# Patient Record
Sex: FEMALE | Race: WHITE | NOT HISPANIC OR LATINO | Employment: OTHER | URBAN - METROPOLITAN AREA
[De-identification: names, ages, dates, MRNs, and addresses within clinical notes are randomized per-mention and may not be internally consistent; named-entity substitution may affect disease eponyms.]

---

## 2017-01-06 ENCOUNTER — OFFICE VISIT (OUTPATIENT)
Dept: PHYSICAL MEDICINE AND REHAB | Facility: CLINIC | Age: 77
End: 2017-01-06
Payer: MEDICARE

## 2017-01-06 ENCOUNTER — OFFICE VISIT (OUTPATIENT)
Dept: PODIATRY | Facility: CLINIC | Age: 77
End: 2017-01-06
Payer: MEDICARE

## 2017-01-06 VITALS
SYSTOLIC BLOOD PRESSURE: 189 MMHG | BODY MASS INDEX: 30.86 KG/M2 | HEART RATE: 74 BPM | DIASTOLIC BLOOD PRESSURE: 91 MMHG | HEIGHT: 60 IN

## 2017-01-06 VITALS
HEART RATE: 78 BPM | DIASTOLIC BLOOD PRESSURE: 92 MMHG | WEIGHT: 158 LBS | BODY MASS INDEX: 31.02 KG/M2 | SYSTOLIC BLOOD PRESSURE: 173 MMHG | HEIGHT: 60 IN

## 2017-01-06 DIAGNOSIS — G89.29 CHRONIC BILATERAL LOW BACK PAIN WITH SCIATICA, SCIATICA LATERALITY UNSPECIFIED: ICD-10-CM

## 2017-01-06 DIAGNOSIS — Z86.12 HISTORY OF POLIOMYELITIS: ICD-10-CM

## 2017-01-06 DIAGNOSIS — M54.40 CHRONIC BILATERAL LOW BACK PAIN WITH SCIATICA, SCIATICA LATERALITY UNSPECIFIED: ICD-10-CM

## 2017-01-06 DIAGNOSIS — E11.40 TYPE 2 DIABETES MELLITUS WITH DIABETIC NEUROPATHY, WITHOUT LONG-TERM CURRENT USE OF INSULIN: ICD-10-CM

## 2017-01-06 DIAGNOSIS — E08.44 DIABETIC AMYOTROPHY ASSOCIATED WITH DIABETES MELLITUS DUE TO UNDERLYING CONDITION: ICD-10-CM

## 2017-01-06 DIAGNOSIS — M47.26 OSTEOARTHRITIS OF SPINE WITH RADICULOPATHY, LUMBAR REGION: ICD-10-CM

## 2017-01-06 DIAGNOSIS — G14 POST-POLIOMYELITIS MUSCULAR ATROPHY: ICD-10-CM

## 2017-01-06 DIAGNOSIS — M17.11 PRIMARY OSTEOARTHRITIS OF RIGHT KNEE: ICD-10-CM

## 2017-01-06 DIAGNOSIS — G82.20 PARAPARESIS OF BOTH LOWER LIMBS: ICD-10-CM

## 2017-01-06 DIAGNOSIS — M21.372 FOOT DROP, LEFT: ICD-10-CM
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
## 2017-01-06 DIAGNOSIS — M47.16 LUMBAR SPONDYLOSIS WITH MYELOPATHY: ICD-10-CM

## 2017-01-06 DIAGNOSIS — L84 CORN OR CALLUS: ICD-10-CM

## 2017-01-06 DIAGNOSIS — R26.9 GAIT DISORDER: Primary | ICD-10-CM

## 2017-01-06 DIAGNOSIS — M48.061 LUMBAR SPINAL STENOSIS: ICD-10-CM

## 2017-01-06 DIAGNOSIS — E11.42 DIABETIC POLYNEUROPATHY ASSOCIATED WITH TYPE 2 DIABETES MELLITUS: ICD-10-CM

## 2017-01-06 DIAGNOSIS — M54.16 LEFT LUMBAR RADICULOPATHY: ICD-10-CM

## 2017-01-06 DIAGNOSIS — B35.1 ONYCHOMYCOSIS DUE TO DERMATOPHYTE: ICD-10-CM

## 2017-01-06 DIAGNOSIS — G14 POST POLIOMYELITIS SYNDROME: ICD-10-CM

## 2017-01-06 DIAGNOSIS — G60.9 IDIOPATHIC PERIPHERAL NEUROPATHY: Primary | ICD-10-CM

## 2017-01-06 PROCEDURE — 3077F SYST BP >= 140 MM HG: CPT | Mod: S$GLB,,, | Performed by: PHYSICAL MEDICINE & REHABILITATION

## 2017-01-06 PROCEDURE — 99999 PR PBB SHADOW E&M-EST. PATIENT-LVL II: CPT | Mod: PBBFAC,,, | Performed by: PODIATRIST

## 2017-01-06 PROCEDURE — 99499 UNLISTED E&M SERVICE: CPT | Mod: S$GLB,,, | Performed by: PODIATRIST

## 2017-01-06 PROCEDURE — 99214 OFFICE O/P EST MOD 30 MIN: CPT | Mod: S$GLB,,, | Performed by: PHYSICAL MEDICINE & REHABILITATION

## 2017-01-06 PROCEDURE — 1160F RVW MEDS BY RX/DR IN RCRD: CPT | Mod: S$GLB,,, | Performed by: PHYSICAL MEDICINE & REHABILITATION

## 2017-01-06 PROCEDURE — 11056 PARNG/CUTG B9 HYPRKR LES 2-4: CPT | Mod: Q9,S$GLB,, | Performed by: PODIATRIST

## 2017-01-06 PROCEDURE — 1159F MED LIST DOCD IN RCRD: CPT | Mod: S$GLB,,, | Performed by: PHYSICAL MEDICINE & REHABILITATION

## 2017-01-06 PROCEDURE — 1157F ADVNC CARE PLAN IN RCRD: CPT | Mod: S$GLB,,, | Performed by: PHYSICAL MEDICINE & REHABILITATION

## 2017-01-06 PROCEDURE — 99999 PR PBB SHADOW E&M-EST. PATIENT-LVL III: CPT | Mod: PBBFAC,,, | Performed by: PHYSICAL MEDICINE & REHABILITATION

## 2017-01-06 PROCEDURE — 3080F DIAST BP >= 90 MM HG: CPT | Mod: S$GLB,,, | Performed by: PHYSICAL MEDICINE & REHABILITATION

## 2017-01-06 PROCEDURE — 99499 UNLISTED E&M SERVICE: CPT | Mod: S$GLB,,, | Performed by: PHYSICAL MEDICINE & REHABILITATION

## 2017-01-06 PROCEDURE — 11721 DEBRIDE NAIL 6 OR MORE: CPT | Mod: Q9,59,S$GLB, | Performed by: PODIATRIST

## 2017-01-06 RX ORDER — HYDROCODONE BITARTRATE AND ACETAMINOPHEN 10; 325 MG/1; MG/1
1 TABLET ORAL EVERY 8 HOURS PRN
Qty: 90 TABLET | Refills: 0 | Status: SHIPPED | OUTPATIENT
Start: 2017-02-06 | End: 2017-03-08

## 2017-01-06 RX ORDER — HYDROCODONE BITARTRATE AND ACETAMINOPHEN 10; 325 MG/1; MG/1
1 TABLET ORAL EVERY 8 HOURS PRN
Qty: 90 TABLET | Refills: 0 | Status: SHIPPED | OUTPATIENT
Start: 2017-03-06 | End: 2017-04-06 | Stop reason: SDUPTHER

## 2017-01-06 RX ORDER — HYDROCODONE BITARTRATE AND ACETAMINOPHEN 10; 325 MG/1; MG/1
1 TABLET ORAL EVERY 8 HOURS PRN
Qty: 90 TABLET | Refills: 0 | Status: SHIPPED | OUTPATIENT
Start: 2017-01-06 | End: 2017-02-05

## 2017-01-06 RX ORDER — GABAPENTIN 600 MG/1
600 TABLET ORAL 3 TIMES DAILY
Qty: 270 TABLET | Refills: 2 | Status: SHIPPED | OUTPATIENT
Start: 2017-01-06 | End: 2017-04-06

## 2017-01-06 NOTE — MR AVS SNAPSHOT
[facility]  [address]  [facility] [address]  Phone: [phone]                  Emilia Alan   2017 12:00 PM   Office Visit    Description:  Female : 1940   Provider:  Morro Ford DPM   Department:  [facility]           Reason for Visit     Peripheral Neuropathy     Foot Problem     Diabetes Mellitus     Diabetic Foot Exam     Routine Foot Care                To Do List           Future Appointments        Provider Department Dept Phone    2017 10:30 AM Krista Burger MD [facility] [phone]    2017 11:15 AM Carole Niño DPM [facility] [phone]      Goals (5 Years of Data)     None      [facility]     [facility] -  Assistance  Registered nurses in the [facility] provide clinical advisement, health education, appointment booking, and other advisory services.  Call for this free service at [phone].             Medications           Message regarding Medications     Verify the changes and/or additions to your medication regime listed below are the same as discussed with your clinician today.  If any of these changes or additions are incorrect, please notify your healthcare provider.             Verify that the below list of medications is an accurate representation of the medications you are currently taking.  If none reported, the list may be blank. If incorrect, please contact your healthcare provider. Carry this list with you in case of emergency.           Current Medications     blood sugar diagnostic Strp 1 strip by Misc.(Non-Drug; Combo Route) route 2 (two) times daily. TRUE RESULT SYSTEM    blood-glucose meter (TRUERESULT BLOOD GLUCOSE SYSTM) kit Use as instructed    calcium-vitamin D3-vitamin K (VIACTIV) 500-100-40 mg-unit-mcg Chew Take 2 each by mouth.    diclofenac (VOLTAREN) 75 MG EC tablet TAKE 1 TABLET BY MOUTH TWICE DAILY WITH MEALS. STOP IF  ANY STOMACH IRRITATION    diclofenac sodium (VOLTAREN) 1 % Gel Apply 4 gm to both knees 3x/day.    docusate sodium (COLACE) 50 MG capsule Take 50 mg by mouth 2 (two) times daily as needed for Constipation.    econazole nitrate 1 % cream Apply topically once daily.    enalapril (VASOTEC) 20 MG tablet Take 1 tablet (20 mg total) by mouth 2 (two) times daily.    glipiZIDE (GLUCOTROL) 5 MG tablet Take 1 tablet (5 mg total) by mouth once daily.    hydrochlorothiazide (HYDRODIURIL) 25 MG tablet Take 1 tablet (25 mg total) by mouth once daily.    lancets Misc TEST 2 X DAILY PROSPER RESULT SYSTEM    metformin (GLUCOPHAGE-XR) 500 MG 24 hr tablet TK 1 T PO QD    misoprostol (CYTOTEC) 200 MCG Tab Take 1 tablet (200 mcg total) by mouth 2 (two) times daily.    multivitamin (THERAGRAN) per tablet     oxybutynin (DITROPAN) 5 MG Tab Take 1 tablet (5 mg total) by mouth 2 (two) times daily.    pravastatin (PRAVACHOL) 40 MG tablet Take 1.5 tablets (60 mg total) by mouth nightly.    verapamil (CALAN-SR) 240 MG CR tablet Take 1 tablet (240 mg total) by mouth once daily.    gabapentin (NEURONTIN) 600 MG tablet Take 1 tablet (600 mg total) by mouth 3 (three) times daily.    hydrocodone-acetaminophen 10-325mg (NORCO)  mg Tab Take 1 tablet by mouth every 6 (six) hours as needed (pain).    hydrocodone-acetaminophen 10-325mg (NORCO)  mg Tab Take 1 tablet by mouth every 8 (eight) hours as needed (pain).    hydrocodone-acetaminophen 10-325mg (NORCO)  mg Tab Starting on Feb 06, 2017. Take 1 tablet by mouth every 8 (eight) hours as needed (pain).    hydrocodone-acetaminophen 10-325mg (NORCO)  mg Tab Starting on Mar 06, 2017. Take 1 tablet by mouth every 8 (eight) hours as needed (pain).           Clinical Reference Information           Vital Signs - Last Recorded  Most recent update: 1/6/2017 11:48 AM by Kymberly Guerra MA    BP Pulse Ht BMI       (!) 189/91 74 5' (1.524 m) 30.86 kg/m2       Blood Pressure          Most  Recent Value    BP  (!)  189/91      Allergies as of 1/6/2017     No Known Allergies      Immunizations Administered on Date of Encounter - 1/6/2017     None      [facility] Sign-Up     Activating your [facility] account is as easy as 1-2-3!     1) Visit [email or web address], select Sign Up Now, enter this activation code and your date of birth, then select Next.  [email or web address]  Expires: 2/20/2017 11:25 AM      2) Create a username and password to use when you visit [facility] in the future and select a security question in case you lose your password and select Next.    3) Enter your e-mail address and click Sign Up!    Additional Information  If you have questions, please e-mail [email or web address] or call [phone] to talk to our [facility] staff. Remember, [facility] is NOT to be used for urgent needs. For medical emergencies, dial 911.

## 2017-01-06 NOTE — PROGRESS NOTES
Subjective:      Patient ID: Emilia Alan is a 76 y.o. female.    Chief Complaint: Peripheral Neuropathy (PCP Dr. Deras 11/14/16); Foot Problem; Diabetes Mellitus; Diabetic Foot Exam; and Routine Foot Care    Emilia is a 76 y.o. female who presents to the clinic for evaluation and treatment of high risk feet. 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 The patient's chief complaint is long, thick toenails.       PCP: Lauren Deras MD    Date Last Seen by PCP:   Chief Complaint   Patient presents with    Peripheral Neuropathy     PCP Dr. Deras 11/14/16    Foot Problem    Diabetes Mellitus    Diabetic Foot Exam    Routine Foot Care         Current shoe gear: DM shoes w/ AFO brace( L)    Hemoglobin A1C   Date Value Ref Range Status   11/10/2016 6.7 (H) 4.5 - 6.2 % Final     Comment:     According to ADA guidelines, hemoglobin A1C <7.0% represents  optimal control in non-pregnant diabetic patients.  Different  metrics may apply to specific populations.   Standards of Medical Care in Diabetes - 2016.  For the purpose of screening for the presence of diabetes:  <5.7%     Consistent with the absence of diabetes  5.7-6.4%  Consistent with increasing risk for diabetes   (prediabetes)  >or=6.5%  Consistent with diabetes  Currently no consensus exists for use of hemoglobin A1C  for diagnosis of diabetes for children.     05/17/2016 6.4 (H) 4.5 - 6.2 % Final   11/11/2015 6.0 4.5 - 6.2 % Final         Review  of Systems   Constitution: Negative for chills, decreased appetite and fever.   Cardiovascular: Negative for chest pain, claudication and leg swelling.   Respiratory: Negative for cough.    Skin: Positive for dry skin and nail changes.   Musculoskeletal: Positive for muscle weakness (foot drop). Negative for arthritis, back pain, gout, joint pain and myalgias.   Gastrointestinal: Negative for nausea and vomiting.   Neurological: Positive for numbness and paresthesias. Negative for loss of balance.           Objective:      Physical Exam   Constitutional: She is oriented to person, place, and time. She appears well-developed and well-nourished. No distress.   Cardiovascular:   Dorsalis pedis and posterior tibial pulses are diminished Bilaterally. Toes are cool to touch. Feet are warm proximally.There is decreased digital hair. Skin is atrophic, slightly hyperpigmented, and mildly edematous       Musculoskeletal: She exhibits no edema or tenderness.        Right ankle: Normal.        Left ankle: Normal.        Right foot: There is no swelling, no crepitus and no deformity.        Left foot: There is no swelling, no crepitus and no deformity.   Dropfoot left.      Lymphadenopathy:   No palpable lymph nodes   Neurological: She is alert and oriented to person, place, and time. She has normal strength.   [place]-Sarah 5.07 monofilamant testing is diminished Charbel feet. Sharp/dull sensation diminished Bilaterally. Light touch absent Bilaterally.       Skin: Skin is warm, dry and intact. No abrasion, no bruising, no burn, no laceration, no lesion, no petechiae and no rash noted. She is not diaphoretic. No pallor. Nails show no clubbing.   Nails 1-5 b/l  are elongated by  3-6 mm's, thickened by 3-5 mm's, dystrophic, and are darkened in  coloration . Xerosis Bilaterally. No open lesions noted.    Hyperkeratotic tissue noted to plantar L 5th MPJ and plantar L 5th toe   Psychiatric: She has a normal mood and affect. Judgment  and thought content normal.   Nursing note and vitals reviewed.          Assessment:       Encounter Diagnoses   Name Primary?    Idiopathic peripheral neuropathy Yes    History of poliomyelitis     Onychomycosis due to dermatophyte     Corn or callus     Post-poliomyelitis muscular atrophy     Foot drop, left          Plan:       Emilia was seen today for peripheral neuropathy, foot problem, diabetes mellitus, diabetic foot exam and routine foot care.    Diagnoses and all orders for this visit:    Idiopathic peripheral neuropathy    History of poliomyelitis    Onychomycosis due to dermatophyte    Corn or callus    Post-poliomyelitis muscular atrophy    Foot drop, left    I counseled the patient on her conditions, their implications and medical management.    - Shoe inspection.Patient instructed on proper foot hygeine. We discussed wearing proper shoe gear, daily foot inspections, never walking without protective shoe gear, never putting sharp instruments to feet, routine podiatric nail visits every 2-3 months.      - With patient's permission, nails were aggressively reduced and debrided x 10 to their soft tissue attachment mechanically and with electric , removing all offending nail and debris. Patient relates relief following the procedure. She will continue to monitor the areas daily, inspect her feet, wear protective shoe gear when ambulatory, moisturizer to maintain skin integrity and follow in this office in approximately 2-3 months, sooner p.r.n.    - After cleansing the  area w/ alcohol prep pad the above mentioned hyperkeratosis was trimmed utilizing No 15 scapel, to a smooth base with out incident. Patient tolerated this  well and reported comfort to the area of plantar L 5th MPJ and lateral l 5th toe

## 2017-01-06 NOTE — PROGRESS NOTES
Subjective:       Patient ID: Emilia Alan is a 76 y.o. female.    Chief Complaint: Gait Problem    Back Pain   Associated symptoms include leg pain. Pertinent negatives include no abdominal pain, chest pain, fever, numbness or weakness. Headaches:     Leg Pain    Pertinent negatives include no numbness.   Extremity Weakness    Pertinent negatives include no fever or numbness.   Knee Pain    Pertinent negatives include no numbness.   Ms. Alan is a 76-year-old white female returns to clinic for face to face evaluation for scooter.  She has known with past medical history of polio, diagnosed at the age of 18 months and postpolio syndrome diagnosed a couple of years ago.    She has paraparesis, progressive b/l leg weakness, functional decline,a right leg is weaker than Left leg- that is weak in ankle only.   She cannot actively  Right LE, still walks short distances, but majority of time she spent sitting in manual wheel chair.  Today, she complains that she cannot walk any more, she stumbles a lot, and falls on daily basis if she tries to walk ( using RW).   She has at least 7 episodes of near fall during one week.no major injury reported.   Rt knee gives away on her, Rt leg is affected by polio, cannot lift or move at all Rt leg any longer.   She is still able to lift left leg.   She also have severe Lumbar Spinal stenosis at L3-4,with near complete obliteration of the left neural foramen, and severe Lumbar SS, at L4-5 with   Bilateral severe neural foramina narrowing.    She is here for follow up visit for back pain, and chronic pain management.    Today, she complains about back pain.   Current back pain is 0, worst pain is 9 while upright and walking.    Pain is localized across lower back and in upper spine.    Back pain is off/on, present mainly during day time, sharp, severe ,stabbing pain.  Pain is worse with lying or sitting and getting up from chair.   With that pain she is afraid she will  fall down, since she gets more weakness in Right leg.   Complains also about right knee pain, that is weak, and goes backwards during walking.   The patient has had gradual worsening of her gait over the last few years.   She was prescribed a left AFO ( that she wears) and a right KAFO that patient did not like ( does not wear).   She cannot tolerate swedish knee cage as well.   Now asking for neoprene sleee brace , that will try to stabilized knee, and to take pressure off bone.   She continues to complain of progressive bilateral lower extremity weakness.   She reports frequent falls, especially in the last three months.   The patient lives in a single-silvia home with a ramp access.   She is independent with her dressing, feeding and grooming.   She is also independent with toileting and bathing using a shower chair.   She is no longer able to ambulate with single cane, nor with RW  She can go about 20 feet, but has to stop frequently.   She has manual wheel chair that is bulky, and she cannot propel, nor she can francisco longer use RW with seat, for any longer distances.   She is restricted by lower extremity weakness, especially on the right side. She has frequent falls, reportedly every day.   She did not sustain any significant injuries.   The patient does own a manual wheelchair, but not able to push, propel on her own, secondary to arm, Rt shoulder pain and weakness as well.     She is here for face to face evaluation for scooter, and for follow up visit , and medication adjustment.    Review of Systems   Constitutional: Negative for appetite change, chills, fatigue, fever and unexpected weight change.   HENT: Negative for drooling, trouble swallowing and voice change.    Eyes: Negative for pain and visual disturbance.   Respiratory: Negative for shortness of breath and wheezing.    Cardiovascular: Negative for chest pain and palpitations.   Gastrointestinal: Negative for abdominal distention, abdominal pain,  constipation and diarrhea.   Genitourinary: Negative for difficulty urinating.   Musculoskeletal: Positive for back pain and extremity weakness. Negative for arthralgias, gait problem, joint swelling, myalgias and neck stiffness.               Skin: Negative for color change and rash.   Neurological: Negative for dizziness, facial asymmetry, speech difficulty, weakness, light-headedness and numbness. Headaches:     Hematological: Negative for adenopathy.   Psychiatric/Behavioral: Negative for behavioral problems, confusion and sleep disturbance. The patient is not nervous/anxious.            Objective:      Physical Exam     Constitutional: She is oriented to person, place, and time. She appears well-developed and well-nourished.   HENT:   Head: Normocephalic.   Eyes: EOM are normal.   Neck: Normal range of motion.   Cardiovascular: Normal rate, regular rhythm and normal heart sounds.   Pulmonary/Chest: Breath sounds normal.   Musculoskeletal: Normal range of motion.   BUE:  ROM:full.  Strength: 5/5 at shoulders, elbows & hands.  Sensation to pinprick: intact  BLE: ROM:full.  Strength:   RLE: HF 0/5, KE 0/5,   Ankle DF 2-, Ankle PF 3  LLE:   HF 3-, KE 3-.  Ankle DF 1,  Ankle PF 3  Leg length discrepancy ( left is shorter than right), wears Left AFO.   Sensation to pinprick: intact .   DTR: decreased.       Xray of Right knee ( 2014)  Showed:  Standing AP knees and lateral of the right knee and merchant view of both knees are submitted.    Advanced degenerative change seen in the tricompartmental areas of the right knee.    Left knee shows mild degenerative change.  Both patellas show significant lateral deviation    MRI of Lumbar spine  ( 2015) ;  L2-L3:There is a circumferential disk bulge with moderate bilateral facet osseous hypertrophy and ligamentum flavum buckling. This results and mild narrowing of the spinal canal. The bilateral neural foramen remain patent.  L3-L4: There is a circumferential disk bulge with  left paracentral disk protrusion. This is associated with severe bilateral facet osseous hypertrophy, bilateral facet edema, and ligamentum flavum buckling.   These findings result in severe narrowing of the spinal canal and near complete obliteration of the left neural foramen.  The right neural foramen is moderately narrowed as well.  L4-L5: There is a circumferential disk bulge with superimposed central disk protrusion. This is associated with severe bilateral facet osseous hypertrophy and ligamentum flavum buckling.   These findings result in severe narrowing of the spinal canal and bilateral neural foramina, left greater than right.     Assessment:       1. Gait disorder    2. Paraparesis of both lower limbs    3. Post poliomyelitis syndrome    4. Type 2 diabetes mellitus with diabetic neuropathy, without long-term current use of insulin    5. Primary osteoarthritis of right knee    6. Chronic bilateral low back pain with sciatica, sciatica laterality unspecified    7. Fall, sequela    8. Lumbar spinal stenosis    9. Left lumbar radiculopathy    10. Osteoarthritis of spine with radiculopathy, lumbar region    11. History of poliomyelitis    12. Lumbar spondylosis with myelopathy    13. 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  Dispense: 90 tablet; Refill: 0  -     hydrocodone-acetaminophen 10-325mg (NORCO)  mg Tab; Take 1 tablet by mouth every 8 (eight) hours as needed (pain).  Dispense: 90 tablet; Refill: 0  -     hydrocodone-acetaminophen 10-325mg (NORCO)  mg Tab; Take 1 tablet by mouth every 8 (eight) hours as needed (pain).  Dispense: 90 tablet; Refill: 0  -     gabapentin (NEURONTIN) 600 MG tablet; Take 1 tablet (600 mg  total) by mouth 3 (three) times daily.  Dispense: 270 tablet; Refill: 2  -     HME - OTHER    Paraparesis of both lower limbs  -     hydrocodone-acetaminophen 10-325mg (NORCO)  mg Tab; Take 1 tablet by mouth every 8 (eight) hours as needed (pain).  Dispense: 90 tablet; Refill: 0  -     hydrocodone-acetaminophen 10-325mg (NORCO)  mg Tab; Take 1 tablet by mouth every 8 (eight) hours as needed (pain).  Dispense: 90 tablet; Refill: 0  -     hydrocodone-acetaminophen 10-325mg (NORCO)  mg Tab; Take 1 tablet by mouth every 8 (eight) hours as needed (pain).  Dispense: 90 tablet; Refill: 0  -     gabapentin (NEURONTIN) 600 MG tablet; Take 1 tablet (600 mg total) by mouth 3 (three) times daily.  Dispense: 270 tablet; Refill: 2  -     HME - OTHER    Post poliomyelitis syndrome  -     hydrocodone-acetaminophen 10-325mg (NORCO)  mg Tab; Take 1 tablet by mouth every 8 (eight) hours as needed (pain).  Dispense: 90 tablet; Refill: 0  -     hydrocodone-acetaminophen 10-325mg (NORCO)  mg Tab; Take 1 tablet by mouth every 8 (eight) hours as needed (pain).  Dispense: 90 tablet; Refill: 0  -     hydrocodone-acetaminophen 10-325mg (NORCO)  mg Tab; Take 1 tablet by mouth every 8 (eight) hours as needed (pain).  Dispense: 90 tablet; Refill: 0  -     gabapentin (NEURONTIN) 600 MG tablet; Take 1 tablet (600 mg total) by mouth 3 (three) times daily.  Dispense: 270 tablet; Refill: 2  -     HME - OTHER    Type 2 diabetes mellitus with diabetic neuropathy, without long-term current use of insulin  -     hydrocodone-acetaminophen 10-325mg (NORCO)  mg Tab; Take 1 tablet by mouth every 8 (eight) hours as needed (pain).  Dispense: 90 tablet; Refill: 0  -     hydrocodone-acetaminophen 10-325mg (NORCO)  mg Tab; Take 1 tablet by mouth every 8 (eight) hours as needed (pain).  Dispense: 90 tablet; Refill: 0  -     hydrocodone-acetaminophen 10-325mg (NORCO)  mg Tab; Take 1 tablet by mouth every 8  (eight) hours as needed (pain).  Dispense: 90 tablet; Refill: 0  -     gabapentin (NEURONTIN) 600 MG tablet; Take 1 tablet (600 mg total) by mouth 3 (three) times daily.  Dispense: 270 tablet; Refill: 2  -     HME - OTHER    Primary osteoarthritis of right knee  -     hydrocodone-acetaminophen 10-325mg (NORCO)  mg Tab; Take 1 tablet by mouth every 8 (eight) hours as needed (pain).  Dispense: 90 tablet; Refill: 0  -     hydrocodone-acetaminophen 10-325mg (NORCO)  mg Tab; Take 1 tablet by mouth every 8 (eight) hours as needed (pain).  Dispense: 90 tablet; Refill: 0  -     hydrocodone-acetaminophen 10-325mg (NORCO)  mg Tab; Take 1 tablet by mouth every 8 (eight) hours as needed (pain).  Dispense: 90 tablet; Refill: 0  -     gabapentin (NEURONTIN) 600 MG tablet; Take 1 tablet (600 mg total) by mouth 3 (three) times daily.  Dispense: 270 tablet; Refill: 2  -     HME - OTHER    Chronic bilateral low back pain with sciatica, sciatica laterality unspecified  -     hydrocodone-acetaminophen 10-325mg (NORCO)  mg Tab; Take 1 tablet by mouth every 8 (eight) hours as needed (pain).  Dispense: 90 tablet; Refill: 0  -     hydrocodone-acetaminophen 10-325mg (NORCO)  mg Tab; Take 1 tablet by mouth every 8 (eight) hours as needed (pain).  Dispense: 90 tablet; Refill: 0  -     hydrocodone-acetaminophen 10-325mg (NORCO)  mg Tab; Take 1 tablet by mouth every 8 (eight) hours as needed (pain).  Dispense: 90 tablet; Refill: 0  -     gabapentin (NEURONTIN) 600 MG tablet; Take 1 tablet (600 mg total) by mouth 3 (three) times daily.  Dispense: 270 tablet; Refill: 2  -     HME - OTHER    Fall, sequela  -     hydrocodone-acetaminophen 10-325mg (NORCO)  mg Tab; Take 1 tablet by mouth every 8 (eight) hours as needed (pain).  Dispense: 90 tablet; Refill: 0  -     hydrocodone-acetaminophen 10-325mg (NORCO)  mg Tab; Take 1 tablet by mouth every 8 (eight) hours as needed (pain).  Dispense: 90 tablet;  Refill: 0  -     hydrocodone-acetaminophen 10-325mg (NORCO)  mg Tab; Take 1 tablet by mouth every 8 (eight) hours as needed (pain).  Dispense: 90 tablet; Refill: 0  -     gabapentin (NEURONTIN) 600 MG tablet; Take 1 tablet (600 mg total) by mouth 3 (three) times daily.  Dispense: 270 tablet; Refill: 2  -     HME - OTHER    Lumbar spinal stenosis  -     hydrocodone-acetaminophen 10-325mg (NORCO)  mg Tab; Take 1 tablet by mouth every 8 (eight) hours as needed (pain).  Dispense: 90 tablet; Refill: 0  -     hydrocodone-acetaminophen 10-325mg (NORCO)  mg Tab; Take 1 tablet by mouth every 8 (eight) hours as needed (pain).  Dispense: 90 tablet; Refill: 0  -     hydrocodone-acetaminophen 10-325mg (NORCO)  mg Tab; Take 1 tablet by mouth every 8 (eight) hours as needed (pain).  Dispense: 90 tablet; Refill: 0  -     gabapentin (NEURONTIN) 600 MG tablet; Take 1 tablet (600 mg total) by mouth 3 (three) times daily.  Dispense: 270 tablet; Refill: 2  -     HME - OTHER    Left lumbar radiculopathy  -     hydrocodone-acetaminophen 10-325mg (NORCO)  mg Tab; Take 1 tablet by mouth every 8 (eight) hours as needed (pain).  Dispense: 90 tablet; Refill: 0  -     hydrocodone-acetaminophen 10-325mg (NORCO)  mg Tab; Take 1 tablet by mouth every 8 (eight) hours as needed (pain).  Dispense: 90 tablet; Refill: 0  -     hydrocodone-acetaminophen 10-325mg (NORCO)  mg Tab; Take 1 tablet by mouth every 8 (eight) hours as needed (pain).  Dispense: 90 tablet; Refill: 0  -     gabapentin (NEURONTIN) 600 MG tablet; Take 1 tablet (600 mg total) by mouth 3 (three) times daily.  Dispense: 270 tablet; Refill: 2  -     HME - OTHER    Osteoarthritis of spine with radiculopathy, lumbar region  -     hydrocodone-acetaminophen 10-325mg (NORCO)  mg Tab; Take 1 tablet by mouth every 8 (eight) hours as needed (pain).  Dispense: 90 tablet; Refill: 0  -     hydrocodone-acetaminophen 10-325mg (NORCO)  mg Tab; Take  1 tablet by mouth every 8 (eight) hours as needed (pain).  Dispense: 90 tablet; Refill: 0  -     hydrocodone-acetaminophen 10-325mg (NORCO)  mg Tab; Take 1 tablet by mouth every 8 (eight) hours as needed (pain).  Dispense: 90 tablet; Refill: 0  -     gabapentin (NEURONTIN) 600 MG tablet; Take 1 tablet (600 mg total) by mouth 3 (three) times daily.  Dispense: 270 tablet; Refill: 2  -     HME - OTHER    History of poliomyelitis  -     hydrocodone-acetaminophen 10-325mg (NORCO)  mg Tab; Take 1 tablet by mouth every 8 (eight) hours as needed (pain).  Dispense: 90 tablet; Refill: 0  -     hydrocodone-acetaminophen 10-325mg (NORCO)  mg Tab; Take 1 tablet by mouth every 8 (eight) hours as needed (pain).  Dispense: 90 tablet; Refill: 0  -     hydrocodone-acetaminophen 10-325mg (NORCO)  mg Tab; Take 1 tablet by mouth every 8 (eight) hours as needed (pain).  Dispense: 90 tablet; Refill: 0  -     gabapentin (NEURONTIN) 600 MG tablet; Take 1 tablet (600 mg total) by mouth 3 (three) times daily.  Dispense: 270 tablet; Refill: 2  -     HME - OTHER    Lumbar spondylosis with myelopathy  -     hydrocodone-acetaminophen 10-325mg (NORCO)  mg Tab; Take 1 tablet by mouth every 8 (eight) hours as needed (pain).  Dispense: 90 tablet; Refill: 0  -     hydrocodone-acetaminophen 10-325mg (NORCO)  mg Tab; Take 1 tablet by mouth every 8 (eight) hours as needed (pain).  Dispense: 90 tablet; Refill: 0  -     hydrocodone-acetaminophen 10-325mg (NORCO)  mg Tab; Take 1 tablet by mouth every 8 (eight) hours as needed (pain).  Dispense: 90 tablet; Refill: 0  -     gabapentin (NEURONTIN) 600 MG tablet; Take 1 tablet (600 mg total) by mouth 3 (three) times daily.  Dispense: 270 tablet; Refill: 2  -     HME - OTHER    Diabetic polyneuropathy associated with type 2 diabetes mellitus  -     hydrocodone-acetaminophen 10-325mg (NORCO)  mg Tab; Take 1 tablet by mouth every 8 (eight) hours as needed (pain).   Dispense: 90 tablet; Refill: 0  -     hydrocodone-acetaminophen 10-325mg (NORCO)  mg Tab; Take 1 tablet by mouth every 8 (eight) hours as needed (pain).  Dispense: 90 tablet; Refill: 0  -     hydrocodone-acetaminophen 10-325mg (NORCO)  mg Tab; Take 1 tablet by mouth every 8 (eight) hours as needed (pain).  Dispense: 90 tablet; Refill: 0  -     gabapentin (NEURONTIN) 600 MG tablet; Take 1 tablet (600 mg total) by mouth 3 (three) times daily.  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  Dispense: 90 tablet; Refill: 0  -     hydrocodone-acetaminophen 10-325mg (NORCO)  mg Tab; Take 1 tablet by mouth every 8 (eight) hours as needed (pain).  Dispense: 90 tablet; Refill: 0  -     hydrocodone-acetaminophen 10-325mg (NORCO)  mg Tab; Take 1 tablet by mouth every 8 (eight) hours as needed (pain).  Dispense: 90 tablet; Refill: 0  -     gabapentin (NEURONTIN) 600 MG tablet; Take 1 tablet (600 mg total) by mouth 3 (three) times daily.  Dispense: 270 tablet; Refill: 2  -     HME - OTHER      Patient with Post polio sy with  Paraparesis., severe Lumbar spinal stenosis at L3-4, and L4-5, with  Leg length discrepancy ( left is shorter than right), wears Left AFO, and has more proximal strength in LLE, than in RLE .   Also with severe DJD , genu valgus in R knee ( cannot toleate custom made  knee brace,  Nor Swedish knee cage).      .Rx: scooter, power mobility device  Lifetime need.  Face-to-face: 01/06/17.  - The patient was seen today for mobility evaluation for a powered mobility device-scooter due to significant impairment of gait and ADL's at home.   - The patient has multifactorial gait impairment, due to Post polio sy,  with Paraparesis, ( both leg weakness, Rt > Lt)   Complicated by  severe Lumbar spinal stenosis at L3-4, and L4-5, with  Leg length discrepancy ( left is shorter than right), severe DJD , genu valgus in R knee ,   That gives away, multiple falls.  - The patient is not able to ambulate safely to the kitchen or living room, she is able to wallk 15- 20 ft with RW, cannot make from one room to another safely,  Due to profound leg weakness, Paraparesis, ( both leg weakness, Rt > Lt)   - The patient is unable to use cane or walker for functional distances due to Paraparesis, (both leg weakness, Rt > Lt)  weakness, associated with  Rt shoulder/ arm pain/ DJD of Rt shoulder.  - The patient is unable to use a manual wheelchair for functional distances due to generalized and focal weakness in Rt shoulder/ arm associated with    profound leg weakness, Paraparesis, ( both leg weakness, Rt > Lt)   - The patient prefers scooter secondary to her small rooms, house, and to be able to transfer easier. She lives alove in single story house, and has a ramp to get to house. She states that she would need the smallest scooter, to be able to use in house.  - The patient's cognition is intact & she should be able to use a powered mobility device well at home.   - The patient was given a prescription for scooter.   - This will allow the patient to go safely from bedroom, to bathroom, to the kitchen, dining room or living room for feeding & ADL's, and socialization.   Would improve quality of her life and decrease a risk of falls.       1. Back pain   Will resume Hydrocodone 10/325 mg po TID, and Neurontin 300 mg, po QID.   Patient refuses neurosurgical evaluation, and ASHLEY to back.     2. Right knee pain, secondary to advanced tricompartmental DJD, refusing Ortho consult, does not want knee replacement.   Wears brace with genu recurvatum and valgus in R knee ( order Voltaren gel).     RTC in 3  months..    Total time spent face to face with patient was 25 minutes.   More than 50% of that time was spent in  counseling on diagnosis , prognosis and treatment options, evaluation for scooter.  I also caunsel patient  on common and most usual side effect of prescribed medications.   Risk and benefits of opiates, possible risk of developing opiate dependence and tolerance, need of strict compliance with prescribed medications.  I reviewed Primary care , and other specialty's notes to better coordinate patient's  care.   All questions were answered, and patient voiced understanding.

## 2017-01-06 NOTE — MR AVS SNAPSHOT
[facility]  [address]  [facility] [address]  Phone: [phone]                  Emilia Alan   2017 10:30 AM   Office Visit    Description:  Female : 1940   Provider:  Krista Burger MD   Department:  [facility]           Reason for Visit     Gait Problem           Diagnoses this Visit        Comments    Gait disorder    -  Primary     Paraparesis of both lower limbs         Post poliomyelitis syndrome         Type 2 diabetes mellitus with diabetic neuropathy, without long-term current use of insulin         Primary osteoarthritis of right knee         Chronic bilateral low back pain with sciatica, sciatica laterality unspecified         Fall, sequela         Lumbar spinal stenosis         Left lumbar radiculopathy         Osteoarthritis of spine with radiculopathy, lumbar region         History of poliomyelitis         Lumbar spondylosis with myelopathy         Diabetic polyneuropathy associated with type 2 diabetes mellitus         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       These Medications        Disp Refills Start End    hydrocodone-acetaminophen 10-325mg (NORCO)  mg Tab 90 tablet 0 2017    Take 1 tablet by mouth every 8 (eight) hours as needed (pain). - Oral    Pharmacy: [facility] [id number] [address] [facility] [address]  AT [facility] [address] Ph #: [phone]       hydrocodone-acetaminophen 10-325mg (NORCO)  mg Tab 90 tablet 0 2017 3/8/2017    Take 1 tablet by mouth every 8 (eight) hours as needed (pain). - Oral    Pharmacy: [facility] [id number] -  [place], [place] - [address] AT [facility] Ph #: [phone]       hydrocodone-acetaminophen 10-325mg (NORCO)  mg Tab 90 tablet 0 3/6/2017 4/5/2017    Take 1 tablet by mouth every 8 (eight) hours as needed (pain). - Oral    Pharmacy: [facility] [address] - [address] AT [facility] Ph #: [phone]       gabapentin (NEURONTIN) 600 MG tablet 270 tablet 2 1/6/2017 4/6/2017    Take 1 tablet (600 mg total) by mouth 3 (three) times daily. - Oral    Pharmacy: [facility] [address] - [address]  AT [facility] Ph #: [phone]         [facility]     [facility] Nurse Care Line - 24/7 Assistance  Registered nurses in the [facility] provide clinical advisement, health education, appointment booking, and other advisory services.  Call for this free service at [phone].             Medications           Message regarding Medications     Verify the changes and/or additions to your medication regime listed below are the same as discussed with your clinician today.  If any of these changes or additions are incorrect, please notify your healthcare provider.        START taking these NEW medications        Refills    hydrocodone-acetaminophen 10-325mg (NORCO)  mg Tab 0    Starting on: 2/6/2017    Sig: Take 1 tablet by mouth every 8 (eight) hours as needed (pain).    Class: Print    Route: Oral    hydrocodone-acetaminophen 10-325mg (NORCO)  mg Tab 0    Starting on: 3/6/2017    Sig: Take 1 tablet by mouth every 8 (eight) hours as needed (pain).    Class: Print    Route: Oral    gabapentin (NEURONTIN) 600 MG tablet 2    Sig: Take 1 tablet (600 mg total) by mouth 3 (three) times daily.    Class: Normal    Route: Oral      CHANGE how you are taking these medications     Start Taking Instead of    hydrocodone-acetaminophen 10-325mg (NORCO)  mg Tab hydrocodone-acetaminophen 10-325mg  (NORCO)  mg Tab    Dosage:  Take 1 tablet by mouth every 8 (eight) hours as needed (pain). Dosage:  Take 1 tablet by mouth every 6 (six) hours as needed (pain).    Reason for Change:  Reorder            Verify that the below list of medications is an accurate representation of the medications you are currently taking.  If none reported, the list may be blank. If incorrect, please contact your healthcare provider. Carry this list with you in case of emergency.           Current Medications     blood sugar diagnostic Strp 1 strip by Misc.(Non-Drug; Combo Route) route 2 (two) times daily. TRUE RESULT SYSTEM    blood-glucose meter (TRUERESULT BLOOD GLUCOSE SYSTM) kit Use as instructed    calcium-vitamin D3-vitamin K (VIACTIV) 500-100-40 mg-unit-mcg Chew Take 2 each by mouth.    diclofenac (VOLTAREN) 75 MG EC tablet TAKE 1 TABLET BY MOUTH TWICE DAILY WITH MEALS. STOP IF ANY STOMACH IRRITATION    diclofenac sodium (VOLTAREN) 1 % Gel Apply 4 gm to both knees 3x/day.    docusate sodium (COLACE) 50 MG capsule Take 50 mg by mouth 2 (two) times daily as needed for Constipation.    econazole nitrate 1 % cream Apply topically once daily.    enalapril (VASOTEC) 20 MG tablet Take 1 tablet (20 mg total) by mouth 2 (two) times daily.    glipiZIDE (GLUCOTROL) 5 MG tablet Take 1 tablet (5 mg total) by mouth once daily.    hydrochlorothiazide (HYDRODIURIL) 25 MG tablet Take 1 tablet (25 mg total) by mouth once daily.    lancets Misc TEST 2 X DAILY PROSPER RESULT SYSTEM    metformin (GLUCOPHAGE-XR) 500 MG 24 hr tablet TK 1 T PO QD    misoprostol (CYTOTEC) 200 MCG Tab Take 1 tablet (200 mcg total) by mouth 2 (two) times daily.    multivitamin (THERAGRAN) per tablet     oxybutynin (DITROPAN) 5 MG Tab Take 1 tablet (5 mg total) by mouth 2 (two) times daily.    pravastatin (PRAVACHOL) 40 MG tablet Take 1.5 tablets (60 mg total) by mouth nightly.    verapamil (CALAN-SR) 240 MG CR tablet Take 1 tablet (240 mg total) by mouth once daily.     gabapentin (NEURONTIN) 600 MG tablet Take 1 tablet (600 mg total) by mouth 3 (three) times daily.    hydrocodone-acetaminophen 10-325mg (NORCO)  mg Tab Take 1 tablet by mouth every 6 (six) hours as needed (pain).    hydrocodone-acetaminophen 10-325mg (NORCO)  mg Tab Take 1 tablet by mouth every 8 (eight) hours as needed (pain).    hydrocodone-acetaminophen 10-325mg (NORCO)  mg Tab Starting on Feb 06, 2017. Take 1 tablet by mouth every 8 (eight) hours as needed (pain).    hydrocodone-acetaminophen 10-325mg (NORCO)  mg Tab Starting on Mar 06, 2017. Take 1 tablet by mouth every 8 (eight) hours as needed (pain).           Clinical Reference Information           Vital Signs - Last Recorded  Most recent update: 1/6/2017 10:27 AM by Esmer Machuca MA    BP Pulse Ht Wt BMI    (!) 173/92 78 5' (1.524 m) 71.7 kg (158 lb) 30.86 kg/m2      Blood Pressure          Most Recent Value    BP  (!)  173/92      Allergies as of 1/6/2017     No Known Allergies      Immunizations Administered on Date of Encounter - 1/6/2017     None      Orders Placed During Today's Visit      Normal Orders This Visit    E - OTHER       [facility] Sign-Up     Activating your [facility] account is as easy as 1-2-3!     1) Visit [email or web address], select Sign Up Now, enter this activation code and your date of birth, then select Next.  [email or web address]  Expires: 2/20/2017 11:25 AM      2) Create a username and password to use when you visit [facility] in the future and select a security question in case you lose your password and select Next.    3) Enter your e-mail address and click Sign Up!    Additional Information  If you have questions, please e-mail [email or web address] or call [phone] to talk to our [facility] staff. Remember, [facility] is NOT to be used for urgent needs. For medical emergencies, dial 911.

## 2017-01-31 RX ORDER — DICLOFENAC SODIUM 75 MG/1
TABLET, DELAYED RELEASE ORAL
Qty: 180 TABLET | Refills: 0 | Status: SHIPPED | OUTPATIENT
Start: 2017-01-31 | End: 2017-04-25 | Stop reason: SDUPTHER

## 2017-04-06 ENCOUNTER — OFFICE VISIT (OUTPATIENT)
Dept: PODIATRY | Facility: CLINIC | Age: 77
End: 2017-04-06
Payer: MEDICARE

## 2017-04-06 ENCOUNTER — OFFICE VISIT (OUTPATIENT)
Dept: PHYSICAL MEDICINE AND REHAB | Facility: CLINIC | Age: 77
End: 2017-04-06
Payer: MEDICARE

## 2017-04-06 VITALS
HEIGHT: 60 IN | RESPIRATION RATE: 18 BRPM | WEIGHT: 162 LBS | BODY MASS INDEX: 31.8 KG/M2 | HEART RATE: 69 BPM | DIASTOLIC BLOOD PRESSURE: 79 MMHG | SYSTOLIC BLOOD PRESSURE: 180 MMHG

## 2017-04-06 VITALS
WEIGHT: 162.38 LBS | HEART RATE: 77 BPM | SYSTOLIC BLOOD PRESSURE: 158 MMHG | DIASTOLIC BLOOD PRESSURE: 80 MMHG | BODY MASS INDEX: 31.88 KG/M2 | HEIGHT: 60 IN

## 2017-04-06 DIAGNOSIS — M54.16 LEFT LUMBAR RADICULOPATHY: ICD-10-CM
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
## 2017-04-06 DIAGNOSIS — Z86.12 HISTORY OF POLIOMYELITIS: ICD-10-CM

## 2017-04-06 DIAGNOSIS — E11.42 DIABETIC POLYNEUROPATHY ASSOCIATED WITH TYPE 2 DIABETES MELLITUS: ICD-10-CM

## 2017-04-06 DIAGNOSIS — L84 CORN OR CALLUS: ICD-10-CM

## 2017-04-06 DIAGNOSIS — M48.061 LUMBAR SPINAL STENOSIS: Primary | ICD-10-CM

## 2017-04-06 DIAGNOSIS — G89.29 CHRONIC PAIN OF RIGHT KNEE: ICD-10-CM

## 2017-04-06 DIAGNOSIS — M17.11 PRIMARY OSTEOARTHRITIS OF RIGHT KNEE: ICD-10-CM

## 2017-04-06 DIAGNOSIS — G82.20 PARAPARESIS OF BOTH LOWER LIMBS: ICD-10-CM

## 2017-04-06 DIAGNOSIS — M25.561 CHRONIC PAIN OF RIGHT KNEE: ICD-10-CM

## 2017-04-06 DIAGNOSIS — M54.16 LUMBAR RADICULOPATHY: ICD-10-CM

## 2017-04-06 DIAGNOSIS — E08.44 DIABETIC AMYOTROPHY ASSOCIATED WITH DIABETES MELLITUS DUE TO UNDERLYING CONDITION: ICD-10-CM

## 2017-04-06 DIAGNOSIS — G14 POST POLIOMYELITIS SYNDROME: ICD-10-CM

## 2017-04-06 DIAGNOSIS — G89.29 CHRONIC BILATERAL LOW BACK PAIN WITH SCIATICA, SCIATICA LATERALITY UNSPECIFIED: ICD-10-CM

## 2017-04-06 DIAGNOSIS — M54.40 CHRONIC BILATERAL LOW BACK PAIN WITH SCIATICA, SCIATICA LATERALITY UNSPECIFIED: ICD-10-CM

## 2017-04-06 DIAGNOSIS — E11.40 TYPE 2 DIABETES MELLITUS WITH DIABETIC NEUROPATHY, WITHOUT LONG-TERM CURRENT USE OF INSULIN: ICD-10-CM

## 2017-04-06 DIAGNOSIS — B35.1 ONYCHOMYCOSIS DUE TO DERMATOPHYTE: ICD-10-CM

## 2017-04-06 DIAGNOSIS — M47.16 LUMBAR SPONDYLOSIS WITH MYELOPATHY: ICD-10-CM

## 2017-04-06 DIAGNOSIS — R26.9 GAIT DISORDER: ICD-10-CM

## 2017-04-06 DIAGNOSIS — M47.26 OSTEOARTHRITIS OF SPINE WITH RADICULOPATHY, LUMBAR REGION: ICD-10-CM

## 2017-04-06 DIAGNOSIS — G60.9 IDIOPATHIC PERIPHERAL NEUROPATHY: Primary | ICD-10-CM

## 2017-04-06 PROCEDURE — 99999 PR PBB SHADOW E&M-EST. PATIENT-LVL III: CPT | Mod: PBBFAC,,, | Performed by: PHYSICAL MEDICINE & REHABILITATION

## 2017-04-06 PROCEDURE — 3077F SYST BP >= 140 MM HG: CPT | Mod: S$GLB,,, | Performed by: PHYSICAL MEDICINE & REHABILITATION

## 2017-04-06 PROCEDURE — 99499 UNLISTED E&M SERVICE: CPT | Mod: S$GLB,,, | Performed by: PHYSICAL MEDICINE & REHABILITATION

## 2017-04-06 PROCEDURE — 3079F DIAST BP 80-89 MM HG: CPT | Mod: S$GLB,,, | Performed by: PHYSICAL MEDICINE & REHABILITATION

## 2017-04-06 PROCEDURE — 99499 UNLISTED E&M SERVICE: CPT | Mod: S$GLB,,, | Performed by: PODIATRIST

## 2017-04-06 PROCEDURE — 1125F AMNT PAIN NOTED PAIN PRSNT: CPT | Mod: S$GLB,,, | Performed by: PHYSICAL MEDICINE & REHABILITATION

## 2017-04-06 PROCEDURE — 1160F RVW MEDS BY RX/DR IN RCRD: CPT | Mod: S$GLB,,, | Performed by: PHYSICAL MEDICINE & REHABILITATION

## 2017-04-06 PROCEDURE — 11056 PARNG/CUTG B9 HYPRKR LES 2-4: CPT | Mod: Q9,S$GLB,, | Performed by: PODIATRIST

## 2017-04-06 PROCEDURE — 99999 PR PBB SHADOW E&M-EST. PATIENT-LVL III: CPT | Mod: PBBFAC,,, | Performed by: PODIATRIST

## 2017-04-06 PROCEDURE — 1159F MED LIST DOCD IN RCRD: CPT | Mod: S$GLB,,, | Performed by: PHYSICAL MEDICINE & REHABILITATION

## 2017-04-06 PROCEDURE — 1157F ADVNC CARE PLAN IN RCRD: CPT | Mod: S$GLB,,, | Performed by: PHYSICAL MEDICINE & REHABILITATION

## 2017-04-06 PROCEDURE — 99214 OFFICE O/P EST MOD 30 MIN: CPT | Mod: S$GLB,,, | Performed by: PHYSICAL MEDICINE & REHABILITATION

## 2017-04-06 PROCEDURE — 11721 DEBRIDE NAIL 6 OR MORE: CPT | Mod: 59,Q9,S$GLB, | Performed by: PODIATRIST

## 2017-04-06 RX ORDER — GABAPENTIN 300 MG/1
CAPSULE ORAL
Refills: 3 | COMMUNITY
Start: 2017-03-23 | End: 2017-04-25 | Stop reason: SDUPTHER

## 2017-04-06 RX ORDER — HYDROCODONE BITARTRATE AND ACETAMINOPHEN 10; 325 MG/1; MG/1
1 TABLET ORAL EVERY 6 HOURS PRN
Qty: 120 TABLET | Refills: 0 | Status: SHIPPED | OUTPATIENT
Start: 2017-04-06 | End: 2017-04-25 | Stop reason: SDUPTHER

## 2017-04-06 RX ORDER — HYDROCODONE BITARTRATE AND ACETAMINOPHEN 10; 325 MG/1; MG/1
1 TABLET ORAL EVERY 6 HOURS PRN
Qty: 120 TABLET | Refills: 0 | Status: SHIPPED | OUTPATIENT
Start: 2017-06-06 | End: 2017-07-06 | Stop reason: SDUPTHER

## 2017-04-06 RX ORDER — HYDROCODONE BITARTRATE AND ACETAMINOPHEN 10; 325 MG/1; MG/1
1 TABLET ORAL EVERY 6 HOURS PRN
Qty: 120 TABLET | Refills: 0 | Status: SHIPPED | OUTPATIENT
Start: 2017-05-06 | End: 2017-06-05

## 2017-04-06 RX ORDER — LANCETS 33 GAUGE
EACH MISCELLANEOUS
Refills: 3 | COMMUNITY
Start: 2017-02-10 | End: 2023-11-15

## 2017-04-06 NOTE — PROGRESS NOTES
Subjective:      Patient ID: Emilia Alan is a 76 y.o. female.    Chief Complaint: Follow-up (nail care ) and Callouses    Emilia is a 76 y.o. female who presents to the clinic for evaluation and treatment of high risk feet. 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 The patient's chief complaint is long, thick toenails.       PCP: Lauren Deras MD    Date Last Seen by PCP:   Chief Complaint   Patient presents with    Follow-up     nail care     Callouses         Current shoe gear: DM shoes w/ AFO brace( L)    Hemoglobin A1C   Date Value Ref Range Status   11/10/2016 6.7 (H) 4.5 - 6.2 % Final     Comment:     According to ADA guidelines, hemoglobin A1C <7.0% represents  optimal control in non-pregnant diabetic patients.  Different  metrics may apply to specific populations.   Standards of Medical Care in Diabetes - 2016.  For the purpose of screening for the presence of diabetes:  <5.7%     Consistent with the absence of diabetes  5.7-6.4%  Consistent with increasing risk for diabetes   (prediabetes)  >or=6.5%  Consistent with diabetes  Currently no consensus exists for use of hemoglobin A1C  for diagnosis of diabetes for children.     05/17/2016 6.4 (H) 4.5 - 6.2 % Final   11/11/2015 6.0 4.5 - 6.2 % Final         Review of Systems   Constitution: Negative for chills, decreased appetite and fever.   Cardiovascular: Negative for chest pain, claudication and leg swelling.   Respiratory: Negative for  cough.    Skin: Positive for dry skin and nail changes. Negative for flushing and itching.   Musculoskeletal: Positive for muscle weakness (foot drop). Negative for arthritis, back pain, gout, joint pain and myalgias.   Gastrointestinal: Negative for nausea and vomiting.   Neurological: Positive for numbness and paresthesias. Negative for loss of balance.           Objective:      Physical Exam   Constitutional: She is oriented to person, place, and time. She appears well-developed and well-nourished. No distress.   Cardiovascular:   Dorsalis pedis and posterior tibial pulses are diminished Bilaterally. Toes are cool to touch. Feet are warm proximally.There is decreased digital hair. Skin is atrophic, slightly hyperpigmented, and mildly edematous       Musculoskeletal: She exhibits no edema or tenderness.        Right ankle: Normal.        Left ankle: Normal.        Right foot: There is no swelling, no crepitus and no deformity.        Left foot: There is no swelling, no crepitus and no deformity.   Dropfoot left.      Lymphadenopathy:   No palpable lymph nodes   Neurological: She is alert and oriented to person, place, and time. She has normal strength.   [place]-Sarah 5.07 monofilamant testing is diminished Charbel feet. Sharp/dull sensation diminished Bilaterally. Light touch absent Bilaterally.       Skin: Skin is warm, dry and intact. No abrasion, no bruising, no burn, no laceration, no lesion, no petechiae and no rash noted. She is not diaphoretic. No pallor. Nails show no clubbing.   Nails 1-5 b/l  are elongated by  3-7 mm's, thickened by 3-5 mm's, dystrophic, and are darkened in  coloration . Xerosis Bilaterally. No open lesions noted.    Hyperkeratotic tissue noted to plantar L 5th MPJ and plantar L 5th toe   Psychiatric: She has a normal mood and affect. Judgment and thought content normal.   Nursing note and vitals reviewed.          Assessment:       Encounter Diagnoses   Name Primary?    Idiopathic  peripheral neuropathy Yes    History of poliomyelitis     Onychomycosis due to dermatophyte     Corn or callus          Plan:       Emilia was seen today for follow-up and callouses.    Diagnoses and all orders for this visit:    Idiopathic peripheral neuropathy    History of poliomyelitis    Onychomycosis due to dermatophyte    Corn or callus      I counseled the patient on her conditions, their implications and medical management.    - Shoe inspection.Patient instructed on proper foot hygeine. We discussed wearing proper shoe gear, daily foot inspections, never walking without protective shoe gear, never putting sharp instruments to feet, routine podiatric nail visits every 2-3 months.      - With patient's permission, nails were aggressively reduced and debrided x 10 to their soft tissue attachment mechanically and with electric , removing all offending nail and debris. Patient relates relief following the procedure. She will continue to monitor the areas daily, inspect her feet, wear protective shoe gear when ambulatory, moisturizer to maintain skin integrity and follow in this office in approximately 2-3 months, sooner p.r.n.    - After cleansing the  area w/ alcohol prep pad the above mentioned hyperkeratosis was trimmed utilizing No 15 scapel, to a smooth base with out incident. Patient tolerated this  well and reported comfort to the area of plantar L 5th MPJ and lateral l 5th toe

## 2017-04-06 NOTE — MR AVS SNAPSHOT
[facility]  [address]  [facility] [address]  Phone: [phone]                  Emilia SHAIKH Dayanna   2017 10:20 AM   Office Visit    Description:  Female : 1940   Provider:  Krista Burger MD   Department:  [facility]           Reason for Visit     Back Pain     Neck Pain           Diagnoses this Visit        Comments    Lumbar spinal stenosis    -  Primary     Lumbar radiculopathy         Paraparesis of both lower limbs         Osteoarthritis of spine with radiculopathy, lumbar region         Post poliomyelitis syndrome         Diabetic polyneuropathy associated with type 2 diabetes mellitus         History of poliomyelitis         Chronic pain of right knee         Lumbar spondylosis with myelopathy         Primary osteoarthritis of right knee         Gait disorder         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       These Medications        Disp Refills Start End    hydrocodone-acetaminophen 10-325mg (NORCO)  mg Tab 120 tablet 0 2017    Take 1 tablet by mouth every 6 (six) hours as needed for Pain (pain). - Oral    Pharmacy: [facility] [id number] - [address] [facility] [address]  AT [facility] [address] Ph #: [phone]       hydrocodone-acetaminophen 10-325mg (NORCO)  mg Tab 120 tablet 0 2017    Take 1 tablet by  mouth every 6 (six) hours as needed for Pain (pain). - Oral    [facility]: [facility] [id number]  [place] - [address]  AT [facility] Ph #: [phone]       hydrocodone-acetaminophen 10-325mg (NORCO)  mg Tab 120 tablet 0 6/6/2017 7/6/2017    Take 1 tablet by mouth every 6 (six) hours as needed for Pain (pain). - Oral    Pharmacy: [facility] [id number] - [place] - [address]  AT [facility] Ph #: [phone]         [facility]     [facility] On Call Nurse Care Line - 24/7 Assistance  Unless otherwise directed by your provider, please contact [facility], our nurse care line that is available for 24/7 assistance.     Registered nurses in the [facility] provide: appointment scheduling, clinical advisement, health education, and other advisory services.  Call: [phone] (toll free)               Medications           Message regarding Medications     Verify the changes and/or additions to your medication regime listed below are the same as discussed with your clinician today.  If any of these changes or additions are incorrect, please notify your healthcare provider.        START taking these NEW medications        Refills    hydrocodone-acetaminophen 10-325mg (NORCO)  mg Tab 0    Starting on: 5/6/2017    Sig: Take 1 tablet by mouth every 6 (six) hours as needed for Pain (pain).    Class: Print    Route: Oral    hydrocodone-acetaminophen 10-325mg (NORCO)  mg Tab 0    Starting on: 6/6/2017    Sig: Take 1 tablet by mouth every 6 (six) hours as needed for Pain (pain).    Class: Print    Route: Oral      CHANGE how you are taking these medications     Start Taking Instead of    hydrocodone-acetaminophen 10-325mg (NORCO)  mg Tab hydrocodone-acetaminophen 10-325mg (NORCO)  mg Tab    Dosage:  Take 1 tablet by mouth every 6 (six) hours as needed for Pain (pain). Dosage:  Take 1 tablet by mouth every 8 (eight) hours  as needed (pain).    Reason for Change:  Reorder            Verify that the below list of medications is an accurate representation of the medications you are currently taking.  If none reported, the list may be blank. If incorrect, please contact your healthcare provider. Carry this list with you in case of emergency.           Current Medications     blood sugar diagnostic Strp 1 strip by Misc.(Non-Drug; Combo Route) route 2 (two) times daily. TRUE RESULT SYSTEM    blood-glucose meter (TRUERESULT BLOOD GLUCOSE SYSTM) kit Use as instructed    calcium-vitamin D3-vitamin K (VIACTIV) 500-100-40 mg-unit-mcg Chew Take 2 each by mouth.    diclofenac (VOLTAREN) 75 MG EC tablet TAKE 1 TABLET BY MOUTH TWICE DAILY WITH MEALS. STOP IF ANY STOMACH IRRITATION    diclofenac sodium (VOLTAREN) 1 % Gel Apply 4 gm to both knees 3x/day.    docusate sodium (COLACE) 50 MG capsule Take 50 mg by mouth 2 (two) times daily as needed for Constipation.    econazole nitrate 1 % cream Apply topically once daily.    enalapril (VASOTEC) 20 MG tablet Take 1 tablet (20 mg total) by mouth 2 (two) times daily.    gabapentin (NEURONTIN) 600 MG tablet Take 1 tablet (600 mg total) by mouth 3 (three) times daily.    glipiZIDE (GLUCOTROL) 5 MG tablet Take 1 tablet (5 mg total) by mouth once daily.    hydrochlorothiazide (HYDRODIURIL) 25 MG tablet Take 1 tablet (25 mg total) by mouth once daily.    lancets Misc TEST 2 X DAILY PROSPER RESULT SYSTEM    metformin (GLUCOPHAGE-XR) 500 MG 24 hr tablet TK 1 T PO QD    misoprostol (CYTOTEC) 200 MCG Tab Take 1 tablet (200 mcg total) by mouth 2 (two) times daily.    multivitamin (THERAGRAN) per tablet     oxybutynin (DITROPAN) 5 MG Tab Take 1 tablet (5 mg total) by mouth 2 (two) times daily.    pravastatin (PRAVACHOL) 40 MG tablet Take 1.5 tablets (60 mg total) by mouth nightly.    verapamil (CALAN-SR) 240 MG CR tablet Take 1 tablet (240 mg total) by mouth once daily.    hydrocodone-acetaminophen 10-325mg (NORCO)   mg Tab Take 1 tablet by mouth every 6 (six) hours as needed for Pain (pain).    hydrocodone-acetaminophen 10-325mg (NORCO)  mg Tab Starting on May 06, 2017. Take 1 tablet by mouth every 6 (six) hours as needed for Pain (pain).    hydrocodone-acetaminophen 10-325mg (NORCO)  mg Tab Starting on Jun 06, 2017. Take 1 tablet by mouth every 6 (six) hours as needed for Pain (pain).           Clinical Reference Information           Your Vitals Were     BP Pulse Height Weight BMI    158/80 77 5' (1.524 m) 73.6 kg (162 lb 5.9 oz) 31.71 kg/m2      Blood Pressure          Most Recent Value    BP  (!)  158/80      Allergies as of 4/6/2017     No Known Allergies      Immunizations Administered on Date of Encounter - 4/6/2017     None      [facility] Sign-Up     Activating your [facility] account is as easy as 1-2-3!     1) Visit [email or web address], select Sign Up Now, enter this activation code and your date of birth, then select Next.  [email or web address]  Expires: 5/21/2017 11:07 AM      2) Create a username and password to use when you visit [facility] in the future and select a security question in case you lose your password and select Next.    3) Enter your e-mail address and click Sign Up!    Additional Information  If you have questions, please e-mail [email or web address] or call [phone] to talk to our [facility] staff. Remember, [facility] is NOT to be used for urgent needs. For medical emergencies, dial 911.         Language Assistance Services     ATTENTION: Language assistance services are available, free of charge. Please call [phone].      ATENCIÓN: Si habla katherine, tiene a medina disposición servicios gratuitos de asistencia lingüística. Llame al [phone].     CHÚ Ý: N?u b?n nói Ti?ng Vi?t, có các d?ch v? h? tr? ngôn ng? mi?n phí dành cho b?n. G?i s? [phone].         [facility] complies with applicable Federal civil rights laws and does not discriminate on the basis  of race, color, national origin, age, disability, or sex.

## 2017-04-20 ENCOUNTER — TELEPHONE (OUTPATIENT)
Dept: INTERNAL MEDICINE | Facility: CLINIC | Age: 77
End: 2017-04-20

## 2017-04-20 DIAGNOSIS — E11.42 DIABETIC POLYNEUROPATHY ASSOCIATED WITH TYPE 2 DIABETES MELLITUS: Primary | ICD-10-CM

## 2017-04-20 DIAGNOSIS — I10 ESSENTIAL HYPERTENSION: ICD-10-CM

## 2017-04-20 RX ORDER — VERAPAMIL HYDROCHLORIDE 240 MG/1
TABLET, FILM COATED, EXTENDED RELEASE ORAL
Qty: 90 TABLET | Refills: 0 | Status: SHIPPED | OUTPATIENT
Start: 2017-04-20 | End: 2017-04-25 | Stop reason: SDUPTHER

## 2017-04-20 RX ORDER — VERAPAMIL HYDROCHLORIDE 240 MG/1
TABLET, FILM COATED, EXTENDED RELEASE ORAL
Qty: 90 TABLET | Refills: 0 | Status: SHIPPED | OUTPATIENT
Start: 2017-04-20 | End: 2017-05-17 | Stop reason: SDUPTHER

## 2017-04-20 NOTE — TELEPHONE ENCOUNTER
Please call her    Blood pressure has been very high    She will be due for labs and needs to see me in May or at the latest June    Orders are in, please schedule appointment.  I did refill her medication.  Thank you

## 2017-04-25 DIAGNOSIS — G89.29 CHRONIC PAIN OF RIGHT KNEE: ICD-10-CM

## 2017-04-25 DIAGNOSIS — M17.11 PRIMARY OSTEOARTHRITIS OF RIGHT KNEE: ICD-10-CM
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
## 2017-04-25 DIAGNOSIS — M54.16 LEFT LUMBAR RADICULOPATHY: ICD-10-CM

## 2017-04-25 DIAGNOSIS — M54.16 LUMBAR RADICULOPATHY: ICD-10-CM

## 2017-04-25 DIAGNOSIS — M48.061 LUMBAR SPINAL STENOSIS: ICD-10-CM

## 2017-04-25 DIAGNOSIS — N39.46 MIXED INCONTINENCE: ICD-10-CM

## 2017-04-25 DIAGNOSIS — M25.561 CHRONIC PAIN OF RIGHT KNEE: ICD-10-CM

## 2017-04-25 DIAGNOSIS — M47.26 OSTEOARTHRITIS OF SPINE WITH RADICULOPATHY, LUMBAR REGION: ICD-10-CM

## 2017-04-25 DIAGNOSIS — M47.16 LUMBAR SPONDYLOSIS WITH MYELOPATHY: ICD-10-CM

## 2017-04-25 DIAGNOSIS — E11.42 DIABETIC POLYNEUROPATHY ASSOCIATED WITH TYPE 2 DIABETES MELLITUS: ICD-10-CM

## 2017-04-25 DIAGNOSIS — E78.5 HYPERLIPIDEMIA, UNSPECIFIED HYPERLIPIDEMIA TYPE: ICD-10-CM

## 2017-04-25 DIAGNOSIS — M54.40 CHRONIC BILATERAL LOW BACK PAIN WITH SCIATICA, SCIATICA LATERALITY UNSPECIFIED: ICD-10-CM

## 2017-04-25 DIAGNOSIS — G82.20 PARAPARESIS OF BOTH LOWER LIMBS: ICD-10-CM

## 2017-04-25 DIAGNOSIS — G89.29 CHRONIC BILATERAL LOW BACK PAIN WITH SCIATICA, SCIATICA LATERALITY UNSPECIFIED: ICD-10-CM

## 2017-04-25 DIAGNOSIS — E08.44 DIABETIC AMYOTROPHY ASSOCIATED WITH DIABETES MELLITUS DUE TO UNDERLYING CONDITION: ICD-10-CM

## 2017-04-25 DIAGNOSIS — R26.9 GAIT DISORDER: ICD-10-CM

## 2017-04-25 DIAGNOSIS — E11.40 TYPE 2 DIABETES MELLITUS WITH DIABETIC NEUROPATHY, WITHOUT LONG-TERM CURRENT USE OF INSULIN: ICD-10-CM

## 2017-04-25 DIAGNOSIS — Z86.12 HISTORY OF POLIOMYELITIS: ICD-10-CM

## 2017-04-25 DIAGNOSIS — G14 POST POLIOMYELITIS SYNDROME: ICD-10-CM

## 2017-04-25 RX ORDER — PRAVASTATIN SODIUM 40 MG/1
60 TABLET ORAL NIGHTLY
Qty: 135 TABLET | Refills: 3 | Status: SHIPPED | OUTPATIENT
Start: 2017-04-25 | End: 2017-05-17 | Stop reason: SDUPTHER

## 2017-04-25 RX ORDER — VERAPAMIL HYDROCHLORIDE 240 MG/1
TABLET, FILM COATED, EXTENDED RELEASE ORAL
Qty: 90 TABLET | Refills: 0 | Status: SHIPPED | OUTPATIENT
Start: 2017-04-25 | End: 2017-05-17

## 2017-04-25 RX ORDER — HYDROCHLOROTHIAZIDE 25 MG/1
25 TABLET ORAL DAILY
Qty: 90 TABLET | Refills: 3 | Status: SHIPPED | OUTPATIENT
Start: 2017-04-25 | End: 2017-05-17

## 2017-04-25 RX ORDER — GLIPIZIDE 5 MG/1
5 TABLET ORAL DAILY
Qty: 90 TABLET | Refills: 3 | Status: SHIPPED | OUTPATIENT
Start: 2017-04-25 | End: 2017-05-17 | Stop reason: SDUPTHER

## 2017-04-25 RX ORDER — HYDROCODONE BITARTRATE AND ACETAMINOPHEN 10; 325 MG/1; MG/1
1 TABLET ORAL EVERY 6 HOURS PRN
Qty: 120 TABLET | Refills: 0 | Status: SHIPPED | OUTPATIENT
Start: 2017-04-25 | End: 2017-04-26 | Stop reason: CLARIF

## 2017-04-25 RX ORDER — ENALAPRIL MALEATE 20 MG/1
20 TABLET ORAL 2 TIMES DAILY
Qty: 180 TABLET | Refills: 3 | Status: SHIPPED | OUTPATIENT
Start: 2017-04-25 | End: 2017-05-17 | Stop reason: SDUPTHER

## 2017-04-25 RX ORDER — OXYBUTYNIN CHLORIDE 5 MG/1
5 TABLET ORAL 2 TIMES DAILY
Qty: 180 TABLET | Refills: 3 | Status: SHIPPED | OUTPATIENT
Start: 2017-04-25 | End: 2017-05-17 | Stop reason: SDUPTHER

## 2017-04-25 RX ORDER — DICLOFENAC SODIUM 75 MG/1
TABLET, DELAYED RELEASE ORAL
Qty: 180 TABLET | Refills: 0 | Status: SHIPPED | OUTPATIENT
Start: 2017-04-25 | End: 2017-05-17

## 2017-04-25 RX ORDER — GABAPENTIN 300 MG/1
300 CAPSULE ORAL 2 TIMES DAILY
Qty: 180 CAPSULE | Refills: 3 | Status: SHIPPED | OUTPATIENT
Start: 2017-04-25 | End: 2017-05-17 | Stop reason: SDUPTHER

## 2017-04-25 RX ORDER — METFORMIN HYDROCHLORIDE 500 MG/1
TABLET, EXTENDED RELEASE ORAL
Qty: 60 TABLET | Refills: 5 | Status: SHIPPED | OUTPATIENT
Start: 2017-04-25 | End: 2017-05-17 | Stop reason: SDUPTHER

## 2017-04-25 NOTE — TELEPHONE ENCOUNTER
----- Message from Eva Licea sent at 4/25/2017  2:13 PM CDT -----  Contact: patient [phone]  Pt said that she needs all of her rx's filled/ about 10  and that you know what they are . She said that you also know the name of her pharmacy. Please call pt

## 2017-05-03 RX ORDER — MISOPROSTOL 200 UG/1
TABLET ORAL
Qty: 180 TABLET | Refills: 0 | Status: SHIPPED | OUTPATIENT
Start: 2017-05-03 | End: 2017-06-28

## 2017-05-10 ENCOUNTER — LAB VISIT (OUTPATIENT)
Dept: LAB | Facility: HOSPITAL | Age: 77
End: 2017-05-10
Attending: INTERNAL MEDICINE
Payer: MEDICARE

## 2017-05-10 DIAGNOSIS — E11.42 DIABETIC POLYNEUROPATHY ASSOCIATED WITH TYPE 2 DIABETES MELLITUS: ICD-10-CM

## 2017-05-10 DIAGNOSIS — I10 ESSENTIAL HYPERTENSION: ICD-10-CM

## 2017-05-10 LAB
ALBUMIN SERPL BCP-MCNC: 3 G/DL
ALP SERPL-CCNC: 79 U/L
ALT SERPL W/O P-5'-P-CCNC: 22 U/L
ANION GAP SERPL CALC-SCNC: 10 MMOL/L
AST SERPL-CCNC: 24 U/L
BASOPHILS # BLD AUTO: 0.12 K/UL
BASOPHILS NFR BLD: 1.5 %
BILIRUB SERPL-MCNC: 0.3 MG/DL
BUN SERPL-MCNC: 31 MG/DL
CALCIUM SERPL-MCNC: 9.5 MG/DL
CHLORIDE SERPL-SCNC: 103 MMOL/L
CHOLEST/HDLC SERPL: 3.6 {RATIO}
CO2 SERPL-SCNC: 27 MMOL/L
CREAT SERPL-MCNC: 1.1 MG/DL
DIFFERENTIAL METHOD: ABNORMAL
EOSINOPHIL # BLD AUTO: 0.6 K/UL
EOSINOPHIL NFR BLD: 7.1 %
ERYTHROCYTE [DISTWIDTH] IN BLOOD BY AUTOMATED COUNT: 12.6 %
EST. GFR  (AFRICAN AMERICAN): 56.4 ML/MIN/1.73 M^2
EST. GFR  (NON AFRICAN AMERICAN): 48.9 ML/MIN/1.73 M^2
ESTIMATED AVG GLUCOSE: 154 MG/DL
GLUCOSE SERPL-MCNC: 121 MG/DL
HBA1C MFR BLD HPLC: 7 %
HCT VFR BLD AUTO: 34.9 %
HDL/CHOLESTEROL RATIO: 27.5 %
HDLC SERPL-MCNC: 142 MG/DL
HDLC SERPL-MCNC: 39 MG/DL
HGB BLD-MCNC: 11.5 G/DL
LDLC SERPL CALC-MCNC: 48.8 MG/DL
LYMPHOCYTES # BLD AUTO: 2.9 K/UL
LYMPHOCYTES NFR BLD: 37.2 %
MCH RBC QN AUTO: 31.6 PG
MCHC RBC AUTO-ENTMCNC: 33 %
MCV RBC AUTO: 96 FL
MONOCYTES # BLD AUTO: 0.6 K/UL
MONOCYTES NFR BLD: 7.3 %
NEUTROPHILS # BLD AUTO: 3.6 K/UL
NEUTROPHILS NFR BLD: 46.6 %
NONHDLC SERPL-MCNC: 103 MG/DL
PLATELET # BLD AUTO: 210 K/UL
PMV BLD AUTO: 11.5 FL
POTASSIUM SERPL-SCNC: 4 MMOL/L
PROT SERPL-MCNC: 6.1 G/DL
RBC # BLD AUTO: 3.64 M/UL
SODIUM SERPL-SCNC: 140 MMOL/L
TRIGL SERPL-MCNC: 271 MG/DL
WBC # BLD AUTO: 7.79 K/UL

## 2017-05-10 PROCEDURE — 80053 COMPREHEN METABOLIC PANEL: CPT

## 2017-05-10 PROCEDURE — 36415 COLL VENOUS BLD VENIPUNCTURE: CPT

## 2017-05-10 PROCEDURE — 85025 COMPLETE CBC W/AUTO DIFF WBC: CPT

## 2017-05-10 PROCEDURE — 80061 LIPID PANEL: CPT

## 2017-05-10 PROCEDURE — 83036 HEMOGLOBIN GLYCOSYLATED A1C: CPT

## 2017-05-17 ENCOUNTER — OFFICE VISIT (OUTPATIENT)
Dept: INTERNAL MEDICINE | Facility: CLINIC | Age: 77
End: 2017-05-17
Payer: MEDICARE

## 2017-05-17 VITALS — DIASTOLIC BLOOD PRESSURE: 80 MMHG | SYSTOLIC BLOOD PRESSURE: 140 MMHG

## 2017-05-17 DIAGNOSIS — N39.46 MIXED INCONTINENCE: ICD-10-CM

## 2017-05-17 DIAGNOSIS — E11.42 DIABETIC POLYNEUROPATHY ASSOCIATED WITH TYPE 2 DIABETES MELLITUS: ICD-10-CM

## 2017-05-17 DIAGNOSIS — M48.061 LUMBAR SPINAL STENOSIS: ICD-10-CM

## 2017-05-17 DIAGNOSIS — G89.29 CHRONIC PAIN OF RIGHT KNEE: ICD-10-CM

## 2017-05-17 DIAGNOSIS — R26.9 GAIT DISORDER: ICD-10-CM

## 2017-05-17 DIAGNOSIS — E11.43 TYPE 2 DIABETES MELLITUS WITH AUTONOMIC NEUROPATHY, UNSPECIFIED LONG TERM INSULIN USE STATUS: ICD-10-CM

## 2017-05-17 DIAGNOSIS — M17.11 PRIMARY OSTEOARTHRITIS OF RIGHT KNEE: ICD-10-CM

## 2017-05-17 DIAGNOSIS — M47.26 OSTEOARTHRITIS OF SPINE WITH RADICULOPATHY, LUMBAR REGION: ICD-10-CM

## 2017-05-17 DIAGNOSIS — I10 ESSENTIAL HYPERTENSION: ICD-10-CM

## 2017-05-17 DIAGNOSIS — E11.40 TYPE 2 DIABETES MELLITUS WITH DIABETIC NEUROPATHY, WITHOUT LONG-TERM CURRENT USE OF INSULIN: ICD-10-CM

## 2017-05-17 DIAGNOSIS — G14 POST POLIOMYELITIS SYNDROME: ICD-10-CM

## 2017-05-17 DIAGNOSIS — D64.9 ANEMIA, UNSPECIFIED TYPE: ICD-10-CM

## 2017-05-17 DIAGNOSIS — M25.561 CHRONIC PAIN OF RIGHT KNEE: ICD-10-CM

## 2017-05-17 DIAGNOSIS — E53.8 B12 DEFICIENCY: ICD-10-CM

## 2017-05-17 DIAGNOSIS — E78.5 HYPERLIPIDEMIA, UNSPECIFIED HYPERLIPIDEMIA TYPE: ICD-10-CM

## 2017-05-17 DIAGNOSIS — E08.44 DIABETIC AMYOTROPHY ASSOCIATED WITH DIABETES MELLITUS DUE TO UNDERLYING CONDITION: ICD-10-CM

## 2017-05-17 DIAGNOSIS — M81.0 AGE-RELATED OSTEOPOROSIS WITHOUT CURRENT PATHOLOGICAL FRACTURE: ICD-10-CM

## 2017-05-17 DIAGNOSIS — N17.9 ACUTE RENAL FAILURE, UNSPECIFIED ACUTE RENAL FAILURE TYPE: ICD-10-CM

## 2017-05-17 DIAGNOSIS — Z00.00 ANNUAL PHYSICAL EXAM: Primary | ICD-10-CM

## 2017-05-17 PROBLEM — M54.16 LUMBAR RADICULOPATHY: Status: RESOLVED | Noted: 2017-01-06 | Resolved: 2017-05-17

## 2017-05-17 PROCEDURE — 99499 UNLISTED E&M SERVICE: CPT | Mod: S$GLB,,, | Performed by: INTERNAL MEDICINE

## 2017-05-17 PROCEDURE — 3079F DIAST BP 80-89 MM HG: CPT | Mod: S$GLB,,, | Performed by: INTERNAL MEDICINE

## 2017-05-17 PROCEDURE — 99397 PER PM REEVAL EST PAT 65+ YR: CPT | Mod: S$GLB,,, | Performed by: INTERNAL MEDICINE

## 2017-05-17 PROCEDURE — 3077F SYST BP >= 140 MM HG: CPT | Mod: S$GLB,,, | Performed by: INTERNAL MEDICINE

## 2017-05-17 PROCEDURE — 99999 PR PBB SHADOW E&M-EST. PATIENT-LVL III: CPT | Mod: PBBFAC,,, | Performed by: INTERNAL MEDICINE

## 2017-05-17 RX ORDER — ENALAPRIL MALEATE 20 MG/1
20 TABLET ORAL 2 TIMES DAILY
Qty: 180 TABLET | Refills: 3 | Status: SHIPPED | OUTPATIENT
Start: 2017-05-17 | End: 2018-01-05 | Stop reason: ALTCHOICE

## 2017-05-17 RX ORDER — HYDROCODONE BITARTRATE AND ACETAMINOPHEN 10; 325 MG/1; MG/1
1 TABLET ORAL EVERY 6 HOURS PRN
Qty: 120 TABLET | Refills: 0 | Status: CANCELLED | OUTPATIENT
Start: 2017-05-17 | End: 2017-06-16

## 2017-05-17 RX ORDER — ASPIRIN 81 MG/1
81 TABLET ORAL DAILY
Qty: 30 TABLET | Refills: 12 | Status: SHIPPED | OUTPATIENT
Start: 2017-05-17 | End: 2019-04-12 | Stop reason: SDUPTHER

## 2017-05-17 RX ORDER — OXYBUTYNIN CHLORIDE 5 MG/1
5 TABLET ORAL 2 TIMES DAILY
Qty: 180 TABLET | Refills: 3 | Status: SHIPPED | OUTPATIENT
Start: 2017-05-17 | End: 2018-02-05 | Stop reason: SDUPTHER

## 2017-05-17 RX ORDER — ATENOLOL 25 MG/1
25 TABLET ORAL DAILY
Qty: 90 TABLET | Refills: 3 | Status: ON HOLD | OUTPATIENT
Start: 2017-05-17 | End: 2017-06-10 | Stop reason: HOSPADM

## 2017-05-17 RX ORDER — DICLOFENAC SODIUM 75 MG/1
TABLET, DELAYED RELEASE ORAL
Qty: 180 TABLET | Refills: 0 | Status: CANCELLED | OUTPATIENT
Start: 2017-05-17

## 2017-05-17 RX ORDER — HYDROCHLOROTHIAZIDE 25 MG/1
25 TABLET ORAL DAILY
Qty: 90 TABLET | Refills: 3 | Status: CANCELLED | OUTPATIENT
Start: 2017-05-17

## 2017-05-17 RX ORDER — GLIPIZIDE 5 MG/1
5 TABLET ORAL DAILY
Qty: 90 TABLET | Refills: 3 | Status: SHIPPED | OUTPATIENT
Start: 2017-05-17 | End: 2018-02-05 | Stop reason: SDUPTHER

## 2017-05-17 RX ORDER — METFORMIN HYDROCHLORIDE 500 MG/1
TABLET, EXTENDED RELEASE ORAL
Qty: 60 TABLET | Refills: 5 | Status: SHIPPED | OUTPATIENT
Start: 2017-05-17 | End: 2017-06-28

## 2017-05-17 RX ORDER — GABAPENTIN 300 MG/1
300 CAPSULE ORAL 2 TIMES DAILY
Qty: 180 CAPSULE | Refills: 3 | Status: SHIPPED | OUTPATIENT
Start: 2017-05-17 | End: 2017-06-28

## 2017-05-17 RX ORDER — VERAPAMIL HYDROCHLORIDE 240 MG/1
TABLET, FILM COATED, EXTENDED RELEASE ORAL
Qty: 90 TABLET | Refills: 3 | Status: ON HOLD | OUTPATIENT
Start: 2017-05-17 | End: 2017-06-10 | Stop reason: HOSPADM

## 2017-05-17 RX ORDER — HYDROCODONE BITARTRATE AND ACETAMINOPHEN 10; 325 MG/1; MG/1
1 TABLET ORAL EVERY 6 HOURS PRN
Qty: 120 TABLET | Refills: 0 | Status: CANCELLED | OUTPATIENT
Start: 2017-06-06 | End: 2017-07-06

## 2017-05-17 RX ORDER — PRAVASTATIN SODIUM 40 MG/1
60 TABLET ORAL NIGHTLY
Qty: 135 TABLET | Refills: 3 | Status: SHIPPED | OUTPATIENT
Start: 2017-05-17 | End: 2018-02-05 | Stop reason: SDUPTHER

## 2017-05-17 NOTE — MR AVS SNAPSHOT
[facility] [place] [facility]  [address]  [facility] [address]  Phone: [phone]  Fax: [phone]                  Emilia Alan   2017 11:30 AM   Office Visit    Description:  Female : 1940   Provider:  Lauren Deras MD   Department:  [facility] [place] [facility]           Reason for Visit     Annual Exam           Diagnoses this Visit        Comments    Annual physical exam    -  Primary     Type 2 diabetes mellitus with autonomic neuropathy, unspecified long term insulin use status         Osteoarthritis of spine with radiculopathy, lumbar region         Post poliomyelitis syndrome         Diabetic polyneuropathy associated with type 2 diabetes mellitus         Primary osteoarthritis of right knee         Gait disorder         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         Type 2 diabetes mellitus with diabetic neuropathy, without long-term current use of insulin         Chronic pain of right knee         Hyperlipidemia, unspecified hyperlipidemia type         Mixed incontinence         Lumbar spinal stenosis         Essential hypertension         Acute renal failure, unspecified acute renal failure type         Anemia, unspecified type         B12 deficiency         Age-related osteoporosis without current pathological fracture                To Do List           Future Appointments        Provider Department Dept Phone    2017 8:15 AM Carole Niño DPM [facility] - Podiatry [phone]    2017 10:20 AM Krista Burger MD [facility]-Physical Med & Rehab [phone]      To Schedule:     Please call the Endoscopy Department at [phone] to schedule your appointment.          Goals (5 Years of Data)     None       These Medications        Disp Refills Start End    blood sugar diagnostic Strp 180 strip 4 2017     1 strip by Misc.(Non-Drug; Combo Route) route 2 (two) times daily. TRUE RESULT  SYSTEM - Misc.(Non-Drug; Combo Route)    [address] [place] [address] [facility] Ph #: [phone]       glipiZIDE (GLUCOTROL) 5 MG tablet 90 tablet 3 5/17/2017     Take 1 tablet (5 mg total) by mouth once daily. - Oral    Pharmacy: [address] AT [facility] Ph #: [phone]       gabapentin (NEURONTIN) 300 MG capsule 180 capsule 3 5/17/2017     Take 1 capsule (300 mg total) by mouth 2 (two) times daily. - Oral    Pharmacy: [address], [place] [facility] Ph #: [phone]       metformin (GLUCOPHAGE-XR) 500 MG 24 hr tablet 60 tablet 5 5/17/2017     TK 1 T PO QD    Pharmacy: [address] AT [facility] Ph #: [phone]       pravastatin (PRAVACHOL) 40 MG tablet 135 tablet 3 5/17/2017     Take 1.5 tablets (60 mg total) by mouth nightly. - Oral    Pharmacy: [address] AT [facility] Ph #: [phone]       oxybutynin (DITROPAN) 5 MG Tab 180 tablet 3 5/17/2017     Take 1 tablet (5 mg total) by mouth 2 (two) times daily. - Oral    Pharmacy: [address] [place] [address] [address] AT [facility] Ph #: [phone]       verapamil (CALAN-SR) 240 MG CR tablet 90 tablet 3 5/17/2017     TAKE 1 TABLET(240 MG) BY MOUTH EVERY DAY    Pharmacy: [address]  AT [facility] Ph #: [phone]       enalapril (VASOTEC) 20 MG tablet 180 tablet 3 5/17/2017     Take 1 tablet (20 mg total) by mouth 2 (two) times daily. - Oral    Pharmacy: [facility] [id number] [address] AT [facility] [address] Ph #: [phone]       atenolol (TENORMIN) 25 MG tablet 90 tablet 3 5/17/2017 5/17/2018    Take 1 tablet  (25 mg total) by mouth once daily. - Oral    Pharmacy: [facility] [id number]  [address] [facility] [address]  AT [facility] Ph #: [phone]       aspirin (ECOTRIN) 81 MG EC tablet 30 tablet 12 5/17/2017 5/17/2018    Take 1 tablet (81 mg total) by mouth once daily. - Oral    Pharmacy: [facility] [address] [facility] [place] [facility] [address]  AT [facility] Ph #: [phone]         [facility]     [facility] - 24/7 Assistance  Unless otherwise directed by your provider, please contact [facility], our nurse care line that is available for 24/7 assistance.     Registered nurses in the [facility] provide: appointment scheduling, clinical advisement, health education, and other advisory services.  Call: [phone] (toll free)               Medications           Message regarding Medications     Verify the changes and/or additions to your medication regime listed below are the same as discussed with your clinician today.  If any of these changes or additions are incorrect, please notify your healthcare provider.        START taking these NEW medications        Refills    atenolol (TENORMIN) 25 MG tablet 3    Sig: Take 1 tablet (25 mg total) by mouth once daily.    Class: Normal    Route: Oral    aspirin (ECOTRIN) 81 MG EC tablet 12    Sig: Take 1 tablet (81 mg total) by mouth once daily.    Class: Print    Route: Oral      STOP taking these medications     diclofenac (VOLTAREN) 75 MG EC tablet TAKE 1 TABLET BY MOUTH TWICE DAILY WITH MEALS. STOP IF ANY STOMACH IRRITATION    hydrochlorothiazide (HYDRODIURIL) 25 MG tablet Take 1 tablet (25 mg total) by mouth once daily.           Verify that the below list of medications is an accurate representation of the medications you are currently taking.  If none reported, the list may be blank. If incorrect, please contact your healthcare provider. Carry this list with you in case of emergency.            Current Medications     blood-glucose meter (TRUERESULT BLOOD GLUCOSE SYSTM) kit Use as instructed    calcium-vitamin D3-vitamin K (VIACTIV) 500-100-40 mg-unit-mcg Chew Take 2 each by mouth.    diclofenac sodium (VOLTAREN) 1 % Gel Apply 4 gm to both knees 3x/day.    docusate sodium (COLACE) 50 MG capsule Take 1 capsule (50 mg total) by mouth 2 (two) times daily as needed for Constipation.    econazole nitrate 1 % cream Apply topically once daily.    hydrocodone-acetaminophen 10-325mg (NORCO)  mg Tab Take 1 tablet by mouth every 6 (six) hours as needed for Pain (pain).    hydrocodone-acetaminophen 10-325mg (NORCO)  mg Tab Starting on Jun 06, 2017. Take 1 tablet by mouth every 6 (six) hours as needed for Pain (pain).    lancets Misc TEST 2 X DAILY PROSPER RESULT SYSTEM    misoprostol (CYTOTEC) 200 MCG Tab TAKE 1 TABLET(200 MCG) BY MOUTH TWICE DAILY    multivitamin (THERAGRAN) per tablet     TRUEPLUS LANCETS 33 gauge Misc USE BID    aspirin (ECOTRIN) 81 MG EC tablet Take 1 tablet (81 mg total) by mouth once daily.    atenolol (TENORMIN) 25 MG tablet Take 1 tablet (25 mg total) by mouth once daily.    blood sugar diagnostic Strp 1 strip by Misc.(Non-Drug; Combo Route) route 2 (two) times daily. TRUE RESULT SYSTEM    enalapril (VASOTEC) 20 MG tablet Take 1 tablet (20 mg total) by mouth 2 (two) times daily.    gabapentin (NEURONTIN) 300 MG capsule Take 1 capsule (300 mg total) by mouth 2 (two) times daily.    glipiZIDE (GLUCOTROL) 5 MG tablet Take 1 tablet (5 mg total) by mouth once daily.    metformin (GLUCOPHAGE-XR) 500 MG 24 hr tablet TK 1 T PO QD    oxybutynin (DITROPAN) 5 MG Tab Take 1 tablet (5 mg total) by mouth 2 (two) times daily.    pravastatin (PRAVACHOL) 40 MG tablet Take 1.5 tablets (60 mg total) by mouth nightly.    verapamil (CALAN-SR) 240 MG CR tablet TAKE 1 TABLET(240 MG) BY MOUTH EVERY DAY           Clinical Reference Information           Your Vitals Were     BP                   140/80            Blood Pressure          Most Recent Value    BP  (!)  140/80      Allergies as of 5/17/2017     No Known Allergies      Immunizations Administered on Date of Encounter - 5/17/2017     None      Orders Placed During Today's Visit      Normal Orders This Visit    Ambulatory consult to Endocrinology     Case request GI: COLONOSCOPY, ESOPHAGOGASTRODUODENOSCOPY (EGD)     Future Labs/Procedures Expected by Expires    Basic metabolic panel  5/17/2017 5/17/2018    CBC auto differential  5/17/2017 5/17/2018    Ferritin  5/17/2017 8/15/2017    Iron and TIBC  5/17/2017 8/15/2017    Protein electrophoresis, serum  5/17/2017 5/17/2018    Vitamin B12  5/17/2017 5/17/2018      [facility] Sign-Up     Activating your [facility] account is as easy as 1-2-3!     1) Visit [email or web address], select Sign Up Now, enter this activation code and your date of birth, then select Next.  [email or web address]  Expires: 5/21/2017 11:07 AM      2) Create a username and password to use when you visit [facility] in the future and select a security question in case you lose your password and select Next.    3) Enter your e-mail address and click Sign Up!    Additional Information  If you have questions, please e-mail [email or web address] or call [phone] to talk to our [facility] staff. Remember, [facility] is NOT to be used for urgent needs. For medical emergencies, dial 911.         Instructions      Low-Salt Diet  This diet removes foods that are high in salt. It also limits the amount of salt you use when cooking. It is most often used for people with high blood pressure, edema (fluid retention), and kidney, liver, or heart disease.  Table salt contains the mineral sodium. Your body needs sodium to work normally. But too much sodium can make your health problems worse. Your healthcare provider is recommending a low-salt (also called low-sodium) diet for you. Your total daily allowance of salt is 1,500 to 2,300 milligrams (mg). It is less than 1  teaspoon of table salt. This means you can have only about 500 to 700 mg of sodium at each meal. People with certain health problems should limit salt intake to the lower end of the recommended range.    When you cook, dont add much salt. If you can cook without using salt, even better. Dont add salt to your food at the table.  When shopping, read food labels. Salt is often called sodium on the label. Choose foods that are salt-free, low salt, or very low salt. Note that foods with reduced salt may not lower your salt intake enough.    Beans, potatoes, and pasta  Ok: Dry beans, split peas, lentils, potatoes, rice, macaroni, pasta, spaghetti without added salt  Avoid: Potato chips, tortilla chips, and similar products  Breads and cereals  Ok: Low-sodium breads, rolls, cereals, and cakes; low-salt crackers, matzo crackers  Avoid: Salted crackers, pretzels, popcorn, Mauritian toast, pancakes, muffins  Dairy  Ok: Milk, chocolate milk, hot chocolate mix, low-salt cheeses, and yogurt  Avoid: Processed cheese and cheese spreads; Roquefort, Camembert, and cottage cheese; buttermilk, instant breakfast drink  Desserts  Ok: Ice cream, frozen yogurt, juice bars, gelatin, cookies and pies, sugar, honey, jelly, hard candy  Avoid: Most pies, cakes and cookies prepared or processed with salt; instant pudding  Drinks  Ok: Tea, coffee, fizzy (carbonated) drinks, juices  Avoid: Flavored coffees, electrolyte replacement drinks, sports drinks  Meats  Ok: All fresh meat, fish, poultry, low-salt tuna, eggs, egg substitute  Avoid: Smoked, pickled, brine-cured, or salted meats and fish. This includes holden, chipped beef, corned beef, hot dogs, deli meats, ham, kosher meats, salt pork, sausage, canned tuna, salted codfish, smoked salmon, herring, sardines, or anchovies.  Seasonings and spices  Ok: Most seasonings are okay. Good substitutes for salt include: fresh herb blends, hot sauce, lemon, garlic, johansen, vinegar, dry mustard, parsley,  cilantro, horseradish, tomato paste, regular margarine, mayonnaise, unsalted butter, cream cheese, vegetable oil, cream, low-salt salad dressing and gravy.  Avoid: Regular ketchup, relishes, pickles, soy sauce, teriyaki sauce, Worcestershire sauce, BBQ sauce, tartar sauce, meat tenderizer, chili sauce, regular gravy, regular salad dressing, salted butter  Soups  Ok: Low-salt soups and broths made with allowed foods  Avoid: Bouillon cubes, soups with smoked or salted meats, regular soup and broth  Vegetables  Ok: Most vegetables are okay; also low-salt tomato and vegetable juices  Avoid: Sauerkraut and other brine-soaked vegetables; pickles and other pickled vegetables; tomato juice, olives  Date Last Reviewed: 8/1/2016 © 2000-2016 [employer]. [address] [place] [address]. All rights reserved. This information is not intended as a substitute for professional medical care. Always follow your healthcare professional's instructions.        Discharge Instructions for High Blood Pressure (Hypertension)  You have been diagnosed with high blood pressure (also called hypertension). This means the force of blood against your artery walls is too strong. It also means your heart is working hard to move blood. High blood pressure usually has no symptoms, but over time, it can damage your heart, blood vessels, eyes, kidneys, and other organs. With help from your doctor, you can manage your blood pressure and protect your health.  Taking medicine  · Learn to take your own blood pressure. Keep a record of your results. Ask your doctor which readings mean that you need medical attention.  · Take your blood pressure medicine exactly as directed. Dont skip doses. Missing doses can cause your blood pressure to get out of control.  · If you do miss a dose (or doses) check with your healthcare provider about what to do.  · Avoid medicine that contain heart stimulants, including over-the-counter drugs. Check  "for warnings about high blood pressure on the label. Ask the pharmacist before purchasing something you haven't used before  · Check with your doctor or pharmacist before taking a decongestant. Some decongestants can worsen high blood pressure.  Lifestyle changes  · Maintain a healthy weight. Get help to lose any extra pounds.  · Cut back on salt.  ¨ Limit canned, dried, packaged, and fast foods.  ¨ Dont add salt to your food at the table.  ¨ Season foods with herbs instead of salt when you cook.  ¨ Request no added salt when you go to a restaurant.  ¨ The American Heart Associations (AHA) "ideal" sodium intake recommendation is 1,500 milligrams per day.  However, since American's eat so much salt, the AHA says a positive change can occur by cutting back to even 2,400 milligrams of sodium a day.   · Follow the DASH (Dietary Approaches to Stop Hypertension) eating plan. This plan recommends vegetables, fruits, whole gains, and other heart healthy foods.  · Begin an exercise program. Ask your doctor how to get started. The American Heart Association recommends aerobic exercise 3 to 4 times a week for an average of 40 minutes at a time, with your doctor's approval. Simple activities like walking or gardening can help.  · Break the smoking habit. Enroll in a stop-smoking program to improve your chances of success. Ask your healthcare provider about programs and medicines to help you stop smoking.  · Limit drinks that contain caffeine (coffee, black or green tea, cola) to 2 per day.  · Never take stimulants such as amphetamines or cocaine; these drugs can be deadly for someone with high blood pressure.  · Control your stress. Learn stress-management techniques.  · Limit alcohol to no more than 1 drink a day for women and 2 drinks a day for men.  Follow-up care  Make a follow-up appointment as directed by our staff.     When to seek medical care  Call your doctor immediately or seek emergency care if you have any of the " following:  · Chest pain or shortness of breath (call 911)  · Moderate to severe headache  · Weakness in the muscles of your face, arms, or legs  · Trouble speaking  · Extreme drowsiness  · Confusion  · Fainting or dizziness  · Pulsating or rushing sound in your ears  · Unexplained nosebleed  · Weakness, tingling, or numbness of your face, arms, or legs  · Change in vision  · Blood pressure measured at home that is greater than 180/110   Date Last Reviewed: 4/27/2016  © 2340-6222 [employer]. [address] [place] [address]. All rights reserved. This information is not intended as a substitute for professional medical care. Always follow your healthcare professional's instructions.        Controlling High Blood Pressure  High blood pressure (hypertension) is often called the silent killer. This is because many people who have it dont know it. High blood pressure is defined as 140/90 mm Hg or higher. Know your blood pressure and remember to check it regularly. Doing so can save your life. Here are some things you can do to help control your blood pressure.    Choose heart-healthy foods  · Select low-salt, low-fat foods. Limit sodium intake to 2,400 mg per day or the amount suggested by your healthcare provider.  · Limit canned, dried, cured, packaged, and fast foods. These can contain a lot of salt.  · Eat 8 to 10 servings of fruits and vegetables every day.  · Choose lean meats, fish, or chicken.  · Eat whole-grain pasta, brown rice, and beans.  · Eat 2 to 3 servings of low-fat or fat-free dairy products.  · Ask your doctor about the DASH eating plan. This plan helps reduce blood pressure.  · When you go to a restaurant, ask that your meal be prepared with no added salt.  Maintain a healthy weight  · Ask your healthcare provider how many calories to eat a day. Then stick to that number.  · Ask your healthcare provider what weight range is healthiest for you. If you are overweight, a weight  loss of only 3% to 5% of your body weight can help lower blood pressure. Generally, a good weight loss goal is to lose 10% of your body weight in a year.  · Limit snacks and sweets.  · Get regular exercise.  Get up and get active  · Choose activities you enjoy. Find ones you can do with friends or family. This includes bicycling, dancing, walking, and jogging.  · Park farther away from building entrances.  · Use stairs instead of the elevator.  · When you can, walk or bike instead of driving.  · [place] leaves, garden, or do household repairs.  · Be active at a moderate to vigorous level of physical activity for at least 40 minutes for a minimum of 3 to 4 days a week.   Manage stress  · Make time to relax and enjoy life. Find time to laugh.  · Communicate your concerns with your loved ones and your healthcare provider.  · Visit with family and friends, and keep up with hobbies.  Limit alcohol and quit smoking  · Men should have no more than 2 drinks per day.  · Women should have no more than 1 drink per day.  · Talk with your healthcare provider about quitting smoking. Smoking significantly increases your risk for heart disease and stroke. Ask your healthcare provider about community smoking cessation programs and other options.  Medicines  If lifestyle changes arent enough, your healthcare provider may prescribe high blood pressure medicine. Take all medicines as prescribed. If you have any questions about your medicines, ask your healthcare provider before stopping or changing them.   Date Last Reviewed: 4/27/2016  © 4156-9041 [employer]. [address] [place] [address], [place] [address]. All rights reserved. This information is not intended as a substitute for professional medical care. Always follow your healthcare professional's instructions.      STOP HCTZ  STOP DICLOFENAC  INCREASE FLUIDS  START ATENOLOL             Language Assistance Services     ATTENTION: Language assistance services are available,  free of charge. Please call [phone].      ATENCIÓN: Si habla español, tiene a medina disposición servicios gratuitos de asistencia lingüística. Llame al [phone].     CHÚ Ý: N?u b?n nói Ti?ng Vi?t, có các d?ch v? h? tr? ngôn ng? mi?n phí dành cho b?n. G?i s? [phone].         [facility] complies with applicable Federal civil rights laws and does not discriminate on the basis of race, color, national origin, age, disability, or sex.

## 2017-05-17 NOTE — PATIENT INSTRUCTIONS
Low-Salt Diet  This diet removes foods that are high in salt. It also limits the amount of salt you use when cooking. It is most often used for people with high blood pressure, edema (fluid retention), and kidney, liver, or heart disease.  Table salt contains the mineral sodium. Your body needs sodium to work normally. But too much sodium can make your health problems worse. Your healthcare provider is recommending a low-salt (also called low-sodium) diet for you. Your total daily allowance of salt is 1,500 to 2,300 milligrams (mg). It is less than 1 teaspoon of table salt. This means you can have only about 500 to 700 mg of sodium at each meal. People with certain health problems should limit salt intake to the lower end of the recommended range.    When you cook, dont add much salt. If you can cook without using salt, even better. Dont add salt to your food at the table.  When shopping, read food labels. Salt is often called sodium on the label. Choose foods that are salt-free, low salt, or very low salt. Note that foods with reduced salt may not lower your salt intake enough.    Beans, potatoes, and pasta  Ok: Dry beans, split peas, lentils, potatoes, rice, macaroni, pasta, spaghetti without added salt  Avoid: Potato chips, tortilla chips, and similar products  Breads and cereals  Ok: Low-sodium breads, rolls, cereals, and cakes; low-salt crackers, matzo crackers  Avoid: Salted crackers, pretzels, popcorn, Vietnamese toast, pancakes, muffins  Dairy  Ok: Milk, chocolate milk, hot chocolate mix, low-salt cheeses, and yogurt  Avoid: Processed cheese and cheese spreads; Roquefort, Camembert, and cottage cheese; buttermilk, instant breakfast drink  Desserts  Ok: Ice cream, frozen yogurt, juice bars, gelatin, cookies and pies, sugar, honey, jelly, hard candy  Avoid: Most pies, cakes and cookies prepared or processed with salt; instant pudding  Drinks  Ok: Tea, coffee, fizzy (carbonated) drinks, juices  Avoid: Flavored  coffees, electrolyte replacement drinks, sports drinks  Meats  Ok: All fresh meat, fish, poultry, low-salt tuna, eggs, egg substitute  Avoid: Smoked, pickled, brine-cured, or salted meats and fish. This includes holden, chipped beef, corned beef, hot dogs, deli meats, ham, kosher meats, salt pork, sausage, canned tuna, salted codfish, smoked salmon, herring, sardines, or anchovies.  Seasonings and spices  Ok: Most seasonings are okay. Good substitutes for salt include: fresh herb blends, hot sauce, lemon, garlic, johansen, vinegar, dry mustard, parsley, cilantro, horseradish, tomato paste, regular margarine, mayonnaise, unsalted butter, cream cheese, vegetable oil, cream, low-salt salad dressing and gravy.  Avoid: Regular ketchup, relishes, pickles, soy sauce, teriyaki sauce, Worcestershire sauce, BBQ sauce, tartar sauce, meat tenderizer, chili sauce, regular gravy, regular salad dressing, salted butter  Soups  Ok: Low-salt soups and broths made with allowed foods  Avoid: Bouillon cubes, soups with smoked or salted meats, regular soup and broth  Vegetables  Ok: Most vegetables are okay; also low-salt tomato and vegetable juices  Avoid: Sauerkraut and other brine-soaked vegetables; pickles and other pickled vegetables; tomato juice, olives  Date Last Reviewed: 8/1/2016 © 2000-2016 [employer]. [address] [place] [address]. All rights reserved. This information is not intended as a substitute for professional medical care. Always follow your healthcare professional's instructions.        Discharge Instructions for High Blood Pressure (Hypertension)  You have been diagnosed with high blood pressure (also called hypertension). This means the force of blood against your artery walls is too strong. It also means your heart is working hard to move blood. High blood pressure usually has no symptoms, but over time, it can damage your heart, blood vessels, eyes, kidneys, and other organs. With help  "from your doctor, you can manage your blood pressure and protect your health.  Taking medicine  · Learn to take your own blood pressure. Keep a record of your results. Ask your doctor which readings mean that you need medical attention.  · Take your blood pressure medicine exactly as directed. Dont skip doses. Missing doses can cause your blood pressure to get out of control.  · If you do miss a dose (or doses) check with your healthcare provider about what to do.  · Avoid medicine that contain heart stimulants, including over-the-counter drugs. Check for warnings about high blood pressure on the label. Ask the pharmacist before purchasing something you haven't used before  · Check with your doctor or pharmacist before taking a decongestant. Some decongestants can worsen high blood pressure.  Lifestyle changes  · Maintain a healthy weight. Get help to lose any extra pounds.  · Cut back on salt.  ¨ Limit canned, dried, packaged, and fast foods.  ¨ Dont add salt to your food at the table.  ¨ Season foods with herbs instead of salt when you cook.  ¨ Request no added salt when you go to a restaurant.  ¨ The American Heart Associations (AHA) "ideal" sodium intake recommendation is 1,500 milligrams per day.  However, since American's eat so much salt, the AHA says a positive change can occur by cutting back to even 2,400 milligrams of sodium a day.   · Follow the DASH (Dietary Approaches to Stop Hypertension) eating plan. This plan recommends vegetables, fruits, whole gains, and other heart healthy foods.  · Begin an exercise program. Ask your doctor how to get started. The American Heart Association recommends aerobic exercise 3 to 4 times a week for an average of 40 minutes at a time, with your doctor's approval. Simple activities like walking or gardening can help.  · Break the smoking habit. Enroll in a stop-smoking program to improve your chances of success. Ask your healthcare provider about programs and " medicines to help you stop smoking.  · Limit drinks that contain caffeine (coffee, black or green tea, cola) to 2 per day.  · Never take stimulants such as amphetamines or cocaine; these drugs can be deadly for someone with high blood pressure.  · Control your stress. Learn stress-management techniques.  · Limit alcohol to no more than 1 drink a day for women and 2 drinks a day for men.  Follow-up care  Make a follow-up appointment as directed by our staff.     When to seek medical care  Call your doctor immediately or seek emergency care if you have any of the following:  · Chest pain or shortness of breath (call 911)  · Moderate to severe headache  · Weakness in the muscles of your face, arms, or legs  · Trouble speaking  · Extreme drowsiness  · Confusion  · Fainting or dizziness  · Pulsating or rushing sound in your ears  · Unexplained nosebleed  · Weakness, tingling, or numbness of your face, arms, or legs  · Change in vision  · Blood pressure measured at home that is greater than 180/110   Date Last Reviewed: 4/27/2016  © 4021-8134 [employer]. [address] [place] [address]. All rights reserved. This information is not intended as a substitute for professional medical care. Always follow your healthcare professional's instructions.        Controlling High Blood Pressure  High blood pressure (hypertension) is often called the silent killer. This is because many people who have it dont know it. High blood pressure is defined as 140/90 mm Hg or higher. Know your blood pressure and remember to check it regularly. Doing so can save your life. Here are some things you can do to help control your blood pressure.    Choose heart-healthy foods  · Select low-salt, low-fat foods. Limit sodium intake to 2,400 mg per day or the amount suggested by your healthcare provider.  · Limit canned, dried, cured, packaged, and fast foods. These can contain a lot of salt.  · Eat 8 to 10 servings of fruits  and vegetables every day.  · Choose lean meats, fish, or chicken.  · Eat whole-grain pasta, brown rice, and beans.  · Eat 2 to 3 servings of low-fat or fat-free dairy products.  · Ask your doctor about the DASH eating plan. This plan helps reduce blood pressure.  · When you go to a restaurant, ask that your meal be prepared with no added salt.  Maintain a healthy weight  · Ask your healthcare provider how many calories to eat a day. Then stick to that number.  · Ask your healthcare provider what weight range is healthiest for you. If you are overweight, a weight loss of only 3% to 5% of your body weight can help lower blood pressure. Generally, a good weight loss goal is to lose 10% of your body weight in a year.  · Limit snacks and sweets.  · Get regular exercise.  Get up and get active  · Choose activities you enjoy. Find ones you can do with friends or family. This includes bicycling, dancing, walking, and jogging.  · Park farther away from building entrances.  · Use stairs instead of the elevator.  · When you can, walk or bike instead of driving.  · [place] leaves, garden, or do household repairs.  · Be active at a moderate to vigorous level of physical activity for at least 40 minutes for a minimum of 3 to 4 days a week.   Manage stress  · Make time to relax and enjoy life. Find time to laugh.  · Communicate your concerns with your loved ones and your healthcare provider.  · Visit with family and friends, and keep up with hobbies.  Limit alcohol and quit smoking  · Men should have no more than 2 drinks per day.  · Women should have no more than 1 drink per day.  · Talk with your healthcare provider about quitting smoking. Smoking significantly increases your risk for heart disease and stroke. Ask your healthcare provider about community smoking cessation programs and other options.  Medicines  If lifestyle changes arent enough, your healthcare provider may prescribe high blood pressure medicine. Take all  medicines as prescribed. If you have any questions about your medicines, ask your healthcare provider before stopping or changing them.   Date Last Reviewed: 4/27/2016 © 2000-2016 [employer]. [address] [place] [address], [place] [address]. All rights reserved. This information is not intended as a substitute for professional medical care. Always follow your healthcare professional's instructions.      STOP HCTZ  STOP DICLOFENAC  INCREASE FLUIDS  START ATENOLOL

## 2017-05-18 DIAGNOSIS — D64.9 ANEMIA, UNSPECIFIED TYPE: Primary | ICD-10-CM

## 2017-05-18 RX ORDER — POLYETHYLENE GLYCOL 3350, SODIUM SULFATE ANHYDROUS, SODIUM BICARBONATE, SODIUM CHLORIDE, POTASSIUM CHLORIDE 236; 22.74; 6.74; 5.86; 2.97 G/4L; G/4L; G/4L; G/4L; G/4L
4 POWDER, FOR SOLUTION ORAL ONCE
Qty: 4000 ML | Refills: 0 | Status: SHIPPED | OUTPATIENT
Start: 2017-05-18 | End: 2017-05-18

## 2017-05-18 NOTE — PROGRESS NOTES
Subjective:       Patient ID: Emilia Alan is a 76 y.o. female.    Chief Complaint: Annual Exam    HPI Comments: Annual exam  Multiple issues    CKD with GFR 48.9- this is new.  Drinks some water.  Is on NSAIDs- advised to d/c,  Will also d/c her diuretic.    Anemia, stable. Unclear etiology.  No GI bleeding.  Previous iron levels acceptable.  EGD/colonoscopy to be ordered; she is due.    DM stable.  A1c 7.0.    BP doing OK but slightly high today.  Not able to exercise much due to post polio syndrome and leg pain.    Opto 10/16  Podiatry 4/17    Patient Active Problem List:     Gait disorder     History of poliomyelitis     Post poliomyelitis syndrome     Diabetic polyneuropathy associated with type 2 diabetes mellitus     Mixed hyperlipidemia     Osteoporosis without current pathological fracture: worse on fosamax 5/16- Reclast 8/16     Paraparesis of both lower limbs     Essential hypertension     Type 2 diabetes mellitus with diabetic neuropathy, without long-term current use of insulin     Osteoarthritis of right knee     Lumbar spinal stenosis          Review of Systems   Constitutional: Negative for fatigue and fever.   HENT: Negative for congestion.    Eyes: Negative for visual disturbance.   Respiratory: Negative for cough, shortness of breath and stridor.    Cardiovascular: Negative for leg swelling.   Gastrointestinal: Negative for abdominal pain.   Genitourinary: Negative for difficulty urinating and hematuria.   Musculoskeletal: Positive for back pain.   Skin: Negative for rash.   Neurological: Negative for weakness and numbness.   Psychiatric/Behavioral: Negative for decreased concentration.       Objective:      Physical Exam   Constitutional: She is oriented to person, place, and time. She appears well-developed and well-nourished.   HENT:   Head: Normocephalic and atraumatic.   Right Ear: External ear normal.   Left Ear: External ear normal.   Nose: Nose normal.   Mouth/Throat: Oropharynx is  clear and moist. No oropharyngeal exudate.   Eyes: Conjunctivae and EOM are normal. No scleral icterus.   Neck: Normal range of motion. Neck supple. No JVD present. No thyromegaly present.   Cardiovascular: Normal rate, regular rhythm, normal heart sounds and intact distal pulses.  Exam reveals no gallop.    No murmur heard.  Pulmonary/Chest: Effort normal and breath sounds normal. No respiratory distress. She has no wheezes. She exhibits no tenderness.   Abdominal: Soft. Bowel sounds are normal. She exhibits no distension and no mass. There is no tenderness. There is no rebound and no guarding.   Musculoskeletal: Normal range of motion. She exhibits no edema.   In a wheelchair   Lymphadenopathy:     She has no cervical adenopathy.   Neurological: She is alert and oriented to person, place, and time. No cranial nerve deficit. Coordination normal.   Skin: Skin is warm. No rash noted. No erythema.   Psychiatric: She has a normal mood and affect. Her behavior is normal. Judgment and thought content normal.   Nursing note and vitals reviewed.      Assessment:       1. Annual physical exam    2. Type 2 diabetes mellitus with autonomic neuropathy, unspecified long term insulin use status    3. Osteoarthritis of spine with radiculopathy, lumbar region    4. Post poliomyelitis syndrome    5. Diabetic polyneuropathy associated with type 2 diabetes mellitus    6. Primary osteoarthritis of right knee    7. Gait disorder    8. 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 Type 2 diabetes mellitus with diabetic neuropathy, without long-term current use of insulin    10. Chronic pain of right knee    11. Hyperlipidemia, unspecified hyperlipidemia type    12. Mixed incontinence    13. Lumbar spinal stenosis    14. Essential hypertension    15. Acute renal failure, unspecified acute renal failure type    16. Anemia, unspecified type    17. B12 deficiency    18. Age-related osteoporosis  without current pathological fracture        Plan:         Annual physical exam    Type 2 diabetes mellitus with autonomic neuropathy, unspecified long term insulin use status: diet and medication reviewed  -     blood sugar diagnostic Strp; 1 strip by Misc.(Non-Drug; Combo Route) route 2 (two) times daily. 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  Dispense: 135 tablet; Refill: 3    Mixed incontinence  -     oxybutynin (DITROPAN) 5 MG Tab; Take 1 tablet (5 mg total) by mouth 2 (two) times daily.  Dispense: 180 tablet; Refill: 3    Lumbar spinal stenosis: PM&R follow up    Essential hypertension: add atenolol; d/c HCTZ.  Low salt diet, exercise as tolerated.  Call if BP > 135/85 on a regular basis.  RTC 4 weeks for BP check    Acute renal failure, unspecified acute renal failure type: d/c diuretic; increase fluids.  D/c NSAIDs.  Will repeat labs in 1 month; may need additional assessment  -     Basic metabolic panel; Future; Expected date: 5/17/17    Anemia, unspecified type  -     Case request GI: COLONOSCOPY, ESOPHAGOGASTRODUODENOSCOPY (EGD)  -     Ferritin; Future; Expected date: 5/17/17  -     Protein electrophoresis, serum; Future; Expected date: 5/17/17  -     Iron and TIBC; Future; Expected date: 5/17/17  -     CBC auto differential; Future; Expected date:  5/17/17    B12 deficiency  -     Vitamin B12; Future; Expected date: 5/17/17    Age-related osteoporosis without current pathological fracture  -     Ambulatory consult to Endocrinology    Other orders  -     Cancel: diclofenac (VOLTAREN) 75 MG EC tablet; TAKE 1 TABLET BY MOUTH TWICE DAILY WITH MEALS. STOP IF ANY STOMACH IRRITATION  Dispense: 180 tablet; Refill: 0  -     glipiZIDE (GLUCOTROL) 5 MG tablet; Take 1 tablet (5 mg total) by mouth once daily.  Dispense: 90 tablet; Refill: 3  -     gabapentin (NEURONTIN) 300 MG capsule; Take 1 capsule (300 mg total) by mouth 2 (two) times daily.  Dispense: 180 capsule; Refill: 3  -     metformin (GLUCOPHAGE-XR) 500 MG 24 hr tablet; TK 1 T PO QD  Dispense: 60 tablet; Refill: 5  -     Cancel: hydrochlorothiazide (HYDRODIURIL) 25 MG tablet; Take 1 tablet (25 mg total) by mouth once daily.  Dispense: 90 tablet; Refill: 3  -     verapamil (CALAN-SR) 240 MG CR tablet; TAKE 1 TABLET(240 MG) BY MOUTH EVERY DAY  Dispense: 90 tablet; Refill: 3  -     enalapril (VASOTEC) 20 MG tablet; Take 1 tablet (20 mg total) by mouth 2 (two) times daily.  Dispense: 180 tablet; Refill: 3  -     atenolol (TENORMIN) 25 MG tablet; Take 1 tablet (25 mg total) by mouth once daily.  Dispense: 90 tablet; Refill: 3  -     aspirin (ECOTRIN) 81 MG EC tablet; Take 1 tablet (81 mg total) by mouth once daily.  Dispense: 30 tablet; Refill: 12    I will review all studies and determine further tx depending on findings    EP BP check 4 weeks

## 2017-05-31 ENCOUNTER — LAB VISIT (OUTPATIENT)
Dept: LAB | Facility: HOSPITAL | Age: 77
End: 2017-05-31
Attending: INTERNAL MEDICINE
Payer: MEDICARE

## 2017-05-31 DIAGNOSIS — N17.9 ACUTE RENAL FAILURE, UNSPECIFIED ACUTE RENAL FAILURE TYPE: ICD-10-CM

## 2017-05-31 DIAGNOSIS — E53.8 B12 DEFICIENCY: ICD-10-CM

## 2017-05-31 DIAGNOSIS — D64.9 ANEMIA, UNSPECIFIED TYPE: ICD-10-CM

## 2017-05-31 LAB
ANION GAP SERPL CALC-SCNC: 11 MMOL/L
BASOPHILS # BLD AUTO: 0.11 K/UL
BASOPHILS NFR BLD: 1.6 %
BUN SERPL-MCNC: 17 MG/DL
CALCIUM SERPL-MCNC: 9.7 MG/DL
CHLORIDE SERPL-SCNC: 107 MMOL/L
CO2 SERPL-SCNC: 24 MMOL/L
CREAT SERPL-MCNC: 0.8 MG/DL
DIFFERENTIAL METHOD: ABNORMAL
EOSINOPHIL # BLD AUTO: 0.2 K/UL
EOSINOPHIL NFR BLD: 3.1 %
ERYTHROCYTE [DISTWIDTH] IN BLOOD BY AUTOMATED COUNT: 12.9 %
EST. GFR  (AFRICAN AMERICAN): >60 ML/MIN/1.73 M^2
EST. GFR  (NON AFRICAN AMERICAN): >60 ML/MIN/1.73 M^2
FERRITIN SERPL-MCNC: 80 NG/ML
GLUCOSE SERPL-MCNC: 165 MG/DL
HCT VFR BLD AUTO: 35.8 %
HGB BLD-MCNC: 11.9 G/DL
IRON SERPL-MCNC: 72 UG/DL
LYMPHOCYTES # BLD AUTO: 1.5 K/UL
LYMPHOCYTES NFR BLD: 21.3 %
MCH RBC QN AUTO: 31.2 PG
MCHC RBC AUTO-ENTMCNC: 33.2 %
MCV RBC AUTO: 94 FL
MONOCYTES # BLD AUTO: 0.4 K/UL
MONOCYTES NFR BLD: 5.3 %
NEUTROPHILS # BLD AUTO: 4.8 K/UL
NEUTROPHILS NFR BLD: 68.4 %
PLATELET # BLD AUTO: 239 K/UL
PMV BLD AUTO: 10.9 FL
POTASSIUM SERPL-SCNC: 4.1 MMOL/L
RBC # BLD AUTO: 3.81 M/UL
SATURATED IRON: 21 %
SODIUM SERPL-SCNC: 142 MMOL/L
TOTAL IRON BINDING CAPACITY: 345 UG/DL
TRANSFERRIN SERPL-MCNC: 233 MG/DL
VIT B12 SERPL-MCNC: 1118 PG/ML
WBC # BLD AUTO: 7.03 K/UL

## 2017-05-31 PROCEDURE — 84466 ASSAY OF TRANSFERRIN: CPT

## 2017-05-31 PROCEDURE — 36415 COLL VENOUS BLD VENIPUNCTURE: CPT

## 2017-05-31 PROCEDURE — 86334 IMMUNOFIX E-PHORESIS SERUM: CPT | Mod: 26,,, | Performed by: PATHOLOGY

## 2017-05-31 PROCEDURE — 82607 VITAMIN B-12: CPT

## 2017-05-31 PROCEDURE — 86334 IMMUNOFIX E-PHORESIS SERUM: CPT

## 2017-05-31 PROCEDURE — 80048 BASIC METABOLIC PNL TOTAL CA: CPT

## 2017-05-31 PROCEDURE — 85025 COMPLETE CBC W/AUTO DIFF WBC: CPT

## 2017-05-31 PROCEDURE — 84165 PROTEIN E-PHORESIS SERUM: CPT

## 2017-05-31 PROCEDURE — 83540 ASSAY OF IRON: CPT

## 2017-05-31 PROCEDURE — 82728 ASSAY OF FERRITIN: CPT

## 2017-06-01 LAB
ALBUMIN SERPL ELPH-MCNC: 3.62 G/DL
ALPHA1 GLOB SERPL ELPH-MCNC: 0.32 G/DL
ALPHA2 GLOB SERPL ELPH-MCNC: 0.79 G/DL
B-GLOBULIN SERPL ELPH-MCNC: 0.78 G/DL
GAMMA GLOB SERPL ELPH-MCNC: 0.88 G/DL
PROT SERPL-MCNC: 6.4 G/DL

## 2017-06-02 NOTE — LETTER
June 2, 2017    Emilia Alan  [address] [facility] [address]             [facility] - Internal Medicine  [address]  [place] [address]  Phone: [phone]  Fax: [phone] Dear Mrs. Alan:    Below are the results from your recent visit:    Resulted Orders   Ferritin   Result Value Ref Range    Ferritin 80 20.0 - 300.0 ng/mL   Protein electrophoresis, serum   Result Value Ref Range    Protein, Serum 6.4 6.0 - 8.4 g/dL    Albumin grams/dl 3.62 3.35 - 5.55 g/dL    Alpha-1 grams/dl 0.32 0.17 - 0.41 g/dL    Alpha-2 grams/dl 0.79 0.43 - 0.99 g/dL    Beta grams/dl 0.78 0.50 - 1.10 g/dL    Gamma grams/dl 0.88 0.67 - 1.58 g/dL   Vitamin B12   Result Value Ref Range    Vitamin B-12 1118 (H) 210 - 950 pg/mL   Iron and TIBC   Result Value Ref Range    Iron 72 30 - 160 ug/dL    Transferrin 233 200 - 375 mg/dL    TIBC 345 250 - 450 ug/dL    Saturated Iron 21 20 - 50 %   CBC auto differential   Result Value Ref Range    WBC 7.03 3.90 - 12.70 K/uL    RBC 3.81 (L) 4.00 - 5.40 M/uL    Hemoglobin 11.9 (L) 12.0 - 16.0 g/dL    Hematocrit 35.8 (L) 37.0 - 48.5 %    MCV 94 82 - 98 fL    MCH 31.2 (H) 27.0 - 31.0 pg    MCHC 33.2 32.0 - 36.0 %    RDW 12.9 11.5 - 14.5 %    Platelets 239 150 - 350 K/uL    MPV 10.9 9.2 - 12.9 fL    Gran # 4.8 1.8 - 7.7 K/uL    Lymph # 1.5 1.0 - 4.8 K/uL    Mono # 0.4 0.3 - 1.0 K/uL    Eos # 0.2 0.0 - 0.5 K/uL    Baso # 0.11 0.00 - 0.20 K/uL    Gran% 68.4 38.0 - 73.0 %    Lymph% 21.3 18.0 - 48.0 %    Mono% 5.3 4.0 - 15.0 %    Eosinophil% 3.1 0.0 - 8.0 %    Basophil% 1.6 0.0 - 1.9 %    Differential Method Automated    Basic metabolic panel   Result Value Ref Range    Sodium 142 136 - 145 mmol/L    Potassium 4.1 3.5 - 5.1 mmol/L    Chloride 107 95 - 110 mmol/L    CO2 24 23 - 29 mmol/L    Glucose 165 (H) 70 - 110 mg/dL    BUN, Bld 17 8 - 23 mg/dL    Creatinine 0.8 0.5 - 1.4 mg/dL    Calcium 9.7 8.7 - 10.5 mg/dL    Anion Gap 11 8 - 16 mmol/L    eGFR if African American >60.0 >60  mL/min/1.73 m^2    eGFR if non African American >60.0 >60 mL/min/1.73 m^2      Comment:      Calculation used to obtain the estimated glomerular filtration  rate (eGFR) is the CKD-EPI equation. Since race is unknown   in our information system, the eGFR values for   -American and Non--American patients are given   for each creatinine result.       Your results are within an acceptable range.    I will see you in July for follow up.  Please don't hesitate to call our office if you have any questions or concerns.    Sincerely,        Lauren Deras MD

## 2017-06-02 NOTE — LETTER
June 8, 2017    Emilia Alan  [address] [facility] [address]             [facility] - Internal Medicine  [address]  [place] [address]  Phone: [phone]  Fax: [phone] Dear Mrs. Alan:    Below are the results from your recent visit:    Resulted Orders   Ferritin   Result Value Ref Range    Ferritin 80 20.0 - 300.0 ng/mL   Protein electrophoresis, serum   Result Value Ref Range    Protein, Serum 6.4 6.0 - 8.4 g/dL    Albumin grams/dl 3.62 3.35 - 5.55 g/dL    Alpha-1 grams/dl 0.32 0.17 - 0.41 g/dL    Alpha-2 grams/dl 0.79 0.43 - 0.99 g/dL    Beta grams/dl 0.78 0.50 - 1.10 g/dL    Gamma grams/dl 0.88 0.67 - 1.58 g/dL   Vitamin B12   Result Value Ref Range    Vitamin B-12 1118 (H) 210 - 950 pg/mL   Iron and TIBC   Result Value Ref Range    Iron 72 30 - 160 ug/dL    Transferrin 233 200 - 375 mg/dL    TIBC 345 250 - 450 ug/dL    Saturated Iron 21 20 - 50 %   CBC auto differential   Result Value Ref Range    WBC 7.03 3.90 - 12.70 K/uL    RBC 3.81 (L) 4.00 - 5.40 M/uL    Hemoglobin 11.9 (L) 12.0 - 16.0 g/dL    Hematocrit 35.8 (L) 37.0 - 48.5 %    MCV 94 82 - 98 fL    MCH 31.2 (H) 27.0 - 31.0 pg    MCHC 33.2 32.0 - 36.0 %    RDW 12.9 11.5 - 14.5 %    Platelets 239 150 - 350 K/uL    MPV 10.9 9.2 - 12.9 fL    Gran # 4.8 1.8 - 7.7 K/uL    Lymph # 1.5 1.0 - 4.8 K/uL    Mono # 0.4 0.3 - 1.0 K/uL    Eos # 0.2 0.0 - 0.5 K/uL    Baso # 0.11 0.00 - 0.20 K/uL    Gran% 68.4 38.0 - 73.0 %    Lymph% 21.3 18.0 - 48.0 %    Mono% 5.3 4.0 - 15.0 %    Eosinophil% 3.1 0.0 - 8.0 %    Basophil% 1.6 0.0 - 1.9 %    Differential Method Automated    Basic metabolic panel   Result Value Ref Range    Sodium 142 136 - 145 mmol/L    Potassium 4.1 3.5 - 5.1 mmol/L    Chloride 107 95 - 110 mmol/L    CO2 24 23 - 29 mmol/L    Glucose 165 (H) 70 - 110 mg/dL    BUN, Bld 17 8 - 23 mg/dL    Creatinine 0.8 0.5 - 1.4 mg/dL    Calcium 9.7 8.7 - 10.5 mg/dL    Anion Gap 11 8 - 16 mmol/L    eGFR if African American >60.0 >60  mL/min/1.73 m^2    eGFR if non African American >60.0 >60 mL/min/1.73 m^2      Comment:      Calculation used to obtain the estimated glomerular filtration  rate (eGFR) is the CKD-EPI equation. Since race is unknown   in our information system, the eGFR values for   -American and Non--American patients are given   for each creatinine result.     Immunofixation electrophoresis   Result Value Ref Range    Immunofix Interp. SEE COMMENT       Comment:      See pathologist's interpretation.   Pathologist Interpretation JOSTIN   Result Value Ref Range    Pathologist Interpretation JOSTIN REVIEWED       Comment:      Electronically reviewed and signed by:  Shavon Martin M.D.  Signed on 06/07/17 at 16:15  No monoclonal peaks identified.       Your results are within an acceptable range.  Please don't hesitate to call our office if you have any questions or concerns.  I will see you in July.    Sincerely,        Lauren Deras MD

## 2017-06-02 NOTE — LETTER
June 6, 2017    Emilia Alan  [address] [facility] [address]             [facility] - Internal Medicine  [address]  [place] [address]  Phone: [phone]  Fax: [phone] Dear Mrs. Alan:    Below are the results from your recent visit:    Resulted Orders   Ferritin   Result Value Ref Range    Ferritin 80 20.0 - 300.0 ng/mL   Protein electrophoresis, serum   Result Value Ref Range    Protein, Serum 6.4 6.0 - 8.4 g/dL    Albumin grams/dl 3.62 3.35 - 5.55 g/dL    Alpha-1 grams/dl 0.32 0.17 - 0.41 g/dL    Alpha-2 grams/dl 0.79 0.43 - 0.99 g/dL    Beta grams/dl 0.78 0.50 - 1.10 g/dL    Gamma grams/dl 0.88 0.67 - 1.58 g/dL   Vitamin B12   Result Value Ref Range    Vitamin B-12 1118 (H) 210 - 950 pg/mL   Iron and TIBC   Result Value Ref Range    Iron 72 30 - 160 ug/dL    Transferrin 233 200 - 375 mg/dL    TIBC 345 250 - 450 ug/dL    Saturated Iron 21 20 - 50 %   CBC auto differential   Result Value Ref Range    WBC 7.03 3.90 - 12.70 K/uL    RBC 3.81 (L) 4.00 - 5.40 M/uL    Hemoglobin 11.9 (L) 12.0 - 16.0 g/dL    Hematocrit 35.8 (L) 37.0 - 48.5 %    MCV 94 82 - 98 fL    MCH 31.2 (H) 27.0 - 31.0 pg    MCHC 33.2 32.0 - 36.0 %    RDW 12.9 11.5 - 14.5 %    Platelets 239 150 - 350 K/uL    MPV 10.9 9.2 - 12.9 fL    Gran # 4.8 1.8 - 7.7 K/uL    Lymph # 1.5 1.0 - 4.8 K/uL    Mono # 0.4 0.3 - 1.0 K/uL    Eos # 0.2 0.0 - 0.5 K/uL    Baso # 0.11 0.00 - 0.20 K/uL    Gran% 68.4 38.0 - 73.0 %    Lymph% 21.3 18.0 - 48.0 %    Mono% 5.3 4.0 - 15.0 %    Eosinophil% 3.1 0.0 - 8.0 %    Basophil% 1.6 0.0 - 1.9 %    Differential Method Automated    Basic metabolic panel   Result Value Ref Range    Sodium 142 136 - 145 mmol/L    Potassium 4.1 3.5 - 5.1 mmol/L    Chloride 107 95 - 110 mmol/L    CO2 24 23 - 29 mmol/L    Glucose 165 (H) 70 - 110 mg/dL    BUN, Bld 17 8 - 23 mg/dL    Creatinine 0.8 0.5 - 1.4 mg/dL    Calcium 9.7 8.7 - 10.5 mg/dL    Anion Gap 11 8 - 16 mmol/L    eGFR if African American >60.0 >60  mL/min/1.73 m^2    eGFR if non African American >60.0 >60 mL/min/1.73 m^2      Comment:      Calculation used to obtain the estimated glomerular filtration  rate (eGFR) is the CKD-EPI equation. Since race is unknown   in our information system, the eGFR values for   -American and Non--American patients are given   for each creatinine result.     Immunofixation electrophoresis   Result Value Ref Range    Immunofix Interp. SEE COMMENT       Comment:      See pathologist's interpretation.     Your results are within an acceptable range.  Please don't hesitate to call our office if you have any questions or concerns.  Please keep your follow-up appointment to see me in July.    Sincerely,        Lauren Deras MD

## 2017-06-06 LAB — INTERPRETATION SERPL IFE-IMP: NORMAL

## 2017-06-06 NOTE — ADDENDUM NOTE
Encounter addended by: Lauren Deras MD on: 6/6/2017  2:47 PM<BR>    Actions taken: Letter status changed

## 2017-06-07 LAB — PATHOLOGIST INTERPRETATION IFE: NORMAL

## 2017-06-08 NOTE — ADDENDUM NOTE
Encounter addended by: Lauren Deras MD on: 6/8/2017  2:52 PM<BR>    Actions taken: Letter status changed

## 2017-06-09 ENCOUNTER — HOSPITAL ENCOUNTER (INPATIENT)
Facility: HOSPITAL | Age: 77
LOS: 1 days | Discharge: HOME | DRG: 310 | End: 2017-06-10
Attending: EMERGENCY MEDICINE | Admitting: INTERNAL MEDICINE
Payer: MEDICARE

## 2017-06-09 DIAGNOSIS — R55 PRE-SYNCOPE: ICD-10-CM

## 2017-06-09 DIAGNOSIS — I95.9 HYPOTENSION, UNSPECIFIED HYPOTENSION TYPE: ICD-10-CM

## 2017-06-09 DIAGNOSIS — E11.42 DIABETIC POLYNEUROPATHY ASSOCIATED WITH TYPE 2 DIABETES MELLITUS: ICD-10-CM

## 2017-06-09 DIAGNOSIS — R00.1 SYMPTOMATIC SINUS BRADYCARDIA: Primary | ICD-10-CM

## 2017-06-09 DIAGNOSIS — E78.2 MIXED HYPERLIPIDEMIA: ICD-10-CM

## 2017-06-09 DIAGNOSIS — R55 SYNCOPE, UNSPECIFIED SYNCOPE TYPE: ICD-10-CM

## 2017-06-09 DIAGNOSIS — I10 ESSENTIAL HYPERTENSION: ICD-10-CM

## 2017-06-09 DIAGNOSIS — Z86.12 HISTORY OF POLIOMYELITIS: ICD-10-CM

## 2017-06-09 DIAGNOSIS — G14 POST POLIOMYELITIS SYNDROME: ICD-10-CM

## 2017-06-09 DIAGNOSIS — R55 SYNCOPE: ICD-10-CM

## 2017-06-09 DIAGNOSIS — E11.40 TYPE 2 DIABETES MELLITUS WITH DIABETIC NEUROPATHY, WITHOUT LONG-TERM CURRENT USE OF INSULIN: ICD-10-CM

## 2017-06-09 DIAGNOSIS — R00.1 SINUS BRADYCARDIA: ICD-10-CM

## 2017-06-09 DIAGNOSIS — I95.9 HYPOTENSION: ICD-10-CM

## 2017-06-09 LAB
ALBUMIN SERPL BCP-MCNC: 3.2 G/DL
ALP SERPL-CCNC: 106 U/L
ALT SERPL W/O P-5'-P-CCNC: 70 U/L
ANION GAP SERPL CALC-SCNC: 14 MMOL/L
APTT BLDCRRT: <21 SEC
AST SERPL-CCNC: 94 U/L
BASOPHILS # BLD AUTO: 0.11 K/UL
BASOPHILS NFR BLD: 1.2 %
BILIRUB SERPL-MCNC: 0.5 MG/DL
BNP SERPL-MCNC: 206 PG/ML
BUN SERPL-MCNC: 23 MG/DL
BUN SERPL-MCNC: 31 MG/DL (ref 6–30)
CALCIUM SERPL-MCNC: 9.1 MG/DL
CHLORIDE SERPL-SCNC: 104 MMOL/L (ref 95–110)
CHLORIDE SERPL-SCNC: 106 MMOL/L
CO2 SERPL-SCNC: 18 MMOL/L
CREAT SERPL-MCNC: 1 MG/DL (ref 0.5–1.4)
CREAT SERPL-MCNC: 1.1 MG/DL
DIFFERENTIAL METHOD: ABNORMAL
EOSINOPHIL # BLD AUTO: 0.2 K/UL
EOSINOPHIL NFR BLD: 2.3 %
ERYTHROCYTE [DISTWIDTH] IN BLOOD BY AUTOMATED COUNT: 12.9 %
EST. GFR  (AFRICAN AMERICAN): 56.4 ML/MIN/1.73 M^2
EST. GFR  (NON AFRICAN AMERICAN): 48.9 ML/MIN/1.73 M^2
GLUCOSE SERPL-MCNC: 245 MG/DL
GLUCOSE SERPL-MCNC: 248 MG/DL (ref 70–110)
HCT VFR BLD AUTO: 37.6 %
HCT VFR BLD CALC: 38 %PCV (ref 36–54)
HGB BLD-MCNC: 12.6 G/DL
INR PPP: 0.9
LYMPHOCYTES # BLD AUTO: 1.6 K/UL
LYMPHOCYTES NFR BLD: 17.8 %
MAGNESIUM SERPL-MCNC: 2.4 MG/DL
MCH RBC QN AUTO: 31.8 PG
MCHC RBC AUTO-ENTMCNC: 33.5 %
MCV RBC AUTO: 95 FL
MONOCYTES # BLD AUTO: 0.5 K/UL
MONOCYTES NFR BLD: 5 %
NEUTROPHILS # BLD AUTO: 6.8 K/UL
NEUTROPHILS NFR BLD: 73.4 %
PHOSPHATE SERPL-MCNC: 4.5 MG/DL
PLATELET # BLD AUTO: 259 K/UL
PMV BLD AUTO: 11 FL
POC IONIZED CALCIUM: 1.1 MMOL/L (ref 1.06–1.42)
POC TCO2 (MEASURED): 24 MMOL/L (ref 23–29)
POCT GLUCOSE: 197 MG/DL (ref 70–110)
POTASSIUM BLD-SCNC: 4.9 MMOL/L (ref 3.5–5.1)
POTASSIUM SERPL-SCNC: 5 MMOL/L
PROT SERPL-MCNC: 7.4 G/DL
PROTHROMBIN TIME: 10 SEC
RBC # BLD AUTO: 3.96 M/UL
SAMPLE: ABNORMAL
SODIUM BLD-SCNC: 138 MMOL/L (ref 136–145)
SODIUM SERPL-SCNC: 138 MMOL/L
TROPONIN I SERPL DL<=0.01 NG/ML-MCNC: 0.01 NG/ML
TSH SERPL DL<=0.005 MIU/L-ACNC: 1.66 UIU/ML
WBC # BLD AUTO: 9.23 K/UL

## 2017-06-09 PROCEDURE — 83880 ASSAY OF NATRIURETIC PEPTIDE: CPT

## 2017-06-09 PROCEDURE — 99291 CRITICAL CARE FIRST HOUR: CPT

## 2017-06-09 PROCEDURE — 82962 GLUCOSE BLOOD TEST: CPT

## 2017-06-09 PROCEDURE — 84484 ASSAY OF TROPONIN QUANT: CPT

## 2017-06-09 PROCEDURE — 83735 ASSAY OF MAGNESIUM: CPT

## 2017-06-09 PROCEDURE — 84443 ASSAY THYROID STIM HORMONE: CPT

## 2017-06-09 PROCEDURE — 25000003 PHARM REV CODE 250: Performed by: EMERGENCY MEDICINE

## 2017-06-09 PROCEDURE — 84100 ASSAY OF PHOSPHORUS: CPT

## 2017-06-09 PROCEDURE — 25000003 PHARM REV CODE 250: Performed by: INTERNAL MEDICINE

## 2017-06-09 PROCEDURE — 63600175 PHARM REV CODE 636 W HCPCS: Performed by: INTERNAL MEDICINE

## 2017-06-09 PROCEDURE — 96366 THER/PROPH/DIAG IV INF ADDON: CPT

## 2017-06-09 PROCEDURE — 96368 THER/DIAG CONCURRENT INF: CPT

## 2017-06-09 PROCEDURE — 85730 THROMBOPLASTIN TIME PARTIAL: CPT

## 2017-06-09 PROCEDURE — 94761 N-INVAS EAR/PLS OXIMETRY MLT: CPT

## 2017-06-09 PROCEDURE — 63600175 PHARM REV CODE 636 W HCPCS: Performed by: EMERGENCY MEDICINE

## 2017-06-09 PROCEDURE — 11000001 HC ACUTE MED/SURG PRIVATE ROOM

## 2017-06-09 PROCEDURE — 93010 ELECTROCARDIOGRAM REPORT: CPT | Mod: S$GLB,,, | Performed by: INTERNAL MEDICINE

## 2017-06-09 PROCEDURE — 85610 PROTHROMBIN TIME: CPT

## 2017-06-09 PROCEDURE — 96365 THER/PROPH/DIAG IV INF INIT: CPT

## 2017-06-09 PROCEDURE — 93005 ELECTROCARDIOGRAM TRACING: CPT

## 2017-06-09 PROCEDURE — 80053 COMPREHEN METABOLIC PANEL: CPT

## 2017-06-09 PROCEDURE — 99285 EMERGENCY DEPT VISIT HI MDM: CPT | Mod: 25

## 2017-06-09 PROCEDURE — 85025 COMPLETE CBC W/AUTO DIFF WBC: CPT

## 2017-06-09 RX ORDER — GLUCAGON 1 MG
1 KIT INJECTION
Status: DISCONTINUED | OUTPATIENT
Start: 2017-06-09 | End: 2017-06-10 | Stop reason: HOSPADM

## 2017-06-09 RX ORDER — DOPAMINE HYDROCHLORIDE 160 MG/100ML
INJECTION, SOLUTION INTRAVENOUS
Status: DISPENSED
Start: 2017-06-09 | End: 2017-06-10

## 2017-06-09 RX ORDER — ENOXAPARIN SODIUM 100 MG/ML
40 INJECTION SUBCUTANEOUS EVERY 24 HOURS
Status: DISCONTINUED | OUTPATIENT
Start: 2017-06-09 | End: 2017-06-09

## 2017-06-09 RX ORDER — SODIUM CHLORIDE 0.9 % (FLUSH) 0.9 %
3 SYRINGE (ML) INJECTION EVERY 8 HOURS
Status: DISCONTINUED | OUTPATIENT
Start: 2017-06-09 | End: 2017-06-10 | Stop reason: HOSPADM

## 2017-06-09 RX ORDER — INSULIN ASPART 100 [IU]/ML
0-5 INJECTION, SOLUTION INTRAVENOUS; SUBCUTANEOUS
Status: DISCONTINUED | OUTPATIENT
Start: 2017-06-09 | End: 2017-06-10 | Stop reason: HOSPADM

## 2017-06-09 RX ORDER — DOPAMINE HYDROCHLORIDE 160 MG/100ML
5 INJECTION, SOLUTION INTRAVENOUS CONTINUOUS
Status: DISCONTINUED | OUTPATIENT
Start: 2017-06-09 | End: 2017-06-10 | Stop reason: HOSPADM

## 2017-06-09 RX ORDER — ATROPINE SULFATE 0.1 MG/ML
0.5 INJECTION INTRAVENOUS
Status: DISCONTINUED | OUTPATIENT
Start: 2017-06-09 | End: 2017-06-10 | Stop reason: HOSPADM

## 2017-06-09 RX ORDER — ONDANSETRON 2 MG/ML
8 INJECTION INTRAMUSCULAR; INTRAVENOUS EVERY 6 HOURS PRN
Status: DISCONTINUED | OUTPATIENT
Start: 2017-06-09 | End: 2017-06-10 | Stop reason: HOSPADM

## 2017-06-09 RX ORDER — SODIUM CHLORIDE 9 MG/ML
1000 INJECTION, SOLUTION INTRAVENOUS
Status: COMPLETED | OUTPATIENT
Start: 2017-06-09 | End: 2017-06-09

## 2017-06-09 RX ORDER — IBUPROFEN 200 MG
16 TABLET ORAL
Status: DISCONTINUED | OUTPATIENT
Start: 2017-06-09 | End: 2017-06-10 | Stop reason: HOSPADM

## 2017-06-09 RX ORDER — IBUPROFEN 200 MG
24 TABLET ORAL
Status: DISCONTINUED | OUTPATIENT
Start: 2017-06-09 | End: 2017-06-10 | Stop reason: HOSPADM

## 2017-06-09 RX ORDER — HEPARIN SODIUM 5000 [USP'U]/ML
5000 INJECTION, SOLUTION INTRAVENOUS; SUBCUTANEOUS EVERY 8 HOURS
Status: DISCONTINUED | OUTPATIENT
Start: 2017-06-09 | End: 2017-06-10 | Stop reason: HOSPADM

## 2017-06-09 RX ORDER — ASPIRIN 81 MG/1
81 TABLET ORAL DAILY
Status: DISCONTINUED | OUTPATIENT
Start: 2017-06-09 | End: 2017-06-10 | Stop reason: HOSPADM

## 2017-06-09 RX ADMIN — PRAVASTATIN SODIUM 60 MG: 40 TABLET ORAL at 08:06

## 2017-06-09 RX ADMIN — INSULIN DETEMIR 7 UNITS: 100 INJECTION, SOLUTION SUBCUTANEOUS at 09:06

## 2017-06-09 RX ADMIN — ASPIRIN 81 MG: 81 TABLET, COATED ORAL at 03:06

## 2017-06-09 RX ADMIN — CALCIUM GLUCONATE 1 G: 94 INJECTION, SOLUTION INTRAVENOUS at 02:06

## 2017-06-09 RX ADMIN — ONDANSETRON 8 MG: 2 INJECTION INTRAMUSCULAR; INTRAVENOUS at 02:06

## 2017-06-09 RX ADMIN — SODIUM CHLORIDE, PRESERVATIVE FREE 3 ML: 5 INJECTION INTRAVENOUS at 09:06

## 2017-06-09 RX ADMIN — SODIUM CHLORIDE 1000 ML: 0.9 INJECTION, SOLUTION INTRAVENOUS at 12:06

## 2017-06-09 RX ADMIN — HEPARIN SODIUM 5000 UNITS: 5000 INJECTION, SOLUTION INTRAVENOUS; SUBCUTANEOUS at 08:06

## 2017-06-09 RX ADMIN — SODIUM CHLORIDE 1000 ML: 0.9 INJECTION, SOLUTION INTRAVENOUS at 02:06

## 2017-06-09 RX ADMIN — DOPAMINE HYDROCHLORIDE IN DEXTROSE 5 MCG/KG/MIN: 1.6 INJECTION, SOLUTION INTRAVENOUS at 01:06

## 2017-06-09 NOTE — CONSULTS
[facility]  Cardiology Service ER Consult Note    Attending Physician: Praful Wilcox MD  Reason for Consult: Sinus bradycardia    HPI:     Emilia Alan is a 76 year old female patient who was brought into the ED for symptomatic bradycardia and cardiology service is consulted for the same reason.    The patient has a PMH, DM, HL, HTN, anemia, CKD, diabetic neuropathy. The patient takes atenolol 25 mg daily and verapamil 240 mg daily at home. The patient experienced sudden onset dizziness, diaphoresis and near syncope while walking at home around noon. She felt very weak, sat down and called EMS. When EMS arrived her blood pressure was undetectable and her heart rate was in the mid-30s. ECG showed junctional escape rhythm. She was given atropine 0.5 mg X x and started on dopamine infusion which temporarily increased the heart rate to >100 bpm. And than heart rate persisted around 50 bpm. She is currently on dopamine 10 mcg/kg/min. After arrival to the ED, her ECG showed sinus bradycardia. Her dopamine was stopped temporarily but BP dropped to 80 mm Hg and heart to 40. So it was restarted again. The patient feels better now. She denies any prior similar symptoms. She denies chest pain, SOB, syncope. One of her BP meds was changed 2 weeks ago (patient is not sure which one). She denies diarrhea, poor oral intake.    ROS: Negative except mentioned above.      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      Current Outpatient Prescriptions on File Prior to Encounter   Medication Sig Dispense Refill    aspirin (ECOTRIN) 81 MG EC tablet Take 1 tablet (81 mg total) by mouth once daily. 30 tablet 12    atenolol (TENORMIN) 25 MG tablet Take 1 tablet (25 mg total) by mouth once daily. 90 tablet 3    blood sugar diagnostic Strp 1 strip by Misc.(Non-Drug; Combo Route) route 2 (two) times daily. TRUE RESULT SYSTEM 180 strip 4    blood-glucose meter (TRUERESULT BLOOD GLUCOSE SYSTM) kit Use as instructed 1 each 0    calcium-vitamin D3-vitamin K (VIACTIV) 500-100-40 mg-unit-mcg Chew Take 2 each by mouth.      diclofenac sodium (VOLTAREN) 1 % Gel Apply 4 gm to both knees 3x/day. 500 g 4    docusate sodium (COLACE) 50 MG capsule Take 1 capsule (50 mg total) by mouth 2 (two) times daily as needed for Constipation.      econazole nitrate 1 % cream Apply topically once daily. 85 g 0    enalapril (VASOTEC) 20 MG tablet Take 1 tablet (20 mg total) by mouth 2 (two) times daily. 180 tablet 3    gabapentin (NEURONTIN) 300 MG capsule Take 1 capsule (300 mg total) by mouth 2 (two) times daily. 180 capsule 3    glipiZIDE (GLUCOTROL) 5 MG tablet Take 1 tablet (5 mg total) by mouth once daily. 90 tablet 3    hydrocodone-acetaminophen 10-325mg (NORCO)  mg Tab Take 1 tablet by mouth every 6 (six) hours as needed for Pain (pain). 120 tablet 0    lancets Misc TEST 2 X DAILY PROSPER RESULT SYSTEM 180 each 3    metformin (GLUCOPHAGE-XR) 500 MG 24 hr tablet TK 1 T PO QD 60 tablet 5    misoprostol (CYTOTEC) 200 MCG Tab TAKE 1 TABLET(200 MCG) BY MOUTH TWICE DAILY 180 tablet 0    multivitamin (THERAGRAN)  per tablet       oxybutynin (DITROPAN) 5 MG Tab Take 1 tablet (5 mg total) by mouth 2 (two) times daily. 180 tablet 3    pravastatin (PRAVACHOL) 40 MG tablet Take 1.5 tablets (60 mg total) by mouth nightly. 135 tablet 3    TRUEPLUS LANCETS 33 gauge Misc USE BID  3    verapamil (CALAN-SR) 240 MG CR tablet TAKE 1 TABLET(240 MG) BY MOUTH EVERY DAY 90 tablet 3        Physical Exam:     Vitals:  Temp:  [97.7 °F (36.5 °C)]   Pulse:  [48-55]   Resp:  [16-24]   BP: (80-91)/(47-58)   SpO2:  [95 %]        Physical Exam   Constitutional: She is oriented to person, place, and time. No distress.   Eyes: No scleral icterus.   Neck: No JVD present.   Cardiovascular:   No murmur heard.  Bradycardic, regular   Pulmonary/Chest: She has no wheezes. She has no rales.   Abdominal: There is no tenderness. There is no rebound.   Musculoskeletal: She exhibits no edema.   Neurological: She is alert and oriented to person, place, and time.   Skin: Skin is warm and dry. She is not diaphoretic.   Psychiatric: She has a normal mood and affect. Her behavior is normal.         Labs:     Recent Results (from the past 336 hour(s))   CBC auto differential    Collection Time: 06/09/17 12:46 PM   Result Value Ref Range    WBC 9.23 3.90 - 12.70 K/uL    Hemoglobin 12.6 12.0 - 16.0 g/dL    Hematocrit 37.6 37.0 - 48.5 %    Platelets 259 150 - 350 K/uL   CBC auto differential    Collection Time: 05/31/17 10:01 AM   Result Value Ref Range    WBC 7.03 3.90 - 12.70 K/uL    Hemoglobin 11.9 (L) 12.0 - 16.0 g/dL    Hematocrit 35.8 (L) 37.0 - 48.5 %    Platelets 239 150 - 350 K/uL       Recent Results (from the past 336 hour(s))   Basic metabolic panel    Collection Time: 05/31/17 10:01 AM   Result Value Ref Range    Sodium 142 136 - 145 mmol/L    Potassium 4.1 3.5 - 5.1 mmol/L    Chloride 107 95 - 110 mmol/L    CO2 24 23 - 29 mmol/L    BUN, Bld 17 8 - 23 mg/dL    Creatinine 0.8 0.5 - 1.4 mg/dL    Calcium 9.7 8.7 - 10.5 mg/dL    Anion Gap 11 8 - 16 mmol/L        No results for input(s): TROPONINI, CPKMB, CPK, MB in the last 168 hours.    Imaging:  No acute cardiopulmonary process.    EKG:   Sinus bradycardia, fusion complex    Telemetry:   Telemetry currently shows: Sinus bradycardia, HR of 49 to 55 bpm    Assessment & Recommendations:     76 year old female patient with a PMH, DM, HL, HTN, anemia, CKD, diabetic neuropathy. The patient was seen with the following diagnoses:    1) Symptomatic Sinus Bradycardia  - Initial HR in the mid 30s with junctional escape. Currently sinus bradycardia. SBP around  mm Hg on dopamine.  - Bradycardia is likely secondary to atenolol and verapamil.  - No evidence of AV lynn disease, his purkinje disease on the ECG.    Recommendations:  - Keep transcutaneous pacer pads on and atropine at bed-side.  - NS IV bolus.  - Calcium gluconate 1 gr now. Can repeat the dose.  - Continue with dopamine at the current rate.  - Will admit to CCU for close monitoring and further management.  - Hold verapamil and atenolol.   - TSH pending. No need for TVP currently.  - If heart rate does not improve, can consider glucagon IV and EP consult.    I discussed with the ED team and CCU resident. Will discuss with Dr. Armstrong.    Thank you for this consult. Further recommendations are pending the attending addendum. Please do not hesitate to page with questions.     Signed:  Akash Jauregui MD  Cardiology Fellow, PGY-6  Pager: [phone]  6/9/2017 1:05 PM    Attending Addendum:

## 2017-06-09 NOTE — H&P
[facility]  Cardiology CCU  History and Physical     Patient Name: Emilia Alan  MRN: [id number]  Admission Date: 6/9/2017  Code Status: Full Code   Attending Provider: Praful Wilcox MD   Primary Care Physician: Lauren Deras MD  Principal Problem:Symptomatic sinus bradycardia    Patient information was obtained from patient, relative(s) and ER records.     Subjective:     Chief Complaint: dizziness, presyncope      HPI:   This is a 76 year old lady with hx of poliomyelitis, DM type II, HTN, HLD, CKD stage III who is BIB after having a presyncopal episode at home. Symptoms started today 11am-12pm when the patient was watching TV and suddenly became diaphoretic and lightheaded. She got up to go to the bathroom but felt weak and dizzy and had to lay on a dining chair. Her daughter happened to come in and find her mother on the chair. She attempted to get her to her wheelchair to take her to the hospital but could not maneuver her legs given her hx of poliomyelitis. Instead she called EMS. Upon their arrival, her blood pressure was undetectable and her heart rate was in the mid-30's. EKG showed junctional escape rhythm. She was given atropine 0.5 mg x2 with no improvement then dopamine infusion was initiated which temporarily increased the heart rate to >100 bpm. After arrival to the ED, her ECG showed sinus bradycardia. Her dopamine was stopped temporarily but BP dropped to 80 mm Hg and heart to 40. The patient feels better now. She denies any prior similar symptoms. She denies chest pain, SOB, syncope. One of her BP meds was changed 2 weeks ago (patient is not sure which one). She denies diarrhea, poor oral intake.       She notes being taken off verapamil 240mg daily and HCTZ 9 days ago and switched over to atenolol 25mg daily. She denies any similar episodes in the past. Denies any CAD or CVA.         Past Medical History:   Diagnosis Date    Allergy     Anemia 10/20/2014     Arthritis     Diabetes mellitus     Diabetic neuropathy 2012    Gait disorder 2012    High cholesterol     History of poliomyelitis 2012    Hyperlipidemia 2013    Hypertension     Osteopenia     Osteoporosis, unspecified 2014    Other specified anemias 2015    Post poliomyelitis syndrome 2012    Sleep apnea     Type II or unspecified type diabetes mellitus without mention of complication, not stated as uncontrolled 2015    Unspecified essential hypertension 2015       Past Surgical History:   Procedure Laterality Date    CATARACT EXTRACTION       SECTION      CHOLECYSTECTOMY      EYE SURGERY      FRACTURE SURGERY         Review of patient's allergies indicates:  No Known Allergies    No current facility-administered medications on file prior to encounter.      Current Outpatient Prescriptions on File Prior to Encounter   Medication Sig    aspirin (ECOTRIN) 81 MG EC tablet Take 1 tablet (81 mg total) by mouth once daily.    atenolol (TENORMIN) 25 MG tablet Take 1 tablet (25 mg total) by mouth once daily.    blood sugar diagnostic Strp 1 strip by Misc.(Non-Drug; Combo Route) route 2 (two) times daily. TRUE RESULT SYSTEM    blood-glucose meter (TRUE[employer] BLOOD GLUCOSE SYSTM) kit Use as instructed    calcium-vitamin D3-vitamin K (VIACTIV) 500-100-40 mg-unit-mcg Chew Take 2 each by mouth.    diclofenac sodium (VOLTAREN) 1 % Gel Apply 4 gm to both knees 3x/day.    docusate sodium (COLACE) 50 MG capsule Take 1 capsule (50 mg total) by mouth 2 (two) times daily as needed for Constipation.    econazole nitrate 1 % cream Apply topically once daily.    enalapril (VASOTEC) 20 MG tablet Take 1 tablet (20 mg total) by mouth 2 (two) times daily.    gabapentin (NEURONTIN) 300 MG capsule Take 1 capsule (300 mg total) by mouth 2 (two) times daily.    glipiZIDE (GLUCOTROL) 5 MG tablet Take 1 tablet (5 mg total) by mouth once daily.     hydrocodone-acetaminophen 10-325mg (NORCO)  mg Tab Take 1 tablet by mouth every 6 (six) hours as needed for Pain (pain).    lancets Misc TEST 2 X DAILY PROSPER RESULT SYSTEM    metformin (GLUCOPHAGE-XR) 500 MG 24 hr tablet TK 1 T PO QD    misoprostol (CYTOTEC) 200 MCG Tab TAKE 1 TABLET(200 MCG) BY MOUTH TWICE DAILY    multivitamin (THERAGRAN) per tablet     oxybutynin (DITROPAN) 5 MG Tab Take 1 tablet (5 mg total) by mouth 2 (two) times daily.    pravastatin (PRAVACHOL) 40 MG tablet Take 1.5 tablets (60 mg total) by mouth nightly.    TRUEPLUS LANCETS 33 gauge Misc USE BID    verapamil (CALAN-SR) 240 MG CR tablet TAKE 1 TABLET(240 MG) BY MOUTH EVERY DAY     Family History     Problem Relation (Age of Onset)    Diabetes Father, Son, Daughter    Heart disease Father, Brother, Brother    No Known Problems Daughter        Social History Main Topics    Smoking status: Never Smoker    Smokeless tobacco: Never Used    Alcohol use No    Drug use: No    Sexual activity: No     ROS   Constitutional: no fever or chills  ENT: no nasal congestion or sore throat  Respiratory: no cough or shortness of breath  Cardiovascular: no chest pain or palpitations  Gastrointestinal: + nausea and vomiting, no abdominal pain or abdominal distention   Genitourinary: no hematuria or dysuria  Integument/Breast: no rash or pruritis  Hematologic/Lymphatic: no easy bruising or lymphadenopathy  Musculoskeletal: no arthralgias or myalgias  Neurological: no seizures or tremors  Endocrine: no heat or cold intolerance    Objective:     Vital Signs (Most Recent):  Temp: 97.7 °F (36.5 °C) (06/09/17 1245)  Pulse: (!) 51 (06/09/17 1407)  Resp: 19 (06/09/17 1407)  BP: (!) 98/55 (06/09/17 1407)  SpO2: (!) 94 % (06/09/17 1407) Vital Signs (24h Range):  Temp:  [97.7 °F (36.5 °C)] 97.7 °F (36.5 °C)  Pulse:  [44-55] 51  Resp:  [13-24] 19  SpO2:  [94 %-99 %] 94 %  BP: (72-98)/(42-58) 98/55     Weight: 73.9 kg (163 lb)  Body mass index is 32.92  kg/m².    SpO2: (!) 94 %  O2 Device (Oxygen Therapy): room air    No intake or output data in the 24 hours ending 06/09/17 1458    Lines/Drains/Airways     Peripheral Intravenous Line                 Peripheral IV - Single Lumen 06/09/17 1242 Right Wrist less than 1 day         Peripheral IV - Single Lumen 06/09/17 1257 Left Wrist less than 1 day                Physical Exam  General: well developed, well nourished, alert and oriented x3, NAD  Eyes: conjunctivae/corneas clear. PERRL.  Neck: supple, symmetrical, trachea midline, no JVD  Cardiovascular: Heart: regular rhythm, bradycardic, S1, S2 normal, no murmur, no click, rub or gallop.  Lungs: clear to auscultation bilaterally and normal respiratory effort  Chest Wall: no tenderness, has pacer pads on left ant chest and left back.  Abdomen/Rectal: Abdomen: Non distended. + BS. No masses. No TTP. No rebound or guarding. Negative Barlow's sign. Rectal: not examined  Extremities: + RLE +1 pitting edema, shortened left leg, bilateral foot drop, left foot with lateral and calcaneal scars. No redness or tenderness in the calves or thighs. Pulses: 2+ and symmetric  Skin: Skin color, texture, turgor normal. No rashes or lesions  Musculoskeletal: full range of motion of joints  Lymph Nodes: No cervical or supraclavicular adenopathy  Psych/Behavioral: Normal. Alert and oriented, appropriate affect.    Significant Labs:   CMP     Recent Labs  Lab 06/09/17  1246      K 5.0      CO2 18*   *   BUN 23   CREATININE 1.1   CALCIUM 9.1   PROT 7.4   ALBUMIN 3.2*   BILITOT 0.5   ALKPHOS 106   AST 94*   ALT 70*   ANIONGAP 14   ESTGFRAFRICA 56.4*   EGFRNONAA 48.9*   , CBC     Recent Labs  Lab 06/09/17  1246 06/09/17  1251   WBC 9.23  --    HGB 12.6  --    HCT 37.6 38     --    , INR     Recent Labs  Lab 06/09/17  1246   INR 0.9   , Lipid Panel No results for input(s): CHOL, HDL, LDLCALC, TRIG, CHOLHDL in the last 48 hours. and Troponin     Recent Labs  Lab  06/09/17  1246   TROPONINI 0.007       Significant Imaging: Echocardiogram: 2D echo with color flow doppler: No results found for this or any previous visit.  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] 07/25/2012 Not Applicable      Problems Resolved During this Admission:    Diagnosis Date Noted Date Resolved POA       Neuro:  -- alert and oriented  -- no acute issues    #Diabetic neuropathy:  -- hold gabapentin in light of hypotension    #Poliomyelitis:  -- PT/OT    Resp:   -- sating high 90's on RA  -- no acute issues  -- CXR wnl    Cards:  #Symptomatic bradycardia:  -- likely drug-induced given hx of recently changing medications to atneolol  -- K 5.0  -- pacer pads on and atropine is at bedside  -- hold atenolol   -- case discussed with EP, plan to observe over weekend and consider PPM by Monday if bradycardia did not improve  -- continue ASA    #HTN  Hypotension:  -- hypotension likely due to symptomatic bradycardia. Treat underlying cause  -- received one liter NS bolus in ED  -- continue dopamine gtt and wean as tolerated  -- hold home enalapril given hypotension    #HLD:  -- continue pravachol    Renal:  #CKD stage III:  -- likely diabetic neuropathy  -- at baseline Cr of 1.1  -- renally dose medications  -- avoid nephrotoxins, NSAIDs, IV contrast  -- monitor BP and treat hypotension  -- strict I's/O's  -- daily weights  -- DVT ppx with heparin 5,000 IU tid   -- renal diet    Endocrine:  #DM type II:  -- on  metformin and glipizide at home  -- start weight-base basal insulin at 7u detemir in addition to LDSSI  -- BSL on admit was 245    #ID:  -- no acute issues    #FENGI:  -- no acute issues    Code: full  Diet: cardiac, renal, diabetic  DVT ppx: [facility]      Case and plan d/w Dr. Brewer, cardiology fellow.      VTE Risk Mitigation         Ordered     heparin (porcine) injection 5,000 Units  Every 8 hours     Route:  Subcutaneous        06/09/17 1449     Medium Risk of VTE  Once      06/09/17 1447          FUNMILAYO Mathew  Cardiology   [facility]

## 2017-06-09 NOTE — ED PROVIDER NOTES
"Encounter Date: 6/9/2017    SCRIBE #1 NOTE: I, Mai Prater, am scribing for, and in the presence of,  Dr. De Souza. I have scribed the following portions of the note - the EKG reading.       History     Chief Complaint   Patient presents with    Other     Patient states that she was really clammy and had blurry vision. Patient denies passing out      Review of patient's allergies indicates:  No Known Allergies  77 y/o F with h/o DM2, anemia, HTN, HLD, b12 def, arthritis BIB EMS for sx bradycardia and near-syncopal episode. Pt reports being in usual state of health, feeling well and watching TV at home just PTA, when she became woozy, lightheaded, generally weak, diaphoretic with blurry vision; she proceeded to "lay out" on her seat, almost passing out; then EMS was called. EMS reports that upon their arrival her initial HR was in 30s and she had an undetectable BP and heart tones. She was promptly given 0.5 atropine x2 and started on dopa gtt which improved her mentation, BP and HR significantly; rhythm upon arrival here sinus tach. Initial accucheck 200s. Pt and daughter report her as having been Rx'd new meds recently during past few wks- though they cannot recall which ones they are, however. Per MAR, pt may be on atenolol and/or verapamil. Pt reports having had recent dx kidney dz as well which is being w/u as outpt. Denies CP, SOB, lightheaded / dizziness, hx thyroid issue / wt loss or recent illness, intox / opiate use, or recent issues with her sugars. No focal neuro changes or sz-like activity. Denies trauma or pain anywhere. Currently pt is asx, feeling well.          Past Medical History:   Diagnosis Date    Allergy     Anemia 10/20/2014    Arthritis     Diabetes mellitus     Diabetic neuropathy 7/25/2012    Gait disorder 7/25/2012    High cholesterol     History of poliomyelitis 7/25/2012    Hyperlipidemia 8/5/2013    Hypertension     Osteopenia     Osteoporosis, unspecified 6/5/2014    Other " specified anemias 2015    Post poliomyelitis syndrome 2012    Sleep apnea     Type II or unspecified type diabetes mellitus without mention of complication, not stated as uncontrolled 2015    Unspecified essential hypertension 2015     Past Surgical History:   Procedure Laterality Date    CATARACT EXTRACTION       SECTION      CHOLECYSTECTOMY      EYE SURGERY      FRACTURE SURGERY       Family History   Problem Relation Age of Onset    Diabetes Father     Heart disease Father     Heart disease Brother     No Known Problems Daughter     Diabetes Son     Heart disease Brother     Diabetes Daughter     Cataracts Neg Hx     Glaucoma Neg Hx     Hypertension Neg Hx     Cancer Neg Hx     Blindness Neg Hx     Amblyopia Neg Hx     Strabismus Neg Hx     Retinal detachment Neg Hx     Macular degeneration Neg Hx     Melanoma Neg Hx      Social History   Substance Use Topics    Smoking status: Never Smoker    Smokeless tobacco: Never Used    Alcohol use No     Review of Systems   Constitutional: Negative for chills, diaphoresis, fatigue and fever.   HENT: Negative for congestion, rhinorrhea, sinus pressure and sore throat.    Eyes: Negative for visual disturbance.   Respiratory: Negative for cough and shortness of breath.    Cardiovascular: Negative for chest pain and palpitations.   Gastrointestinal: Negative for abdominal pain, blood in stool, constipation, diarrhea, nausea and vomiting.   Genitourinary: Negative for difficulty urinating, flank pain, frequency, hematuria, pelvic pain, urgency, vaginal bleeding and vaginal discharge.   Musculoskeletal: Negative for back pain and neck pain.   Skin: Negative for rash.   Neurological: Positive for dizziness, syncope (pre-syncope), weakness and light-headedness. Negative for seizures, speech difficulty, numbness and headaches.   Psychiatric/Behavioral: Negative for behavioral problems and confusion.       Physical Exam      Initial Vitals [06/09/17 1245]   BP Pulse Resp Temp SpO2   (!) 91/58 (!) 55 16 97.7 °F (36.5 °C) 95 %     Physical Exam    Nursing note and vitals reviewed.  Constitutional: She appears well-developed and well-nourished. She is not diaphoretic. No distress.   Non-toxic elderly WF smiling in NAD with daughter at bedside   HENT:   Head: Normocephalic and atraumatic.   Mouth/Throat: Oropharynx is clear and moist. No oropharyngeal exudate.   Eyes: EOM are normal. Pupils are equal, round, and reactive to light. No scleral icterus.   Neck: Normal range of motion. Neck supple. No tracheal deviation present. No JVD present.   Cardiovascular: Normal rate, regular rhythm and intact distal pulses. Exam reveals no gallop and no friction rub.    No murmur heard.  HR 70s regular   Pulmonary/Chest: Breath sounds normal. No stridor. She has no wheezes. She has no rhonchi. She has no rales.   Abdominal: Soft. There is no tenderness.   Musculoskeletal: She exhibits no edema or tenderness.   Neurological: She is alert and oriented to person, place, and time.   Skin: Skin is warm and dry. Capillary refill takes less than 2 seconds. No rash noted.   Psychiatric: She has a normal mood and affect. Thought content normal.         ED Course   Procedures  Labs Reviewed   CBC W/ AUTO DIFFERENTIAL - Abnormal; Notable for the following:        Result Value    RBC 3.96 (*)     MCH 31.8 (*)     Gran% 73.4 (*)     Lymph% 17.8 (*)     All other components within normal limits   COMPREHENSIVE METABOLIC PANEL - Abnormal; Notable for the following:     CO2 18 (*)     Glucose 245 (*)     Albumin 3.2 (*)     AST 94 (*)     ALT 70 (*)     eGFR if  56.4 (*)     eGFR if non  48.9 (*)     All other components within normal limits   B-TYPE NATRIURETIC PEPTIDE - Abnormal; Notable for the following:      (*)     All other components within normal limits   ISTAT PROCEDURE - Abnormal; Notable for the following:     POC  Glucose 248 (*)     POC BUN 31 (*)     All other components within normal limits   MAGNESIUM   PHOSPHORUS   PROTIME-INR   APTT   TSH   TROPONIN I   URINALYSIS, REFLEX TO URINE CULTURE   ISTAT CHEM8   POCT GLUCOSE MONITORING CONTINUOUS     EKG Readings: (Independently Interpreted)   Initial Reading: No STEMI. Rhythm: Sinus Bradycardia. Heart Rate: 50. Axis: Normal.   Occasional PVCs, ST segment flattening noted to septal and inferior leads          Medical Decision Making:   History:   Old Medical Records: I decided to obtain old medical records.  Independently Interpreted Test(s):   I have ordered and independently interpreted EKG Reading(s) - see prior notes  Clinical Tests:   Lab Tests: Reviewed and Ordered  Radiological Study: Reviewed and Ordered  Medical Tests: Reviewed and Ordered  Other:   I have discussed this case with another health care provider.       APC / Resident Notes:   HO-3 MDM: Pt presented as above- with significant sx bradycardia, likely sinus; no e/o AVB per EMS and our EKGs though possible STDs noted on their EKG. We placed pacer pads on her, stopped her dopa gtt here upon arrival and ordered NS bolus; however her HR dipped to 40s and SBP dropped to 80s- so it was restarted and cards team was called. Initial ddx incl but not limited to cardiac such as ACS, sick sinus; med induced such as b-blocker, CCB; electrolyte abnlty; less likely hypoglycemia, hypothyroid, vasovagal, sepsis, tox. Initiated full sx aditya w/u here; contacted cards team on-call to emergently evaluate, they will see pt shortly. Transvenous pacing equipmt avail at bedside if needed. Will cont to monitor closely; anticipate admission to cardiac ICU. Case and plan d/w staff Soledad De Souza and Brenden.  Seymour Allred MD, PGY-3 EM  6/9/2017 1:25 PM    -3 update: Labs thus far largely unremarkable besides bicarb 18 (AG 14), mild transaminitis, mild elev BNP; CXR clear and pt has remained stable on dopa gtt. Cards team evaluated; have  decided to give trial dose of Ca gluconate for possible CCB effect, and another bolus of IVF; they will admit her to cardiac ICU for further eval and tx.  Seymour Allred MD, PGY-3 EM  6/9/2017 2:00 PM           Scribe Attestation:   Scribe #1: I performed the above scribed service and the documentation accurately describes the services I performed. I attest to the accuracy of the note.    Attending Attestation:           Physician Attestation for Scribe:  Physician Attestation Statement for Scribe #1: I, Dr. De Souza, reviewed documentation, as scribed by Mai Prater in my presence, and it is both accurate and complete.                 ED Course     Clinical Impression:   The primary encounter diagnosis was Symptomatic sinus bradycardia. Diagnoses of Syncope, Pre-syncope, and Hypotension, unspecified hypotension type were also pertinent to this visit.          Seymour Allred MD  Resident  06/09/17 8727

## 2017-06-09 NOTE — ED NOTES
Patient identifiers verified and correct for Emilia Alan.    LOC: The patient is awake, alert and aware of environment with an appropriate affect, the patient is oriented x 3 and speaking appropriately.  APPEARANCE: Patient resting comfortably and in no acute distress, patient is clean and well groomed, patient's clothing is properly fastened.  SKIN: The skin is warm and dry, color consistent with ethnicity, patient has normal skin turgor and moist mucus membranes, skin intact, no breakdown or bruising noted.  MUSCULOSKELETAL: Patient moving all extremities spontaneously, no obvious swelling or deformities noted.  RESPIRATORY: Airway is open and patent, respirations are spontaneous, patient has a normal effort and rate, no accessory muscle use noted, clear bilateral breath sounds noted through out the chest.  CARDIAC: Bradycardia noted, pacer pads noted, no periphreal edema noted, capillary refill < 3 seconds.  ABDOMEN: Soft and non tender to palpation, no distention noted, normoactive bowel sounds present in all four quadrants.

## 2017-06-10 VITALS
WEIGHT: 162.25 LBS | HEIGHT: 60 IN | OXYGEN SATURATION: 98 % | BODY MASS INDEX: 31.86 KG/M2 | DIASTOLIC BLOOD PRESSURE: 67 MMHG | HEART RATE: 76 BPM | SYSTOLIC BLOOD PRESSURE: 140 MMHG | TEMPERATURE: 98 F | RESPIRATION RATE: 14 BRPM

## 2017-06-10 PROBLEM — I95.9 HYPOTENSION: Status: ACTIVE | Noted: 2017-06-10

## 2017-06-10 PROBLEM — R55 PRE-SYNCOPE: Status: ACTIVE | Noted: 2017-06-10

## 2017-06-10 PROBLEM — R55 SYNCOPE: Status: ACTIVE | Noted: 2017-06-10

## 2017-06-10 LAB
ALBUMIN SERPL BCP-MCNC: 3.1 G/DL
ALP SERPL-CCNC: 97 U/L
ALT SERPL W/O P-5'-P-CCNC: 54 U/L
ANION GAP SERPL CALC-SCNC: 11 MMOL/L
AST SERPL-CCNC: 52 U/L
BASOPHILS # BLD AUTO: 0.05 K/UL
BASOPHILS NFR BLD: 0.6 %
BILIRUB SERPL-MCNC: 0.4 MG/DL
BUN SERPL-MCNC: 14 MG/DL
CALCIUM SERPL-MCNC: 9.2 MG/DL
CHLORIDE SERPL-SCNC: 111 MMOL/L
CO2 SERPL-SCNC: 16 MMOL/L
CREAT SERPL-MCNC: 0.8 MG/DL
DIFFERENTIAL METHOD: ABNORMAL
EOSINOPHIL # BLD AUTO: 0.1 K/UL
EOSINOPHIL NFR BLD: 0.7 %
ERYTHROCYTE [DISTWIDTH] IN BLOOD BY AUTOMATED COUNT: 12.7 %
EST. GFR  (AFRICAN AMERICAN): >60 ML/MIN/1.73 M^2
EST. GFR  (NON AFRICAN AMERICAN): >60 ML/MIN/1.73 M^2
GLUCOSE SERPL-MCNC: 150 MG/DL
HCT VFR BLD AUTO: 32.4 %
HGB BLD-MCNC: 11.2 G/DL
LYMPHOCYTES # BLD AUTO: 1.2 K/UL
LYMPHOCYTES NFR BLD: 14.9 %
MAGNESIUM SERPL-MCNC: 2.3 MG/DL
MCH RBC QN AUTO: 31.9 PG
MCHC RBC AUTO-ENTMCNC: 34.6 %
MCV RBC AUTO: 92 FL
MONOCYTES # BLD AUTO: 0.5 K/UL
MONOCYTES NFR BLD: 6.5 %
NEUTROPHILS # BLD AUTO: 6.4 K/UL
NEUTROPHILS NFR BLD: 77.3 %
PHOSPHATE SERPL-MCNC: 3.2 MG/DL
PLATELET # BLD AUTO: 206 K/UL
PLATELET BLD QL SMEAR: ABNORMAL
PMV BLD AUTO: 10.4 FL
POCT GLUCOSE: 167 MG/DL (ref 70–110)
POCT GLUCOSE: 167 MG/DL (ref 70–110)
POCT GLUCOSE: 169 MG/DL (ref 70–110)
POCT GLUCOSE: 251 MG/DL (ref 70–110)
POTASSIUM SERPL-SCNC: 4.7 MMOL/L
PROT SERPL-MCNC: 7 G/DL
RBC # BLD AUTO: 3.51 M/UL
SODIUM SERPL-SCNC: 138 MMOL/L
WBC # BLD AUTO: 8.27 K/UL

## 2017-06-10 PROCEDURE — 25000003 PHARM REV CODE 250: Performed by: INTERNAL MEDICINE

## 2017-06-10 PROCEDURE — 99223 1ST HOSP IP/OBS HIGH 75: CPT | Mod: ,,, | Performed by: INTERNAL MEDICINE

## 2017-06-10 PROCEDURE — 84100 ASSAY OF PHOSPHORUS: CPT

## 2017-06-10 PROCEDURE — 85025 COMPLETE CBC W/AUTO DIFF WBC: CPT

## 2017-06-10 PROCEDURE — 80053 COMPREHEN METABOLIC PANEL: CPT

## 2017-06-10 PROCEDURE — 83735 ASSAY OF MAGNESIUM: CPT

## 2017-06-10 RX ORDER — ENALAPRIL MALEATE 10 MG/1
20 TABLET ORAL 2 TIMES DAILY
Status: DISCONTINUED | OUTPATIENT
Start: 2017-06-10 | End: 2017-06-10 | Stop reason: HOSPADM

## 2017-06-10 RX ADMIN — HEPARIN SODIUM 5000 UNITS: 5000 INJECTION, SOLUTION INTRAVENOUS; SUBCUTANEOUS at 05:06

## 2017-06-10 RX ADMIN — ENALAPRIL MALEATE 20 MG: 10 TABLET ORAL at 09:06

## 2017-06-10 RX ADMIN — SODIUM CHLORIDE, PRESERVATIVE FREE 3 ML: 5 INJECTION INTRAVENOUS at 05:06

## 2017-06-10 RX ADMIN — ASPIRIN 81 MG: 81 TABLET, COATED ORAL at 09:06

## 2017-06-10 NOTE — PROGRESS NOTES
Patient discharged home with instructions. Medications reviewed with patient, including which medications to stop and start taking, as well as medications that were already given this morning. Patient and family verbalized understanding of instructions. Patient's belongings (phone  and eyeglasses) were sent with patient. Patient's daughter and grandson at bedside at time of discharge, patient able to ambulate at baseline (history of poliomyelitis) to wheelchair and taken to vehicle. Questions answered, VSS at discharge.

## 2017-06-10 NOTE — CONSULTS
Electrophysiology Consult Note  Attending Physician: Marvin Armstrong MD  Reason for Consult: Symptomatic Bradycardia     HPI:   76 y.o. woman with PMH of poliomyelitis, DM2, HTN, HLD, CKD who presented 06/09 after developing presyncopal symptoms. She was watching TV when she suddenly developed dizziness and diaphoresis with inability to walk. EMS was called and they found her  undetectable with HR's in the mid 30's/ They performed an EKG that identified a junctional escape rhythm and they gave atropine 0.5x2 without any response. They then started her on a dopamine drip and transferred her to the ED. There, they attempted to discontinue the dopamine drip, but the bradycardia and hypotension persisted. EP was consulted for possible PPM placement. To note, the patient was recently switched from Verapamil 240 QD + HCTZ 25 QD to atenolol 25mg QD on 05/17.    ROS:    Constitution: Negative for fever, chills, weight loss or gain.   HENT: Negative for sore throat, rhinorrhea, or headache.  Eyes: Negative for blurred or double vision.   Cardiovascular: See above  Pulmonary: Negative for SOB   Gastrointestinal: Negative for abdominal pain, nausea, vomiting, or diarrhea.   : Negative for dysuria.   Neurological: Negative for focal weakness or sensory changes.  PMH:     PTA Medications   Medication Sig    aspirin (ECOTRIN) 81 MG EC tablet Take 1 tablet (81 mg total) by mouth once daily.    atenolol (TENORMIN) 25 MG tablet Take 1 tablet (25 mg total) by mouth once daily.    blood sugar diagnostic Strp 1 strip by Misc.(Non-Drug; Combo Route) route 2 (two) times daily. TRUE RESULT SYSTEM    blood-glucose meter (TRUERESULT BLOOD GLUCOSE SYSTM) kit Use as instructed    calcium-vitamin D3-vitamin K (VIACTIV) 500-100-40 mg-unit-mcg Chew Take 2 each by mouth.    diclofenac sodium (VOLTAREN) 1 % Gel Apply 4 gm to both knees 3x/day.    docusate sodium (COLACE) 50 MG capsule Take 1 capsule (50 mg total) by mouth 2  (two) times daily as needed for Constipation.    econazole nitrate 1 % cream Apply topically once daily.    enalapril (VASOTEC) 20 MG tablet Take 1 tablet (20 mg total) by mouth 2 (two) times daily.    gabapentin (NEURONTIN) 300 MG capsule Take 1 capsule (300 mg total) by mouth 2 (two) times daily.    glipiZIDE (GLUCOTROL) 5 MG tablet Take 1 tablet (5 mg total) by mouth once daily.    hydrocodone-acetaminophen 10-325mg (NORCO)  mg Tab Take 1 tablet by mouth every 6 (six) hours as needed for Pain (pain).    lancets Misc TEST 2 X DAILY PROSPER RESULT SYSTEM    metformin (GLUCOPHAGE-XR) 500 MG 24 hr tablet TK 1 T PO QD    misoprostol (CYTOTEC) 200 MCG Tab TAKE 1 TABLET(200 MCG) BY MOUTH TWICE DAILY    multivitamin (THERAGRAN) per tablet     oxybutynin (DITROPAN) 5 MG Tab Take 1 tablet (5 mg total) by mouth 2 (two) times daily.    pravastatin (PRAVACHOL) 40 MG tablet Take 1.5 tablets (60 mg total) by mouth nightly.    TRUEPLUS LANCETS 33 gauge Misc USE BID    verapamil (CALAN-SR) 240 MG CR tablet TAKE 1 TABLET(240 MG) BY MOUTH EVERY DAY      Review of patient's allergies indicates:  No Known Allergies    Past Medical History:   Diagnosis Date    Allergy     Anemia 10/20/2014    Arthritis     Diabetes mellitus     Diabetic neuropathy 2012    Gait disorder 2012    High cholesterol     History of poliomyelitis 2012    Hyperlipidemia 2013    Hypertension     Osteopenia     Osteoporosis, unspecified 2014    Other specified anemias 2015    Post poliomyelitis syndrome 2012    Sleep apnea     Type II or unspecified type diabetes mellitus without mention of complication, not stated as uncontrolled 2015    Unspecified essential hypertension 2015       Past Surgical History:   Procedure Laterality Date    CATARACT EXTRACTION       SECTION      CHOLECYSTECTOMY      EYE SURGERY      FRACTURE SURGERY        Family History   Problem Relation Age of  Onset    Diabetes Father     Heart disease Father     Heart disease Brother     No Known Problems Daughter     Diabetes Son     Heart disease Brother     Diabetes Daughter     Cataracts Neg Hx     Glaucoma Neg Hx     Hypertension Neg Hx     Cancer Neg Hx     Blindness Neg Hx     Amblyopia Neg Hx     Strabismus Neg Hx     Retinal detachment Neg Hx     Macular degeneration Neg Hx     Melanoma Neg Hx        Social History   Substance Use Topics    Smoking status: Never Smoker    Smokeless tobacco: Never Used    Alcohol use No        Allergies:   Review of patient's allergies indicates:  No Known Allergies  Medications:     No current facility-administered medications on file prior to encounter.      Current Outpatient Prescriptions on File Prior to Encounter   Medication Sig Dispense Refill    aspirin (ECOTRIN) 81 MG EC tablet Take 1 tablet (81 mg total) by mouth once daily. 30 tablet 12    atenolol (TENORMIN) 25 MG tablet Take 1 tablet (25 mg total) by mouth once daily. 90 tablet 3    blood sugar diagnostic Strp 1 strip by Misc.(Non-Drug; Combo Route) route 2 (two) times daily. TRUE RESULT SYSTEM 180 strip 4    blood-glucose meter (TRUERESULT BLOOD GLUCOSE SYSTM) kit Use as instructed 1 each 0    calcium-vitamin D3-vitamin K (VIACTIV) 500-100-40 mg-unit-mcg Chew Take 2 each by mouth.      diclofenac sodium (VOLTAREN) 1 % Gel Apply 4 gm to both knees 3x/day. 500 g 4    docusate sodium (COLACE) 50 MG capsule Take 1 capsule (50 mg total) by mouth 2 (two) times daily as needed for Constipation.      econazole nitrate 1 % cream Apply topically once daily. 85 g 0    enalapril (VASOTEC) 20 MG tablet Take 1 tablet (20 mg total) by mouth 2 (two) times daily. 180 tablet 3    gabapentin (NEURONTIN) 300 MG capsule Take 1 capsule (300 mg total) by mouth 2 (two) times daily. 180 capsule 3    glipiZIDE (GLUCOTROL) 5 MG tablet Take 1 tablet (5 mg total) by mouth once daily. 90 tablet 3     hydrocodone-acetaminophen 10-325mg (NORCO)  mg Tab Take 1 tablet by mouth every 6 (six) hours as needed for Pain (pain). 120 tablet 0    lancets Misc TEST 2 X DAILY PROSPER RESULT SYSTEM 180 each 3    metformin (GLUCOPHAGE-XR) 500 MG 24 hr tablet TK 1 T PO QD 60 tablet 5    misoprostol (CYTOTEC) 200 MCG Tab TAKE 1 TABLET(200 MCG) BY MOUTH TWICE DAILY 180 tablet 0    multivitamin (THERAGRAN) per tablet       oxybutynin (DITROPAN) 5 MG Tab Take 1 tablet (5 mg total) by mouth 2 (two) times daily. 180 tablet 3    pravastatin (PRAVACHOL) 40 MG tablet Take 1.5 tablets (60 mg total) by mouth nightly. 135 tablet 3    TRUEPLUS LANCETS 33 gauge Misc USE BID  3    verapamil (CALAN-SR) 240 MG CR tablet TAKE 1 TABLET(240 MG) BY MOUTH EVERY DAY 90 tablet 3       Inpatient Medications   Continuous Infusions:   DOPamine 2.5 mcg/kg/min (06/10/17 0300)     Scheduled Meds:   aspirin  81 mg Oral Daily    heparin (porcine)  5,000 Units Subcutaneous Q8H    insulin detemir  7 Units Subcutaneous QHS    pravastatin  60 mg Oral Nightly    sodium chloride 0.9%  3 mL Intravenous Q8H     PRN Meds:atropine, dextrose 50%, dextrose 50%, glucagon (human recombinant), glucose, glucose, insulin aspart, ondansetron     Social History:     Social History   Substance Use Topics    Smoking status: Never Smoker    Smokeless tobacco: Never Used    Alcohol use No     Family History:     Family History   Problem Relation Age of Onset    Diabetes Father     Heart disease Father     Heart disease Brother     No Known Problems Daughter     Diabetes Son     Heart disease Brother     Diabetes Daughter     Cataracts Neg Hx     Glaucoma Neg Hx     Hypertension Neg Hx     Cancer Neg Hx     Blindness Neg Hx     Amblyopia Neg Hx     Strabismus Neg Hx     Retinal detachment Neg Hx     Macular degeneration Neg Hx     Melanoma Neg Hx      Physical Exam:     Vitals:  Temp:  [97.6 °F (36.4 °C)-98.4 °F (36.9 °C)]   Pulse:  [44-74]   Resp:   [13-24]   BP: ()/(42-80)   SpO2:  [94 %-100 %]  on RA I/O's:    Intake/Output Summary (Last 24 hours) at 06/10/17 0516  Last data filed at 06/10/17 0300   Gross per 24 hour   Intake           272.02 ml   Output              850 ml   Net          -577.98 ml        Constitutional: NAD, conversant  HEENT: Sclera anicteric, PERRLA, EOMI  Neck: No JVD, no carotid bruits  CV: RRR, no murmur, normal S1/S2  Pulm: CTAB, no wheezes, rales, or ronchi  GI: Abdomen soft, NTND, +BS  Extremities: No LE edema, warm and well perfused  Skin: No ecchymosis, erythema, or ulcers  Psych: AOx3, appropriate affect  Neuro: CNII-XII intact, no focal deficits    Labs:     CBC: CMP:      Recent Labs  Lab 06/09/17  1246 06/09/17  1251 06/10/17  0347   WBC 9.23  --  8.27   HGB 12.6  --  11.2*   HCT 37.6 38 32.4*     --  206   MCV 95  --  92   RDW 12.9  --  12.7      Recent Labs  Lab 06/09/17  1246      K 5.0      CO2 18*   BUN 23   CREATININE 1.1   CALCIUM 9.1   PROT 7.4   BILITOT 0.5   ALKPHOS 106   ALT 70*   AST 94*   MG 2.4   PHOS 4.5        Cholesterol: Coagulation   Lab Results   Component Value Date    CHOL 142 05/10/2017    HDL 39 (L) 05/10/2017    LDLCALC 48.8 (L) 05/10/2017    TRIG 271 (H) 05/10/2017      Recent Labs  Lab 06/09/17  1246   INR 0.9        Misc:     Recent Labs  Lab 06/09/17  1246   TROPONINI 0.007      Lab Results   Component Value Date    HGBA1C 7.0 (H) 05/10/2017        Micro:   Microbiology Results (last 7 days)     ** No results found for the last 168 hours. **           Imaging:     CXR (06/09/2017):   The examination is compared to study of 4/11/2008.  Cardiomediastinal silhouette within normal limits.  There is no tracheal abnormalities the lungs are clear peritoneal vasculature within normal limits.  There are cardiac monitoring leads of the chest.  There is a defibrillator pad over the left chest wall.     EKG (06/09/2017):   Sinus bradycardia with 1 PVC     Telemetry:   No abnormalities      2D Echo:   none     Assessment:   76 y.o. woman with PMH of poliomyelitis, DM2, HTN, HLD, CKD who presented 06/09 after developing symptomatic bradycardia    Plan:     #Symptomatic Bradycardia  --weaned off of dopa this morning. HR stable. Watch on tele.  --allow atenolol to wash out of her system  --no need for TVP at this time  --avoid AV lynn blockers  --discharge with 30 day cardiac monitor  --follow up with Dr. Vasquez in clinic in 6 weeks    Thank you for the consult. We will sign off. Staff addendum to follow    Signed:  Monique Edwards MD  Cardiology Fellow, PGY4  6/10/2017 5:16 AM

## 2017-06-10 NOTE — PLAN OF CARE
Problem: Patient Care Overview  Goal: Plan of Care Review  Outcome: Ongoing (interventions implemented as appropriate)  PT arrived from ED at 1800. Pt AAO, VSS. HR 55-65. No acute distress. Dopamine infusing to maintain MAP > 70. Family at bedside. Cardiology team notified of arrival. POC discussed with pt.

## 2017-06-10 NOTE — DISCHARGE SUMMARY
DISCHARGE SUMMARY  Hospital Medicine    Team: Networked reference to record PCT     Patient Name: Emilia Alan  YOB: 1940    Admit Date: 6/9/2017    Discharge Date: 06/10/2017    Discharge Attending Physician: Marvin Armstrong MD     Diagnoses:  Active Hospital Problems    Diagnosis  POA    *Sinus bradycardia [R00.1]  Yes    Hypotension [I95.9]  Yes    Pre-syncope [R55]  Yes    Syncope [R55]  Yes    Type 2 diabetes mellitus with diabetic neuropathy, without long-term current use of insulin [E11.40]  Yes    Essential hypertension [I10]  Yes    Mixed hyperlipidemia [E78.2]  Yes    History of poliomyelitis [Z86.12]  Yes    Diabetic polyneuropathy associated with type 2 diabetes mellitus [E11.42]  Yes    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       Discharged Condition: admit problems have stabilized      HOSPITAL COURSE:    Initial Presentation:   This is a 76 year old lady with hx of poliomyelitis, DM type II, HTN, HLD, CKD stage III who is BIB after having a presyncopal episode at home. Symptoms started today 11am-12pm when the patient was watching TV and suddenly became diaphoretic and lightheaded. She got up to go to the bathroom but felt weak and dizzy and had to lay on a dining chair. Her daughter happened to come in and find her mother on the chair. She attempted to get her to her wheelchair to take her to the hospital but could not maneuver her legs given her hx of poliomyelitis. Instead she called EMS. Upon their arrival, her blood pressure was undetectable and her heart rate was in the mid-30's. EKG showed junctional escape rhythm. She was given atropine 0.5 mg x2 with no improvement then dopamine infusion was initiated which temporarily increased the heart rate to >100 bpm. After arrival to the ED, her ECG showed sinus bradycardia. Her dopamine was stopped  temporarily but BP dropped to 80 mm Hg and heart to 40. The patient feels better now. She denies any prior similar symptoms. She denies chest pain, SOB, syncope. One of her BP meds was changed 2 weeks ago (patient is not sure which one). She denies diarrhea, poor oral intake.        She notes being taken off verapamil 240mg daily and HCTZ 9 days ago and switched over to atenolol 25mg daily. She denies any similar episodes in the past. Denies any CAD or CVA.     Course of Principle Problem for Admission:  Patient presented with symptomatic bradycardia requiring dopamine infusion. She had previously been started on atenolol 25 mg by her PCP, which is what likely contributed to her bradycardia. This medication has been removed and her problem has resolved. Weaned off of dopamine drip now. EP has seen the patient and feels that she is ok to go home. Event monitor has been ordered for the patient on outpatient basis.    CONSULTS: Electophysiology, Cardiology    Disposition:  Home       Future Scheduled Appointments:  Future Appointments  Date Time Provider Department Center   7/6/2017 8:15 AM Carole Niño DPM [facility] POD [facility]   7/6/2017 10:20 AM Krista Burger MD [facility] PHYSMED [facility]   7/14/2017 2:30 PM Lauren Deras MD [facility] IM [facility]   9/22/2017 11:30 AM Kelin Jerez NP [facility] ENDOCRN [facility]       Follow-up Plans from This Hospitalization:    Last CBC/BMP/HgbA1c (if applicable):  Recent Results (from the past 336 hour(s))   CBC auto differential    Collection Time: 06/10/17  3:47 AM   Result Value Ref Range    WBC 8.27 3.90 - 12.70 K/uL    Hemoglobin 11.2 (L) 12.0 - 16.0 g/dL    Hematocrit 32.4 (L) 37.0 - 48.5 %    Platelets 206 150 - 350 K/uL   CBC auto differential    Collection Time: 06/09/17 12:46 PM   Result Value Ref Range    WBC 9.23 3.90 - 12.70 K/uL    Hemoglobin 12.6 12.0 - 16.0 g/dL    Hematocrit 37.6 37.0 - 48.5 %    Platelets 259 150 - 350 K/uL   CBC auto differential     Collection Time: 05/31/17 10:01 AM   Result Value Ref Range    WBC 7.03 3.90 - 12.70 K/uL    Hemoglobin 11.9 (L) 12.0 - 16.0 g/dL    Hematocrit 35.8 (L) 37.0 - 48.5 %    Platelets 239 150 - 350 K/uL     Recent Results (from the past 336 hour(s))   Basic metabolic panel    Collection Time: 05/31/17 10:01 AM   Result Value Ref Range    Sodium 142 136 - 145 mmol/L    Potassium 4.1 3.5 - 5.1 mmol/L    Chloride 107 95 - 110 mmol/L    CO2 24 23 - 29 mmol/L    BUN, Bld 17 8 - 23 mg/dL    Creatinine 0.8 0.5 - 1.4 mg/dL    Calcium 9.7 8.7 - 10.5 mg/dL    Anion Gap 11 8 - 16 mmol/L     Lab Results   Component Value Date    HGBA1C 7.0 (H) 05/10/2017       Discharge Medication List:     Medication List      CONTINUE taking these medications    aspirin 81 MG EC tablet  Commonly known as:  ECOTRIN  Take 1 tablet (81 mg total) by mouth once daily.     blood sugar diagnostic Strp  1 strip by Misc.(Non-Drug; Combo Route) route 2 (two) times daily. TRUE RESULT SYSTEM     blood-glucose meter kit  Commonly known as:  TRUERESULT BLOOD GLUCOSE SYSTM  Use as instructed     diclofenac sodium 1 % Gel  Commonly known as:  VOLTAREN  Apply 4 gm to both knees 3x/day.     docusate sodium 50 MG capsule  Commonly known as:  COLACE  Take 1 capsule (50 mg total) by mouth 2 (two) times daily as needed for Constipation.     econazole nitrate 1 % cream  Apply topically once daily.     enalapril 20 MG tablet  Commonly known as:  VASOTEC  Take 1 tablet (20 mg total) by mouth 2 (two) times daily.     gabapentin 300 MG capsule  Commonly known as:  NEURONTIN  Take 1 capsule (300 mg total) by mouth 2 (two) times daily.     glipiZIDE 5 MG tablet  Commonly known as:  GLUCOTROL  Take 1 tablet (5 mg total) by mouth once daily.     hydrocodone-acetaminophen 10-325mg  mg Tab  Commonly known as:  NORCO  Take 1 tablet by mouth every 6 (six) hours as needed for Pain (pain).     * lancets Misc  TEST 2 X DAILY PROSPER RESULT SYSTEM     * TRUEPLUS LANCETS 33 gauge  Misc  Generic drug:  lancets     metformin 500 MG 24 hr tablet  Commonly known as:  GLUCOPHAGE-XR  TK 1 T PO QD     misoprostol 200 MCG Tab  Commonly known as:  CYTOTEC  TAKE 1 TABLET(200 MCG) BY MOUTH TWICE DAILY     multivitamin per tablet  Commonly known as:  THERAGRAN     oxybutynin 5 MG Tab  Commonly known as:  DITROPAN  Take 1 tablet (5 mg total) by mouth 2 (two) times daily.     pravastatin 40 MG tablet  Commonly known as:  PRAVACHOL  Take 1.5 tablets (60 mg total) by mouth nightly.     VIACTIV 500-100-40 mg-unit-mcg Chew  Generic drug:  calcium-vitamin D3-vitamin K        * This list has 2 medication(s) that are the same as other medications prescribed for you. Read the directions carefully, and ask your doctor or other care provider to review them with you.            STOP taking these medications    atenolol 25 MG tablet  Commonly known as:  TENORMIN     verapamil 240 MG CR tablet  Commonly known as:  CALAN-SR            Patient Instructions:    Discharge Procedure Orders  Diet general     Activity as tolerated     Cardiac event monitor   Standing Status: Future  Standing Exp. Date: 06/10/18   Order Specific Question Answer Comments   Cardiac Event Monitor Auto Trigger          Signing Physician:  Ike Paredes MD

## 2017-06-10 NOTE — H&P
[facility]  Cardiology CCU  Progress Note    Patient Name: Emilia Alan  MRN: [id number]  Admission Date: 6/9/2017  Code Status: Full Code   Attending Provider: Marvin Armstrong MD   Primary Care Physician: Lauren Deras MD  Principal Problem:Sinus bradycardia    Patient information was obtained from patient, relative(s) and ER records.     Subjective:     Chief Complaint: dizziness, presyncope      HPI:   This is a 76 year old lady with hx of poliomyelitis, DM type II, HTN, HLD, CKD stage III who is BIB after having a presyncopal episode at home. Symptoms started today 11am-12pm when the patient was watching TV and suddenly became diaphoretic and lightheaded. She got up to go to the bathroom but felt weak and dizzy and had to lay on a dining chair. Her daughter happened to come in and find her mother on the chair. She attempted to get her to her wheelchair to take her to the hospital but could not maneuver her legs given her hx of poliomyelitis. Instead she called EMS. Upon their arrival, her blood pressure was undetectable and her heart rate was in the mid-30's. EKG showed junctional escape rhythm. She was given atropine 0.5 mg x2 with no improvement then dopamine infusion was initiated which temporarily increased the heart rate to >100 bpm. After arrival to the ED, her ECG showed sinus bradycardia. Her dopamine was stopped temporarily but BP dropped to 80 mm Hg and heart to 40. The patient feels better now. She denies any prior similar symptoms. She denies chest pain, SOB, syncope. One of her BP meds was changed 2 weeks ago (patient is not sure which one). She denies diarrhea, poor oral intake.       She notes being taken off verapamil 240mg daily and HCTZ 9 days ago and switched over to atenolol 25mg daily. She denies any similar episodes in the past. Denies any CAD or CVA.     Interval History  Patient reports that she had high UOP overnight while on dopamine drip. Reports that her  symptoms have resolved now. Denies any further lightheadedness, palpitations, or dizziness.      Past Medical History:   Diagnosis Date    Allergy     Anemia 10/20/2014    Arthritis     Diabetes mellitus     Diabetic neuropathy 2012    Gait disorder 2012    High cholesterol     History of poliomyelitis 2012    Hyperlipidemia 2013    Hypertension     Osteopenia     Osteoporosis, unspecified 2014    Other specified anemias 2015    Post poliomyelitis syndrome 2012    Sleep apnea     Type II or unspecified type diabetes mellitus without mention of complication, not stated as uncontrolled 2015    Unspecified essential hypertension 2015       Past Surgical History:   Procedure Laterality Date    CATARACT EXTRACTION       SECTION      CHOLECYSTECTOMY      EYE SURGERY      FRACTURE SURGERY         Review of patient's allergies indicates:  No Known Allergies    No current facility-administered medications on file prior to encounter.      Current Outpatient Prescriptions on File Prior to Encounter   Medication Sig    aspirin (ECOTRIN) 81 MG EC tablet Take 1 tablet (81 mg total) by mouth once daily.    atenolol (TENORMIN) 25 MG tablet Take 1 tablet (25 mg total) by mouth once daily.    blood sugar diagnostic Strp 1 strip by Misc.(Non-Drug; Combo Route) route 2 (two) times daily. TRUE RESULT SYSTEM    blood-glucose meter (TRUERESULT BLOOD GLUCOSE SYSTM) kit Use as instructed    calcium-vitamin D3-vitamin K (VIACTIV) 500-100-40 mg-unit-mcg Chew Take 2 each by mouth.    diclofenac sodium (VOLTAREN) 1 % Gel Apply 4 gm to both knees 3x/day.    docusate sodium (COLACE) 50 MG capsule Take 1 capsule (50 mg total) by mouth 2 (two) times daily as needed for Constipation.    econazole nitrate 1 % cream Apply topically once daily.    enalapril (VASOTEC) 20 MG tablet Take 1 tablet (20 mg total) by mouth 2 (two) times daily.    gabapentin (NEURONTIN) 300 MG  capsule Take 1 capsule (300 mg total) by mouth 2 (two) times daily.    glipiZIDE (GLUCOTROL) 5 MG tablet Take 1 tablet (5 mg total) by mouth once daily.    hydrocodone-acetaminophen 10-325mg (NORCO)  mg Tab Take 1 tablet by mouth every 6 (six) hours as needed for Pain (pain).    lancets Misc TEST 2 X DAILY PROSPER RESULT SYSTEM    metformin (GLUCOPHAGE-XR) 500 MG 24 hr tablet TK 1 T PO QD    misoprostol (CYTOTEC) 200 MCG Tab TAKE 1 TABLET(200 MCG) BY MOUTH TWICE DAILY    multivitamin (THERAGRAN) per tablet     oxybutynin (DITROPAN) 5 MG Tab Take 1 tablet (5 mg total) by mouth 2 (two) times daily.    pravastatin (PRAVACHOL) 40 MG tablet Take 1.5 tablets (60 mg total) by mouth nightly.    TRUEPLUS LANCETS 33 gauge Misc USE BID    verapamil (CALAN-SR) 240 MG CR tablet TAKE 1 TABLET(240 MG) BY MOUTH EVERY DAY     Family History     Problem Relation (Age of Onset)    Diabetes Father, Son, Daughter    Heart disease Father, Brother, Brother    No Known Problems Daughter        Social History Main Topics    Smoking status: Never Smoker    Smokeless tobacco: Never Used    Alcohol use No    Drug use: No    Sexual activity: No     ROS   Constitutional: no fever or chills  ENT: no nasal congestion or sore throat  Respiratory: no cough or shortness of breath  Cardiovascular: no chest pain or palpitations  Gastrointestinal: + nausea and vomiting, no abdominal pain or abdominal distention   Genitourinary: no hematuria or dysuria  Integument/Breast: no rash or pruritis  Hematologic/Lymphatic: no easy bruising or lymphadenopathy  Musculoskeletal: no arthralgias or myalgias  Neurological: no seizures or tremors  Endocrine: no heat or cold intolerance    Objective:     Vital Signs (Most Recent):  Temp: 98.5 °F (36.9 °C) (06/10/17 0700)  Pulse: 74 (06/10/17 0900)  Resp: 13 (06/10/17 0900)  BP: (!) 157/70 (06/10/17 0900)  SpO2: 98 % (06/10/17 0900) Vital Signs (24h Range):  Temp:  [97.6 °F (36.4 °C)-98.5 °F (36.9 °C)]  98.5 °F (36.9 °C)  Pulse:  [44-74] 74  Resp:  [10-24] 13  SpO2:  [94 %-100 %] 98 %  BP: ()/(42-80) 157/70     Weight: 73.6 kg (162 lb 4.1 oz)  Body mass index is 31.69 kg/m².    SpO2: 98 %  O2 Device (Oxygen Therapy): room air      Intake/Output Summary (Last 24 hours) at 06/10/17 0925  Last data filed at 06/10/17 0701   Gross per 24 hour   Intake           295.72 ml   Output             1350 ml   Net         -1054.28 ml       Lines/Drains/Airways     Peripheral Intravenous Line                 Peripheral IV - Single Lumen 06/09/17 1242 Right Wrist less than 1 day         Peripheral IV - Single Lumen 06/09/17 1257 Left Wrist less than 1 day                Physical Exam  General: well developed, well nourished, alert and oriented x3, NAD  Eyes: conjunctivae/corneas clear. PERRL.  Neck: supple, symmetrical, trachea midline, no JVD  Cardiovascular: Heart: regular rhythm, bradycardic, S1, S2 normal, no murmur, no click, rub or gallop.  Lungs: clear to auscultation bilaterally and normal respiratory effort  Chest Wall: no tenderness, has pacer pads on left ant chest and left back.  Abdomen/Rectal: Abdomen: Non distended. + BS. No masses. No TTP. No rebound or guarding. Negative Barlow's sign. Rectal: not examined  Extremities: + RLE +1 pitting edema, shortened left leg, bilateral foot drop, left foot with lateral and calcaneal scars. No redness or tenderness in the calves or thighs. Pulses: 2+ and symmetric  Skin: Skin color, texture, turgor normal. No rashes or lesions  Musculoskeletal: full range of motion of joints  Lymph Nodes: No cervical or supraclavicular adenopathy  Psych/Behavioral: Normal. Alert and oriented, appropriate affect.    Significant Labs:   CMP     Recent Labs  Lab 06/09/17  1246 06/10/17  0649    138   K 5.0 4.7    111*   CO2 18* 16*   * 150*   BUN 23 14   CREATININE 1.1 0.8   CALCIUM 9.1 9.2   PROT 7.4 7.0   ALBUMIN 3.2* 3.1*   BILITOT 0.5 0.4   ALKPHOS 106 97   AST 94* 52*    ALT 70* 54*   ANIONGAP 14 11   ESTGFRAFRICA 56.4* >60.0   EGFRNONAA 48.9* >60.0   , CBC     Recent Labs  Lab 06/09/17  1246  06/10/17  0347   WBC 9.23  --  8.27   HGB 12.6  --  11.2*   HCT 37.6  < > 32.4*     --  206   < > = values in this interval not displayed., INR     Recent Labs  Lab 06/09/17  1246   INR 0.9   , Lipid Panel No results for input(s): CHOL, HDL, LDLCALC, TRIG, CHOLHDL in the last 48 hours. and Troponin     Recent Labs  Lab 06/09/17  1246   TROPONINI 0.007       Significant Imaging: Echocardiogram: 2D echo with color flow doppler: No results found for this or any previous visit.  Assessment and Plan:     Active Diagnoses:    Diagnosis Date Noted POA    PRINCIPAL PROBLEM:  Sinus bradycardia [R00.1] 06/09/2017 Yes    Hypotension [I95.9] 06/10/2017 Yes    Pre-syncope [R55] 06/10/2017 Yes    Syncope [R55] 06/10/2017 Yes    Type 2 diabetes mellitus with diabetic neuropathy, without long-term current use of insulin [E11.40] 07/06/2015 Yes    Essential hypertension [I10] 07/06/2015 Yes    Mixed hyperlipidemia [E78.2] 09/24/2012 Yes    History of poliomyelitis [Z86.12] 07/25/2012 Yes    Diabetic polyneuropathy associated with type 2 diabetes mellitus [E11.42] 07/25/2012 Yes    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] 07/25/2012 Not Applicable      Problems Resolved During this Admission:    Diagnosis Date Noted Date Resolved POA       Neuro:  -- alert and oriented  -- no acute issues    #Diabetic neuropathy:  -- may resume gabapentin on discharge    #Poliomyelitis:  -- PT/OT    Resp:   -- sating high 90's on RA  -- no acute issues  -- CXR wnl    Cards:  #Symptomatic bradycardia:  -- likely drug-induced given hx of recently changing medications to atneolol  -- K 4.7  -- pacer pads on and atropine is at bedside  -- hold atenolol   -- case discussed with EP, recommending no further intervention  -- continue ASA    #HTN  Hypotension:  -- hypotension likely due to symptomatic bradycardia. Treat  underlying cause  -- received one liter NS bolus in ED  -- weaned off dopamine drip  -- restart enalapril given hypertension  -- resolved    #HLD:  -- continue pravachol    Renal:  #CKD stage III:  -- likely diabetic neuropathy  -- at baseline Cr of 1.1  -- renally dose medications  -- avoid nephrotoxins, NSAIDs, IV contrast  -- strict I's/O's  -- daily weights  -- DVT ppx with heparin 5,000 IU tid   -- renal diet    Endocrine:  #DM type II:  -- on metformin and glipizide at home  -- start weight-base basal insulin at 7u detemir in addition to LDSSI  -- BSL on admit was 245    #ID:  -- no acute issues    #FENGI:  -- no acute issues    Code: full  Diet: cardiac, renal, diabetic  DVT ppx: SQH      Case and plan d/w Dr. Armstrong.      VTE Risk Mitigation         Ordered     heparin (porcine) injection 5,000 Units  Every 8 hours     Route:  Subcutaneous        06/09/17 1449     Medium Risk of VTE  Once      06/09/17 1447          Ike Paredes MD  Cardiology   [facility]

## 2017-06-10 NOTE — PLAN OF CARE
Problem: Patient Care Overview  Goal: Plan of Care Review  Outcome: Ongoing (interventions implemented as appropriate)  POC reviewed with patient and daughter.  Pt AAOx4,GCS 15. On SR, no episodes of bradycardia noted. Still on Dopa drip tapered down to 2.5mcg/kg/min.-177mmmhg throughout the shift. Urinates in the bedpan with adequate amount of clear yellow urine. Please see flow sheet for complete assmt. All concerns and questions addressed. Daughter stayed overnight. Will continue to monitor.

## 2017-06-11 ENCOUNTER — TELEPHONE (OUTPATIENT)
Dept: INTERNAL MEDICINE | Facility: CLINIC | Age: 77
End: 2017-06-11

## 2017-06-11 RX ORDER — VERAPAMIL HYDROCHLORIDE 240 MG/1
TABLET, FILM COATED, EXTENDED RELEASE ORAL
Qty: 90 TABLET | Refills: 0 | Status: SHIPPED | OUTPATIENT
Start: 2017-06-11 | End: 2017-07-25

## 2017-06-11 NOTE — TELEPHONE ENCOUNTER
Please call her    I spoke to her today- reviewed her chart- she is at home now and doing well.   She does not feel she needs home health; has 2 daughters at home and they are taking care of her    Please see about her coming on to see me for a hospital follow up next week (week of 6/19);  she does not want to come in next week; she has her scopes scheduled    Please let me know thanks

## 2017-06-14 ENCOUNTER — HOSPITAL ENCOUNTER (OUTPATIENT)
Facility: HOSPITAL | Age: 77
Discharge: HOME | End: 2017-06-14
Attending: INTERNAL MEDICINE | Admitting: INTERNAL MEDICINE
Payer: MEDICARE

## 2017-06-14 ENCOUNTER — SURGERY (OUTPATIENT)
Age: 77
End: 2017-06-14

## 2017-06-14 ENCOUNTER — CLINICAL SUPPORT (OUTPATIENT)
Dept: ELECTROPHYSIOLOGY | Facility: CLINIC | Age: 77
End: 2017-06-14
Payer: MEDICARE

## 2017-06-14 ENCOUNTER — ANESTHESIA EVENT (OUTPATIENT)
Dept: ENDOSCOPY | Facility: HOSPITAL | Age: 77
End: 2017-06-14
Payer: MEDICARE

## 2017-06-14 ENCOUNTER — ANESTHESIA (OUTPATIENT)
Dept: ENDOSCOPY | Facility: HOSPITAL | Age: 77
End: 2017-06-14
Payer: MEDICARE

## 2017-06-14 VITALS
BODY MASS INDEX: 32 KG/M2 | HEART RATE: 72 BPM | SYSTOLIC BLOOD PRESSURE: 182 MMHG | TEMPERATURE: 98 F | WEIGHT: 163 LBS | RESPIRATION RATE: 17 BRPM | RESPIRATION RATE: 20 BRPM | DIASTOLIC BLOOD PRESSURE: 77 MMHG | OXYGEN SATURATION: 97 % | HEIGHT: 60 IN

## 2017-06-14 DIAGNOSIS — D50.9 IRON DEFICIENCY ANEMIA: ICD-10-CM

## 2017-06-14 DIAGNOSIS — R55 PRE-SYNCOPE: ICD-10-CM

## 2017-06-14 DIAGNOSIS — R00.1 SINUS BRADYCARDIA: ICD-10-CM

## 2017-06-14 DIAGNOSIS — D50.9 IRON DEFICIENCY ANEMIA, UNSPECIFIED IRON DEFICIENCY ANEMIA TYPE: Primary | ICD-10-CM

## 2017-06-14 LAB — POCT GLUCOSE: 171 MG/DL (ref 70–110)

## 2017-06-14 PROCEDURE — 88305 TISSUE EXAM BY PATHOLOGIST: CPT | Performed by: PATHOLOGY

## 2017-06-14 PROCEDURE — 93268 ECG RECORD/REVIEW: CPT | Mod: S$GLB,,, | Performed by: INTERNAL MEDICINE

## 2017-06-14 PROCEDURE — D9220A PRA ANESTHESIA: Mod: CRNA,,, | Performed by: NURSE ANESTHETIST, CERTIFIED REGISTERED

## 2017-06-14 PROCEDURE — 43239 EGD BIOPSY SINGLE/MULTIPLE: CPT | Mod: 51,,, | Performed by: INTERNAL MEDICINE

## 2017-06-14 PROCEDURE — D9220A PRA ANESTHESIA: Mod: ANES,,, | Performed by: ANESTHESIOLOGY

## 2017-06-14 PROCEDURE — 45378 DIAGNOSTIC COLONOSCOPY: CPT | Mod: ,,, | Performed by: INTERNAL MEDICINE

## 2017-06-14 PROCEDURE — 88305 TISSUE EXAM BY PATHOLOGIST: CPT | Mod: 26,,, | Performed by: PATHOLOGY

## 2017-06-14 RX ORDER — PROPOFOL 10 MG/ML
VIAL (ML) INTRAVENOUS CONTINUOUS PRN
Status: DISCONTINUED | OUTPATIENT
Start: 2017-06-14 | End: 2017-06-14

## 2017-06-14 RX ORDER — PROPOFOL 10 MG/ML
VIAL (ML) INTRAVENOUS
Status: DISCONTINUED | OUTPATIENT
Start: 2017-06-14 | End: 2017-06-14

## 2017-06-14 RX ORDER — LIDOCAINE HCL/PF 100 MG/5ML
SYRINGE (ML) INTRAVENOUS
Status: DISCONTINUED | OUTPATIENT
Start: 2017-06-14 | End: 2017-06-14

## 2017-06-14 RX ORDER — SODIUM CHLORIDE 9 MG/ML
INJECTION, SOLUTION INTRAVENOUS CONTINUOUS
Status: DISCONTINUED | OUTPATIENT
Start: 2017-06-14 | End: 2017-06-14 | Stop reason: HOSPADM

## 2017-06-14 RX ORDER — GLYCOPYRROLATE 0.2 MG/ML
INJECTION INTRAMUSCULAR; INTRAVENOUS
Status: DISCONTINUED | OUTPATIENT
Start: 2017-06-14 | End: 2017-06-14

## 2017-06-14 RX ADMIN — LIDOCAINE HYDROCHLORIDE 50 MG: 20 INJECTION, SOLUTION INTRAVENOUS at 10:06

## 2017-06-14 RX ADMIN — PROPOFOL 100 MG: 10 INJECTION, EMULSION INTRAVENOUS at 10:06

## 2017-06-14 RX ADMIN — PROPOFOL 175 MCG/KG/MIN: 10 INJECTION, EMULSION INTRAVENOUS at 10:06

## 2017-06-14 RX ADMIN — SODIUM CHLORIDE: 0.9 INJECTION, SOLUTION INTRAVENOUS at 11:06

## 2017-06-14 RX ADMIN — GLYCOPYRROLATE 0.2 MG: 0.2 INJECTION, SOLUTION INTRAMUSCULAR; INTRAVENOUS at 10:06

## 2017-06-14 RX ADMIN — SODIUM CHLORIDE: 0.9 INJECTION, SOLUTION INTRAVENOUS at 09:06

## 2017-06-14 NOTE — TRANSFER OF CARE
Anesthesia Transfer of Care Note    Patient: Emilia Alan    Procedure(s) Performed: Procedure(s) (LRB):  ESOPHAGOGASTRODUODENOSCOPY (EGD) (N/A)  COLONOSCOPY (N/A)    Patient location: PACU    Anesthesia Type: general    Transport from OR: Transported from OR on room air with adequate spontaneous ventilation    Post pain: adequate analgesia    Post assessment: no apparent anesthetic complications    Post vital signs: stable    Level of consciousness: sedated    Nausea/Vomiting: no nausea/vomiting    Complications: none    Transfer of care protocol was followed      Last vitals:   Visit Vitals  BP (!) 199/93 (BP Location: Left arm, Patient Position: Lying, BP Method: Automatic)   Pulse 72   Temp 37.2 °C (99 °F) (Oral)   Resp 18   Ht 5' (1.524 m)   Wt 73.9 kg (163 lb)   SpO2 98%   Breastfeeding? No   BMI 31.83 kg/m²

## 2017-06-14 NOTE — PATIENT INSTRUCTIONS
Discharge Summary/Instructions after an Endoscopic Procedure  Patient Name: Emilia Alan  Patient MRN: [id number]  Patient YOB: 1940 Wednesday, June 14, 2017  Karen Lebron MD  RESTRICTIONS ON ACTIVITY:  - DO NOT drive a car, operate machinery, make legal/financial decisions, or   drink alcohol until the day after the procedure.    - The following day: return to full activity including work, except no heavy   lifting, straining or running for 3 days if polyps were removed.  - Diet: Eat and drink normally unless instructed otherwise.  TREATMENT FOR COMMON SIDE EFFECTS:  - Mild abdominal pain, bloating or excessive gas: rest, eat lightly and use   a heating pad.  - Sore Throat - treat with throat lozenges. Gargle with warm salt water.  SYMPTOMS TO WATCH FOR AND REPORT TO YOUR PHYSICIAN:  1. Severe abdominal pain or bloating.  2. Pain in chest.  3. Chills or fever occurring within 24 hours after a procedure.  4. A large amount of rectal bleeding, which would show as bright red,   maroon, or black stools. (A small amount of blood from the rectum is not   serious, especially if hemorrhoids are present.)  5. Because air was used during the procedure, expelling large amounts of air   from your rectum or belching is normal.  6. If a bowel prep was taken, you may not have a bowel movement for 1-3   days.  This is normal.  7. Go directly to the emergency room if you notice any of the following:   Chills and/or fever over 101 F   Persistent vomiting   Severe abdominal pain, other than gas cramps   Severe chest pain   Black, tarry stools   Any bleeding - exceeding one tablespoon  Your doctor recommends these additional instructions:  If any biopsies were performed, my office will call you in 5 to 6 business   days with any results.  You are being discharged to home.   You have a contact number available for emergencies.  The signs and symptoms   of potential delayed complications were discussed with you.  You may  return   to normal activities tomorrow.  Written discharge instructions were   provided to you.   Resume your previous diet.   Continue your present medications.   We are waiting for your pathology results.  For questions, problems or results please call your physician - Karen Lebron MD at Work:  [phone].  [facility], EMERGENCY ROOM PHONE NUMBER: [phone]  IF A COMPLICATION OR EMERGENCY SITUATION ARISES AND YOU ARE UNABLE TO REACH   YOUR PHYSICIAN - GO TO THE EMERGENCY ROOM.  Karen Lebron MD  6/14/2017 10:59:09 AM  This report has been verified and signed electronically.

## 2017-06-14 NOTE — ANESTHESIA POSTPROCEDURE EVALUATION
Anesthesia Post Evaluation    Patient: Emilia Alan    Procedure(s) Performed: Procedure(s) (LRB):  ESOPHAGOGASTRODUODENOSCOPY (EGD) (N/A)  COLONOSCOPY (N/A)    Final Anesthesia Type: general  Patient location during evaluation: GI PACU  Patient participation: Yes- Able to Participate  Level of consciousness: awake and alert, oriented and awake  Post-procedure vital signs: reviewed and stable  Pain management: adequate  Airway patency: patent  PONV status at discharge: No PONV  Anesthetic complications: no      Cardiovascular status: blood pressure returned to baseline and hemodynamically stable  Respiratory status: unassisted, spontaneous ventilation and room air  Hydration status: euvolemic  Follow-up not needed.        Visit Vitals  BP (!) 182/77 (BP Location: Left arm, Patient Position: Lying, BP Method: Automatic)   Pulse 72   Temp 36.6 °C (97.8 °F) (Axillary)   Resp 17   Ht 5' (1.524 m)   Wt 73.9 kg (163 lb)   SpO2 97%   Breastfeeding? No   BMI 31.83 kg/m²       Pain/Yelena Score: Pain Assessment Performed: Yes (6/14/2017 11:49 AM)  Presence of Pain: denies (6/14/2017 11:49 AM)  Pain Rating Prior to Med Admin: 0 (6/14/2017 11:49 AM)  Yelena Score: 10 (6/14/2017 11:49 AM)

## 2017-06-14 NOTE — ANESTHESIA PREPROCEDURE EVALUATION
06/14/2017  Emilia Alan is a 76 y.o., female.    Anesthesia Evaluation    I have reviewed the Patient Summary Reports.    I have reviewed the Nursing Notes.      Review of Systems  Anesthesia Hx:  No problems with previous Anesthesia    Cardiovascular:   Hypertension    Pulmonary:   Sleep Apnea    Musculoskeletal:   Arthritis     Neurological:   Neuromuscular Disease,    Endocrine:   Diabetes        Physical Exam  General:  Well nourished    Airway/Jaw/Neck:  Airway Findings: Mouth Opening: Normal Tongue: Normal  Mallampati: II  TM Distance: Normal, at least 6 cm  Jaw/Neck Findings:  Neck ROM: Normal ROM     Eyes/Ears/Nose:  Eyes/Ears/Nose Findings:    Dental:  Dental Findings: In tact   Chest/Lungs:  Chest/Lungs Findings: Normal Respiratory Rate     Heart/Vascular:  Heart Findings: Rate: Normal  Rhythm: Regular Rhythm        Mental Status:  Mental Status Findings:  Cooperative, Alert and Oriented         Anesthesia Plan  Type of Anesthesia, risks & benefits discussed:  Anesthesia Type:  general  Patient's Preference: general  Intra-op Monitoring Plan: standard ASA monitors  Intra-op Monitoring Plan Comments:   Post Op Pain Control Plan:   Post Op Pain Control Plan Comments:   Induction:   IV  Beta Blocker:  Patient is not currently on a Beta-Blocker (No further documentation required).       Informed Consent: Patient understands risks and agrees with Anesthesia plan.  Questions answered. Anesthesia consent signed with patient.  ASA Score: 2     Day of Surgery Review of History & Physical:    H&P update referred to the surgeon.         Ready For Surgery From Anesthesia Perspective.

## 2017-06-14 NOTE — H&P
Short Stay Endoscopy History and Physical    PCP - Lauren Deras MD  Referring Physician - Lauren Deras MD  [address] [place] [address]    Procedure - EGD and colonoscopy  ASA - per anesthesia  Mallampati - per anesthesia  History of Anesthesia problems - no  Family history Anesthesia problems -  no   Plan of anesthesia - General    HPI  76 y.o. female  Reason for procedure: anemia      ROS:  Constitutional: No fevers, chills, No weight loss  CV: No chest pain  Pulm: No cough, No shortness of breath  GI: see HPI    Medical History:  has a past medical history of Allergy; Anemia (10/20/2014); Arthritis; Diabetes mellitus; Diabetic neuropathy (2012); Gait disorder (2012); High cholesterol; History of poliomyelitis (2012); Hyperlipidemia (2013); Hypertension; Osteopenia; Osteoporosis, unspecified (2014); Other specified anemias (2015); Post poliomyelitis syndrome (2012); Sleep apnea; Type II or unspecified type diabetes mellitus without mention of complication, not stated as uncontrolled (2015); and Unspecified essential hypertension (2015).    Surgical History:  has a past surgical history that includes Cataract extraction;  section; Cholecystectomy; Fracture surgery; and Eye surgery.    Family History: family history includes Diabetes in her daughter, father, and son; Heart disease in her brother, brother, and father; No Known Problems in her daughter.. Otherwise no colon cancer, inflammatory bowel disease, or GI malignancies.    Social History:  reports that she has never smoked. She has never used smokeless tobacco. She reports that she does not drink alcohol or use drugs.    Review of patient's allergies indicates:  No Known Allergies    Medications:   Prescriptions Prior to Admission   Medication Sig Dispense Refill Last Dose    docusate sodium (COLACE) 50 MG capsule Take 1 capsule (50 mg total) by mouth 2 (two) times daily as needed for  Constipation.   6/13/2017 at Unknown time    enalapril (VASOTEC) 20 MG tablet Take 1 tablet (20 mg total) by mouth 2 (two) times daily. 180 tablet 3 6/14/2017 at Unknown time    aspirin (ECOTRIN) 81 MG EC tablet Take 1 tablet (81 mg total) by mouth once daily. 30 tablet 12 6/12/2017    blood sugar diagnostic Strp 1 strip by Misc.(Non-Drug; Combo Route) route 2 (two) times daily. TRUE RESULT SYSTEM 180 strip 4     blood-glucose meter (TRUERESULT BLOOD GLUCOSE SYSTM) kit Use as instructed 1 each 0 Taking    calcium-vitamin D3-vitamin K (VIACTIV) 500-100-40 mg-unit-mcg Chew Take 2 each by mouth.   6/13/2017    diclofenac sodium (VOLTAREN) 1 % Gel Apply 4 gm to both knees 3x/day. 500 g 4 Taking    econazole nitrate 1 % cream Apply topically once daily. 85 g 0 Taking    gabapentin (NEURONTIN) 300 MG capsule Take 1 capsule (300 mg total) by mouth 2 (two) times daily. 180 capsule 3 6/12/2017    glipiZIDE (GLUCOTROL) 5 MG tablet Take 1 tablet (5 mg total) by mouth once daily. 90 tablet 3 6/12/2017    hydrocodone-acetaminophen 10-325mg (NORCO)  mg Tab Take 1 tablet by mouth every 6 (six) hours as needed for Pain (pain). 120 tablet 0 6/12/2017    lancets Misc TEST 2 X DAILY PROSPER RESULT SYSTEM 180 each 3 Taking    metformin (GLUCOPHAGE-XR) 500 MG 24 hr tablet TK 1 T PO QD 60 tablet 5 6/13/2017    misoprostol (CYTOTEC) 200 MCG Tab TAKE 1 TABLET(200 MCG) BY MOUTH TWICE DAILY 180 tablet 0 6/12/2017    multivitamin (THERAGRAN) per tablet    6/12/2017    oxybutynin (DITROPAN) 5 MG Tab Take 1 tablet (5 mg total) by mouth 2 (two) times daily. 180 tablet 3 6/12/2017    pravastatin (PRAVACHOL) 40 MG tablet Take 1.5 tablets (60 mg total) by mouth nightly. 135 tablet 3 6/12/2017    TRUEPLUS LANCETS 33 gauge Misc USE BID  3 Taking    verapamil (CALAN-SR) 240 MG CR tablet TAKE 1 TABLET(240 MG) BY MOUTH EVERY DAY 90 tablet 0        Physical Exam:    Vital Signs:   Vitals:    06/14/17 0957   BP: (!) 199/93   Pulse: 72    Resp: 18   Temp: 99 °F (37.2 °C)       General Appearance: Well appearing in no acute distress  Lungs: CTA anteriorly  Heart:  Regular rate, S1, S2 normal, no murmurs heard.  Abdomen: Soft, non tender, non distended with normal bowel sounds, no masses  Extremities: No edema    Labs:  Lab Results   Component Value Date    WBC 8.27 06/10/2017    HGB 11.2 (L) 06/10/2017    HCT 32.4 (L) 06/10/2017     06/10/2017    CHOL 142 05/10/2017    TRIG 271 (H) 05/10/2017    HDL 39 (L) 05/10/2017    ALT 54 (H) 06/10/2017    AST 52 (H) 06/10/2017     06/10/2017    K 4.7 06/10/2017     (H) 06/10/2017    CREATININE 0.8 06/10/2017    BUN 14 06/10/2017    CO2 16 (L) 06/10/2017    TSH 1.657 06/09/2017    INR 0.9 06/09/2017    HGBA1C 7.0 (H) 05/10/2017       I have explained the risks and benefits of this endoscopic procedure to the patient including but not limited to bleeding, inflammation, infection, perforation, and death.      Karen Lebron MD

## 2017-06-14 NOTE — PATIENT INSTRUCTIONS
Discharge Summary/Instructions after an Endoscopic Procedure  Patient Name: Emilia Alan  Patient MRN: [id number]  Patient YOB: 1940 Wednesday, June 14, 2017  Karen Lebron MD  RESTRICTIONS ON ACTIVITY:  - DO NOT drive a car, operate machinery, make legal/financial decisions, or   drink alcohol until the day after the procedure.    - The following day: return to full activity including work, except no heavy   lifting, straining or running for 3 days if polyps were removed.  - Diet: Eat and drink normally unless instructed otherwise.  TREATMENT FOR COMMON SIDE EFFECTS:  - Mild abdominal pain, bloating or excessive gas: rest, eat lightly and use   a heating pad.  - Sore Throat - treat with throat lozenges. Gargle with warm salt water.  SYMPTOMS TO WATCH FOR AND REPORT TO YOUR PHYSICIAN:  1. Severe abdominal pain or bloating.  2. Pain in chest.  3. Chills or fever occurring within 24 hours after a procedure.  4. A large amount of rectal bleeding, which would show as bright red,   maroon, or black stools. (A small amount of blood from the rectum is not   serious, especially if hemorrhoids are present.)  5. Because air was used during the procedure, expelling large amounts of air   from your rectum or belching is normal.  6. If a bowel prep was taken, you may not have a bowel movement for 1-3   days.  This is normal.  7. Go directly to the emergency room if you notice any of the following:   Chills and/or fever over 101 F   Persistent vomiting   Severe abdominal pain, other than gas cramps   Severe chest pain   Black, tarry stools   Any bleeding - exceeding one tablespoon  Your doctor recommends these additional instructions:  If any biopsies were performed, my office will call you in 5 to 6 business   days with any results.  You are being discharged to home.   You have a contact number available for emergencies.  The signs and symptoms   of potential delayed complications were discussed with you.  You may  return   to normal activities tomorrow.  Written discharge instructions were   provided to you.   Resume your previous diet.   Continue your present medications.   Your physician has recommended a repeat colonoscopy in 10 years for   screening purposes.   Return to your referring physician as previously scheduled.  For questions, problems or results please call your physician - Karen Lebron MD at Work:  [phone].  [facility], EMERGENCY ROOM PHONE NUMBER: [phone]  IF A COMPLICATION OR EMERGENCY SITUATION ARISES AND YOU ARE UNABLE TO REACH   YOUR PHYSICIAN - GO TO THE EMERGENCY ROOM.  Karen Lebron MD  6/14/2017 11:19:12 AM  This report has been verified and signed electronically.

## 2017-06-20 DIAGNOSIS — M47.16 LUMBAR SPONDYLOSIS WITH MYELOPATHY: ICD-10-CM

## 2017-06-20 DIAGNOSIS — E11.42 DIABETIC POLYNEUROPATHY ASSOCIATED WITH TYPE 2 DIABETES MELLITUS: ICD-10-CM

## 2017-06-20 DIAGNOSIS — M47.26 OSTEOARTHRITIS OF SPINE WITH RADICULOPATHY, LUMBAR REGION: ICD-10-CM

## 2017-06-20 DIAGNOSIS — Z86.12 HISTORY OF POLIOMYELITIS: ICD-10-CM

## 2017-06-20 DIAGNOSIS — G14 POST POLIOMYELITIS SYNDROME: ICD-10-CM

## 2017-06-20 DIAGNOSIS — R26.9 GAIT DISORDER: ICD-10-CM

## 2017-06-20 DIAGNOSIS — G82.20 PARAPARESIS OF BOTH LOWER LIMBS: ICD-10-CM

## 2017-06-20 RX ORDER — GABAPENTIN 300 MG/1
CAPSULE ORAL
Qty: 270 CAPSULE | Refills: 0 | Status: SHIPPED | OUTPATIENT
Start: 2017-06-20 | End: 2017-06-28

## 2017-06-21 ENCOUNTER — TELEPHONE (OUTPATIENT)
Dept: ENDOSCOPY | Facility: HOSPITAL | Age: 77
End: 2017-06-21

## 2017-06-21 NOTE — LETTER
June 21, 2017        Emilia SHAIKH Alinaconnor  [address] [address]         6/21/2017      Emilia Alan  [address] [address]    MRN:  [id number]      Dear Emilia Silvia SHAIKH Dayanna,    It was a pleasure to meet you recently and to perform your endoscopy. Your primary care referred you for an upper endoscopy and colonoscopy due to anemia (low blood count).  The colonoscopy was normal. The upper endoscopy was normal and biopsies of your small intestine were taken which were also normal.     It is my pleasure to participate in your medical care. Please do not hesitate to contact me if you have any questions. Please continue your follow up with your primary care physician, Dr. Deras.     Sincerely,        Karen Lebron MD   Department of Gastroenterology  Medical Director, Inflammatory Bowel Disease

## 2017-06-21 NOTE — ADDENDUM NOTE
Encounter addended by: Dominique Wilcox MA on: 6/21/2017 10:28 AM<BR>    Actions taken: Letter status changed

## 2017-06-28 ENCOUNTER — OFFICE VISIT (OUTPATIENT)
Dept: INTERNAL MEDICINE | Facility: CLINIC | Age: 77
End: 2017-06-28
Payer: MEDICARE

## 2017-06-28 ENCOUNTER — TELEPHONE (OUTPATIENT)
Dept: INTERNAL MEDICINE | Facility: CLINIC | Age: 77
End: 2017-06-28

## 2017-06-28 VITALS
HEIGHT: 60 IN | HEART RATE: 78 BPM | DIASTOLIC BLOOD PRESSURE: 81 MMHG | SYSTOLIC BLOOD PRESSURE: 178 MMHG | WEIGHT: 162.5 LBS | BODY MASS INDEX: 31.9 KG/M2

## 2017-06-28 DIAGNOSIS — R00.1 SINUS BRADYCARDIA: ICD-10-CM

## 2017-06-28 DIAGNOSIS — E11.42 DIABETIC POLYNEUROPATHY ASSOCIATED WITH TYPE 2 DIABETES MELLITUS: ICD-10-CM

## 2017-06-28 DIAGNOSIS — R55 PRE-SYNCOPE: Primary | ICD-10-CM

## 2017-06-28 DIAGNOSIS — I10 ESSENTIAL HYPERTENSION: ICD-10-CM

## 2017-06-28 DIAGNOSIS — E11.40 TYPE 2 DIABETES MELLITUS WITH DIABETIC NEUROPATHY, WITHOUT LONG-TERM CURRENT USE OF INSULIN: ICD-10-CM

## 2017-06-28 DIAGNOSIS — R35.0 URINARY FREQUENCY: ICD-10-CM

## 2017-06-28 DIAGNOSIS — G47.33 OBSTRUCTIVE SLEEP APNEA SYNDROME: ICD-10-CM

## 2017-06-28 PROBLEM — I95.9 HYPOTENSION: Status: RESOLVED | Noted: 2017-06-10 | Resolved: 2017-06-28

## 2017-06-28 LAB
BACTERIA #/AREA URNS AUTO: ABNORMAL /HPF
BILIRUB UR QL STRIP: NEGATIVE
CLARITY UR REFRACT.AUTO: CLEAR
COLOR UR AUTO: YELLOW
GLUCOSE UR QL STRIP: ABNORMAL
HGB UR QL STRIP: NEGATIVE
KETONES UR QL STRIP: NEGATIVE
LEUKOCYTE ESTERASE UR QL STRIP: ABNORMAL
MICROSCOPIC COMMENT: ABNORMAL
NITRITE UR QL STRIP: NEGATIVE
PH UR STRIP: 7 [PH] (ref 5–8)
PROT UR QL STRIP: NEGATIVE
RBC #/AREA URNS AUTO: 1 /HPF (ref 0–4)
SP GR UR STRIP: 1.01 (ref 1–1.03)
SQUAMOUS #/AREA URNS AUTO: 1 /HPF
URN SPEC COLLECT METH UR: ABNORMAL
UROBILINOGEN UR STRIP-ACNC: NEGATIVE EU/DL
WBC #/AREA URNS AUTO: 17 /HPF (ref 0–5)

## 2017-06-28 PROCEDURE — 99499 UNLISTED E&M SERVICE: CPT | Mod: S$GLB,,, | Performed by: INTERNAL MEDICINE

## 2017-06-28 PROCEDURE — 99215 OFFICE O/P EST HI 40 MIN: CPT | Mod: S$GLB,,, | Performed by: INTERNAL MEDICINE

## 2017-06-28 PROCEDURE — 1159F MED LIST DOCD IN RCRD: CPT | Mod: S$GLB,,, | Performed by: INTERNAL MEDICINE

## 2017-06-28 PROCEDURE — 99999 PR PBB SHADOW E&M-EST. PATIENT-LVL IV: CPT | Mod: PBBFAC,,, | Performed by: INTERNAL MEDICINE

## 2017-06-28 PROCEDURE — 1126F AMNT PAIN NOTED NONE PRSNT: CPT | Mod: S$GLB,,, | Performed by: INTERNAL MEDICINE

## 2017-06-28 PROCEDURE — 81001 URINALYSIS AUTO W/SCOPE: CPT

## 2017-06-28 RX ORDER — GABAPENTIN 600 MG/1
TABLET ORAL
COMMUNITY
Start: 2017-04-03 | End: 2017-07-06 | Stop reason: SDUPTHER

## 2017-06-28 RX ORDER — HYDROCHLOROTHIAZIDE 25 MG/1
TABLET ORAL
COMMUNITY
Start: 2017-06-16 | End: 2017-06-28

## 2017-06-28 RX ORDER — NITROFURANTOIN 25; 75 MG/1; MG/1
100 CAPSULE ORAL 2 TIMES DAILY
Qty: 10 CAPSULE | Refills: 0 | Status: SHIPPED | OUTPATIENT
Start: 2017-06-28 | End: 2017-08-08

## 2017-06-28 RX ORDER — HYDRALAZINE HYDROCHLORIDE 10 MG/1
10 TABLET, FILM COATED ORAL 3 TIMES DAILY
Qty: 90 TABLET | Refills: 11 | Status: SHIPPED | OUTPATIENT
Start: 2017-06-28 | End: 2017-08-08 | Stop reason: SDUPTHER

## 2017-06-28 NOTE — PATIENT INSTRUCTIONS
Hydralazine tablets  What is this medicine?  HYDRALAZINE (emily kern) is a type of vasodilator. It relaxes blood vessels, increasing the blood and oxygen supply to your heart. This medicine is used to treat high blood pressure.  How should I use this medicine?  Take this medicine by mouth with a glass of water. Follow the directions on the prescription label. Take your doses at regular intervals. Do not take your medicine more often than directed. Do not stop taking except on the advice of your doctor or health care professional.  Talk to your pediatrician regarding the use of this medicine in children. Special care may be needed. While this drug may be prescribed for children for selected conditions, precautions do apply.  What side effects may I notice from receiving this medicine?  Side effects that you should report to your doctor or health care professional as soon as possible:  · chest pain, or fast or irregular heartbeat  · fever, chills, or sore throat  · numbness or tingling in the hands or feet  · shortness of breath  · skin rash, redness, blisters or itching  · stiff or swollen joints  · sudden weight gain  · swelling of the feet or legs  · swollen lymph glands  · unusual weakness  Side effects that usually do not require medical attention (report to your doctor or health care professional if they continue or are bothersome):  · diarrhea, or constipation  · headache  · loss of appetite  · nausea, vomiting  What may interact with this medicine?  · medicines for high blood pressure  · medicines for mental depression  What if I miss a dose?  If you miss a dose, take it as soon as you can. If it is almost time for your next dose, take only that dose. Do not take double or extra doses.  Where should I keep my medicine?  Keep out of the reach of children.  Store at room temperature between 15 and 30 degrees C (59 and 86 degrees F). Throw away any unused medicine after the expiration date.  What should I  tell my health care provider before I take this medicine?  They need to know if you have any of these conditions:  · blood vessel disease  · heart disease including angina or history of heart attack  · kidney or liver disease  · systemic lupus erythematosus (SLE)  · an unusual or allergic reaction to hydralazine, tartrazine dye, other medicines, foods, dyes, or preservatives  · pregnant or trying to get pregnant  · breast-feeding  What should I watch for while using this medicine?  Visit your doctor or health care professional for regular checks on your progress. Check your blood pressure and pulse rate regularly. Ask your doctor or health care professional what your blood pressure and pulse rate should be and when you should contact him or her.  You may get drowsy or dizzy. Do not drive, use machinery, or do anything that needs mental alertness until you know how this medicine affects you. Do not stand or sit up quickly, especially if you are an older patient. This reduces the risk of dizzy or fainting spells. Alcohol may interfere with the effect of this medicine. Avoid alcoholic drinks.  Do not treat yourself for coughs, colds, or pain while you are taking this medicine without asking your doctor or health care professional for advice. Some ingredients may increase your blood pressure.  Date Last Reviewed:   NOTE:This sheet is a summary. It may not cover all possible information. If you have questions about this medicine, talk to your doctor, pharmacist, or health care provider. Copyright© 2016 [employer]

## 2017-06-28 NOTE — PROGRESS NOTES
Transitional Care Note  Subjective:       Patient ID: Emilia Alan is a 76 y.o. female.  Chief Complaint: Follow-up (blood pressure check)    Family and/or Caretaker present at visit?  No.  Diagnostic tests reviewed/disposition: I have reviewed all completed as well as pending diagnostic tests at the time of discharge.  Disease/illness education: yes  Home health/community services discussion/referrals: Patient does not have home health established from hospital visit.  They do not need home health.  If needed, we will set up home health for the patient.   Establishment or re-establishment of referral orders for community resources: No other necessary community resources.   Discussion with other health care providers: No discussion with other health care providers necessary.     Ate breakfast, took pills and then felt bad.  She thought she was hypoglycemic, but then felt over-tired.  She did not fully lose consciousness.  911 was called and she was admitted- bradycardic.  BP up since meds adjusted; beta blocker d/c.    Recall that we had had to discontinue her diuretic due to renal insufficiency.  She is already on maximum doses of Ace.  She is back on verapamil.    ALso had short-endoscopy.  No findings.    At home BP running 150 range.    Has monitor in place, no sx of syncope or LOC.      Significant polyuria all day and all night.  No dysuria or fever.  No hematuria.  Never smoked.    Osteoporosis evaluated last year, she saw endocrinology in September and was given 1 dose of Reclast.  She'll be due for follow-up around September of this year as well.  No falls or fractures.    She requires a new mask and some supplies for her CPAP.  She has not had this evaluated since 2008.  She declines depression or excessive daytime somnolence.    Patient Active Problem List   Diagnosis    Gait disorder    History of poliomyelitis    Post poliomyelitis syndrome    Diabetic polyneuropathy associated with type 2  diabetes mellitus    Mixed hyperlipidemia    Osteoporosis without current pathological fracture: worse on fosamax 5/16- Reclast 8/16    Paraparesis of both lower limbs    Essential hypertension    Type 2 diabetes mellitus with diabetic neuropathy, without long-term current use of insulin    Osteoarthritis of right knee    Lumbar spinal stenosis    Sinus bradycardia    Pre-syncope    Obstructive sleep apnea syndrome: dx 2008 needs CPAP 11         HPI  Review of Systems   Constitutional: Negative for chills, fatigue and fever.   HENT: Negative for congestion and postnasal drip.    Eyes: Negative.    Respiratory: Positive for apnea. Negative for cough, chest tightness, shortness of breath and wheezing.    Cardiovascular: Negative.         No further syncope.  No chest pain, pressure, tightness or shortness of breath.  No edema.    Discussed her test results.  BNP minimally elevated.   Gastrointestinal: Negative.         No GERD sx; no GI bleeding   Genitourinary: Positive for frequency. Negative for hematuria.   Musculoskeletal: Positive for arthralgias, back pain and gait problem.        No changes   Neurological: Negative for tremors, syncope, weakness and light-headedness.   Psychiatric/Behavioral: Negative for behavioral problems, confusion and dysphoric mood. The patient is not nervous/anxious.        Objective:      Physical Exam   Constitutional: She is oriented to person, place, and time. She appears well-developed and well-nourished.   HENT:   Head: Normocephalic and atraumatic.   Nose: Nose normal.   Mouth/Throat: Oropharynx is clear and moist. No oropharyngeal exudate.   Eyes: Conjunctivae and EOM are normal. No scleral icterus.   Neck: Normal range of motion. Neck supple. No JVD present. No thyromegaly present.   Cardiovascular: Normal rate, regular rhythm, normal heart sounds and intact distal pulses.  Exam reveals no gallop.    No murmur heard.  Pulmonary/Chest: Effort normal and breath sounds  normal. No respiratory distress. She has no wheezes. She exhibits no tenderness.   Abdominal: Soft. Bowel sounds are normal. She exhibits no distension. There is no tenderness. There is no guarding.   Musculoskeletal: Normal range of motion. She exhibits no edema or tenderness.   Lymphadenopathy:     She has no cervical adenopathy.   Neurological: She is alert and oriented to person, place, and time.   In a wheelchair   Skin: Skin is warm. No rash noted. No erythema.   Psychiatric: She has a normal mood and affect. Her behavior is normal. Judgment and thought content normal.   Nursing note and vitals reviewed.      Assessment:       1. Pre-syncope    2. Sinus bradycardia    3. Essential hypertension    4. Type 2 diabetes mellitus with diabetic neuropathy, without long-term current use of insulin    5. Diabetic polyneuropathy associated with type 2 diabetes mellitus    6. Obstructive sleep apnea syndrome: dx 2008 needs CPAP 11    7. Urinary frequency        Plan:         Pre-syncope: Diet, hydration and postural measures reviewed.  No further symptoms.  Results of monitor pending    Sinus bradycardia: Keep arrhythmia follow-up  -     2D Echo w/ Color Flow Doppler; Future    Essential hypertension: difficult to control.  On maximum dose of ACE inhibitor as well as calcium channel blocker.  Unable to tolerate diuretic.  Did not tolerate beta blocker.  Will try low-dose hydralazine, cautions and side effects reviewed.  Continue with low-salt diet and exercise and weight loss as tolerated  -     Ambulatory consult to Cardiology  -     Cardiology Lab US Renal Artery Scan Bilateral; Future  -     2D Echo w/ Color Flow Doppler; Future    Type 2 diabetes mellitus with diabetic neuropathy, without long-term current use of insulin: Continue regimen     Diabetic polyneuropathy associated with type 2 diabetes mellitus: Continue regimen    Obstructive sleep apnea syndrome: dx 2008 needs CPAP 11: Issues reviewed, continue regimen.   Needs to be revisited; this could have implications for her hypertension and arrhythmia issues   -     Ambulatory consult to Sleep Disorders  -     CPAP/BIPAP SUPPLIES    Urinary frequency  -     Urinalysis  -     Ambulatory referral to Urology    Other orders  -     hydrALAZINE (APRESOLINE) 10 MG tablet; Take 1 tablet (10 mg total) by mouth 3 (three) times daily.  Dispense: 90 tablet; Refill: 11    EP 6 weeks    I will review all studies and determine further tx depending on findings    Patient evaluated for over 45 minutes with this appoinment, including diagnostic testing and treatment.  All questions answered,  chart reviewed and care coordinated.

## 2017-06-29 NOTE — TELEPHONE ENCOUNTER
Please let her know it looks like she has a UTI    I have sent in antibiotics to her pharmact    Keep all follow up appts    thanks

## 2017-07-06 ENCOUNTER — OFFICE VISIT (OUTPATIENT)
Dept: PHYSICAL MEDICINE AND REHAB | Facility: CLINIC | Age: 77
End: 2017-07-06
Payer: MEDICARE

## 2017-07-06 ENCOUNTER — OFFICE VISIT (OUTPATIENT)
Dept: PODIATRY | Facility: CLINIC | Age: 77
End: 2017-07-06
Payer: MEDICARE

## 2017-07-06 VITALS
SYSTOLIC BLOOD PRESSURE: 163 MMHG | HEIGHT: 60 IN | BODY MASS INDEX: 30.73 KG/M2 | HEART RATE: 65 BPM | DIASTOLIC BLOOD PRESSURE: 87 MMHG | WEIGHT: 156.5 LBS

## 2017-07-06 VITALS
WEIGHT: 162 LBS | HEIGHT: 60 IN | HEART RATE: 77 BPM | BODY MASS INDEX: 31.8 KG/M2 | DIASTOLIC BLOOD PRESSURE: 87 MMHG | RESPIRATION RATE: 18 BRPM | SYSTOLIC BLOOD PRESSURE: 188 MMHG

## 2017-07-06 DIAGNOSIS — Z86.12 HISTORY OF POLIOMYELITIS: ICD-10-CM

## 2017-07-06 DIAGNOSIS — B35.1 ONYCHOMYCOSIS DUE TO DERMATOPHYTE: ICD-10-CM

## 2017-07-06 DIAGNOSIS — M54.16 LUMBAR RADICULOPATHY: ICD-10-CM

## 2017-07-06 DIAGNOSIS — M81.0 AGE-RELATED OSTEOPOROSIS WITHOUT CURRENT PATHOLOGICAL FRACTURE: ICD-10-CM

## 2017-07-06 DIAGNOSIS — E11.40 TYPE 2 DIABETES MELLITUS WITH DIABETIC NEUROPATHY, WITHOUT LONG-TERM CURRENT USE OF INSULIN: ICD-10-CM

## 2017-07-06 DIAGNOSIS — G89.29 CHRONIC PAIN OF RIGHT KNEE: ICD-10-CM

## 2017-07-06 DIAGNOSIS — M47.16 LUMBAR SPONDYLOSIS WITH MYELOPATHY: ICD-10-CM

## 2017-07-06 DIAGNOSIS — G60.9 IDIOPATHIC PERIPHERAL NEUROPATHY: Primary | ICD-10-CM

## 2017-07-06 DIAGNOSIS — M17.11 PRIMARY OSTEOARTHRITIS OF RIGHT KNEE: ICD-10-CM

## 2017-07-06 DIAGNOSIS — G14 POST POLIOMYELITIS SYNDROME: ICD-10-CM

## 2017-07-06 DIAGNOSIS — M54.16 LEFT LUMBAR RADICULOPATHY: ICD-10-CM

## 2017-07-06 DIAGNOSIS — G89.29 CHRONIC BILATERAL LOW BACK PAIN WITH SCIATICA, SCIATICA LATERALITY UNSPECIFIED: ICD-10-CM
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
## 2017-07-06 DIAGNOSIS — E08.44 DIABETIC AMYOTROPHY ASSOCIATED WITH DIABETES MELLITUS DUE TO UNDERLYING CONDITION: ICD-10-CM

## 2017-07-06 DIAGNOSIS — M54.40 CHRONIC BILATERAL LOW BACK PAIN WITH SCIATICA, SCIATICA LATERALITY UNSPECIFIED: ICD-10-CM

## 2017-07-06 DIAGNOSIS — M47.26 OSTEOARTHRITIS OF SPINE WITH RADICULOPATHY, LUMBAR REGION: Primary | ICD-10-CM

## 2017-07-06 DIAGNOSIS — E11.42 DIABETIC POLYNEUROPATHY ASSOCIATED WITH TYPE 2 DIABETES MELLITUS: ICD-10-CM

## 2017-07-06 DIAGNOSIS — R26.9 GAIT DISORDER: ICD-10-CM

## 2017-07-06 DIAGNOSIS — M25.561 CHRONIC PAIN OF RIGHT KNEE: ICD-10-CM

## 2017-07-06 DIAGNOSIS — L84 CORN OR CALLUS: ICD-10-CM

## 2017-07-06 DIAGNOSIS — G82.20 PARAPARESIS OF BOTH LOWER LIMBS: ICD-10-CM

## 2017-07-06 DIAGNOSIS — M48.061 LUMBAR SPINAL STENOSIS: ICD-10-CM

## 2017-07-06 PROCEDURE — 11056 PARNG/CUTG B9 HYPRKR LES 2-4: CPT | Mod: Q9,S$GLB,, | Performed by: PODIATRIST

## 2017-07-06 PROCEDURE — 99999 PR PBB SHADOW E&M-EST. PATIENT-LVL III: CPT | Mod: PBBFAC,,, | Performed by: PODIATRIST

## 2017-07-06 PROCEDURE — 99499 UNLISTED E&M SERVICE: CPT | Mod: S$GLB,,, | Performed by: PHYSICAL MEDICINE & REHABILITATION

## 2017-07-06 PROCEDURE — 99499 UNLISTED E&M SERVICE: CPT | Mod: S$GLB,,, | Performed by: PODIATRIST

## 2017-07-06 PROCEDURE — 1125F AMNT PAIN NOTED PAIN PRSNT: CPT | Mod: S$GLB,,, | Performed by: PHYSICAL MEDICINE & REHABILITATION

## 2017-07-06 PROCEDURE — 99999 PR PBB SHADOW E&M-EST. PATIENT-LVL III: CPT | Mod: PBBFAC,,, | Performed by: PHYSICAL MEDICINE & REHABILITATION

## 2017-07-06 PROCEDURE — 11721 DEBRIDE NAIL 6 OR MORE: CPT | Mod: 59,Q9,S$GLB, | Performed by: PODIATRIST

## 2017-07-06 PROCEDURE — 1159F MED LIST DOCD IN RCRD: CPT | Mod: S$GLB,,, | Performed by: PHYSICAL MEDICINE & REHABILITATION

## 2017-07-06 PROCEDURE — 99214 OFFICE O/P EST MOD 30 MIN: CPT | Mod: S$GLB,,, | Performed by: PHYSICAL MEDICINE & REHABILITATION

## 2017-07-06 RX ORDER — GABAPENTIN 600 MG/1
600 TABLET ORAL
Qty: 360 TABLET | Refills: 1 | Status: SHIPPED | OUTPATIENT
Start: 2017-07-06 | End: 2017-10-06 | Stop reason: SDUPTHER

## 2017-07-06 RX ORDER — HYDROCODONE BITARTRATE AND ACETAMINOPHEN 10; 325 MG/1; MG/1
1 TABLET ORAL EVERY 6 HOURS PRN
Qty: 120 TABLET | Refills: 0 | Status: SHIPPED | OUTPATIENT
Start: 2017-08-05 | End: 2017-09-04

## 2017-07-06 RX ORDER — GABAPENTIN 300 MG/1
CAPSULE ORAL
COMMUNITY
Start: 2017-06-20 | End: 2017-07-25 | Stop reason: DRUGHIGH

## 2017-07-06 RX ORDER — HYDROCODONE BITARTRATE AND ACETAMINOPHEN 10; 325 MG/1; MG/1
1 TABLET ORAL EVERY 6 HOURS PRN
Qty: 120 TABLET | Refills: 0 | Status: SHIPPED | OUTPATIENT
Start: 2017-07-06 | End: 2017-10-06 | Stop reason: SDUPTHER

## 2017-07-06 RX ORDER — HYDROCODONE BITARTRATE AND ACETAMINOPHEN 10; 325 MG/1; MG/1
1 TABLET ORAL EVERY 6 HOURS PRN
Qty: 120 TABLET | Refills: 0 | Status: SHIPPED | OUTPATIENT
Start: 2017-09-06 | End: 2017-10-06

## 2017-07-06 NOTE — PROGRESS NOTES
Subjective:      Patient ID: Emilia Alan is a 76 y.o. female.    Chief Complaint: PCP (Lauren Deras MD 6/28/17); Peripheral Neuropathy; Nail Care; and Callouses    Emilia is a 76 y.o. female who presents to the clinic for evaluation and treatment of high risk feet. 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 The patient's chief complaint is long, thick toenails.       PCP: Lauren Deras MD    Date Last Seen by PCP:   Chief Complaint   Patient presents with    PCP     Lauren Deras MD 6/28/17    Peripheral Neuropathy    Nail Care    Callouses         Current shoe gear: DM shoes w/ AFO brace( L)    Hemoglobin A1C   Date Value Ref Range Status   05/10/2017 7.0 (H) 4.5 - 6.2 % Final     Comment:     According to ADA guidelines, hemoglobin A1C <7.0% represents  optimal control in non-pregnant diabetic patients.  Different  metrics may apply to specific populations.   Standards of Medical Care in Diabetes - 2016.  For the purpose of screening for the presence of diabetes:  <5.7%     Consistent with the absence of diabetes  5.7-6.4%  Consistent with increasing risk for diabetes   (prediabetes)  >or=6.5%  Consistent with diabetes  Currently no consensus exists for use of hemoglobin A1C  for diagnosis of diabetes for children.     11/10/2016 6.7 (H) 4.5 - 6.2 % Final     Comment:     According to ADA guidelines, hemoglobin A1C  <7.0% represents  optimal control in non-pregnant diabetic patients.  Different  metrics may apply to specific populations.   Standards of Medical Care in Diabetes - 2016.  For the purpose of screening for the presence of diabetes:  <5.7%     Consistent with the absence of diabetes  5.7-6.4%  Consistent with increasing risk for diabetes   (prediabetes)  >or=6.5%  Consistent with diabetes  Currently no consensus exists for use of hemoglobin A1C  for diagnosis of diabetes for children.     05/17/2016 6.4 (H) 4.5 - 6.2 % Final         Review of Systems   Constitution: Negative for chills, decreased appetite and fever.   Cardiovascular: Negative for chest pain, claudication and leg swelling.   Respiratory: Negative for cough.    Skin: Positive for dry skin and nail changes. Negative for flushing and itching.   Musculoskeletal: Positive for muscle weakness (foot drop). Negative for arthritis, back pain, gout, joint pain and myalgias.   Gastrointestinal: Negative for nausea and vomiting.   Neurological: Positive for numbness and paresthesias. Negative for loss of balance.           Objective:      Physical Exam   Constitutional: She is oriented to person, place, and time. She appears well-developed and well-nourished. No distress.   Cardiovascular:   Dorsalis pedis and posterior tibial pulses are diminished Bilaterally. Toes are cool to touch. Feet are warm proximally.There is decreased digital hair. Skin is atrophic, slightly hyperpigmented, and mildly edematous       Musculoskeletal: She exhibits no edema or tenderness.        Right ankle: Normal.        Left ankle: Normal.        Right foot: There is no swelling, no crepitus and no deformity.        Left foot: There is no swelling, no crepitus and no deformity.   Dropfoot left.      Lymphadenopathy:   No palpable lymph nodes   Neurological: She is alert and oriented to person, place, and time. She has normal strength.   [place] 5.07 monofilamant testing is  diminished Charbel feet. Sharp/dull sensation diminished Bilaterally. Light touch absent Bilaterally.       Skin: Skin is warm, dry and intact. No abrasion, no bruising, no burn, no laceration, no lesion, no petechiae and no rash noted. She is not diaphoretic. No pallor. Nails show no clubbing.   Nails 1-5 b/l  are elongated by  3-5 mm's, thickened by 3-5 mm's, dystrophic, and are darkened in  coloration . Xerosis Bilaterally. No open lesions noted.    Hyperkeratotic tissue noted to plantar L 5th MPJ and plantar L 5th toe   Psychiatric: She has a normal mood and affect. Judgment and thought content normal.   Nursing note and vitals reviewed.          Assessment:       Encounter Diagnoses   Name Primary?    Idiopathic peripheral neuropathy Yes    History of poliomyelitis     Onychomycosis due to dermatophyte     Corn or callus          Plan:       Emilia was seen today for pcp, peripheral neuropathy, nail care and callouses.    Diagnoses and all orders for this visit:    Idiopathic peripheral neuropathy    History of poliomyelitis    Onychomycosis due to dermatophyte    Corn or callus      I counseled the patient on her conditions, their implications and medical management.    - Shoe inspection.Patient instructed on proper foot hygeine. We discussed wearing proper shoe gear, daily foot inspections, never walking without protective shoe gear, never putting sharp instruments to feet, routine podiatric nail visits every 2-3 months.      - With patient's permission, nails were aggressively reduced and debrided x 10 to their soft tissue attachment mechanically and with electric , removing all offending nail and debris. Patient relates relief following the procedure. She will continue to monitor the areas daily, inspect her feet, wear protective shoe gear when ambulatory, moisturizer to maintain skin integrity and follow in this office in approximately 2-3 months, sooner p.r.n.    - After cleansing the  area w/  alcohol prep pad the above mentioned hyperkeratosis was trimmed utilizing No 15 scapel, to a smooth base with out incident. Patient tolerated this  well and reported comfort to the area of plantar L 5th MPJ and lateral l 5th toe

## 2017-07-06 NOTE — PROGRESS NOTES
Subjective:       Patient ID: Emilia Alan is a 76 y.o. female.    Chief Complaint: Spine Injury and Back Pain    Back Pain   Associated symptoms include leg pain. Pertinent negatives include no abdominal pain, chest pain, fever, numbness or weakness. Headaches:     Neck Pain    Associated symptoms include leg pain. Pertinent negatives include no chest pain, fever, numbness, trouble swallowing or weakness. Headaches:     Leg Pain    Pertinent negatives include no numbness.   Extremity Weakness    Pertinent negatives include no fever or numbness.   Knee Pain    Pertinent negatives include no numbness.   Spine Injury   Associated symptoms include neck pain. Pertinent negatives include no abdominal pain, arthralgias, chest pain, chills, fatigue, fever, joint swelling, myalgias, numbness, rash or weakness. Headaches:     Ms. Alan is a 76-year-old white female with past medical history of polio,   diagnosed at the age of 18 months and postpolio syndrome.  [facility] 04/06/17.  She is here for follow up visit for back pain, leg weakness,  functional decline, and chronic pain management.   She has paraparesis, A right leg is weaker than Left leg- that is weak in ankle.   She cannot actively  Right LE, still walks short distances, but majority of time she spent sitting.    Today, she states that she is slowing down, she walks slower, and can do less than in past.  She complains about back pain.   Current back pain is 6, worst pain is 9-10/10 while upright and walking.    Pain is localized across lower back and in upper spine.    Back pain is off/on, present mainly during day time, sharp, severe ,stabbing pain.  Pain is worse with lying or sitting and getting up from chair.   With that pain she is afraid she will fall down, since she gets more weakness in Right leg.   Complains also about right knee pain, that is weak, and goes backwards during walking.   Patient takes  Hydrocodone 10/325 mg, takes 3-4 x/day, and  Neurontin 600 mg, po QID, tolerates it well, and she knows that both medications are helping.  Patient refuses neurosurgical evaluation, and  ASHLEY to back.       The patient has had gradual worsening of her gait over the last few years.   She was prescribed a left AFO ( that she wears) and a right KAFO that patient did not like ( does not wear).   She cannot tolerate swedish knee cage as well.   She also has a neoprene sleee brace , to stabilized knee, and to take pressure off bone.   She continues to complain of progressive bilateral lower extremity weakness.   She reports frequent falls, especially in the last 3-6 months.   The patient lives in a single-silvia home with a ramp access.   She is independent with her dressing, feeding and grooming.   She is also independent with toileting and bathing using a shower chair.   She is able to ambulate with single cane, RW.  She can go about 20 feet, but has to stop frequently. She does better with a Rollator walker.   She has manual wheel chair that is bulky, and she cannot propel, RW with seat, cane and RW.   The patient does own a manual wheelchair.  She is restricted by lower extremity weakness, especially on the right side as noted above, she has frequent falls.  She did not sustain any significant injuries.   She recieved a new SCOOTER that is now fitting very nice, and is helping her a lot moving inside her house.    She is here to follow up, and chronic pain management with opiates.     Past Medical History:   Diagnosis Date    Allergy     Anemia 10/20/2014    Arthritis     Diabetes mellitus     Diabetic neuropathy 7/25/2012    Gait disorder 7/25/2012    High cholesterol     History of poliomyelitis 7/25/2012    Hyperlipidemia 8/5/2013    Hypertension     Obstructive sleep apnea syndrome: dx 2008 needs CPAP 11 6/28/2017    Osteopenia     Osteoporosis, unspecified 6/5/2014    Other specified anemias 7/6/2015    Post poliomyelitis syndrome 7/25/2012     Sleep apnea     Type II or unspecified type diabetes mellitus without mention of complication, not stated as uncontrolled 2015    Unspecified essential hypertension 2015       Past Surgical History:   Procedure Laterality Date    CATARACT EXTRACTION       SECTION      CHOLECYSTECTOMY      COLONOSCOPY N/A 2017    Procedure: COLONOSCOPY;  Surgeon: Karen Lebron MD;  Location: [address];  Service: Endoscopy;  Laterality: N/A;    EYE SURGERY      FRACTURE SURGERY         Family History   Problem Relation Age of Onset    Diabetes Father     Heart disease Father     Heart disease Brother     No Known Problems Daughter     Diabetes Son     Heart disease Brother     Diabetes Daughter     Cataracts Neg Hx     Glaucoma Neg Hx     Hypertension Neg Hx     Cancer Neg Hx     Blindness Neg Hx     Amblyopia Neg Hx     Strabismus Neg Hx     Retinal detachment Neg Hx     Macular degeneration Neg Hx     Melanoma Neg Hx        Social History     Social History    Marital status:      Spouse name: N/A    Number of children: 4    Years of education: N/A     Social History Main Topics    Smoking status: Never Smoker    Smokeless tobacco: Never Used    Alcohol use No    Drug use: No    Sexual activity: No     Other Topics Concern    Are You Pregnant Or Think You May Be? No     Social History Narrative    None       Current Outpatient Prescriptions   Medication Sig Dispense Refill    aspirin (ECOTRIN) 81 MG EC tablet Take 1 tablet (81 mg total) by mouth once daily. 30 tablet 12    blood sugar diagnostic Strp 1 strip by Misc.(Non-Drug; Combo Route) route 2 (two) times daily. TRUE RESULT SYSTEM 180 strip 4    blood-glucose meter (TRUERESULT BLOOD GLUCOSE SYSTM) kit Use as instructed 1 each 0    calcium-vitamin D3-vitamin K (VIACTIV) 500-100-40 mg-unit-mcg Chew Take 2 each by mouth.      diclofenac sodium (VOLTAREN) 1 % Gel Apply 4 gm to both knees 3x/day. 500  g 4    docusate sodium (COLACE) 50 MG capsule Take 1 capsule (50 mg total) by mouth 2 (two) times daily as needed for Constipation.      econazole nitrate 1 % cream Apply topically once daily. 85 g 0    enalapril (VASOTEC) 20 MG tablet Take 1 tablet (20 mg total) by mouth 2 (two) times daily. 180 tablet 3    gabapentin (NEURONTIN) 300 MG capsule       gabapentin (NEURONTIN) 600 MG tablet Take 1 tablet (600 mg total) by mouth 4 (four) times daily with meals and nightly. 360 tablet 1    glipiZIDE (GLUCOTROL) 5 MG tablet Take 1 tablet (5 mg total) by mouth once daily. 90 tablet 3    hydrALAZINE (APRESOLINE) 10 MG tablet Take 1 tablet (10 mg total) by mouth 3 (three) times daily. 90 tablet 11    hydrocodone-acetaminophen 10-325mg (NORCO)  mg Tab Take 1 tablet by mouth every 6 (six) hours as needed for Pain (pain). 120 tablet 0    [START ON 8/5/2017] hydrocodone-acetaminophen 10-325mg (NORCO)  mg Tab Take 1 tablet by mouth every 6 (six) hours as needed for Pain (pain). 120 tablet 0    [START ON 9/6/2017] hydrocodone-acetaminophen 10-325mg (NORCO)  mg Tab Take 1 tablet by mouth every 6 (six) hours as needed for Pain (pain). 120 tablet 0    lancets Misc TEST 2 X DAILY PROSPER RESULT SYSTEM 180 each 3    multivitamin (THERAGRAN) per tablet       nitrofurantoin, macrocrystal-monohydrate, (MACROBID) 100 MG capsule Take 1 capsule (100 mg total) by mouth 2 (two) times daily. 10 capsule 0    oxybutynin (DITROPAN) 5 MG Tab Take 1 tablet (5 mg total) by mouth 2 (two) times daily. 180 tablet 3    pravastatin (PRAVACHOL) 40 MG tablet Take 1.5 tablets (60 mg total) by mouth nightly. 135 tablet 3    TRUEPLUS LANCETS 33 gauge Misc USE BID  3    verapamil (CALAN-SR) 240 MG CR tablet TAKE 1 TABLET(240 MG) BY MOUTH EVERY DAY 90 tablet 0     No current facility-administered medications for this visit.        Review of patient's allergies indicates:  No Known Allergies  Review of Systems   Constitutional:  Negative for appetite change, chills, fatigue, fever and unexpected weight change.   HENT: Negative for drooling, trouble swallowing and voice change.    Eyes: Negative for pain and visual disturbance.   Respiratory: Negative for shortness of breath and wheezing.    Cardiovascular: Negative for chest pain and palpitations.   Gastrointestinal: Negative for abdominal distention, abdominal pain, constipation and diarrhea.   Genitourinary: Negative for difficulty urinating.   Musculoskeletal: Positive for back pain, extremity weakness and neck pain. Negative for arthralgias, gait problem, joint swelling, myalgias and neck stiffness.               Skin: Negative for color change and rash.   Neurological: Negative for dizziness, facial asymmetry, speech difficulty, weakness, light-headedness and numbness. Headaches:     Hematological: Negative for adenopathy.   Psychiatric/Behavioral: Negative for behavioral problems, confusion and sleep disturbance. The patient is not nervous/anxious.            Objective:      Physical Exam     Constitutional: She is oriented to person, place, and time. She appears well-developed and well-nourished.   HENT:   Head: Normocephalic.   Eyes: EOM are normal.   Neck: Normal range of motion.   Cardiovascular: Normal rate, regular rhythm and normal heart sounds.   Pulmonary/Chest: Breath sounds normal.   Musculoskeletal: Normal range of motion.   BUE:  ROM:full.  Strength: 5/5 at shoulders, elbows & hands.  Sensation to pinprick: intact  BLE: ROM:full.  Strength:   RLE: HF 0/5, KE 0/5,   Ankle DF 2-, Ankle PF 3  LLE:   HF 3-, KE 3-.  Ankle DF 1,  Ankle PF 3  Leg length discrepancy ( left is shorter than right), wears Left AFO.   Sensation to pinprick: intact .   DTR: decreased.       Xray of Right knee ( 2014)  Showed:  Standing AP knees and lateral of the right knee and merchant view of both knees are submitted.    Advanced degenerative change seen in the tricompartmental areas of the right  knee.    Left knee shows mild degenerative change.  Both patellas show significant lateral deviation    MRI of Lumbar spine  ( 2015) ;  L2-L3:There is a circumferential disk bulge with moderate bilateral facet osseous hypertrophy and ligamentum flavum buckling. This results and mild narrowing of the spinal canal. The bilateral neural foramen remain patent.  L3-L4: There is a circumferential disk bulge with left paracentral disk protrusion. This is associated with severe bilateral facet osseous hypertrophy, bilateral facet edema, and ligamentum flavum buckling.   These findings result in severe narrowing of the spinal canal and near complete obliteration of the left neural foramen.  The right neural foramen is moderately narrowed as well.  L4-L5: There is a circumferential disk bulge with superimposed central disk protrusion. This is associated with severe bilateral facet osseous hypertrophy and ligamentum flavum buckling.   These findings result in severe narrowing of the spinal canal and bilateral neural foramina, left greater than right.     Assessment:       1. Osteoarthritis of spine with radiculopathy, lumbar region    2. Post poliomyelitis syndrome    3. Paraparesis of both lower limbs    4. Lumbar spinal stenosis    5. Diabetic polyneuropathy associated with type 2 diabetes mellitus    6. Osteoporosis without current pathological fracture: worse on fosamax 5/16- Reclast 8/16    7. Primary osteoarthritis of right knee    8. History of poliomyelitis    9. Lumbar spondylosis with myelopathy    10. Gait disorder    11. 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 Type 2 diabetes mellitus with diabetic neuropathy, without long-term current use of insulin    13. Chronic bilateral low back pain with sciatica, sciatica laterality unspecified    14. Fall, sequela    15. Left lumbar radiculopathy    16. Lumbar radiculopathy    17. Chronic pain of right knee       Plan:         Osteoarthritis of spine with radiculopathy, lumbar region  -     hydrocodone-acetaminophen 10-325mg (NORCO)  mg Tab; Take 1 tablet by mouth every 6 (six) hours as needed for Pain (pain).  Dispense: 120 tablet; Refill: 0  -     hydrocodone-acetaminophen 10-325mg (NORCO)  mg Tab; Take 1 tablet by mouth every 6 (six) hours as needed for Pain (pain).  Dispense: 120 tablet; Refill: 0  -     hydrocodone-acetaminophen 10-325mg (NORCO)  mg Tab; Take 1 tablet by mouth every 6 (six) hours as needed for Pain (pain).  Dispense: 120 tablet; Refill: 0    Post poliomyelitis syndrome  -     hydrocodone-acetaminophen 10-325mg (NORCO)  mg Tab; Take 1 tablet by mouth every 6 (six) hours as needed for Pain (pain).  Dispense: 120 tablet; Refill: 0  -     hydrocodone-acetaminophen 10-325mg (NORCO)  mg Tab; Take 1 tablet by mouth every 6 (six) hours as needed for Pain (pain).  Dispense: 120 tablet; Refill: 0  -     hydrocodone-acetaminophen 10-325mg (NORCO)  mg Tab; Take 1 tablet by mouth every 6 (six) hours as needed for Pain (pain).  Dispense: 120 tablet; Refill: 0    Paraparesis of both lower limbs  -     hydrocodone-acetaminophen 10-325mg (NORCO)  mg Tab; Take 1 tablet by mouth every 6 (six) hours as needed for Pain (pain).  Dispense: 120 tablet; Refill: 0  -     hydrocodone-acetaminophen 10-325mg (NORCO)  mg Tab; Take 1 tablet by mouth every 6 (six) hours as needed for Pain (pain).  Dispense: 120 tablet; Refill: 0  -     hydrocodone-acetaminophen 10-325mg (NORCO)  mg Tab; Take 1 tablet by mouth every 6 (six) hours as needed for Pain (pain).  Dispense: 120 tablet; Refill: 0    Lumbar spinal stenosis  -     hydrocodone-acetaminophen 10-325mg (NORCO)  mg Tab; Take 1 tablet by mouth every 6 (six) hours as needed for Pain (pain).  Dispense: 120 tablet; Refill: 0  -     hydrocodone-acetaminophen 10-325mg (NORCO)  mg Tab; Take 1 tablet by mouth every 6 (six) hours as needed  for Pain (pain).  Dispense: 120 tablet; Refill: 0  -     hydrocodone-acetaminophen 10-325mg (NORCO)  mg Tab; Take 1 tablet by mouth every 6 (six) hours as needed for Pain (pain).  Dispense: 120 tablet; Refill: 0    Diabetic polyneuropathy associated with type 2 diabetes mellitus  -     hydrocodone-acetaminophen 10-325mg (NORCO)  mg Tab; Take 1 tablet by mouth every 6 (six) hours as needed for Pain (pain).  Dispense: 120 tablet; Refill: 0  -     hydrocodone-acetaminophen 10-325mg (NORCO)  mg Tab; Take 1 tablet by mouth every 6 (six) hours as needed for Pain (pain).  Dispense: 120 tablet; Refill: 0  -     hydrocodone-acetaminophen 10-325mg (NORCO)  mg Tab; Take 1 tablet by mouth every 6 (six) hours as needed for Pain (pain).  Dispense: 120 tablet; Refill: 0    Osteoporosis without current pathological fracture: worse on fosamax 5/16- Reclast 8/16  -     hydrocodone-acetaminophen 10-325mg (NORCO)  mg Tab; Take 1 tablet by mouth every 6 (six) hours as needed for Pain (pain).  Dispense: 120 tablet; Refill: 0  -     hydrocodone-acetaminophen 10-325mg (NORCO)  mg Tab; Take 1 tablet by mouth every 6 (six) hours as needed for Pain (pain).  Dispense: 120 tablet; Refill: 0  -     hydrocodone-acetaminophen 10-325mg (NORCO)  mg Tab; Take 1 tablet by mouth every 6 (six) hours as needed for Pain (pain).  Dispense: 120 tablet; Refill: 0    Primary osteoarthritis of right knee  -     hydrocodone-acetaminophen 10-325mg (NORCO)  mg Tab; Take 1 tablet by mouth every 6 (six) hours as needed for Pain (pain).  Dispense: 120 tablet; Refill: 0  -     hydrocodone-acetaminophen 10-325mg (NORCO)  mg Tab; Take 1 tablet by mouth every 6 (six) hours as needed for Pain (pain).  Dispense: 120 tablet; Refill: 0  -     hydrocodone-acetaminophen 10-325mg (NORCO)  mg Tab; Take 1 tablet by mouth every 6 (six) hours as needed for Pain (pain).  Dispense: 120 tablet; Refill: 0    History of  poliomyelitis  -     hydrocodone-acetaminophen 10-325mg (NORCO)  mg Tab; Take 1 tablet by mouth every 6 (six) hours as needed for Pain (pain).  Dispense: 120 tablet; Refill: 0  -     hydrocodone-acetaminophen 10-325mg (NORCO)  mg Tab; Take 1 tablet by mouth every 6 (six) hours as needed for Pain (pain).  Dispense: 120 tablet; Refill: 0  -     hydrocodone-acetaminophen 10-325mg (NORCO)  mg Tab; Take 1 tablet by mouth every 6 (six) hours as needed for Pain (pain).  Dispense: 120 tablet; Refill: 0    Lumbar spondylosis with myelopathy  -     hydrocodone-acetaminophen 10-325mg (NORCO)  mg Tab; Take 1 tablet by mouth every 6 (six) hours as needed for Pain (pain).  Dispense: 120 tablet; Refill: 0  -     hydrocodone-acetaminophen 10-325mg (NORCO)  mg Tab; Take 1 tablet by mouth every 6 (six) hours as needed for Pain (pain).  Dispense: 120 tablet; Refill: 0  -     hydrocodone-acetaminophen 10-325mg (NORCO)  mg Tab; Take 1 tablet by mouth every 6 (six) hours as needed for Pain (pain).  Dispense: 120 tablet; Refill: 0    Gait disorder  -     hydrocodone-acetaminophen 10-325mg (NORCO)  mg Tab; Take 1 tablet by mouth every 6 (six) hours as needed for Pain (pain).  Dispense: 120 tablet; Refill: 0  -     hydrocodone-acetaminophen 10-325mg (NORCO)  mg Tab; Take 1 tablet by mouth every 6 (six) hours as needed for Pain (pain).  Dispense: 120 tablet; Refill: 0  -     hydrocodone-acetaminophen 10-325mg (NORCO)  mg Tab; Take 1 tablet by mouth every 6 (six) hours as needed for Pain (pain).  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  Dispense: 120 tablet; Refill: 0  -     hydrocodone-acetaminophen 10-325mg (NORCO)  mg Tab; Take 1 tablet by mouth every 6 (six) hours as  needed for Pain (pain).  Dispense: 120 tablet; Refill: 0  -     hydrocodone-acetaminophen 10-325mg (NORCO)  mg Tab; Take 1 tablet by mouth every 6 (six) hours as needed for Pain (pain).  Dispense: 120 tablet; Refill: 0    Type 2 diabetes mellitus with diabetic neuropathy, without long-term current use of insulin  -     hydrocodone-acetaminophen 10-325mg (NORCO)  mg Tab; Take 1 tablet by mouth every 6 (six) hours as needed for Pain (pain).  Dispense: 120 tablet; Refill: 0  -     hydrocodone-acetaminophen 10-325mg (NORCO)  mg Tab; Take 1 tablet by mouth every 6 (six) hours as needed for Pain (pain).  Dispense: 120 tablet; Refill: 0  -     hydrocodone-acetaminophen 10-325mg (NORCO)  mg Tab; Take 1 tablet by mouth every 6 (six) hours as needed for Pain (pain).  Dispense: 120 tablet; Refill: 0    Chronic bilateral low back pain with sciatica, sciatica laterality unspecified  -     hydrocodone-acetaminophen 10-325mg (NORCO)  mg Tab; Take 1 tablet by mouth every 6 (six) hours as needed for Pain (pain).  Dispense: 120 tablet; Refill: 0  -     hydrocodone-acetaminophen 10-325mg (NORCO)  mg Tab; Take 1 tablet by mouth every 6 (six) hours as needed for Pain (pain).  Dispense: 120 tablet; Refill: 0  -     hydrocodone-acetaminophen 10-325mg (NORCO)  mg Tab; Take 1 tablet by mouth every 6 (six) hours as needed for Pain (pain).  Dispense: 120 tablet; Refill: 0    Fall, sequela  -     hydrocodone-acetaminophen 10-325mg (NORCO)  mg Tab; Take 1 tablet by mouth every 6 (six) hours as needed for Pain (pain).  Dispense: 120 tablet; Refill: 0  -     hydrocodone-acetaminophen 10-325mg (NORCO)  mg Tab; Take 1 tablet by mouth every 6 (six) hours as needed for Pain (pain).  Dispense: 120 tablet; Refill: 0  -     hydrocodone-acetaminophen 10-325mg (NORCO)  mg Tab; Take 1 tablet by mouth every 6 (six) hours as needed for Pain (pain).  Dispense: 120 tablet; Refill: 0    Left lumbar  radiculopathy  -     hydrocodone-acetaminophen 10-325mg (NORCO)  mg Tab; Take 1 tablet by mouth every 6 (six) hours as needed for Pain (pain).  Dispense: 120 tablet; Refill: 0  -     hydrocodone-acetaminophen 10-325mg (NORCO)  mg Tab; Take 1 tablet by mouth every 6 (six) hours as needed for Pain (pain).  Dispense: 120 tablet; Refill: 0  -     hydrocodone-acetaminophen 10-325mg (NORCO)  mg Tab; Take 1 tablet by mouth every 6 (six) hours as needed for Pain (pain).  Dispense: 120 tablet; Refill: 0    Lumbar radiculopathy  -     hydrocodone-acetaminophen 10-325mg (NORCO)  mg Tab; Take 1 tablet by mouth every 6 (six) hours as needed for Pain (pain).  Dispense: 120 tablet; Refill: 0  -     hydrocodone-acetaminophen 10-325mg (NORCO)  mg Tab; Take 1 tablet by mouth every 6 (six) hours as needed for Pain (pain).  Dispense: 120 tablet; Refill: 0  -     hydrocodone-acetaminophen 10-325mg (NORCO)  mg Tab; Take 1 tablet by mouth every 6 (six) hours as needed for Pain (pain).  Dispense: 120 tablet; Refill: 0    Chronic pain of right knee  -     hydrocodone-acetaminophen 10-325mg (NORCO)  mg Tab; Take 1 tablet by mouth every 6 (six) hours as needed for Pain (pain).  Dispense: 120 tablet; Refill: 0  -     hydrocodone-acetaminophen 10-325mg (NORCO)  mg Tab; Take 1 tablet by mouth every 6 (six) hours as needed for Pain (pain).  Dispense: 120 tablet; Refill: 0  -     hydrocodone-acetaminophen 10-325mg (NORCO)  mg Tab; Take 1 tablet by mouth every 6 (six) hours as needed for Pain (pain).  Dispense: 120 tablet; Refill: 0    Other orders  -     gabapentin (NEURONTIN) 600 MG tablet; Take 1 tablet (600 mg total) by mouth 4 (four) times daily with meals and nightly.  Dispense: 360 tablet; Refill: 1      Patient with C.palsy, with  Paraparesis., severe Lumbar spinal stenosis at L3-4, and L4-5, with  Leg length discrepancy ( left is shorter than right),  wears Left AFO, and has more  proximal strength in LLE, than in RLE .   Also with severe DJD , genu valgus in R knee ( cannot toleate custom made  knee brace, nor Swedish knee cage).   She also wears  neoprene sleee brace , that stabilizes knee, and improves proprioception, helps with gait.    1. Back pain   Will resume Hydrocodone 10/325 mg, takes 3-4 x/day, and Neurontin 600 mg, po QID.   Patient refuses neurosurgical evaluation, and  ASHLEY to back.     2.  Recieved SCOOTER  the very next day, that is fitting great, excellent .       RTC in 3  months..    Total time spent face to face with patient was 25 minutes.   More than 50% of that time was spent in counseling on diagnosis , prognosis and treatment options.   I also caunsel patient  on common and most usual side effect of prescribed medications.   Risk and benefits of opiates, possible risk of developing opiate dependence and tolerance, need of strict compliance with prescribed medications.  I reviewed Primary care , and other specialty's notes to better coordinate patient's  care.   All questions were answered, and patient voiced understanding.

## 2017-07-12 ENCOUNTER — CLINICAL SUPPORT (OUTPATIENT)
Dept: CARDIOLOGY | Facility: CLINIC | Age: 77
End: 2017-07-12
Payer: MEDICARE

## 2017-07-12 DIAGNOSIS — I10 ESSENTIAL HYPERTENSION: ICD-10-CM

## 2017-07-12 PROCEDURE — 93975 VASCULAR STUDY: CPT | Mod: S$GLB,,, | Performed by: INTERNAL MEDICINE

## 2017-07-13 ENCOUNTER — HOSPITAL ENCOUNTER (OUTPATIENT)
Dept: CARDIOLOGY | Facility: CLINIC | Age: 77
Discharge: HOME | End: 2017-07-13
Payer: MEDICARE

## 2017-07-13 DIAGNOSIS — I10 ESSENTIAL HYPERTENSION: ICD-10-CM

## 2017-07-13 DIAGNOSIS — R00.1 SINUS BRADYCARDIA: ICD-10-CM

## 2017-07-13 LAB
DIASTOLIC DYSFUNCTION: NO
ESTIMATED PA SYSTOLIC PRESSURE: 30.25
GLOBAL PERICARDIAL EFFUSION: NORMAL
RETIRED EF AND QEF - SEE NOTES: 63 (ref 55–65)
TRICUSPID VALVE REGURGITATION: NORMAL

## 2017-07-13 PROCEDURE — 93306 TTE W/DOPPLER COMPLETE: CPT | Mod: S$GLB,,, | Performed by: INTERNAL MEDICINE

## 2017-07-19 NOTE — PROGRESS NOTES
Please notify the patient that her cardiac event monitor showed no significant slow heart rates.  I would be happy to discuss with her further over the phone or in clinic if she would like to talk in person.

## 2017-07-20 ENCOUNTER — TELEPHONE (OUTPATIENT)
Dept: ELECTROPHYSIOLOGY | Facility: CLINIC | Age: 77
End: 2017-07-20

## 2017-07-25 ENCOUNTER — HOSPITAL ENCOUNTER (EMERGENCY)
Facility: HOSPITAL | Age: 77
Discharge: HOME | End: 2017-07-25
Attending: EMERGENCY MEDICINE
Payer: MEDICARE

## 2017-07-25 ENCOUNTER — OFFICE VISIT (OUTPATIENT)
Dept: CARDIOLOGY | Facility: CLINIC | Age: 77
End: 2017-07-25
Payer: MEDICARE

## 2017-07-25 VITALS
HEIGHT: 60 IN | BODY MASS INDEX: 31.29 KG/M2 | DIASTOLIC BLOOD PRESSURE: 100 MMHG | WEIGHT: 159.38 LBS | SYSTOLIC BLOOD PRESSURE: 225 MMHG | HEART RATE: 73 BPM

## 2017-07-25 VITALS
DIASTOLIC BLOOD PRESSURE: 91 MMHG | TEMPERATURE: 98 F | HEIGHT: 60 IN | WEIGHT: 150 LBS | OXYGEN SATURATION: 98 % | HEART RATE: 82 BPM | SYSTOLIC BLOOD PRESSURE: 217 MMHG | BODY MASS INDEX: 29.45 KG/M2 | RESPIRATION RATE: 18 BRPM

## 2017-07-25 DIAGNOSIS — I10 SEVERE HYPERTENSION: ICD-10-CM

## 2017-07-25 DIAGNOSIS — I10 ESSENTIAL HYPERTENSION: Primary | ICD-10-CM

## 2017-07-25 PROBLEM — R00.1 SINUS BRADYCARDIA: Status: RESOLVED | Noted: 2017-06-09 | Resolved: 2017-07-25

## 2017-07-25 PROBLEM — R55 PRE-SYNCOPE: Status: RESOLVED | Noted: 2017-06-10 | Resolved: 2017-07-25

## 2017-07-25 LAB
BUN SERPL-MCNC: 18 MG/DL (ref 6–30)
CHLORIDE SERPL-SCNC: 106 MMOL/L (ref 95–110)
CREAT SERPL-MCNC: 0.7 MG/DL (ref 0.5–1.4)
GLUCOSE SERPL-MCNC: 134 MG/DL (ref 70–110)
HCT VFR BLD CALC: 37 %PCV (ref 36–54)
POC IONIZED CALCIUM: 1.24 MMOL/L (ref 1.06–1.42)
POC TCO2 (MEASURED): 27 MMOL/L (ref 23–29)
POTASSIUM BLD-SCNC: 3.7 MMOL/L (ref 3.5–5.1)
SAMPLE: ABNORMAL
SODIUM BLD-SCNC: 143 MMOL/L (ref 136–145)

## 2017-07-25 PROCEDURE — 99283 EMERGENCY DEPT VISIT LOW MDM: CPT | Mod: ,,, | Performed by: EMERGENCY MEDICINE

## 2017-07-25 PROCEDURE — 93005 ELECTROCARDIOGRAM TRACING: CPT

## 2017-07-25 PROCEDURE — 99499 UNLISTED E&M SERVICE: CPT | Mod: S$GLB,,, | Performed by: INTERNAL MEDICINE

## 2017-07-25 PROCEDURE — 1125F AMNT PAIN NOTED PAIN PRSNT: CPT | Mod: S$GLB,,, | Performed by: INTERNAL MEDICINE

## 2017-07-25 PROCEDURE — 99284 EMERGENCY DEPT VISIT MOD MDM: CPT | Mod: 25

## 2017-07-25 PROCEDURE — 1159F MED LIST DOCD IN RCRD: CPT | Mod: S$GLB,,, | Performed by: INTERNAL MEDICINE

## 2017-07-25 PROCEDURE — 25000003 PHARM REV CODE 250: Performed by: STUDENT IN AN ORGANIZED HEALTH CARE EDUCATION/TRAINING PROGRAM

## 2017-07-25 PROCEDURE — 99999 PR PBB SHADOW E&M-EST. PATIENT-LVL III: CPT | Mod: PBBFAC,,, | Performed by: INTERNAL MEDICINE

## 2017-07-25 PROCEDURE — 93010 ELECTROCARDIOGRAM REPORT: CPT | Mod: ,,, | Performed by: INTERNAL MEDICINE

## 2017-07-25 PROCEDURE — 99213 OFFICE O/P EST LOW 20 MIN: CPT | Mod: S$GLB,,, | Performed by: INTERNAL MEDICINE

## 2017-07-25 RX ORDER — DICLOFENAC SODIUM 75 MG/1
75 TABLET, DELAYED RELEASE ORAL 2 TIMES DAILY
COMMUNITY
End: 2017-08-08

## 2017-07-25 RX ORDER — DESLORATADINE 5 MG/1
5 TABLET ORAL DAILY
COMMUNITY

## 2017-07-25 RX ORDER — METFORMIN HYDROCHLORIDE 500 MG/1
500 TABLET ORAL
COMMUNITY
End: 2017-09-22 | Stop reason: SDUPTHER

## 2017-07-25 RX ORDER — HYDRALAZINE HYDROCHLORIDE 10 MG/1
10 TABLET, FILM COATED ORAL
Status: COMPLETED | OUTPATIENT
Start: 2017-07-25 | End: 2017-07-25

## 2017-07-25 RX ADMIN — HYDRALAZINE HYDROCHLORIDE 10 MG: 10 TABLET, FILM COATED ORAL at 04:07

## 2017-07-25 NOTE — DISCHARGE INSTRUCTIONS
Take 20mg hydralazine 2 Tablets by mouth in the morning and 10mg hydralazine 1 Tablet in the afternoon. If blood pressure is not controlled, can take 20mg Hydralazine 2 tablets in the morning and 20mg Hydralazine 2 tablets in the afternoon. If blood pressure runs too low, return to regular blood pressure medication schedule.

## 2017-07-25 NOTE — LETTER
July 25, 2017      Lauren Deras MD  [address]  [place] [address]           [facility] - Cardiology  [address]  [facility] [address]  Phone: [phone]          Patient: Emilia Alan   MR Number: [id number]   YOB: 1940   Date of Visit: 7/25/2017       Dear Dr. Lauren Deras:    Thank you for referring Emilia Alan to me for evaluation. Attached you will find relevant portions of my assessment and plan of care.    If you have questions, please do not hesitate to call me. I look forward to following Emilia Alan along with you.    Sincerely,    Yonathan Nguyen MD    Enclosure  CC:  No Recipients    If you would like to receive this communication electronically, please contact [email or web address] or [phone] to request more information on [facility] access.    For providers and/or their staff who would like to refer a patient to [facility], please contact us through our one-stop-shop provider referral line, [facility], at [phone].    If you feel you have received this communication in error or would no longer like to receive these types of communications, please e-mail [email or web address]

## 2017-07-25 NOTE — ED TRIAGE NOTES
Pt states she is here for High Blood pressure, Pt states she has no s/s of high blood pressure, Pt denies headache, dizziness, blurry vision, or chest pain. Pt is currently having no pain, denies any problems at this time.

## 2017-07-25 NOTE — PROVIDER PROGRESS NOTES - EMERGENCY DEPT.
Encounter Date: 7/25/2017    ED Physician Progress Notes       SCRIBE NOTE: I, Brook Magaña, am scribing for, and in the presence of,  Dr. Nava.  Physician Statement: I, Dr. Nava, personally performed the services described in this documentation as scribed by Brook Magaña in my presence, and it is both accurate and complete.      EKG - STEMI Decision  Initial Reading: No STEMI present.

## 2017-07-25 NOTE — ED NOTES
LOC: The patient is awake, alert and aware of environment with an appropriate affect, the patient is oriented x 3 and speaking appropriately.  APPEARANCE: Patient resting comfortably and in no acute distress, patient is clean and well groomed  SKIN: The skin is warm and dry, color consistent with ethnicity, patient has normal skin turgor and moist mucus membranes, skin intact, no breakdown or brusing noted.  MUSCULOSKELETAL: Patient moving all extremities well, no obvious swelling or deformities noted.   RESPIRATORY: Airway is open and patent, breath sounds clear throughout all lung fields; respirations are spontaneous, patient has a normal effort and rate, no accessory muscle use noted.   CARDIAC: Patient has no peripheral edema noted, capillary refill < 3 seconds. No complaints of chest pain   ABDOMEN: Soft and non tender to palpation, no distention noted. Bowel sounds present x 4  NEUROLOGIC: PERRL, 4mm bilaterally, eyes open spontaneously, behavior appropriate to situation, follows commands, facial expression symmetrical, bilateral hand grasp equal and even, purposeful motor response noted, normal sensation in all extremities when touched with a finger.

## 2017-07-25 NOTE — ASSESSMENT & PLAN NOTE
Severe hypertension requiring immediate treatment. Pt will be transferred to the ED and will return to see me in Cardiology Clinic in two weeks.

## 2017-07-25 NOTE — ED PROVIDER NOTES
Encounter Date: 7/25/2017    SCRIBE #1 NOTE: I, Shabnam Acuña, am scribing for, and in the presence of,  Dr. Gibson. I have scribed the following portions of the note - the Resident attestation and the EKG reading.       History     Chief Complaint   Patient presents with    Hypertension     coming from cards clinic for HTN- took all meds today, denies CP/blurred vision/h/a.      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 Patiently was recently discharged from hospital on 6/10 for sinus bradycardia after receiving atenolol, and patient now follows up with cariology clinic as she has a history of difficult to control blood pressure. Patient felt at baseline today and was noted to have a BP of 225/100 at clinic and she was asked to come to the ED. Patient takes enalapril BID and hydralazine BID, and verapamil, having taken one dose each before her clinic appointment. Patient was taken off verapamil for unkown reasons, and arrhythmia clinic could not be reached for details. Patient is asymptomatc. Denies headache, blurry vision, new onset weakness, chest pain, SOB, palpitations, decreased urine, abd pain, syncope, nausea.           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    Sleep apnea     Type II or unspecified type diabetes mellitus without mention of complication, not stated as uncontrolled 7/6/2015    Unspecified essential  hypertension 2015     Past Surgical History:   Procedure Laterality Date    CATARACT EXTRACTION       SECTION      CHOLECYSTECTOMY      COLONOSCOPY N/A 2017    Procedure: COLONOSCOPY;  Surgeon: Karen Lebron MD;  Location: [address];  Service: Endoscopy;  Laterality: N/A;    EYE SURGERY      FRACTURE SURGERY       Family History   Problem Relation Age of Onset    Diabetes Father     Heart disease Father     Heart attack Father     Heart disease Brother     No Known Problems Daughter     Diabetes Son     Heart disease Brother     Diabetes Daughter     Cataracts Neg Hx     Glaucoma Neg Hx     Hypertension Neg Hx     Cancer Neg Hx     Blindness Neg Hx     Amblyopia Neg Hx     Strabismus Neg Hx     Retinal detachment Neg Hx     Macular degeneration Neg Hx     Melanoma Neg Hx      Social History   Substance Use Topics    Smoking status: Never Smoker    Smokeless tobacco: Never Used    Alcohol use No     Review of Systems   Constitutional: Negative for activity change, appetite change, chills, diaphoresis, fatigue and fever.   HENT: Negative for trouble swallowing.    Eyes: Negative for visual disturbance.   Respiratory: Negative for chest tightness, shortness of breath and wheezing.    Cardiovascular: Negative for chest pain, palpitations and leg swelling.   Gastrointestinal: Negative for abdominal pain, constipation, diarrhea, nausea and vomiting.   Genitourinary: Negative for difficulty urinating and dysuria.   Musculoskeletal: Negative for back pain.   Skin: Negative for color change.   Neurological: Negative for dizziness, syncope, weakness, light-headedness, numbness and headaches.   Psychiatric/Behavioral: Negative for confusion.       Physical Exam     Initial Vitals [17 1352]   BP Pulse Resp Temp SpO2   (!) 216/96 63 16 98.4 °F (36.9 °C) 98 %      MAP       136         Physical Exam    Vitals reviewed.  Constitutional: She appears well-developed and  well-nourished. She is not diaphoretic. No distress.   HENT:   Head: Normocephalic and atraumatic.   Left Ear: External ear normal.   Eyes: Conjunctivae and EOM are normal. Pupils are equal, round, and reactive to light.   Cardiovascular: Normal rate, regular rhythm, normal heart sounds and intact distal pulses. Exam reveals no friction rub.    No murmur heard.  Pulmonary/Chest: Breath sounds normal. No stridor. No respiratory distress. She has no wheezes. She has no rhonchi. She has no rales.   Abdominal: Soft. Bowel sounds are normal. She exhibits no distension. There is no tenderness. There is no rebound.   Musculoskeletal: Normal range of motion.   No lower extremity edema noted   Neurological: She is alert and oriented to person, place, and time. No cranial nerve deficit. GCS eye subscore is 4. GCS verbal subscore is 5. GCS motor subscore is 6.   Patient has known neuropathy 2/2 to DM in fingertips bilaterally and BLE in feet up to ankle. Unchanged from baseline.    Patient has known BLE weakness 2/2 to poliomyelitis, R worse than L. Unchanged from baseline.   Skin: Skin is warm and dry. Capillary refill takes less than 2 seconds. No erythema.         ED Course   Procedures  Labs Reviewed   ISTAT PROCEDURE - Abnormal; Notable for the following:        Result Value    POC Glucose 134 (*)     All other components within normal limits     EKG Readings: (Independently Interpreted)   NSR at 63, normal intervals, normal axis, no acute ischemic changes.          Medical Decision Making:   History:   Old Medical Records: I decided to obtain old medical records.  Initial Assessment:   76 year old patient with history of difficult to treat HTN and recent discharge for sinus aditya after atenolol administration. Patient sent from clinic for systolic 225, currently asymptomatic, no neurological deficits or abnormal findings on exam. Patient given home dose of hydralazine while in ED, ISTAT performed to test kidney function.  No apparent signs of end organ damage. Will be discharged and asked to follow up with PCP for BP control, to return to ED if symptoms of hypertensive emergency occur, and to change dosing schedule of hydralazine if tolerated.   Differential Diagnosis:   1) hypertension  2) Hypertensive urgency  3) hypertensive emergency  Independently Interpreted Test(s):   I have ordered and independently interpreted EKG Reading(s) - see prior notes  Clinical Tests:   Lab Tests: Ordered and Reviewed  Medical Tests: Ordered and Reviewed            Scribe Attestation:   Scribe #1: I performed the above scribed service and the documentation accurately describes the services I performed. I attest to the accuracy of the note.    Attending Attestation:   Physician Attestation Statement for Resident:  As the supervising MD   Physician Attestation Statement: I have personally seen and examined this patient.   I agree with the above history. -:   As the supervising MD I agree with the above PE.    As the supervising MD I agree with the above treatment, course, plan, and disposition.   -: 77 yo female sent from cardiology clinic for evaluation of elevated blood pressure. Pt is asymptomatic at this time. ISTAT shows no acute abnormalities. It is unclear as to why cardiology wanted to discontinue verapamil today. Tried calling clinic to discuss concerns with no response. Will increase hydralazine to 20 mg in the morning and instruct pt to monitor pressure over the next several days. Pt advised to return if she has symptoms including unilateral weakness, slurred speech, CP, or SOB. She takes daily logs of her pressure and it was 150/80 this morning. Pt also noted she recently had an upper and lower scope and during this time pressure was in the 220's, this could be secondary to white coat syndrome as pressure seems to be more controlled at home.   I have reviewed the following: old records at this facility and EKG reports.          Physician  Attestation for Scribe:  Physician Attestation Statement for Scribe #1: I, Dr. Gibson, reviewed documentation, as scribed by Shabnam Acuña in my presence, and it is both accurate and complete.                 ED Course     Clinical Impression:   The encounter diagnosis was Essential hypertension.    Disposition:   Disposition: Discharged  Condition: Stable                        Valeria Gibson MD  07/28/17 [id number]

## 2017-07-27 ENCOUNTER — HOSPITAL ENCOUNTER (OUTPATIENT)
Dept: CARDIOLOGY | Facility: CLINIC | Age: 77
Discharge: HOME | End: 2017-07-27
Payer: MEDICARE

## 2017-07-27 ENCOUNTER — OFFICE VISIT (OUTPATIENT)
Dept: ELECTROPHYSIOLOGY | Facility: CLINIC | Age: 77
End: 2017-07-27
Payer: MEDICARE

## 2017-07-27 ENCOUNTER — TELEPHONE (OUTPATIENT)
Dept: INTERNAL MEDICINE | Facility: CLINIC | Age: 77
End: 2017-07-27

## 2017-07-27 VITALS
HEART RATE: 74 BPM | DIASTOLIC BLOOD PRESSURE: 96 MMHG | SYSTOLIC BLOOD PRESSURE: 184 MMHG | BODY MASS INDEX: 31.22 KG/M2 | HEIGHT: 60 IN | WEIGHT: 159 LBS

## 2017-07-27 DIAGNOSIS — G47.33 OBSTRUCTIVE SLEEP APNEA SYNDROME: ICD-10-CM

## 2017-07-27 DIAGNOSIS — R00.1 BRADYCARDIA, DRUG INDUCED: ICD-10-CM

## 2017-07-27 DIAGNOSIS — R00.1 BRADYCARDIA: ICD-10-CM

## 2017-07-27 DIAGNOSIS — R00.1 BRADYCARDIA: Primary | ICD-10-CM

## 2017-07-27 DIAGNOSIS — I10 SEVERE HYPERTENSION: Primary | ICD-10-CM

## 2017-07-27 DIAGNOSIS — E11.42 DIABETIC POLYNEUROPATHY ASSOCIATED WITH TYPE 2 DIABETES MELLITUS: ICD-10-CM

## 2017-07-27 DIAGNOSIS — T50.905A BRADYCARDIA, DRUG INDUCED: ICD-10-CM

## 2017-07-27 PROCEDURE — 99499 UNLISTED E&M SERVICE: CPT | Mod: S$GLB,,, | Performed by: INTERNAL MEDICINE

## 2017-07-27 PROCEDURE — 99999 PR PBB SHADOW E&M-EST. PATIENT-LVL III: CPT | Mod: PBBFAC,,, | Performed by: INTERNAL MEDICINE

## 2017-07-27 PROCEDURE — 99213 OFFICE O/P EST LOW 20 MIN: CPT | Mod: S$GLB,,, | Performed by: INTERNAL MEDICINE

## 2017-07-27 PROCEDURE — 1126F AMNT PAIN NOTED NONE PRSNT: CPT | Mod: S$GLB,,, | Performed by: INTERNAL MEDICINE

## 2017-07-27 PROCEDURE — 93000 ELECTROCARDIOGRAM COMPLETE: CPT | Mod: S$GLB,,, | Performed by: INTERNAL MEDICINE

## 2017-07-27 PROCEDURE — 1159F MED LIST DOCD IN RCRD: CPT | Mod: S$GLB,,, | Performed by: INTERNAL MEDICINE

## 2017-07-27 NOTE — PROGRESS NOTES
Subjective:    Patient ID:  Emilia Alan is a 76 y.o. female who presents for follow-up of No chief complaint on file.    Primary Cardiologist: Yonathan Nguyen MD  Primary Care Physician: Lauren Deras MD    HPI   I had the pleasure of seeing Ms. Alan in our electrophysiology clinic today in follow-up for her bradycardia.  As you are aware she is a pleasant 76 year-old woman with difficult to treat hypertension, poliomyelitis, and diabetes mellitus type 2. She was previously on a treatment regimen of verapamil, enalapril, and hydrochlorothiazide. The HCTZ had to be stopped due to increase in creatinine. Atenolol was added.  Several weeks later on 6/9/2017 she was at home and began to feel weak and dizzy. EMS was called and they found her pulse. They performed an EKG that identified a junctional escape rhythm and they gave atropine 0.5x2 without any response. They then started her on a dopamine drip and transferred her to the ED. There, they attempted to discontinue the dopamine drip, but the bradycardia and hypotension persisted. With holding verapamil and atenolol her heart rate recovered and dopamine was stopped. She was discharged on only enalapril.  At follow-up visit with Dr. Deras on 6/28/17 she was started on hydralazine 10mg tid. In her note she mentioned the patient was back on verapamil however it is not on her medication list. She then saw Dr. Nguyen for hypertension in general cardiology clinic on 7/25/2017. At that visit she was sent to the ER for BP of 225/100. Her hydralazine was increased to 20mg in the morning, 10mg at lunch, and 20mg with dinner.  She presents for follow-up from her hospital visit and after the 30 day monitor. She has been feeling well. At home her BP has been 152-193/82-98. Her pulse has been 69-91. She reports she is not on verapamil.    She wore a 30 day monitor which showed no significant bradycardia. Recent ECHO showed preserved LV function. A renal artery  doppler noted no significant stenosis. She had a recent EGD/colonoscopy for anemia which were normal.    ECHO 7/2017 CONCLUSIONS     1 - Normal left ventricular systolic function (EF 60-65%).     2 - No wall motion abnormalities.     3 - Normal left ventricular diastolic function.     4 - Normal right ventricular systolic function .     5 - Trivial to mild tricuspid regurgitation.     6 - Trivial pulmonic regurgitation.     7 - Trivial pericardial effusion.     8 - The estimated PA systolic pressure is 30 mmHg.     My interpretation of today's in clinic electrocardiogram is normal sinus rhythm at a rate of 74 bpm.    Review of Systems   Constitution: Negative for weakness and malaise/fatigue.   HENT: Negative.    Eyes: Negative for blurred vision and visual disturbance.   Cardiovascular: Negative for chest pain, irregular heartbeat, leg swelling, near-syncope, orthopnea, palpitations and syncope.   Respiratory: Positive for sleep disturbances due to breathing (uses CPAP). Negative for cough and wheezing.    Hematologic/Lymphatic: Negative for bleeding problem. Does not bruise/bleed easily.   Skin: Negative.    Gastrointestinal: Negative for bloating and abdominal pain.   Neurological: Negative for excessive daytime sleepiness, dizziness and light-headedness.        Objective:    Physical Exam   Constitutional: She is oriented to person, place, and time. She appears well-developed and well-nourished. No distress.   HENT:   Head: Normocephalic and atraumatic.   Eyes: Conjunctivae are normal. Right eye exhibits no discharge. Left eye exhibits no discharge.   Neck: Neck supple. No JVD present.   Cardiovascular: Normal rate, regular rhythm and normal heart sounds.  Exam reveals no gallop and no friction rub.    No murmur heard.  Pulmonary/Chest: Effort normal and breath sounds normal. No respiratory distress. She has no wheezes. She has no rales.   Abdominal: Soft. Bowel sounds are normal. She exhibits no distension.  There is no tenderness.   Musculoskeletal: She exhibits no edema.   Neurological: She is alert and oriented to person, place, and time.   Skin: Skin is warm and dry. She is not diaphoretic.   Psychiatric: She has a normal mood and affect. Her behavior is normal. Judgment and thought content normal.         Assessment:       1. Severe hypertension    2. Bradycardia, drug induced    3. Obstructive sleep apnea syndrome: dx 2008 needs CPAP 11    4. Diabetic polyneuropathy associated with type 2 diabetes mellitus         Plan:       In summary, Ms. Alan is a pleasant 76 year-old woman with difficult to treat hypertension, poliomyelitis, and diabetes mellitus type 2 with recent admission for drug-induced symptomatic bradycardia due to verapamil and atenolol. Her heart rate is normal off these agents.  I would recommend her BP be treated without lynn blocking agents. Amlodipine is an option for a calcium channel blocker that does not effect the heart rate. RTC as needed. Follow-up with Dr. Nguyen and Dr. Deras.    Thank you for allowing me to participate in the care of this patient. Please do not hesitate to call me with any questions or concerns.    Fernie Vasquez MD, PhD  Cardiac Electrophysiology

## 2017-07-27 NOTE — TELEPHONE ENCOUNTER
----- Message from Fernie Vasquez MD sent at 7/27/2017 10:15 AM CDT -----  George Aguilar    I saw Ms. Alan in EP follow-up clinic from her recent hospital stay. Her 30 day monitor showed normal heart rate off verapamil and atenolol. She is currently taking enalapril and hydralazine for her BP with home readings still 150s-190s.  I would recommend not treating her with agents with lynn blocking activity in the future. Amlodipine is an option however for CCB activity that does not effect heart rate.  She can see me as needed in the future.    Regards    Fernie Vasquez MD, PhD  Cardiac Electrophysiology

## 2017-08-04 ENCOUNTER — OFFICE VISIT (OUTPATIENT)
Dept: SLEEP MEDICINE | Facility: CLINIC | Age: 77
End: 2017-08-04
Payer: MEDICARE

## 2017-08-04 VITALS
WEIGHT: 159 LBS | HEIGHT: 60 IN | HEART RATE: 74 BPM | BODY MASS INDEX: 31.22 KG/M2 | SYSTOLIC BLOOD PRESSURE: 165 MMHG | DIASTOLIC BLOOD PRESSURE: 83 MMHG

## 2017-08-04 DIAGNOSIS — G47.33 OBSTRUCTIVE SLEEP APNEA: Primary | ICD-10-CM

## 2017-08-04 PROCEDURE — 99499 UNLISTED E&M SERVICE: CPT | Mod: S$GLB,,, | Performed by: NURSE PRACTITIONER

## 2017-08-04 PROCEDURE — 99999 PR PBB SHADOW E&M-EST. PATIENT-LVL V: CPT | Mod: PBBFAC,,, | Performed by: NURSE PRACTITIONER

## 2017-08-04 PROCEDURE — 1126F AMNT PAIN NOTED NONE PRSNT: CPT | Mod: S$GLB,,, | Performed by: NURSE PRACTITIONER

## 2017-08-04 PROCEDURE — 99203 OFFICE O/P NEW LOW 30 MIN: CPT | Mod: S$GLB,,, | Performed by: NURSE PRACTITIONER

## 2017-08-04 PROCEDURE — 1159F MED LIST DOCD IN RCRD: CPT | Mod: S$GLB,,, | Performed by: NURSE PRACTITIONER

## 2017-08-04 NOTE — PROGRESS NOTES
Emilia Alan was seen as a new patient, referred by Dr. Lauren Deras , for the management of obstructive sleep apnea.     CHIEF COMPLAINT: CPAP monitoring    HISTORY OF PRESENT ILLNESS: Emilia Alan a 76 y.o. female presents for the management of obstructive sleep apnea. She was diagnosed with sleep apnea by study done 2008 revealing mild sleep apnea. She has been adherent with cpap 11cm since this time. Contniued resolution of snoring. No apneic pauses. Sleep undisturbed. Sleep remains refreshing. Sleep onset delayed due to early time to bed and tv watching, okay with her schedule. Denies excessive daytime sleepiness. Norco/gapentin qd for  Back/knee pain, chronic pain, hx polio. Eligible for new machine.Has not had new supplies in a very very long time. Uses chin strap which. Denies significant oral drying. Denies nasal drying. Wants mask w/o forehead piece.     Interrogation- [employer] machine poor condition, manometer 11.3cm. 958hr. Nasal comfort mask worn    Denies symptoms of restless legs or kicking during sleep.     On todays [place] Sleepiness Scale the patient scores a 6/24.     BT:9p  SL: hours, watches tv, asleep ~ 12-1a  WT: 7a  Sleep quality: refreshing     FAMILY HISTORY: No known sleep disorders.     SOCIAL HISTORY: no ETOH, no tobacco    REVIEW OF SYSTEMS:  Sleep related symptoms as per HPI; Positive overweight; denies sinus congestion; occasion oral drying. occasional dyspnea; + palpitations; +gait impairment, back /knee pain. Denies acid reflux; Denies polyuria; Denies headaches;Denies mood disturbance.  Otherwise, a balance of 10 systems reviewed is negative        PHYSICAL EXAM:   BP (!) 165/83   Pulse 74   Ht 5' (1.524 m)   Wt 72.1 kg (159 lb)   BMI 31.05 kg/m²   GENERAL: Obese body habitus, well groomed   HEENT: Conjunctivae are non-erythematous; Pupils equal, round, and reactive to light; Nose is symmetrical  SKIN: On face and neck: No abrasions, no rashes, no  lesions  RESPIRATORY:  Normal chest expansion and non-labored breathing at rest.   CARDIOVASCULAR: Normal S1, S2  EXTREMITIES: No edema. No clubbing. No cyanosis. Sitting in WC  NEURO/PSYCH: Oriented to time, place and person. Normal attention span and concentration. Affect is full. Mood is normal.     PSG 5/21/08 AHI 11.8/low sat 88%  Titration 6/3/08, effective CPAP 11cm    ASSESSMENT:     VIVEK, mild, could be an underestimate. Has been adherent with cpap 11cm since 2008. Symptoms continued to be improved. Eligible for new machine.  She has medical comorbidities of obesity, hypertension, DM2, long term opioid use. Warrants continued treatment for sleep apnea.     PLAN:   1. Get new cpap machine today, CPAP 11cm. [facility] DME. RTC 5-6 wks adherence   2. Discussed etiology of VIVEK and potential ramifications of untreated VIVEK, including stroke, heart disease, HTN.      Thank you for allowing me the opportunity to participate in the care of your patient

## 2017-08-04 NOTE — PATIENT INSTRUCTIONS

## 2017-08-08 ENCOUNTER — OFFICE VISIT (OUTPATIENT)
Dept: INTERNAL MEDICINE | Facility: CLINIC | Age: 77
End: 2017-08-08
Payer: MEDICARE

## 2017-08-08 VITALS — SYSTOLIC BLOOD PRESSURE: 168 MMHG | DIASTOLIC BLOOD PRESSURE: 87 MMHG

## 2017-08-08 DIAGNOSIS — G47.33 OBSTRUCTIVE SLEEP APNEA SYNDROME: ICD-10-CM

## 2017-08-08 DIAGNOSIS — Z86.12 HISTORY OF POLIOMYELITIS: ICD-10-CM

## 2017-08-08 DIAGNOSIS — I70.201 ATHEROSCLEROSIS OF ARTERY OF RIGHT LOWER EXTREMITY: ICD-10-CM

## 2017-08-08 DIAGNOSIS — E11.40 TYPE 2 DIABETES MELLITUS WITH DIABETIC NEUROPATHY, WITHOUT LONG-TERM CURRENT USE OF INSULIN: ICD-10-CM

## 2017-08-08 DIAGNOSIS — R00.1 BRADYCARDIA, DRUG INDUCED: ICD-10-CM

## 2017-08-08 DIAGNOSIS — T50.905A BRADYCARDIA, DRUG INDUCED: ICD-10-CM

## 2017-08-08 DIAGNOSIS — I10 SEVERE HYPERTENSION: Primary | ICD-10-CM

## 2017-08-08 PROCEDURE — 99499 UNLISTED E&M SERVICE: CPT | Mod: S$GLB,,, | Performed by: INTERNAL MEDICINE

## 2017-08-08 PROCEDURE — 3008F BODY MASS INDEX DOCD: CPT | Mod: S$GLB,,, | Performed by: INTERNAL MEDICINE

## 2017-08-08 PROCEDURE — 99999 PR PBB SHADOW E&M-EST. PATIENT-LVL III: CPT | Mod: PBBFAC,,, | Performed by: INTERNAL MEDICINE

## 2017-08-08 PROCEDURE — 1126F AMNT PAIN NOTED NONE PRSNT: CPT | Mod: S$GLB,,, | Performed by: INTERNAL MEDICINE

## 2017-08-08 PROCEDURE — 99214 OFFICE O/P EST MOD 30 MIN: CPT | Mod: S$GLB,,, | Performed by: INTERNAL MEDICINE

## 2017-08-08 PROCEDURE — 3077F SYST BP >= 140 MM HG: CPT | Mod: S$GLB,,, | Performed by: INTERNAL MEDICINE

## 2017-08-08 PROCEDURE — 3079F DIAST BP 80-89 MM HG: CPT | Mod: S$GLB,,, | Performed by: INTERNAL MEDICINE

## 2017-08-08 PROCEDURE — 1159F MED LIST DOCD IN RCRD: CPT | Mod: S$GLB,,, | Performed by: INTERNAL MEDICINE

## 2017-08-08 RX ORDER — HYDRALAZINE HYDROCHLORIDE 25 MG/1
25 TABLET, FILM COATED ORAL 3 TIMES DAILY
Qty: 270 TABLET | Refills: 3 | Status: SHIPPED | OUTPATIENT
Start: 2017-08-08 | End: 2017-08-10 | Stop reason: ALTCHOICE

## 2017-08-08 RX ORDER — HYDRALAZINE HYDROCHLORIDE 25 MG/1
25 TABLET, FILM COATED ORAL 3 TIMES DAILY
Qty: 90 TABLET | Refills: 11 | Status: SHIPPED | OUTPATIENT
Start: 2017-08-08 | End: 2017-08-08 | Stop reason: SDUPTHER

## 2017-08-08 NOTE — PROGRESS NOTES
Subjective:       Patient ID: Emilia Alan is a 76 y.o. female.    Chief Complaint: Follow-up    BP follow up    Still with high readings.      She saw Dr. Nguyen for hypertension in general cardiology clinic on 7/25/2017. At that visit she was sent to the ER for BP of 225/100. Her hydralazine was increased to 20mg in the morning, 10mg at lunch, and 20mg with dinner.      She then saw Dr Vasquez for follow-up from her hospital visit and after the 30 day monitor. She had been feeling well. At home her BP has been 152-193/82-98. Her pulse has been 69-91. She is not on verapamil.   She wore a 30 day monitor which showed no significant bradycardia.  Recent ECHO showed preserved LV function.  A renal artery doppler noted no significant stenosis.     She had a recent EGD/colonoscopy for anemia which were normal.    Seen in sleep clinic last week; CPAP recently adjusted, new machine.  Much more comfortable and she is sleeping better.  Discussed this may help her BP.    Some ASCVD R leg but no claudication; this was identified years ago.    CXR 6/17 acceptable.    Patient Active Problem List:     Gait disorder     History of poliomyelitis     Post poliomyelitis syndrome     Diabetic polyneuropathy associated with type 2 diabetes mellitus     Mixed hyperlipidemia     Osteoporosis without current pathological fracture: worse on fosamax 5/16- Reclast 8/16     Paraparesis of both lower limbs     Severe hypertension     Type 2 diabetes mellitus with diabetic neuropathy, without long-term current use of insulin     Osteoarthritis of right knee     Lumbar spinal stenosis     Obstructive sleep apnea syndrome: dx 2008 needs CPAP 11     Bradycardia, drug induced     Atherosclerosis of artery of right lower extremity: see xray 4/4/07                   Review of Systems   Constitutional: Negative for chills, fatigue and fever.   HENT: Negative for congestion, ear pain and postnasal drip.    Eyes: Negative.    Respiratory:  Positive for apnea. Negative for cough, chest tightness, shortness of breath and wheezing.         Better with new CPAP machine   Cardiovascular: Negative.         Feels well BP up and down  No palpitations of flushing   Gastrointestinal: Negative.    Genitourinary: Negative for dysuria and frequency.   Musculoskeletal: Positive for arthralgias and gait problem.        Chronic post polio sx   Neurological: Negative for headaches.       Objective:      Physical Exam   Constitutional: She is oriented to person, place, and time. She appears well-developed and well-nourished.   HENT:   Head: Normocephalic and atraumatic.   Right Ear: External ear normal.   Left Ear: External ear normal.   Mouth/Throat: Oropharynx is clear and moist.   Eyes: Conjunctivae are normal.   Neck: Normal range of motion. Neck supple. No thyromegaly present.   Cardiovascular: Normal rate, regular rhythm and normal heart sounds.    Pulmonary/Chest: No respiratory distress. She has no wheezes. She has no rales.   Abdominal: Soft. Bowel sounds are normal.   Musculoskeletal: She exhibits no edema.   Unchanged, in a wheelchair   Lymphadenopathy:     She has no cervical adenopathy.   Neurological: She is alert and oriented to person, place, and time.   Skin: Skin is warm and dry. No rash noted. No erythema.       Assessment:       1. Severe hypertension    2. Atherosclerosis of artery of right lower extremity: see xray 4/4/07    3. Bradycardia, drug induced    4. History of poliomyelitis    5. Type 2 diabetes mellitus with diabetic neuropathy, without long-term current use of insulin        Plan:         Severe hypertension: increase hydralazine to 25 tid; keep Cardiology follow up.  Low salt diet, exercise as tolerated, 5-10-# weight loss.  Call if BP > 135/85 on a regular basis.  -     CBC auto differential; Future; Expected date: 08/08/2017  -     Comprehensive metabolic panel; Future; Expected date: 08/08/2017    Atherosclerosis of artery of right  lower extremity: see xray 4/4/07  -     Lipid panel; Future; Expected date: 08/08/2017    Bradycardia, drug induced: resolved    History of poliomyelitis: stable    Type 2 diabetes mellitus with diabetic neuropathy, without long-term current use of insulin  -     Hemoglobin A1c; Future; Expected date: 08/08/2017    Other order  -     hydrALAZINE (APRESOLINE) 25 MG tablet; Take 1 tablet (25 mg total) by mouth 3 (three) times daily.  Dispense: 270 tablet; Refill: 3    VIVEK doing better on new tx; this should help with BP- reviewed compliance  Keep Cardiology follow up    Labs and EP 3 months  Tdap and flu vaccines recommended

## 2017-08-09 NOTE — PROGRESS NOTES
Patient ID:  Emilia Alan is a 76 y.o. female who presents for follow-up of severe hypertension. Hypertension  . This problem is stable    Pt has severe hypertension. She is compliant with her medicines and measures BP at home (today she forgot to bring the diary); systolic values frequently range 170-160 mmHg systolic.       Lab Results   Component Value Date     06/10/2017    K 4.7 06/10/2017     (H) 06/10/2017    CO2 16 (L) 06/10/2017    BUN 14 06/10/2017    CREATININE 0.8 06/10/2017     (H) 06/10/2017    HGBA1C 7.0 (H) 05/10/2017    MG 2.3 06/10/2017    AST 52 (H) 06/10/2017    ALT 54 (H) 06/10/2017    ALBUMIN 3.1 (L) 06/10/2017    PROT 7.0 06/10/2017    BILITOT 0.4 06/10/2017    WBC 8.27 06/10/2017    HGB 11.2 (L) 06/10/2017    HCT 37 07/25/2017    MCV 92 06/10/2017     06/10/2017    INR 0.9 06/09/2017    TSH 1.657 06/09/2017         Lab Results   Component Value Date    CHOL 142 05/10/2017    HDL 39 (L) 05/10/2017    TRIG 271 (H) 05/10/2017       Lab Results   Component Value Date    LDLCALC 48.8 (L) 05/10/2017       Past Medical History:   Diagnosis Date    Allergy     Anemia 10/20/2014    Arthritis     Atherosclerosis of artery of right lower extremity: see xray 4/4/07 8/8/2017    Diabetes mellitus     Diabetic neuropathy 7/25/2012    Gait disorder 7/25/2012    High cholesterol     History of poliomyelitis 7/25/2012    Hyperlipidemia 8/5/2013    Hypertension     Obstructive sleep apnea syndrome: dx 2008 needs CPAP 11 6/28/2017    Osteopenia     Osteoporosis, unspecified 6/5/2014    Other specified anemias 7/6/2015    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    hydrALAZINE (APRESOLINE) 25 MG tablet Take 1 tablet (25 mg total) by mouth 3 (three) times daily.            Review of Systems   Constitution: Negative for decreased appetite, diaphoresis, fever, weakness, malaise/fatigue, weight gain and weight loss.   HENT: Negative for congestion, ear discharge, ear pain, headaches and nosebleeds.    Eyes: Negative for blurred vision, double vision and visual disturbance.   Cardiovascular: Positive for leg swelling (right pretibial and ankle adema for many years). Negative for chest pain, claudication, cyanosis, dyspnea on exertion, irregular heartbeat, near-syncope, orthopnea, palpitations, paroxysmal nocturnal dyspnea and syncope.   Respiratory: Negative for cough, hemoptysis, shortness of breath, sleep disturbances due to breathing, snoring, sputum production and wheezing.    Endocrine: Negative for polydipsia, polyphagia and polyuria.   Hematologic/Lymphatic: Negative for adenopathy and bleeding problem. Does not bruise/bleed easily.   Skin: Negative for color change, nail changes, poor wound healing and rash.   Musculoskeletal: Positive for muscle weakness (polio has affected her right leg when she was a chils). Negative for muscle cramps.   Gastrointestinal: Negative for abdominal pain, anorexia, change in bowel habit, hematochezia, nausea and vomiting.   Genitourinary: Negative for dysuria, frequency and hematuria.   Neurological: Negative for brief paralysis, difficulty with concentration, excessive daytime sleepiness, dizziness, focal weakness, light-headedness, seizures and vertigo.   Psychiatric/Behavioral: Negative for altered mental status and depression.   Allergic/Immunologic: Negative for persistent infections.                Objective:         BP (!) 160/84 (BP Location: Left arm, Patient Position: Sitting, BP Method: Large (Manual))   Ht 5' (1.524 m)   Wt 73 kg (160 lb 15 oz)   BMI 31.43 kg/m²    Physical Exam   Constitutional: She is oriented to  person, place, and time. She appears well-developed and well-nourished.   HENT:   Head: Normocephalic.   Right Ear: External ear normal.   Left Ear: External ear normal.   Nose: Nose normal.   Inspection of lips, teeth and gums normal   Eyes: Conjunctivae and EOM are normal. Pupils are equal, round, and reactive to light. No scleral icterus.   Neck: Normal range of motion. No JVD present. No tracheal deviation present. No thyromegaly present.   Cardiovascular: Normal rate, regular rhythm, normal heart sounds and intact distal pulses.  Exam reveals no gallop and no friction rub.    No murmur heard.  Pulmonary/Chest: Effort normal and breath sounds normal. No respiratory distress. She has no wheezes. She has no rales. She exhibits no tenderness.   Abdominal: Soft. Bowel sounds are normal. She exhibits no distension. There is no hepatosplenomegaly. There is no tenderness. There is no guarding.   Musculoskeletal: She exhibits no edema or tenderness.   Weakness of the right leg secondary to polio.   Lymphadenopathy:   Palpation of lymph nodes of neck and groin normal   Neurological: She is oriented to person, place, and time. No cranial nerve deficit. She exhibits normal muscle tone. Coordination normal.   Skin: Skin is warm and dry. No rash noted. No erythema. No pallor.   Palpation of skin normal   Psychiatric: She has a normal mood and affect. Her behavior is normal. Judgment and thought content normal.           I have reviewed the following:     Details / Date    [x]   Labs     []   Imaging     [x]   Cardiology Procedures     []   Other      Assessment and Plan:       1. Severe hypertension         Severe hypertension  Her BP is still poorly controlled with systolic values frequently ranging 170-160 mmHg. I have tried enrolling pt in the digital hypertension program but she does not have a smartphone.    Instructed to take BP 3 times per day for 5 days prior to the next visit.  Increase hydralazine from 25 to 50 mg  tid.  RTC in 4 weeks

## 2017-08-10 ENCOUNTER — OFFICE VISIT (OUTPATIENT)
Dept: CARDIOLOGY | Facility: CLINIC | Age: 77
End: 2017-08-10
Payer: MEDICARE

## 2017-08-10 VITALS
DIASTOLIC BLOOD PRESSURE: 84 MMHG | BODY MASS INDEX: 31.6 KG/M2 | HEIGHT: 60 IN | WEIGHT: 160.94 LBS | SYSTOLIC BLOOD PRESSURE: 160 MMHG

## 2017-08-10 DIAGNOSIS — I10 SEVERE HYPERTENSION: Primary | ICD-10-CM

## 2017-08-10 PROCEDURE — 99214 OFFICE O/P EST MOD 30 MIN: CPT | Mod: S$GLB,,, | Performed by: INTERNAL MEDICINE

## 2017-08-10 PROCEDURE — 1125F AMNT PAIN NOTED PAIN PRSNT: CPT | Mod: S$GLB,,, | Performed by: INTERNAL MEDICINE

## 2017-08-10 PROCEDURE — 99499 UNLISTED E&M SERVICE: CPT | Mod: S$GLB,,, | Performed by: INTERNAL MEDICINE

## 2017-08-10 PROCEDURE — 3008F BODY MASS INDEX DOCD: CPT | Mod: S$GLB,,, | Performed by: INTERNAL MEDICINE

## 2017-08-10 PROCEDURE — 3079F DIAST BP 80-89 MM HG: CPT | Mod: S$GLB,,, | Performed by: INTERNAL MEDICINE

## 2017-08-10 PROCEDURE — 1159F MED LIST DOCD IN RCRD: CPT | Mod: S$GLB,,, | Performed by: INTERNAL MEDICINE

## 2017-08-10 PROCEDURE — 99999 PR PBB SHADOW E&M-EST. PATIENT-LVL III: CPT | Mod: PBBFAC,,, | Performed by: INTERNAL MEDICINE

## 2017-08-10 PROCEDURE — 3077F SYST BP >= 140 MM HG: CPT | Mod: S$GLB,,, | Performed by: INTERNAL MEDICINE

## 2017-08-10 RX ORDER — HYDRALAZINE HYDROCHLORIDE 50 MG/1
50 TABLET, FILM COATED ORAL 3 TIMES DAILY
Qty: 90 TABLET | Refills: 11 | Status: SHIPPED | OUTPATIENT
Start: 2017-08-10 | End: 2017-08-17 | Stop reason: DRUGHIGH

## 2017-08-10 NOTE — ASSESSMENT & PLAN NOTE
Her BP is still poorly controlled with systolic values frequently ranging 170-160 mmHg. I have tried enrolling pt in the digital hypertension program but she does not have a smartphone.    Instructed to take BP 3 times per day for 5 days prior to the next visit.  Increase hydralazine from 25 to 50 mg tid.  RTC in 4 weeks

## 2017-08-10 NOTE — PATIENT INSTRUCTIONS
I have instructed Emilia Alan, to obtain BP measurements according to the following rule: wait 5 minutes before each BP measurement; obtain the BP measurement and after 2 minutes take the BP again. If the BP systolic values are not within 10 mmHg wait 2 additional minutes and repeat the BP measurement. Average the two consecutive BP values in which the measurements are within 10 mmHg. I have asked the patient to obtain 3 measurements per day for 5 days, keep a diary and then return to see me in clinic.

## 2017-08-16 ENCOUNTER — TELEPHONE (OUTPATIENT)
Dept: CARDIOLOGY | Facility: CLINIC | Age: 77
End: 2017-08-16

## 2017-08-17 RX ORDER — HYDRALAZINE HYDROCHLORIDE 50 MG/1
50 TABLET, FILM COATED ORAL 3 TIMES DAILY
COMMUNITY
End: 2017-10-09 | Stop reason: SDUPTHER

## 2017-08-17 NOTE — TELEPHONE ENCOUNTER
Patient dropped off list of blood pressure readings. Dr. Nguyen reviewed the list and wants patient to increase Hydralazine from 50 mg TID to 75 mg TID. Patient was called and notified about this and she was agreeable. She will wait two weeks after starting the higher dose of Hydralazine before starting to keep a new b/p list.  New Rx for Hydralazine 75 mg ( one and a half of the 50 mg tablet three times daily ) was called in to patient's pharmacy.

## 2017-08-23 RX ORDER — DICLOFENAC SODIUM 75 MG/1
75 TABLET, DELAYED RELEASE ORAL 2 TIMES DAILY PRN
Qty: 90 TABLET | Refills: 0 | Status: SHIPPED | OUTPATIENT
Start: 2017-08-23 | End: 2017-08-23 | Stop reason: SDUPTHER

## 2017-08-23 RX ORDER — DICLOFENAC SODIUM 75 MG/1
TABLET, DELAYED RELEASE ORAL
Qty: 180 TABLET | Refills: 0 | Status: SHIPPED | OUTPATIENT
Start: 2017-08-23 | End: 2017-11-30 | Stop reason: SDUPTHER

## 2017-09-07 ENCOUNTER — OFFICE VISIT (OUTPATIENT)
Dept: CARDIOLOGY | Facility: CLINIC | Age: 77
End: 2017-09-07
Payer: MEDICARE

## 2017-09-07 VITALS
WEIGHT: 163.56 LBS | HEART RATE: 82 BPM | SYSTOLIC BLOOD PRESSURE: 159 MMHG | BODY MASS INDEX: 32.11 KG/M2 | HEIGHT: 60 IN | DIASTOLIC BLOOD PRESSURE: 70 MMHG

## 2017-09-07 DIAGNOSIS — I10 SEVERE HYPERTENSION: Primary | ICD-10-CM

## 2017-09-07 PROCEDURE — 3008F BODY MASS INDEX DOCD: CPT | Mod: S$GLB,,, | Performed by: INTERNAL MEDICINE

## 2017-09-07 PROCEDURE — 99999 PR PBB SHADOW E&M-EST. PATIENT-LVL IV: CPT | Mod: PBBFAC,,, | Performed by: INTERNAL MEDICINE

## 2017-09-07 PROCEDURE — 1125F AMNT PAIN NOTED PAIN PRSNT: CPT | Mod: S$GLB,,, | Performed by: INTERNAL MEDICINE

## 2017-09-07 PROCEDURE — 99499 UNLISTED E&M SERVICE: CPT | Mod: S$GLB,,, | Performed by: INTERNAL MEDICINE

## 2017-09-07 PROCEDURE — 3078F DIAST BP <80 MM HG: CPT | Mod: S$GLB,,, | Performed by: INTERNAL MEDICINE

## 2017-09-07 PROCEDURE — 1159F MED LIST DOCD IN RCRD: CPT | Mod: S$GLB,,, | Performed by: INTERNAL MEDICINE

## 2017-09-07 PROCEDURE — 3077F SYST BP >= 140 MM HG: CPT | Mod: S$GLB,,, | Performed by: INTERNAL MEDICINE

## 2017-09-07 PROCEDURE — 99214 OFFICE O/P EST MOD 30 MIN: CPT | Mod: S$GLB,,, | Performed by: INTERNAL MEDICINE

## 2017-09-07 RX ORDER — HYDROCHLOROTHIAZIDE 25 MG/1
25 TABLET ORAL DAILY
Qty: 30 TABLET | Refills: 11 | Status: SHIPPED | OUTPATIENT
Start: 2017-09-07 | End: 2018-01-05 | Stop reason: ALTCHOICE

## 2017-09-07 NOTE — PROGRESS NOTES
Patient ID:  Emilia Alan is a 76 y.o. female who presents for follow-up of severe hypertension. This problem is improving.    Pt is a 67 year old woman with severe hypertension. She refers that over the past few weeks she has developed increased swelling of her legs and has also experienced some shortness of breath on exertion. She has no symptoms suggestive of myocardial ischemia.  Her home BP record shows good control with BP consistently < 150/90 mmHg. Her present antihypertensive therapy is: enalapril 20 mg bid; hydralazine 50 mg tid.      Lab Results   Component Value Date     06/10/2017    K 4.7 06/10/2017     (H) 06/10/2017    CO2 16 (L) 06/10/2017    BUN 14 06/10/2017    CREATININE 0.8 06/10/2017     (H) 06/10/2017    HGBA1C 7.0 (H) 05/10/2017    MG 2.3 06/10/2017    AST 52 (H) 06/10/2017    ALT 54 (H) 06/10/2017    ALBUMIN 3.1 (L) 06/10/2017    PROT 7.0 06/10/2017    BILITOT 0.4 06/10/2017    WBC 8.27 06/10/2017    HGB 11.2 (L) 06/10/2017    HCT 37 07/25/2017    MCV 92 06/10/2017     06/10/2017    INR 0.9 06/09/2017    TSH 1.657 06/09/2017         Lab Results   Component Value Date    CHOL 142 05/10/2017    HDL 39 (L) 05/10/2017    TRIG 271 (H) 05/10/2017       Lab Results   Component Value Date    LDLCALC 48.8 (L) 05/10/2017       Past Medical History:   Diagnosis Date    Allergy     Anemia 10/20/2014    Arthritis     Atherosclerosis of artery of right lower extremity: see xray 4/4/07 8/8/2017    Diabetes mellitus     Diabetic neuropathy 7/25/2012    Gait disorder 7/25/2012    High cholesterol     History of poliomyelitis 7/25/2012    Hyperlipidemia 8/5/2013    Hypertension     Obstructive sleep apnea syndrome: dx 2008 needs CPAP 11 6/28/2017    Osteopenia     Osteoporosis, unspecified 6/5/2014    Other specified anemias 7/6/2015    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    hydrALAZINE (APRESOLINE) 50 MG tablet Take 50 mg by mouth 3 (three) times daily. One and a half tablets ( total of 75 mg ) three times daily            Review of Systems   Constitution: Negative for decreased appetite, diaphoresis, fever, weakness, malaise/fatigue, weight gain and weight loss.   HENT: Negative for congestion, ear discharge, ear pain and nosebleeds.    Eyes: Negative for blurred vision, double vision and visual disturbance.   Cardiovascular: Positive for leg swelling (right pretibial and ankle adema for many years). Negative for chest pain, claudication, cyanosis, dyspnea on exertion, irregular heartbeat, near-syncope, orthopnea, palpitations, paroxysmal nocturnal dyspnea and syncope.   Respiratory: Positive for shortness of breath (upon exertion, e.g. getting dressed). Negative for cough, hemoptysis, sleep disturbances due to breathing, snoring, sputum production and wheezing.    Endocrine: Negative for polydipsia, polyphagia and polyuria.   Hematologic/Lymphatic: Negative for adenopathy and bleeding problem. Does not bruise/bleed easily.   Skin: Negative for color change, nail changes, poor wound healing and rash.   Musculoskeletal: Positive for muscle weakness (polio has affected her left leg when she was a child). Negative for muscle cramps.   Gastrointestinal: Negative for abdominal pain, anorexia, change in bowel habit, hematochezia, nausea and vomiting.   Genitourinary: Negative for dysuria, frequency and hematuria.   Neurological: Negative for brief paralysis, difficulty with concentration, excessive daytime sleepiness, dizziness, focal weakness, headaches, light-headedness, seizures and vertigo.   Psychiatric/Behavioral: Negative for altered mental status and  depression.   Allergic/Immunologic: Negative for persistent infections.        Objective:         BP (!) 159/70 (BP Location: Left arm, Patient Position: Sitting, BP Method: X-Large (Automatic))   Pulse 82   Ht 5' (1.524 m)   Wt 74.2 kg (163 lb 9.3 oz)   BMI 31.95 kg/m²    Physical Exam   Constitutional: She is oriented to person, place, and time. She appears well-developed and well-nourished.   HENT:   Head: Normocephalic.   Right Ear: External ear normal.   Left Ear: External ear normal.   Nose: Nose normal.   Inspection of lips, teeth and gums normal   Eyes: Conjunctivae and EOM are normal. Pupils are equal, round, and reactive to light. No scleral icterus.   Neck: Normal range of motion. No JVD present. No tracheal deviation present. No thyromegaly present.   Cardiovascular: Normal rate, regular rhythm, normal heart sounds and intact distal pulses.  Exam reveals no gallop and no friction rub.    No murmur heard.  Pulmonary/Chest: Effort normal. No respiratory distress. She has no wheezes. She has rales in the left lower field. She exhibits no tenderness.   Abdominal: Soft. Bowel sounds are normal. She exhibits no distension. There is no hepatosplenomegaly. There is no tenderness. There is no guarding.   Musculoskeletal: She exhibits no edema or tenderness.   Weakness of the right leg secondary to polio.   Lymphadenopathy:   Palpation of lymph nodes of neck and groin normal   Neurological: She is oriented to person, place, and time. No cranial nerve deficit. She exhibits normal muscle tone. Coordination normal.   Skin: Skin is warm and dry. No rash noted. No erythema. No pallor.   Palpation of skin normal. She has 1-2+ bilateral ankle and pretibial edema (up to just below the knee). This is slightly worse than what she has had for many years.   Psychiatric: She has a normal mood and affect. Her behavior is normal. Judgment and thought content normal.      I have reviewed the following:     Details / Date     []   Labs     []   Imaging     []   Cardiology Procedures     []   Other      Assessment and Plan:       1. Severe hypertension         Severe hypertension  BP is well controlled. She has clinical evidence of fluid retention with shortness of breath, small rales at the left lung base and increased dependent edema.    Add HCTZ 25 mg qd to her present therapy.  Basic metabolic panel prior to next visit  Home BP record for 5 days prior to visit  RTC in 4 weeks

## 2017-09-07 NOTE — ASSESSMENT & PLAN NOTE
BP is well controlled. She has clinical evidence of fluid retention with exertional shortness of breath, small rales at the left lung base and increased dependent edema.    Add HCTZ 25 mg qd to her present therapy.  Basic metabolic panel prior to next visit  Home BP record for 5 days prior to visit  RTC in 4 weeks

## 2017-09-11 RX ORDER — METFORMIN HYDROCHLORIDE 500 MG/1
TABLET, EXTENDED RELEASE ORAL
Qty: 90 TABLET | Refills: 0 | Status: SHIPPED | OUTPATIENT
Start: 2017-09-11 | End: 2017-12-09 | Stop reason: SDUPTHER

## 2017-09-20 ENCOUNTER — OFFICE VISIT (OUTPATIENT)
Dept: SLEEP MEDICINE | Facility: CLINIC | Age: 77
End: 2017-09-20
Payer: MEDICARE

## 2017-09-20 VITALS
DIASTOLIC BLOOD PRESSURE: 68 MMHG | HEART RATE: 73 BPM | SYSTOLIC BLOOD PRESSURE: 118 MMHG | BODY MASS INDEX: 31.95 KG/M2 | WEIGHT: 163.56 LBS

## 2017-09-20 DIAGNOSIS — G47.33 OBSTRUCTIVE SLEEP APNEA: Primary | ICD-10-CM

## 2017-09-20 PROCEDURE — 3008F BODY MASS INDEX DOCD: CPT | Mod: S$GLB,,, | Performed by: NURSE PRACTITIONER

## 2017-09-20 PROCEDURE — 99499 UNLISTED E&M SERVICE: CPT | Mod: S$GLB,,, | Performed by: NURSE PRACTITIONER

## 2017-09-20 PROCEDURE — 99213 OFFICE O/P EST LOW 20 MIN: CPT | Mod: S$GLB,,, | Performed by: NURSE PRACTITIONER

## 2017-09-20 PROCEDURE — 99999 PR PBB SHADOW E&M-EST. PATIENT-LVL III: CPT | Mod: PBBFAC,,, | Performed by: NURSE PRACTITIONER

## 2017-09-20 PROCEDURE — 1159F MED LIST DOCD IN RCRD: CPT | Mod: S$GLB,,, | Performed by: NURSE PRACTITIONER

## 2017-09-20 PROCEDURE — 3074F SYST BP LT 130 MM HG: CPT | Mod: S$GLB,,, | Performed by: NURSE PRACTITIONER

## 2017-09-20 PROCEDURE — 1125F AMNT PAIN NOTED PAIN PRSNT: CPT | Mod: S$GLB,,, | Performed by: NURSE PRACTITIONER

## 2017-09-20 PROCEDURE — 3078F DIAST BP <80 MM HG: CPT | Mod: S$GLB,,, | Performed by: NURSE PRACTITIONER

## 2017-09-20 NOTE — PROGRESS NOTES
Emilia Alan returns today for mgt of obstructive sleep apnea.    Since last seen she got new dreamstation cpap machine and continues to use it nightly. Longtime user .Contniued resolution of snoring and air gasping. Sleeps undisturbed. Got new nasal mask. Using therapy helps her sleep and feel less tired during daytime. ESS=2. +oral drying.Not using chin strap consistently.     Interrogation- 30davg 8h/n. Wisp mask. AHI 2.8-5.3, 0% periodic. 99 %mask fit. 26/30d>4h.       HISTORY:  8/4/17  CHIEF COMPLAINT: CPAP monitoring    HISTORY OF PRESENT ILLNESS: Emilia Alan a 77 y.o. female presents for the management of obstructive sleep apnea. She was diagnosed with sleep apnea by study done 2008 revealing mild sleep apnea. She has been adherent with cpap 11cm since this time. Contniued resolution of snoring. No apneic pauses. Sleep undisturbed. Sleep remains refreshing. Sleep onset delayed due to early time to bed and tv watching, okay with her schedule. Denies excessive daytime sleepiness. Norco/gapentin qd for back/knee pain, chronic pain, hx polio. Eligible for new machine. Has not had new supplies in a very very long time. Uses chin strap which. Denies significant oral drying. Denies nasal drying. Wants mask w/o forehead piece. ESS=6    Interrogation- tango machine poor condition, manometer 11.3cm. 958hr. Nasal comfort mask worn    Denies symptoms of restless legs or kicking during sleep.     BT:9p  SL: hours, watches tv, asleep ~ 12-1a  WT: 7a  Sleep quality: refreshing         REVIEW OF SYSTEMS:  Sleep related symptoms as per HPI; 4# gain; denies sinus congestion; +gait impairment, back /knee pain. Otherwise, a balance of 10 systems reviewed is negative        PHYSICAL EXAM:   /68 (BP Location: Left arm, Patient Position: Sitting)   Pulse 73   Wt 74.2 kg (163 lb 9.3 oz)   BMI 31.95 kg/m²   GENERAL: Obese body habitus, well groomed     PSG 5/21/08 AHI 11.8/low sat 88%  Titration 6/3/08,  effective CPAP 11cm    ASSESSMENT:   VIVEK, mild, could be an underestimate. Has been adherent with cpap 11cm since 2008. Symptoms continued to be improved. Eligible for new machine. 9/20/17: Continued excellent adherence, symptoms improved. Meeting Medicare guidelines.   She has medical comorbidities of obesity, hypertension, DM2, long term opioid use. Warrants continued treatment for sleep apnea.     PLAN:   1. Continue CPAP 11cm. [facility] D[facility]. Wear chin strap to get ahi<5 consistently and avoid oral drying. RTC 12-mos adherence   2. Discussed effectiveness of her therapy and potential ramifications of untreated VIVEK, including stroke, heart disease, HTN.

## 2017-09-22 ENCOUNTER — OFFICE VISIT (OUTPATIENT)
Dept: ENDOCRINOLOGY | Facility: CLINIC | Age: 77
End: 2017-09-22
Payer: MEDICARE

## 2017-09-22 VITALS
DIASTOLIC BLOOD PRESSURE: 68 MMHG | HEIGHT: 60 IN | HEART RATE: 82 BPM | BODY MASS INDEX: 32.11 KG/M2 | RESPIRATION RATE: 16 BRPM | SYSTOLIC BLOOD PRESSURE: 138 MMHG | WEIGHT: 163.56 LBS

## 2017-09-22 DIAGNOSIS — E11.40 TYPE 2 DIABETES MELLITUS WITH DIABETIC NEUROPATHY, WITHOUT LONG-TERM CURRENT USE OF INSULIN: ICD-10-CM

## 2017-09-22 DIAGNOSIS — E78.2 MIXED HYPERLIPIDEMIA: ICD-10-CM

## 2017-09-22 DIAGNOSIS — I10 SEVERE HYPERTENSION: ICD-10-CM

## 2017-09-22 DIAGNOSIS — M81.0 AGE-RELATED OSTEOPOROSIS WITHOUT CURRENT PATHOLOGICAL FRACTURE: Primary | ICD-10-CM

## 2017-09-22 PROCEDURE — 99214 OFFICE O/P EST MOD 30 MIN: CPT | Mod: S$GLB,,, | Performed by: INTERNAL MEDICINE

## 2017-09-22 PROCEDURE — 3075F SYST BP GE 130 - 139MM HG: CPT | Mod: S$GLB,,, | Performed by: INTERNAL MEDICINE

## 2017-09-22 PROCEDURE — 3008F BODY MASS INDEX DOCD: CPT | Mod: S$GLB,,, | Performed by: INTERNAL MEDICINE

## 2017-09-22 PROCEDURE — 1126F AMNT PAIN NOTED NONE PRSNT: CPT | Mod: S$GLB,,, | Performed by: INTERNAL MEDICINE

## 2017-09-22 PROCEDURE — 3078F DIAST BP <80 MM HG: CPT | Mod: S$GLB,,, | Performed by: INTERNAL MEDICINE

## 2017-09-22 PROCEDURE — 99999 PR PBB SHADOW E&M-EST. PATIENT-LVL IV: CPT | Mod: PBBFAC,,, | Performed by: INTERNAL MEDICINE

## 2017-09-22 PROCEDURE — 1159F MED LIST DOCD IN RCRD: CPT | Mod: S$GLB,,, | Performed by: INTERNAL MEDICINE

## 2017-09-22 RX ORDER — SODIUM CHLORIDE 0.9 % (FLUSH) 0.9 %
10 SYRINGE (ML) INJECTION
Status: CANCELLED | OUTPATIENT
Start: 2017-09-22

## 2017-09-22 RX ORDER — ACETAMINOPHEN 500 MG
500 TABLET ORAL
Status: CANCELLED | OUTPATIENT
Start: 2017-09-22

## 2017-09-22 RX ORDER — HEPARIN 100 UNIT/ML
500 SYRINGE INTRAVENOUS
Status: CANCELLED | OUTPATIENT
Start: 2017-09-22

## 2017-09-22 RX ORDER — ZOLEDRONIC ACID 5 MG/100ML
5 INJECTION, SOLUTION INTRAVENOUS
Status: CANCELLED | OUTPATIENT
Start: 2017-09-22

## 2017-09-22 NOTE — LETTER
September 22, 2017      Lauren Deras MD  [address]  [facility] [address]           [facility]  [address]  [facility] [address]  Phone: [phone]  Fax: [phone]          Patient: Emilia Alan   MR Number: [id number]   YOB: 1940   Date of Visit: 9/22/2017       Dear Dr. Lauren Deras:    Thank you for referring Emilia Alan to me for evaluation. Attached you will find relevant portions of my assessment and plan of care.    If you have questions, please do not hesitate to call me. I look forward to following Emilia Alan along with you.    Sincerely,    Kelin Jerez, NP    Enclosure  CC:  No Recipients    If you would like to receive this communication electronically, please contact [email or web address] or [phone] to request more information on [employer] access.    For providers and/or their staff who would like to refer a patient to [facility], please contact us through our one-stop-shop provider referral line, [facility], at [phone].    If you feel you have received this communication in error or would no longer like to receive these types of communications, please e-mail [email or web address]

## 2017-09-22 NOTE — PROGRESS NOTES
Subjective:      Patient ID: Emilia Alan is a 77 y.o. female.    Chief Complaint:  Osteoporosis      History of Present Illness  Ms. Alan presents today for follow up of osteoporosis.     Was last seen in 08/2016 by Dr. Alcaraz for osteoporosis.    She was started on therapy for  osteoporosis in ~2008.    Per medical record she was on nasal calcitonin for four years, then she was switched to Fosamax ~2012.  Has history of multiple fractures (notably to the left wrist and left tibia).  Denies chronic glucocorticoid use, history of RA, history of smoking, or current EtOH consumption.  Denies family history of hip fractures.  Had decline of BMD of 6% at the hip on most recent DXA scan done 5/2016. Z-scores similar to age matched controls at the hip.    Dr. Alcaraz discontinued Fosamax in 08/2016 and started the patient on Reclast     She received an infusion on 08/19/2016   Patient reports she tolerated Reclast well. She denied side effects.   Denied fever or flu like symptoms     She is taking an over the counter calcium and vitamin D supplement   She also endorses consuming a calcium rich diet including yogurt, spinach, almonds and milk     Previous evaluation for secondary causes negative.  Has advanced degenerative changes of the L-spine.    Also with history of type 2 DM for about 15 years.  Has never been on insulin.  Currently on glipizide 5 mg in the AM and metformin XR 500mg daily.  Last A1c 7.0 % without anemia on labs from 05/2017   Checks glucoses once in early AM.   Has symptoms of hypoglycemia 2-3 times monthly that resolve with carb intake   She attributes hypoglycemia to skipping meals     Has diabetic neuropathy without history of foot ulcers.  Denies history of diabetic retinopathy but she is due for annual eye exam.    Has hypertension and blood pressure has been elevated recently  Currently controlled with current regimen   She is now followed by Cardiology - Dr. Nguyen   Compliant with  antihypertensives.     Review of Systems   Constitutional: Negative for chills and fever.   HENT: Negative for trouble swallowing.    Eyes: Negative for visual disturbance.   Respiratory: Positive for shortness of breath.    Cardiovascular: Positive for leg swelling.   Gastrointestinal: Negative for nausea.   Endocrine: Negative for cold intolerance and heat intolerance.   Musculoskeletal: Positive for gait problem and myalgias.   Neurological: Negative for tremors.   Hematological: Does not bruise/bleed easily.   Psychiatric/Behavioral: The patient is not nervous/anxious.      Objective:   Physical Exam   Constitutional: She is oriented to person, place, and time. She appears well-developed. No distress.   Neck: No thyromegaly present.   Cardiovascular: Normal rate and regular rhythm.    Pulmonary/Chest: Effort normal.   Abdominal: Soft.   Musculoskeletal: She exhibits edema.   Neurological: She is alert and oriented to person, place, and time.   Skin: Skin is warm and dry.   Nursing note and vitals reviewed.    Lab Review:   Results for MARGARITA HANNON (MRN [id number]) as of 9/22/2017 13:00   Ref. Range 6/10/2017 06:49   Sodium Latest Ref Range: 136 - 145 mmol/L 138   Potassium Latest Ref Range: 3.5 - 5.1 mmol/L 4.7   Chloride Latest Ref Range: 95 - 110 mmol/L 111 (H)   CO2 Latest Ref Range: 23 - 29 mmol/L 16 (L)   Anion Gap Latest Ref Range: 8 - 16 mmol/L 11   BUN, Bld Latest Ref Range: 8 - 23 mg/dL 14   Creatinine Latest Ref Range: 0.5 - 1.4 mg/dL 0.8   eGFR if non African American Latest Ref Range: >60 mL/min/1.73 m^2 >60.0   eGFR if African American Latest Ref Range: >60 mL/min/1.73 m^2 >60.0   Glucose Latest Ref Range: 70 - 110 mg/dL 150 (H)   Calcium Latest Ref Range: 8.7 - 10.5 mg/dL 9.2   Phosphorus Latest Ref Range: 2.7 - 4.5 mg/dL 3.2   Magnesium Latest Ref Range: 1.6 - 2.6 mg/dL 2.3   Alkaline Phosphatase Latest Ref Range: 55 - 135 U/L 97   Total Protein Latest Ref Range: 6.0 - 8.4 g/dL 7.0   Albumin  Latest Ref Range: 3.5 - 5.2 g/dL 3.1 (L)   Total Bilirubin Latest Ref Range: 0.1 - 1.0 mg/dL 0.4   AST Latest Ref Range: 10 - 40 U/L 52 (H)   ALT Latest Ref Range: 10 - 44 U/L 54 (H)     Results for MARGARITA HANNON (MRN [id number]) as of 9/22/2017 13:00   Ref. Range 8/9/2016 12:20   Vit D, 25-Hydroxy Latest Ref Range: 30 - 96 ng/mL 34     Results for MARGARITA HANNON (MRN [id number]) as of 9/22/2017 13:00   Ref. Range 5/10/2017 07:00   Hemoglobin A1C Latest Ref Range: 4.5 - 6.2 % 7.0 (H)   Estimated Avg Glucose Latest Ref Range: 68 - 131 mg/dL 154 (H)     Bone Density Scan 5/2016:  Low bone mass at the hip with a significant decrease of 6.1% in the hip BMD compared with the prior study. Elevated BMD in the lumbar spine, unchanged.     Assessment:     1. Osteoporosis without current pathological fracture: worse on fosamax 5/16- Reclast 8/16    2. Type 2 diabetes mellitus with diabetic neuropathy, without long-term current use of insulin    3. Severe hypertension    4. Mixed hyperlipidemia        Plan:     1.) Osteoporosis on therapy since ~2008  -- recommend to continue Reclast   -- order placed today   -- RDA of calcium and vitamin D, calcium from food sources are preferred   -- emphasized fall safety and fall precautions   -- weight bearing exercises as tolerated   -- repeat DEXA due in 05/2018   -- Reviewed complications of bisphosphonates with patient including ONJ and atypical femoral fractures  --Patient does not have any dental work that is pending at this time  -- check vitamin D with next set of labs     2.) T2DM   -- Seems to be well controlled with occasional hypoglycemia that is able to be recognized by the patient  -- Continue current regimen  -- emphasized dietary and lifestyle modifications   -- followed by PCP     3.) Severe Hypertension   -- stable with current regimen   -- followed by Cardiology     4. ) Mixed HLD   -- on statin, tolerating   -- encouraged to follow a low fat, low chol diet      RTC in 1 year

## 2017-09-24 DIAGNOSIS — M47.16 LUMBAR SPONDYLOSIS WITH MYELOPATHY: ICD-10-CM

## 2017-09-24 DIAGNOSIS — Z86.12 HISTORY OF POLIOMYELITIS: ICD-10-CM

## 2017-09-24 DIAGNOSIS — M47.26 OSTEOARTHRITIS OF SPINE WITH RADICULOPATHY, LUMBAR REGION: ICD-10-CM

## 2017-09-24 DIAGNOSIS — R26.9 GAIT DISORDER: ICD-10-CM

## 2017-09-24 DIAGNOSIS — G14 POST POLIOMYELITIS SYNDROME: ICD-10-CM

## 2017-09-24 DIAGNOSIS — G82.20 PARAPARESIS OF BOTH LOWER LIMBS: ICD-10-CM

## 2017-09-24 DIAGNOSIS — E11.42 DIABETIC POLYNEUROPATHY ASSOCIATED WITH TYPE 2 DIABETES MELLITUS: ICD-10-CM

## 2017-09-24 RX ORDER — GABAPENTIN 300 MG/1
CAPSULE ORAL
Qty: 270 CAPSULE | Refills: 0 | Status: SHIPPED | OUTPATIENT
Start: 2017-09-24 | End: 2017-12-14 | Stop reason: SDUPTHER

## 2017-10-06 ENCOUNTER — OFFICE VISIT (OUTPATIENT)
Dept: PODIATRY | Facility: CLINIC | Age: 77
End: 2017-10-06
Payer: MEDICARE

## 2017-10-06 ENCOUNTER — OFFICE VISIT (OUTPATIENT)
Dept: PHYSICAL MEDICINE AND REHAB | Facility: CLINIC | Age: 77
End: 2017-10-06
Payer: MEDICARE

## 2017-10-06 VITALS
DIASTOLIC BLOOD PRESSURE: 71 MMHG | RESPIRATION RATE: 18 BRPM | WEIGHT: 163 LBS | HEART RATE: 71 BPM | BODY MASS INDEX: 32 KG/M2 | HEIGHT: 60 IN | SYSTOLIC BLOOD PRESSURE: 160 MMHG

## 2017-10-06 VITALS
SYSTOLIC BLOOD PRESSURE: 150 MMHG | BODY MASS INDEX: 32.05 KG/M2 | HEIGHT: 60 IN | DIASTOLIC BLOOD PRESSURE: 70 MMHG | WEIGHT: 163.25 LBS | HEART RATE: 78 BPM

## 2017-10-06 DIAGNOSIS — E11.42 DIABETIC POLYNEUROPATHY ASSOCIATED WITH TYPE 2 DIABETES MELLITUS: ICD-10-CM

## 2017-10-06 DIAGNOSIS — G14 POST POLIOMYELITIS SYNDROME: ICD-10-CM
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
## 2017-10-06 DIAGNOSIS — E08.44 DIABETIC AMYOTROPHY ASSOCIATED WITH DIABETES MELLITUS DUE TO UNDERLYING CONDITION: ICD-10-CM

## 2017-10-06 DIAGNOSIS — G60.9 IDIOPATHIC PERIPHERAL NEUROPATHY: Primary | ICD-10-CM

## 2017-10-06 DIAGNOSIS — M81.0 AGE-RELATED OSTEOPOROSIS WITHOUT CURRENT PATHOLOGICAL FRACTURE: ICD-10-CM

## 2017-10-06 DIAGNOSIS — M25.561 CHRONIC PAIN OF RIGHT KNEE: ICD-10-CM

## 2017-10-06 DIAGNOSIS — G89.29 CHRONIC BILATERAL LOW BACK PAIN WITH SCIATICA, SCIATICA LATERALITY UNSPECIFIED: ICD-10-CM

## 2017-10-06 DIAGNOSIS — B35.1 ONYCHOMYCOSIS DUE TO DERMATOPHYTE: ICD-10-CM

## 2017-10-06 DIAGNOSIS — M47.16 LUMBAR SPONDYLOSIS WITH MYELOPATHY: ICD-10-CM

## 2017-10-06 DIAGNOSIS — M54.16 LUMBAR RADICULOPATHY: ICD-10-CM

## 2017-10-06 DIAGNOSIS — R26.9 GAIT DISORDER: ICD-10-CM

## 2017-10-06 DIAGNOSIS — E11.40 TYPE 2 DIABETES MELLITUS WITH DIABETIC NEUROPATHY, WITHOUT LONG-TERM CURRENT USE OF INSULIN: ICD-10-CM

## 2017-10-06 DIAGNOSIS — G82.20 PARAPARESIS OF BOTH LOWER LIMBS: ICD-10-CM

## 2017-10-06 DIAGNOSIS — G89.29 CHRONIC PAIN OF RIGHT KNEE: ICD-10-CM

## 2017-10-06 DIAGNOSIS — M54.16 LEFT LUMBAR RADICULOPATHY: ICD-10-CM

## 2017-10-06 DIAGNOSIS — Z86.12 HISTORY OF POLIOMYELITIS: ICD-10-CM

## 2017-10-06 DIAGNOSIS — M17.11 PRIMARY OSTEOARTHRITIS OF RIGHT KNEE: ICD-10-CM

## 2017-10-06 DIAGNOSIS — L84 CORN OR CALLUS: ICD-10-CM

## 2017-10-06 DIAGNOSIS — M21.372 FOOT DROP, LEFT: ICD-10-CM

## 2017-10-06 DIAGNOSIS — M47.26 OSTEOARTHRITIS OF SPINE WITH RADICULOPATHY, LUMBAR REGION: Primary | ICD-10-CM

## 2017-10-06 DIAGNOSIS — M48.062 SPINAL STENOSIS OF LUMBAR REGION WITH NEUROGENIC CLAUDICATION: ICD-10-CM

## 2017-10-06 DIAGNOSIS — M54.40 CHRONIC BILATERAL LOW BACK PAIN WITH SCIATICA, SCIATICA LATERALITY UNSPECIFIED: ICD-10-CM

## 2017-10-06 PROCEDURE — 99999 PR PBB SHADOW E&M-EST. PATIENT-LVL III: CPT | Mod: PBBFAC,,, | Performed by: PODIATRIST

## 2017-10-06 PROCEDURE — 99999 PR PBB SHADOW E&M-EST. PATIENT-LVL III: CPT | Mod: PBBFAC,,, | Performed by: PHYSICAL MEDICINE & REHABILITATION

## 2017-10-06 PROCEDURE — 11721 DEBRIDE NAIL 6 OR MORE: CPT | Mod: 59,Q9,S$GLB, | Performed by: PODIATRIST

## 2017-10-06 PROCEDURE — 99499 UNLISTED E&M SERVICE: CPT | Mod: S$GLB,,, | Performed by: PHYSICAL MEDICINE & REHABILITATION

## 2017-10-06 PROCEDURE — 99214 OFFICE O/P EST MOD 30 MIN: CPT | Mod: S$GLB,,, | Performed by: PHYSICAL MEDICINE & REHABILITATION

## 2017-10-06 PROCEDURE — 11056 PARNG/CUTG B9 HYPRKR LES 2-4: CPT | Mod: Q9,S$GLB,, | Performed by: PODIATRIST

## 2017-10-06 PROCEDURE — 99499 UNLISTED E&M SERVICE: CPT | Mod: S$GLB,,, | Performed by: PODIATRIST

## 2017-10-06 RX ORDER — HYDROCODONE BITARTRATE AND ACETAMINOPHEN 10; 325 MG/1; MG/1
1 TABLET ORAL EVERY 6 HOURS PRN
Qty: 120 TABLET | Refills: 0 | Status: SHIPPED | OUTPATIENT
Start: 2017-11-06 | End: 2017-10-06 | Stop reason: SDUPTHER

## 2017-10-06 RX ORDER — HYDROCODONE BITARTRATE AND ACETAMINOPHEN 10; 325 MG/1; MG/1
1 TABLET ORAL EVERY 6 HOURS PRN
Qty: 120 TABLET | Refills: 0 | Status: SHIPPED | OUTPATIENT
Start: 2017-12-06 | End: 2018-01-11 | Stop reason: SDUPTHER

## 2017-10-06 RX ORDER — HYDRALAZINE HYDROCHLORIDE 10 MG/1
TABLET, FILM COATED ORAL
COMMUNITY
Start: 2017-09-24 | End: 2017-10-09 | Stop reason: DRUGHIGH

## 2017-10-06 RX ORDER — HYDROCODONE BITARTRATE AND ACETAMINOPHEN 10; 325 MG/1; MG/1
1 TABLET ORAL EVERY 6 HOURS PRN
Qty: 120 TABLET | Refills: 0 | Status: SHIPPED | OUTPATIENT
Start: 2017-10-06 | End: 2017-11-05

## 2017-10-06 RX ORDER — GABAPENTIN 600 MG/1
600 TABLET ORAL
Qty: 360 TABLET | Refills: 1 | Status: SHIPPED | OUTPATIENT
Start: 2017-10-06 | End: 2018-01-11 | Stop reason: SDUPTHER

## 2017-10-06 NOTE — PROGRESS NOTES
Subjective:      Patient ID: Emilia Alan is a 77 y.o. female.    Chief Complaint: PCP (Lauren Deras MD 8/8/17); Diabetic Foot Exam; Nail Care; and Foot Problem    Emilia is a 77 y.o. female who presents to the clinic for evaluation and treatment of high risk feet. 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 The patient's chief complaint is long, thick toenails.       PCP: Lauren Deras MD    Date Last Seen by PCP:   Chief Complaint   Patient presents with    PCP     Lauren Deras MD 8/8/17    Diabetic Foot Exam    Nail Care    Foot Problem         Current shoe gear: DM shoes w/ AFO brace( L)    Hemoglobin A1C   Date Value Ref Range Status   05/10/2017 7.0 (H) 4.5 - 6.2 % Final     Comment:     According to ADA guidelines, hemoglobin A1C <7.0% represents  optimal control in non-pregnant diabetic patients.  Different  metrics may apply to specific populations.   Standards of Medical Care in Diabetes - 2016.  For the purpose of screening for the presence of diabetes:  <5.7%     Consistent with the absence of diabetes  5.7-6.4%  Consistent with increasing risk for diabetes   (prediabetes)  >or=6.5%  Consistent with diabetes  Currently no consensus exists for use of hemoglobin A1C  for diagnosis of diabetes for children.     11/10/2016 6.7 (H) 4.5  - 6.2 % Final     Comment:     According to ADA guidelines, hemoglobin A1C <7.0% represents  optimal control in non-pregnant diabetic patients.  Different  metrics may apply to specific populations.   Standards of Medical Care in Diabetes - 2016.  For the purpose of screening for the presence of diabetes:  <5.7%     Consistent with the absence of diabetes  5.7-6.4%  Consistent with increasing risk for diabetes   (prediabetes)  >or=6.5%  Consistent with diabetes  Currently no consensus exists for use of hemoglobin A1C  for diagnosis of diabetes for children.     05/17/2016 6.4 (H) 4.5 - 6.2 % Final         Review of Systems   Constitution: Negative for chills, decreased appetite and fever.   Cardiovascular: Negative for chest pain, claudication and leg swelling.   Respiratory: Negative for cough.    Skin: Positive for dry skin and nail changes. Negative for flushing and itching.   Musculoskeletal: Positive for muscle weakness (foot drop). Negative for arthritis, back pain, gout, joint pain and myalgias.   Gastrointestinal: Negative for nausea and vomiting.   Neurological: Positive for numbness and paresthesias. Negative for loss of balance.           Objective:      Physical Exam   Constitutional: She is oriented to person, place, and time. She appears well-developed and well-nourished. No distress.   Cardiovascular:   Dorsalis pedis and posterior tibial pulses are diminished Bilaterally. Toes are cool to touch. Feet are warm proximally.There is decreased digital hair. Skin is atrophic, slightly hyperpigmented, and mildly edematous       Musculoskeletal: She exhibits no edema or tenderness.        Right ankle: Normal.        Left ankle: Normal.        Right foot: There is no swelling, no crepitus and no deformity.        Left foot: There is no swelling, no crepitus and no deformity.   Dropfoot left.      Lymphadenopathy:   No palpable lymph nodes   Neurological: She is alert and oriented to person, place, and time.  She has normal strength.   [place] 5.07 monofilamant testing is diminished Charbel feet. Sharp/dull sensation diminished Bilaterally. Light touch absent Bilaterally.       Skin: Skin is warm, dry and intact. No abrasion, no bruising, no burn, no laceration, no lesion, no petechiae and no rash noted. She is not diaphoretic. No pallor. Nails show no clubbing.   Nails 1-5 b/l  are elongated by  3-8 mm's, thickened by 3-5 mm's, dystrophic, and are darkened in  coloration . Xerosis Bilaterally. No open lesions noted.    Hyperkeratotic tissue noted to plantar L 5th MPJ and plantar L 5th toe   Psychiatric: She has a normal mood and affect. Judgment and thought content normal.   Nursing note and vitals reviewed.          Assessment:       Encounter Diagnoses   Name Primary?    Idiopathic peripheral neuropathy Yes    History of poliomyelitis     Onychomycosis due to dermatophyte     Foot drop, left     Corn or callus          Plan:       Emilia was seen today for pcp, diabetic foot exam, nail care and foot problem.    Diagnoses and all orders for this visit:    Idiopathic peripheral neuropathy    History of poliomyelitis    Onychomycosis due to dermatophyte    Foot drop, left    Corn or callus      I counseled the patient on her conditions, their implications and medical management.    - Shoe inspection.Patient instructed on proper foot hygeine. We discussed wearing proper shoe gear, daily foot inspections, never walking without protective shoe gear, never putting sharp instruments to feet, routine podiatric nail visits every 2-3 months.      - With patient's permission, nails were aggressively reduced and debrided x 10 to their soft tissue attachment mechanically and with electric , removing all offending nail and debris. Patient relates relief following the procedure. She will continue to monitor the areas daily, inspect her feet, wear protective shoe gear when ambulatory, moisturizer to maintain skin  integrity and follow in this office in approximately 2-3 months, sooner p.r.n.    - After cleansing the  area w/ alcohol prep pad the above mentioned hyperkeratosis was trimmed utilizing No 15 scapel, to a smooth base with out incident. Patient tolerated this  well and reported comfort to the area of plantar L 5th MPJ and lateral l 5th toe

## 2017-10-09 ENCOUNTER — LAB VISIT (OUTPATIENT)
Dept: LAB | Facility: HOSPITAL | Age: 77
End: 2017-10-09
Attending: INTERNAL MEDICINE
Payer: MEDICARE

## 2017-10-09 ENCOUNTER — OFFICE VISIT (OUTPATIENT)
Dept: CARDIOLOGY | Facility: CLINIC | Age: 77
End: 2017-10-09
Payer: MEDICARE

## 2017-10-09 VITALS
HEART RATE: 81 BPM | HEIGHT: 60 IN | WEIGHT: 165.38 LBS | DIASTOLIC BLOOD PRESSURE: 64 MMHG | BODY MASS INDEX: 32.47 KG/M2 | SYSTOLIC BLOOD PRESSURE: 152 MMHG

## 2017-10-09 DIAGNOSIS — E11.40 TYPE 2 DIABETES MELLITUS WITH DIABETIC NEUROPATHY, WITHOUT LONG-TERM CURRENT USE OF INSULIN: ICD-10-CM

## 2017-10-09 DIAGNOSIS — I70.201 ATHEROSCLEROSIS OF ARTERY OF RIGHT LOWER EXTREMITY: ICD-10-CM

## 2017-10-09 DIAGNOSIS — I10 SEVERE HYPERTENSION: ICD-10-CM

## 2017-10-09 LAB
ANION GAP SERPL CALC-SCNC: 9 MMOL/L
BASOPHILS # BLD AUTO: 0.08 K/UL
BASOPHILS NFR BLD: 1.1 %
BUN SERPL-MCNC: 43 MG/DL
CALCIUM SERPL-MCNC: 9.6 MG/DL
CHLORIDE SERPL-SCNC: 106 MMOL/L
CHOLEST SERPL-MCNC: 143 MG/DL
CHOLEST/HDLC SERPL: 3.3 {RATIO}
CO2 SERPL-SCNC: 27 MMOL/L
CREAT SERPL-MCNC: 1.2 MG/DL
DIFFERENTIAL METHOD: ABNORMAL
EOSINOPHIL # BLD AUTO: 0.5 K/UL
EOSINOPHIL NFR BLD: 7.1 %
ERYTHROCYTE [DISTWIDTH] IN BLOOD BY AUTOMATED COUNT: 13 %
EST. GFR  (AFRICAN AMERICAN): 50.4 ML/MIN/1.73 M^2
EST. GFR  (NON AFRICAN AMERICAN): 43.7 ML/MIN/1.73 M^2
ESTIMATED AVG GLUCOSE: 148 MG/DL
GLUCOSE SERPL-MCNC: 143 MG/DL
HBA1C MFR BLD HPLC: 6.8 %
HCT VFR BLD AUTO: 35.9 %
HDLC SERPL-MCNC: 44 MG/DL
HDLC SERPL: 30.8 %
HGB BLD-MCNC: 11.5 G/DL
LDLC SERPL CALC-MCNC: 55 MG/DL
LYMPHOCYTES # BLD AUTO: 2 K/UL
LYMPHOCYTES NFR BLD: 27.2 %
MCH RBC QN AUTO: 30.3 PG
MCHC RBC AUTO-ENTMCNC: 32 G/DL
MCV RBC AUTO: 95 FL
MONOCYTES # BLD AUTO: 0.4 K/UL
MONOCYTES NFR BLD: 4.9 %
NEUTROPHILS # BLD AUTO: 4.4 K/UL
NEUTROPHILS NFR BLD: 59.6 %
NONHDLC SERPL-MCNC: 99 MG/DL
PLATELET # BLD AUTO: 233 K/UL
PMV BLD AUTO: 11.2 FL
POTASSIUM SERPL-SCNC: 3.8 MMOL/L
RBC # BLD AUTO: 3.8 M/UL
SODIUM SERPL-SCNC: 142 MMOL/L
TRIGL SERPL-MCNC: 220 MG/DL
WBC # BLD AUTO: 7.36 K/UL

## 2017-10-09 PROCEDURE — 80061 LIPID PANEL: CPT

## 2017-10-09 PROCEDURE — 36415 COLL VENOUS BLD VENIPUNCTURE: CPT

## 2017-10-09 PROCEDURE — 83036 HEMOGLOBIN GLYCOSYLATED A1C: CPT

## 2017-10-09 PROCEDURE — 85025 COMPLETE CBC W/AUTO DIFF WBC: CPT

## 2017-10-09 PROCEDURE — 99999 PR PBB SHADOW E&M-EST. PATIENT-LVL IV: CPT | Mod: PBBFAC,,, | Performed by: INTERNAL MEDICINE

## 2017-10-09 PROCEDURE — 99213 OFFICE O/P EST LOW 20 MIN: CPT | Mod: S$GLB,,, | Performed by: INTERNAL MEDICINE

## 2017-10-09 PROCEDURE — 80048 BASIC METABOLIC PNL TOTAL CA: CPT

## 2017-10-09 RX ORDER — HYDRALAZINE HYDROCHLORIDE 50 MG/1
TABLET, FILM COATED ORAL
Qty: 315 TABLET | Refills: 3 | Status: SHIPPED | OUTPATIENT
Start: 2017-10-09 | End: 2018-01-26 | Stop reason: SDUPTHER

## 2017-10-09 RX ORDER — HYDRALAZINE HYDROCHLORIDE 50 MG/1
50 TABLET, FILM COATED ORAL 3 TIMES DAILY
Qty: 90 TABLET | Refills: 3 | Status: SHIPPED | OUTPATIENT
Start: 2017-10-09 | End: 2017-10-09 | Stop reason: SDUPTHER

## 2017-10-09 NOTE — ASSESSMENT & PLAN NOTE
From the home record BP control appears adequate. I have discussed with the pt the possibility of a more stringent BP monitoring through the Digital Hypertension Program; she accepts to be referred.     Refer to Digital Hypertension  RTC to see me in 6 months

## 2017-10-09 NOTE — TELEPHONE ENCOUNTER
Nayla said that the Rx had two sets of directions and that the pt wanted a 90 day supply.Clarified with the pt that Rx should be for Hydralazine 50 mg take 1.5 tablets (= total of 75 mg) oral three times a day.Ok'd qty for 315 tabs for 90 day supply with 3 refills.Nayla reports understanding of these instructions.

## 2017-10-09 NOTE — PROGRESS NOTES
Patient ID:  Emilia Alan is a 77 y.o. female who presents for follow-up of  hypertension. This problem is stable.    Pt has no new complaints. Shortness of breath on exertion is stable. Home BP record shows good control with most values below 140/90 mmHg.  She takes the following antihypertensive medicines: HCTZ 25 mg qAM; enalapril 20 mg bid;  Hydralazine 50 mg tid.              Lab Results   Component Value Date     06/10/2017    K 4.7 06/10/2017     (H) 06/10/2017    CO2 16 (L) 06/10/2017    BUN 14 06/10/2017    CREATININE 0.8 06/10/2017     (H) 06/10/2017    HGBA1C 7.0 (H) 05/10/2017    MG 2.3 06/10/2017    AST 52 (H) 06/10/2017    ALT 54 (H) 06/10/2017    ALBUMIN 3.1 (L) 06/10/2017    PROT 7.0 06/10/2017    BILITOT 0.4 06/10/2017    WBC 8.27 06/10/2017    HGB 11.2 (L) 06/10/2017    HCT 37 07/25/2017    MCV 92 06/10/2017     06/10/2017    INR 0.9 06/09/2017    TSH 1.657 06/09/2017         Lab Results   Component Value Date    CHOL 142 05/10/2017    HDL 39 (L) 05/10/2017    TRIG 271 (H) 05/10/2017       Lab Results   Component Value Date    LDLCALC 48.8 (L) 05/10/2017       Past Medical History:   Diagnosis Date    Allergy     Anemia 10/20/2014    Arthritis     Atherosclerosis of artery of right lower extremity: see xray 4/4/07 8/8/2017    Diabetes mellitus     Diabetic neuropathy 7/25/2012    Gait disorder 7/25/2012    High cholesterol     History of poliomyelitis 7/25/2012    Hyperlipidemia 8/5/2013    Hypertension     Obstructive sleep apnea syndrome: dx 2008 needs CPAP 11 6/28/2017    Osteopenia     Osteoporosis, unspecified 6/5/2014    Other specified anemias 7/6/2015    Post poliomyelitis syndrome 7/25/2012    Sleep apnea     Type II or unspecified type diabetes mellitus without mention of complication, not stated as uncontrolled 7/6/2015    Unspecified essential hypertension 7/6/2015     Hypertension Medications             enalapril (VASOTEC) 20 MG  tablet Take 1 tablet (20 mg total) by mouth 2 (two) times daily.    hydrALAZINE (APRESOLINE) 10 MG tablet     hydrALAZINE (APRESOLINE) 50 MG tablet Take 50 mg by mouth 3 (three) times daily. One and a half tablets ( total of 75 mg ) three times daily    hydrochlorothiazide (HYDRODIURIL) 25 MG tablet Take 1 tablet (25 mg total) by mouth once daily.            Review of Systems   Constitution: Negative for decreased appetite, diaphoresis, fever, weakness, malaise/fatigue, weight gain and weight loss.   HENT: Negative for congestion, ear discharge, ear pain and nosebleeds.    Eyes: Negative for blurred vision, double vision and visual disturbance.   Cardiovascular: Positive for leg swelling (right pretibial and ankle adema for many years). Negative for chest pain, claudication, cyanosis, dyspnea on exertion, irregular heartbeat, near-syncope, orthopnea, palpitations, paroxysmal nocturnal dyspnea and syncope.   Respiratory: Positive for shortness of breath (upon exertion, e.g. getting dressed). Negative for cough, hemoptysis, sleep disturbances due to breathing, snoring, sputum production and wheezing.    Endocrine: Negative for polydipsia, polyphagia and polyuria.   Hematologic/Lymphatic: Negative for adenopathy and bleeding problem. Does not bruise/bleed easily.   Skin: Negative for color change, nail changes, poor wound healing and rash.   Musculoskeletal: Positive for muscle weakness (polio has affected her left leg when she was a child). Negative for muscle cramps.   Gastrointestinal: Negative for abdominal pain, anorexia, change in bowel habit, hematochezia, nausea and vomiting.   Genitourinary: Negative for dysuria, frequency and hematuria.   Neurological: Negative for brief paralysis, difficulty with concentration, excessive daytime sleepiness, dizziness, focal weakness, headaches, light-headedness, seizures and vertigo.   Psychiatric/Behavioral: Negative for altered mental status and depression.    Allergic/Immunologic: Negative for persistent infections.                Objective:         There were no vitals taken for this visit.   Physical Exam   Constitutional: She is oriented to person, place, and time. She appears well-developed and well-nourished.   HENT:   Head: Normocephalic.   Right Ear: External ear normal.   Left Ear: External ear normal.   Nose: Nose normal.   Inspection of lips, teeth and gums normal   Eyes: Conjunctivae and EOM are normal. Pupils are equal, round, and reactive to light. No scleral icterus.   Neck: Normal range of motion. No JVD present. No tracheal deviation present. No thyromegaly present.   Cardiovascular: Normal rate, regular rhythm, normal heart sounds and intact distal pulses.  Exam reveals no gallop and no friction rub.    No murmur heard.  Pulmonary/Chest: Effort normal. No respiratory distress. She has no wheezes. She has no rales. She exhibits no tenderness.   Abdominal: Soft. Bowel sounds are normal. She exhibits no distension. There is no hepatosplenomegaly. There is no tenderness. There is no guarding.   Musculoskeletal: She exhibits no edema or tenderness.   Weakness of the right leg secondary to polio.   Lymphadenopathy:   Palpation of lymph nodes of neck and groin normal   Neurological: She is oriented to person, place, and time. No cranial nerve deficit. She exhibits normal muscle tone. Coordination normal.   Skin: Skin is warm and dry. No rash noted. No erythema. No pallor.   She has 1+ bilateral ankle and pretibial edema (polio has affected both of her legs and this is a chronic problem)   Psychiatric: She has a normal mood and affect. Her behavior is normal. Judgment and thought content normal.           I have reviewed the following:     Details / Date    []   Labs     []   Imaging     []   Cardiology Procedures     []   Other      Assessment and Plan:       No diagnosis found.     No problem-specific Assessment & Plan notes found for this  encounter.    There are no diagnoses linked to this encounter.

## 2017-10-09 NOTE — TELEPHONE ENCOUNTER
----- Message from Angely Hou sent at 10/9/2017  9:53 AM CDT -----  Contact: Nayla Davies call Nayla at [facility] [phone].  She needs to clarify dir on hydrALAZINE (APRESOLINE) 50 MG tablet.  There are 2 sets of directions.    Thank you

## 2017-10-10 ENCOUNTER — TELEPHONE (OUTPATIENT)
Dept: INTERNAL MEDICINE | Facility: CLINIC | Age: 77
End: 2017-10-10

## 2017-10-10 NOTE — TELEPHONE ENCOUNTER
Please let her know that her labs look better in general.  I would like to see her back in about 3 months time for an EP, please schedule.  Let me know if any other problems or concerns.  Thank you

## 2017-10-11 ENCOUNTER — TELEPHONE (OUTPATIENT)
Dept: INTERNAL MEDICINE | Facility: CLINIC | Age: 77
End: 2017-10-11

## 2017-10-11 DIAGNOSIS — E11.42 DIABETIC POLYNEUROPATHY ASSOCIATED WITH TYPE 2 DIABETES MELLITUS: Primary | ICD-10-CM

## 2017-10-11 NOTE — TELEPHONE ENCOUNTER
----- Message from Lauren Deras MD sent at 8/8/2017  7:03 PM CDT -----  See me 11/17 with labs  Eye exam October

## 2017-10-11 NOTE — TELEPHONE ENCOUNTER
She is due for an eye exam this month, please contact her to schedule.  Referral is in.  Thank you

## 2017-10-17 ENCOUNTER — INFUSION (OUTPATIENT)
Dept: INFECTIOUS DISEASES | Facility: HOSPITAL | Age: 77
End: 2017-10-17
Attending: INTERNAL MEDICINE
Payer: MEDICARE

## 2017-10-17 VITALS
WEIGHT: 165 LBS | BODY MASS INDEX: 32.39 KG/M2 | TEMPERATURE: 98 F | HEIGHT: 60 IN | DIASTOLIC BLOOD PRESSURE: 74 MMHG | HEART RATE: 86 BPM | SYSTOLIC BLOOD PRESSURE: 187 MMHG

## 2017-10-17 DIAGNOSIS — M81.0 AGE-RELATED OSTEOPOROSIS WITHOUT CURRENT PATHOLOGICAL FRACTURE: Primary | ICD-10-CM

## 2017-10-17 PROCEDURE — 63600175 PHARM REV CODE 636 W HCPCS: Performed by: INTERNAL MEDICINE

## 2017-10-17 PROCEDURE — 25000003 PHARM REV CODE 250: Performed by: INTERNAL MEDICINE

## 2017-10-17 PROCEDURE — 96365 THER/PROPH/DIAG IV INF INIT: CPT

## 2017-10-17 RX ORDER — ACETAMINOPHEN 500 MG
500 TABLET ORAL
Status: CANCELLED | OUTPATIENT
Start: 2017-10-17

## 2017-10-17 RX ORDER — ACETAMINOPHEN 500 MG
500 TABLET ORAL
Status: DISCONTINUED | OUTPATIENT
Start: 2017-10-17 | End: 2017-10-17 | Stop reason: HOSPADM

## 2017-10-17 RX ORDER — ZOLEDRONIC ACID 5 MG/100ML
5 INJECTION, SOLUTION INTRAVENOUS
Status: COMPLETED | OUTPATIENT
Start: 2017-10-17 | End: 2017-10-17

## 2017-10-17 RX ORDER — SODIUM CHLORIDE 0.9 % (FLUSH) 0.9 %
10 SYRINGE (ML) INJECTION
Status: CANCELLED | OUTPATIENT
Start: 2017-10-17

## 2017-10-17 RX ORDER — HEPARIN 100 UNIT/ML
500 SYRINGE INTRAVENOUS
Status: CANCELLED | OUTPATIENT
Start: 2017-10-17

## 2017-10-17 RX ORDER — ZOLEDRONIC ACID 5 MG/100ML
5 INJECTION, SOLUTION INTRAVENOUS
Status: CANCELLED | OUTPATIENT
Start: 2017-10-17

## 2017-10-17 RX ADMIN — ZOLEDRONIC ACID 5 MG: 5 INJECTION, SOLUTION INTRAVENOUS at 10:10

## 2017-10-17 RX ADMIN — ACETAMINOPHEN 500 MG: 500 TABLET ORAL at 10:10

## 2017-10-20 ENCOUNTER — PATIENT MESSAGE (OUTPATIENT)
Dept: ADMINISTRATIVE | Facility: OTHER | Age: 77
End: 2017-10-20

## 2017-10-23 ENCOUNTER — OFFICE VISIT (OUTPATIENT)
Dept: OPTOMETRY | Facility: CLINIC | Age: 77
End: 2017-10-23
Payer: MEDICARE

## 2017-10-23 DIAGNOSIS — E11.9 DIABETES MELLITUS TYPE 2 WITHOUT RETINOPATHY: Primary | ICD-10-CM

## 2017-10-23 DIAGNOSIS — Z13.5 GLAUCOMA SCREENING: ICD-10-CM

## 2017-10-23 DIAGNOSIS — H26.493 PCO (POSTERIOR CAPSULAR OPACIFICATION), BILATERAL: ICD-10-CM

## 2017-10-23 PROCEDURE — 99999 PR PBB SHADOW E&M-EST. PATIENT-LVL II: CPT | Mod: PBBFAC,,, | Performed by: OPTOMETRIST

## 2017-10-23 PROCEDURE — 92014 COMPRE OPH EXAM EST PT 1/>: CPT | Mod: S$GLB,,, | Performed by: OPTOMETRIST

## 2017-10-23 PROCEDURE — 99499 UNLISTED E&M SERVICE: CPT | Mod: S$GLB,,, | Performed by: OPTOMETRIST

## 2017-10-23 NOTE — LETTER
October 23, 2017      Lauren Deras MD  [address]  [facility] [address]           [facility]  [address]  [facility] [address]  Phone: [phone]  Fax: [phone]          Patient: Emilia Alan   MR Number: [id number]   YOB: 1940   Date of Visit: 10/23/2017       Dear Dr. Lauren Deras:    Thank you for referring Emilia Alan to me for evaluation. Attached you will find relevant portions of my assessment and plan of care.    If you have questions, please do not hesitate to call me. I look forward to following Emilia Alan along with you.    Sincerely,    Philip Amaya, OD    Enclosure  CC:  No Recipients    If you would like to receive this communication electronically, please contact [email or web address] or [phone] to request more information on [employer] access.    For providers and/or their staff who would like to refer a patient to [facility], please contact us through our one-stop-shop provider referral line, [facility] , at [phone].    If you feel you have received this communication in error or would no longer like to receive these types of communications, please e-mail [email or web address]

## 2017-10-24 ENCOUNTER — PATIENT OUTREACH (OUTPATIENT)
Dept: OTHER | Facility: OTHER | Age: 77
End: 2017-10-24

## 2017-10-24 NOTE — LETTER
Altagracia Cabezas PharmD  [address] [place] [address]     Dear Emilia Alan,    Welcome to the [facility]!         My name is Altagracia Cabezas PharmD and I am your dedicated Digital Medicine clinician.  As an expert in medication management, I will help ensure that the medications you are taking continue to provide you with the intended benefits.      I am Nhi Camacho and I will be your health  for the duration of the program.  My  job is to help you identify lifestyle changes to improve your blood pressure control.  We will talk about nutrition, exercise, and other ways that you may be able to adjust your current habits to better your health. Together, we will work to improve your overall health and encourage you to meet your goals for a healthier lifestyle.    What we expect from YOU:    You will need to take blood pressure readings multiple times a week and no less than one reading per week.   It is important that you take your measurements at different times during the day, when possible.     What you should expect from your Digital Medicine Care Team:   We will provide you with education about high blood pressure, including lifestyle changes that could help you to control your blood pressure.   We will review your weekly readings and provide you with monthly blood pressure progress reports after you have been in the program for more than 30 days.   We will send monthly progress reports on your blood pressure control to your physician so they can follow along with your progress as well.    You will be able to reach me by phone at [phone] or through your [facility] account by clicking my name under Care Team on the right side of the home screen.    I look forward to working with you to achieve your blood pressure goals!    Sincerely,    Altagracia Cabezas PharmD  Your personal clinician    Please visit  [email or web address] to learn more about high blood pressure and what you can do lower your blood pressure.                                                                                           Emilia Alan  [address] [facility] [address]

## 2017-10-24 NOTE — PROGRESS NOTES
Last 5 Patient Entered Redings Current 30 Day Average: 155/78     Recent Readings 10/23/2017 10/22/2017 10/21/2017 10/20/2017 10/20/2017    Systolic BP (mmHg) 148 152 163 157 147    Diastolic BP (mmHg) 74 77 75 84 91    Pulse 74 73 72 68 77        Hypertension Digital Medicine Program (HDMP): Health  Introduction    Pt reports her Bp cuff may be small and she might switch it out.    Introduced Mrs. Emilia Alan to [facility]. Discussed health  role and recommended lifestyle modifications.    Diet (i.e. Low sodium, food labels): Pt reports eating no canned food and no fried foods. Reports all meat is oven cooked and eats only fresh veggies. States when seasoning food she uses Mrs.Dash and other seasonings that are salt free.  Exercise: Pt reports not be able to work out or move around well because her polio is affecting her muscles. States she got polio as a child and it affects her walking because she has a brace on her legs and she uses a walker to get around the house. Discussed chair exercises she get do in the future to get some physically activity in.  Alcohol/Tobacco: Will discuss next call.  Medication Adherence: has been compliant with the medicaiton regimen. Pt reports taking medicine at 7 in the morning and 3 in the evening. Reports she never forgets to take her medicine because she has arthritis and it will flare up if she doesn't take her medicine properly.  Other goals: Pt reports wanting to figure out why her BP readings are high.    Reviewed AHA recommendations:  Limit sodium intake to <2000mg/day  Perform 150 minutes of physical activity per week    Patient is aware of the importance of taking daily BP readings at various times of the day  Patient aware that the health  can be used as a resource for lifestyle modifications to help reduce or maintain a healthy BP  Patient is aware of the importance of medication adherence.  Patient is aware that [facility] team is not available for  emergencies. Patient should call 911 or [facility] on Call if one arises.

## 2017-11-07 ENCOUNTER — PATIENT OUTREACH (OUTPATIENT)
Dept: OTHER | Facility: OTHER | Age: 77
End: 2017-11-07

## 2017-11-07 NOTE — PROGRESS NOTES
Ms. Emilia Alan is a 77 y.o. female who is newly enrolled in the Digital Medicine Hypertension Clinic.     The following information was reviewed/updated:  Preferred pharmacy   [id number] - [place] - [address]  AT [facility] [address]  [address] [place] [address]  Phone: [phone] Fax: [phone]    Patient prefers a 90 days supply  Review of patient's allergies indicates:  No Known Allergies  Current Outpatient Prescriptions on File Prior to Visit   Medication Sig Dispense Refill    aspirin (ECOTRIN) 81 MG EC tablet Take 1 tablet (81 mg total) by mouth once daily. 30 tablet 12    blood sugar diagnostic Strp 1 strip by Misc.(Non-Drug; Combo Route) route 2 (two) times daily. TRUE RESULT SYSTEM 180 strip 4    blood-glucose meter (TRUERESULT BLOOD GLUCOSE SYSTM) kit Use as instructed 1 each 0    calcium-vitamin D3-vitamin K (VIACTIV) 500-100-40 mg-unit-mcg Chew Take 2 each by mouth.      desloratadine (CLARINEX) 5 mg tablet Take 5 mg by mouth once daily.      diclofenac (VOLTAREN) 75 MG EC tablet TAKE 1 TABLET(75 MG) BY MOUTH TWICE DAILY AS NEEDED. STOP IF ANY STOMACH IRRITATION 180 tablet 0    diclofenac sodium (VOLTAREN) 1 % Gel Apply 4 gm to both knees 3x/day. 500 g 4    docusate sodium (COLACE) 50 MG capsule Take 1 capsule (50 mg total) by mouth 2 (two) times daily as needed for Constipation.      enalapril (VASOTEC) 20 MG tablet Take 1 tablet (20 mg total) by mouth 2 (two) times daily. 180 tablet 3    gabapentin (NEURONTIN) 300 MG capsule TAKE 1 CAPSULE(300 MG) BY MOUTH THREE TIMES DAILY 270 capsule 0    gabapentin (NEURONTIN) 600 MG tablet Take 1 tablet (600 mg total) by mouth 4 (four) times daily with meals and nightly. 360 tablet 1    glipiZIDE (GLUCOTROL) 5 MG tablet Take 1 tablet (5 mg total) by mouth once daily. 90 tablet 3    hydrALAZINE (APRESOLINE) 50 MG tablet Take One and a half tablets ( total of 75 mg ) oral three times daily. 315  tablet 3    hydrochlorothiazide (HYDRODIURIL) 25 MG tablet Take 1 tablet (25 mg total) by mouth once daily. 30 tablet 11    [START ON 12/6/2017] hydrocodone-acetaminophen 10-325mg (NORCO)  mg Tab Take 1 tablet by mouth every 6 (six) hours as needed for Pain (pain). 120 tablet 0    lancets Misc TEST 2 X DAILY PROSPER RESULT SYSTEM 180 each 3    metformin (GLUCOPHAGE-XR) 500 MG 24 hr tablet TAKE 1 TABLET BY MOUTH EVERY DAY 90 tablet 0    multivitamin (THERAGRAN) per tablet Take 1 tablet by mouth once daily.       oxybutynin (DITROPAN) 5 MG Tab Take 1 tablet (5 mg total) by mouth 2 (two) times daily. 180 tablet 3    pravastatin (PRAVACHOL) 40 MG tablet Take 1.5 tablets (60 mg total) by mouth nightly. 135 tablet 3    TRUEPLUS LANCETS 33 gauge Misc USE BID  3     No current facility-administered medications on file prior to visit.      IF DEPRESSED: Not indicated    VIVEK: Diagnosed    VIVEK screening results for this patient suggest a high likelihood of sleep apnea, which can contribute to hypertension. Patient has been previously diagnosed with sleep apnea and is interested in referral at this time. Mrs. reports consistent CPAP use at least 8 per night. VIVEK is managed effectively with CPAP use.     Reviewed non-pharmacologic therapies and impact on BP:    1. Low-sodium diet- 11 mmHg reduction 2-4 weeks. I have reviewed D.A.S.H diet sodium restrictions (<2000 mg/day)  2. Exercise- 7 mmHg reduction 4-12 weeks. I have recommended patient engage in exercise as tolerated at least 30 minutes 5x per week to improve cardiovascular health.    3. Alcohol intake- 3 mmHg reduction 4-12 weeks. I have discussed with patient the maximum recommended number of 1 drink(s) per day for men/women.     Explained that we expect patient to obtain several blood pressures per week at random times of day.   Our goal is to get  BP to consistently below 140/90 mmHg and make the process convenient so patient can avoid extra trips to the  office. Getting your blood pressure below 140/90 mmHg (definition of control) will reduce your risk for heart attack, kidney failure, stroke and death (as well as kidney failure, eye disease, & dementia).     Patient is not meeting the goal already.   When asked what the patient thinks is causing BP to be elevated, she states: that she does not cook with salt however, due to physical limitations and pain associated with polio patient is not physically active. She is adherent to medication regimen and has no complaints at this time. Per patient earlier readings are higher than normal as a result of an ill fitting cuff. Issue was resolved last week and readings are lower and are similar to the readings she would get using a previous cuff. She denies s/s of hyper/hypotension. Discussed obtaining more readings prior to making medication changes. Per patient she has only ever had a problem with atenolol. Newly started on hydrochlorothiazide after having noticeable edema. Edema may be a result of hydralazine use. Will continue to assess readings and consider the use of a long acting calcium channel blocker/long acting ACEI in place of previous regimen.     Last 5 Patient Entered Readings Current 30 Day Average: 152/78     Recent Readings 11/5/2017 11/3/2017 11/2/2017 11/1/2017 10/31/2017    Systolic BP (mmHg) 139 165 155 132 120    Diastolic BP (mmHg) 61 82 65 65 63    Pulse 68 66 71 76 80          Instructed patient not to allow anyone else to use phone and BP cuff.   I'm not available for emergencies. Patient will call [facility] on-call ([phone] or [phone]) or 911 if needed.     Discussed appropriate BP measuring technique:  Before taking your blood pressure  Find a quiet place. You will need to listen for your heartbeat.  Roll up the sleeve on your left arm or remove any tight-sleeved clothing, if needed. (It's best to take your blood pressure from your left arm if you are right-handed.You can use the other  arm if you have been told by your health care provider to do so.)  Rest in a chair next to a table for 5 to 10 minutes. (Your left arm should rest comfortably at heart level.)  Sit up straight with your back against the chair, legs uncrossed and on the ground.  Rest your forearm on the table with the palm of your hand facing up.  You should not talk, read the newspaper, or watch television during this process.  Blood pressure timing will vary significantly as the jolie is on daughter's phone.       Patient and I agreed that she will continue to monitor blood pressure and sodium intake, and continue to remain adherent to medications.     I will plan to follow-up with the patient in 2 to 4 weeks.   Emailed patient link to [facility] HTN webpage and my contact information in case she has any questions.

## 2017-11-21 ENCOUNTER — PATIENT OUTREACH (OUTPATIENT)
Dept: OTHER | Facility: OTHER | Age: 77
End: 2017-11-21

## 2017-11-21 NOTE — PROGRESS NOTES
Last 5 Patient Entered Readings Current 30 Day Average: 146/74     Recent Readings 11/20/2017 11/18/2017 11/17/2017 11/15/2017 11/14/2017    Systolic BP (mmHg) 117 122 167 115 145    Diastolic BP (mmHg) 41 58 79 61 73    Pulse 81 86 69 73 90          Hypertension Digital Medicine Program (HDMP): Health  Follow Up    Lifestyle Modifications:    1.Low sodium diet: yes, Pt reports still eating fresh vegetables and no canned foods and plans on to continue to eat this way throughout the holiday.Reports she doesn't change her diet for the holiday because they will waste food.     2.Physical activity: no, Pt reports still no being able to workout.    3.Hypotension/Hypertension symptoms: no   Frequency/Alleviating factors/Precipitating factors, etc.     4.Patient has been compliant with the medication regimen.     Follow up with Mrs. Emilia Alan completed. No further questions or concerns. I will follow up in a few weeks to assess progress.

## 2017-11-30 RX ORDER — DICLOFENAC SODIUM 75 MG/1
TABLET, DELAYED RELEASE ORAL
Qty: 180 TABLET | Refills: 0 | Status: SHIPPED | OUTPATIENT
Start: 2017-11-30 | End: 2018-03-03 | Stop reason: SDUPTHER

## 2017-12-05 ENCOUNTER — PATIENT OUTREACH (OUTPATIENT)
Dept: OTHER | Facility: OTHER | Age: 77
End: 2017-12-05

## 2017-12-05 NOTE — PROGRESS NOTES
HPI:  Ms. Alan is feeling well. Notes improvement in readings since adding HCTZ. Two to three readings elevated however, they were taken prior to medication. Patient must take readings when daughter is home. She denies s/s of hypertension at this time. Patient has upcoming appointment with nurse practitioner to review medications.     Last 5 Patient Entered Readings Current 30 Day Average: 138/69     Recent Readings 12/5/2017 12/4/2017 12/3/2017 12/1/2017 11/30/2017    Systolic BP (mmHg) 127 164 134 136 130    Diastolic BP (mmHg) 58 74 83 70 71    Pulse 73 69 76 85 78        Assessment:  Per 30-day average blood pressure is well controlled and improving.     Plan:  · Continue current regimen. Patient just refilled enalapril. Will consider to switching to lisinopril to avoid twice daily dosing.   · Will continue to monitor and follow-up in a few weeks, sooner if needed to assess BP readings and medication regimen.     Current medication regimen:  Hypertension Medications             enalapril (VASOTEC) 20 MG tablet Take 1 tablet (20 mg total) by mouth 2 (two) times daily.    hydrALAZINE (APRESOLINE) 50 MG tablet Take One and a half tablets ( total of 75 mg ) oral three times daily.    hydrochlorothiazide (HYDRODIURIL) 25 MG tablet Take 1 tablet (25 mg total) by mouth once daily.

## 2017-12-10 RX ORDER — METFORMIN HYDROCHLORIDE 500 MG/1
TABLET, EXTENDED RELEASE ORAL
Qty: 90 TABLET | Refills: 0 | Status: SHIPPED | OUTPATIENT
Start: 2017-12-10 | End: 2018-02-05 | Stop reason: SDUPTHER

## 2017-12-14 ENCOUNTER — OFFICE VISIT (OUTPATIENT)
Dept: INTERNAL MEDICINE | Facility: CLINIC | Age: 77
End: 2017-12-14
Payer: MEDICARE

## 2017-12-14 VITALS
HEIGHT: 60 IN | OXYGEN SATURATION: 97 % | BODY MASS INDEX: 32.47 KG/M2 | WEIGHT: 165.38 LBS | SYSTOLIC BLOOD PRESSURE: 142 MMHG | HEART RATE: 89 BPM | DIASTOLIC BLOOD PRESSURE: 66 MMHG

## 2017-12-14 DIAGNOSIS — I70.201 ATHEROSCLEROSIS OF ARTERY OF RIGHT LOWER EXTREMITY: ICD-10-CM

## 2017-12-14 DIAGNOSIS — M81.0 AGE-RELATED OSTEOPOROSIS WITHOUT CURRENT PATHOLOGICAL FRACTURE: ICD-10-CM

## 2017-12-14 DIAGNOSIS — G14 POST POLIOMYELITIS SYNDROME: ICD-10-CM

## 2017-12-14 DIAGNOSIS — Z23 NEED FOR TETANUS BOOSTER: ICD-10-CM

## 2017-12-14 DIAGNOSIS — Z00.00 ENCOUNTER FOR PREVENTIVE HEALTH EXAMINATION: Primary | ICD-10-CM

## 2017-12-14 DIAGNOSIS — I10 SEVERE HYPERTENSION: ICD-10-CM

## 2017-12-14 DIAGNOSIS — E11.40 TYPE 2 DIABETES MELLITUS WITH DIABETIC NEUROPATHY, WITHOUT LONG-TERM CURRENT USE OF INSULIN: ICD-10-CM

## 2017-12-14 DIAGNOSIS — E78.2 MIXED HYPERLIPIDEMIA: ICD-10-CM

## 2017-12-14 DIAGNOSIS — G82.20 PARAPARESIS OF BOTH LOWER LIMBS: ICD-10-CM

## 2017-12-14 DIAGNOSIS — G47.33 OBSTRUCTIVE SLEEP APNEA SYNDROME: ICD-10-CM

## 2017-12-14 PROCEDURE — G0439 PPPS, SUBSEQ VISIT: HCPCS | Mod: S$GLB,,, | Performed by: NURSE PRACTITIONER

## 2017-12-14 PROCEDURE — 99999 PR PBB SHADOW E&M-EST. PATIENT-LVL V: CPT | Mod: PBBFAC,,, | Performed by: NURSE PRACTITIONER

## 2017-12-14 PROCEDURE — 99499 UNLISTED E&M SERVICE: CPT | Mod: S$GLB,,, | Performed by: NURSE PRACTITIONER

## 2017-12-14 NOTE — PROGRESS NOTES
Emilia Alan presented for a  Medicare AWV and comprehensive Health Risk Assessment today. The following components were reviewed and updated:    · Medical history  · Family History  · Social history  · Allergies and Current Medications  · Health Risk Assessment  · Health Maintenance  · Care Team     ** See Completed Assessments for Annual Wellness Visit within the encounter summary.**       The following assessments were completed:  · Living Situation  · CAGE  · Depression Screening  · Timed Get Up and Go  · Whisper Test  · Cognitive Function Screening  · Nutrition Screening  · ADL Screening  · PAQ Screening    Vitals:    12/14/17 0939   BP: (!) 142/66   Pulse: 89   SpO2: 97%   Weight: 75 kg (165 lb 5.5 oz)   Height: 5' (1.524 m)     Body mass index is 32.29 kg/m².  Physical Exam   Constitutional: She is oriented to person, place, and time. No distress.   In motorized wheelchair. Impaired gait   HENT:   Head: Normocephalic and atraumatic.   Eyes: No scleral icterus.   Neck: Neck supple.   Cardiovascular: Normal rate, regular rhythm and normal heart sounds.    Pulmonary/Chest: Effort normal and breath sounds normal. No respiratory distress. She has no wheezes. She has no rales.   Musculoskeletal: She exhibits no edema.   Neurological: She is alert and oriented to person, place, and time.   Skin: Skin is warm and dry. She is not diaphoretic.   Psychiatric: She has a normal mood and affect. Her behavior is normal.         Diagnoses and health risks identified today and associated recommendations/orders:    1. Encounter for preventive health examination  - Screenings performed, as noted above. Personal preventative testing needs reviewed.     2. Need for tetanus booster  - tetanus and diphther. tox (PF)(TD); Inject 0.5 mLs into the muscle one time.    3. Severe hypertension  - Improving control with digital HTN program. Followed by digital medicine and PCP    4. Atherosclerosis of artery of right lower extremity: see  xray 4/4/07  - Stable, BP control improving, followed by PCP and cardiology    5. Mixed hyperlipidemia  - Stable, followed by PCP and cardiology    6. Type 2 diabetes mellitus with diabetic neuropathy, without long-term current use of insulin  - A1c at goal, followed by PCP    7. Osteoporosis without current pathological fracture: worse on fosamax 5/16- Reclast 8/16  - Worsened on alendronate, received reclast infusion. Followed by endocrinology    8. Obstructive sleep apnea syndrome: dx 2008 needs CPAP 11  - Stable, follwoed by PCP and sleep medicine.     9. 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 Recommendations were developed using the USPSTF age appropriate recommendations. Education, counseling, and referrals were provided as needed. After Visit Summary printed and given to patient which includes a list of additional screenings\tests needed.    Return in about 1 year (around 12/14/2018) for your next annual wellness visit.    Keren Ziegler NP

## 2017-12-14 NOTE — PATIENT INSTRUCTIONS
Counseling and Referral of Other Preventative  (Italic type indicates deductible and co-insurance are waived)    Patient Name: Emilia Alan  Today's Date: 12/14/2017      SERVICE LIMITATIONS RECOMMENDATION    Vaccines    · Pneumococcal (once after 65)    · Influenza (annually)    · Hepatitis B (if medium/high risk)    · Prevnar 13      Hepatitis B medium/high risk factors:       - End-stage renal disease       - Hemophiliacs who received Factor VII or         IX concentrates       - Clients of institutions for the mentally             retarded       - Persons who live in the same house as          a HepB carrier       - Homosexual men       - Illicit injectable drug abusers     Pneumococcal: Done, no repeat necessary     Influenza: Done, repeat in one year     Hepatitis B: N/A     Prevnar 13: Done, no repeat necessary    Mammogram (biennial age 50-74)  Annually (age 40 or over)  N/A    Pap (up to age 70 and after 70 if unknown history or abnormal study last 10 years)    N/A     The USPSTF recommends against screening for cervical cancer in women older than age 65 years who have had adequate prior screening and are not otherwise at high risk for cervical cancer.      Colorectal cancer screening (to age 75)    · Fecal occult blood test (annual)  · Flexible sigmoidoscopy (5y)  · Screening colonoscopy (10y)  · Barium enema   Last done 6/14/17, repeat every 10 years    Diabetes self-management training (no USPSTF recommendations)  Requires referral by treating physician for patient with diabetes or renal disease. 10 hours of initial DSMT sessions of no less than 30 minutes each in a continuous 12-month period. 2 hours of follow-up DSMT in subsequent years.  N/A - Well controlled at this time.     Bone mass measurements (age 65 & older, biennial)  Requires diagnosis related to osteoporosis or estrogen deficiency. Biennial benefit unless patient has history of long-term glucocorticoid  Ordered, please schedule     Glaucoma screening (no USPSTF recommendation)  Diabetes mellitus, family history   , age 50 or over    American, age 65 or over  Done this year, repeat every year    Medical nutrition therapy for diabetes or renal disease (no recommended schedule)  Requires referral by treating physician for patient with diabetes or renal disease or kidney transplant within the past 3 years.  Can be provided in same year as diabetes self-management training (DSMT), and CMS recommends medical nutrition therapy take place after DSMT. Up to 3 hours for initial year and 2 hours in subsequent years.  N/A - Well controlled at this time    Cardiovascular screening blood tests (every 5 years)  · Fasting lipid panel  Order as a panel if possible  Done this year, repeat every year    Diabetes screening tests (at least every 3 years, Medicare covers annually or at 6-month intervals for prediabetic patients)  · Fasting blood sugar (FBS) or glucose tolerance test (GTT)  Patient must be diagnosed with one of the following:       - Hypertension       - Dyslipidemia       - Obesity (BMI 30kg/m2)       - Previous elevated impaired FBS or GTT       ... or any two of the following:       - Overweight (BMI 25 but <30)       - Family history of diabetes       - Age 65 or older       - History of gestational diabetes or birth of baby weighing more than 9 pounds  N/A    HIV screening (annually for increased risk patients)  · HIV-1 and HIV-2 by EIA, or RICCARDO, rapid antibody test or oral mucosa transudate  Patients must be at increased risk for HIV infection per USPSTF guidelines or pregnant. Tests covered annually for patient at increased risk or as requested by the patient. Pregnant patients may receive up to 3 tests during pregnancy.  Risks discussed, screening is not recommended    Smoking cessation counseling (up to 8 sessions per year)  Patients must be asymptomatic of tobacco-related conditions to receive as a preventative  service.  Non-smoker    Subsequent annual wellness visit  At least 12 months since last AWV  Return in one year     The following information is provided to all patients.  This information is to help you find resources for any of the problems found today that may be affecting your health:                Living healthy guide: www.[facility].[place].[facility]      Understanding Diabetes: www.diabetes.org      Eating healthy: www.cdc.gov/healthyweight      CDC home safety checklist: www.cdc.gov/steadi/patient.html      Agency on Aging: www.goea.[place].[facility]      Alcoholics anonymous (AA): www.aa.org      Physical Activity: www.court.nih.gov/pa4cybb      Tobacco use: www.quitwithusla.org

## 2017-12-20 ENCOUNTER — PATIENT OUTREACH (OUTPATIENT)
Dept: OTHER | Facility: OTHER | Age: 77
End: 2017-12-20

## 2017-12-20 NOTE — PROGRESS NOTES
Last 5 Patient Entered Readings Current 30 Day Average: 141/69       Units 12/20/2017 12/19/2017 12/19/2017 12/18/2017 12/18/2017    Time - 12:00 AM  1:28 PM 12:00 AM  5:57 PM 12:00 AM    Systolic Blood Pressure - - 142 - 174 -    Diastolic Blood Pressure - - 72 - 82 -    Pulse bpm - 65 - 72 -    Steps Walked - 1211 - 6768 - 4643          Hypertension Digital Medicine (HDMP) Health  Follow Up    LVM to follow up with Mrs. Emilia Alan.    Per 30 day average, blood pressure is not well controlled 141/69 mmHg. Encouraged adherence to low sodium diet and physical activity guidelines. Advised patient to call or message with questions or concerns. WCB in 3 weeks.

## 2018-01-05 ENCOUNTER — PATIENT OUTREACH (OUTPATIENT)
Dept: OTHER | Facility: OTHER | Age: 78
End: 2018-01-05

## 2018-01-05 DIAGNOSIS — I10 SEVERE HYPERTENSION: Primary | ICD-10-CM

## 2018-01-05 RX ORDER — VALSARTAN AND HYDROCHLOROTHIAZIDE 160; 25 MG/1; MG/1
1 TABLET ORAL DAILY
Qty: 30 TABLET | Refills: 3 | Status: SHIPPED | OUTPATIENT
Start: 2018-01-05 | End: 2018-02-05 | Stop reason: SDUPTHER

## 2018-01-05 NOTE — PROGRESS NOTES
HPI:   Called patient for follow-up. She is doing well and states that nothing has changed diet wise yet her blood pressure is elevated. Seen by PCP and reports a blood pressure in the 140's. She reports compliance with her medication regimen with no complaints. She denies s/s of hypertension.     Last 5 Patient Entered Readings Current 30 Day Average: 155/74     Recent Readings 1/4/2018 1/3/2018 1/3/2018 1/2/2018 1/1/2018    SBP (mmHg) 147 170 181 164 150    DBP (mmHg) 66 79 94 74 71    Pulse 73 72 71 73 69        Assessment:  Per 30-day average blood pressure is well controlled. Will optimize medication regimen.    Plan:  · Stop enalapril and HCTZ and start valsartan/hctz. Patient verbalized understanding.  · Will continue to monitor and follow-up in a few weeks, sooner if needed to assess BP readings and medication regimen.     Current medication regimen:  Hypertension Medications             hydrALAZINE (APRESOLINE) 50 MG tablet Take One and a half tablets ( total of 75 mg ) oral three times daily.    valsartan-hydrochlorothiazide (DIOVAN-HCT) 160-25 mg per tablet Take 1 tablet by mouth once daily.

## 2018-01-11 ENCOUNTER — PATIENT OUTREACH (OUTPATIENT)
Dept: OTHER | Facility: OTHER | Age: 78
End: 2018-01-11

## 2018-01-11 ENCOUNTER — TELEPHONE (OUTPATIENT)
Dept: PODIATRY | Facility: CLINIC | Age: 78
End: 2018-01-11

## 2018-01-11 ENCOUNTER — OFFICE VISIT (OUTPATIENT)
Dept: PHYSICAL MEDICINE AND REHAB | Facility: CLINIC | Age: 78
End: 2018-01-11
Payer: MEDICARE

## 2018-01-11 ENCOUNTER — OFFICE VISIT (OUTPATIENT)
Dept: PODIATRY | Facility: CLINIC | Age: 78
End: 2018-01-11
Payer: MEDICARE

## 2018-01-11 VITALS
DIASTOLIC BLOOD PRESSURE: 57 MMHG | SYSTOLIC BLOOD PRESSURE: 124 MMHG | HEART RATE: 91 BPM | WEIGHT: 161.63 LBS | BODY MASS INDEX: 31.73 KG/M2 | HEIGHT: 60 IN

## 2018-01-11 VITALS
SYSTOLIC BLOOD PRESSURE: 121 MMHG | BODY MASS INDEX: 31.61 KG/M2 | WEIGHT: 161 LBS | DIASTOLIC BLOOD PRESSURE: 62 MMHG | HEART RATE: 80 BPM | HEIGHT: 60 IN

## 2018-01-11 DIAGNOSIS — G89.29 CHRONIC BILATERAL LOW BACK PAIN WITH SCIATICA, SCIATICA LATERALITY UNSPECIFIED: ICD-10-CM

## 2018-01-11 DIAGNOSIS — E08.44 DIABETIC AMYOTROPHY ASSOCIATED WITH DIABETES MELLITUS DUE TO UNDERLYING CONDITION: ICD-10-CM

## 2018-01-11 DIAGNOSIS — M25.561 CHRONIC PAIN OF RIGHT KNEE: ICD-10-CM

## 2018-01-11 DIAGNOSIS — E11.40 TYPE 2 DIABETES MELLITUS WITH DIABETIC NEUROPATHY, WITHOUT LONG-TERM CURRENT USE OF INSULIN: ICD-10-CM

## 2018-01-11 DIAGNOSIS — M54.16 LUMBAR RADICULOPATHY: ICD-10-CM

## 2018-01-11 DIAGNOSIS — M81.0 AGE-RELATED OSTEOPOROSIS WITHOUT CURRENT PATHOLOGICAL FRACTURE: ICD-10-CM

## 2018-01-11 DIAGNOSIS — R26.9 GAIT DISORDER: ICD-10-CM

## 2018-01-11 DIAGNOSIS — M48.062 SPINAL STENOSIS OF LUMBAR REGION WITH NEUROGENIC CLAUDICATION: ICD-10-CM
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
## 2018-01-11 DIAGNOSIS — M47.16 LUMBAR SPONDYLOSIS WITH MYELOPATHY: ICD-10-CM

## 2018-01-11 DIAGNOSIS — M17.11 PRIMARY OSTEOARTHRITIS OF RIGHT KNEE: Primary | ICD-10-CM

## 2018-01-11 DIAGNOSIS — L84 CORN OR CALLUS: ICD-10-CM

## 2018-01-11 DIAGNOSIS — Z86.12 HISTORY OF POLIOMYELITIS: ICD-10-CM

## 2018-01-11 DIAGNOSIS — G89.29 CHRONIC PAIN OF RIGHT KNEE: ICD-10-CM

## 2018-01-11 DIAGNOSIS — E11.42 DIABETIC POLYNEUROPATHY ASSOCIATED WITH TYPE 2 DIABETES MELLITUS: ICD-10-CM

## 2018-01-11 DIAGNOSIS — M54.40 CHRONIC BILATERAL LOW BACK PAIN WITH SCIATICA, SCIATICA LATERALITY UNSPECIFIED: ICD-10-CM

## 2018-01-11 DIAGNOSIS — G60.9 IDIOPATHIC PERIPHERAL NEUROPATHY: Primary | ICD-10-CM

## 2018-01-11 DIAGNOSIS — M47.26 OSTEOARTHRITIS OF SPINE WITH RADICULOPATHY, LUMBAR REGION: ICD-10-CM

## 2018-01-11 DIAGNOSIS — G82.20 PARAPARESIS OF BOTH LOWER LIMBS: ICD-10-CM

## 2018-01-11 DIAGNOSIS — G14 POST POLIOMYELITIS SYNDROME: ICD-10-CM

## 2018-01-11 DIAGNOSIS — B35.1 ONYCHOMYCOSIS DUE TO DERMATOPHYTE: ICD-10-CM

## 2018-01-11 DIAGNOSIS — M54.16 LEFT LUMBAR RADICULOPATHY: ICD-10-CM

## 2018-01-11 PROCEDURE — 11056 PARNG/CUTG B9 HYPRKR LES 2-4: CPT | Mod: Q9,S$GLB,, | Performed by: PODIATRIST

## 2018-01-11 PROCEDURE — 99499 UNLISTED E&M SERVICE: CPT | Mod: S$GLB,,, | Performed by: PODIATRIST

## 2018-01-11 PROCEDURE — 99999 PR PBB SHADOW E&M-EST. PATIENT-LVL III: CPT | Mod: PBBFAC,,, | Performed by: PODIATRIST

## 2018-01-11 PROCEDURE — 11721 DEBRIDE NAIL 6 OR MORE: CPT | Mod: 59,Q9,S$GLB, | Performed by: PODIATRIST

## 2018-01-11 PROCEDURE — 99499 UNLISTED E&M SERVICE: CPT | Mod: S$GLB,,, | Performed by: PHYSICAL MEDICINE & REHABILITATION

## 2018-01-11 PROCEDURE — 99999 PR PBB SHADOW E&M-EST. PATIENT-LVL III: CPT | Mod: PBBFAC,,, | Performed by: PHYSICAL MEDICINE & REHABILITATION

## 2018-01-11 PROCEDURE — 99214 OFFICE O/P EST MOD 30 MIN: CPT | Mod: S$GLB,,, | Performed by: PHYSICAL MEDICINE & REHABILITATION

## 2018-01-11 RX ORDER — HYDROCODONE BITARTRATE AND ACETAMINOPHEN 10; 325 MG/1; MG/1
1 TABLET ORAL EVERY 6 HOURS PRN
Qty: 120 TABLET | Refills: 0 | Status: SHIPPED | OUTPATIENT
Start: 2018-02-10 | End: 2018-03-12

## 2018-01-11 RX ORDER — GABAPENTIN 600 MG/1
600 TABLET ORAL
Qty: 360 TABLET | Refills: 1 | Status: SHIPPED | OUTPATIENT
Start: 2018-01-11 | End: 2018-07-19 | Stop reason: SDUPTHER

## 2018-01-11 RX ORDER — HYDROCODONE BITARTRATE AND ACETAMINOPHEN 10; 325 MG/1; MG/1
1 TABLET ORAL EVERY 6 HOURS PRN
Qty: 120 TABLET | Refills: 0 | Status: SHIPPED | OUTPATIENT
Start: 2018-03-10 | End: 2018-04-11 | Stop reason: SDUPTHER

## 2018-01-11 RX ORDER — HYDROCODONE BITARTRATE AND ACETAMINOPHEN 10; 325 MG/1; MG/1
1 TABLET ORAL EVERY 6 HOURS PRN
Qty: 120 TABLET | Refills: 0 | Status: SHIPPED | OUTPATIENT
Start: 2018-01-11 | End: 2018-02-10

## 2018-01-11 NOTE — LETTER
Altagracia Cabezas PharmD  [address] [place] [address]     Dear Emilia Alan,    Welcome to the [facility] Hypertension Digital Medicine Program!         My name is Altagracia Cabezas PharmD and I am your dedicated Digital Medicine clinician.  As an expert in medication management, I will help ensure that the medications you are taking continue to provide you with the intended benefits.      I am Nhi Camacho and I will be your health  for the duration of the program.  My  job is to help you identify lifestyle changes to improve your blood pressure control.  We will talk about nutrition, exercise, and other ways that you may be able to adjust your current habits to better your health. Together, we will work to improve your overall health and encourage you to meet your goals for a healthier lifestyle.    What we expect from YOU:    You will need to take blood pressure readings multiple times a week and no less than one reading per week.   It is important that you take your measurements at different times during the day, when possible.     What you should expect from your Digital Medicine Care Team:   We will provide you with education about high blood pressure, including lifestyle changes that could help you to control your blood pressure.   We will review your weekly readings and provide you with monthly blood pressure progress reports after you have been in the program for more than 30 days.   We will send monthly progress reports on your blood pressure control to your physician so they can follow along with your progress as well.    You will be able to reach me by phone at [phone] or through your [facility] account by clicking my name under Care Team on the right side of the home screen.    I look forward to working with you to achieve your blood pressure goals!    Sincerely,    Altagracia Cabezas PharmD  Your personal clinician    Please visit  [email or web address] to learn more about high blood pressure and what you can do lower your blood pressure.                                                                                           Emilia Alan  [address] [address]

## 2018-01-11 NOTE — PROGRESS NOTES
Last 5 Patient Entered Readings                                      Current 30 Day Average: 158/79     Recent Readings 2/22/2018 2/20/2018 2/19/2018 2/18/2018 2/17/2018    SBP (mmHg) 147 172 169 114 163    DBP (mmHg) 68 86 72 60 77    Pulse 69 83 88 83 62          Hypertension Digital Medicine Program (HDMP): Health  Follow Up    Lifestyle Modifications:    1.Low sodium diet: yes Pt reports she isn't adding salt.     2.Physical activity: yes Pt reports she is moving around better but not exercising in the gym because of her polio and arthritis.    3.Hypotension/Hypertension symptoms: no   Frequency/Alleviating factors/Precipitating factors, etc.     4.Patient has been compliant with the medication regimen.     Follow up with Mrs. Emilia Alan completed. No further questions or concerns. I will follow up in a few weeks to assess progress.

## 2018-01-11 NOTE — PROGRESS NOTES
Subjective:       Patient ID: Emilia Alan is a 77 y.o. female.    Chief Complaint: Back Pain and Knee Pain    Spine Injury   Associated symptoms include neck pain. Pertinent negatives include no abdominal pain, arthralgias, chest pain, chills, fatigue, fever, joint swelling, myalgias, numbness, rash or weakness. Headaches:     Extremity Weakness    Pertinent negatives include no fever or numbness.   Knee Pain    Pertinent negatives include no numbness.   Back Pain   Associated symptoms include leg pain. Pertinent negatives include no abdominal pain, chest pain, fever, numbness or weakness. Headaches:     Neck Pain    Associated symptoms include leg pain. Pertinent negatives include no chest pain, fever, numbness, trouble swallowing or weakness. Headaches:     Leg Pain    Pertinent negatives include no numbness.     Subjective:       Patient ID: Emilia Alan is a 77 y.o. female.    Chief Complaint: Back Pain and Knee Pain    Extremity Weakness    Pertinent negatives include no fever or numbness.   Back Pain   Associated symptoms include leg pain. Pertinent negatives include no abdominal pain, chest pain, fever, numbness or weakness. Headaches:     Leg Pain    Pertinent negatives include no numbness.   Knee Pain    Pertinent negatives include no numbness.     Ms. 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  She has B LE weakness, paraparesis, secondary to severe Lumbar spinal stenosis at L3-4, and L4-5, with  Leg length discrepancy ( left is shorter than right),with severe DJD of both knees , genu valgus in R knee.  LCV  10/07/17.   She is here for follow up visit for back pain, leg weakness, functional decline, and chronic pain management.   She has paraparesis, a right leg is weaker than Left leg- that is weak in ankle.   She cannot actively  Right LE, still walks short distances, but majority of time she  spent sitting.    Today, she states that she is slowing down, she walks slower, and can do less than in past. She also complains about back pain.   Current back pain is 7, worst pain is 9-10/10 while upright and walking.    Pain is localized across lower back and in upper spine.    Back pain is off/on, present mainly during day time, sharp, severe ,stabbing pain.  Pain is worse with lying or sitting and getting up from chair.   With that pain she is afraid she will fall down, since she gets more weakness in Right leg.   Complains also about right knee pain, that is weak, and goes backwards during walking.   Patient takes  Hydrocodone 10/325 mg, takes 3-4 x/day, and Neurontin 600 mg, po QID, tolerates it well, and she knows that both medications are helping.  Patient refuses neurosurgical evaluation, and  ASHLEY to back.   The patient has had gradual worsening of her gait over the last few years.   She was prescribed a left AFO ( that she wears).   She cannot tolerate swedish knee cage as well.   She also has a neoprene sleee brace , to stabilized knee, and to take pressure off bone.   Right hinge knee brace that is all warned up,since she wears it all the time.  She continues to complain of progressive bilateral lower extremity weakness.   She reports frequent falls, especially in the last 3-6 months.   The patient lives in a single-silvia home with a ramp access.   She is independent with her dressing, feeding and grooming.   She is also independent with toileting and bathing using a shower chair.   She is able to ambulate with single cane, RW.  She can walk about 20 feet, but has to stop frequently.   She does better with a Rollator walker.   She has manual wheel chair that is bulky, and she cannot propel, RW with seat, cane and RW.   She is restricted by lower extremity weakness, especially on the right side as noted above, she has frequent falls.  She did not sustain any significant injuries.   She recieved a new  KODAK.  She is here for follow up, and chronic pain management with opiates.    Past Medical History:   Diagnosis Date    Allergy     Anemia 10/20/2014    Arthritis     Atherosclerosis of artery of right lower extremity: see xray 07    Diabetes mellitus     Diabetic neuropathy 2012    Gait disorder 2012    High cholesterol     History of poliomyelitis 2012    Hyperlipidemia 2013    Hypertension     Obstructive sleep apnea syndrome: dx 2008 needs CPAP 11 2017    Osteopenia     Osteoporosis, unspecified 2014    Other specified anemias 2015    Post poliomyelitis syndrome 2012    Sleep apnea     Type II or unspecified type diabetes mellitus without mention of complication, not stated as uncontrolled 2015    Unspecified essential hypertension 2015       Past Surgical History:   Procedure Laterality Date    CATARACT EXTRACTION       SECTION      CHOLECYSTECTOMY      COLONOSCOPY N/A 2017    Procedure: COLONOSCOPY;  Surgeon: Karen Lebron MD;  Location: [facility] ([address] [place] [address]);  Service: Endoscopy;  Laterality: N/A;    EYE SURGERY      FRACTURE SURGERY         Family History   Problem Relation Age of Onset    Diabetes Father     Heart disease Father     Heart attack Father     Heart disease Brother     No Known Problems Daughter     Diabetes Son     Heart disease Brother     Diabetes Daughter     Diabetes Daughter     Cataracts Neg Hx     Glaucoma Neg Hx     Hypertension Neg Hx     Cancer Neg Hx     Blindness Neg Hx     Amblyopia Neg Hx     Strabismus Neg Hx     Retinal detachment Neg Hx     Macular degeneration Neg Hx     Melanoma Neg Hx        Social History     Social History    Marital status:      Spouse name: N/A    Number of children: 4    Years of education: N/A     Social History Main Topics    Smoking status: Never Smoker    Smokeless tobacco: Never Used    Alcohol use No    Drug use:  No    Sexual activity: No     Other Topics Concern    Are You Pregnant Or Think You May Be? No     Social History Narrative    No narrative on file       Current Outpatient Prescriptions   Medication Sig Dispense Refill    aspirin (ECOTRIN) 81 MG EC tablet Take 1 tablet (81 mg total) by mouth once daily. 30 tablet 12    blood sugar diagnostic Strp 1 strip by Misc.(Non-Drug; Combo Route) route 2 (two) times daily. TRUE RESULT SYSTEM 180 strip 4    calcium-vitamin D3-vitamin K (VIACTIV) 500-100-40 mg-unit-mcg Chew Take 2 each by mouth.      desloratadine (CLARINEX) 5 mg tablet Take 5 mg by mouth once daily.      diclofenac (VOLTAREN) 75 MG EC tablet TAKE 1 TABLET(75 MG) BY MOUTH TWICE DAILY AS NEEDED. STOP IF ANY STOMACH IRRITATION 180 tablet 0    diclofenac sodium (VOLTAREN) 1 % Gel Apply 4 gm to both knees 3x/day. 500 g 4    docusate sodium (COLACE) 50 MG capsule Take 1 capsule (50 mg total) by mouth 2 (two) times daily as needed for Constipation.      FLUZONE HIGH-DOSE 2017-18, PF, 180 mcg/0.5 mL vaccine       gabapentin (NEURONTIN) 600 MG tablet Take 1 tablet (600 mg total) by mouth 4 (four) times daily with meals and nightly. 360 tablet 1    glipiZIDE (GLUCOTROL) 5 MG tablet Take 1 tablet (5 mg total) by mouth once daily. 90 tablet 3    hydrALAZINE (APRESOLINE) 50 MG tablet Take One and a half tablets ( total of 75 mg ) oral three times daily. 315 tablet 3    hydrocodone-acetaminophen 10-325mg (NORCO)  mg Tab Take 1 tablet by mouth every 6 (six) hours as needed for Pain (pain). 120 tablet 0    [START ON 2/10/2018] hydrocodone-acetaminophen 10-325mg (NORCO)  mg Tab Take 1 tablet by mouth every 6 (six) hours as needed for Pain (pain). 120 tablet 0    [START ON 3/10/2018] hydrocodone-acetaminophen 10-325mg (NORCO)  mg Tab Take 1 tablet by mouth every 6 (six) hours as needed for Pain (pain). 120 tablet 0    lancets Misc TEST 2 X DAILY PROSPER RESULT SYSTEM 180 each 3    metFORMIN  (GLUCOPHAGE-XR) 500 MG 24 hr tablet TAKE 1 TABLET BY MOUTH EVERY DAY 90 tablet 0    multivitamin (THERAGRAN) per tablet Take 1 tablet by mouth once daily.       oxybutynin (DITROPAN) 5 MG Tab Take 1 tablet (5 mg total) by mouth 2 (two) times daily. 180 tablet 3    pravastatin (PRAVACHOL) 40 MG tablet Take 1.5 tablets (60 mg total) by mouth nightly. 135 tablet 3    TRUEPLUS LANCETS 33 gauge Misc USE BID  3    valsartan-hydrochlorothiazide (DIOVAN-HCT) 160-25 mg per tablet Take 1 tablet by mouth once daily. 30 tablet 3    blood-glucose meter (TRUERESULT BLOOD GLUCOSE SYSTM) kit Use as instructed 1 each 0     No current facility-administered medications for this visit.        Review of patient's allergies indicates:  No Known Allergies    Review of Systems   Constitutional: Negative for appetite change, chills, fatigue, fever and unexpected weight change.   HENT: Negative for drooling, trouble swallowing and voice change.    Eyes: Negative for pain and visual disturbance.   Respiratory: Negative for shortness of breath and wheezing.    Cardiovascular: Negative for chest pain and palpitations.   Gastrointestinal: Negative for abdominal distention, abdominal pain, constipation and diarrhea.   Genitourinary: Negative for difficulty urinating.   Musculoskeletal: Positive for back pain, extremity weakness and neck pain. Negative for arthralgias, gait problem, joint swelling, myalgias and neck stiffness.               Skin: Negative for color change and rash.   Neurological: Negative for dizziness, facial asymmetry, speech difficulty, weakness, light-headedness and numbness. Headaches:     Hematological: Negative for adenopathy.   Psychiatric/Behavioral: Negative for behavioral problems, confusion and sleep disturbance. The patient is not nervous/anxious.        Objective:      Physical Exam    Constitutional: She is oriented to person, place, and time.   She appears well-developed and well-nourished.   HENT:   Head:  Normocephalic.   Eyes: EOM are normal.   Neck: Normal range of motion.   Cardiovascular: Normal rate, regular rhythm and normal heart sounds.   Pulmonary/Chest: Breath sounds normal.   Musculoskeletal: Normal range of motion.   BUE:  ROM:full.  Strength: 5/5 at shoulders, elbows & hands.  Sensation to pinprick: intact  BLE: ROM:full.  Strength:   RLE: HF 0/5, KE 0/5,   Ankle DF 2-, Ankle PF 3  LLE:   HF 3-, KE 3-.  Ankle DF 1,  Ankle PF 3  Leg length discrepancy ( left is shorter than right), wears Left AFO.   Sensation to pinprick: intact .   DTR: decreased.       Xray of Right knee ( 2014)  Showed:  Standing AP knees and lateral of the right knee and merchant view of both knees are submitted.    Advanced degenerative change seen in the tricompartmental areas of the right knee.    Left knee shows mild degenerative change.  Both patellas show significant lateral deviation    MRI of Lumbar spine  ( 2015) ;  L2-L3:There is a circumferential disk bulge with moderate bilateral facet osseous hypertrophy and ligamentum flavum buckling. This results and mild narrowing of the spinal canal. The bilateral neural foramen remain patent.  L3-L4: There is a circumferential disk bulge with left paracentral disk protrusion. This is associated with severe bilateral facet osseous hypertrophy, bilateral facet edema, and ligamentum flavum buckling.   These findings result in severe narrowing of the spinal canal and near complete obliteration of the left neural foramen.  The right neural foramen is moderately narrowed as well.  L4-L5: There is a circumferential disk bulge with superimposed central disk protrusion. This is associated with severe bilateral facet osseous hypertrophy and ligamentum flavum buckling.   These findings result in severe narrowing of the spinal canal and bilateral neural foramina, left greater than right.     Assessment:       1. Primary osteoarthritis of right knee    2. Paraparesis of both lower limbs    3.  Spinal stenosis of lumbar region with neurogenic claudication    4. Post poliomyelitis syndrome    5. Gait disorder    6. Osteoarthritis of spine with radiculopathy, lumbar region    7. History of poliomyelitis    8. Lumbar spondylosis with myelopathy    9. Diabetic polyneuropathy associated with type 2 diabetes mellitus    10. 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 Type 2 diabetes mellitus with diabetic neuropathy, without long-term current use of insulin    12. Chronic bilateral low back pain with sciatica, sciatica laterality unspecified    13. Fall, sequela    14. Left lumbar radiculopathy    15. Lumbar radiculopathy    16. Chronic pain of right knee    17. Osteoporosis without current pathological fracture: worse on fosamax 5/16- Reclast 8/16       Plan:        Primary osteoarthritis of right knee  -     hydrocodone-acetaminophen 10-325mg (NORCO)  mg Tab; Take 1 tablet by mouth every 6 (six) hours as needed for Pain (pain).  Dispense: 120 tablet; Refill: 0  -     hydrocodone-acetaminophen 10-325mg (NORCO)  mg Tab; Take 1 tablet by mouth every 6 (six) hours as needed for Pain (pain).  Dispense: 120 tablet; Refill: 0  -     hydrocodone-acetaminophen 10-325mg (NORCO)  mg Tab; Take 1 tablet by mouth every 6 (six) hours as needed for Pain (pain).  Dispense: 120 tablet; Refill: 0  -     gabapentin (NEURONTIN) 600 MG tablet; Take 1 tablet (600 mg total) by mouth 4 (four) times daily with meals and nightly.  Dispense: 360 tablet; Refill: 1  -     HME - OTHER    Paraparesis of both lower limbs  -     hydrocodone-acetaminophen 10-325mg (NORCO)  mg Tab; Take 1 tablet by mouth every 6 (six) hours as needed for Pain (pain).  Dispense: 120 tablet; Refill: 0  -     hydrocodone-acetaminophen 10-325mg (NORCO)  mg Tab; Take 1 tablet by mouth every 6 (six) hours as needed for Pain (pain).  Dispense: 120 tablet; Refill: 0  -      hydrocodone-acetaminophen 10-325mg (NORCO)  mg Tab; Take 1 tablet by mouth every 6 (six) hours as needed for Pain (pain).  Dispense: 120 tablet; Refill: 0  -     gabapentin (NEURONTIN) 600 MG tablet; Take 1 tablet (600 mg total) by mouth 4 (four) times daily with meals and nightly.  Dispense: 360 tablet; Refill: 1    Spinal stenosis of lumbar region with neurogenic claudication  -     hydrocodone-acetaminophen 10-325mg (NORCO)  mg Tab; Take 1 tablet by mouth every 6 (six) hours as needed for Pain (pain).  Dispense: 120 tablet; Refill: 0  -     hydrocodone-acetaminophen 10-325mg (NORCO)  mg Tab; Take 1 tablet by mouth every 6 (six) hours as needed for Pain (pain).  Dispense: 120 tablet; Refill: 0  -     hydrocodone-acetaminophen 10-325mg (NORCO)  mg Tab; Take 1 tablet by mouth every 6 (six) hours as needed for Pain (pain).  Dispense: 120 tablet; Refill: 0  -     gabapentin (NEURONTIN) 600 MG tablet; Take 1 tablet (600 mg total) by mouth 4 (four) times daily with meals and nightly.  Dispense: 360 tablet; Refill: 1    Post poliomyelitis syndrome  -     hydrocodone-acetaminophen 10-325mg (NORCO)  mg Tab; Take 1 tablet by mouth every 6 (six) hours as needed for Pain (pain).  Dispense: 120 tablet; Refill: 0  -     hydrocodone-acetaminophen 10-325mg (NORCO)  mg Tab; Take 1 tablet by mouth every 6 (six) hours as needed for Pain (pain).  Dispense: 120 tablet; Refill: 0  -     hydrocodone-acetaminophen 10-325mg (NORCO)  mg Tab; Take 1 tablet by mouth every 6 (six) hours as needed for Pain (pain).  Dispense: 120 tablet; Refill: 0  -     gabapentin (NEURONTIN) 600 MG tablet; Take 1 tablet (600 mg total) by mouth 4 (four) times daily with meals and nightly.  Dispense: 360 tablet; Refill: 1    Gait disorder  -     hydrocodone-acetaminophen 10-325mg (NORCO)  mg Tab; Take 1 tablet by mouth every 6 (six) hours as needed for Pain (pain).  Dispense: 120 tablet; Refill: 0  -      hydrocodone-acetaminophen 10-325mg (NORCO)  mg Tab; Take 1 tablet by mouth every 6 (six) hours as needed for Pain (pain).  Dispense: 120 tablet; Refill: 0  -     hydrocodone-acetaminophen 10-325mg (NORCO)  mg Tab; Take 1 tablet by mouth every 6 (six) hours as needed for Pain (pain).  Dispense: 120 tablet; Refill: 0  -     gabapentin (NEURONTIN) 600 MG tablet; Take 1 tablet (600 mg total) by mouth 4 (four) times daily with meals and nightly.  Dispense: 360 tablet; Refill: 1    Osteoarthritis of spine with radiculopathy, lumbar region  -     hydrocodone-acetaminophen 10-325mg (NORCO)  mg Tab; Take 1 tablet by mouth every 6 (six) hours as needed for Pain (pain).  Dispense: 120 tablet; Refill: 0  -     hydrocodone-acetaminophen 10-325mg (NORCO)  mg Tab; Take 1 tablet by mouth every 6 (six) hours as needed for Pain (pain).  Dispense: 120 tablet; Refill: 0  -     hydrocodone-acetaminophen 10-325mg (NORCO)  mg Tab; Take 1 tablet by mouth every 6 (six) hours as needed for Pain (pain).  Dispense: 120 tablet; Refill: 0  -     gabapentin (NEURONTIN) 600 MG tablet; Take 1 tablet (600 mg total) by mouth 4 (four) times daily with meals and nightly.  Dispense: 360 tablet; Refill: 1    History of poliomyelitis  -     hydrocodone-acetaminophen 10-325mg (NORCO)  mg Tab; Take 1 tablet by mouth every 6 (six) hours as needed for Pain (pain).  Dispense: 120 tablet; Refill: 0  -     hydrocodone-acetaminophen 10-325mg (NORCO)  mg Tab; Take 1 tablet by mouth every 6 (six) hours as needed for Pain (pain).  Dispense: 120 tablet; Refill: 0  -     hydrocodone-acetaminophen 10-325mg (NORCO)  mg Tab; Take 1 tablet by mouth every 6 (six) hours as needed for Pain (pain).  Dispense: 120 tablet; Refill: 0  -     gabapentin (NEURONTIN) 600 MG tablet; Take 1 tablet (600 mg total) by mouth 4 (four) times daily with meals and nightly.  Dispense: 360 tablet; Refill: 1    Lumbar spondylosis with myelopathy  -      hydrocodone-acetaminophen 10-325mg (NORCO)  mg Tab; Take 1 tablet by mouth every 6 (six) hours as needed for Pain (pain).  Dispense: 120 tablet; Refill: 0  -     hydrocodone-acetaminophen 10-325mg (NORCO)  mg Tab; Take 1 tablet by mouth every 6 (six) hours as needed for Pain (pain).  Dispense: 120 tablet; Refill: 0  -     hydrocodone-acetaminophen 10-325mg (NORCO)  mg Tab; Take 1 tablet by mouth every 6 (six) hours as needed for Pain (pain).  Dispense: 120 tablet; Refill: 0  -     gabapentin (NEURONTIN) 600 MG tablet; Take 1 tablet (600 mg total) by mouth 4 (four) times daily with meals and nightly.  Dispense: 360 tablet; Refill: 1    Diabetic polyneuropathy associated with type 2 diabetes mellitus  -     hydrocodone-acetaminophen 10-325mg (NORCO)  mg Tab; Take 1 tablet by mouth every 6 (six) hours as needed for Pain (pain).  Dispense: 120 tablet; Refill: 0  -     hydrocodone-acetaminophen 10-325mg (NORCO)  mg Tab; Take 1 tablet by mouth every 6 (six) hours as needed for Pain (pain).  Dispense: 120 tablet; Refill: 0  -     hydrocodone-acetaminophen 10-325mg (NORCO)  mg Tab; Take 1 tablet by mouth every 6 (six) hours as needed for Pain (pain).  Dispense: 120 tablet; Refill: 0  -     gabapentin (NEURONTIN) 600 MG tablet; Take 1 tablet (600 mg total) by mouth 4 (four) times daily with meals and nightly.  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  Dispense: 120 tablet; Refill: 0  -     hydrocodone-acetaminophen 10-325mg (NORCO)  mg Tab; Take 1 tablet by mouth every 6 (six) hours as needed for Pain (pain).  Dispense: 120 tablet; Refill: 0  -     hydrocodone-acetaminophen 10-325mg (NORCO)  mg Tab; Take 1 tablet by mouth every 6 (six) hours as needed for Pain  (pain).  Dispense: 120 tablet; Refill: 0  -     gabapentin (NEURONTIN) 600 MG tablet; Take 1 tablet (600 mg total) by mouth 4 (four) times daily with meals and nightly.  Dispense: 360 tablet; Refill: 1    Type 2 diabetes mellitus with diabetic neuropathy, without long-term current use of insulin  -     hydrocodone-acetaminophen 10-325mg (NORCO)  mg Tab; Take 1 tablet by mouth every 6 (six) hours as needed for Pain (pain).  Dispense: 120 tablet; Refill: 0  -     hydrocodone-acetaminophen 10-325mg (NORCO)  mg Tab; Take 1 tablet by mouth every 6 (six) hours as needed for Pain (pain).  Dispense: 120 tablet; Refill: 0  -     hydrocodone-acetaminophen 10-325mg (NORCO)  mg Tab; Take 1 tablet by mouth every 6 (six) hours as needed for Pain (pain).  Dispense: 120 tablet; Refill: 0  -     gabapentin (NEURONTIN) 600 MG tablet; Take 1 tablet (600 mg total) by mouth 4 (four) times daily with meals and nightly.  Dispense: 360 tablet; Refill: 1    Chronic bilateral low back pain with sciatica, sciatica laterality unspecified  -     hydrocodone-acetaminophen 10-325mg (NORCO)  mg Tab; Take 1 tablet by mouth every 6 (six) hours as needed for Pain (pain).  Dispense: 120 tablet; Refill: 0  -     hydrocodone-acetaminophen 10-325mg (NORCO)  mg Tab; Take 1 tablet by mouth every 6 (six) hours as needed for Pain (pain).  Dispense: 120 tablet; Refill: 0  -     hydrocodone-acetaminophen 10-325mg (NORCO)  mg Tab; Take 1 tablet by mouth every 6 (six) hours as needed for Pain (pain).  Dispense: 120 tablet; Refill: 0  -     gabapentin (NEURONTIN) 600 MG tablet; Take 1 tablet (600 mg total) by mouth 4 (four) times daily with meals and nightly.  Dispense: 360 tablet; Refill: 1    Fall, sequela  -     hydrocodone-acetaminophen 10-325mg (NORCO)  mg Tab; Take 1 tablet by mouth every 6 (six) hours as needed for Pain (pain).  Dispense: 120 tablet; Refill: 0  -     hydrocodone-acetaminophen 10-325mg (NORCO)   mg Tab; Take 1 tablet by mouth every 6 (six) hours as needed for Pain (pain).  Dispense: 120 tablet; Refill: 0  -     hydrocodone-acetaminophen 10-325mg (NORCO)  mg Tab; Take 1 tablet by mouth every 6 (six) hours as needed for Pain (pain).  Dispense: 120 tablet; Refill: 0  -     gabapentin (NEURONTIN) 600 MG tablet; Take 1 tablet (600 mg total) by mouth 4 (four) times daily with meals and nightly.  Dispense: 360 tablet; Refill: 1    Left lumbar radiculopathy  -     hydrocodone-acetaminophen 10-325mg (NORCO)  mg Tab; Take 1 tablet by mouth every 6 (six) hours as needed for Pain (pain).  Dispense: 120 tablet; Refill: 0  -     hydrocodone-acetaminophen 10-325mg (NORCO)  mg Tab; Take 1 tablet by mouth every 6 (six) hours as needed for Pain (pain).  Dispense: 120 tablet; Refill: 0  -     hydrocodone-acetaminophen 10-325mg (NORCO)  mg Tab; Take 1 tablet by mouth every 6 (six) hours as needed for Pain (pain).  Dispense: 120 tablet; Refill: 0  -     gabapentin (NEURONTIN) 600 MG tablet; Take 1 tablet (600 mg total) by mouth 4 (four) times daily with meals and nightly.  Dispense: 360 tablet; Refill: 1    Lumbar radiculopathy  -     hydrocodone-acetaminophen 10-325mg (NORCO)  mg Tab; Take 1 tablet by mouth every 6 (six) hours as needed for Pain (pain).  Dispense: 120 tablet; Refill: 0  -     hydrocodone-acetaminophen 10-325mg (NORCO)  mg Tab; Take 1 tablet by mouth every 6 (six) hours as needed for Pain (pain).  Dispense: 120 tablet; Refill: 0  -     hydrocodone-acetaminophen 10-325mg (NORCO)  mg Tab; Take 1 tablet by mouth every 6 (six) hours as needed for Pain (pain).  Dispense: 120 tablet; Refill: 0  -     gabapentin (NEURONTIN) 600 MG tablet; Take 1 tablet (600 mg total) by mouth 4 (four) times daily with meals and nightly.  Dispense: 360 tablet; Refill: 1    Chronic pain of right knee  -     hydrocodone-acetaminophen 10-325mg (NORCO)  mg Tab; Take 1 tablet by mouth  every 6 (six) hours as needed for Pain (pain).  Dispense: 120 tablet; Refill: 0  -     hydrocodone-acetaminophen 10-325mg (NORCO)  mg Tab; Take 1 tablet by mouth every 6 (six) hours as needed for Pain (pain).  Dispense: 120 tablet; Refill: 0  -     hydrocodone-acetaminophen 10-325mg (NORCO)  mg Tab; Take 1 tablet by mouth every 6 (six) hours as needed for Pain (pain).  Dispense: 120 tablet; Refill: 0  -     gabapentin (NEURONTIN) 600 MG tablet; Take 1 tablet (600 mg total) by mouth 4 (four) times daily with meals and nightly.  Dispense: 360 tablet; Refill: 1  -     HME - OTHER    Osteoporosis without current pathological fracture: worse on fosamax 5/16- Reclast 8/16  -     hydrocodone-acetaminophen 10-325mg (NORCO)  mg Tab; Take 1 tablet by mouth every 6 (six) hours as needed for Pain (pain).  Dispense: 120 tablet; Refill: 0  -     hydrocodone-acetaminophen 10-325mg (NORCO)  mg Tab; Take 1 tablet by mouth every 6 (six) hours as needed for Pain (pain).  Dispense: 120 tablet; Refill: 0  -     hydrocodone-acetaminophen 10-325mg (NORCO)  mg Tab; Take 1 tablet by mouth every 6 (six) hours as needed for Pain (pain).  Dispense: 120 tablet; Refill: 0  -     gabapentin (NEURONTIN) 600 MG tablet; Take 1 tablet (600 mg total) by mouth 4 (four) times daily with meals and nightly.  Dispense: 360 tablet; Refill: 1      Patient with C.palsy, with  Paraparesis., severe Lumbar spinal stenosis at L3-4, and L4-5, with  Leg length discrepancy ( left is shorter than right),  wears Left AFO, and has more proximal strength in LLE, than in RLE .   Also with severe DJD , genu valgus in R knee ( cannot toleate custom made  knee brace, nor Swedish knee cage).   She also wears  neoprene sleee brace , that stabilizes knee, and improves proprioception, helps with gait.    1. Back pain   Will resume Hydrocodone 10/325 mg, takes 3-4 x/day, and Neurontin 600 mg, po QID.   Opioid Risk Score       Value Time User     Opioid Risk Score  5 1/11/2018 10:20 AM Krista Burger MD         reviewed and appropriate.    Patient refuses neurosurgical evaluation, and  ASHLEY to back.     2. Right knee pain, secondary to advanced tricompartmental DJD,   refusing Ortho consult, does not want knee replacement.   Wears brace with genu recurvatum and valgus in R knee,   will order a new  HME -other, hinge knee brace.     RTC in 3 - 4  months..    Total time spent face to face with patient was 25 minutes.   More than 50% of that time was spent in counseling on diagnosis , prognosis and treatment options.   I also caunsel patient  on common and most usual side effect of prescribed medications.   Risk and benefits of opiates, possible risk of developing opiate dependence and tolerance, need of strict compliance with prescribed medications.  I reviewed Primary care , and other specialty's notes to better coordinate patient's  care.   All questions were answered, and patient voiced understanding.

## 2018-01-11 NOTE — TELEPHONE ENCOUNTER
----- Message from Michael Sunshine sent at 1/11/2018 11:37 AM CST -----  Contact: Carol Ann Ross/daughter  Carol Ann would like an order for the pts foot brace to be faxed over to [facility] at [phone]. Carol Ann can be reached at [phone].

## 2018-01-12 NOTE — PROGRESS NOTES
Subjective:      Patient ID: Emilia Alan is a 77 y.o. female.    Chief Complaint: Diabetes Mellitus (PCP Dr. Deras 8/8/17); Diabetic Foot Exam; and Routine Foot Care    Emilia is a 77 y.o. female who presents to the clinic for evaluation and treatment of high risk feet. 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 The patient's chief complaint is long, thick toenails.       PCP: Lauren Deras MD    Date Last Seen by PCP:   Chief Complaint   Patient presents with    Diabetes Mellitus     PCP Dr. Deras 8/8/17    Diabetic Foot Exam    Routine Foot Care         Current shoe gear: DM shoes w/ AFO brace( L)    Hemoglobin A1C   Date Value Ref Range Status   10/09/2017 6.8 (H) 4.0 - 5.6 % Final     Comment:     According to ADA guidelines, hemoglobin A1c <7.0% represents  optimal control in non-pregnant diabetic patients. Different  metrics may apply to specific patient populations.   Standards of Medical Care in Diabetes-2016.  For the purpose of screening for the presence of diabetes:  <5.7%     Consistent with the absence of diabetes  5.7-6.4%  Consistent with increasing risk for diabetes   (prediabetes)  >or=6.5%  Consistent with diabetes  Currently, no consensus exists for use of hemoglobin A1c  for diagnosis of diabetes for children.  This Hemoglobin  A1c assay has significant interference with fetal   hemoglobin   (HbF). The results are invalid for patients with abnormal amounts of   HbF,   including those with known Hereditary Persistence   of Fetal Hemoglobin. Heterozygous hemoglobin variants (HbAS, HbAC,   HbAD, HbAE, HbA2) do not significantly interfere with this assay;   however, presence of multiple variants in a sample may impact the %   interference.     05/10/2017 7.0 (H) 4.5 - 6.2 % Final     Comment:     According to ADA guidelines, hemoglobin A1C <7.0% represents  optimal control in non-pregnant diabetic patients.  Different  metrics may apply to specific populations.   Standards of Medical Care in Diabetes - 2016.  For the purpose of screening for the presence of diabetes:  <5.7%     Consistent with the absence of diabetes  5.7-6.4%  Consistent with increasing risk for diabetes   (prediabetes)  >or=6.5%  Consistent with diabetes  Currently no consensus exists for use of hemoglobin A1C  for diagnosis of diabetes for children.     11/10/2016 6.7 (H) 4.5 - 6.2 % Final     Comment:     According to ADA guidelines, hemoglobin A1C <7.0% represents  optimal control in non-pregnant diabetic patients.  Different  metrics may apply to specific populations.   Standards of Medical Care in Diabetes - 2016.  For the purpose of screening for the presence of diabetes:  <5.7%     Consistent with the absence of diabetes  5.7-6.4%  Consistent with increasing risk for diabetes   (prediabetes)  >or=6.5%  Consistent with diabetes  Currently no consensus exists for use of hemoglobin A1C  for diagnosis of diabetes for children.           Review of Systems   Constitution: Negative for chills, decreased appetite and fever.   Cardiovascular: Negative for chest pain, claudication and leg swelling.   Respiratory: Negative for cough.    Skin: Positive for dry skin and nail changes. Negative for flushing and itching.   Musculoskeletal: Positive for muscle weakness (foot drop).  Negative for arthritis, back pain, gout, joint pain and myalgias.   Gastrointestinal: Negative for nausea and vomiting.   Neurological: Positive for numbness and paresthesias. Negative for loss of balance.           Objective:      Physical Exam   Constitutional: She is oriented to person, place, and time. She appears well-developed and well-nourished. No distress.   Cardiovascular:   Dorsalis pedis and posterior tibial pulses are diminished Bilaterally. Toes are cool to touch. Feet are warm proximally.There is decreased digital hair. Skin is atrophic, slightly hyperpigmented, and mildly edematous       Musculoskeletal: She exhibits no edema or tenderness.        Right ankle: Normal.        Left ankle: Normal.        Right foot: There is no swelling, no crepitus and no deformity.        Left foot: There is no swelling, no crepitus and no deformity.   Dropfoot left.      Lymphadenopathy:   No palpable lymph nodes   Neurological: She is alert and oriented to person, place, and time. She has normal strength.   Elim-Sarah 5.07 monofilamant testing is diminished Charbel feet. Sharp/dull sensation diminished Bilaterally. Light touch absent Bilaterally.       Skin: Skin is warm, dry and intact. No abrasion, no bruising, no burn, no laceration, no lesion, no petechiae and no rash noted. She is not diaphoretic. No pallor. Nails show no clubbing.   Nails 1-5 b/l  are elongated by  3-6 mm's, thickened by 3-5 mm's, dystrophic, and are darkened in  coloration . Xerosis Bilaterally. No open lesions noted.    Hyperkeratotic tissue noted to plantar L 5th MPJ and plantar L 5th toe   Psychiatric: She has a normal mood and affect. Judgment and thought content normal.   Nursing note and vitals reviewed.          Assessment:       Encounter Diagnoses   Name Primary?    Idiopathic peripheral neuropathy Yes    History of poliomyelitis     Onychomycosis due to dermatophyte     Corn or callus          Plan:       Emilia was seen today  for diabetes mellitus, diabetic foot exam and routine foot care.    Diagnoses and all orders for this visit:    Idiopathic peripheral neuropathy    History of poliomyelitis    Onychomycosis due to dermatophyte    Corn or callus      I counseled the patient on her conditions, their implications and medical management.    - Shoe inspection.Patient instructed on proper foot hygeine. We discussed wearing proper shoe gear, daily foot inspections, never walking without protective shoe gear, never putting sharp instruments to feet, routine podiatric nail visits every 2-3 months.      - With patient's permission, nails were aggressively reduced and debrided x 10 to their soft tissue attachment mechanically and with electric , removing all offending nail and debris. Patient relates relief following the procedure. She will continue to monitor the areas daily, inspect her feet, wear protective shoe gear when ambulatory, moisturizer to maintain skin integrity and follow in this office in approximately 2-3 months, sooner p.r.n.    - After cleansing the  area w/ alcohol prep pad the above mentioned hyperkeratosis was trimmed utilizing No 15 scapel, to a smooth base with out incident. Patient tolerated this  well and reported comfort to the area of plantar L 5th MPJ and lateral l 5th toe

## 2018-01-15 ENCOUNTER — TELEPHONE (OUTPATIENT)
Dept: PODIATRY | Facility: CLINIC | Age: 78
End: 2018-01-15

## 2018-01-15 DIAGNOSIS — G14 POST-POLIO SYNDROME: Primary | ICD-10-CM

## 2018-01-15 NOTE — TELEPHONE ENCOUNTER
----- Message from Michael Sunshine sent at 1/15/2018  8:46 AM CST -----  Contact: self/home   Pt would like her order for her brace to be faxed over to [facility]. The fax number is [phone]. Pt would like a call once order has been faxed over.       Dr. Moore I talk to the patient and the patient said that [facility] said that she need a script for the  brace will you write one for patient and I will faxed it the the company.

## 2018-01-18 ENCOUNTER — PATIENT OUTREACH (OUTPATIENT)
Dept: OTHER | Facility: OTHER | Age: 78
End: 2018-01-18

## 2018-01-19 NOTE — PROGRESS NOTES
HPI:  Called patient to discuss high reading. States that they had no water yesterday and she ate fast food. She denies symptoms and states that she feels fine now. She was unable to repeat the reading because her daughter was asleep. She will retake her blood pressure immediately. Per patient she is tolerating the new medication without any complaints. Upon further discussion patient is only taking hydralazine 50 mg TID. Per patient the medication is difficult to cut in half.     Last 5 Patient Entered Readings                                      Current 30 Day Average: 154/73     Recent Readings 1/17/2018 1/17/2018 1/17/2018 1/15/2018 1/13/2018    SBP (mmHg) 215 205 181 138 152    DBP (mmHg) 138 89 95 58 78    Pulse 77 93 76 79 84        Assessment:  Per 30-day average blood pressure is not at goal. Medication regimen optimized one week ago.     Plan:  · Continue current regimen. Will optimize hydralazine if needed.   · Will continue to monitor and follow-up in a few weeks sooner if needed to assess BP readings and medication regimen.     Current medication regimen:  Hypertension Medications             hydrALAZINE (APRESOLINE) 50 MG tablet Take One and a half tablets ( total of 75 mg ) oral three times daily.    valsartan-hydrochlorothiazide (DIOVAN-HCT) 160-25 mg per tablet Take 1 tablet by mouth once daily.

## 2018-01-23 ENCOUNTER — OFFICE VISIT (OUTPATIENT)
Dept: INTERNAL MEDICINE | Facility: CLINIC | Age: 78
End: 2018-01-23
Payer: MEDICARE

## 2018-01-23 ENCOUNTER — HOSPITAL ENCOUNTER (EMERGENCY)
Facility: OTHER | Age: 78
Discharge: HOME | End: 2018-01-23
Attending: EMERGENCY MEDICINE
Payer: MEDICARE

## 2018-01-23 VITALS
HEIGHT: 60 IN | HEART RATE: 94 BPM | SYSTOLIC BLOOD PRESSURE: 171 MMHG | WEIGHT: 161 LBS | BODY MASS INDEX: 31.61 KG/M2 | RESPIRATION RATE: 16 BRPM | DIASTOLIC BLOOD PRESSURE: 76 MMHG | OXYGEN SATURATION: 96 % | TEMPERATURE: 99 F

## 2018-01-23 VITALS
SYSTOLIC BLOOD PRESSURE: 158 MMHG | OXYGEN SATURATION: 96 % | DIASTOLIC BLOOD PRESSURE: 80 MMHG | TEMPERATURE: 99 F | HEART RATE: 90 BPM

## 2018-01-23 DIAGNOSIS — R42 DIZZINESS: ICD-10-CM

## 2018-01-23 DIAGNOSIS — R19.7 NAUSEA, VOMITING, AND DIARRHEA: Primary | ICD-10-CM

## 2018-01-23 DIAGNOSIS — K52.9 GASTROENTERITIS: ICD-10-CM

## 2018-01-23 DIAGNOSIS — R53.1 WEAKNESS: ICD-10-CM

## 2018-01-23 DIAGNOSIS — R11.2 INTRACTABLE VOMITING WITH NAUSEA, UNSPECIFIED VOMITING TYPE: Primary | ICD-10-CM

## 2018-01-23 DIAGNOSIS — R19.7 DIARRHEA, UNSPECIFIED TYPE: ICD-10-CM

## 2018-01-23 DIAGNOSIS — R11.2 NAUSEA, VOMITING, AND DIARRHEA: Primary | ICD-10-CM

## 2018-01-23 DIAGNOSIS — R68.89 FLU-LIKE SYMPTOMS: ICD-10-CM

## 2018-01-23 LAB
ALBUMIN SERPL BCP-MCNC: 4 G/DL
ALP SERPL-CCNC: 99 U/L
ALT SERPL W/O P-5'-P-CCNC: 25 U/L
ANION GAP SERPL CALC-SCNC: 14 MMOL/L
AST SERPL-CCNC: 28 U/L
B-OH-BUTYR BLD STRIP-SCNC: 0.6 MMOL/L
BASOPHILS # BLD AUTO: 0.04 K/UL
BASOPHILS NFR BLD: 0.4 %
BILIRUB SERPL-MCNC: 0.4 MG/DL
BILIRUB UR QL STRIP: NEGATIVE
BUN SERPL-MCNC: 20 MG/DL
CALCIUM SERPL-MCNC: 9.8 MG/DL
CHLORIDE SERPL-SCNC: 101 MMOL/L
CLARITY UR: CLEAR
CO2 SERPL-SCNC: 21 MMOL/L
COLOR UR: YELLOW
CREAT SERPL-MCNC: 0.8 MG/DL
DIFFERENTIAL METHOD: ABNORMAL
EOSINOPHIL # BLD AUTO: 0 K/UL
EOSINOPHIL NFR BLD: 0.1 %
ERYTHROCYTE [DISTWIDTH] IN BLOOD BY AUTOMATED COUNT: 12.5 %
EST. GFR  (AFRICAN AMERICAN): >60 ML/MIN/1.73 M^2
EST. GFR  (NON AFRICAN AMERICAN): >60 ML/MIN/1.73 M^2
FLUAV AG SPEC QL IA: NEGATIVE
FLUBV AG SPEC QL IA: NEGATIVE
GLUCOSE SERPL-MCNC: 193 MG/DL
GLUCOSE UR QL STRIP: ABNORMAL
HCT VFR BLD AUTO: 37 %
HGB BLD-MCNC: 12.9 G/DL
HGB UR QL STRIP: NEGATIVE
KETONES UR QL STRIP: ABNORMAL
LEUKOCYTE ESTERASE UR QL STRIP: NEGATIVE
LIPASE SERPL-CCNC: 11 U/L
LYMPHOCYTES # BLD AUTO: 0.9 K/UL
LYMPHOCYTES NFR BLD: 9.1 %
MCH RBC QN AUTO: 32.2 PG
MCHC RBC AUTO-ENTMCNC: 34.9 G/DL
MCV RBC AUTO: 92 FL
MONOCYTES # BLD AUTO: 0.3 K/UL
MONOCYTES NFR BLD: 2.9 %
NEUTROPHILS # BLD AUTO: 8.9 K/UL
NEUTROPHILS NFR BLD: 87.1 %
NITRITE UR QL STRIP: NEGATIVE
PH UR STRIP: 6 [PH] (ref 5–8)
PLATELET # BLD AUTO: 305 K/UL
PMV BLD AUTO: 11 FL
POCT GLUCOSE: 167 MG/DL (ref 70–110)
POTASSIUM SERPL-SCNC: 3.6 MMOL/L
PROT SERPL-MCNC: 8 G/DL
PROT UR QL STRIP: ABNORMAL
RBC # BLD AUTO: 4.01 M/UL
SODIUM SERPL-SCNC: 136 MMOL/L
SP GR UR STRIP: 1.02 (ref 1–1.03)
SPECIMEN SOURCE: NORMAL
URN SPEC COLLECT METH UR: ABNORMAL
UROBILINOGEN UR STRIP-ACNC: NEGATIVE EU/DL
WBC # BLD AUTO: 10.23 K/UL

## 2018-01-23 PROCEDURE — 99999 PR PBB SHADOW E&M-EST. PATIENT-LVL V: CPT | Mod: PBBFAC,,, | Performed by: NURSE PRACTITIONER

## 2018-01-23 PROCEDURE — 83690 ASSAY OF LIPASE: CPT

## 2018-01-23 PROCEDURE — 96374 THER/PROPH/DIAG INJ IV PUSH: CPT

## 2018-01-23 PROCEDURE — 87400 INFLUENZA A/B EACH AG IA: CPT

## 2018-01-23 PROCEDURE — 99285 EMERGENCY DEPT VISIT HI MDM: CPT | Mod: 25

## 2018-01-23 PROCEDURE — 85025 COMPLETE CBC W/AUTO DIFF WBC: CPT

## 2018-01-23 PROCEDURE — 25000003 PHARM REV CODE 250: Performed by: PHYSICIAN ASSISTANT

## 2018-01-23 PROCEDURE — 63600175 PHARM REV CODE 636 W HCPCS: Performed by: PHYSICIAN ASSISTANT

## 2018-01-23 PROCEDURE — 93010 ELECTROCARDIOGRAM REPORT: CPT | Mod: ,,, | Performed by: INTERNAL MEDICINE

## 2018-01-23 PROCEDURE — 96361 HYDRATE IV INFUSION ADD-ON: CPT

## 2018-01-23 PROCEDURE — 81003 URINALYSIS AUTO W/O SCOPE: CPT

## 2018-01-23 PROCEDURE — 82010 KETONE BODYS QUAN: CPT

## 2018-01-23 PROCEDURE — 96376 TX/PRO/DX INJ SAME DRUG ADON: CPT

## 2018-01-23 PROCEDURE — 99215 OFFICE O/P EST HI 40 MIN: CPT | Mod: S$GLB,,, | Performed by: NURSE PRACTITIONER

## 2018-01-23 PROCEDURE — 80053 COMPREHEN METABOLIC PANEL: CPT

## 2018-01-23 RX ORDER — ONDANSETRON 2 MG/ML
4 INJECTION INTRAMUSCULAR; INTRAVENOUS
Status: COMPLETED | OUTPATIENT
Start: 2018-01-23 | End: 2018-01-23

## 2018-01-23 RX ORDER — ONDANSETRON 4 MG/1
4 TABLET, ORALLY DISINTEGRATING ORAL EVERY 8 HOURS PRN
Qty: 12 TABLET | Refills: 0 | Status: SHIPPED | OUTPATIENT
Start: 2018-01-23 | End: 2018-05-16

## 2018-01-23 RX ADMIN — ONDANSETRON 4 MG: 2 INJECTION INTRAMUSCULAR; INTRAVENOUS at 02:01

## 2018-01-23 RX ADMIN — ONDANSETRON 4 MG: 2 INJECTION INTRAMUSCULAR; INTRAVENOUS at 04:01

## 2018-01-23 RX ADMIN — SODIUM CHLORIDE 1000 ML: 0.9 INJECTION, SOLUTION INTRAVENOUS at 02:01

## 2018-01-23 RX ADMIN — SODIUM CHLORIDE 1000 ML: 0.9 INJECTION, SOLUTION INTRAVENOUS at 05:01

## 2018-01-23 NOTE — PROGRESS NOTES
Subjective:       Patient ID: Emilia Alan is a 77 y.o. female.    Chief Complaint: Facial Swelling; Diarrhea; and Emesis    Pt here with daughter.  Pt c/o sudden onset of n/v/d last night around 6 pm.  Pt has vomited 3 times, had dry heaves and has had more than 10 episodes of diarrhea.  Pt states that she  Is dizzy with standing.  No abd pain, no headache.  No bodyaches.  No sore throat. Some swelling of face.  Pt actively nauseated, not tolerating PO at home          Past Medical History:   Diagnosis Date    Allergy     Anemia 10/20/2014    Arthritis     Atherosclerosis of artery of right lower extremity: see xray 07    Diabetes mellitus     Diabetic neuropathy 2012    Gait disorder 2012    High cholesterol     History of poliomyelitis 2012    Hyperlipidemia 2013    Hypertension     Obstructive sleep apnea syndrome: dx 2008 needs CPAP 11 2017    Osteopenia     Osteoporosis, unspecified 2014    Other specified anemias 2015    Post poliomyelitis syndrome 2012    Sleep apnea     Type II or unspecified type diabetes mellitus without mention of complication, not stated as uncontrolled 2015    Unspecified essential hypertension 2015     Past Surgical History:   Procedure Laterality Date    CATARACT EXTRACTION       SECTION      CHOLECYSTECTOMY      COLONOSCOPY N/A 2017    Procedure: COLONOSCOPY;  Surgeon: Karen Lebron MD;  Location: [address];  Service: Endoscopy;  Laterality: N/A;    EYE SURGERY      FRACTURE SURGERY       Social History     Social History Narrative    No narrative on file     Family History   Problem Relation Age of Onset    Diabetes Father     Heart disease Father     Heart attack Father     Heart disease Brother     No Known Problems Daughter     Diabetes Son     Heart disease Brother     Diabetes Daughter     Diabetes Daughter     Cataracts Neg Hx     Glaucoma Neg Hx      Hypertension Neg Hx     Cancer Neg Hx     Blindness Neg Hx     Amblyopia Neg Hx     Strabismus Neg Hx     Retinal detachment Neg Hx     Macular degeneration Neg Hx     Melanoma Neg Hx      Vitals:    01/23/18 1112   BP: (!) 158/80   Pulse: 90   Temp: 98.7 °F (37.1 °C)   SpO2: 96%   PainSc: 0-No pain     Outpatient Encounter Prescriptions as of 1/23/2018   Medication Sig Dispense Refill    aspirin (ECOTRIN) 81 MG EC tablet Take 1 tablet (81 mg total) by mouth once daily. 30 tablet 12    blood sugar diagnostic Strp 1 strip by Misc.(Non-Drug; Combo Route) route 2 (two) times daily. TRUE RESULT SYSTEM 180 strip 4    calcium-vitamin D3-vitamin K (VIACTIV) 500-100-40 mg-unit-mcg Chew Take 2 each by mouth.      desloratadine (CLARINEX) 5 mg tablet Take 5 mg by mouth once daily.      diclofenac (VOLTAREN) 75 MG EC tablet TAKE 1 TABLET(75 MG) BY MOUTH TWICE DAILY AS NEEDED. STOP IF ANY STOMACH IRRITATION 180 tablet 0    diclofenac sodium (VOLTAREN) 1 % Gel Apply 4 gm to both knees 3x/day. 500 g 4    docusate sodium (COLACE) 50 MG capsule Take 1 capsule (50 mg total) by mouth 2 (two) times daily as needed for Constipation.      FLUZONE HIGH-DOSE 2017-18, PF, 180 mcg/0.5 mL vaccine       gabapentin (NEURONTIN) 600 MG tablet Take 1 tablet (600 mg total) by mouth 4 (four) times daily with meals and nightly. 360 tablet 1    glipiZIDE (GLUCOTROL) 5 MG tablet Take 1 tablet (5 mg total) by mouth once daily. 90 tablet 3    hydrALAZINE (APRESOLINE) 50 MG tablet Take One and a half tablets ( total of 75 mg ) oral three times daily. 315 tablet 3    hydrocodone-acetaminophen 10-325mg (NORCO)  mg Tab Take 1 tablet by mouth every 6 (six) hours as needed for Pain (pain). 120 tablet 0    [START ON 2/10/2018] hydrocodone-acetaminophen 10-325mg (NORCO)  mg Tab Take 1 tablet by mouth every 6 (six) hours as needed for Pain (pain). 120 tablet 0    [START ON 3/10/2018] hydrocodone-acetaminophen 10-325mg (NORCO)  " mg Tab Take 1 tablet by mouth every 6 (six) hours as needed for Pain (pain). 120 tablet 0    lancets Misc TEST 2 X DAILY PROSPER RESULT SYSTEM 180 each 3    metFORMIN (GLUCOPHAGE-XR) 500 MG 24 hr tablet TAKE 1 TABLET BY MOUTH EVERY DAY 90 tablet 0    multivitamin (THERAGRAN) per tablet Take 1 tablet by mouth once daily.       oxybutynin (DITROPAN) 5 MG Tab Take 1 tablet (5 mg total) by mouth 2 (two) times daily. 180 tablet 3    pravastatin (PRAVACHOL) 40 MG tablet Take 1.5 tablets (60 mg total) by mouth nightly. 135 tablet 3    TRUEPLUS LANCETS 33 gauge Misc USE BID  3    valsartan-hydrochlorothiazide (DIOVAN-HCT) 160-25 mg per tablet Take 1 tablet by mouth once daily. 30 tablet 3    blood-glucose meter (TRUERESULT BLOOD GLUCOSE SYSTM) kit Use as instructed 1 each 0     No facility-administered encounter medications on file as of 1/23/2018.      Wt Readings from Last 3 Encounters:   01/11/18 73 kg (161 lb)   01/11/18 73.3 kg (161 lb 9.6 oz)   12/14/17 75 kg (165 lb 5.5 oz)     Last 3 sets of Vitals    Vitals - 1 value per visit 1/11/2018 1/11/2018 1/23/2018   SYSTOLIC 121 124 158   DIASTOLIC 62 57 80   PULSE 80 91 90   TEMPERATURE - - 98.7   RESPIRATIONS - - -   SPO2 - - 96   Weight (lb) 161 161.6 -   Weight (kg) 73.029 73.301 -   HEIGHT 5' 0" 5' 0" -   BODY MASS INDEX 31.44 31.56 -   VISIT REPORT - - -   Pain Score  7 0 0   Some recent data might be hidden     No results found for: CBC  Review of Systems   Constitutional: Positive for fatigue.   HENT: Positive for facial swelling. Negative for congestion, sore throat, tinnitus, trouble swallowing and voice change.    Respiratory: Negative for cough and shortness of breath.    Cardiovascular: Negative for chest pain and palpitations.   Gastrointestinal: Positive for diarrhea, nausea and vomiting. Negative for abdominal pain.   Musculoskeletal: Negative for arthralgias, myalgias, neck pain and neck stiffness.   Skin: Negative for rash.   Neurological: " Positive for dizziness and light-headedness. Negative for facial asymmetry, numbness and headaches.   Hematological: Negative for adenopathy. Does not bruise/bleed easily.   Psychiatric/Behavioral: Negative for sleep disturbance.       Objective:      Physical Exam   Constitutional: She is oriented to person, place, and time. She appears well-developed. She appears distressed.   Pt look ill, pale     HENT:   Head: Normocephalic and atraumatic.   Right Ear: External ear normal.   Left Ear: External ear normal.   Nose: Nose normal.   Mouth/Throat: Oropharynx is clear and moist. No oropharyngeal exudate.   Oral mucosa moist     Eyes: Conjunctivae are normal. Pupils are equal, round, and reactive to light. Right eye exhibits no discharge. Left eye exhibits no discharge.   Neck: Normal range of motion. Neck supple.   Cardiovascular: Regular rhythm.  Tachycardia present.    Pulmonary/Chest: Effort normal and breath sounds normal. No stridor. No respiratory distress.   Abdominal: Soft. Bowel sounds are decreased. There is no tenderness. There is no rigidity, no rebound, no guarding and no CVA tenderness.   Lymphadenopathy:     She has no cervical adenopathy.   Neurological: She is alert and oriented to person, place, and time.   Skin: Capillary refill takes 2 to 3 seconds. She is not diaphoretic. There is pallor.   Psychiatric: She has a normal mood and affect. Her behavior is normal. Judgment and thought content normal.   Nursing note and vitals reviewed.        Report called to Briana at [facility] at 11:46 am.    Pt transported by [facility] out of clinic and 12:33    Assessment:       1. Intractable vomiting with nausea, unspecified vomiting type    2. Diarrhea, unspecified type    3. Weakness    4. Gastroenteritis    5. Flu-like symptoms        Plan:       Emilia was seen today for facial swelling, diarrhea and emesis.    Diagnoses and all orders for this visit:    Intractable vomiting with nausea, unspecified  vomiting type  -     Refer to Emergency Dept.    Diarrhea, unspecified type  -     Refer to Emergency Dept.    Weakness  -     Refer to Emergency Dept.    Gastroenteritis  -     Refer to Emergency Dept.    Flu-like symptoms  -     Refer to Emergency Dept.      Patient Instructions   To ER via EMS for further eval

## 2018-01-23 NOTE — ED NOTES
"ROUNDING:  Reclining in chair; AAOx4. States nausea "better", lower abd pain 2/10  "it's getting better" and states dizziness "a little better."  Pt smiling and jovial. Resp:18 even and unlabored. Comfort and BR needs addressed. Plan of care updated. Pt's dtr at [facility]. Fall precautions maintained. Recliner wheels locked and call light within reach.Will continue to monitor.   "

## 2018-01-23 NOTE — ED NOTES
DIZZINESS:  Assisted pt to BSC. Pt c/o increase dizziness standing up from BSC to sitting in the recliner. Monitor demonstrates HR:113. Dizziness improved once pt sat down. Eva Pindea PA-C notified. Will continue to monitor.

## 2018-01-23 NOTE — PROGRESS NOTES
Orders given per JENNY Robin to transport to [facility] ER as per patient request  per Ambulance. [facility] notified. Daughter is present and has spoken with JENNY Robin regarding her status .Report was called to ER per JENNY Robin .[facility] arrived @ 12:30 , patient transported without event.

## 2018-01-23 NOTE — ED NOTES
PO CHALLENGE: Pt given 4 ounces of water to consume po. Pt encouraged to complete fluids as tolerated. PO challenge in progress. Will continue to monitor.

## 2018-01-23 NOTE — ED TRIAGE NOTES
Pt reports she has been suffering from N/V/D x2 days. Pt reports she feels dizzy wheel sitting in the wheelchair, pt reports onset of dizziness this morning while being seen by PCP.

## 2018-01-23 NOTE — ED PROVIDER NOTES
Encounter Date: 2018       History     Chief Complaint   Patient presents with    Diarrhea     N/V/D x3 days, VSS, sent from PCP     Patient is a 77 y.o. female with a past medical history of HTN, DM, presenting with complaints of nausea, vomiting, diarrhea.  The patient reports her symptoms started approximately 2 days ago.  She states that she's been unable to tolerate any by mouth intake.  She denies fever or chills.  She states that she is also started feeling dizzy.  She states that she went to see her primary care provider today who recommended that she go immediately to the emergency department for further evaluation and management.  She denies known sick contacts.  She states she's had trouble taking her medication due to her symptoms.      The history is provided by the patient.     Review of patient's allergies indicates:   Allergen Reactions    Atenolol     Verapamil      Past Medical History:   Diagnosis Date    Allergy     Anemia 10/20/2014    Arthritis     Atherosclerosis of artery of right lower extremity: see xray 07    Diabetes mellitus     Diabetic neuropathy 2012    Gait disorder 2012    High cholesterol     History of poliomyelitis 2012    Hyperlipidemia 2013    Hypertension     Obstructive sleep apnea syndrome: dx 2008 needs CPAP 11 2017    Osteopenia     Osteoporosis, unspecified 2014    Other specified anemias 2015    Post poliomyelitis syndrome 2012    Sleep apnea     Type II or unspecified type diabetes mellitus without mention of complication, not stated as uncontrolled 2015    Unspecified essential hypertension 2015     Past Surgical History:   Procedure Laterality Date    CATARACT EXTRACTION       SECTION      CHOLECYSTECTOMY      COLONOSCOPY N/A 2017    Procedure: COLONOSCOPY;  Surgeon: Karen Lebron MD;  Location: [address];  Service: Endoscopy;  Laterality: N/A;    EYE  SURGERY      FRACTURE SURGERY       Family History   Problem Relation Age of Onset    Diabetes Father     Heart disease Father     Heart attack Father     Heart disease Brother     No Known Problems Daughter     Diabetes Son     Heart disease Brother     Diabetes Daughter     Diabetes Daughter     Cataracts Neg Hx     Glaucoma Neg Hx     Hypertension Neg Hx     Cancer Neg Hx     Blindness Neg Hx     Amblyopia Neg Hx     Strabismus Neg Hx     Retinal detachment Neg Hx     Macular degeneration Neg Hx     Melanoma Neg Hx      Social History   Substance Use Topics    Smoking status: Never Smoker    Smokeless tobacco: Never Used    Alcohol use No     Review of Systems   Constitutional: Positive for fatigue. Negative for activity change, appetite change, chills and fever.   HENT: Negative for congestion, rhinorrhea and sore throat.    Eyes: Negative for photophobia and visual disturbance.   Respiratory: Negative for cough, shortness of breath and wheezing.    Cardiovascular: Negative for chest pain.   Gastrointestinal: Positive for diarrhea, nausea and vomiting. Negative for abdominal pain.   Genitourinary: Negative for dysuria, hematuria and urgency.   Musculoskeletal: Negative for back pain, myalgias and neck pain.   Skin: Negative for color change and wound.   Neurological: Positive for dizziness. Negative for weakness and headaches.   Psychiatric/Behavioral: Negative for agitation and confusion.       Physical Exam     Initial Vitals [01/23/18 1254]   BP Pulse Resp Temp SpO2   (!) 186/103 97 16 98.2 °F (36.8 °C) (!) 94 %      MAP       130.67         Physical Exam    Nursing note and vitals reviewed.  Constitutional: She appears well-developed and well-nourished. She is not diaphoretic.  Non-toxic appearance. She does not have a sickly appearance. She does not appear ill. No distress.   Elderly appearing,  female.  Speaking in clear and full sentences.  No acute distress.   HENT:    Head: Normocephalic and atraumatic.   Right Ear: External ear normal.   Left Ear: External ear normal.   Nose: Nose normal.   Mouth/Throat: Oropharynx is clear and moist.   Eyes: Conjunctivae and EOM are normal.   Neck: Normal range of motion. Neck supple.   Cardiovascular: Normal rate, regular rhythm and normal heart sounds.   Pulmonary/Chest: Breath sounds normal. No respiratory distress. She has no wheezes.   Musculoskeletal: Normal range of motion.   Neurological: She is alert and oriented to person, place, and time. GCS eye subscore is 4. GCS verbal subscore is 5. GCS motor subscore is 6.   Skin: Skin is warm.   Psychiatric: She has a normal mood and affect. Her behavior is normal. Judgment and thought content normal.         ED Course   Procedures  Labs Reviewed   CBC W/ AUTO DIFFERENTIAL - Abnormal; Notable for the following:        Result Value    MCH 32.2 (*)     Gran # 8.9 (*)     Lymph # 0.9 (*)     Gran% 87.1 (*)     Lymph% 9.1 (*)     Mono% 2.9 (*)     All other components within normal limits   COMPREHENSIVE METABOLIC PANEL - Abnormal; Notable for the following:     CO2 21 (*)     Glucose 193 (*)     All other components within normal limits   BETA - HYDROXYBUTYRATE, SERUM - Abnormal; Notable for the following:     Beta-Hydroxybutyrate 0.6 (*)     All other components within normal limits   URINALYSIS - Abnormal; Notable for the following:     Protein, UA Trace (*)     Glucose, UA 1+ (*)     Ketones, UA 1+ (*)     All other components within normal limits   POCT GLUCOSE - Abnormal; Notable for the following:     POCT Glucose 167 (*)     All other components within normal limits   LIPASE   INFLUENZA A AND B ANTIGEN   POCT GLUCOSE MONITORING CONTINUOUS     EKG Readings: (Independently Interpreted)   Initial Reading: No STEMI. Rhythm: Normal Sinus Rhythm. Heart Rate: 87.          Medical Decision Making:   Initial Assessment:   Urgent evaluation of a 77-year-old female with a past medical history of  hypertension, diabetes, presenting with complaints of abdominal pain, nausea, vomiting, diarrhea.  Patient is afebrile and nontoxic appearing, hemodynamically stable.  Physical exam reveals regular rate and rhythm, lungs are clear to auscultation bilaterally.  Will plan for labs, EKG, fluids and reassess.  Clinical Tests:   Lab Tests: Ordered and Reviewed  The following lab test(s) were unremarkable: CBC, CMP and Urinalysis       <> Summary of Lab: Beta hydroxybutyrate   Medical Tests: Ordered and Reviewed  ED Management:  EKG shows NSR with a rate of 87bpm, no STEMI.  UA shows 1+ ketones.  Rapid influenza is negative.  CBC shows no leukocytosis, normal H&H.  CMP is relatively unremarkable.  After 1 L of fluids, patient reports much improvement of her symptoms.  She does have mild persistent dizziness when she stands up to use the restroom.  Will plan to administer another liter of fluids.  At this time, no further testing or imaging is warranted.  Patient's signs and symptoms are likely secondary to a viral etiology.  Hydrated in the emergency department.  Will plan to discharge home with a prescription for Zofran, counseled on further symptomatic care and treatment. The patient was instructed to follow up with a primary care provider in 2 days or to return to the emergency department for worsening symptoms. The treatment plan was discussed with the patient who demonstrated understanding and comfort with plan. The patient's history, physical exam, and plan of care was discussed with and agreed upon with my supervising physician.    Other:   I have discussed this case with another health care provider.       <> Summary of the Discussion: Dr. Ojeda  This note was created using Dragon Medical Dictation. There may be typographical errors secondary to dictation.                    ED Course      Clinical Impression:     1. Nausea, vomiting, and diarrhea    2. Dizziness         Disposition:   Disposition:  Discharged  Condition: Stable                        Eva Pineda PA-C  01/23/18 2323

## 2018-01-23 NOTE — ED NOTES
URINE COLLECTION PENDING: Pt states she does not need to void at this time. Sterile specimen container and urine clean catch instructions given to patient. Pt instructed to notify nurse immediately once she has the urge to void.  Pt verbalized understanding. Will continue to monitor.

## 2018-01-24 ENCOUNTER — TELEPHONE (OUTPATIENT)
Dept: INTERNAL MEDICINE | Facility: CLINIC | Age: 78
End: 2018-01-24

## 2018-01-24 NOTE — ED NOTES
"ROUNDING:  Reclining in chair; AAOx4. States nausea, abd pain and dizziness "gone." Denies any other discomfort at this time. Comfort and BR needs addressed. Plan of care updated. Fall precautions maintained. Family member at BS. Recliner wheels locked and call light within reach. Will continue to monitor.   "

## 2018-01-24 NOTE — TELEPHONE ENCOUNTER
I am sorry about the problem yesterday, but I am concerned that she is getting dehydrated and with her diabetes this is very high risk.    I would recommend that she go back to the emergency room as she may need additional testing today and additional IV fluids since she is continuing to have vomiting

## 2018-01-24 NOTE — TELEPHONE ENCOUNTER
----- Message from Kay Fermin sent at 1/24/2018  9:30 AM CST -----  Contact: Carol Ann/Daughter/[phone]  Pt's daughter is requesting a call from the nurse in regards to her mother being sent to the ER on yesterday. Please advise.

## 2018-01-24 NOTE — ED NOTES
"AAOx4. Pt assisted to BSC. States dizziness "much better" standing up. Pt denies any other discomfort at this time. Family at BS. Discharge instructions/RX given and reviewed with patient. Pt verbalized understanding. Pt discharged to home.   "

## 2018-01-24 NOTE — TELEPHONE ENCOUNTER
Pt daughter stated they came to  yesterday pt was send to the ED they stayed there for a couple of hours pt was on the hallway receiving  fluids in a chair because they had no exam rooms available, pt was send home bu is still having severe nausea and vomiting even if she drinks clear broth also Diarrhea,   Pt Daughter wants to know what to do , because pt is not improving at all   please advise

## 2018-01-24 NOTE — TELEPHONE ENCOUNTER
Spoke to pt's daughter and advised. She stated that pt had just had a popsicle and so far has been able to keep it down. She will try to bring pt back. (she advised that pt may not want to go)

## 2018-01-26 ENCOUNTER — OFFICE VISIT (OUTPATIENT)
Dept: INTERNAL MEDICINE | Facility: CLINIC | Age: 78
End: 2018-01-26
Payer: MEDICARE

## 2018-01-26 ENCOUNTER — LAB VISIT (OUTPATIENT)
Dept: LAB | Facility: HOSPITAL | Age: 78
End: 2018-01-26
Attending: INTERNAL MEDICINE
Payer: MEDICARE

## 2018-01-26 VITALS — DIASTOLIC BLOOD PRESSURE: 65 MMHG | SYSTOLIC BLOOD PRESSURE: 125 MMHG

## 2018-01-26 DIAGNOSIS — R53.83 FATIGUE, UNSPECIFIED TYPE: Primary | ICD-10-CM

## 2018-01-26 DIAGNOSIS — R11.2 NAUSEA AND VOMITING, INTRACTABILITY OF VOMITING NOT SPECIFIED, UNSPECIFIED VOMITING TYPE: ICD-10-CM

## 2018-01-26 DIAGNOSIS — E11.40 TYPE 2 DIABETES MELLITUS WITH DIABETIC NEUROPATHY, WITHOUT LONG-TERM CURRENT USE OF INSULIN: ICD-10-CM

## 2018-01-26 DIAGNOSIS — I10 SEVERE HYPERTENSION: ICD-10-CM

## 2018-01-26 DIAGNOSIS — R53.83 FATIGUE, UNSPECIFIED TYPE: ICD-10-CM

## 2018-01-26 LAB
ALBUMIN SERPL BCP-MCNC: 3.2 G/DL
ALP SERPL-CCNC: 98 U/L
ALT SERPL W/O P-5'-P-CCNC: 30 U/L
AMYLASE SERPL-CCNC: 31 U/L
ANION GAP SERPL CALC-SCNC: 10 MMOL/L
AST SERPL-CCNC: 33 U/L
B-OH-BUTYR BLD STRIP-SCNC: 0.1 MMOL/L
BASOPHILS # BLD AUTO: 0.07 K/UL
BASOPHILS NFR BLD: 0.8 %
BILIRUB SERPL-MCNC: 0.5 MG/DL
BUN SERPL-MCNC: 19 MG/DL
CALCIUM SERPL-MCNC: 8.9 MG/DL
CHLORIDE SERPL-SCNC: 97 MMOL/L
CO2 SERPL-SCNC: 24 MMOL/L
CREAT SERPL-MCNC: 0.9 MG/DL
DIFFERENTIAL METHOD: ABNORMAL
EOSINOPHIL # BLD AUTO: 0.1 K/UL
EOSINOPHIL NFR BLD: 1 %
ERYTHROCYTE [DISTWIDTH] IN BLOOD BY AUTOMATED COUNT: 12.4 %
EST. GFR  (AFRICAN AMERICAN): >60 ML/MIN/1.73 M^2
EST. GFR  (NON AFRICAN AMERICAN): >60 ML/MIN/1.73 M^2
ESTIMATED AVG GLUCOSE: 128 MG/DL
GLUCOSE SERPL-MCNC: 236 MG/DL (ref 70–110)
GLUCOSE SERPL-MCNC: 275 MG/DL
HBA1C MFR BLD HPLC: 6.1 %
HCT VFR BLD AUTO: 35.2 %
HGB BLD-MCNC: 12.4 G/DL
LIPASE SERPL-CCNC: 20 U/L
LYMPHOCYTES # BLD AUTO: 1.5 K/UL
LYMPHOCYTES NFR BLD: 16.8 %
MCH RBC QN AUTO: 32.2 PG
MCHC RBC AUTO-ENTMCNC: 35.2 G/DL
MCV RBC AUTO: 91 FL
MONOCYTES # BLD AUTO: 0.7 K/UL
MONOCYTES NFR BLD: 7.8 %
NEUTROPHILS # BLD AUTO: 6.5 K/UL
NEUTROPHILS NFR BLD: 73.3 %
PLATELET # BLD AUTO: 316 K/UL
PMV BLD AUTO: 10.4 FL
POTASSIUM SERPL-SCNC: 3.4 MMOL/L
PROT SERPL-MCNC: 7 G/DL
RBC # BLD AUTO: 3.85 M/UL
SODIUM SERPL-SCNC: 131 MMOL/L
TSH SERPL DL<=0.005 MIU/L-ACNC: 1.39 UIU/ML
WBC # BLD AUTO: 8.92 K/UL

## 2018-01-26 PROCEDURE — 99499 UNLISTED E&M SERVICE: CPT | Mod: S$GLB,,, | Performed by: INTERNAL MEDICINE

## 2018-01-26 PROCEDURE — 85025 COMPLETE CBC W/AUTO DIFF WBC: CPT

## 2018-01-26 PROCEDURE — 83036 HEMOGLOBIN GLYCOSYLATED A1C: CPT

## 2018-01-26 PROCEDURE — 99215 OFFICE O/P EST HI 40 MIN: CPT | Mod: S$GLB,,, | Performed by: INTERNAL MEDICINE

## 2018-01-26 PROCEDURE — 87086 URINE CULTURE/COLONY COUNT: CPT

## 2018-01-26 PROCEDURE — 82010 KETONE BODYS QUAN: CPT

## 2018-01-26 PROCEDURE — 99999 PR PBB SHADOW E&M-EST. PATIENT-LVL III: CPT | Mod: PBBFAC,,, | Performed by: INTERNAL MEDICINE

## 2018-01-26 PROCEDURE — 80053 COMPREHEN METABOLIC PANEL: CPT

## 2018-01-26 PROCEDURE — 83690 ASSAY OF LIPASE: CPT

## 2018-01-26 PROCEDURE — 36415 COLL VENOUS BLD VENIPUNCTURE: CPT

## 2018-01-26 PROCEDURE — 84443 ASSAY THYROID STIM HORMONE: CPT

## 2018-01-26 PROCEDURE — 82150 ASSAY OF AMYLASE: CPT

## 2018-01-26 PROCEDURE — 82948 REAGENT STRIP/BLOOD GLUCOSE: CPT | Mod: S$GLB,,, | Performed by: INTERNAL MEDICINE

## 2018-01-26 RX ORDER — HYDRALAZINE HYDROCHLORIDE 50 MG/1
50 TABLET, FILM COATED ORAL EVERY 8 HOURS
Qty: 270 TABLET | Refills: 3
Start: 2018-01-26 | End: 2018-02-05 | Stop reason: SDUPTHER

## 2018-01-26 RX ORDER — SODIUM CHLORIDE 9 MG/ML
INJECTION, SOLUTION INTRAVENOUS
Status: COMPLETED | OUTPATIENT
Start: 2018-01-26 | End: 2018-01-26

## 2018-01-26 RX ADMIN — SODIUM CHLORIDE: 9 INJECTION, SOLUTION INTRAVENOUS at 08:01

## 2018-01-26 NOTE — PROGRESS NOTES
Subjective:       Patient ID: Emilia Alan is a 77 y.o. female.    Chief Complaint: Emesis; Diarrhea; and Fatigue    ER follow up    Here with daughter Carol Ann.    Sunday began with vomiting and diarrhea.  No ill contacts.   No obvious triggers, did not eat out.  In the ER recently (1/23) negative for flu.  In the ER was tested- labs ok; given IV fluids.    Sx slightly better.  Not eating much.  A little weak, but not lightheaded nor any syncope      Diabetes stable, adhering to sick day rules.      Nausea slightly better.    Digital HTN- changed from enalapril with HCTZ to Diovan HCT.  Also on hydralazine.  At this point only taking hydralazine 50 3 x daily.  BP better; low normal today.    GB removed.    Did not take diabetes meds today.    Patient Active Problem List:     Gait disorder     History of poliomyelitis     Post poliomyelitis syndrome     Diabetic polyneuropathy associated with type 2 diabetes mellitus     Mixed hyperlipidemia     Osteoporosis without current pathological fracture: worse on fosamax 5/16- Reclast 8/16     Paraparesis of both lower limbs     Severe hypertension     Type 2 diabetes mellitus with diabetic neuropathy, without long-term current use of insulin     Osteoarthritis of right knee     Lumbar spinal stenosis     Obstructive sleep apnea syndrome: dx 2008 needs CPAP 11     Bradycardia, drug induced     Atherosclerosis of artery of right lower extremity: see xray 4/4/07            Review of Systems   Constitutional: Positive for fatigue. Negative for activity change, appetite change, chills and fever.   HENT: Negative for congestion, hearing loss, sinus pressure and sore throat.    Eyes: Negative for visual disturbance.   Respiratory: Negative for apnea, cough, shortness of breath and wheezing.    Cardiovascular: Negative for chest pain, palpitations and leg swelling.   Gastrointestinal: Negative for abdominal distention, abdominal pain, constipation, diarrhea, nausea and  vomiting.   Genitourinary: Negative for dysuria, frequency, hematuria and vaginal bleeding.   Musculoskeletal: Negative for gait problem, joint swelling and myalgias.   Skin: Negative for rash.   Neurological: Negative for dizziness, weakness, light-headedness and headaches.   Hematological: Negative for adenopathy. Does not bruise/bleed easily.   Psychiatric/Behavioral: Positive for sleep disturbance. Negative for confusion, hallucinations and suicidal ideas.       Objective:      Physical Exam   Constitutional: She is oriented to person, place, and time. She appears well-developed and well-nourished.   HENT:   Head: Normocephalic and atraumatic.   Right Ear: External ear normal.   Left Ear: External ear normal.   Nose: Nose normal.   Mouth/Throat: Oropharynx is clear and moist. No oropharyngeal exudate.   Eyes: Conjunctivae and EOM are normal. No scleral icterus.   Neck: Normal range of motion. Neck supple. No JVD present. No thyromegaly present.   Cardiovascular: Normal rate, regular rhythm, normal heart sounds and intact distal pulses.  Exam reveals no gallop.    No murmur heard.  Pulmonary/Chest: Effort normal and breath sounds normal. No respiratory distress. She has no wheezes.   Abdominal: Soft. Bowel sounds are normal. She exhibits no distension and no mass. There is no tenderness. There is no rebound and no guarding.   Musculoskeletal: Normal range of motion. She exhibits no edema or tenderness.   Lymphadenopathy:     She has no cervical adenopathy.   Neurological: She is alert and oriented to person, place, and time. She displays normal reflexes. No cranial nerve deficit. Coordination normal.   Skin: Skin is warm. No rash noted. No erythema.   Psychiatric: She has a normal mood and affect. Her behavior is normal. Judgment and thought content normal.   Nursing note and vitals reviewed.      Assessment:       1. Fatigue, unspecified type    2. Nausea and vomiting, intractability of vomiting not specified,  unspecified vomiting type    3. Type 2 diabetes mellitus with diabetic neuropathy, without long-term current use of insulin    4. Severe hypertension        Plan:         The patient had a point-of-care testing glucose is 200 range.    POCT testing urinalysis was acceptable with no ketones.  She was sent for stat labs all of which were acceptable.  She received 1 L of IV fluids over the course of 3 hours.    After the IV fluids, she felt symptomatically significantly better and looks much improved.    We discussed bland diet, hydration at home and decreasing hydralazine to twice daily while monitoring blood pressure.  Alarm symptoms reviewed with patient and daughter, she is having none.  Follow-up in the next 3-4 days, sooner with problems in the interim    Patient evaluated for over 60 minutes with this appoinment, including diagnostic testing and treatment.  All questions answered,  chart reviewed and care coordinated.

## 2018-01-26 NOTE — PROGRESS NOTES
2 patient identifiers verified (Name and )    Fall Risk Band applied    Patient instructed not to go to restroom or stand alone.  Patient verbalized understanding.    Symptoms: diarrhea & vomiting    Number of attempts: 1    Size of cath used: 20G    Location: right AC    Fluid ordered: Normal Saline    Amount of fluid given:      Initial Vitals:   BP:   Pulse:   O2:   Temp:   Blood Sugar:

## 2018-01-27 LAB
BACTERIA UR CULT: NORMAL
BACTERIA UR CULT: NORMAL

## 2018-01-28 ENCOUNTER — PATIENT MESSAGE (OUTPATIENT)
Dept: INTERNAL MEDICINE | Facility: CLINIC | Age: 78
End: 2018-01-28

## 2018-02-05 ENCOUNTER — OFFICE VISIT (OUTPATIENT)
Dept: INTERNAL MEDICINE | Facility: CLINIC | Age: 78
End: 2018-02-05
Payer: MEDICARE

## 2018-02-05 ENCOUNTER — PATIENT OUTREACH (OUTPATIENT)
Dept: OTHER | Facility: OTHER | Age: 78
End: 2018-02-05

## 2018-02-05 VITALS — SYSTOLIC BLOOD PRESSURE: 120 MMHG | DIASTOLIC BLOOD PRESSURE: 70 MMHG

## 2018-02-05 DIAGNOSIS — I10 SEVERE HYPERTENSION: Primary | ICD-10-CM

## 2018-02-05 DIAGNOSIS — E11.40 TYPE 2 DIABETES MELLITUS WITH DIABETIC NEUROPATHY, WITHOUT LONG-TERM CURRENT USE OF INSULIN: ICD-10-CM

## 2018-02-05 DIAGNOSIS — N39.46 MIXED INCONTINENCE: ICD-10-CM

## 2018-02-05 DIAGNOSIS — E78.5 HYPERLIPIDEMIA, UNSPECIFIED HYPERLIPIDEMIA TYPE: ICD-10-CM

## 2018-02-05 DIAGNOSIS — I70.201 ATHEROSCLEROSIS OF ARTERY OF RIGHT LOWER EXTREMITY: ICD-10-CM

## 2018-02-05 PROCEDURE — 99999 PR PBB SHADOW E&M-EST. PATIENT-LVL III: CPT | Mod: PBBFAC,,, | Performed by: INTERNAL MEDICINE

## 2018-02-05 PROCEDURE — 99499 UNLISTED E&M SERVICE: CPT | Mod: S$GLB,,, | Performed by: INTERNAL MEDICINE

## 2018-02-05 PROCEDURE — 1159F MED LIST DOCD IN RCRD: CPT | Mod: S$GLB,,, | Performed by: INTERNAL MEDICINE

## 2018-02-05 PROCEDURE — 1125F AMNT PAIN NOTED PAIN PRSNT: CPT | Mod: S$GLB,,, | Performed by: INTERNAL MEDICINE

## 2018-02-05 PROCEDURE — 99214 OFFICE O/P EST MOD 30 MIN: CPT | Mod: S$GLB,,, | Performed by: INTERNAL MEDICINE

## 2018-02-05 PROCEDURE — 3008F BODY MASS INDEX DOCD: CPT | Mod: S$GLB,,, | Performed by: INTERNAL MEDICINE

## 2018-02-05 RX ORDER — VALSARTAN AND HYDROCHLOROTHIAZIDE 160; 25 MG/1; MG/1
1 TABLET ORAL DAILY
Qty: 90 TABLET | Refills: 3 | Status: SHIPPED | OUTPATIENT
Start: 2018-02-05 | End: 2018-02-27 | Stop reason: ALTCHOICE

## 2018-02-05 RX ORDER — HYDRALAZINE HYDROCHLORIDE 50 MG/1
50 TABLET, FILM COATED ORAL EVERY 12 HOURS
Qty: 180 TABLET | Refills: 3
Start: 2018-02-05 | End: 2018-05-17 | Stop reason: SDUPTHER

## 2018-02-05 RX ORDER — PRAVASTATIN SODIUM 40 MG/1
60 TABLET ORAL NIGHTLY
Qty: 135 TABLET | Refills: 3 | Status: SHIPPED | OUTPATIENT
Start: 2018-02-05 | End: 2019-04-12 | Stop reason: SDUPTHER

## 2018-02-05 RX ORDER — METFORMIN HYDROCHLORIDE 500 MG/1
500 TABLET, EXTENDED RELEASE ORAL DAILY
Qty: 90 TABLET | Refills: 3 | Status: SHIPPED | OUTPATIENT
Start: 2018-02-05 | End: 2019-02-24 | Stop reason: SDUPTHER

## 2018-02-05 RX ORDER — OXYBUTYNIN CHLORIDE 5 MG/1
5 TABLET ORAL 2 TIMES DAILY
Qty: 180 TABLET | Refills: 3 | Status: SHIPPED | OUTPATIENT
Start: 2018-02-05 | End: 2019-03-05 | Stop reason: SDUPTHER

## 2018-02-05 RX ORDER — GLIPIZIDE 5 MG/1
5 TABLET ORAL DAILY
Qty: 90 TABLET | Refills: 3 | Status: SHIPPED | OUTPATIENT
Start: 2018-02-05 | End: 2018-06-18

## 2018-02-05 NOTE — Clinical Note
George Frias:  I saw her today, BP doing well on hydralazine 50 2 x daily.  I reduced it  because on 3 x daily it was too low and she was tired and hypotensive.  I told her if BP creeps up again we will need to increase to 3 x daily again.  Lauren Deras

## 2018-02-05 NOTE — PATIENT INSTRUCTIONS
Taking Your Blood Pressure  Blood pressure is the force of blood against the artery wall as it moves from the heart through the blood vessels. You can take your own blood pressure reading using a digital monitor. Take your readings the same each time, using the same arm. Take readings as often as your healthcare provider instructs.  About blood pressure monitors  Blood pressure monitors are designed for certain ages and cases. You can find monitors for older adults, for pregnant women, and for children. Make sure the one you choose is the right one for your age and situation.  The American Heart Association recommends an automatic cuff monitor that fits on your upper arm (bicep). The cuff should fit your arm size. A cuff thats too large or too small will not give an accurate reading. Measure around your upper arm to find your size.  Monitors that attach to your finger or wrist are not as accurate as monitors for your upper arm.  Ask your healthcare provider for help in choosing a monitor. Bring your monitor to your next provider visit if you need help in using it the correct way.  The steps below are general instructions for using an automatic digital monitor.  Step 1. Relax    · Take your blood pressure at the same time every day, such as in the morning or evening, or at the time your healthcare provider recommends.  · Wait at least a half-hour after smoking, eating, or exercising. Don't drink coffee, tea, soda, or other caffeinated beverages before checking your blood pressure.  · Sit comfortably at a table with both feet on the floor. Do not cross your legs or feet. Place the monitor near you.  · Rest for a few minutes before you begin.  Step 2. Wrap the cuff    · Place your arm on the table, palm up. Your arm should be at the level of your heart. Wrap the cuff around your upper arm, just above your elbow. Its best done on bare skin, not over clothing. Most cuffs will indicate where the brachial artery (the  blood vessel in the middle of the arm at the inner side of the elbow) should line up with the cuff. Look in your monitor's instruction booklet for an illustration. You can also bring your cuff to your healthcare provider and have them show you how to correctly place the cuff.  Step 3. Inflate the cuff    · Push the button that starts the pump.  · The cuff will tighten, then loosen.  · The numbers will change. When they stop changing, your blood pressure reading will appear.  · Take 2 or 3 readings one minute apart.  Step 4. Write down the results of each reading    · Write down your blood pressure numbers for each reading. Note the date and time. Keep your results in one place, such as a notebook. Even if your monitor has a built-in memory, keep a hard copy of the readings.  · Remove the cuff from your arm. Turn off the machine.  · Bring your blood pressure records with your healthcare providers at each visit.  · If you start a new blood pressure medicine, note the day you started the new medicine. Also note the day if you change the dose of your medicine. This information goes on your blood pressure recording sheet. This will help your healthcare provider monitor how well the medicine changes are working.  · Ask your healthcare provider what numbers should prompt you to call him or her. Also ask what numbers should prompt you to get help right away.  Date Last Reviewed: 11/1/2016  © 3777-1881 [employer]. [address] [place] [address], [place] [address]. All rights reserved. This information is not intended as a substitute for professional medical care. Always follow your healthcare professional's instructions.

## 2018-02-05 NOTE — PROGRESS NOTES
Subjective:       Patient ID: Emilia Alan is a 77 y.o. female.    Chief Complaint: Hypertension    Follow up multiple issues    Significantly better with regard to viral syndrome and fatigue.   Back to eating and drinking well.  No longer napping.  No fevers or chills at this point.    BP much better again.  Hydralazine 50 2 x daily because on 3 x daily it was too low and she was tired and hypotensive.      Last fall CXR, echo and DIXIE assessment wnl.   No palpitations, no chest pain.  Denies edema, PND or orthopnea.    Recall being seen in Cardiology in October for her BP.  Told to come back in 1 year (chart says 6 months).    Patient Active Problem List:     Gait disorder     History of poliomyelitis     Post poliomyelitis syndrome     Diabetic polyneuropathy associated with type 2 diabetes mellitus     Mixed hyperlipidemia     Osteoporosis without current pathological fracture: worse on fosamax 5/16- Reclast 8/16     Paraparesis of both lower limbs     Severe hypertension     Type 2 diabetes mellitus with diabetic neuropathy, without long-term current use of insulin     Osteoarthritis of right knee     Lumbar spinal stenosis     Obstructive sleep apnea syndrome: dx 2008 needs CPAP 11     Bradycardia, drug induced     Atherosclerosis of artery of right lower extremity: see xray 4/4/07        Hypertension   Pertinent negatives include no chest pain, headaches, palpitations or shortness of breath.     Review of Systems   Constitutional: Negative for activity change, appetite change, chills, fatigue and fever.   HENT: Negative for congestion, hearing loss, sinus pressure and sore throat.    Eyes: Negative for visual disturbance.   Respiratory: Negative for apnea, cough and shortness of breath.    Cardiovascular: Negative for chest pain, palpitations and leg swelling.   Gastrointestinal: Negative for abdominal distention, abdominal pain, constipation, diarrhea, nausea and vomiting.   Genitourinary: Negative  for dysuria, frequency, hematuria and vaginal bleeding.   Musculoskeletal: Positive for arthralgias and back pain. Negative for gait problem, joint swelling and myalgias.        Stable sx  Sees Dr Burger, pain meds stable   Skin: Negative for rash.   Neurological: Negative for dizziness, weakness, light-headedness and headaches.   Hematological: Negative for adenopathy. Does not bruise/bleed easily.   Psychiatric/Behavioral: Negative for confusion, hallucinations, sleep disturbance and suicidal ideas.       Objective:      Physical Exam   Constitutional: She is oriented to person, place, and time. She appears well-developed and well-nourished.   HENT:   Head: Normocephalic and atraumatic.   Right Ear: External ear normal.   Left Ear: External ear normal.   Nose: Nose normal.   Mouth/Throat: Oropharynx is clear and moist. No oropharyngeal exudate.   Eyes: Conjunctivae and EOM are normal. No scleral icterus.   Neck: Normal range of motion. Neck supple. No JVD present. No thyromegaly present.   Cardiovascular: Normal rate, regular rhythm, normal heart sounds and intact distal pulses.  Exam reveals no gallop.    No murmur heard.  Pulmonary/Chest: Effort normal and breath sounds normal. No respiratory distress. She has no wheezes.   Abdominal: Soft. Bowel sounds are normal. She exhibits no distension and no mass. There is no tenderness. There is no rebound and no guarding.   Musculoskeletal: Normal range of motion. She exhibits no edema or tenderness.   Lymphadenopathy:     She has no cervical adenopathy.   Neurological: She is alert and oriented to person, place, and time. She displays normal reflexes. No cranial nerve deficit. Coordination normal.   Skin: Skin is warm. No rash noted. No erythema.   Psychiatric: She has a normal mood and affect. Her behavior is normal. Judgment and thought content normal.   Nursing note and vitals reviewed.      Assessment:       1. Severe hypertension    2. Type 2 diabetes mellitus  with diabetic neuropathy, without long-term current use of insulin    3. Atherosclerosis of artery of right lower extremity: see xray 4/4/07    4. Hyperlipidemia, unspecified hyperlipidemia type    5. Mixed incontinence        Plan:         Severe hypertension: improved.   Low salt diet, exercise, 5-10-# weight loss.  Call if BP > 135/85 on a regular basis.  -     Comprehensive metabolic panel; Future; Expected date: 02/05/2018  -     CBC auto differential; Future; Expected date: 02/05/2018  -     valsartan-hydrochlorothiazide (DIOVAN-HCT) 160-25 mg per tablet; Take 1 tablet by mouth once daily.  Dispense: 90 tablet; Refill: 3    Type 2 diabetes mellitus with diabetic neuropathy, without long-term current use of insulin: doing very well on her regimen  -     Hemoglobin A1c; Future; Expected date: 02/05/2018    Atherosclerosis of artery of right lower extremity: see xray 4/4/07: no alarm sx.  Stable on regimen  -     Lipid panel; Future; Expected date: 02/05/2018    Hyperlipidemia, unspecified hyperlipidemia type  -     pravastatin (PRAVACHOL) 40 MG tablet; Take 1.5 tablets (60 mg total) by mouth nightly.  Dispense: 135 tablet; Refill: 3    Mixed incontinence  -     oxybutynin (DITROPAN) 5 MG Tab; Take 1 tablet (5 mg total) by mouth 2 (two) times daily.  Dispense: 180 tablet; Refill: 3    Other orders  -     metFORMIN (GLUCOPHAGE-XR) 500 MG 24 hr tablet; Take 1 tablet (500 mg total) by mouth once daily.  Dispense: 90 tablet; Refill: 3  -     glipiZIDE (GLUCOTROL) 5 MG tablet; Take 1 tablet (5 mg total) by mouth once daily.  Dispense: 90 tablet; Refill: 3    EP 3 months with labs

## 2018-02-05 NOTE — PROGRESS NOTES
HPI:   Called patient for follow-up. States that she is doing much better today. She has been very ill for the last week and had to be seen in the ED. She was unable to keep anything down including her medications. Illness has resolved and patient reports adherence to regimen however, she states that her PCP changed hydralazine to twice daily. She is unsure of why the change occurred and notes increased BP. Appointment today at 3 PM for follow-up. Patient denies s/s of hypertension at this time.     Last 5 Patient Entered Readings                                      Current 30 Day Average: 154/74     Recent Readings 2/4/2018 2/3/2018 2/3/2018 2/1/2018 1/31/2018    SBP (mmHg) 151 197 162 136 154    DBP (mmHg) 81 91 94 75 77    Pulse 78 75 78 83 78        Assessment:  Per 30-day average blood pressure not well controlled. Trending down after medication adjustment however, readings began to trend upward secondary to illness.     Plan:  · Continue current regimen. Will follow-up with PCP regarding decrease dose of hydralazine. Valsartan available for titration.   · Will continue to monitor and follow-up in a few weeks sooner if needed to assess BP readings and medication regimen.     Current medication regimen:  Hypertension Medications             hydrALAZINE (APRESOLINE) 50 MG tablet Take 1 tablet (50 mg total) by mouth every 8 (eight) hours.    valsartan-hydrochlorothiazide (DIOVAN-HCT) 160-25 mg per tablet Take 1 tablet by mouth once daily.

## 2018-02-09 ENCOUNTER — PATIENT OUTREACH (OUTPATIENT)
Dept: OTHER | Facility: OTHER | Age: 78
End: 2018-02-09

## 2018-02-09 NOTE — PROGRESS NOTES
HPI:  Called patient for follow-up. She is doing well. States that she was feeling tired and fatigued and she is unsure of the cause. Medication adjusted by PCP to avoid hypotensive symptoms. Upon further discussion patient is using a home cuff and states that readings are much lower 115's to 120's. She denies s/s of hyper/hypotension at this time.     Last 5 Patient Entered Readings                                      Current 30 Day Average: 157/76     Recent Readings 2/8/2018 2/7/2018 2/6/2018 2/5/2018 2/4/2018    SBP (mmHg) 177 165 159 156 151    DBP (mmHg) 85 81 77 80 81    Pulse 60 80 78 74 78        Assessment:  Per 30-day average blood pressure is not well controlled however, patient questions cuff accuracy.     Plan:  · Continue current medication regimen.  · Patient will have cuff assessed in 1 to 2 weeks at [facility].   · Will continue to monitor and follow-up in a few weeks sooner if needed to assess BP readings and medication regimen.     Current medication regimen:  Hypertension Medications             hydrALAZINE (APRESOLINE) 50 MG tablet Take 1 tablet (50 mg total) by mouth every 12 (twelve) hours.    valsartan-hydrochlorothiazide (DIOVAN-HCT) 160-25 mg per tablet Take 1 tablet by mouth once daily.

## 2018-02-27 ENCOUNTER — PATIENT OUTREACH (OUTPATIENT)
Dept: OTHER | Facility: OTHER | Age: 78
End: 2018-02-27

## 2018-02-27 DIAGNOSIS — I10 SEVERE HYPERTENSION: Primary | ICD-10-CM

## 2018-02-27 RX ORDER — VALSARTAN AND HYDROCHLOROTHIAZIDE 320; 25 MG/1; MG/1
1 TABLET, FILM COATED ORAL DAILY
Qty: 30 TABLET | Refills: 3 | Status: SHIPPED | OUTPATIENT
Start: 2018-02-27 | End: 2018-06-27 | Stop reason: SDUPTHER

## 2018-02-27 NOTE — PROGRESS NOTES
"HPI:  Called patient for follow-up. Despite high readings patient is adamant that she feels fine and has no symptoms. States that she has been dealing with high blood pressure for a long time and that no matter what, her readings are always high. She endorses high intake of fresh fruits, vegetables and lean meats that are baked. Upon further discussion patient realized that her preferred seasoning is reduced sodium versus salt free. Per patient she has worked with dieticians in the past and did not feel as though she learned anything and just "looked at labels". She has seen her friends use online recipes however, she is not computer savvy and is requesting that we mail her information on what she can eat. She feels that this would be easier than focusing on what she can't have. She reports use of a food journal for the last two years and is willing to discuss in further detail so that she can improve her diet. Cuff was assessed and replaced shortly after our last discussion.     Last 5 Patient Entered Readings                                      Current 30 Day Average: 160/80     Recent Readings 2/26/2018 2/26/2018 2/23/2018 2/22/2018 2/20/2018    SBP (mmHg) 165 187 162 147 172    DBP (mmHg) 80 91 80 68 86    Pulse 72 76 67 69 83        Assessment:  Per 30-day average blood pressure is not well controlled. Will optimize medication regimen at this time.      Plan:  · Increase valsartan. Patient will begin new regimen tomorrow.   · Discussed seasonings that are salt free and methods for reducing sodium intake. Task placed for HC to discuss in further detail as well as to mail useful resources to patient as requested.   · Will continue to monitor and follow-up in a few weeks sooner if needed to assess BP readings and medication regimen.     Current medication regimen:  Hypertension Medications             hydrALAZINE (APRESOLINE) 50 MG tablet Take 1 tablet (50 mg total) by mouth every 12 (twelve) hours.    " valsartan-hydrochlorothiazide (DIOVAN-HCT) 320-25 mg per tablet Take 1 tablet by mouth once daily.

## 2018-03-04 RX ORDER — DICLOFENAC SODIUM 75 MG/1
TABLET, DELAYED RELEASE ORAL
Qty: 180 TABLET | Refills: 0 | Status: SHIPPED | OUTPATIENT
Start: 2018-03-04 | End: 2018-05-29 | Stop reason: SDUPTHER

## 2018-03-05 ENCOUNTER — PATIENT OUTREACH (OUTPATIENT)
Dept: OTHER | Facility: OTHER | Age: 78
End: 2018-03-05

## 2018-03-05 NOTE — PROGRESS NOTES
Last 5 Patient Entered Readings                                      Current 30 Day Average: 162/80     Recent Readings 3/4/2018 3/3/2018 3/2/2018 3/2/2018 3/1/2018    SBP (mmHg) 138 131 168 161 171    DBP (mmHg) 67 61 73 105 83    Pulse 78 77 69 70 74          Hypertension Digital Medicine Program (HDMP): Health  Follow Up    Lifestyle Modifications:    1.Low sodium diet:yes, Pt reports oven roasting her food and trying to eat more vegetables. Reports she doesn't think she is using the right seasonings to decrease her Bp. Discussed low sodium recipes and seasoning to help with her diet with pt. Send pt resource and will discuss if the recipes and seasoning where good on the next call.    2.Physical activity: yes , Pt reports she is walking around and move around in her house constantly.     3.Hypotension/Hypertension symptoms: no   Frequency/Alleviating factors/Precipitating factors, etc.     4.Patient has been compliant with the medication regimen.     Follow up with Mrs. Emilia Alan completed. No further questions or concerns. I will follow up in a few weeks to assess progress.

## 2018-03-13 ENCOUNTER — PATIENT OUTREACH (OUTPATIENT)
Dept: OTHER | Facility: OTHER | Age: 78
End: 2018-03-13

## 2018-03-13 NOTE — PROGRESS NOTES
HPI:  Called patient for follow-up. States that she is doing well. Per patient, BP was elevated yesterday because she had yet to take medication. She denies s/s of hypertension. She endorses adherence to medication regimen with no complaints (7 AM and ~6 or 7 PM). Patient has not received dietary resources in the mail.     Last 5 Patient Entered Readings                                      Current 30 Day Average: 162/79     Recent Readings 3/12/2018 3/11/2018 3/10/2018 3/10/2018 3/9/2018    SBP (mmHg) 185 151 167 188 154    DBP (mmHg) 87 72 78 80 76    Pulse 71 67 67 73 79        Assessment:  Per 30-day average blood pressure is not well controlled. Medication regimen optimized two weeks ago.     Plan:  · Continue current regimen. Patient will take valsartan/hctz in the morning and hydralazine in the afternoon and evening.   · Will consider initiating amlodipine if needed.   · Task placed for HC to follow-up with patient regarding resources.  · Will continue to monitor and follow-up in a few weeks, sooner if needed to assess BP readings and medication regimen.     Current medication regimen:  Hypertension Medications             hydrALAZINE (APRESOLINE) 50 MG tablet Take 1 tablet (50 mg total) by mouth every 12 (twelve) hours.    valsartan-hydrochlorothiazide (DIOVAN-HCT) 320-25 mg per tablet Take 1 tablet by mouth once daily.

## 2018-03-27 ENCOUNTER — PATIENT OUTREACH (OUTPATIENT)
Dept: OTHER | Facility: OTHER | Age: 78
End: 2018-03-27

## 2018-03-27 DIAGNOSIS — I10 SEVERE HYPERTENSION: Primary | ICD-10-CM

## 2018-03-27 NOTE — LETTER
Altagracia Cabezas PharmD  [address] [place] [address]     Dear Emilia Alan,    Welcome to the [facility]pert[facility]!         My name is Altagracia Cabezas PharmD and I will be your dedicated Digital Medicine clinician. As an expert in medication management, I will help manage your medications and identify lifestyle changes to improve blood pressure control. Together, we will work to improve your overall health.     What we expect from YOU:    You will need to take blood pressure readings multiple times a week and no less than one reading per week.   It is important that you take your measurements at different times during the day, when possible.     What you should expect from your Digital Medicine Care Team:   I will provide you with education about high blood pressure, including lifestyle changes that could help you to control your blood pressure.   I will review your weekly readings and provide you with monthly blood pressure progress reports after you have been in the program for more than 30 days.   I will send monthly progress reports on your blood pressure control to your physician so they can follow along with your progress as well.    You will be able to reach me by phone at  or through your [facility] account by clicking my name under Care Team on the right side of the home screen.    I look forward to working with you to achieve your blood pressure goals!    Sincerely,    Altagracia Cabezas PharmD  Your Personal Clinical Pharmacist    Please visit [email or web address] to learn more about high blood pressure and what you can do lower your blood pressure.                                                                                                    Emilia Alan  [address] [facility] [id number]

## 2018-03-27 NOTE — PROGRESS NOTES
HPI:  Called patient for follow-up. Overall doing well. Notes no change in blood pressure despite reported adherence to medication regimen. Endorses use of two salt free seasonings since last discussion however, the recipes were damaged in the mail. She denies s/s of hypertension at this time.     Last 5 Patient Entered Readings                                      Current 30 Day Average: 159/78     Recent Readings 4/8/2018 4/7/2018 4/6/2018 4/4/2018 4/3/2018    SBP (mmHg) 162 173 165 138 153    DBP (mmHg) 70 82 81 75 81    Pulse 72 71 72 64 75        Assessment:  Per 30-day average blood pressure is not well controlled. Will optimize medication regimen at this time.     Plan:  · Start amlodipine 5 mg. Patient prefers to take in the afternoon vs hydralazine.   · Will resend recipes and meal plan to patient (DM & HTN friendly).   · Will continue to monitor and follow-up in 2 weeks to assess BP readings and medication regimen.     Current medication regimen:     Hypertension Medications             amLODIPine (NORVASC) 5 MG tablet Take 1 tablet (5 mg total) by mouth once daily.    hydrALAZINE (APRESOLINE) 50 MG tablet Take 1 tablet (50 mg total) by mouth every 12 (twelve) hours.    valsartan-hydrochlorothiazide (DIOVAN-HCT) 320-25 mg per tablet Take 1 tablet by mouth once daily.

## 2018-04-06 ENCOUNTER — PATIENT OUTREACH (OUTPATIENT)
Dept: OTHER | Facility: OTHER | Age: 78
End: 2018-04-06

## 2018-04-06 NOTE — PROGRESS NOTES
"Last 5 Patient Entered Readings                                      Current 30 Day Average: 159/78     Recent Readings 4/6/2018 4/4/2018 4/3/2018 4/1/2018 3/31/2018    SBP (mmHg) 165 138 153 169 168    DBP (mmHg) 81 75 81 88 80    Pulse 72 64 75 72 74        4/6- Accidental encounter. Will wait to call per clinical pharmacist request. Will call again on 4/20.    Patient expressed her frustration in not knowing why her BP readings are all over the place. Went over all the foods she has eaten. Patient reports keeping track of all her meals. Most meals are bread and meat based. Encouraged patient to read food labels and increase fruit and vegetable intake, decrease meat and cheese consumption. She states she will keep track of the amount of sodium in her food journal.    4/25 - Sent "snail mail" of [employer] low sodium bread.     Hypertension Digital Medicine Program (HDMP): Health  Follow Up    Lifestyle Modifications:    1.Low sodium diet: no Patient states she is cooking everything in the oven. She states not consuming foods high in sodium. Patient reports not smoking or drinking alcoholic beverages. Patient states she does not feel anxious when taking readings.  Patient states consuming beef and pork often. Patient states not consuming foods high in salt. Patient reports consuming 1 cup of coffee in the morning. Patient states consuming an egg on a piece of toast and slice of cheese with an orange. Patient states eating out maybe 1x-2x/month. She states not adding any seasoning to her meals except [employer] PATEL. Patient states she may consume sushi her daughter brings. Patient states not adding soy sauce to her sushi. Patient states consuming around 2-4 slices of bread/day. She states not consuming items out of a can-everything is fresh or frozen. Patient confirmed she is taking her BP correctly (i.e. Feet on the floor, cuff is not too tight, not watching tv,) and is implementing correct BP timing (i.e. Waiting at " "least 45 minutes after normal ADL's). Patient states not adding table salt to any meals. Patient states making a sandwich out of the meat made previously and adding a piece of cheese to it. Patient states the bread she is consuming is 190 mg per slice. Patient states not knowing the bread is high is sodium. Informed patient cheese may be high in sodium.  Informed patient to heavily monitor and track the food labels and purchase items that are 140 mg or less of sodium.    2.Physical activity: no Patient states "my activity is at a zero point." She states having a brace on both legs. She states having polio as a child. She states the most activity she can do is walking around the house. Encouraged patient to walk around 5-10 minutes every hour to work towards a goal of walking 30 minutes a day.      3.Hypotension/Hypertension symptoms: no Patient reports no abnormal signs or symptoms with BP medication.  Frequency/Alleviating factors/Precipitating factors, etc.     4.Patient has been compliant with the medication regimen.     Follow up with Mrs. Croninll Silvia Alan completed. No further questions or concerns. I will follow up in a few weeks to assess progress.       "

## 2018-04-10 RX ORDER — AMLODIPINE BESYLATE 5 MG/1
5 TABLET ORAL DAILY
Qty: 30 TABLET | Refills: 3 | Status: SHIPPED | OUTPATIENT
Start: 2018-04-10 | End: 2018-06-18 | Stop reason: DRUGHIGH

## 2018-04-11 ENCOUNTER — OFFICE VISIT (OUTPATIENT)
Dept: PHYSICAL MEDICINE AND REHAB | Facility: CLINIC | Age: 78
End: 2018-04-11
Payer: MEDICARE

## 2018-04-11 ENCOUNTER — OFFICE VISIT (OUTPATIENT)
Dept: PODIATRY | Facility: CLINIC | Age: 78
End: 2018-04-11
Payer: MEDICARE

## 2018-04-11 VITALS
BODY MASS INDEX: 32.74 KG/M2 | SYSTOLIC BLOOD PRESSURE: 190 MMHG | DIASTOLIC BLOOD PRESSURE: 91 MMHG | HEART RATE: 87 BPM | WEIGHT: 166.75 LBS | HEIGHT: 60 IN

## 2018-04-11 VITALS
HEART RATE: 81 BPM | BODY MASS INDEX: 32.59 KG/M2 | HEIGHT: 60 IN | RESPIRATION RATE: 18 BRPM | DIASTOLIC BLOOD PRESSURE: 85 MMHG | SYSTOLIC BLOOD PRESSURE: 179 MMHG | WEIGHT: 166 LBS

## 2018-04-11 DIAGNOSIS — E08.44 DIABETIC AMYOTROPHY ASSOCIATED WITH DIABETES MELLITUS DUE TO UNDERLYING CONDITION: ICD-10-CM

## 2018-04-11 DIAGNOSIS — M47.26 OSTEOARTHRITIS OF SPINE WITH RADICULOPATHY, LUMBAR REGION: ICD-10-CM
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
## 2018-04-11 DIAGNOSIS — E11.42 DIABETIC POLYNEUROPATHY ASSOCIATED WITH TYPE 2 DIABETES MELLITUS: ICD-10-CM

## 2018-04-11 DIAGNOSIS — M48.062 SPINAL STENOSIS OF LUMBAR REGION WITH NEUROGENIC CLAUDICATION: ICD-10-CM

## 2018-04-11 DIAGNOSIS — M17.11 PRIMARY OSTEOARTHRITIS OF RIGHT KNEE: Primary | ICD-10-CM

## 2018-04-11 DIAGNOSIS — G14 POST-POLIO SYNDROME: Primary | ICD-10-CM

## 2018-04-11 DIAGNOSIS — M54.16 LUMBAR RADICULOPATHY: ICD-10-CM

## 2018-04-11 DIAGNOSIS — M54.16 LEFT LUMBAR RADICULOPATHY: ICD-10-CM

## 2018-04-11 DIAGNOSIS — E11.40 TYPE 2 DIABETES MELLITUS WITH DIABETIC NEUROPATHY, WITHOUT LONG-TERM CURRENT USE OF INSULIN: ICD-10-CM

## 2018-04-11 DIAGNOSIS — M25.561 CHRONIC PAIN OF RIGHT KNEE: ICD-10-CM

## 2018-04-11 DIAGNOSIS — M81.0 AGE-RELATED OSTEOPOROSIS WITHOUT CURRENT PATHOLOGICAL FRACTURE: ICD-10-CM

## 2018-04-11 DIAGNOSIS — L84 CORN OR CALLUS: ICD-10-CM

## 2018-04-11 DIAGNOSIS — G60.9 IDIOPATHIC PERIPHERAL NEUROPATHY: ICD-10-CM

## 2018-04-11 DIAGNOSIS — B35.1 ONYCHOMYCOSIS DUE TO DERMATOPHYTE: ICD-10-CM

## 2018-04-11 DIAGNOSIS — M47.16 LUMBAR SPONDYLOSIS WITH MYELOPATHY: ICD-10-CM

## 2018-04-11 DIAGNOSIS — R26.9 GAIT DISORDER: ICD-10-CM

## 2018-04-11 DIAGNOSIS — G89.29 CHRONIC BILATERAL LOW BACK PAIN WITH SCIATICA, SCIATICA LATERALITY UNSPECIFIED: ICD-10-CM

## 2018-04-11 DIAGNOSIS — G89.29 CHRONIC PAIN OF RIGHT KNEE: ICD-10-CM

## 2018-04-11 DIAGNOSIS — G14 POST POLIOMYELITIS SYNDROME: ICD-10-CM

## 2018-04-11 DIAGNOSIS — M54.40 CHRONIC BILATERAL LOW BACK PAIN WITH SCIATICA, SCIATICA LATERALITY UNSPECIFIED: ICD-10-CM

## 2018-04-11 DIAGNOSIS — G82.20 PARAPARESIS OF BOTH LOWER LIMBS: ICD-10-CM

## 2018-04-11 DIAGNOSIS — Z86.12 HISTORY OF POLIOMYELITIS: ICD-10-CM

## 2018-04-11 PROCEDURE — 99999 PR PBB SHADOW E&M-EST. PATIENT-LVL III: CPT | Mod: PBBFAC,,, | Performed by: PODIATRIST

## 2018-04-11 PROCEDURE — 99999 PR PBB SHADOW E&M-EST. PATIENT-LVL III: CPT | Mod: PBBFAC,,, | Performed by: PHYSICAL MEDICINE & REHABILITATION

## 2018-04-11 PROCEDURE — 11056 PARNG/CUTG B9 HYPRKR LES 2-4: CPT | Mod: Q9,S$GLB,, | Performed by: PODIATRIST

## 2018-04-11 PROCEDURE — 99499 UNLISTED E&M SERVICE: CPT | Mod: S$GLB,,, | Performed by: PHYSICAL MEDICINE & REHABILITATION

## 2018-04-11 PROCEDURE — 3078F DIAST BP <80 MM HG: CPT | Mod: CPTII,S$GLB,, | Performed by: PHYSICAL MEDICINE & REHABILITATION

## 2018-04-11 PROCEDURE — 11721 DEBRIDE NAIL 6 OR MORE: CPT | Mod: 59,Q9,S$GLB, | Performed by: PODIATRIST

## 2018-04-11 PROCEDURE — 3077F SYST BP >= 140 MM HG: CPT | Mod: CPTII,S$GLB,, | Performed by: PHYSICAL MEDICINE & REHABILITATION

## 2018-04-11 PROCEDURE — 99499 UNLISTED E&M SERVICE: CPT | Mod: S$GLB,,, | Performed by: PODIATRIST

## 2018-04-11 PROCEDURE — 99214 OFFICE O/P EST MOD 30 MIN: CPT | Mod: S$GLB,,, | Performed by: PHYSICAL MEDICINE & REHABILITATION

## 2018-04-11 RX ORDER — HYDROCODONE BITARTRATE AND ACETAMINOPHEN 10; 325 MG/1; MG/1
1 TABLET ORAL EVERY 6 HOURS PRN
Qty: 120 TABLET | Refills: 0 | Status: SHIPPED | OUTPATIENT
Start: 2018-04-11 | End: 2018-05-11

## 2018-04-11 RX ORDER — DICLOFENAC SODIUM 10 MG/G
GEL TOPICAL
Qty: 500 G | Refills: 4 | Status: SHIPPED | OUTPATIENT
Start: 2018-04-11 | End: 2019-06-13

## 2018-04-11 RX ORDER — ENALAPRIL MALEATE 20 MG/1
TABLET ORAL
COMMUNITY
Start: 2018-03-29 | End: 2018-05-14

## 2018-04-11 RX ORDER — HYDROCODONE BITARTRATE AND ACETAMINOPHEN 10; 325 MG/1; MG/1
1 TABLET ORAL EVERY 6 HOURS PRN
Qty: 120 TABLET | Refills: 0 | Status: SHIPPED | OUTPATIENT
Start: 2018-06-11 | End: 2018-07-19 | Stop reason: SDUPTHER

## 2018-04-11 RX ORDER — HYDROCODONE BITARTRATE AND ACETAMINOPHEN 10; 325 MG/1; MG/1
1 TABLET ORAL EVERY 6 HOURS PRN
Qty: 120 TABLET | Refills: 0 | Status: SHIPPED | OUTPATIENT
Start: 2018-05-11 | End: 2018-06-10

## 2018-04-11 NOTE — PROGRESS NOTES
Subjective:       Patient ID: Emilia Alan is a 77 y.o. female.    Chief Complaint: Back Pain    Back Pain   Associated symptoms include leg pain. Pertinent negatives include no abdominal pain, chest pain, fever, numbness or weakness. Headaches:     Knee Pain    Pertinent negatives include no numbness.   Spine Injury   Associated symptoms include neck pain. Pertinent negatives include no abdominal pain, arthralgias, chest pain, chills, fatigue, fever, joint swelling, myalgias, numbness, rash or weakness. Headaches:     Extremity Weakness    Pertinent negatives include no fever or numbness.   Neck Pain    Associated symptoms include leg pain. Pertinent negatives include no chest pain, fever, numbness, trouble swallowing or weakness. Headaches:     Leg Pain    Pertinent negatives include no numbness.     Subjective:       Patient ID: Emilia Alan is a 77 y.o. female.    Chief Complaint: Back Pain    Extremity Weakness    Pertinent negatives include no fever or numbness.   Back Pain   Associated symptoms include leg pain. Pertinent negatives include no abdominal pain, chest pain, fever, numbness or weakness. Headaches:     Leg Pain    Pertinent negatives include no numbness.   Knee Pain    Pertinent negatives include no numbness.     Ms. 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  She has B LE weakness, paraparesis, secondary to severe Lumbar spinal stenosis at L3-4, and L4-5, with  Leg length discrepancy ( left is shorter than right),with severe DJD of both knees , genu valgus in R knee.  LCV  01/11/18.  She is here for follow up visit for back pain, leg weakness, functional decline, and chronic pain management.   She has paraparesis, a right leg is weaker than Left leg- that is weak in ankle.   She cannot actively  Right LE, still walks short distances, but majority of time she spent sitting.    Today, she  states that she is slowing down, she walks slower, and can do less than in past. She also complains about back pain.   Current back pain is 7, worst pain is 9-10/10 while upright and walking.    Pain is localized across lower back and in upper spine.    Back pain is off/on, present mainly during day time, sharp, severe ,stabbing pain.  Pain is worse with lying or sitting and getting up from chair.   With that pain she is afraid she will fall down, since she gets more weakness in Right leg.   Complains also about right knee pain, that is weak, and goes backwards   during walking.   Patient takes  Hydrocodone 10/325 mg, takes 3-4 x/day, and Neurontin 600 mg, po QID, tolerates it well, and she knows that both medications are helping.  Patient refuses neurosurgical evaluation, and  ASHLEY to back.   The patient has had gradual worsening of her gait over the last few years.   She was prescribed a left AFO ( that she wears).   She cannot tolerate swedish knee cage as well.   She also has a neoprene sleee brace , to stabilized knee, and to take pressure off bone.   Right hinge knee brace that is all warned up,since she wears it all the time.  She continues to complain of progressive bilateral lower extremity weakness.   She reports frequent falls, especially in the last 3-6 months.   The patient lives in a single-silvia home with a ramp access.   She is independent with her dressing, feeding and grooming.   She is also independent with toileting and bathing using a shower chair.   She is able to ambulate with single cane, RW.  She can walk about 20 feet, but has to stop frequently.   She does better with a Rollator walker.   She has manual wheel chair that is bulky, and she cannot propel, RW with seat, cane and RW.   She is restricted by lower extremity weakness, especially on the right side as noted above, she has frequent falls.  She did not sustain any significant injuries.   She recieved a new SCOOTER, and it is helping  her greatly.  She uses it for linger distances, but tries to walk as much as possible leslye. Inside the house, with her braces, and  RW with seat.    She is here for follow up, and chronic pain management with opiates.    Past Medical History:   Diagnosis Date    Allergy     Anemia 10/20/2014    Arthritis     Atherosclerosis of artery of right lower extremity: see xray 07    Diabetes mellitus     Diabetic neuropathy 2012    Gait disorder 2012    High cholesterol     History of poliomyelitis 2012    Hyperlipidemia 2013    Hypertension     Obstructive sleep apnea syndrome: dx 2008 needs CPAP 11 2017    Osteopenia     Osteoporosis, unspecified 2014    Other specified anemias 2015    Post poliomyelitis syndrome 2012    Sleep apnea     Type II or unspecified type diabetes mellitus without mention of complication, not stated as uncontrolled 2015    Unspecified essential hypertension 2015       Past Surgical History:   Procedure Laterality Date    CATARACT EXTRACTION       SECTION      CHOLECYSTECTOMY      COLONOSCOPY N/A 2017    Procedure: COLONOSCOPY;  Surgeon: Karen Lebron MD;  Location: [facility] ([address] [place] [address]);  Service: Endoscopy;  Laterality: N/A;    EYE SURGERY      FRACTURE SURGERY         Family History   Problem Relation Age of Onset    Diabetes Father     Heart disease Father     Heart attack Father     Heart disease Brother     No Known Problems Daughter     Diabetes Son     Heart disease Brother     Diabetes Daughter     Diabetes Daughter     Cataracts Neg Hx     Glaucoma Neg Hx     Hypertension Neg Hx     Cancer Neg Hx     Blindness Neg Hx     Amblyopia Neg Hx     Strabismus Neg Hx     Retinal detachment Neg Hx     Macular degeneration Neg Hx     Melanoma Neg Hx        Social History     Social History    Marital status:      Spouse name: N/A    Number of children: 4    Years of  education: N/A     Social History Main Topics    Smoking status: Never Smoker    Smokeless tobacco: Never Used    Alcohol use No    Drug use: No    Sexual activity: No     Other Topics Concern    Are You Pregnant Or Think You May Be? No     Social History Narrative    No narrative on file       Current Outpatient Prescriptions   Medication Sig Dispense Refill    amLODIPine (NORVASC) 5 MG tablet Take 1 tablet (5 mg total) by mouth once daily. 30 tablet 3    aspirin (ECOTRIN) 81 MG EC tablet Take 1 tablet (81 mg total) by mouth once daily. 30 tablet 12    blood sugar diagnostic Strp 1 strip by Misc.(Non-Drug; Combo Route) route 2 (two) times daily. TRUE RESULT SYSTEM 180 strip 4    blood-glucose meter (TRUERESULT BLOOD GLUCOSE SYSTM) kit Use as instructed 1 each 0    calcium-vitamin D3-vitamin K (VIACTIV) 500-100-40 mg-unit-mcg Chew Take 2 each by mouth.      desloratadine (CLARINEX) 5 mg tablet Take 5 mg by mouth once daily.      diclofenac (VOLTAREN) 75 MG EC tablet TAKE 1 TABLET(75 MG) BY MOUTH TWICE DAILY AS NEEDED. STOP IF ANY STOMACH IRRITATION 180 tablet 0    diclofenac sodium (VOLTAREN) 1 % Gel Apply 4 gm to both knees 3x/day. 500 g 4    docusate sodium (COLACE) 50 MG capsule Take 1 capsule (50 mg total) by mouth 2 (two) times daily as needed for Constipation.      enalapril (VASOTEC) 20 MG tablet       gabapentin (NEURONTIN) 600 MG tablet Take 1 tablet (600 mg total) by mouth 4 (four) times daily with meals and nightly. 360 tablet 1    glipiZIDE (GLUCOTROL) 5 MG tablet Take 1 tablet (5 mg total) by mouth once daily. 90 tablet 3    hydrALAZINE (APRESOLINE) 50 MG tablet Take 1 tablet (50 mg total) by mouth every 12 (twelve) hours. 180 tablet 3    hydrocodone-acetaminophen 10-325mg (NORCO)  mg Tab Take 1 tablet by mouth every 6 (six) hours as needed for Pain (pain). 120 tablet 0    [START ON 5/11/2018] hydrocodone-acetaminophen 10-325mg (NORCO)  mg Tab Take 1 tablet by mouth  every 6 (six) hours as needed for Pain (pain). 120 tablet 0    [START ON 6/11/2018] hydrocodone-acetaminophen 10-325mg (NORCO)  mg Tab Take 1 tablet by mouth every 6 (six) hours as needed for Pain (pain). 120 tablet 0    lancets Misc TEST 2 X DAILY PROSPER RESULT SYSTEM 180 each 3    metFORMIN (GLUCOPHAGE-XR) 500 MG 24 hr tablet Take 1 tablet (500 mg total) by mouth once daily. 90 tablet 3    multivitamin (THERAGRAN) per tablet Take 1 tablet by mouth once daily.       ondansetron (ZOFRAN-ODT) 4 MG TbDL Take 1 tablet (4 mg total) by mouth every 8 (eight) hours as needed. 12 tablet 0    oxybutynin (DITROPAN) 5 MG Tab Take 1 tablet (5 mg total) by mouth 2 (two) times daily. 180 tablet 3    pravastatin (PRAVACHOL) 40 MG tablet Take 1.5 tablets (60 mg total) by mouth nightly. 135 tablet 3    TRUEPLUS LANCETS 33 gauge Misc USE BID  3    valsartan-hydrochlorothiazide (DIOVAN-HCT) 320-25 mg per tablet Take 1 tablet by mouth once daily. 30 tablet 3     No current facility-administered medications for this visit.        Review of patient's allergies indicates:  No Known Allergies    Review of Systems   Constitutional: Negative for appetite change, chills, fatigue, fever and unexpected weight change.   HENT: Negative for drooling, trouble swallowing and voice change.    Eyes: Negative for pain and visual disturbance.   Respiratory: Negative for shortness of breath and wheezing.    Cardiovascular: Negative for chest pain and palpitations.   Gastrointestinal: Negative for abdominal distention, abdominal pain, constipation and diarrhea.   Genitourinary: Negative for difficulty urinating.   Musculoskeletal: Positive for back pain, extremity weakness and neck pain. Negative for arthralgias, gait problem, joint swelling, myalgias and neck stiffness.               Skin: Negative for color change and rash.   Neurological: Negative for dizziness, facial asymmetry, speech difficulty, weakness, light-headedness and numbness.  Headaches:     Hematological: Negative for adenopathy.   Psychiatric/Behavioral: Negative for behavioral problems, confusion and sleep disturbance. The patient is not nervous/anxious.        Objective:      Physical Exam    Constitutional: She is oriented to person, place, and time.   She appears well-developed and well-nourished.   HENT:   Head: Normocephalic.   Eyes: EOM are normal.   Neck: Normal range of motion.   Cardiovascular: Normal rate, regular rhythm and normal heart sounds.   Pulmonary/Chest: Breath sounds normal.   Musculoskeletal: Normal range of motion.   BUE:  ROM:full.  Strength: 5/5 at shoulders, elbows & hands.  Sensation to pinprick: intact  BLE: ROM:full.  Strength:   RLE: HF 0/5, KE 0/5,   Ankle DF 2-, Ankle PF 3  LLE:   HF 3-, KE 3-.  Ankle DF 1,  Ankle PF 3  Leg length discrepancy ( left is shorter than right), wears Left AFO.   Sensation to pinprick: intact .   DTR: decreased.       Xray of Right knee ( 2014)  Showed:  Standing AP knees and lateral of the right knee and merchant view of both knees are submitted.    Advanced degenerative change seen in the tricompartmental areas of the right knee.    Left knee shows mild degenerative change.  Both patellas show significant lateral deviation    MRI of Lumbar spine  ( 2015) ;  L2-L3:There is a circumferential disk bulge with moderate bilateral facet osseous hypertrophy and ligamentum flavum buckling. This results and mild narrowing of the spinal canal. The bilateral neural foramen remain patent.  L3-L4: There is a circumferential disk bulge with left paracentral disk protrusion. This is associated with severe bilateral facet osseous hypertrophy, bilateral facet edema, and ligamentum flavum buckling.   These findings result in severe narrowing of the spinal canal and near complete obliteration of the left neural foramen.  The right neural foramen is moderately narrowed as well.  L4-L5: There is a circumferential disk bulge with superimposed  central disk protrusion. This is associated with severe bilateral facet osseous hypertrophy and ligamentum flavum buckling.   These findings result in severe narrowing of the spinal canal and bilateral neural foramina, left greater than right.     Assessment:       1. Primary osteoarthritis of right knee    2. Spinal stenosis of lumbar region with neurogenic claudication    3. Diabetic polyneuropathy associated with type 2 diabetes mellitus    4. Lumbar radiculopathy    5. Paraparesis of both lower limbs    6. Post poliomyelitis syndrome    7. Osteoarthritis of spine with radiculopathy, lumbar region    8. History of poliomyelitis    9. Lumbar spondylosis with myelopathy    10. Gait disorder    11. 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 Type 2 diabetes mellitus with diabetic neuropathy, without long-term current use of insulin    13. Chronic bilateral low back pain with sciatica, sciatica laterality unspecified    14. Fall, sequela    15. Left lumbar radiculopathy    16. Chronic pain of right knee    17. Osteoporosis without current pathological fracture: worse on fosamax 5/16- Reclast 8/16       Plan:        Primary osteoarthritis of right knee  -     hydrocodone-acetaminophen 10-325mg (NORCO)  mg Tab; Take 1 tablet by mouth every 6 (six) hours as needed for Pain (pain).  Dispense: 120 tablet; Refill: 0  -     hydrocodone-acetaminophen 10-325mg (NORCO)  mg Tab; Take 1 tablet by mouth every 6 (six) hours as needed for Pain (pain).  Dispense: 120 tablet; Refill: 0  -     hydrocodone-acetaminophen 10-325mg (NORCO)  mg Tab; Take 1 tablet by mouth every 6 (six) hours as needed for Pain (pain).  Dispense: 120 tablet; Refill: 0  -     diclofenac sodium (VOLTAREN) 1 % Gel; Apply 4 gm to both knees 3x/day.  Dispense: 500 g; Refill: 4    Spinal stenosis of lumbar region with neurogenic claudication  -     hydrocodone-acetaminophen 10-325mg (NORCO)  mg  Tab; Take 1 tablet by mouth every 6 (six) hours as needed for Pain (pain).  Dispense: 120 tablet; Refill: 0  -     hydrocodone-acetaminophen 10-325mg (NORCO)  mg Tab; Take 1 tablet by mouth every 6 (six) hours as needed for Pain (pain).  Dispense: 120 tablet; Refill: 0  -     hydrocodone-acetaminophen 10-325mg (NORCO)  mg Tab; Take 1 tablet by mouth every 6 (six) hours as needed for Pain (pain).  Dispense: 120 tablet; Refill: 0  -     diclofenac sodium (VOLTAREN) 1 % Gel; Apply 4 gm to both knees 3x/day.  Dispense: 500 g; Refill: 4    Diabetic polyneuropathy associated with type 2 diabetes mellitus  -     hydrocodone-acetaminophen 10-325mg (NORCO)  mg Tab; Take 1 tablet by mouth every 6 (six) hours as needed for Pain (pain).  Dispense: 120 tablet; Refill: 0  -     hydrocodone-acetaminophen 10-325mg (NORCO)  mg Tab; Take 1 tablet by mouth every 6 (six) hours as needed for Pain (pain).  Dispense: 120 tablet; Refill: 0  -     hydrocodone-acetaminophen 10-325mg (NORCO)  mg Tab; Take 1 tablet by mouth every 6 (six) hours as needed for Pain (pain).  Dispense: 120 tablet; Refill: 0  -     diclofenac sodium (VOLTAREN) 1 % Gel; Apply 4 gm to both knees 3x/day.  Dispense: 500 g; Refill: 4    Lumbar radiculopathy  -     hydrocodone-acetaminophen 10-325mg (NORCO)  mg Tab; Take 1 tablet by mouth every 6 (six) hours as needed for Pain (pain).  Dispense: 120 tablet; Refill: 0  -     hydrocodone-acetaminophen 10-325mg (NORCO)  mg Tab; Take 1 tablet by mouth every 6 (six) hours as needed for Pain (pain).  Dispense: 120 tablet; Refill: 0  -     hydrocodone-acetaminophen 10-325mg (NORCO)  mg Tab; Take 1 tablet by mouth every 6 (six) hours as needed for Pain (pain).  Dispense: 120 tablet; Refill: 0  -     diclofenac sodium (VOLTAREN) 1 % Gel; Apply 4 gm to both knees 3x/day.  Dispense: 500 g; Refill: 4    Paraparesis of both lower limbs  -     hydrocodone-acetaminophen 10-325mg (NORCO)   mg Tab; Take 1 tablet by mouth every 6 (six) hours as needed for Pain (pain).  Dispense: 120 tablet; Refill: 0  -     hydrocodone-acetaminophen 10-325mg (NORCO)  mg Tab; Take 1 tablet by mouth every 6 (six) hours as needed for Pain (pain).  Dispense: 120 tablet; Refill: 0  -     hydrocodone-acetaminophen 10-325mg (NORCO)  mg Tab; Take 1 tablet by mouth every 6 (six) hours as needed for Pain (pain).  Dispense: 120 tablet; Refill: 0  -     diclofenac sodium (VOLTAREN) 1 % Gel; Apply 4 gm to both knees 3x/day.  Dispense: 500 g; Refill: 4    Post poliomyelitis syndrome  -     hydrocodone-acetaminophen 10-325mg (NORCO)  mg Tab; Take 1 tablet by mouth every 6 (six) hours as needed for Pain (pain).  Dispense: 120 tablet; Refill: 0  -     hydrocodone-acetaminophen 10-325mg (NORCO)  mg Tab; Take 1 tablet by mouth every 6 (six) hours as needed for Pain (pain).  Dispense: 120 tablet; Refill: 0  -     hydrocodone-acetaminophen 10-325mg (NORCO)  mg Tab; Take 1 tablet by mouth every 6 (six) hours as needed for Pain (pain).  Dispense: 120 tablet; Refill: 0  -     diclofenac sodium (VOLTAREN) 1 % Gel; Apply 4 gm to both knees 3x/day.  Dispense: 500 g; Refill: 4    Osteoarthritis of spine with radiculopathy, lumbar region  -     hydrocodone-acetaminophen 10-325mg (NORCO)  mg Tab; Take 1 tablet by mouth every 6 (six) hours as needed for Pain (pain).  Dispense: 120 tablet; Refill: 0  -     hydrocodone-acetaminophen 10-325mg (NORCO)  mg Tab; Take 1 tablet by mouth every 6 (six) hours as needed for Pain (pain).  Dispense: 120 tablet; Refill: 0  -     hydrocodone-acetaminophen 10-325mg (NORCO)  mg Tab; Take 1 tablet by mouth every 6 (six) hours as needed for Pain (pain).  Dispense: 120 tablet; Refill: 0  -     diclofenac sodium (VOLTAREN) 1 % Gel; Apply 4 gm to both knees 3x/day.  Dispense: 500 g; Refill: 4    History of poliomyelitis  -     hydrocodone-acetaminophen 10-325mg (NORCO)   mg Tab; Take 1 tablet by mouth every 6 (six) hours as needed for Pain (pain).  Dispense: 120 tablet; Refill: 0  -     hydrocodone-acetaminophen 10-325mg (NORCO)  mg Tab; Take 1 tablet by mouth every 6 (six) hours as needed for Pain (pain).  Dispense: 120 tablet; Refill: 0  -     hydrocodone-acetaminophen 10-325mg (NORCO)  mg Tab; Take 1 tablet by mouth every 6 (six) hours as needed for Pain (pain).  Dispense: 120 tablet; Refill: 0  -     diclofenac sodium (VOLTAREN) 1 % Gel; Apply 4 gm to both knees 3x/day.  Dispense: 500 g; Refill: 4    Lumbar spondylosis with myelopathy  -     hydrocodone-acetaminophen 10-325mg (NORCO)  mg Tab; Take 1 tablet by mouth every 6 (six) hours as needed for Pain (pain).  Dispense: 120 tablet; Refill: 0  -     hydrocodone-acetaminophen 10-325mg (NORCO)  mg Tab; Take 1 tablet by mouth every 6 (six) hours as needed for Pain (pain).  Dispense: 120 tablet; Refill: 0  -     hydrocodone-acetaminophen 10-325mg (NORCO)  mg Tab; Take 1 tablet by mouth every 6 (six) hours as needed for Pain (pain).  Dispense: 120 tablet; Refill: 0  -     diclofenac sodium (VOLTAREN) 1 % Gel; Apply 4 gm to both knees 3x/day.  Dispense: 500 g; Refill: 4    Gait disorder  -     hydrocodone-acetaminophen 10-325mg (NORCO)  mg Tab; Take 1 tablet by mouth every 6 (six) hours as needed for Pain (pain).  Dispense: 120 tablet; Refill: 0  -     hydrocodone-acetaminophen 10-325mg (NORCO)  mg Tab; Take 1 tablet by mouth every 6 (six) hours as needed for Pain (pain).  Dispense: 120 tablet; Refill: 0  -     hydrocodone-acetaminophen 10-325mg (NORCO)  mg Tab; Take 1 tablet by mouth every 6 (six) hours as needed for Pain (pain).  Dispense: 120 tablet; Refill: 0  -     diclofenac sodium (VOLTAREN) 1 % Gel; Apply 4 gm to both knees 3x/day.  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  Dispense: 120 tablet; Refill: 0  -     hydrocodone-acetaminophen 10-325mg (NORCO)  mg Tab; Take 1 tablet by mouth every 6 (six) hours as needed for Pain (pain).  Dispense: 120 tablet; Refill: 0  -     hydrocodone-acetaminophen 10-325mg (NORCO)  mg Tab; Take 1 tablet by mouth every 6 (six) hours as needed for Pain (pain).  Dispense: 120 tablet; Refill: 0  -     diclofenac sodium (VOLTAREN) 1 % Gel; Apply 4 gm to both knees 3x/day.  Dispense: 500 g; Refill: 4    Type 2 diabetes mellitus with diabetic neuropathy, without long-term current use of insulin  -     hydrocodone-acetaminophen 10-325mg (NORCO)  mg Tab; Take 1 tablet by mouth every 6 (six) hours as needed for Pain (pain).  Dispense: 120 tablet; Refill: 0  -     hydrocodone-acetaminophen 10-325mg (NORCO)  mg Tab; Take 1 tablet by mouth every 6 (six) hours as needed for Pain (pain).  Dispense: 120 tablet; Refill: 0  -     hydrocodone-acetaminophen 10-325mg (NORCO)  mg Tab; Take 1 tablet by mouth every 6 (six) hours as needed for Pain (pain).  Dispense: 120 tablet; Refill: 0  -     diclofenac sodium (VOLTAREN) 1 % Gel; Apply 4 gm to both knees 3x/day.  Dispense: 500 g; Refill: 4    Chronic bilateral low back pain with sciatica, sciatica laterality unspecified  -     hydrocodone-acetaminophen 10-325mg (NORCO)  mg Tab; Take 1 tablet by mouth every 6 (six) hours as needed for Pain (pain).  Dispense: 120 tablet; Refill: 0  -     hydrocodone-acetaminophen 10-325mg (NORCO)  mg Tab; Take 1 tablet by mouth every 6 (six) hours as needed for Pain (pain).  Dispense: 120 tablet; Refill: 0  -     hydrocodone-acetaminophen 10-325mg (NORCO)  mg Tab; Take 1 tablet by mouth every 6 (six) hours as needed for Pain (pain).  Dispense: 120 tablet; Refill: 0  -     diclofenac sodium (VOLTAREN) 1 % Gel; Apply 4 gm  to both knees 3x/day.  Dispense: 500 g; Refill: 4    Fall, sequela  -     hydrocodone-acetaminophen 10-325mg (NORCO)  mg Tab; Take 1 tablet by mouth every 6 (six) hours as needed for Pain (pain).  Dispense: 120 tablet; Refill: 0  -     hydrocodone-acetaminophen 10-325mg (NORCO)  mg Tab; Take 1 tablet by mouth every 6 (six) hours as needed for Pain (pain).  Dispense: 120 tablet; Refill: 0  -     hydrocodone-acetaminophen 10-325mg (NORCO)  mg Tab; Take 1 tablet by mouth every 6 (six) hours as needed for Pain (pain).  Dispense: 120 tablet; Refill: 0  -     diclofenac sodium (VOLTAREN) 1 % Gel; Apply 4 gm to both knees 3x/day.  Dispense: 500 g; Refill: 4    Left lumbar radiculopathy  -     hydrocodone-acetaminophen 10-325mg (NORCO)  mg Tab; Take 1 tablet by mouth every 6 (six) hours as needed for Pain (pain).  Dispense: 120 tablet; Refill: 0  -     hydrocodone-acetaminophen 10-325mg (NORCO)  mg Tab; Take 1 tablet by mouth every 6 (six) hours as needed for Pain (pain).  Dispense: 120 tablet; Refill: 0  -     hydrocodone-acetaminophen 10-325mg (NORCO)  mg Tab; Take 1 tablet by mouth every 6 (six) hours as needed for Pain (pain).  Dispense: 120 tablet; Refill: 0  -     diclofenac sodium (VOLTAREN) 1 % Gel; Apply 4 gm to both knees 3x/day.  Dispense: 500 g; Refill: 4    Chronic pain of right knee  -     hydrocodone-acetaminophen 10-325mg (NORCO)  mg Tab; Take 1 tablet by mouth every 6 (six) hours as needed for Pain (pain).  Dispense: 120 tablet; Refill: 0  -     hydrocodone-acetaminophen 10-325mg (NORCO)  mg Tab; Take 1 tablet by mouth every 6 (six) hours as needed for Pain (pain).  Dispense: 120 tablet; Refill: 0  -     hydrocodone-acetaminophen 10-325mg (NORCO)  mg Tab; Take 1 tablet by mouth every 6 (six) hours as needed for Pain (pain).  Dispense: 120 tablet; Refill: 0  -     diclofenac sodium (VOLTAREN) 1 % Gel; Apply 4 gm to both knees 3x/day.  Dispense: 500 g;  Refill: 4    Osteoporosis without current pathological fracture: worse on fosamax 5/16- Reclast 8/16  -     hydrocodone-acetaminophen 10-325mg (NORCO)  mg Tab; Take 1 tablet by mouth every 6 (six) hours as needed for Pain (pain).  Dispense: 120 tablet; Refill: 0  -     hydrocodone-acetaminophen 10-325mg (NORCO)  mg Tab; Take 1 tablet by mouth every 6 (six) hours as needed for Pain (pain).  Dispense: 120 tablet; Refill: 0  -     hydrocodone-acetaminophen 10-325mg (NORCO)  mg Tab; Take 1 tablet by mouth every 6 (six) hours as needed for Pain (pain).  Dispense: 120 tablet; Refill: 0  -     diclofenac sodium (VOLTAREN) 1 % Gel; Apply 4 gm to both knees 3x/day.  Dispense: 500 g; Refill: 4      Patient with C.palsy, with  Paraparesis., severe Lumbar spinal stenosis at L3-4, and L4-5, with  Leg length discrepancy ( left is shorter than right),  wears Left AFO, and has more proximal strength in LLE, than in RLE .   Also with severe DJD , genu valgus in R knee ( cannot toleate custom made  knee brace, nor Swedish knee cage).   She also wears  neoprene sleee brace , that stabilizes knee, and improves proprioception, helps with gait.    1. Back pain   Will resume Hydrocodone 10/325 mg, takes 3-4 x/day, and Neurontin 600 mg, po QID.   Opioid Risk Score       Value Time User    Opioid Risk Score  0 4/11/2018 10:39 AM Krista Burger MD         reviewed and appropriate.    Patient refuses neurosurgical evaluation, and  ASHLEY to back.     2. Right knee pain, secondary to advanced tricompartmental DJD,   refusing Ortho consult, does not want knee replacement.   Wears brace with genu recurvatum and valgus in R knee,   will order a new  HME -other, hinge knee brace.   Voltaren gel topical.     RTC in 3 months..    Total time spent face to face with patient was 25 minutes.   More than 50% of that time was spent in counseling on diagnosis , prognosis and treatment options.   I also caunsel patient  on common and most  usual side effect of prescribed medications.   Risk and benefits of opiates, possible risk of developing opiate dependence and tolerance, need of strict compliance with prescribed medications.  I reviewed Primary care , and other specialty's notes to better coordinate patient's  care.   All questions were answered, and patient voiced understanding.

## 2018-04-12 NOTE — PROGRESS NOTES
Subjective:      Patient ID: Emilia Alan is a 77 y.o. female.    Chief Complaint: PCP (Lauren Deras MD  2/5/18); Peripheral Neuropathy; Nail Care; and Nail Problem    Emilia is a 77 y.o. female who presents to the clinic for evaluation and treatment of high risk feet. 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 The patient's chief complaint is long, thick toenails.       PCP: Lauren Deras MD    Date Last Seen by PCP:   Chief Complaint   Patient presents with    PCP     Lauren Deras MD  2/5/18    Peripheral Neuropathy    Nail Care    Nail Problem         Current shoe gear: DM shoes w/ AFO brace( L)    Hemoglobin A1C   Date Value Ref Range Status   01/26/2018 6.1 (H) 4.0 - 5.6 % Final     Comment:     According to ADA guidelines, hemoglobin A1c <7.0% represents  optimal control in non-pregnant diabetic patients. Different  metrics may apply to specific patient populations.   Standards of Medical Care in Diabetes-2016.  For the purpose of screening for the presence of diabetes:  <5.7%     Consistent with the absence of diabetes  5.7-6.4%  Consistent with increasing risk for diabetes   (prediabetes)  >or=6.5%  Consistent with diabetes  Currently, no consensus exists for use of hemoglobin A1c  for diagnosis of diabetes for children.  This  Hemoglobin A1c assay has significant interference with fetal   hemoglobin   (HbF). The results are invalid for patients with abnormal amounts of   HbF,   including those with known Hereditary Persistence   of Fetal Hemoglobin. Heterozygous hemoglobin variants (HbAS, HbAC,   HbAD, HbAE, HbA2) do not significantly interfere with this assay;   however, presence of multiple variants in a sample may impact the %   interference.     10/09/2017 6.8 (H) 4.0 - 5.6 % Final     Comment:     According to ADA guidelines, hemoglobin A1c <7.0% represents  optimal control in non-pregnant diabetic patients. Different  metrics may apply to specific patient populations.   Standards of Medical Care in Diabetes-2016.  For the purpose of screening for the presence of diabetes:  <5.7%     Consistent with the absence of diabetes  5.7-6.4%  Consistent with increasing risk for diabetes   (prediabetes)  >or=6.5%  Consistent with diabetes  Currently, no consensus exists for use of hemoglobin A1c  for diagnosis of diabetes for children.  This Hemoglobin A1c assay has significant interference with fetal   hemoglobin   (HbF). The results are invalid for patients with abnormal amounts of   HbF,   including those with known Hereditary Persistence   of Fetal Hemoglobin. Heterozygous hemoglobin variants (HbAS, HbAC,   HbAD, HbAE, HbA2) do not significantly interfere with this assay;   however, presence of multiple variants in a sample may impact the %   interference.     05/10/2017 7.0 (H) 4.5 - 6.2 % Final     Comment:     According to ADA guidelines, hemoglobin A1C <7.0% represents  optimal control in non-pregnant diabetic patients.  Different  metrics may apply to specific populations.   Standards of Medical Care in Diabetes - 2016.  For the purpose of screening for the presence of diabetes:  <5.7%     Consistent with the absence of diabetes  5.7-6.4%  Consistent with increasing risk for diabetes   (prediabetes)  >or=6.5%  Consistent with  diabetes  Currently no consensus exists for use of hemoglobin A1C  for diagnosis of diabetes for children.           Review of Systems   Constitution: Negative for chills, decreased appetite and fever.   Cardiovascular: Negative for chest pain, claudication and leg swelling.   Respiratory: Negative for cough.    Skin: Positive for dry skin and nail changes. Negative for color change, flushing and itching.   Musculoskeletal: Positive for muscle weakness (foot drop). Negative for arthritis, back pain, gout, joint pain and myalgias.   Gastrointestinal: Negative for nausea and vomiting.   Neurological: Positive for numbness and paresthesias. Negative for loss of balance.           Objective:      Physical Exam   Constitutional: She is oriented to person, place, and time. She appears well-developed and well-nourished. No distress.   Cardiovascular:   Dorsalis pedis and posterior tibial pulses are diminished Bilaterally. Toes are cool to touch. Feet are warm proximally.There is decreased digital hair. Skin is atrophic, slightly hyperpigmented, and mildly edematous       Musculoskeletal: She exhibits no edema or tenderness.        Right ankle: Normal.        Left ankle: Normal.        Right foot: There is no swelling, no crepitus and no deformity.        Left foot: There is no swelling, no crepitus and no deformity.   Dropfoot left.      Lymphadenopathy:   No palpable lymph nodes   Neurological: She is alert and oriented to person, place, and time. She has normal strength.   [place] 5.07 monofilamant testing is diminished [place] feet. Sharp/dull sensation diminished Bilaterally. Light touch absent Bilaterally.       Skin: Skin is warm, dry and intact. No abrasion, no bruising, no burn, no laceration, no lesion, no petechiae and no rash noted. She is not diaphoretic. No pallor. Nails show no clubbing.   Nails 1-5 b/l  are elongated by  3-7 mm's, thickened by 3-5 mm's, dystrophic, and are darkened in  coloration .  Xerosis Bilaterally. No open lesions noted.    Hyperkeratotic tissue noted to plantar L 5th MPJ and plantar L 5th toe   Psychiatric: She has a normal mood and affect. Judgment and thought content normal.   Nursing note and vitals reviewed.          Assessment:       Encounter Diagnoses   Name Primary?    Post-polio syndrome Yes    Idiopathic peripheral neuropathy     Onychomycosis due to dermatophyte     Corn or callus          Plan:       Emilia was seen today for pcp, peripheral neuropathy, nail care and nail problem.    Diagnoses and all orders for this visit:    Post-polio syndrome    Idiopathic peripheral neuropathy    Onychomycosis due to dermatophyte    Corn or callus      I counseled the patient on her conditions, their implications and medical management.    - Shoe inspection.Patient instructed on proper foot hygeine. We discussed wearing proper shoe gear, daily foot inspections, never walking without protective shoe gear, never putting sharp instruments to feet, routine podiatric nail visits every 2-3 months.      - With patient's permission, nails were aggressively reduced and debrided x 10 to their soft tissue attachment mechanically and with electric , removing all offending nail and debris. Patient relates relief following the procedure. She will continue to monitor the areas daily, inspect her feet, wear protective shoe gear when ambulatory, moisturizer to maintain skin integrity and follow in this office in approximately 2-3 months, sooner p.r.n.    - After cleansing the  area w/ alcohol prep pad the above mentioned hyperkeratosis was trimmed utilizing No 15 scapel, to a smooth base with out incident. Patient tolerated this  well and reported comfort to the area of plantar L 5th MPJ and lateral l 5th toe

## 2018-04-16 ENCOUNTER — PATIENT MESSAGE (OUTPATIENT)
Dept: ADMINISTRATIVE | Facility: OTHER | Age: 78
End: 2018-04-16

## 2018-04-25 ENCOUNTER — PATIENT OUTREACH (OUTPATIENT)
Dept: OTHER | Facility: OTHER | Age: 78
End: 2018-04-25

## 2018-04-25 NOTE — PROGRESS NOTES
HPI:  Called patient for follow-up. States that she is doing well with new medication and has no complaints. Patient requesting low sodium bread options and snacks. Her daughter does most of the shopping and she finds it difficult to read the labels. Patient is requesting specific list of substitutions and recipes. Patient endorses adherence to medication regimen and denies s/s of hypertension at this time.   Last 5 Patient Entered Readings                                      Current 30 Day Average: 159/80     Recent Readings 4/24/2018 4/23/2018 4/22/2018 4/21/2018 4/20/2018    SBP (mmHg) 132 156 151 170 151    DBP (mmHg) 66 72 78 93 74    Pulse 69 63 65 66 65        Assessment:  Per 30-day average blood pressure is not well controlled. Medication adjusted two weeks ago. Will allow time for amlodipine to reach steady state.     Plan:  · Continue current regimen.  · Discussed several bread substitutions with patient. Will send link to [employer]ucate and list of options via mail and [employer].   · Will continue to monitor and follow-up in 2 to 3 weeks to assess BP readings and medication regimen.     Current medication regimen:  Hypertension Medications             amLODIPine (NORVASC) 5 MG tablet Take 1 tablet (5 mg total) by mouth once daily.    enalapril (VASOTEC) 20 MG tablet     hydrALAZINE (APRESOLINE) 50 MG tablet Take 1 tablet (50 mg total) by mouth every 12 (twelve) hours.    valsartan-hydrochlorothiazide (DIOVAN-HCT) 320-25 mg per tablet Take 1 tablet by mouth once daily.

## 2018-05-01 ENCOUNTER — PATIENT OUTREACH (OUTPATIENT)
Dept: OTHER | Facility: OTHER | Age: 78
End: 2018-05-01

## 2018-05-01 NOTE — PROGRESS NOTES
"Last 5 Patient Entered Readings                                      Current 30 Day Average: 158/80     Recent Readings 4/30/2018 4/29/2018 4/28/2018 4/26/2018 4/25/2018    SBP (mmHg) 125 151 138 170 170    DBP (mmHg) 66 93 75 82 73    Pulse 64 73 71 65 63        Patient states she is writing the sodium content on her boxes of food. Patient inquired about amount of sodium per day. Informed patient of AHA recommended 2k/mg sodium/day.     Patient reports receiving the bread information via snail mail. She states not searching for bread recently.    Digital [facility]: Health  Follow Up    Lifestyle Modifications:    1.Dietary Modifications (Sodium intake <2,000mg/day, food labels, dining out): Patient states trying her best to stay below the the 2 k/mg sodium/day. Patient reports decreasing her bread consumption. Patient states substituting bread with a large piece of lettuce. Patient reports placing her hamburger avril between lettuce. She states consuming a lot of fresh fruit and vegetables. Patient states consuming an egg and yogurt for breakfast. Encouraged patient to continue maintaining her low-sodium diet.     2.Physical Activity: Patient states her physical activity is getting worse instead of better. Patient states "i am trying to walk around more." Encouraged patient to walk around as much as she is able to keep blood moving.     3.Medication Therapy: Patient has been compliant with the medication regimen.    4.Patient has the following medication side effects/concerns:   (Frequency/Alleviating factors/Precipitating factors, etc.)     Follow up with Radha Emilia Vega HAWA Alan completed. No further questions or concerns. Will continue follow up to achieve health goals.  "

## 2018-05-11 ENCOUNTER — LAB VISIT (OUTPATIENT)
Dept: LAB | Facility: HOSPITAL | Age: 78
End: 2018-05-11
Attending: INTERNAL MEDICINE
Payer: MEDICARE

## 2018-05-11 ENCOUNTER — PATIENT MESSAGE (OUTPATIENT)
Dept: INTERNAL MEDICINE | Facility: CLINIC | Age: 78
End: 2018-05-11

## 2018-05-11 ENCOUNTER — TELEPHONE (OUTPATIENT)
Dept: INTERNAL MEDICINE | Facility: CLINIC | Age: 78
End: 2018-05-11

## 2018-05-11 DIAGNOSIS — D64.9 ANEMIA, UNSPECIFIED TYPE: ICD-10-CM

## 2018-05-11 DIAGNOSIS — D53.9 NUTRITIONAL ANEMIA: ICD-10-CM

## 2018-05-11 DIAGNOSIS — I10 SEVERE HYPERTENSION: ICD-10-CM

## 2018-05-11 DIAGNOSIS — E11.40 TYPE 2 DIABETES MELLITUS WITH DIABETIC NEUROPATHY, WITHOUT LONG-TERM CURRENT USE OF INSULIN: ICD-10-CM

## 2018-05-11 DIAGNOSIS — D64.9 ANEMIA, UNSPECIFIED TYPE: Primary | ICD-10-CM

## 2018-05-11 DIAGNOSIS — I70.201 ATHEROSCLEROSIS OF ARTERY OF RIGHT LOWER EXTREMITY: ICD-10-CM

## 2018-05-11 LAB
ALBUMIN SERPL BCP-MCNC: 3.5 G/DL
ALP SERPL-CCNC: 73 U/L
ALT SERPL W/O P-5'-P-CCNC: 17 U/L
ANION GAP SERPL CALC-SCNC: 11 MMOL/L
AST SERPL-CCNC: 18 U/L
BASOPHILS # BLD AUTO: 0.1 K/UL
BASOPHILS NFR BLD: 1.5 %
BILIRUB SERPL-MCNC: 0.3 MG/DL
BUN SERPL-MCNC: 36 MG/DL
CALCIUM SERPL-MCNC: 10 MG/DL
CHLORIDE SERPL-SCNC: 101 MMOL/L
CHOLEST SERPL-MCNC: 153 MG/DL
CHOLEST/HDLC SERPL: 3.4 {RATIO}
CO2 SERPL-SCNC: 25 MMOL/L
CREAT SERPL-MCNC: 1 MG/DL
DIFFERENTIAL METHOD: ABNORMAL
EOSINOPHIL # BLD AUTO: 0.4 K/UL
EOSINOPHIL NFR BLD: 5.9 %
ERYTHROCYTE [DISTWIDTH] IN BLOOD BY AUTOMATED COUNT: 12.6 %
EST. GFR  (AFRICAN AMERICAN): >60 ML/MIN/1.73 M^2
EST. GFR  (NON AFRICAN AMERICAN): 54 ML/MIN/1.73 M^2
ESTIMATED AVG GLUCOSE: 128 MG/DL
FERRITIN SERPL-MCNC: 82 NG/ML
GLUCOSE SERPL-MCNC: 139 MG/DL
HBA1C MFR BLD HPLC: 6.1 %
HCT VFR BLD AUTO: 31.9 %
HDLC SERPL-MCNC: 45 MG/DL
HDLC SERPL: 29.4 %
HGB BLD-MCNC: 10.7 G/DL
IRON SERPL-MCNC: 93 UG/DL
LDLC SERPL CALC-MCNC: 73.6 MG/DL
LYMPHOCYTES # BLD AUTO: 1.5 K/UL
LYMPHOCYTES NFR BLD: 23.6 %
MCH RBC QN AUTO: 31.8 PG
MCHC RBC AUTO-ENTMCNC: 33.5 G/DL
MCV RBC AUTO: 95 FL
MONOCYTES # BLD AUTO: 0.5 K/UL
MONOCYTES NFR BLD: 7.9 %
NEUTROPHILS # BLD AUTO: 4 K/UL
NEUTROPHILS NFR BLD: 60.9 %
NONHDLC SERPL-MCNC: 108 MG/DL
PLATELET # BLD AUTO: 235 K/UL
PMV BLD AUTO: 10.5 FL
POTASSIUM SERPL-SCNC: 4 MMOL/L
PROT SERPL-MCNC: 6.8 G/DL
RBC # BLD AUTO: 3.37 M/UL
SATURATED IRON: 26 %
SODIUM SERPL-SCNC: 137 MMOL/L
TOTAL IRON BINDING CAPACITY: 363 UG/DL
TRANSFERRIN SERPL-MCNC: 245 MG/DL
TRIGL SERPL-MCNC: 172 MG/DL
WBC # BLD AUTO: 6.48 K/UL

## 2018-05-11 PROCEDURE — 36415 COLL VENOUS BLD VENIPUNCTURE: CPT

## 2018-05-11 PROCEDURE — 80061 LIPID PANEL: CPT

## 2018-05-11 PROCEDURE — 82728 ASSAY OF FERRITIN: CPT

## 2018-05-11 PROCEDURE — 80053 COMPREHEN METABOLIC PANEL: CPT

## 2018-05-11 PROCEDURE — 85025 COMPLETE CBC W/AUTO DIFF WBC: CPT

## 2018-05-11 PROCEDURE — 83540 ASSAY OF IRON: CPT

## 2018-05-11 PROCEDURE — 83036 HEMOGLOBIN GLYCOSYLATED A1C: CPT

## 2018-05-14 ENCOUNTER — HOSPITAL ENCOUNTER (OUTPATIENT)
Dept: RADIOLOGY | Facility: HOSPITAL | Age: 78
Discharge: HOME | End: 2018-05-14
Attending: INTERNAL MEDICINE
Payer: MEDICARE

## 2018-05-14 ENCOUNTER — OFFICE VISIT (OUTPATIENT)
Dept: INTERNAL MEDICINE | Facility: CLINIC | Age: 78
End: 2018-05-14
Payer: MEDICARE

## 2018-05-14 VITALS
BODY MASS INDEX: 32.08 KG/M2 | HEIGHT: 60 IN | DIASTOLIC BLOOD PRESSURE: 72 MMHG | HEART RATE: 73 BPM | SYSTOLIC BLOOD PRESSURE: 130 MMHG | OXYGEN SATURATION: 96 % | WEIGHT: 163.38 LBS

## 2018-05-14 DIAGNOSIS — R25.1 TREMOR: ICD-10-CM

## 2018-05-14 DIAGNOSIS — N64.9 DISORDER OF BREAST: ICD-10-CM

## 2018-05-14 DIAGNOSIS — E11.40 TYPE 2 DIABETES MELLITUS WITH DIABETIC NEUROPATHY, WITHOUT LONG-TERM CURRENT USE OF INSULIN: ICD-10-CM

## 2018-05-14 DIAGNOSIS — Z87.898 HISTORY OF ABNORMAL MAMMOGRAM: ICD-10-CM

## 2018-05-14 DIAGNOSIS — D64.9 ANEMIA, UNSPECIFIED TYPE: ICD-10-CM

## 2018-05-14 DIAGNOSIS — M81.0 AGE-RELATED OSTEOPOROSIS WITHOUT CURRENT PATHOLOGICAL FRACTURE: ICD-10-CM

## 2018-05-14 DIAGNOSIS — E78.2 MIXED HYPERLIPIDEMIA: ICD-10-CM

## 2018-05-14 DIAGNOSIS — I10 SEVERE HYPERTENSION: Primary | ICD-10-CM

## 2018-05-14 DIAGNOSIS — G14 POST POLIOMYELITIS SYNDROME: ICD-10-CM

## 2018-05-14 PROCEDURE — 3075F SYST BP GE 130 - 139MM HG: CPT | Mod: CPTII,S$GLB,, | Performed by: INTERNAL MEDICINE

## 2018-05-14 PROCEDURE — 99999 PR PBB SHADOW E&M-EST. PATIENT-LVL V: CPT | Mod: PBBFAC,,, | Performed by: INTERNAL MEDICINE

## 2018-05-14 PROCEDURE — 3078F DIAST BP <80 MM HG: CPT | Mod: CPTII,S$GLB,, | Performed by: INTERNAL MEDICINE

## 2018-05-14 PROCEDURE — 77066 DX MAMMO INCL CAD BI: CPT | Mod: 26,,, | Performed by: RADIOLOGY

## 2018-05-14 PROCEDURE — 99214 OFFICE O/P EST MOD 30 MIN: CPT | Mod: S$GLB,,, | Performed by: INTERNAL MEDICINE

## 2018-05-14 PROCEDURE — 99499 UNLISTED E&M SERVICE: CPT | Mod: S$GLB,,, | Performed by: INTERNAL MEDICINE

## 2018-05-14 PROCEDURE — 77066 DX MAMMO INCL CAD BI: CPT | Mod: TC,PO

## 2018-05-14 NOTE — PROGRESS NOTES
Subjective:       Patient ID: Emilia Alan is a 77 y.o. female.    Chief Complaint: Follow-up (3 month )    Follow up multiple issues     Stable for the most part.  Eating and drinking well.  No further fevers or chills.     BP much better today.  Stable on regimen.     However sometimes in the afternoon it goes up (between 2-8 pm).  Recall last fall CXR, echo and DIXIE assessment wnl.   No palpitations, no chest pain.  Denies edema, PND or orthopnea.  Only taking hydralazine once daily.  Consistent with CPAP.     Recall being seen in Cardiology in October for her BP.  Told to come back in 1 year (chart says 6 months).    Anemia worse again.  Iron normal range.  EGD and colonoscopy wnl 2017.    Slight tremor.    Ongoing follow up in Pain clinic.      Patient Active Problem List:     Gait disorder     History of poliomyelitis     Post poliomyelitis syndrome     Diabetic polyneuropathy associated with type 2 diabetes mellitus     Mixed hyperlipidemia     Osteoporosis without current pathological fracture: worse on fosamax 5/16- Reclast 8/16     Paraparesis of both lower limbs     Severe hypertension     Type 2 diabetes mellitus with diabetic neuropathy, without long-term current use of insulin     Osteoarthritis of right knee     Lumbar spinal stenosis     Obstructive sleep apnea syndrome: dx 2008 needs CPAP 11     Bradycardia, drug induced     Atherosclerosis of artery of right lower extremity: see xray 4/4/07          Review of Systems   Constitutional: Negative for chills, fatigue and fever.   HENT: Negative for congestion and postnasal drip.    Eyes: Negative.    Respiratory: Negative for cough, chest tightness, shortness of breath and wheezing.    Cardiovascular: Negative.    Gastrointestinal: Negative.  Negative for abdominal pain and blood in stool.   Genitourinary: Negative for hematuria.   Musculoskeletal: Positive for arthralgias.   Neurological: Positive for tremors.   Psychiatric/Behavioral: Negative  for dysphoric mood. The patient is not nervous/anxious.        Objective:      Physical Exam   Constitutional: She is oriented to person, place, and time. She appears well-developed and well-nourished.   HENT:   Head: Normocephalic and atraumatic.   Right Ear: External ear normal.   Left Ear: External ear normal.   Mouth/Throat: Oropharynx is clear and moist.   Eyes: Conjunctivae are normal.   Neck: Normal range of motion. Neck supple. No thyromegaly present.   Cardiovascular: Normal rate.    Pulmonary/Chest: No respiratory distress.   Musculoskeletal: She exhibits no edema.   Neurological: She is alert and oriented to person, place, and time. She exhibits normal muscle tone.   Slight resting tremor both hands   Skin: Skin is warm and dry. No rash noted. No erythema.       Assessment:       1. Severe hypertension    2. Mixed hyperlipidemia    3. Post poliomyelitis syndrome    4. Type 2 diabetes mellitus with diabetic neuropathy, without long-term current use of insulin    5. Anemia, unspecified type    6. Tremor    7. History of abnormal mammogram    8. Disorder of breast     9. Osteoporosis without current pathological fracture: worse on fosamax 5/16- Reclast 8/16        Plan:         Severe hypertension: add second hydralazine back into regimen.  Low salt diet.  Call if BP > 135/85 on a regular basis.  -     Ambulatory consult to Cardiology    Mixed hyperlipidemia: continue regimen    Post poliomyelitis syndrome: continue regimen; Pain clinic follow up    Type 2 diabetes mellitus with diabetic neuropathy, without long-term current use of insulin: stable on regimen    Anemia, unspecified type:  No evidence of iron deficiency.  EGD colonoscopy last year acceptable.  B12 and SPEP today  -     Ambulatory consult to Hematology    Tremor  -     Ambulatory consult to Neurology    History of abnormal mammogram  -     Mammo Digital Diagnostic Bilateral With CAD; Future; Expected date: 05/14/2018    Disorder of breast   -      Mammo Digital Diagnostic Bilateral With CAD; Future; Expected date: 05/14/2018    Osteoporosis without current pathological fracture: worse on fosamax 5/16- Reclast 8/16  -     DXA Bone Density Spine And Hip; Future; Expected date: 05/14/2018    I will review all studies and determine further tx depending on findings    Shingrix also recommended  Advance Directives also reviewed

## 2018-05-14 NOTE — PATIENT INSTRUCTIONS
Anemia  Anemia is a condition that occurs when your body does not have enough healthy red blood cells (RBCs). RBCs are the parts of your blood that carry oxygen throughout your body. A protein called hemoglobin allows your RBCs to absorb and release oxygen. Without enough RBCs or hemoglobin, your body doesn't get enough oxygen. Symptoms of anemia may then occur.    What are the symptoms of anemia?  Some people with anemia have no symptoms. But most people have symptoms that range from mild to severe. These can include:  · Tiredness (fatigue)  · Weakness  · Pale skin  · Shortness of breath  · Dizziness or fainting  · Rapid heartbeat  · Trouble doing normal amounts of activity  · Jaundice (yellowing of your eyes, skin, or mouth; dark urine)  What causes anemia?  Anemia can occur when your body:  · Loses too much blood  · Does not make enough RBCs  · Destroys your RBCs at a faster rate than it can replace them  · Does not make a normal amount of hemoglobin in your RBCs  These problems can occur for many reasons, including:  · A condition that you are born with (congenital or inherited), such as sickle cell disease or thalassemia  · Heavy bleeding for any reason, including injury, surgery, childbirth, or even heavy menstrual periods  · Being low in certain nutrients, such as iron, folate, or vitamin B12, possibly from a poor diet or a condition like celiac disease or Crohn's disease  · Certain chronic conditions like diabetes, arthritis, or kidney disease  · Certain chronic infections like tuberculosis or HIV  · Exposure to certain medicines, such as those used for chemotherapy  There are different types of anemia. Your healthcare provider can tell you more about the type of anemia you have and what may have caused it.  How is anemia diagnosed?  To diagnose anemia, your healthcare provider orders blood tests. These can include:  · Complete blood cell count (CBC). This test measures the amounts of the different types  of blood cells.  · Blood smear. This test checks the size and shape of your blood cells. To do the test, a drop of your blood is viewed under a microscope. A stain is used to make the blood cells easier to see.  · Iron studies. These tests measure the amount of iron in your blood. Your body needs iron to make hemoglobin in your RBCs.  · Vitamin B12 and folate studies. These tests check for some of the components that help give RBCs a normal size and shape.  · Reticulocyte count. This test measures the amount of new RBCs that your bone marrow makes.  · Hemoglobin electrophoresis. This test checks for problems with your hemoglobin in RBCs.  How is anemia treated?  Treatment for anemia is based on the type of anemia, its cause, and the severity of your symptoms. Treatments may include:  · Diet changes. This involves increasing the amount of certain nutrients in your diet, such as iron, vitamin B12, or folate. Your healthcare provider may also prescribe nutrient supplements.  · Medicines. Certain medicines treat the cause of your anemia. Others help build new RBCs or relieve symptoms. If a medicine is the cause of your anemia, you may need to stop or change it.  · Blood transfusions. Replacing some of your blood can increase the number of healthy RBCs in your body.  · Surgery. In some cases, your doctor may do surgery to treat the underlying cause of anemia. If you need surgery, your healthcare provider will explain the procedure and outline the risks and benefits for you.  What are the long-term concerns?  If you have a certain type of anemia, you can expect a full recovery after treatment. If you have other types of anemia (especially a type you're born with), you will need to manage it for life. Your doctor can tell you more.  Date Last Reviewed: 12/1/2016  © 9908-3587 [employer]. [address] [place] [address], [place] [address]. All rights reserved. This information is not intended as a substitute for  professional medical care. Always follow your healthcare professional's instructions.        Advance Medical Directive    An advance medical directive is a form that lets you plan ahead for the care youd want if you could no longer express your wishes. This statement outlines the medical treatment youd want or names the person youd wish to make healthcare decisions for you. Be aware that laws vary from state to state, and it may be worthwhile to talk with an .  Writing down your wishes  · An advance directive is important whether youre young or old. Injury or illness can strike at any age.  · Decide what is important to you and the kind of treatment youd want, or not want to have.  · Some states allow only one kind of advance directive. Some let you do both a Durable Power of  for Health Care and a Living Will. Some states put both kinds on the same form.  A Durable Power of  for Health Care  · This form lets you name someone else to be your agent.  · This person can decide on treatment for you only when you cant speak for yourself.  · You do not need to be at the end of your life. He or she could speak for you if you were in a coma but were likely to recover.  A Living Will  · This form lets you list the care you want at the end of your life.  · A living will applies only if you wont live without medical treatment. It would apply if you had advanced cancer, a massive stroke, or other serious illness from which you will not recover.  · It takes effect only when you can no longer express your wishes yourself.  Date Last Reviewed: 2/13/2016 © 2000-2017 [employer]. [address] [place] [address], [place] [address]. All rights reserved. This information is not intended as a substitute for professional medical care. Always follow your healthcare professional's instructions.

## 2018-05-15 ENCOUNTER — TELEPHONE (OUTPATIENT)
Dept: INTERNAL MEDICINE | Facility: CLINIC | Age: 78
End: 2018-05-15

## 2018-05-15 ENCOUNTER — PATIENT MESSAGE (OUTPATIENT)
Dept: INTERNAL MEDICINE | Facility: CLINIC | Age: 78
End: 2018-05-15

## 2018-05-15 NOTE — TELEPHONE ENCOUNTER
Please let her know that her follow-up labs were acceptable.  I recommend that she schedule a Hematology appointment as we discussed yesterday.  Referral is in.  Thank you

## 2018-05-16 ENCOUNTER — OFFICE VISIT (OUTPATIENT)
Dept: CARDIOLOGY | Facility: CLINIC | Age: 78
End: 2018-05-16
Payer: MEDICARE

## 2018-05-16 ENCOUNTER — PATIENT OUTREACH (OUTPATIENT)
Dept: OTHER | Facility: OTHER | Age: 78
End: 2018-05-16

## 2018-05-16 VITALS
SYSTOLIC BLOOD PRESSURE: 131 MMHG | BODY MASS INDEX: 32.2 KG/M2 | HEIGHT: 60 IN | WEIGHT: 164 LBS | DIASTOLIC BLOOD PRESSURE: 58 MMHG | HEART RATE: 76 BPM

## 2018-05-16 DIAGNOSIS — I10 SEVERE HYPERTENSION: ICD-10-CM

## 2018-05-16 DIAGNOSIS — I10 SEVERE HYPERTENSION: Primary | ICD-10-CM

## 2018-05-16 DIAGNOSIS — E78.2 MIXED HYPERLIPIDEMIA: ICD-10-CM

## 2018-05-16 PROCEDURE — 99999 PR PBB SHADOW E&M-EST. PATIENT-LVL IV: CPT | Mod: PBBFAC,,, | Performed by: INTERNAL MEDICINE

## 2018-05-16 PROCEDURE — 3078F DIAST BP <80 MM HG: CPT | Mod: CPTII,S$GLB,, | Performed by: INTERNAL MEDICINE

## 2018-05-16 PROCEDURE — 99499 UNLISTED E&M SERVICE: CPT | Mod: S$GLB,,, | Performed by: INTERNAL MEDICINE

## 2018-05-16 PROCEDURE — 3075F SYST BP GE 130 - 139MM HG: CPT | Mod: CPTII,S$GLB,, | Performed by: INTERNAL MEDICINE

## 2018-05-16 PROCEDURE — 99214 OFFICE O/P EST MOD 30 MIN: CPT | Mod: S$GLB,,, | Performed by: INTERNAL MEDICINE

## 2018-05-16 NOTE — PROGRESS NOTES
"HPI:  Called patient for follow-up. She is doing well. Visit with cardiologist and PCP went well however, patient's hydralazine was increased to 50 mg three times daily on 5/14/18. Patient is tolerating dose increase with no complaints. Expressed frustration with slow change in blood pressure as well as the sodium content in foods. Patient informed that carrots, cucumbers and tomatoes all contain sodium and she feels that there is nothing left to eat. Patient requesting a "book on foods that contain sodium". She denies s/s of hypertension at this time.     Last 5 Patient Entered Readings                                      Current 30 Day Average: 154/77     Recent Readings 5/16/2018 5/15/2018 5/14/2018 5/13/2018 5/12/2018    SBP (mmHg) 146 167 170 110 120    DBP (mmHg) 71 77 77 59 64    Pulse 86 84 92 94 94        Assessment:  Per 30-day average blood pressure is not well controlled however, readings are trending down 154/77 vs 159/80 mmHg. Last medication adjustment 3 days ago. No medication recommendations at this time.     Plan:  · Continue current regimen.  · [employer] handout discussing sodium content mailed to patient.  HC will review further.   · Will continue to monitor and follow-up in a few weeks, sooner if needed to assess BP readings and medication regimen.     Current medication regimen:  Hypertension Medications             amLODIPine (NORVASC) 5 MG tablet Take 1 tablet (5 mg total) by mouth once daily.    hydrALAZINE (APRESOLINE) 50 MG tablet Take 1 tablet (50 mg total) by mouth every 8 (eight) hours.    valsartan-hydrochlorothiazide (DIOVAN-HCT) 320-25 mg per tablet Take 1 tablet by mouth once daily.            "

## 2018-05-16 NOTE — LETTER
May 16, 2018      Lauren Deras MD  [address]  [facility] [address]           [facility]  [address]  [facility] [address]  Phone: [phone]          Patient: Emilia Alan   MR Number: [id number]   YOB: 1940   Date of Visit: 5/16/2018       Dear Dr. Lauren Deras:    Thank you for referring Emilia Alan to me for evaluation. Attached you will find relevant portions of my assessment and plan of care.    If you have questions, please do not hesitate to call me. I look forward to following Emilia Alan along with you.    Sincerely,    Yonathan Nguyen MD    Enclosure  CC:  No Recipients    If you would like to receive this communication electronically, please contact [email or web address] or [phone] to request more information on [employer] Link access.    For providers and/or their staff who would like to refer a patient to [facility], please contact us through our one-stop-shop provider referral line, [facility], at [phone].    If you feel you have received this communication in error or would no longer like to receive these types of communications, please e-mail [email or web address]

## 2018-05-16 NOTE — ASSESSMENT & PLAN NOTE
BP to day was 131/58.     Continue with Digital hypertension program  Continue present therapy:  Valsartan - HCTZ 320 - 25 mg at 8 AM  Hydralazine 50 mg tid  Amlodipine 5 mg at 1 PM

## 2018-05-16 NOTE — PROGRESS NOTES
Cardiology Progress Note:    Patient ID:  Emilia Alan is a 77 y.o. female who presents for follow up of severe hypertension    History of Present Illness: Pt has been in her usual state of health since I last saw her on 10/9/2017.  No symptoms suggestive of myocardial ischemia, heart failure and arrhythmia.     She is enrolled in the Digital Hypertension Program    Dr Deras has recently increased hydralazine from 50 mg bid to tid    Present Medical Therapy Includes:    Valsartan - HCTZ 320 - 25 mg at 8 AM  Hydralazine 50 mg tid  Amlodipine 5 mg at 1 PM    Full Medication List Includes:  Outpatient Encounter Prescriptions as of 5/16/2018   Medication Sig Dispense Refill    amLODIPine (NORVASC) 5 MG tablet Take 1 tablet (5 mg total) by mouth once daily. 30 tablet 3    aspirin (ECOTRIN) 81 MG EC tablet Take 1 tablet (81 mg total) by mouth once daily. 30 tablet 12    blood sugar diagnostic Strp 1 strip by Misc.(Non-Drug; Combo Route) route 2 (two) times daily. TRUE RESULT SYSTEM 180 strip 4    blood-glucose meter (TRUERESULT BLOOD GLUCOSE SYSTM) kit Use as instructed 1 each 0    calcium-vitamin D3-vitamin K (VIACTIV) 500-100-40 mg-unit-mcg Chew Take 2 each by mouth.      desloratadine (CLARINEX) 5 mg tablet Take 5 mg by mouth once daily.      diclofenac (VOLTAREN) 75 MG EC tablet TAKE 1 TABLET(75 MG) BY MOUTH TWICE DAILY AS NEEDED. STOP IF ANY STOMACH IRRITATION 180 tablet 0    diclofenac sodium (VOLTAREN) 1 % Gel Apply 4 gm to both knees 3x/day. 500 g 4    docusate sodium (COLACE) 50 MG capsule Take 1 capsule (50 mg total) by mouth 2 (two) times daily as needed for Constipation.      gabapentin (NEURONTIN) 600 MG tablet Take 1 tablet (600 mg total) by mouth 4 (four) times daily with meals and nightly. 360 tablet 1    glipiZIDE (GLUCOTROL) 5 MG tablet Take 1 tablet (5 mg total) by mouth once daily. 90 tablet 3    hydrALAZINE (APRESOLINE) 50 MG tablet Take 1 tablet (50 mg total) by mouth every 12  (twelve) hours. (Patient taking differently: Take 50 mg by mouth every 8 (eight) hours. ) 180 tablet 3    hydrocodone-acetaminophen 10-325mg (NORCO)  mg Tab Take 1 tablet by mouth every 6 (six) hours as needed for Pain (pain). 120 tablet 0    [START ON 6/11/2018] hydrocodone-acetaminophen 10-325mg (NORCO)  mg Tab Take 1 tablet by mouth every 6 (six) hours as needed for Pain (pain). 120 tablet 0    lancets Misc TEST 2 X DAILY PROSPER RESULT SYSTEM 180 each 3    metFORMIN (GLUCOPHAGE-XR) 500 MG 24 hr tablet Take 1 tablet (500 mg total) by mouth once daily. 90 tablet 3    multivitamin (THERAGRAN) per tablet Take 1 tablet by mouth once daily.       oxybutynin (DITROPAN) 5 MG Tab Take 1 tablet (5 mg total) by mouth 2 (two) times daily. 180 tablet 3    pravastatin (PRAVACHOL) 40 MG tablet Take 1.5 tablets (60 mg total) by mouth nightly. 135 tablet 3    TRUEPLUS LANCETS 33 gauge Misc USE BID  3    valsartan-hydrochlorothiazide (DIOVAN-HCT) 320-25 mg per tablet Take 1 tablet by mouth once daily. 30 tablet 3    [DISCONTINUED] ondansetron (ZOFRAN-ODT) 4 MG TbDL Take 1 tablet (4 mg total) by mouth every 8 (eight) hours as needed. 12 tablet 0     No facility-administered encounter medications on file as of 5/16/2018.        Review of Systems:  Review of Systems   Constitution: Negative for decreased appetite, diaphoresis, fever, weakness, malaise/fatigue, weight gain and weight loss.   HENT: Negative for congestion, ear discharge, ear pain and nosebleeds.    Eyes: Negative for blurred vision, double vision and visual disturbance.   Cardiovascular: Positive for leg swelling (right pretibial and ankle adema for many years). Negative for chest pain, claudication, cyanosis, dyspnea on exertion, irregular heartbeat, near-syncope, orthopnea, palpitations, paroxysmal nocturnal dyspnea and syncope.   Respiratory: Positive for shortness of breath (upon exertion, e.g. getting dressed). Negative for cough, hemoptysis,  sleep disturbances due to breathing, snoring, sputum production and wheezing.    Endocrine: Negative for polydipsia, polyphagia and polyuria.   Hematologic/Lymphatic: Negative for adenopathy and bleeding problem. Does not bruise/bleed easily.   Skin: Negative for color change, nail changes, poor wound healing and rash.   Musculoskeletal: Positive for muscle weakness (polio has affected her left leg when she was a child). Negative for muscle cramps.   Gastrointestinal: Negative for abdominal pain, anorexia, change in bowel habit, hematochezia, nausea and vomiting.   Genitourinary: Negative for dysuria, frequency and hematuria.   Neurological: Negative for brief paralysis, difficulty with concentration, excessive daytime sleepiness, dizziness, focal weakness, headaches, light-headedness, seizures and vertigo.   Psychiatric/Behavioral: Negative for altered mental status and depression.   Allergic/Immunologic: Negative for persistent infections.       Physical Exam:  BP (!) 131/58 (BP Location: Left arm, Patient Position: Sitting, BP Method: X-Large (Automatic))   Pulse 76   Ht 5' (1.524 m)   Wt 74.4 kg (164 lb 0.4 oz)   BMI 32.03 kg/m²   Physical Exam   Constitutional: She is oriented to person, place, and time. She appears well-developed and well-nourished.   HENT:   Head: Normocephalic.   Right Ear: External ear normal.   Left Ear: External ear normal.   Nose: Nose normal.   Inspection of lips, teeth and gums normal   Eyes: Conjunctivae and EOM are normal. Pupils are equal, round, and reactive to light. No scleral icterus.   Neck: Normal range of motion. No JVD present. No tracheal deviation present. No thyromegaly present.   Cardiovascular: Normal rate, regular rhythm, normal heart sounds and intact distal pulses.  Exam reveals no gallop and no friction rub.    No murmur heard.  Pulmonary/Chest: Effort normal. No respiratory distress. She has no wheezes. She has no rales. She exhibits no tenderness.   Abdominal:  Soft. Bowel sounds are normal. She exhibits no distension. There is no hepatosplenomegaly. There is no tenderness. There is no guarding.   Musculoskeletal: She exhibits no edema or tenderness.   Weakness of the right leg secondary to polio.   Lymphadenopathy:   Palpation of lymph nodes of neck and groin normal   Neurological: She is oriented to person, place, and time. No cranial nerve deficit. She exhibits normal muscle tone. Coordination normal.   Skin: Skin is warm and dry. No rash noted. No erythema. No pallor.   She has 1+ bilateral ankle and pretibial edema (polio has affected both of her legs and this is a chronic problem)   Psychiatric: She has a normal mood and affect. Her behavior is normal. Judgment and thought content normal.        Blood Tests:  Lab Results   Component Value Date     (H) 06/09/2017     05/11/2018    K 4.0 05/11/2018     05/11/2018    CO2 25 05/11/2018    BUN 36 (H) 05/11/2018    CREATININE 1.0 05/11/2018     (H) 05/11/2018    HGBA1C 6.1 (H) 05/11/2018    MG 2.3 06/10/2017    AST 18 05/11/2018    ALT 17 05/11/2018    ALBUMIN 3.5 05/11/2018    PROT 6.8 05/11/2018    BILITOT 0.3 05/11/2018    WBC 6.48 05/11/2018    HGB 10.7 (L) 05/11/2018    HCT 31.9 (L) 05/11/2018    HCT 37 07/25/2017    MCV 95 05/11/2018     05/11/2018    INR 0.9 06/09/2017    TSH 1.390 01/26/2018       Lab Results   Component Value Date    CHOL 153 05/11/2018    HDL 45 05/11/2018    TRIG 172 (H) 05/11/2018       Lab Results   Component Value Date    LDLCALC 73.6 05/11/2018       Urine Tests:  Lab Results   Component Value Date    COLORU Yellow 01/23/2018    APPEARANCEUA Clear 01/23/2018    PHUR 6.0 01/23/2018    SPECGRAV 1.025 01/23/2018    PROTEINUA Trace (A) 01/23/2018    GLUCUA 1+ (A) 01/23/2018    KETONESU 1+ (A) 01/23/2018    BILIRUBINUA Negative 01/23/2018    OCCULTUA Negative 01/23/2018    NITRITE Negative 01/23/2018    UROBILINOGEN Negative 01/23/2018    LEUKOCYTESUR Negative  01/23/2018    CREATRANDUR 21 08/05/2013         ECG (23-JAN-2018)  Normal sinus rhythm  Left axis deviation  Nonspecific ST and T wave abnormality  Abnormal ECG        Echocardiogram (07/13/2017)    TEST DESCRIPTION     Aorta: The aortic root is normal in size, measuring 2.7 cm at sinotubular junction and 3.0 cm at Sinuses of Valsalva. The proximal ascending aorta is normal in size, measuring 3.2 cm across.     Left Atrium: The left atrial volume index is normal, measuring 16.87 cc/m2.     Left Ventricle: The left ventricle is normal in size, with an end-diastolic diameter of 4.4 cm, and an end-systolic diameter of 3.1 cm. LV wall thickness is normal, with the septum and the posterior wall each measuring 0.9 cm across. Relative wall   thickness was normal at 0.41, and the LV mass index was 84.2 g/m2 consistent with normal left ventricular mass. There are no regional wall motion abnormalities. Left ventricular systolic function appears normal. Visually estimated ejection fraction is   60-65%. The LV Doppler derived stroke volume equals 69.0 ccs.     Diastolic indices: E wave velocity 0.7 m/s, E/A ratio 0.7,  msec., E/e' ratio(avg) 10. Diastolic function is normal.     Right Atrium: The right atrium is normal in size, measuring 3.6 cm in length and 2.7 cm in width in the apical view.     Right Ventricle: The right ventricle is normal in size measuring 2.9 cm at the base in the apical right ventricle-focused view. Global right ventricular systolic function appears normal. Tricuspid annular plane systolic excursion (TAPSE) is 2.0 cm.   Tissue Doppler-derived tricuspid annular peak systolic velocity (S prime) is 9.9 cm/s. The estimated PA systolic pressure is 30 mmHg.     Aortic Valve:  Aortic valve is normal in structure with normal leaflet mobility.     Mitral Valve:  Mitral valve is normal in structure with normal leaflet mobility.     Tricuspid Valve:  Tricuspid valve is normal in structure with normal leaflet  mobility. There is trivial to mild tricuspid regurgitation.     Pulmonary Valve:  Pulmonary valve is normal in structure with normal leaflet mobility. There is trivial pulmonic regurgitation.     Pericardium: There is evidence of a trivial posterior pericardial effusion.     IVC: IVC is normal in size and collapses > 50% with a sniff, suggesting normal right atrial pressure of 3 mmHg.     Intracavitary: There is no evidence of intracavity mass, thrombi, or vegetation.     CONCLUSIONS     1 - Normal left ventricular systolic function (EF 60-65%).     2 - No wall motion abnormalities.     3 - Normal left ventricular diastolic function.     4 - Normal right ventricular systolic function .     5 - Trivial to mild tricuspid regurgitation.     6 - Trivial pulmonic regurgitation.     7 - Trivial pericardial effusion.     8 - The estimated PA systolic pressure is 30 mmHg.     This document has been electronically    SIGNED BY: Nakul Ross MD         I have reviewed the following:     Details / Date    []   Labs     []   Imaging     []   Cardiology Procedures     []   Other      Assessment and Plan:     1. Severe hypertension    2. Mixed hyperlipidemia         Severe hypertension  BP to day was 131/58.     Continue with Digital hypertension program  Continue present therapy:  Valsartan - HCTZ 320 - 25 mg at 8 AM  Hydralazine 50 mg tid  Amlodipine 5 mg at 1 PM    Mixed hyperlipidemia  Recent lipid panel at [facility]    Continue pravastatin 40 mg qd    Follow-up in about 6 months (around 11/16/2018) for Dr Shukla.        Yonathan Nguyen MD

## 2018-05-17 ENCOUNTER — TELEPHONE (OUTPATIENT)
Dept: INTERNAL MEDICINE | Facility: CLINIC | Age: 78
End: 2018-05-17

## 2018-05-17 RX ORDER — HYDRALAZINE HYDROCHLORIDE 50 MG/1
50 TABLET, FILM COATED ORAL EVERY 8 HOURS
Start: 2018-05-17 | End: 2018-09-04

## 2018-05-17 NOTE — TELEPHONE ENCOUNTER
----- Message from Jarred Muñoz sent at 5/17/2018  3:18 PM CDT -----  Contact: Patient [phone]  Patient calling stating a Referral is needed to be fax over to Hematology Department before patient can be submitted and scheduled for an appt, please. Fax to [phone]     Please call and advise  Thank you

## 2018-05-21 RX ORDER — DICLOFENAC SODIUM 75 MG/1
TABLET, DELAYED RELEASE ORAL
Qty: 180 TABLET | Refills: 0 | OUTPATIENT
Start: 2018-05-21

## 2018-05-22 ENCOUNTER — PATIENT OUTREACH (OUTPATIENT)
Dept: OTHER | Facility: OTHER | Age: 78
End: 2018-05-22

## 2018-05-22 NOTE — PROGRESS NOTES
"Last 5 Patient Entered Readings                                      Current 30 Day Average: 151/74     Recent Readings 5/21/2018 5/18/2018 5/17/2018 5/16/2018 5/15/2018    SBP (mmHg) 161 147 152 146 167    DBP (mmHg) 80 57 73 71 77    Pulse 79 86 74 86 84        Patient states not receiving her "sodium bible" in the mail just yet.     Patient reports she has not been waiting at least 45 minutes before taking AM reading post slumber. Encouraged patient to wait at least 45 minutes.     Encounter also consisted of patient discussing her BP medication and cleaning CPAP machine.    Patient states she will be going on a cruise  6/6 thru 6/12. Placed on hiatus during that time.      Digital Medicine: Health  Follow Up    Lifestyle Modifications:    1.Dietary Modifications (Sodium intake <2,000mg/day, food labels, dining out): Patient reports no enjoying peas. Patient reports consuming more fresh vegetable-cauliflower , string beans, and broccoli. Patient reports not consuming coffee the recent week. Patient reports not consuming chips or soft drinks. Patient reports not purchasing items with more than 140 mg. She states not touching items more than 140 mg. Patient reports consuming carrots or zuccinni often. Patient reports not buying "too much in a can." Patient reports trying grilled salmon but thinks the cook overcooked it. Patient reports she will try Cord[place] again soon. Patient reports trying haibiscus tea to help decrease BP. Will further assess if patient received sodium bible upon next encounter.       2.Physical Activity: Patient reports attending the zoo and going to the [facility] for walking. She states planning on going to [place] this weekend. Patient states trying her best walk around as much as possible. Encouraged patient to continue walking around as much as possible.      3.Medication Therapy: Patient has been compliant with the medication regimen.    4.Patient has the following medication side " effects/concerns:   (Frequency/Alleviating factors/Precipitating factors, etc.)     Follow up with Ms. Emilia Alan completed. No further questions or concerns. Will continue follow up to achieve health goals.

## 2018-05-28 ENCOUNTER — HOSPITAL ENCOUNTER (OUTPATIENT)
Dept: RADIOLOGY | Facility: CLINIC | Age: 78
Discharge: HOME | End: 2018-05-28
Attending: INTERNAL MEDICINE
Payer: MEDICARE

## 2018-05-28 ENCOUNTER — INITIAL CONSULT (OUTPATIENT)
Dept: HEMATOLOGY/ONCOLOGY | Facility: CLINIC | Age: 78
End: 2018-05-28
Payer: MEDICARE

## 2018-05-28 ENCOUNTER — OFFICE VISIT (OUTPATIENT)
Dept: NEUROLOGY | Facility: CLINIC | Age: 78
End: 2018-05-28
Payer: MEDICARE

## 2018-05-28 VITALS
SYSTOLIC BLOOD PRESSURE: 152 MMHG | HEIGHT: 60 IN | BODY MASS INDEX: 32.81 KG/M2 | DIASTOLIC BLOOD PRESSURE: 72 MMHG | HEART RATE: 74 BPM | WEIGHT: 167.13 LBS

## 2018-05-28 VITALS
HEIGHT: 60 IN | DIASTOLIC BLOOD PRESSURE: 65 MMHG | SYSTOLIC BLOOD PRESSURE: 152 MMHG | BODY MASS INDEX: 32.81 KG/M2 | HEART RATE: 76 BPM | WEIGHT: 167.13 LBS

## 2018-05-28 DIAGNOSIS — E11.40 TYPE 2 DIABETES MELLITUS WITH DIABETIC NEUROPATHY, WITHOUT LONG-TERM CURRENT USE OF INSULIN: ICD-10-CM

## 2018-05-28 DIAGNOSIS — M81.0 AGE-RELATED OSTEOPOROSIS WITHOUT CURRENT PATHOLOGICAL FRACTURE: ICD-10-CM

## 2018-05-28 DIAGNOSIS — E78.2 MIXED HYPERLIPIDEMIA: ICD-10-CM

## 2018-05-28 DIAGNOSIS — I10 SEVERE HYPERTENSION: ICD-10-CM

## 2018-05-28 DIAGNOSIS — G14 POST POLIOMYELITIS SYNDROME: ICD-10-CM

## 2018-05-28 DIAGNOSIS — D64.9 ANEMIA, UNSPECIFIED TYPE: ICD-10-CM

## 2018-05-28 DIAGNOSIS — R26.9 GAIT DISORDER: ICD-10-CM

## 2018-05-28 DIAGNOSIS — Z86.12 HISTORY OF POLIOMYELITIS: ICD-10-CM

## 2018-05-28 DIAGNOSIS — E11.42 DIABETIC POLYNEUROPATHY ASSOCIATED WITH TYPE 2 DIABETES MELLITUS: ICD-10-CM

## 2018-05-28 DIAGNOSIS — G25.0 ESSENTIAL TREMOR: Primary | ICD-10-CM

## 2018-05-28 PROCEDURE — 3078F DIAST BP <80 MM HG: CPT | Mod: CPTII,S$GLB,, | Performed by: PSYCHIATRY & NEUROLOGY

## 2018-05-28 PROCEDURE — 99499 UNLISTED E&M SERVICE: CPT | Mod: S$GLB,,, | Performed by: INTERNAL MEDICINE

## 2018-05-28 PROCEDURE — 77080 DXA BONE DENSITY AXIAL: CPT | Mod: 26,,, | Performed by: INTERNAL MEDICINE

## 2018-05-28 PROCEDURE — 99214 OFFICE O/P EST MOD 30 MIN: CPT | Mod: S$GLB,,, | Performed by: PSYCHIATRY & NEUROLOGY

## 2018-05-28 PROCEDURE — 77080 DXA BONE DENSITY AXIAL: CPT | Mod: TC

## 2018-05-28 PROCEDURE — 99499 UNLISTED E&M SERVICE: CPT | Mod: S$GLB,,, | Performed by: PSYCHIATRY & NEUROLOGY

## 2018-05-28 PROCEDURE — 3077F SYST BP >= 140 MM HG: CPT | Mod: CPTII,S$GLB,, | Performed by: INTERNAL MEDICINE

## 2018-05-28 PROCEDURE — 3078F DIAST BP <80 MM HG: CPT | Mod: CPTII,S$GLB,, | Performed by: INTERNAL MEDICINE

## 2018-05-28 PROCEDURE — 99204 OFFICE O/P NEW MOD 45 MIN: CPT | Mod: S$GLB,,, | Performed by: INTERNAL MEDICINE

## 2018-05-28 PROCEDURE — 3077F SYST BP >= 140 MM HG: CPT | Mod: CPTII,S$GLB,, | Performed by: PSYCHIATRY & NEUROLOGY

## 2018-05-28 PROCEDURE — 99999 PR PBB SHADOW E&M-EST. PATIENT-LVL III: CPT | Mod: PBBFAC,,, | Performed by: INTERNAL MEDICINE

## 2018-05-28 PROCEDURE — 99999 PR PBB SHADOW E&M-EST. PATIENT-LVL III: CPT | Mod: PBBFAC,,, | Performed by: PSYCHIATRY & NEUROLOGY

## 2018-05-28 RX ORDER — PRIMIDONE 50 MG/1
50 TABLET ORAL NIGHTLY
Qty: 30 TABLET | Refills: 5 | Status: SHIPPED | OUTPATIENT
Start: 2018-05-28 | End: 2018-08-14 | Stop reason: SINTOL

## 2018-05-28 NOTE — LETTER
May 28, 2018      Lauren Deras MD  [address]  [facility] [address]           [facility]  [address]  [facility] [address]  Phone: [phone]  Fax: [phone]          Patient: Emilia Alan   MR Number: [id number]   YOB: 1940   Date of Visit: 5/28/2018       Dear Dr. Lauren Deras:    Thank you for referring Emilia Alan to me for evaluation. Attached you will find relevant portions of my assessment and plan of care.    If you have questions, please do not hesitate to call me. I look forward to following Emilia Alan along with you.    Sincerely,    Kilo Saldivar MD    Enclosure  CC:  No Recipients    If you would like to receive this communication electronically, please contact [email or web address] or [phone] to request more information on [employer] Link access.    For providers and/or their staff who would like to refer a patient to [facility], please contact us through our one-stop-shop provider referral line, [facility], at [phone].    If you feel you have received this communication in error or would no longer like to receive these types of communications, please e-mail [email or web address]

## 2018-05-28 NOTE — LETTER
May 28, 2018      Lauren Deras MD  [address]  [facility] [address]           [facility] Hematology Oncology  [address]  [facility] [address]  Phone: [phone]          Patient: Emilia Alan   MR Number: [id number]   YOB: 1940   Date of Visit: 5/28/2018       Dear Dr. Lauren Deras:    Thank you for referring Emilia Alan to me for evaluation. Attached you will find relevant portions of my assessment and plan of care.    If you have questions, please do not hesitate to call me. I look forward to following Emilia Alan along with you.    Sincerely,    Brayan Medina Jr., MD    Enclosure  CC:  No Recipients    If you would like to receive this communication electronically, please contact [email or web address] or [phone] to request more information on [employer] Link access.    For providers and/or their staff who would like to refer a patient to [facility], please contact us through our one-stop-shop provider referral line, [facility], at [phone].    If you feel you have received this communication in error or would no longer like to receive these types of communications, please e-mail [email or web address]

## 2018-05-28 NOTE — PROGRESS NOTES
HISTORY OF PRESENT ILLNESS:  Ms. Alan is a 77-year-old woman who is seen in   consultation from Dr. Lauren Deras.  She is here regarding anemia.    A review of her blood counts since  shows that most hemoglobin values were   between 12.0-13.0 until about one year ago.  In the last year, hemoglobin values   have been in the range of 11.2-12.9 except for the most recent value of 10.7 on   2018.  The higher levels were obtained in 2018 at a time when she   was volume depleted from gastroenteritis.    She is not aware of any bleeding.  She has had no significant change in her   weight.  She is not having fevers or sweats.  She experienced some severe   weakness about three months ago and was told this was a consequence of low blood   pressure.  She has severe hypertension and is taking several medications for   it.  She is not having fevers or sweats.    She has diabetes mellitus with peripheral neuropathy, essential hypertension, a   history of poliomyelitis with post-polio syndrome, osteoporosis and obstructive   sleep apnea.  At home, she can walk around the house with a walker, but when she   leaves her home, she usually uses a wheelchair.    She has had five  sections, two surgical procedures on her left foot, a   surgical procedure for trauma of the arm and a cholecystectomy.    She does not smoke or drink alcohol.  She formerly worked as a substitute   teacher.    No family members have had hematologic disorders.  No immediate family members   have had malignancies.    ADDITIONAL PAST HISTORY, SYSTEM REVIEW, SOCIAL HISTORY AND FAMILY HISTORY:  Have   been reviewed with the patient and her daughter and updated in the electronic   record.    PHYSICAL EXAMINATION:  GENERAL APPEARANCE:  A moderately overweight female who is sitting in a   wheelchair, but can stand and sit on a table which then raises her with a   hydraulic lift.  EYES:  No jaundice or pallor.  EARS:  Ceruminous canals.   The portion of the tympanic membranes that I can see   appears normal.  NOSE:  Clear nares.  SINUSES:  No tenderness.  MOUTH AND THROAT:  No mucosal lesions.  Adequate dentition.  NECK:  No masses or bruits.  No thyroid abnormalities.  LYMPH NODES:  No enlarged cervical, axillary or inguinal nodes.  CHEST AND LUNGS:  Normal respiratory effort.  Clear to auscultation and   percussion.  HEART:  Regular rate and rhythm without murmur or gallop.  ABDOMEN:  Soft without masses or tenderness.  No hepatosplenomegaly.  EXTREMITIES:  Trace pretibial and ankle edema on the right.  There is a brace on   the left lower leg and she has muscle atrophy in the left lower leg.  PERIPHERAL VASCULATURE:  Adequate pulses in the feet and ankles.  NEUROLOGIC:  Lower extremity weakness, more prominent on the left.  Mental   status is good.  She is fully oriented.    ADDITIONAL LABORATORY STUDIES:  On 05/11/2018, serum iron was 93 with total iron   binding capacity 363 and iron saturation 26%.  Ferritin was 82 and vitamin B12   was 1053.  A serum protein electrophoresis disclosed no paraproteins.    IMPRESSION:  1.  Anemia.  2.  Diabetes mellitus with associated neuropathy.  3.  Postpolio syndrome.    RECOMMENDATIONS:  1.  Blood will be obtained today for CBC, reticulocyte count, haptoglobin,   sedimentation rate, C-reactive protein, and soluble transferrin receptor.  2.  I will notify the patient of the results of these studies and advise her   about any further investigation that might be indicated.     ADDENDUM: Hemoglobin is 10.2. Haptoglobin and reticulocyte count are  normal.ESR 37. CRP 3.9.      AWB/HN  dd: 05/28/2018 13:00:50 (CDT)  td: 05/29/2018 06:01:26 (CDT)  Doc ID   #[id number]  Job ID #[id number]    CC: Lauren Deras M.D.

## 2018-05-28 NOTE — ASSESSMENT & PLAN NOTE
Using Gabapentin 600 mg four times daily.  On appropriate medications and managed by PCP. We discussed the importance of managing all secondary stroke risk factors, including DM2.

## 2018-05-28 NOTE — ASSESSMENT & PLAN NOTE
Periodic and in bilateral hands, affecting hand writing but not eating utensils use. No vocal tremors. No family history. No use of alcohol and so cannot assess for improvement with drink. She cannot take beta-blockers, which would have been the ideal treatment option for her.   - Trial of Primidone 25 mg QHS. Escalate to 50 mg QHS as needed and as tolerated. We discussed multiple times that she is a fall risk and that the Primidone may make her somnolent and increase risk for fall, and she indicated understanding.

## 2018-05-28 NOTE — ASSESSMENT & PLAN NOTE
On appropriate medications and managed by PCP. We discussed the importance of managing all secondary stroke risk factors, including hypertension.  Cannot tolerate Beta-blockers

## 2018-05-28 NOTE — PROGRESS NOTES
Subjective:     Chief Complaint:  Consult      History of Present Illness:  Emilia Alan is a 77 y.o. female who presents today for initial evaluation of bilateral intermittent fine hand tremors which have been present for many months. She has no other regions affected and no vocal tremor. Symptoms are periodic, but worse in the morning. She is right-handed and handwriting is affected when tremors are present. No known exacerbating features. Does not drink EtOH. No relatives with similar. No associated symptoms or bradykinesia, rigidity, dementia or cognitive decline, etc. She had history of polio and requires use of a walker at home and a wheelchair when out shopping, etc. No recent falls. She has other medical issues including HTN, HLD, and DM2. She has had an adverse response to beta-blockers and has difficult to control hypertension.     Review of Systems  Review of Systems   Constitutional: Negative for activity change, fatigue and fever.   HENT: Negative for hearing loss, trouble swallowing and voice change (No vocal tremors).    Eyes: Negative for pain, redness and visual disturbance.   Respiratory: Negative for choking, chest tightness and shortness of breath.    Cardiovascular: Negative for chest pain.   Gastrointestinal: Negative for abdominal pain, nausea and vomiting.   Endocrine: Negative for cold intolerance.   Genitourinary: Negative for frequency.   Musculoskeletal: Positive for back pain, gait problem and neck pain. Negative for arthralgias, joint swelling and myalgias.   Skin: Negative for color change.   Allergic/Immunologic: Negative for immunocompromised state.   Neurological: Positive for tremors, weakness and numbness. Negative for dizziness, seizures, speech difficulty and headaches.   Hematological: Negative for adenopathy.   Psychiatric/Behavioral: Negative for agitation, behavioral problems, dysphoric mood and suicidal ideas.        Objective:     Vitals:    05/28/18 1438   BP: (!)  152/72   Pulse: 74   Weight: 75.8 kg (167 lb 1.7 oz)   Height: 5' (1.524 m)   PainSc:   7       Neurologic Exam     Mental Status   Oriented to person, place, and time.   Attention: normal.   Speech: speech is normal   Level of consciousness: alert  Knowledge: good.     Cranial Nerves     CN II   Visual fields full to confrontation.     CN III, IV, VI   Pupils are equal, round, and reactive to light.  Extraocular motions are normal.     CN V   Facial sensation intact.     CN VII   Facial expression full, symmetric.     CN VIII   Hearing: intact    CN IX, X   CN IX normal.     CN XI   CN XI normal.     CN XII   CN XII normal.     Motor Exam   Muscle bulk: decreased  Overall muscle tone: normal    Strength   Strength 5/5 except as noted.   Right iliopsoas: 4/5  Left iliopsoas: 4/5  Right quadriceps: 4/5  Left quadriceps: 4/5  Right anterior tibial: 4/5  Left anterior tibial: 2/5  Right peroneal: 4/5  Left peroneal: 2/5  Right gastroc: 4/5  Left gastroc: 3/5    Sensory Exam   Right leg light touch: decreased from knee  Left leg light touch: decreased from knee  Vibration normal.   Right leg pinprick: decreased from knee  Left leg pinprick: decreased from knee    Gait, Coordination, and Reflexes     Tremor   Resting tremor: absent  Intention tremor: absent  Action tremor: absent    Reflexes   Right brachioradialis: 1+  Left brachioradialis: 1+  Right biceps: 1+  Left biceps: 1+  Right triceps: 1+  Left triceps: 1+  Right patellar: 1+  Left patellar: 1+  Right Granados: absent  Left Granados: absent      Physical Exam   Constitutional: She is oriented to person, place, and time. She appears well-developed and well-nourished.   HENT:   Head: Normocephalic and atraumatic.   Eyes: EOM are normal. Pupils are equal, round, and reactive to light.   Neck: Normal range of motion. Neck supple. No thyromegaly present.   Cardiovascular: Normal rate.    Pulmonary/Chest: Effort normal.   Abdominal: Soft.   Lymphadenopathy:     She has  no cervical adenopathy.   Neurological: She is oriented to person, place, and time.   Reflex Scores:       Tricep reflexes are 1+ on the right side and 1+ on the left side.       Bicep reflexes are 1+ on the right side and 1+ on the left side.       Brachioradialis reflexes are 1+ on the right side and 1+ on the left side.       Patellar reflexes are 1+ on the right side and 1+ on the left side.  Skin: Skin is warm and dry.   Psychiatric: She has a normal mood and affect. Her speech is normal and behavior is normal. Thought content normal.   Vitals reviewed.        Lab Results   Component Value Date    WBC 6.04 05/28/2018    HGB 10.2 (L) 05/28/2018    HCT 30.6 (L) 05/28/2018     05/28/2018    CHOL 153 05/11/2018    TRIG 172 (H) 05/11/2018    HDL 45 05/11/2018    ALT 17 05/11/2018    AST 18 05/11/2018     05/11/2018    K 4.0 05/11/2018     05/11/2018    CREATININE 1.0 05/11/2018    BUN 36 (H) 05/11/2018    CO2 25 05/11/2018    TSH 1.390 01/26/2018    HGBA1C 6.1 (H) 05/11/2018    [id number] 1053 (H) 05/14/2018         Assessment and Plan:     Problem List Items Addressed This Visit     Essential tremor - Primary    Current Assessment & Plan     Periodic and in bilateral hands, affecting hand writing but not eating utensils use. No vocal tremors. No family history. No use of alcohol and so cannot assess for improvement with drink. She cannot take beta-blockers, which would have been the ideal treatment option for her.   - Trial of Primidone 25 mg QHS. Escalate to 50 mg QHS as needed and as tolerated. We discussed multiple times that she is a fall risk and that the Primidone may make her somnolent and increase risk for fall, and she indicated understanding.         Relevant Medications    primidone (MYSOLINE) 50 MG Tab    Gait disorder    History of poliomyelitis    Post poliomyelitis syndrome    Diabetic polyneuropathy associated with type 2 diabetes mellitus    Current Assessment & Plan     Using  Gabapentin 600 mg four times daily.  On appropriate medications and managed by PCP. We discussed the importance of managing all secondary stroke risk factors, including DM2.           Mixed hyperlipidemia    Current Assessment & Plan     On appropriate medications and managed by PCP. We discussed the importance of managing all secondary stroke risk factors, including hyperlipidemia.           Severe hypertension    Current Assessment & Plan     On appropriate medications and managed by PCP. We discussed the importance of managing all secondary stroke risk factors, including hypertension.  Cannot tolerate Beta-blockers         Type 2 diabetes mellitus with diabetic neuropathy, without long-term current use of insulin    Current Assessment & Plan     On appropriate medications and managed by PCP. We discussed the importance of managing all secondary stroke risk factors, including DM2.               RTC in 2 months. Or, if symptoms are well-controlled, she can let me know via Portal and simply follow up in 6 months.    Kilo Saldivar MD  [facility]

## 2018-05-29 RX ORDER — DICLOFENAC SODIUM 75 MG/1
TABLET, DELAYED RELEASE ORAL
Qty: 180 TABLET | Refills: 0 | Status: SHIPPED | OUTPATIENT
Start: 2018-05-29 | End: 2018-08-24 | Stop reason: SDUPTHER

## 2018-05-30 ENCOUNTER — TELEPHONE (OUTPATIENT)
Dept: HEMATOLOGY/ONCOLOGY | Facility: CLINIC | Age: 78
End: 2018-05-30

## 2018-06-05 ENCOUNTER — TELEPHONE (OUTPATIENT)
Dept: INTERNAL MEDICINE | Facility: CLINIC | Age: 78
End: 2018-06-05

## 2018-06-05 ENCOUNTER — OFFICE VISIT (OUTPATIENT)
Dept: HEMATOLOGY/ONCOLOGY | Facility: CLINIC | Age: 78
End: 2018-06-05
Payer: MEDICARE

## 2018-06-05 ENCOUNTER — PATIENT MESSAGE (OUTPATIENT)
Dept: INTERNAL MEDICINE | Facility: CLINIC | Age: 78
End: 2018-06-05

## 2018-06-05 VITALS — HEIGHT: 60 IN | BODY MASS INDEX: 32.81 KG/M2 | WEIGHT: 167.13 LBS

## 2018-06-05 DIAGNOSIS — M81.0 AGE-RELATED OSTEOPOROSIS WITHOUT CURRENT PATHOLOGICAL FRACTURE: Primary | ICD-10-CM

## 2018-06-05 DIAGNOSIS — D64.9 ANEMIA, UNSPECIFIED TYPE: Primary | ICD-10-CM

## 2018-06-05 PROCEDURE — 88185 FLOWCYTOMETRY/TC ADD-ON: CPT | Mod: 59 | Performed by: PATHOLOGY

## 2018-06-05 PROCEDURE — 38222 DX BONE MARROW BX & ASPIR: CPT | Mod: LT,S$GLB,, | Performed by: INTERNAL MEDICINE

## 2018-06-05 PROCEDURE — 88311 DECALCIFY TISSUE: CPT | Mod: 26,,, | Performed by: PATHOLOGY

## 2018-06-05 PROCEDURE — 88184 FLOWCYTOMETRY/ TC 1 MARKER: CPT | Performed by: PATHOLOGY

## 2018-06-05 PROCEDURE — 81450 HL NEO GSAP 5-50DNA/DNA&RNA: CPT

## 2018-06-05 PROCEDURE — 88342 IMHCHEM/IMCYTCHM 1ST ANTB: CPT | Performed by: PATHOLOGY

## 2018-06-05 PROCEDURE — 99999 PR PBB SHADOW E&M-EST. PATIENT-LVL III: CPT | Mod: PBBFAC,,, | Performed by: INTERNAL MEDICINE

## 2018-06-05 PROCEDURE — 88305 TISSUE EXAM BY PATHOLOGIST: CPT | Mod: 26,,, | Performed by: PATHOLOGY

## 2018-06-05 PROCEDURE — 88342 IMHCHEM/IMCYTCHM 1ST ANTB: CPT | Mod: 26,59,, | Performed by: PATHOLOGY

## 2018-06-05 PROCEDURE — 88313 SPECIAL STAINS GROUP 2: CPT

## 2018-06-05 PROCEDURE — 88189 FLOWCYTOMETRY/READ 16 & >: CPT | Mod: ,,, | Performed by: PATHOLOGY

## 2018-06-05 PROCEDURE — 99213 OFFICE O/P EST LOW 20 MIN: CPT | Mod: 25,S$GLB,, | Performed by: INTERNAL MEDICINE

## 2018-06-05 PROCEDURE — 88313 SPECIAL STAINS GROUP 2: CPT | Mod: 26,,, | Performed by: PATHOLOGY

## 2018-06-05 PROCEDURE — 88341 IMHCHEM/IMCYTCHM EA ADD ANTB: CPT | Mod: 26,59,, | Performed by: PATHOLOGY

## 2018-06-05 PROCEDURE — 88341 IMHCHEM/IMCYTCHM EA ADD ANTB: CPT | Performed by: PATHOLOGY

## 2018-06-05 PROCEDURE — 88271 CYTOGENETICS DNA PROBE: CPT | Mod: 59

## 2018-06-05 PROCEDURE — 85097 BONE MARROW INTERPRETATION: CPT | Mod: ,,, | Performed by: PATHOLOGY

## 2018-06-05 PROCEDURE — 81342 TRG GENE REARRANGEMENT ANAL: CPT

## 2018-06-05 PROCEDURE — 88237 TISSUE CULTURE BONE MARROW: CPT

## 2018-06-05 PROCEDURE — 88305 TISSUE EXAM BY PATHOLOGIST: CPT | Performed by: PATHOLOGY

## 2018-06-05 NOTE — TELEPHONE ENCOUNTER
It looks like it is being recommended that she take medication for osteoporosis.   She was seeing Endocrinology and they had ordered Reclast but I do not know that she is currently taking it.  She needs to return to endocrinology this August or September.  Please assist with appointment.  Referral is in.  Thank you

## 2018-06-05 NOTE — PROGRESS NOTES
Mrs. Alan is a 77-year-old woman whom I have been evaluating for anemia.    Between 2006 and 2016, her hemoglobin levels were in the range of 12-13.1.    Between 05/2017 and 01/2018, the hemoglobin values were between 11.2-12.9.    However, she had two blood counts in May 2018.  On 05/11/2018, the hemoglobin   was 10.7 and on 05/28/2018, it was 10.2.  Her peripheral blood smear showed   anisocytosis and a few large lymphocytes, some of which had granules.  Her CRP   normal and her sedimentation rate was slightly elevated at 37.  Studies for iron   deficiency, B12 deficiency and hemolysis were negative.    She has diabetes mellitus with peripheral neuropathy, hypertension, a history of   poliomyelitis with postpolio syndrome and obstructive sleep apnea.    I reviewed the laboratory studies with the patient and her relative.  I have not   been able to determine the cause of her worsening anemia and so I have   recommended that she have a bone marrow examination.    PROCEDURE NOTE:  After explaining the bone marrow examination and its potential   risks, she signed a consent form for it.  She lay prone on a procedure table.    The skin over the left posterior iliac bone was cleaned with Betadine.  A 2%   Xylocaine solution was infiltrated into the skin and onto the surface of the   bone.  I made a puncture wound with a #11 blade.  I then inserted an [place]   needle into the iliac bone and aspirated marrow for standard studies, flow   cytometry, cytogenetics, a FISH panel for myelodysplastic disorders and a next   generation sequencing study for hematologic neoplasia.  I then obtained a biopsy   with a separate Jamshidi needle.  She tolerated the procedure well.  A bandage   was placed and she was given instructions on wound care.    She is leaving later this week for a cruise and will likely be away for about   seven to 10 days.  I will notify her of the results of this examination.  I   explained that the genetic  studies may not be reported for two to three weeks.      LARISSAB/HN  dd: 06/05/2018 14:38:26 (CDT)  td: 06/06/2018 13:13:35 (CDT)  Doc ID   #[id number]  Job ID #[id number]    CC:  Lauren Deras M.D.

## 2018-06-06 LAB
BONE MARROW IRON STAIN COMMENT: NORMAL
BONE MARROW WRIGHT STAIN COMMENT: NORMAL

## 2018-06-06 NOTE — TELEPHONE ENCOUNTER
Spoke to pt and advised. She has an appt with Endo on 8/15/18. Pt stated that she was advised not to take Reclast. She was given an IV med. She is unsure of the name.

## 2018-06-07 LAB — T CELL GENE REARRANGEMENT, BM: NORMAL

## 2018-06-08 ENCOUNTER — TELEPHONE (OUTPATIENT)
Dept: HEMATOLOGY/ONCOLOGY | Facility: CLINIC | Age: 78
End: 2018-06-08

## 2018-06-08 NOTE — TELEPHONE ENCOUNTER
No disease found in marrow so far. Mild megaloblastoid erythropoiesis.   Will await genetic studies.

## 2018-06-12 LAB
CLINICAL CYTOGENETICIST REVIEW: NORMAL
FMDS SPECIMEN: NORMAL
MDS FISH ADDITIONAL INFORMATION: NORMAL
MDS FISH DISCLAIMER: NORMAL
MDS FISH REASON FOR REFERRAL (BM): NORMAL
MDS FISH RELEASED BY: NORMAL
MDS FISH RESULT (BM): NORMAL
MDS FISH RESULT SUMMARY: NORMAL
MDS FISH RESULT TABLE: NORMAL
REF LAB TEST METHOD: NORMAL
SPECIMEN SOURCE: NORMAL

## 2018-06-13 ENCOUNTER — PATIENT OUTREACH (OUTPATIENT)
Dept: OTHER | Facility: OTHER | Age: 78
End: 2018-06-13

## 2018-06-13 LAB
CHROM BANDING METHOD: NORMAL
CHROMOSOME ANALYSIS BM ADDITIONAL INFORMATION: NORMAL
CHROMOSOME ANALYSIS BM RELEASED BY: NORMAL
CHROMOSOME ANALYSIS BM RESULT SUMMARY: NORMAL
CLINICAL CYTOGENETICIST REVIEW: NORMAL
KARYOTYP MAR: NORMAL
REASON FOR REFERRAL (NARRATIVE): NORMAL
REF LAB TEST METHOD: NORMAL
SPECIMEN SOURCE: NORMAL
SPECIMEN: NORMAL

## 2018-06-13 NOTE — PROGRESS NOTES
Last 5 Patient Entered Readings                                      Current 30 Day Average: 145/70     Recent Readings 6/9/2018 6/8/2018 6/5/2018 6/1/2018 5/31/2018    SBP (mmHg) 111 123 149 146 138    DBP (mmHg) 53 59 71 75 72    Pulse 79 72 81 76 74          Digital Medicine: Health  Follow Up    Left voicemail to follow up with Radha Emilia Silvia Alan.  Current BP average 145/70 mmHg is not at goal, [130/80].

## 2018-06-14 ENCOUNTER — PATIENT OUTREACH (OUTPATIENT)
Dept: OTHER | Facility: OTHER | Age: 78
End: 2018-06-14

## 2018-06-14 DIAGNOSIS — I10 SEVERE HYPERTENSION: Primary | ICD-10-CM

## 2018-06-18 RX ORDER — AMLODIPINE BESYLATE 10 MG/1
10 TABLET ORAL DAILY
Qty: 30 TABLET | Refills: 11 | Status: SHIPPED | OUTPATIENT
Start: 2018-06-18 | End: 2019-06-13 | Stop reason: SDUPTHER

## 2018-06-18 RX ORDER — GLIPIZIDE 5 MG/1
TABLET ORAL
Qty: 90 TABLET | Refills: 3 | Status: SHIPPED | OUTPATIENT
Start: 2018-06-18 | End: 2019-06-13

## 2018-06-20 LAB
BODY SITE - BONE MARROW: NORMAL
CLINICAL DIAGNOSIS - BONE MARROW: NORMAL
FLOW CYTOMETRY ANTIBODIES ANALYZED - BONE MARROW: NORMAL
FLOW CYTOMETRY COMMENT - BONE MARROW: NORMAL
FLOW CYTOMETRY INTERPRETATION - BONE MARROW: NORMAL

## 2018-06-21 NOTE — PROGRESS NOTES
Last 5 Patient Entered Readings                                      Current 30 Day Average: 142/70     Recent Readings 6/20/2018 6/19/2018 6/18/2018 6/16/2018 6/14/2018    SBP (mmHg) 123 153 141 177 148    DBP (mmHg) 70 76 59 81 74    Pulse 66 78 69 79 66        Encounter also consisted of patient asking about taking new medication and side effects prescribed by neurologist.    Encounter also consisted of patient discussing a recent bone marrow test and waiting the results from it.     Asked patient about her happiness with lower readings, she attributes lower readings to increase in BP medication.    Digital Medicine: Health  Follow Up    Lifestyle Modifications:    1.Dietary Modifications (Sodium intake <2,000mg/day, food labels, dining out): Patient reports she is still reading food labels very closely and trying to stay with low-sodium everything. Encouraged patient to continue reading labels and consuming low-sodium foods.      2.Physical Activity: Patient reports she is trying to walk around more. She states she may have some pain resulting from having polio as a child. Encouraged patient to walk as much as she is comfortably able to keep blood flowing, she states she is trying.     3.Medication Therapy: Patient has been compliant with the medication regimen.    4.Patient has the following medication side effects/concerns:   (Frequency/Alleviating factors/Precipitating factors, etc.)     Follow up with Mrs. Emilia Alan completed. No further questions or concerns. Will continue follow up to achieve health goals.

## 2018-06-22 ENCOUNTER — TELEPHONE (OUTPATIENT)
Dept: HEMATOLOGY/ONCOLOGY | Facility: CLINIC | Age: 78
End: 2018-06-22

## 2018-06-22 LAB
ANNOTATION COMMENT IMP: NORMAL
NGS CLINCIAL TRIALS: NORMAL
NGS INDICATION OF TEST: NORMAL
NGS INTERPRETATION: NORMAL
NGS ONCOHEME PANEL GENE LIST: NORMAL
NGS PATHOGENIC MUTATIONS DETECTED: NORMAL
NGS REVIEWED BY:: NORMAL
NGS VARIANTS OF UNKNOWN SIGNIFICANCE: NORMAL
REF LAB TEST METHOD: NORMAL
SPECIMEN SOURCE: NORMAL
TEST PERFORMANCE INFO SPEC: NORMAL

## 2018-06-24 ENCOUNTER — TELEPHONE (OUTPATIENT)
Dept: HEMATOLOGY/ONCOLOGY | Facility: CLINIC | Age: 78
End: 2018-06-24

## 2018-06-24 DIAGNOSIS — D64.9 ANEMIA, UNSPECIFIED TYPE: Primary | ICD-10-CM

## 2018-06-24 NOTE — TELEPHONE ENCOUNTER
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  It means she has an increased risk of developing myelodysplasia and/or a myeloproliferative neoplasm.    Will monitor cbc every 2 months. If major change, may repeat marrow.  If anemia becomes more severe, will likely advise Procrit.    "

## 2018-06-25 PROBLEM — D75.9 CYTOPENIA: Status: ACTIVE | Noted: 2018-06-25

## 2018-06-27 DIAGNOSIS — I10 SEVERE HYPERTENSION: ICD-10-CM

## 2018-06-27 RX ORDER — VALSARTAN AND HYDROCHLOROTHIAZIDE 320; 25 MG/1; MG/1
TABLET, FILM COATED ORAL
Qty: 30 TABLET | Refills: 2 | Status: SHIPPED | OUTPATIENT
Start: 2018-06-27 | End: 2019-03-22

## 2018-06-27 NOTE — TELEPHONE ENCOUNTER
----- Message from Eva Licea sent at 6/27/2018  9:11 AM CDT -----  Contact: patient [phone]  Pt only has 1 Valsartan hctz left and pharmacy has advised her that they have been unable to contact you for a refill. Pt needs a rx for 90 day supply    [employer]  [phone]

## 2018-07-03 ENCOUNTER — PATIENT OUTREACH (OUTPATIENT)
Dept: OTHER | Facility: OTHER | Age: 78
End: 2018-07-03

## 2018-07-03 NOTE — PROGRESS NOTES
HPI:  Called patient for follow-up. Tolerating increased dose of amlodipine with no complaints. Patient swapped regular bread for small corn tortillas and continues to use lettuce for hamburger buns. She received the sodium handout and found the material very helpful. She endorses adherence to current regimen with no complaints. Discussed occasional low DBP and patient denies s/s of hypotension. She endorses adequate hydration.     Last 5 Patient Entered Readings                                      Current 30 Day Average: 139/67     Recent Readings 7/2/2018 6/28/2018 6/27/2018 6/26/2018 6/25/2018    SBP (mmHg) 141 150 128 152 135    DBP (mmHg) 64 73 76 77 48    Pulse 74 66 75 75 80        Assessment:  Per 30-day average blood pressure is not at goal however, improving since increasing amlodipine 2 weeks ago 143/70 vs 139/67.     Plan:  · Continue current regimen.   · Will continue to monitor and follow-up in 3 weeks to assess BP readings and medication regimen.     Current medication regimen:  Hypertension Medications             amLODIPine (NORVASC) 10 MG tablet Take 1 tablet (10 mg total) by mouth once daily.    hydrALAZINE (APRESOLINE) 50 MG tablet Take 1 tablet (50 mg total) by mouth every 8 (eight) hours.    valsartan-hydrochlorothiazide (DIOVAN-HCT) 320-25 mg per tablet TAKE 1 TABLET BY MOUTH EVERY DAY

## 2018-07-12 ENCOUNTER — PATIENT OUTREACH (OUTPATIENT)
Dept: OTHER | Facility: OTHER | Age: 78
End: 2018-07-12

## 2018-07-12 DIAGNOSIS — E78.5 HYPERLIPIDEMIA, UNSPECIFIED HYPERLIPIDEMIA TYPE: ICD-10-CM

## 2018-07-12 RX ORDER — PRAVASTATIN SODIUM 40 MG/1
TABLET ORAL
Qty: 135 TABLET | Refills: 0 | Status: SHIPPED | OUTPATIENT
Start: 2018-07-12 | End: 2018-07-25 | Stop reason: SDUPTHER

## 2018-07-12 NOTE — PROGRESS NOTES
Last 5 Patient Entered Readings                                      Current 30 Day Average: 145/68     Recent Readings 7/11/2018 7/10/2018 7/9/2018 7/7/2018 7/6/2018    SBP (mmHg) 170 130 153 161 164    DBP (mmHg) 77 45 73 74 69    Pulse 68 80 62 64 79          Digital Medicine: Health  Follow Up    Left voicemail to follow up with Radha Emilia Silvia Alan.  Current BP average 145/68 mmHg is not at goal, [130/80].

## 2018-07-19 ENCOUNTER — TELEPHONE (OUTPATIENT)
Dept: PHYSICAL MEDICINE AND REHAB | Facility: CLINIC | Age: 78
End: 2018-07-19

## 2018-07-19 ENCOUNTER — OFFICE VISIT (OUTPATIENT)
Dept: PHYSICAL MEDICINE AND REHAB | Facility: CLINIC | Age: 78
End: 2018-07-19
Payer: MEDICARE

## 2018-07-19 VITALS — DIASTOLIC BLOOD PRESSURE: 61 MMHG | SYSTOLIC BLOOD PRESSURE: 126 MMHG | HEART RATE: 75 BPM | HEIGHT: 60 IN

## 2018-07-19 DIAGNOSIS — G89.29 CHRONIC PAIN OF RIGHT KNEE: ICD-10-CM

## 2018-07-19 DIAGNOSIS — M25.561 CHRONIC PAIN OF RIGHT KNEE: ICD-10-CM

## 2018-07-19 DIAGNOSIS — R26.9 GAIT DISORDER: ICD-10-CM

## 2018-07-19 DIAGNOSIS — Z86.12 HISTORY OF POLIOMYELITIS: ICD-10-CM

## 2018-07-19 DIAGNOSIS — M47.16 LUMBAR SPONDYLOSIS WITH MYELOPATHY: ICD-10-CM

## 2018-07-19 DIAGNOSIS — M54.16 LUMBAR RADICULOPATHY: ICD-10-CM

## 2018-07-19 DIAGNOSIS — M81.0 AGE-RELATED OSTEOPOROSIS WITHOUT CURRENT PATHOLOGICAL FRACTURE: ICD-10-CM

## 2018-07-19 DIAGNOSIS — G82.20 PARAPARESIS OF BOTH LOWER LIMBS: Primary | ICD-10-CM

## 2018-07-19 DIAGNOSIS — M54.40 CHRONIC BILATERAL LOW BACK PAIN WITH SCIATICA, SCIATICA LATERALITY UNSPECIFIED: ICD-10-CM

## 2018-07-19 DIAGNOSIS — M47.26 OSTEOARTHRITIS OF SPINE WITH RADICULOPATHY, LUMBAR REGION: ICD-10-CM
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
## 2018-07-19 DIAGNOSIS — G89.29 CHRONIC BILATERAL LOW BACK PAIN WITH SCIATICA, SCIATICA LATERALITY UNSPECIFIED: ICD-10-CM

## 2018-07-19 DIAGNOSIS — G14 POST POLIOMYELITIS SYNDROME: ICD-10-CM

## 2018-07-19 DIAGNOSIS — E08.44 DIABETIC AMYOTROPHY ASSOCIATED WITH DIABETES MELLITUS DUE TO UNDERLYING CONDITION: ICD-10-CM

## 2018-07-19 DIAGNOSIS — M48.062 SPINAL STENOSIS OF LUMBAR REGION WITH NEUROGENIC CLAUDICATION: ICD-10-CM

## 2018-07-19 DIAGNOSIS — E11.42 DIABETIC POLYNEUROPATHY ASSOCIATED WITH TYPE 2 DIABETES MELLITUS: ICD-10-CM

## 2018-07-19 DIAGNOSIS — M17.11 PRIMARY OSTEOARTHRITIS OF RIGHT KNEE: ICD-10-CM

## 2018-07-19 DIAGNOSIS — E11.40 TYPE 2 DIABETES MELLITUS WITH DIABETIC NEUROPATHY, WITHOUT LONG-TERM CURRENT USE OF INSULIN: ICD-10-CM

## 2018-07-19 DIAGNOSIS — M47.816 LUMBAR SPONDYLOSIS: ICD-10-CM

## 2018-07-19 DIAGNOSIS — M54.16 LEFT LUMBAR RADICULOPATHY: ICD-10-CM

## 2018-07-19 PROCEDURE — 99214 OFFICE O/P EST MOD 30 MIN: CPT | Mod: S$GLB,,, | Performed by: PHYSICAL MEDICINE & REHABILITATION

## 2018-07-19 PROCEDURE — 99499 UNLISTED E&M SERVICE: CPT | Mod: S$GLB,,, | Performed by: PHYSICAL MEDICINE & REHABILITATION

## 2018-07-19 PROCEDURE — 3074F SYST BP LT 130 MM HG: CPT | Mod: CPTII,S$GLB,, | Performed by: PHYSICAL MEDICINE & REHABILITATION

## 2018-07-19 PROCEDURE — 3078F DIAST BP <80 MM HG: CPT | Mod: CPTII,S$GLB,, | Performed by: PHYSICAL MEDICINE & REHABILITATION

## 2018-07-19 PROCEDURE — 99999 PR PBB SHADOW E&M-EST. PATIENT-LVL III: CPT | Mod: PBBFAC,,, | Performed by: PHYSICAL MEDICINE & REHABILITATION

## 2018-07-19 RX ORDER — GABAPENTIN 600 MG/1
600 TABLET ORAL
Qty: 360 TABLET | Refills: 3 | Status: SHIPPED | OUTPATIENT
Start: 2018-07-19 | End: 2019-06-27 | Stop reason: SDUPTHER

## 2018-07-19 RX ORDER — HYDROCODONE BITARTRATE AND ACETAMINOPHEN 10; 325 MG/1; MG/1
1 TABLET ORAL EVERY 6 HOURS PRN
Qty: 120 TABLET | Refills: 0 | Status: SHIPPED | OUTPATIENT
Start: 2018-08-19 | End: 2018-09-18

## 2018-07-19 RX ORDER — HYDROCODONE BITARTRATE AND ACETAMINOPHEN 10; 325 MG/1; MG/1
1 TABLET ORAL EVERY 6 HOURS PRN
Qty: 120 TABLET | Refills: 0 | Status: SHIPPED | OUTPATIENT
Start: 2018-09-19 | End: 2018-10-30 | Stop reason: SDUPTHER

## 2018-07-19 RX ORDER — HYDROCODONE BITARTRATE AND ACETAMINOPHEN 10; 325 MG/1; MG/1
1 TABLET ORAL EVERY 6 HOURS PRN
Qty: 120 TABLET | Refills: 0 | Status: SHIPPED | OUTPATIENT
Start: 2018-07-19 | End: 2018-08-18

## 2018-07-19 NOTE — PROGRESS NOTES
Last 5 Patient Entered Readings                                      Current 30 Day Average: 143/66     Recent Readings 7/18/2018 7/18/2018 7/16/2018 7/12/2018 7/11/2018    SBP (mmHg) 153 160 126 152 170    DBP (mmHg) 70 109 54 66 77    Pulse 65 76 68 66 68        Assessed elevated readings on 7/18. She states not knowing why her readings are elevated. Patient reports taking BP readings shortly after eating. Encouraged and informed patient to wait at least 45 minutes after eating, waking up, BP medication, etc;; She states not knowing this. She states she will try this moving forward.     Encounter also consisted of patient discussing her upcoming trip.     Encounter also consisted of patient discussing her back problems.    Digital Medicine: Health  Follow Up    Lifestyle Modifications:    1.Dietary Modifications (Sodium intake <2,000mg/day, food labels, dining out): Patient reports adhering to a low-sodium diet. She states consuming zuccini and red wine vinegerette. She states maintaining the low-sodium diet pretty well. She states the diet is not the problem, its the exercise. Encouraged patient to continue her low-sodium diet.     2.Physical Activity: Patient reports having limited exercise due to her previous chronic disease-polio. Encouraged patient to try and move as much as she is able. She states she is trying.     3.Medication Therapy: Patient has been compliant with the medication regimen.    4.Patient has the following medication side effects/concerns:   (Frequency/Alleviating factors/Precipitating factors, etc.)     Follow up with Mrs. Croninll Silvia Alan completed. No further questions or concerns. Will continue follow up to achieve health goals.

## 2018-07-19 NOTE — PROGRESS NOTES
Subjective:       Patient ID: Emilia Alan is a 77 y.o. female.    Chief Complaint: Back Pain and Leg Pain    Back Pain   Associated symptoms include leg pain. Pertinent negatives include no abdominal pain, chest pain, fever, numbness or weakness. Headaches:     Knee Pain    Pertinent negatives include no numbness.   Spine Injury   Associated symptoms include neck pain. Pertinent negatives include no abdominal pain, arthralgias, chest pain, chills, fatigue, fever, joint swelling, myalgias, numbness, rash or weakness. Headaches:     Extremity Weakness    Pertinent negatives include no fever or numbness.   Neck Pain    Associated symptoms include leg pain. Pertinent negatives include no chest pain, fever, numbness, trouble swallowing or weakness. Headaches:     Leg Pain    Pertinent negatives include no numbness.     Subjective:       Patient ID: Emilia Alan is a 77 y.o. female.    Chief Complaint: Back Pain and Leg Pain    Extremity Weakness    Pertinent negatives include no fever or numbness.   Back Pain   Associated symptoms include leg pain. Pertinent negatives include no abdominal pain, chest pain, fever, numbness or weakness. Headaches:     Leg Pain    Pertinent negatives include no numbness.   Knee Pain    Pertinent negatives include no numbness.     Ms. 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  She has B LE weakness, paraparesis, secondary to severe Lumbar spinal stenosis at L3-4, and L4-5, with  Leg length discrepancy ( left is shorter than right),with severe DJD of both knees , genu valgus in R knee.  LCV   04/11/18.  She is here for follow up visit for back pain, leg weakness, functional decline, and chronic pain management.   She has paraparesis, a right leg is weaker than Left leg- that is weak in ankle.   She cannot actively  Right LE, still walks short distances, but majority of time she  spent sitting.    Today, she states that she is slowing down, she walks slower, and can do less than in past. She also complains about back pain.   Current back pain is 7, worst pain is 9-10/10 while upright and walking.    Pain is localized across lower back and in upper spine.    Back pain is off/on, present mainly during day time, sharp, severe ,stabbing pain.  Pain is worse with lying or sitting and getting up from chair.   With that pain she is afraid she will fall down, since she gets more weakness in Right leg.   Complains also about right knee pain, that is weak, and goes backwards   during walking.   Patient takes  Hydrocodone 10/325 mg, takes 3-4 x/day, and Neurontin 600 mg, po QID, tolerates it well, and she knows that both medications are helping.  Patient refuses neurosurgical evaluation, and  ASHLEY to back.   The patient has had gradual worsening of her gait over the last few years.   She was prescribed a left AFO ( that she wears).   She cannot tolerate swedish knee cage as well.   She also has a neoprene sleee brace , to stabilized knee, and to take pressure off bone.   Right hinge knee brace that is all warned up,since she wears it all the time.  She continues to complain of progressive bilateral lower extremity weakness.   She reports frequent falls, especially in the last 3-6 months.   The patient lives in a single-silvia home with a ramp access.   She is independent with her dressing, feeding and grooming.   She is also independent with toileting and bathing using a shower chair.   She is able to ambulate with single cane, RW.  She can walk about 20 feet, but has to stop frequently.   She does better with a Rollator walker.   She has manual wheel chair that is bulky, and she cannot propel, RW with seat, cane and RW.   She is restricted by lower extremity weakness, especially on the right side as noted above, she has frequent falls.  She did not sustain any significant injuries.   She recieved a new  SCOOTER, and it is helping her greatly.  She uses it for linger distances, but tries to walk as much as possible leslye. Inside the house, with her braces, and  RW with seat.    She is here for follow up, and chronic pain management with opiates.    Past Medical History:   Diagnosis Date    Allergy     Anemia 10/20/2014    Arthritis     Atherosclerosis of artery of right lower extremity: see xray 07    Diabetes mellitus     Diabetic neuropathy 2012    Gait disorder 2012    High cholesterol     History of poliomyelitis 2012    Hyperlipidemia 2013    Hypertension     Obstructive sleep apnea syndrome: dx 2008 needs CPAP 11 2017    Osteopenia     Osteoporosis, unspecified 2014    Other specified anemias 2015    Post poliomyelitis syndrome 2012    Sleep apnea     Type II or unspecified type diabetes mellitus without mention of complication, not stated as uncontrolled 2015    Unspecified essential hypertension 2015       Past Surgical History:   Procedure Laterality Date    CATARACT EXTRACTION       SECTION      CHOLECYSTECTOMY      COLONOSCOPY N/A 2017    Procedure: COLONOSCOPY;  Surgeon: Karen Lebron MD;  Location: [address]);  Service: Endoscopy;  Laterality: N/A;    EYE SURGERY      FRACTURE SURGERY         Family History   Problem Relation Age of Onset    Diabetes Father     Heart disease Father     Heart attack Father     Heart disease Brother     No Known Problems Daughter     Diabetes Son     Heart disease Brother     Diabetes Daughter     Diabetes Daughter     Cataracts Neg Hx     Glaucoma Neg Hx     Hypertension Neg Hx     Cancer Neg Hx     Blindness Neg Hx     Amblyopia Neg Hx     Strabismus Neg Hx     Retinal detachment Neg Hx     Macular degeneration Neg Hx     Melanoma Neg Hx        Social History     Social History    Marital status:      Spouse name: N/A    Number of  children: 4    Years of education: N/A     Social History Main Topics    Smoking status: Never Smoker    Smokeless tobacco: Never Used    Alcohol use No    Drug use: No    Sexual activity: No     Other Topics Concern    Are You Pregnant Or Think You May Be? No     Social History Narrative    No narrative on file       Current Outpatient Prescriptions   Medication Sig Dispense Refill    amLODIPine (NORVASC) 10 MG tablet Take 1 tablet (10 mg total) by mouth once daily. 30 tablet 11    aspirin (ECOTRIN) 81 MG EC tablet Take 1 tablet (81 mg total) by mouth once daily. 30 tablet 12    blood sugar diagnostic Strp 1 strip by Misc.(Non-Drug; Combo Route) route 2 (two) times daily. TRUE RESULT SYSTEM 180 strip 4    blood-glucose meter (TRUERESULT BLOOD GLUCOSE SYSTM) kit Use as instructed 1 each 0    calcium-vitamin D3-vitamin K (VIACTIV) 500-100-40 mg-unit-mcg Chew Take 2 each by mouth.      desloratadine (CLARINEX) 5 mg tablet Take 5 mg by mouth once daily.      diclofenac (VOLTAREN) 75 MG EC tablet May take 1 tablet twice daily as needed only; take with food or a meal.  Stop if any stomach irritation 180 tablet 0    diclofenac sodium (VOLTAREN) 1 % Gel Apply 4 gm to both knees 3x/day. 500 g 4    docusate sodium (COLACE) 50 MG capsule Take 1 capsule (50 mg total) by mouth 2 (two) times daily as needed for Constipation.      gabapentin (NEURONTIN) 600 MG tablet Take 1 tablet (600 mg total) by mouth 4 (four) times daily with meals and nightly. 360 tablet 3    glipiZIDE (GLUCOTROL) 5 MG tablet TAKE 1 TABLET(5 MG) BY MOUTH EVERY DAY 90 tablet 3    hydrALAZINE (APRESOLINE) 50 MG tablet Take 1 tablet (50 mg total) by mouth every 8 (eight) hours.      HYDROcodone-acetaminophen (NORCO)  mg per tablet Take 1 tablet by mouth every 6 (six) hours as needed for Pain (pain). 120 tablet 0    [START ON 8/19/2018] HYDROcodone-acetaminophen (NORCO)  mg per tablet Take 1 tablet by mouth every 6 (six) hours  as needed for Pain (pain). 120 tablet 0    [START ON 9/19/2018] HYDROcodone-acetaminophen (NORCO)  mg per tablet Take 1 tablet by mouth every 6 (six) hours as needed for Pain (pain). 120 tablet 0    lancets Misc TEST 2 X DAILY PROSPER RESULT SYSTEM 180 each 3    metFORMIN (GLUCOPHAGE-XR) 500 MG 24 hr tablet Take 1 tablet (500 mg total) by mouth once daily. 90 tablet 3    multivitamin (THERAGRAN) per tablet Take 1 tablet by mouth once daily.       oxybutynin (DITROPAN) 5 MG Tab Take 1 tablet (5 mg total) by mouth 2 (two) times daily. 180 tablet 3    pravastatin (PRAVACHOL) 40 MG tablet Take 1.5 tablets (60 mg total) by mouth nightly. 135 tablet 3    pravastatin (PRAVACHOL) 40 MG tablet TAKE 1 AND 1/2 TABLETS(60 MG) BY MOUTH EVERY NIGHT 135 tablet 0    primidone (MYSOLINE) 50 MG Tab Take 1 tablet (50 mg total) by mouth every evening. 30 tablet 5    TRUEPLUS LANCETS 33 gauge Misc USE BID  3    valsartan-hydrochlorothiazide (DIOVAN-HCT) 320-25 mg per tablet TAKE 1 TABLET BY MOUTH EVERY DAY 30 tablet 2     No current facility-administered medications for this visit.        Review of patient's allergies indicates:  No Known Allergies    Review of Systems   Constitutional: Negative for appetite change, chills, fatigue, fever and unexpected weight change.   HENT: Negative for drooling, trouble swallowing and voice change.    Eyes: Negative for pain and visual disturbance.   Respiratory: Negative for shortness of breath and wheezing.    Cardiovascular: Negative for chest pain and palpitations.   Gastrointestinal: Negative for abdominal distention, abdominal pain, constipation and diarrhea.   Genitourinary: Negative for difficulty urinating.   Musculoskeletal: Positive for back pain, extremity weakness and neck pain. Negative for arthralgias, gait problem, joint swelling, myalgias and neck stiffness.               Skin: Negative for color change and rash.   Neurological: Negative for dizziness, facial asymmetry,  speech difficulty, weakness, light-headedness and numbness. Headaches:     Hematological: Negative for adenopathy.   Psychiatric/Behavioral: Negative for behavioral problems, confusion and sleep disturbance. The patient is not nervous/anxious.        Objective:      Physical Exam    Constitutional: She is oriented to person, place, and time.   She appears well-developed and well-nourished.   HENT:   Head: Normocephalic.   Eyes: EOM are normal.   Neck: Normal range of motion.   Cardiovascular: Normal rate, regular rhythm and normal heart sounds.   Pulmonary/Chest: Breath sounds normal.   Musculoskeletal: Normal range of motion.   BUE:  ROM:full.  Strength: 5/5 at shoulders, elbows & hands.  Sensation to pinprick: intact  BLE: ROM:full.  Strength:   RLE: HF 0/5, KE 0/5,   Ankle DF 2-, Ankle PF 3  LLE:   HF 3-, KE 3-.  Ankle DF 1,  Ankle PF 3  Leg length discrepancy ( left is shorter than right), wears Left AFO.   Sensation to pinprick: intact .   DTR: decreased.       Xray of Right knee ( 2014)  Showed:  Standing AP knees and lateral of the right knee and merchant view of both knees are submitted.    Advanced degenerative change seen in the tricompartmental areas of the right knee.    Left knee shows mild degenerative change.  Both patellas show significant lateral deviation    MRI of Lumbar spine  ( 2015) ;  L2-L3:There is a circumferential disk bulge with moderate bilateral facet osseous hypertrophy and ligamentum flavum buckling. This results and mild narrowing of the spinal canal. The bilateral neural foramen remain patent.  L3-L4: There is a circumferential disk bulge with left paracentral disk protrusion. This is associated with severe bilateral facet osseous hypertrophy, bilateral facet edema, and ligamentum flavum buckling.   These findings result in severe narrowing of the spinal canal and near complete obliteration of the left neural foramen.  The right neural foramen is moderately narrowed as well.  L4-L5:  There is a circumferential disk bulge with superimposed central disk protrusion. This is associated with severe bilateral facet osseous hypertrophy and ligamentum flavum buckling.   These findings result in severe narrowing of the spinal canal and bilateral neural foramina, left greater than right.     Assessment:       1. Paraparesis of both lower limbs    2. Spinal stenosis of lumbar region with neurogenic claudication    3. Post poliomyelitis syndrome    4. Lumbar spondylosis    5. Diabetic polyneuropathy associated with type 2 diabetes mellitus    6. Osteoarthritis of spine with radiculopathy, lumbar region    7. History of poliomyelitis    8. Lumbar spondylosis with myelopathy    9. Primary osteoarthritis of right knee    10. Gait disorder    11. 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 Type 2 diabetes mellitus with diabetic neuropathy, without long-term current use of insulin    13. Chronic bilateral low back pain with sciatica, sciatica laterality unspecified    14. Fall, sequela    15. Left lumbar radiculopathy    16. Lumbar radiculopathy    17. Chronic pain of right knee    18. Osteoporosis without current pathological fracture: worse on fosamax 5/16- Reclast 8/16       Plan:        Paraparesis of both lower limbs  -     HYDROcodone-acetaminophen (NORCO)  mg per tablet; Take 1 tablet by mouth every 6 (six) hours as needed for Pain (pain).  Dispense: 120 tablet; Refill: 0  -     HYDROcodone-acetaminophen (NORCO)  mg per tablet; Take 1 tablet by mouth every 6 (six) hours as needed for Pain (pain).  Dispense: 120 tablet; Refill: 0  -     HYDROcodone-acetaminophen (NORCO)  mg per tablet; Take 1 tablet by mouth every 6 (six) hours as needed for Pain (pain).  Dispense: 120 tablet; Refill: 0  -     gabapentin (NEURONTIN) 600 MG tablet; Take 1 tablet (600 mg total) by mouth 4 (four) times daily with meals and nightly.  Dispense: 360 tablet; Refill:  3    Spinal stenosis of lumbar region with neurogenic claudication  -     HYDROcodone-acetaminophen (NORCO)  mg per tablet; Take 1 tablet by mouth every 6 (six) hours as needed for Pain (pain).  Dispense: 120 tablet; Refill: 0  -     HYDROcodone-acetaminophen (NORCO)  mg per tablet; Take 1 tablet by mouth every 6 (six) hours as needed for Pain (pain).  Dispense: 120 tablet; Refill: 0  -     HYDROcodone-acetaminophen (NORCO)  mg per tablet; Take 1 tablet by mouth every 6 (six) hours as needed for Pain (pain).  Dispense: 120 tablet; Refill: 0  -     gabapentin (NEURONTIN) 600 MG tablet; Take 1 tablet (600 mg total) by mouth 4 (four) times daily with meals and nightly.  Dispense: 360 tablet; Refill: 3    Post poliomyelitis syndrome  -     HYDROcodone-acetaminophen (NORCO)  mg per tablet; Take 1 tablet by mouth every 6 (six) hours as needed for Pain (pain).  Dispense: 120 tablet; Refill: 0  -     HYDROcodone-acetaminophen (NORCO)  mg per tablet; Take 1 tablet by mouth every 6 (six) hours as needed for Pain (pain).  Dispense: 120 tablet; Refill: 0  -     HYDROcodone-acetaminophen (NORCO)  mg per tablet; Take 1 tablet by mouth every 6 (six) hours as needed for Pain (pain).  Dispense: 120 tablet; Refill: 0  -     gabapentin (NEURONTIN) 600 MG tablet; Take 1 tablet (600 mg total) by mouth 4 (four) times daily with meals and nightly.  Dispense: 360 tablet; Refill: 3    Lumbar spondylosis    Diabetic polyneuropathy associated with type 2 diabetes mellitus  -     HYDROcodone-acetaminophen (NORCO)  mg per tablet; Take 1 tablet by mouth every 6 (six) hours as needed for Pain (pain).  Dispense: 120 tablet; Refill: 0  -     HYDROcodone-acetaminophen (NORCO)  mg per tablet; Take 1 tablet by mouth every 6 (six) hours as needed for Pain (pain).  Dispense: 120 tablet; Refill: 0  -     HYDROcodone-acetaminophen (NORCO)  mg per tablet; Take 1 tablet by mouth every 6 (six) hours as  needed for Pain (pain).  Dispense: 120 tablet; Refill: 0  -     gabapentin (NEURONTIN) 600 MG tablet; Take 1 tablet (600 mg total) by mouth 4 (four) times daily with meals and nightly.  Dispense: 360 tablet; Refill: 3    Osteoarthritis of spine with radiculopathy, lumbar region  -     HYDROcodone-acetaminophen (NORCO)  mg per tablet; Take 1 tablet by mouth every 6 (six) hours as needed for Pain (pain).  Dispense: 120 tablet; Refill: 0  -     HYDROcodone-acetaminophen (NORCO)  mg per tablet; Take 1 tablet by mouth every 6 (six) hours as needed for Pain (pain).  Dispense: 120 tablet; Refill: 0  -     HYDROcodone-acetaminophen (NORCO)  mg per tablet; Take 1 tablet by mouth every 6 (six) hours as needed for Pain (pain).  Dispense: 120 tablet; Refill: 0  -     gabapentin (NEURONTIN) 600 MG tablet; Take 1 tablet (600 mg total) by mouth 4 (four) times daily with meals and nightly.  Dispense: 360 tablet; Refill: 3    History of poliomyelitis  -     HYDROcodone-acetaminophen (NORCO)  mg per tablet; Take 1 tablet by mouth every 6 (six) hours as needed for Pain (pain).  Dispense: 120 tablet; Refill: 0  -     HYDROcodone-acetaminophen (NORCO)  mg per tablet; Take 1 tablet by mouth every 6 (six) hours as needed for Pain (pain).  Dispense: 120 tablet; Refill: 0  -     HYDROcodone-acetaminophen (NORCO)  mg per tablet; Take 1 tablet by mouth every 6 (six) hours as needed for Pain (pain).  Dispense: 120 tablet; Refill: 0  -     gabapentin (NEURONTIN) 600 MG tablet; Take 1 tablet (600 mg total) by mouth 4 (four) times daily with meals and nightly.  Dispense: 360 tablet; Refill: 3    Lumbar spondylosis with myelopathy  -     HYDROcodone-acetaminophen (NORCO)  mg per tablet; Take 1 tablet by mouth every 6 (six) hours as needed for Pain (pain).  Dispense: 120 tablet; Refill: 0  -     HYDROcodone-acetaminophen (NORCO)  mg per tablet; Take 1 tablet by mouth every 6 (six) hours as needed for  Pain (pain).  Dispense: 120 tablet; Refill: 0  -     HYDROcodone-acetaminophen (NORCO)  mg per tablet; Take 1 tablet by mouth every 6 (six) hours as needed for Pain (pain).  Dispense: 120 tablet; Refill: 0  -     gabapentin (NEURONTIN) 600 MG tablet; Take 1 tablet (600 mg total) by mouth 4 (four) times daily with meals and nightly.  Dispense: 360 tablet; Refill: 3    Primary osteoarthritis of right knee  -     HYDROcodone-acetaminophen (NORCO)  mg per tablet; Take 1 tablet by mouth every 6 (six) hours as needed for Pain (pain).  Dispense: 120 tablet; Refill: 0  -     HYDROcodone-acetaminophen (NORCO)  mg per tablet; Take 1 tablet by mouth every 6 (six) hours as needed for Pain (pain).  Dispense: 120 tablet; Refill: 0  -     HYDROcodone-acetaminophen (NORCO)  mg per tablet; Take 1 tablet by mouth every 6 (six) hours as needed for Pain (pain).  Dispense: 120 tablet; Refill: 0  -     gabapentin (NEURONTIN) 600 MG tablet; Take 1 tablet (600 mg total) by mouth 4 (four) times daily with meals and nightly.  Dispense: 360 tablet; Refill: 3    Gait disorder  -     HYDROcodone-acetaminophen (NORCO)  mg per tablet; Take 1 tablet by mouth every 6 (six) hours as needed for Pain (pain).  Dispense: 120 tablet; Refill: 0  -     HYDROcodone-acetaminophen (NORCO)  mg per tablet; Take 1 tablet by mouth every 6 (six) hours as needed for Pain (pain).  Dispense: 120 tablet; Refill: 0  -     HYDROcodone-acetaminophen (NORCO)  mg per tablet; Take 1 tablet by mouth every 6 (six) hours as needed for Pain (pain).  Dispense: 120 tablet; Refill: 0  -     gabapentin (NEURONTIN) 600 MG tablet; Take 1 tablet (600 mg total) by mouth 4 (four) times daily with meals and nightly.  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  Dispense: 120 tablet; Refill: 0  -     HYDROcodone-acetaminophen (NORCO)  mg per tablet; Take 1 tablet by mouth every 6 (six) hours as needed for Pain (pain).  Dispense: 120 tablet; Refill: 0  -     HYDROcodone-acetaminophen (NORCO)  mg per tablet; Take 1 tablet by mouth every 6 (six) hours as needed for Pain (pain).  Dispense: 120 tablet; Refill: 0  -     gabapentin (NEURONTIN) 600 MG tablet; Take 1 tablet (600 mg total) by mouth 4 (four) times daily with meals and nightly.  Dispense: 360 tablet; Refill: 3    Type 2 diabetes mellitus with diabetic neuropathy, without long-term current use of insulin  -     HYDROcodone-acetaminophen (NORCO)  mg per tablet; Take 1 tablet by mouth every 6 (six) hours as needed for Pain (pain).  Dispense: 120 tablet; Refill: 0  -     HYDROcodone-acetaminophen (NORCO)  mg per tablet; Take 1 tablet by mouth every 6 (six) hours as needed for Pain (pain).  Dispense: 120 tablet; Refill: 0  -     HYDROcodone-acetaminophen (NORCO)  mg per tablet; Take 1 tablet by mouth every 6 (six) hours as needed for Pain (pain).  Dispense: 120 tablet; Refill: 0  -     gabapentin (NEURONTIN) 600 MG tablet; Take 1 tablet (600 mg total) by mouth 4 (four) times daily with meals and nightly.  Dispense: 360 tablet; Refill: 3    Chronic bilateral low back pain with sciatica, sciatica laterality unspecified  -     HYDROcodone-acetaminophen (NORCO)  mg per tablet; Take 1 tablet by mouth every 6 (six) hours as needed for Pain (pain).  Dispense: 120 tablet; Refill: 0  -     HYDROcodone-acetaminophen (NORCO)  mg per tablet; Take 1 tablet by mouth every 6 (six) hours as needed for Pain (pain).  Dispense: 120 tablet; Refill: 0  -     HYDROcodone-acetaminophen (NORCO)  mg per tablet; Take 1 tablet by mouth every 6 (six) hours as needed for Pain (pain).  Dispense: 120 tablet; Refill: 0  -     gabapentin (NEURONTIN) 600 MG tablet; Take 1 tablet (600 mg  total) by mouth 4 (four) times daily with meals and nightly.  Dispense: 360 tablet; Refill: 3    Fall, sequela  -     HYDROcodone-acetaminophen (NORCO)  mg per tablet; Take 1 tablet by mouth every 6 (six) hours as needed for Pain (pain).  Dispense: 120 tablet; Refill: 0  -     HYDROcodone-acetaminophen (NORCO)  mg per tablet; Take 1 tablet by mouth every 6 (six) hours as needed for Pain (pain).  Dispense: 120 tablet; Refill: 0  -     HYDROcodone-acetaminophen (NORCO)  mg per tablet; Take 1 tablet by mouth every 6 (six) hours as needed for Pain (pain).  Dispense: 120 tablet; Refill: 0  -     gabapentin (NEURONTIN) 600 MG tablet; Take 1 tablet (600 mg total) by mouth 4 (four) times daily with meals and nightly.  Dispense: 360 tablet; Refill: 3    Left lumbar radiculopathy  -     HYDROcodone-acetaminophen (NORCO)  mg per tablet; Take 1 tablet by mouth every 6 (six) hours as needed for Pain (pain).  Dispense: 120 tablet; Refill: 0  -     HYDROcodone-acetaminophen (NORCO)  mg per tablet; Take 1 tablet by mouth every 6 (six) hours as needed for Pain (pain).  Dispense: 120 tablet; Refill: 0  -     HYDROcodone-acetaminophen (NORCO)  mg per tablet; Take 1 tablet by mouth every 6 (six) hours as needed for Pain (pain).  Dispense: 120 tablet; Refill: 0  -     gabapentin (NEURONTIN) 600 MG tablet; Take 1 tablet (600 mg total) by mouth 4 (four) times daily with meals and nightly.  Dispense: 360 tablet; Refill: 3    Lumbar radiculopathy  -     HYDROcodone-acetaminophen (NORCO)  mg per tablet; Take 1 tablet by mouth every 6 (six) hours as needed for Pain (pain).  Dispense: 120 tablet; Refill: 0  -     HYDROcodone-acetaminophen (NORCO)  mg per tablet; Take 1 tablet by mouth every 6 (six) hours as needed for Pain (pain).  Dispense: 120 tablet; Refill: 0  -     HYDROcodone-acetaminophen (NORCO)  mg per tablet; Take 1 tablet by mouth every 6 (six) hours as needed for Pain (pain).   Dispense: 120 tablet; Refill: 0  -     gabapentin (NEURONTIN) 600 MG tablet; Take 1 tablet (600 mg total) by mouth 4 (four) times daily with meals and nightly.  Dispense: 360 tablet; Refill: 3    Chronic pain of right knee  -     HYDROcodone-acetaminophen (NORCO)  mg per tablet; Take 1 tablet by mouth every 6 (six) hours as needed for Pain (pain).  Dispense: 120 tablet; Refill: 0  -     HYDROcodone-acetaminophen (NORCO)  mg per tablet; Take 1 tablet by mouth every 6 (six) hours as needed for Pain (pain).  Dispense: 120 tablet; Refill: 0  -     HYDROcodone-acetaminophen (NORCO)  mg per tablet; Take 1 tablet by mouth every 6 (six) hours as needed for Pain (pain).  Dispense: 120 tablet; Refill: 0  -     gabapentin (NEURONTIN) 600 MG tablet; Take 1 tablet (600 mg total) by mouth 4 (four) times daily with meals and nightly.  Dispense: 360 tablet; Refill: 3    Osteoporosis without current pathological fracture: worse on fosamax 5/16- Reclast 8/16  -     HYDROcodone-acetaminophen (NORCO)  mg per tablet; Take 1 tablet by mouth every 6 (six) hours as needed for Pain (pain).  Dispense: 120 tablet; Refill: 0  -     HYDROcodone-acetaminophen (NORCO)  mg per tablet; Take 1 tablet by mouth every 6 (six) hours as needed for Pain (pain).  Dispense: 120 tablet; Refill: 0  -     HYDROcodone-acetaminophen (NORCO)  mg per tablet; Take 1 tablet by mouth every 6 (six) hours as needed for Pain (pain).  Dispense: 120 tablet; Refill: 0  -     gabapentin (NEURONTIN) 600 MG tablet; Take 1 tablet (600 mg total) by mouth 4 (four) times daily with meals and nightly.  Dispense: 360 tablet; Refill: 3      Patient with C.palsy, with  Paraparesis., severe Lumbar spinal stenosis at L3-4, and L4-5, with  Leg length discrepancy ( left is shorter than right),  wears Left AFO, and has more proximal strength in LLE, than in RLE .   Also with severe DJD , genu valgus in R knee ( cannot toleate custom made  knee brace,  nor [place] knee cage).   She also wears  neoprene sleee brace , that stabilizes knee, and improves proprioception, helps with gait.    1. Back pain   Will resume Hydrocodone 10/325 mg, takes 3-4 x/day, and Neurontin 600 mg, po QID.   Opioid Risk Score       Value Time User    Opioid Risk Score  0 4/11/2018 10:39 AM Krista Burger MD         reviewed and appropriate.    Patient refuses neurosurgical evaluation, and  ASHLEY to back.     2. Right knee pain, secondary to advanced tricompartmental DJD,   refusing Ortho consult, does not want knee replacement.   Wears brace with genu recurvatum and valgus in R knee,   will order a new  HME -other, hinge knee brace.   Voltaren gel topical.     RTC in 3 months..    Total time spent face to face with patient was 25 minutes.   More than 50% of that time was spent in counseling on diagnosis , prognosis and treatment options.   I also caunsel patient  on common and most usual side effect of prescribed medications.   Risk and benefits of opiates, possible risk of developing opiate dependence and tolerance, need of strict compliance with prescribed medications.  I reviewed Primary care , and other specialty's notes to better coordinate patient's  care.   All questions were answered, and patient voiced understanding.

## 2018-07-19 NOTE — TELEPHONE ENCOUNTER
Patient seen in clinic today.  Patient is due RTC 10/18/18.  Appointment not julio  Patient will  call herself to schedule appointment after she reschedule her  Podiatry appointment, same day.  Office number given to patient.

## 2018-07-24 ENCOUNTER — PATIENT OUTREACH (OUTPATIENT)
Dept: OTHER | Facility: OTHER | Age: 78
End: 2018-07-24

## 2018-07-24 NOTE — PROGRESS NOTES
HPI:  Called patient for follow-up. States that she is doing well and continues to tolerate amlodipine. Valsartan was not affected by the recall and patient picked up 90-day supply on 6/28/18. Reviewed readings and patient denies s/s of hypertension. States that she has been working to implement the technique changes discussed with the HC GABRIEL Chicas. Patient endorses adherence to medication regimen with no complaints.     Last 5 Patient Entered Readings                                      Current 30 Day Average: 144/65     Recent Readings 7/24/2018 7/23/2018 7/19/2018 7/18/2018 7/18/2018    SBP (mmHg) 134 149 127 153 160    DBP (mmHg) 66 69 49 70 109    Pulse 69 69 73 65 76        Assessment/Plan:  Per 30-day average blood pressure is not at goal and has trended up since last encounter despite maximizing amlodipine. Elevated readings previously addressed by HC and technique corrected. The last 3 readings since have been closer to goal. Reminded patient to assess battery status on cuff as well. Continue current regimen. Will continue monitoring and follow-up 6 in weeks, sooner if blood pressure begins to trend upward or downward.     Current medication regimen:  Hypertension Medications             amLODIPine (NORVASC) 10 MG tablet Take 1 tablet (10 mg total) by mouth once daily.    hydrALAZINE (APRESOLINE) 50 MG tablet Take 1 tablet (50 mg total) by mouth every 8 (eight) hours.    valsartan-hydrochlorothiazide (DIOVAN-HCT) 320-25 mg per tablet TAKE 1 TABLET BY MOUTH EVERY DAY

## 2018-07-25 ENCOUNTER — OFFICE VISIT (OUTPATIENT)
Dept: INTERNAL MEDICINE | Facility: CLINIC | Age: 78
End: 2018-07-25
Payer: MEDICARE

## 2018-07-25 VITALS
HEIGHT: 60 IN | WEIGHT: 171.06 LBS | SYSTOLIC BLOOD PRESSURE: 130 MMHG | DIASTOLIC BLOOD PRESSURE: 80 MMHG | BODY MASS INDEX: 33.58 KG/M2 | HEART RATE: 72 BPM

## 2018-07-25 DIAGNOSIS — E66.09 CLASS 1 OBESITY DUE TO EXCESS CALORIES WITH SERIOUS COMORBIDITY AND BODY MASS INDEX (BMI) OF 30.0 TO 30.9 IN ADULT: ICD-10-CM

## 2018-07-25 DIAGNOSIS — N18.30 STAGE 3 CHRONIC KIDNEY DISEASE: ICD-10-CM

## 2018-07-25 DIAGNOSIS — T50.905A BRADYCARDIA, DRUG INDUCED: ICD-10-CM

## 2018-07-25 DIAGNOSIS — Z00.00 ENCOUNTER FOR PREVENTIVE HEALTH EXAMINATION: Primary | ICD-10-CM

## 2018-07-25 DIAGNOSIS — E11.42 DIABETIC POLYNEUROPATHY ASSOCIATED WITH TYPE 2 DIABETES MELLITUS: ICD-10-CM

## 2018-07-25 DIAGNOSIS — M47.816 FACET ARTHRITIS OF LUMBAR REGION: ICD-10-CM

## 2018-07-25 DIAGNOSIS — E11.21 TYPE 2 DIABETES MELLITUS WITH DIABETIC NEPHROPATHY, WITHOUT LONG-TERM CURRENT USE OF INSULIN: ICD-10-CM

## 2018-07-25 DIAGNOSIS — I10 SEVERE HYPERTENSION: ICD-10-CM

## 2018-07-25 DIAGNOSIS — R00.1 BRADYCARDIA, DRUG INDUCED: ICD-10-CM

## 2018-07-25 DIAGNOSIS — I70.201 ATHEROSCLEROSIS OF ARTERY OF RIGHT LOWER EXTREMITY: ICD-10-CM

## 2018-07-25 DIAGNOSIS — E78.2 MIXED HYPERLIPIDEMIA: ICD-10-CM

## 2018-07-25 DIAGNOSIS — G25.0 ESSENTIAL TREMOR: ICD-10-CM

## 2018-07-25 DIAGNOSIS — D64.9 ANEMIA, UNSPECIFIED TYPE: ICD-10-CM

## 2018-07-25 DIAGNOSIS — E11.40 TYPE 2 DIABETES MELLITUS WITH DIABETIC NEUROPATHY, WITHOUT LONG-TERM CURRENT USE OF INSULIN: ICD-10-CM

## 2018-07-25 DIAGNOSIS — G14 POST POLIOMYELITIS SYNDROME: ICD-10-CM

## 2018-07-25 DIAGNOSIS — G47.33 OBSTRUCTIVE SLEEP APNEA SYNDROME: ICD-10-CM

## 2018-07-25 DIAGNOSIS — R26.9 GAIT DISORDER: ICD-10-CM

## 2018-07-25 DIAGNOSIS — M81.0 AGE-RELATED OSTEOPOROSIS WITHOUT CURRENT PATHOLOGICAL FRACTURE: ICD-10-CM

## 2018-07-25 DIAGNOSIS — D75.9 CYTOPENIA: ICD-10-CM

## 2018-07-25 DIAGNOSIS — Z86.12 HISTORY OF POLIOMYELITIS: ICD-10-CM

## 2018-07-25 DIAGNOSIS — M48.061 SPINAL STENOSIS OF LUMBAR REGION, UNSPECIFIED WHETHER NEUROGENIC CLAUDICATION PRESENT: ICD-10-CM

## 2018-07-25 DIAGNOSIS — G82.20 PARAPARESIS OF BOTH LOWER LIMBS: ICD-10-CM

## 2018-07-25 PROCEDURE — 3079F DIAST BP 80-89 MM HG: CPT | Mod: CPTII,S$GLB,, | Performed by: NURSE PRACTITIONER

## 2018-07-25 PROCEDURE — 99499 UNLISTED E&M SERVICE: CPT | Mod: S$GLB,,, | Performed by: NURSE PRACTITIONER

## 2018-07-25 PROCEDURE — G0439 PPPS, SUBSEQ VISIT: HCPCS | Mod: S$GLB,,, | Performed by: NURSE PRACTITIONER

## 2018-07-25 PROCEDURE — 3075F SYST BP GE 130 - 139MM HG: CPT | Mod: CPTII,S$GLB,, | Performed by: NURSE PRACTITIONER

## 2018-07-25 PROCEDURE — 99999 PR PBB SHADOW E&M-EST. PATIENT-LVL V: CPT | Mod: PBBFAC,,, | Performed by: NURSE PRACTITIONER

## 2018-07-25 NOTE — PATIENT INSTRUCTIONS
Counseling and Referral of Other Preventative  (Italic type indicates deductible and co-insurance are waived)    Patient Name: Emilia Alan  Today's Date: 7/26/2018    Health Maintenance       Date Due Completion Date    Sign Pain Contract 09/18/1958 ---    Influenza Vaccine 08/01/2018 11/6/2017    Eye Exam 10/23/2018 10/23/2017    Override on 10/23/2017: Done    Hemoglobin A1c 11/11/2018 5/11/2018    Foot Exam 01/11/2019 1/11/2018 (Done)    Override on 1/11/2018: Done    Override on 4/6/2017: Done    Override on 4/29/2016: Done (Podiatry)    Override on 7/29/2015: Done    Override on 12/1/2014: Done    Lipid Panel 05/11/2019 5/11/2018    Mammogram 05/14/2020 5/14/2018    DEXA SCAN 05/28/2021 5/28/2018    Override on 5/17/2011: Done    Colonoscopy 06/14/2027 6/14/2017    Override on 9/29/2006: Done    TETANUS VACCINE 12/14/2027 12/14/2017        No orders of the defined types were placed in this encounter.    The following information is provided to all patients.  This information is to help you find resources for any of the problems found today that may be affecting your health:                Living healthy guide: www.[facility].[place].gov      Understanding Diabetes: www.diabetes.org      Eating healthy: www.cdc.gov/healthyweight      CDC home safety checklist: www.cdc.gov/steadi/patient.html      Agency on Aging: www.goea.[place].gov      Alcoholics anonymous (AA): www.aa.org      Physical Activity: www.court.nih.gov/ie0dxsr      Tobacco use: www.quitwithusla.org     Taking Your Blood Pressure  Blood pressure is the force of blood against the artery wall as it moves from the heart through the blood vessels. You can take your own blood pressure reading using a digital monitor. Take your readings the same each time, using the same arm. Take readings as often as your healthcare provider instructs.  About blood pressure monitors  Blood pressure monitors are designed for certain ages and cases. You can find monitors  for older adults, for pregnant women, and for children. Make sure the one you choose is the right one for your age and situation.  The American Heart Association recommends an automatic cuff monitor that fits on your upper arm (bicep). The cuff should fit your arm size. A cuff thats too large or too small will not give an accurate reading. Measure around your upper arm to find your size.  Monitors that attach to your finger or wrist are not as accurate as monitors for your upper arm.  Ask your healthcare provider for help in choosing a monitor. Bring your monitor to your next provider visit if you need help in using it the correct way.  The steps below are general instructions for using an automatic digital monitor.  Step 1. Relax    · Take your blood pressure at the same time every day, such as in the morning or evening, or at the time your healthcare provider recommends.  · Wait at least a half-hour after smoking, eating, or exercising. Don't drink coffee, tea, soda, or other caffeinated beverages before checking your blood pressure.  · Sit comfortably at a table with both feet on the floor. Do not cross your legs or feet. Place the monitor near you.  · Rest for a few minutes before you begin.  Step 2. Wrap the cuff    · Place your arm on the table, palm up. Your arm should be at the level of your heart. Wrap the cuff around your upper arm, just above your elbow. Its best done on bare skin, not over clothing. Most cuffs will indicate where the brachial artery (the blood vessel in the middle of the arm at the inner side of the elbow) should line up with the cuff. Look in your monitor's instruction booklet for an illustration. You can also bring your cuff to your healthcare provider and have them show you how to correctly place the cuff.  Step 3. Inflate the cuff    · Push the button that starts the pump.  · The cuff will tighten, then loosen.  · The numbers will change. When they stop changing, your blood  pressure reading will appear.  · Take 2 or 3 readings one minute apart.  Step 4. Write down the results of each reading    · Write down your blood pressure numbers for each reading. Note the date and time. Keep your results in one place, such as a notebook. Even if your monitor has a built-in memory, keep a hard copy of the readings.  · Remove the cuff from your arm. Turn off the machine.  · Bring your blood pressure records with your healthcare providers at each visit.  · If you start a new blood pressure medicine, note the day you started the new medicine. Also note the day if you change the dose of your medicine. This information goes on your blood pressure recording sheet. This will help your healthcare provider monitor how well the medicine changes are working.  · Ask your healthcare provider what numbers should prompt you to call him or her. Also ask what numbers should prompt you to get help right away.  Date Last Reviewed: 11/1/2016  © 3404-8285 [employer], [employer]. [address] [place] [address], [place] [address]. All rights reserved. This information is not intended as a substitute for professional medical care. Always follow your healthcare professional's instructions.

## 2018-07-26 PROBLEM — M47.816 FACET ARTHRITIS OF LUMBAR REGION: Status: ACTIVE | Noted: 2018-07-26

## 2018-07-26 PROBLEM — N18.2 STAGE 2 CHRONIC KIDNEY DISEASE: Status: ACTIVE | Noted: 2018-07-26

## 2018-07-26 PROBLEM — E66.811 CLASS 1 OBESITY DUE TO EXCESS CALORIES IN ADULT: Status: ACTIVE | Noted: 2018-07-26

## 2018-07-26 PROBLEM — E11.29 TYPE 2 DIABETES MELLITUS WITH RENAL MANIFESTATIONS: Status: ACTIVE | Noted: 2018-07-26

## 2018-07-26 PROBLEM — N18.30 STAGE 3 CHRONIC KIDNEY DISEASE: Status: ACTIVE | Noted: 2018-07-26

## 2018-07-26 PROBLEM — E66.09 CLASS 1 OBESITY DUE TO EXCESS CALORIES IN ADULT: Status: ACTIVE | Noted: 2018-07-26

## 2018-07-26 NOTE — PROGRESS NOTES
Emilia Alan presented for a  Medicare AWV and comprehensive Health Risk Assessment today. The following components were reviewed and updated:    · Medical history  · Family History  · Social history  · Allergies and Current Medications  · Health Risk Assessment  · Health Maintenance  · Care Team     ** See Completed Assessments for Annual Wellness Visit within the encounter summary.**       The following assessments were completed:  · Living Situation  · CAGE  · Depression Screening  · Timed Get Up and Go  · Whisper Test  · Cognitive Function Screening  ·   ·   ·   · Nutrition Screening  · ADL Screening  · PAQ Screening    Vitals:    07/25/18 1507   BP: 130/80   BP Location: Left arm   Pulse: 72   Weight: 77.6 kg (171 lb 1.2 oz)   Height: 5' (1.524 m)     Body mass index is 33.41 kg/m².  Physical Exam   Constitutional: She is oriented to person, place, and time.   Obese   HENT:   Head: Normocephalic.   Cardiovascular: Normal rate and regular rhythm.    Pulmonary/Chest: Effort normal and breath sounds normal.   Abdominal: Soft. Bowel sounds are normal.   Obese   Musculoskeletal: Normal range of motion. She exhibits no edema.   In wheelchair today, bilateral braces lower legs   Neurological: She is alert and oriented to person, place, and time.   Skin: Skin is warm and dry.   Psychiatric: She has a normal mood and affect.   Nursing note and vitals reviewed.        Diagnoses and health risks identified today and associated recommendations/orders:    1. Encounter for preventive health examination  Here for Health Risk Assessment/Annual Wellness Visit.  Health maintenance reviewed and updated. Follow up in one year.    2. Severe hypertension  Chronic, stable on current medications. Followed by PCP, Digital Hypertension.    3. Atherosclerosis of artery of right lower extremity: see xray 4/4/07  Chronic, stable on current medications. Followed by PCP, Cardiology.    4. Bradycardia, drug induced  Stable. Followed by PCP,  Cardiology.    5. Mixed hyperlipidemia  Chronic, stable on current medications. Followed by PCP, Cardiology.    6. Type 2 diabetes mellitus with diabetic neuropathy, without long-term current use of insulin  Chronic, stable on current medications. Followed by PCP.    7. Diabetic polyneuropathy associated with type 2 diabetes mellitus  Chronic, stable on current medications. Followed by PCP.    8. Type 2 diabetes mellitus with diabetic nephropathy, without long-term current use of insulin  Chronic, stable on current medications. Followed by PCP.    9. Stage 3 chronic kidney disease  Chronic, stable. Followed by PCP.    10. History of poliomyelitis  Stable.  Followed by PCP, Physical Medicine..    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 Followed by PCP, Physical Medicine..    12. Paraparesis of both lower limbs  Chronic, stable. Followed by PCP, Physical Medicine.    13. Gait disorder  Chronic, stable.  Followed by PCP, Physical medicine. .    14. Essential tremor  Chronic, reports that primidone made tremor worse and she is not taking. Has appointment with Neurology on 8/14.    15. Cytopenia  Chronic, stable. Followed by PCP, Hematology.    16. Anemia, unspecified type  Chronic, stable.  Followed by PCP, Hematology.    17. Obstructive sleep apnea syndrome: dx 2008 needs CPAP 11  Chronic, stable. Followed by Sleep Medicine.    18. Osteoporosis without current pathological fracture: worse on fosamax 5/16- Reclast 8/16  Chronic, stable on current medications. Followed by PCP.    19. Spinal stenosis of lumbar region, unspecified whether neurogenic claudication present  Chronic, stable on current medications. Followed by PCP, Physical Medicine..    20. Facet arthritis of lumbar region  Chronic, stable on current medications. Noted Lumbar XR 6/14/14. Followed by PCP, Physical Medicine.    21. Class 1 obesity due to excess calories with serious comorbidity and body mass index (BMI) of 30.0 to 30.9 in  adult  Chronic, weight trending upward. Reinforced diet/exercise per PCP recommendations.  Followed by PCP.      Provided Emilia with a 5-10 year written screening schedule and personal prevention plan. Recommendations were developed using the USPSTF age appropriate recommendations. Education, counseling, and referrals were provided as needed. After Visit Summary printed and given to patient which includes a list of additional screenings\tests needed.    Follow-up in about 7 weeks (around 9/14/2018).with PCP    Zoë Sousa NP

## 2018-08-09 ENCOUNTER — PATIENT OUTREACH (OUTPATIENT)
Dept: OTHER | Facility: OTHER | Age: 78
End: 2018-08-09

## 2018-08-09 NOTE — PROGRESS NOTES
"Last 5 Patient Entered Readings                                      Current 30 Day Average: 147/66     Recent Readings 8/8/2018 8/5/2018 8/4/2018 8/3/2018 8/2/2018    SBP (mmHg) 163 149 156 136 127    DBP (mmHg) 72 74 74 64 57    Pulse 75 76 71 73 87          Digital Medicine: Health  Follow Up    Lifestyle Modifications:    1.Dietary Modifications (Sodium intake <2,000mg/day, food labels, dining out): Patient reports reading the sodium manual her clinical pharmacist. She states reading that often. Patient reports having her diet well-controlled and feels good where she is at. Encouraged patient to continue her low-sodium diet approach.      2.Physical Activity: Patient reports she has not been walking much at all. Patient requested chair exercise information via "[employer] mail." Sent [email or web address] chair exercise information in mail.     3.Medication Therapy: Patient has been compliant with the medication regimen.    4.Patient has the following medication side effects/concerns:   (Frequency/Alleviating factors/Precipitating factors, etc.)     Follow up with Mrs. Emilia Alan completed. No further questions or concerns. Will continue follow up to achieve health goals.  "

## 2018-08-14 ENCOUNTER — OFFICE VISIT (OUTPATIENT)
Dept: NEUROLOGY | Facility: CLINIC | Age: 78
End: 2018-08-14
Payer: MEDICARE

## 2018-08-14 VITALS
WEIGHT: 162 LBS | BODY MASS INDEX: 31.8 KG/M2 | HEART RATE: 77 BPM | HEIGHT: 60 IN | SYSTOLIC BLOOD PRESSURE: 141 MMHG | DIASTOLIC BLOOD PRESSURE: 65 MMHG

## 2018-08-14 DIAGNOSIS — E11.40 TYPE 2 DIABETES MELLITUS WITH DIABETIC NEUROPATHY, WITHOUT LONG-TERM CURRENT USE OF INSULIN: ICD-10-CM

## 2018-08-14 DIAGNOSIS — I10 SEVERE HYPERTENSION: ICD-10-CM

## 2018-08-14 DIAGNOSIS — R26.9 GAIT DISORDER: ICD-10-CM

## 2018-08-14 DIAGNOSIS — G25.0 ESSENTIAL TREMOR: Primary | ICD-10-CM

## 2018-08-14 DIAGNOSIS — G14 POST POLIOMYELITIS SYNDROME: ICD-10-CM

## 2018-08-14 DIAGNOSIS — G82.20 PARAPARESIS OF BOTH LOWER LIMBS: ICD-10-CM

## 2018-08-14 DIAGNOSIS — Z86.12 HISTORY OF POLIOMYELITIS: ICD-10-CM

## 2018-08-14 DIAGNOSIS — E11.42 DIABETIC POLYNEUROPATHY ASSOCIATED WITH TYPE 2 DIABETES MELLITUS: ICD-10-CM

## 2018-08-14 DIAGNOSIS — E78.2 MIXED HYPERLIPIDEMIA: ICD-10-CM

## 2018-08-14 PROCEDURE — 3077F SYST BP >= 140 MM HG: CPT | Mod: CPTII,S$GLB,, | Performed by: PSYCHIATRY & NEUROLOGY

## 2018-08-14 PROCEDURE — 99215 OFFICE O/P EST HI 40 MIN: CPT | Mod: S$GLB,,, | Performed by: PSYCHIATRY & NEUROLOGY

## 2018-08-14 PROCEDURE — 99999 PR PBB SHADOW E&M-EST. PATIENT-LVL IV: CPT | Mod: PBBFAC,,, | Performed by: PSYCHIATRY & NEUROLOGY

## 2018-08-14 PROCEDURE — 3078F DIAST BP <80 MM HG: CPT | Mod: CPTII,S$GLB,, | Performed by: PSYCHIATRY & NEUROLOGY

## 2018-08-15 ENCOUNTER — OFFICE VISIT (OUTPATIENT)
Dept: ENDOCRINOLOGY | Facility: CLINIC | Age: 78
End: 2018-08-15
Payer: MEDICARE

## 2018-08-15 VITALS
BODY MASS INDEX: 33.07 KG/M2 | HEIGHT: 60 IN | SYSTOLIC BLOOD PRESSURE: 132 MMHG | HEART RATE: 78 BPM | WEIGHT: 168.44 LBS | DIASTOLIC BLOOD PRESSURE: 66 MMHG

## 2018-08-15 DIAGNOSIS — M81.0 OSTEOPOROSIS, UNSPECIFIED OSTEOPOROSIS TYPE, UNSPECIFIED PATHOLOGICAL FRACTURE PRESENCE: Primary | ICD-10-CM

## 2018-08-15 PROCEDURE — 99214 OFFICE O/P EST MOD 30 MIN: CPT | Mod: S$GLB,,, | Performed by: INTERNAL MEDICINE

## 2018-08-15 PROCEDURE — 99499 UNLISTED E&M SERVICE: CPT | Mod: HCNC,S$GLB,, | Performed by: INTERNAL MEDICINE

## 2018-08-15 PROCEDURE — 3078F DIAST BP <80 MM HG: CPT | Mod: CPTII,S$GLB,, | Performed by: INTERNAL MEDICINE

## 2018-08-15 PROCEDURE — 99999 PR PBB SHADOW E&M-EST. PATIENT-LVL V: CPT | Mod: PBBFAC,,, | Performed by: INTERNAL MEDICINE

## 2018-08-15 PROCEDURE — 3075F SYST BP GE 130 - 139MM HG: CPT | Mod: CPTII,S$GLB,, | Performed by: INTERNAL MEDICINE

## 2018-08-15 NOTE — PROGRESS NOTES
Ms. Alan is a 77 y.o. F with history of DM2, here for followup. Tracie as a child, at home uses walker but not very mobile.     Pt is new to me, previously seen by other providers in our clinic - Dr. Alcaraz.     Regarding osteoporosis:   Per medical record she was on nasal calcitonin for four years, then she was switched to Fosamax ~2012 -2016. Then we switched her on reclast due to inadequate response to fosamax - doses 2016, late 2017.  Last DXA in May 2018 showed osteopenia (lowest T -1.8 femoral neck) w  BMD at the lumbar spine increased, and the BMD at the total hip remaining stable.  Has history of multiple fractures (notably to the left wrist and left tibia).  Denies chronic glucocorticoid use, history of RA, history of smoking, or current EtOH consumption.  Denies family history of hip fractures.  Review of labs show - SPEP 2018 wnl, GFR 50s to 60s or above, calcium in mid normal range, PTH wnl in the past, phos wnl, TFTs wnl.  No recent vitamin D.   No significant calcium or vitamin D intake    Regarding DM2:  Glipizide 5mg daily  Metformin 500mg once daily XR  Fasting FS (no log) low 100s, A1c in 6's  No hpoglycemia, no episodes of diaphoresis or tremulousness.   Also with history of type 2 DM for about 15 years.     Assessment and Plan:  Ms. Alan is a 77 y.o. F with history of DM2, here for followup.    Osteoporosis:   Risk factors include postmenopausal state, female, chronic immobility   BMD stable, given prolonged course on bisphosphonates will opt to do drug holiday  2000 units of vitamin D daily  Calcium 500-600mg twice daily    DM2: A1c controlled, continue current regimen    RTC in 2 years - call ahead for new DXA and lab orders    May 2018  CLINICAL HISTORY:  76 y/o female with a history of several fracture - 10 years ago broke L wrist  She had menopausal symptoms at 51 y/o.  She is taking Calcium, Vit D and Reclast.  She has a history of Diabetes.  She does not exercise or smoke.    Age-related osteoporosis without current pathological fracture    TECHNIQUE:  DXA specification: Main [place] [employer] Horizon [id number].    Bone Mineral Density scanning was performed over the hip and lumbar spine. Review of the images confirms satisfactory positioning and technique.    COMPARISON:  Comparison study done on 05/17/2016. Lumbar spine BMD 1.217 g/cm2 and the T-score 1.5.  The Total Hip BMD 0.841 g/cm2 and the T-score -0.8.    FINDINGS:  Lumbar Spine: Lumbar bone mineral density L1-L4 is 1.483g/cm2, which is a T-score of 4.0. The Z-score is 6.5.    Total Hip: Total hip bone mineral density is 0.842g/cm2.  The T-score is -0.8, and the Z-score is 1.1.    Femoral neck: Bone mineral density is 0.645g/cm2 and the T-score is -1.8 and the Z-score is 0.4 g/cm2.    There is a  19% risk of a major osteoporotic fracture and a 4.4% risk of hip fracture in the next 10 years (FRAX).    Compared with previous DXA, BMD at the lumbar spine has increased, and the BMD at the total hip has remained stable.      Impression       Osteopenia -- FRAX supports treatment.       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 she has never used  smokeless tobacco. She reports that she does not drink alcohol or use drugs.    Family History   Problem Relation Age of Onset    Diabetes Father     Heart disease Father     Heart attack Father     Heart disease Brother     No Known Problems Daughter     Diabetes Son     Heart disease Brother     Diabetes Daughter     Diabetes Daughter     Cataracts Neg Hx     Glaucoma Neg Hx     Hypertension Neg Hx     Cancer Neg Hx     Blindness Neg Hx     Amblyopia Neg Hx     Strabismus Neg Hx     Retinal detachment Neg Hx     Macular degeneration Neg Hx     Melanoma Neg Hx        Past Surgical History:   Procedure Laterality Date    CATARACT EXTRACTION       SECTION      CHOLECYSTECTOMY      EYE SURGERY      FRACTURE SURGERY         Patient's Medications   New Prescriptions    No medications on file   Previous Medications    AMLODIPINE (NORVASC) 10 MG TABLET    Take 1 tablet (10 mg total) by mouth once daily.    ASPIRIN (ECOTRIN) 81 MG EC TABLET    Take 1 tablet (81 mg total) by mouth once daily.    BLOOD SUGAR DIAGNOSTIC STRP    1 strip by Misc.(Non-Drug; Combo Route) route 2 (two) times daily. TRUE RESULT SYSTEM    BLOOD-GLUCOSE METER (TRUERESULT BLOOD GLUCOSE SYSTM) KIT    Use as instructed    CALCIUM-VITAMIN D3-VITAMIN K (VIACTIV) 500-100-40 MG-UNIT-MCG CHEW    Take 2 each by mouth.    DESLORATADINE (CLARINEX) 5 MG TABLET    Take 5 mg by mouth once daily.    DICLOFENAC (VOLTAREN) 75 MG EC TABLET    May take 1 tablet twice daily as needed only; take with food or a meal.  Stop if any stomach irritation    DICLOFENAC SODIUM (VOLTAREN) 1 % GEL    Apply 4 gm to both knees 3x/day.    DOCUSATE SODIUM (COLACE) 50 MG CAPSULE    Take 1 capsule (50 mg total) by mouth 2 (two) times daily as needed for Constipation.    GABAPENTIN (NEURONTIN) 600 MG TABLET    Take 1 tablet (600 mg total) by mouth 4 (four) times daily with meals and nightly.    GLIPIZIDE (GLUCOTROL) 5 MG TABLET    TAKE 1 TABLET(5 MG) BY  MOUTH EVERY DAY    HYDRALAZINE (APRESOLINE) 50 MG TABLET    Take 1 tablet (50 mg total) by mouth every 8 (eight) hours.    HYDROCODONE-ACETAMINOPHEN (NORCO)  MG PER TABLET    Take 1 tablet by mouth every 6 (six) hours as needed for Pain (pain).    HYDROCODONE-ACETAMINOPHEN (NORCO)  MG PER TABLET    Take 1 tablet by mouth every 6 (six) hours as needed for Pain (pain).    HYDROCODONE-ACETAMINOPHEN (NORCO)  MG PER TABLET    Take 1 tablet by mouth every 6 (six) hours as needed for Pain (pain).    LANCETS MISC    TEST 2 X DAILY PROSPER RESULT SYSTEM    METFORMIN (GLUCOPHAGE-XR) 500 MG 24 HR TABLET    Take 1 tablet (500 mg total) by mouth once daily.    MULTIVITAMIN (THERAGRAN) PER TABLET    Take 1 tablet by mouth once daily.     OXYBUTYNIN (DITROPAN) 5 MG TAB    Take 1 tablet (5 mg total) by mouth 2 (two) times daily.    PRAVASTATIN (PRAVACHOL) 40 MG TABLET    Take 1.5 tablets (60 mg total) by mouth nightly.    TRUEPLUS LANCETS 33 GAUGE MISC    USE BID    VALSARTAN-HYDROCHLOROTHIAZIDE (DIOVAN-HCT) 320-25 MG PER TABLET    TAKE 1 TABLET BY MOUTH EVERY DAY   Modified Medications    No medications on file   Discontinued Medications    No medications on file       Review of Systems:  General: no fevers  Eyes: no vision changes  ENT: no sore throat  Lung: no sob  CV: no palpitations  GI: no nausea   : no dysuria   Endo: no heat intolerance  Heme: no easy bruising   MSK: no joint swelling     /66   Pulse 78   Ht 5' (1.524 m)   Wt 76.4 kg (168 lb 6.9 oz)   BMI 32.89 kg/m²     Physical Exam:  General: normal body habitus, not in acute distress   Eyes: anicteric, no proptosis   ENT: no facial lesions, no oral lesions   Neck: no masses, no LAD   Lung; ctabl, no wheezing or stridor   GI: normal bowel sounds, nondistended   CV: RRR, no rubs or murmurs   Skin: exposed skin without bruising or bleeding   Ext: no peripheral edema or erythema  Neuro: AOx3, moving all extremities, normal gait     Labs:     Chemistry        Component Value Date/Time     05/11/2018 0808    K 4.0 05/11/2018 0808     05/11/2018 0808    CO2 25 05/11/2018 0808    BUN 36 (H) 05/11/2018 0808    CREATININE 1.0 05/11/2018 0808     (H) 05/11/2018 0808        Component Value Date/Time    CALCIUM 10.0 05/11/2018 0808    ALKPHOS 73 05/11/2018 0808    AST 18 05/11/2018 0808    ALT 17 05/11/2018 0808    BILITOT 0.3 05/11/2018 0808    ESTGFRAFRICA >60 05/11/2018 0808    EGFRNONAA 54 (A) 05/11/2018 0808          Lab Results   Component Value Date    WBC 6.04 05/28/2018    HGB 10.2 (L) 05/28/2018    HCT 30.6 (L) 05/28/2018    MCV 96 05/28/2018     05/28/2018        Lab Results   Component Value Date    HDL 45 05/11/2018    HDL 44 10/09/2017    HDL 39 (L) 05/10/2017     Lab Results   Component Value Date    LDLCALC 73.6 05/11/2018    LDLCALC 55.0 (L) 10/09/2017    LDLCALC 48.8 (L) 05/10/2017     Lab Results   Component Value Date    TRIG 172 (H) 05/11/2018    TRIG 220 (H) 10/09/2017    TRIG 271 (H) 05/10/2017     Lab Results   Component Value Date    CHOLHDL 29.4 05/11/2018    CHOLHDL 30.8 10/09/2017    CHOLHDL 27.5 05/10/2017       Hemoglobin A1C   Date Value Ref Range Status   05/11/2018 6.1 (H) 4.0 - 5.6 % Final     Comment:     According to ADA guidelines, hemoglobin A1c <7.0% represents  optimal control in non-pregnant diabetic patients. Different  metrics may apply to specific patient populations.   Standards of Medical Care in Diabetes-2016.  For the purpose of screening for the presence of diabetes:  <5.7%     Consistent with the absence of diabetes  5.7-6.4%  Consistent with increasing risk for diabetes   (prediabetes)  >or=6.5%  Consistent with diabetes  Currently, no consensus exists for use of hemoglobin A1c  for diagnosis of diabetes for children.  This Hemoglobin A1c assay has significant interference with fetal   hemoglobin   (HbF). The results are invalid for patients with abnormal amounts of   HbF,   including  those with known Hereditary Persistence   of Fetal Hemoglobin. Heterozygous hemoglobin variants (HbAS, HbAC,   HbAD, HbAE, HbA2) do not significantly interfere with this assay;   however, presence of multiple variants in a sample may impact the %   interference.     01/26/2018 6.1 (H) 4.0 - 5.6 % Final     Comment:     According to ADA guidelines, hemoglobin A1c <7.0% represents  optimal control in non-pregnant diabetic patients. Different  metrics may apply to specific patient populations.   Standards of Medical Care in Diabetes-2016.  For the purpose of screening for the presence of diabetes:  <5.7%     Consistent with the absence of diabetes  5.7-6.4%  Consistent with increasing risk for diabetes   (prediabetes)  >or=6.5%  Consistent with diabetes  Currently, no consensus exists for use of hemoglobin A1c  for diagnosis of diabetes for children.  This Hemoglobin A1c assay has significant interference with fetal   hemoglobin   (HbF). The results are invalid for patients with abnormal amounts of   HbF,   including those with known Hereditary Persistence   of Fetal Hemoglobin. Heterozygous hemoglobin variants (HbAS, HbAC,   HbAD, HbAE, HbA2) do not significantly interfere with this assay;   however, presence of multiple variants in a sample may impact the %   interference.     10/09/2017 6.8 (H) 4.0 - 5.6 % Final     Comment:     According to ADA guidelines, hemoglobin A1c <7.0% represents  optimal control in non-pregnant diabetic patients. Different  metrics may apply to specific patient populations.   Standards of Medical Care in Diabetes-2016.  For the purpose of screening for the presence of diabetes:  <5.7%     Consistent with the absence of diabetes  5.7-6.4%  Consistent with increasing risk for diabetes   (prediabetes)  >or=6.5%  Consistent with diabetes  Currently, no consensus exists for use of hemoglobin A1c  for diagnosis of diabetes for children.  This Hemoglobin A1c assay has significant interference  with fetal   hemoglobin   (HbF). The results are invalid for patients with abnormal amounts of   HbF,   including those with known Hereditary Persistence   of Fetal Hemoglobin. Heterozygous hemoglobin variants (HbAS, HbAC,   HbAD, HbAE, HbA2) do not significantly interfere with this assay;   however, presence of multiple variants in a sample may impact the %   interference.         Lab Results   Component Value Date    TSH 1.390 01/26/2018    [id number] 10.2 04/11/2008

## 2018-08-15 NOTE — ASSESSMENT & PLAN NOTE
On Gabapentin 600 four times daily.  On appropriate medications and managed by PCP. We discussed the importance of managing all secondary stroke risk factors, including DM2. We discussed the importance of feet checks and annual podiatry and ophthalmology visits.

## 2018-08-15 NOTE — ASSESSMENT & PLAN NOTE
Not responsive to Primidone and she cannot tolerate beta blockers. She is on Neurontin 600 four times daily, which should contribute to control. She is not interested in increasing the Neurontin or in adding Topamax. She states that the infrequency and the short duration of symptoms is not enough to justify the added potential somnolence of the therapy and she is not interested in taking more medications a this time. She will email via Portal to ask for either increase in Neurontin or a new Topamax prescription if symptoms worsen or if she changes her mind.

## 2018-08-15 NOTE — PROGRESS NOTES
"Subjective:     Chief Complaint:  Follow-up    Interval History 8/14/2018 - No improvement in her tremors with Primidone and she did not tolerate side effects of "feeling drunk." She states that tremors continue to be most apparent in the morning and are only lasting for very brief periods (seconds to minutes). Handwriting is worse when symptomatic but returns to normal. Not dropping of things. No apparent inciting situations. She cannot tolerate beta blockers as potential therapy and is not interested in either increasing Gabapentin or adding Topamax. Hypertension is better controlled today.    History of Present Illness:  Emilia Alan is a 77 y.o. female who presents today for initial evaluation of bilateral intermittent fine hand tremors which have been present for many months. She has no other regions affected and no vocal tremor. Symptoms are periodic, but worse in the morning. She is right-handed and handwriting is affected when tremors are present. No known exacerbating features. Does not drink EtOH. No relatives with similar. No associated symptoms or bradykinesia, rigidity, dementia or cognitive decline, etc. She had history of polio and requires use of a walker at home and a wheelchair when out shopping, etc. No recent falls. She has other medical issues including HTN, HLD, and DM2. She has had an adverse response to beta-blockers and has difficult to control hypertension.     Review of Systems  Review of Systems   Constitutional: Negative for activity change, fatigue and fever.   HENT: Negative for hearing loss, trouble swallowing and voice change (No vocal tremors).    Eyes: Negative for pain, redness and visual disturbance.   Respiratory: Negative for choking, chest tightness and shortness of breath.    Cardiovascular: Negative for chest pain.   Gastrointestinal: Negative for abdominal pain, nausea and vomiting.   Endocrine: Negative for cold intolerance.   Genitourinary: Negative for frequency. "   Musculoskeletal: Positive for back pain, gait problem and neck pain. Negative for arthralgias, joint swelling and myalgias.   Skin: Negative for color change.   Allergic/Immunologic: Negative for immunocompromised state.   Neurological: Positive for tremors, weakness and numbness. Negative for dizziness, seizures, speech difficulty and headaches.   Hematological: Negative for adenopathy.   Psychiatric/Behavioral: Negative for agitation, behavioral problems, dysphoric mood and suicidal ideas.        Objective:     Vitals:    08/14/18 1344   BP: (!) 141/65   Pulse: 77   Weight: 73.5 kg (162 lb)   Height: 5' (1.524 m)   PainSc:   7   PainLoc: Generalized       Neurologic Exam     Mental Status   Oriented to person, place, and time.   Attention: normal.   Speech: speech is normal   Level of consciousness: alert  Knowledge: good.     Cranial Nerves     CN II   Visual fields full to confrontation.     CN III, IV, VI   Pupils are equal, round, and reactive to light.  Extraocular motions are normal.     CN V   Facial sensation intact.     CN VII   Facial expression full, symmetric.     CN VIII   Hearing: intact    CN IX, X   CN IX normal.     CN XI   CN XI normal.     CN XII   CN XII normal.     Motor Exam   Muscle bulk: decreased  Overall muscle tone: normal    Strength   Strength 5/5 except as noted.   Right iliopsoas: 4/5  Left iliopsoas: 4/5  Right quadriceps: 4/5  Left quadriceps: 4/5  Right anterior tibial: 4/5  Left anterior tibial: 2/5  Right peroneal: 4/5  Left peroneal: 2/5  Right gastroc: 4/5  Left gastroc: 3/5    Sensory Exam   Right leg light touch: decreased from knee  Left leg light touch: decreased from knee  Vibration normal.   Right leg pinprick: decreased from knee  Left leg pinprick: decreased from knee    Gait, Coordination, and Reflexes     Tremor   Resting tremor: absent  Intention tremor: absent  Action tremor: absent    Reflexes   Right brachioradialis: 1+  Left brachioradialis: 1+  Right biceps:  1+  Left biceps: 1+  Right triceps: 1+  Left triceps: 1+  Right patellar: 1+  Left patellar: 1+  Right Granados: absent  Left Granados: absent      Physical Exam   Constitutional: She is oriented to person, place, and time. She appears well-developed and well-nourished.   HENT:   Head: Normocephalic and atraumatic.   Eyes: EOM are normal. Pupils are equal, round, and reactive to light.   Neck: Normal range of motion. Neck supple. No thyromegaly present.   Cardiovascular: Normal rate.   Pulmonary/Chest: Effort normal.   Abdominal: Soft.   Lymphadenopathy:     She has no cervical adenopathy.   Neurological: She is oriented to person, place, and time.   Reflex Scores:       Tricep reflexes are 1+ on the right side and 1+ on the left side.       Bicep reflexes are 1+ on the right side and 1+ on the left side.       Brachioradialis reflexes are 1+ on the right side and 1+ on the left side.       Patellar reflexes are 1+ on the right side and 1+ on the left side.  Skin: Skin is warm and dry.   Psychiatric: She has a normal mood and affect. Her speech is normal and behavior is normal. Thought content normal.   Vitals reviewed.        Lab Results   Component Value Date    WBC 6.04 05/28/2018    HGB 10.2 (L) 05/28/2018    HCT 30.6 (L) 05/28/2018     05/28/2018    CHOL 153 05/11/2018    TRIG 172 (H) 05/11/2018    HDL 45 05/11/2018    ALT 17 05/11/2018    AST 18 05/11/2018     05/11/2018    K 4.0 05/11/2018     05/11/2018    CREATININE 1.0 05/11/2018    BUN 36 (H) 05/11/2018    CO2 25 05/11/2018    TSH 1.390 01/26/2018    HGBA1C 6.1 (H) 05/11/2018    [id number] 1053 (H) 05/14/2018         Assessment and Plan:     Problem List Items Addressed This Visit     Essential tremor - Primary    Current Assessment & Plan     Not responsive to Primidone and she cannot tolerate beta blockers. She is on Neurontin 600 four times daily, which should contribute to control. She is not interested in increasing the Neurontin or  in adding Topamax. She states that the infrequency and the short duration of symptoms is not enough to justify the added potential somnolence of the therapy and she is not interested in taking more medications a this time. She will email via Portal to ask for either increase in Neurontin or a new Topamax prescription if symptoms worsen or if she changes her mind.         Gait disorder    History of poliomyelitis    Post poliomyelitis syndrome    Diabetic polyneuropathy associated with type 2 diabetes mellitus    Current Assessment & Plan     On Gabapentin 600 four times daily.  On appropriate medications and managed by PCP. We discussed the importance of managing all secondary stroke risk factors, including DM2. We discussed the importance of feet checks and annual podiatry and ophthalmology visits.           Mixed hyperlipidemia    Current Assessment & Plan     On appropriate medications and managed by PCP. We discussed the importance of managing all secondary stroke risk factors, including hyperlipidemia.           Paraparesis of both lower limbs    Severe hypertension    Current Assessment & Plan     On appropriate medications and managed by PCP. We discussed the importance of managing all secondary stroke risk factors, including hypertension.           Type 2 diabetes mellitus with diabetic neuropathy, without long-term current use of insulin    Current Assessment & Plan     On appropriate medications and managed by PCP. We discussed the importance of managing all secondary stroke risk factors, including DM2.               RTC as needed. Communication via Portal.    Kilo Saldivar MD  [facility]

## 2018-08-15 NOTE — LETTER
August 16, 2018      Lauren Deras MD  [address]  [facility] [address]           [facility]  [address]  [facility] [address]  Phone: [phone]  Fax: [phone]          Patient: Emilia Alan   MR Number: [id number]   YOB: 1940   Date of Visit: 8/15/2018       Dear Dr. Lauren Deras:    Thank you for referring Emilia Alan to me for evaluation. Attached you will find relevant portions of my assessment and plan of care.    If you have questions, please do not hesitate to call me. I look forward to following Emilia Alan along with you.    Sincerely,    Taran Canales MD    Enclosure  CC:  No Recipients    If you would like to receive this communication electronically, please contact [email or web address] or [phone] to request more information on [employer] [facility] access.    For providers and/or their staff who would like to refer a patient to [facility], please contact us through our one-stop-shop provider referral line, [facility], at [phone].    If you feel you have received this communication in error or would no longer like to receive these types of communications, please e-mail [email or web address]

## 2018-08-15 NOTE — PATIENT INSTRUCTIONS
Come back in 2 years - call ahead 1 month for for bone density and lab orders    2000 units of vitamin D daily    Calcium 500-600mg twice daily

## 2018-08-20 ENCOUNTER — LAB VISIT (OUTPATIENT)
Dept: LAB | Facility: HOSPITAL | Age: 78
End: 2018-08-20
Attending: INTERNAL MEDICINE
Payer: MEDICARE

## 2018-08-20 DIAGNOSIS — D64.9 ANEMIA, UNSPECIFIED TYPE: ICD-10-CM

## 2018-08-20 DIAGNOSIS — M81.0 OSTEOPOROSIS, UNSPECIFIED OSTEOPOROSIS TYPE, UNSPECIFIED PATHOLOGICAL FRACTURE PRESENCE: ICD-10-CM

## 2018-08-20 LAB
25(OH)D3+25(OH)D2 SERPL-MCNC: 42 NG/ML
ALBUMIN SERPL BCP-MCNC: 3.2 G/DL
ALP SERPL-CCNC: 82 U/L
ALT SERPL W/O P-5'-P-CCNC: 18 U/L
ANION GAP SERPL CALC-SCNC: 10 MMOL/L
AST SERPL-CCNC: 19 U/L
BASOPHILS # BLD AUTO: 0.13 K/UL
BASOPHILS NFR BLD: 2.1 %
BILIRUB SERPL-MCNC: 0.4 MG/DL
BUN SERPL-MCNC: 37 MG/DL
CALCIUM SERPL-MCNC: 9.7 MG/DL
CHLORIDE SERPL-SCNC: 101 MMOL/L
CO2 SERPL-SCNC: 25 MMOL/L
CREAT SERPL-MCNC: 0.9 MG/DL
DIFFERENTIAL METHOD: ABNORMAL
EOSINOPHIL # BLD AUTO: 0.4 K/UL
EOSINOPHIL NFR BLD: 7 %
ERYTHROCYTE [DISTWIDTH] IN BLOOD BY AUTOMATED COUNT: 12.3 %
EST. GFR  (AFRICAN AMERICAN): >60 ML/MIN/1.73 M^2
EST. GFR  (NON AFRICAN AMERICAN): >60 ML/MIN/1.73 M^2
GLUCOSE SERPL-MCNC: 214 MG/DL
HCT VFR BLD AUTO: 31.8 %
HGB BLD-MCNC: 10.4 G/DL
IMM GRANULOCYTES # BLD AUTO: 0.02 K/UL
IMM GRANULOCYTES NFR BLD AUTO: 0.3 %
LYMPHOCYTES # BLD AUTO: 1.3 K/UL
LYMPHOCYTES NFR BLD: 20.6 %
MCH RBC QN AUTO: 32 PG
MCHC RBC AUTO-ENTMCNC: 32.7 G/DL
MCV RBC AUTO: 98 FL
MONOCYTES # BLD AUTO: 0.4 K/UL
MONOCYTES NFR BLD: 5.6 %
NEUTROPHILS # BLD AUTO: 4 K/UL
NEUTROPHILS NFR BLD: 64.4 %
NRBC BLD-RTO: 0 /100 WBC
PLATELET # BLD AUTO: 238 K/UL
PMV BLD AUTO: 10.8 FL
POTASSIUM SERPL-SCNC: 3.8 MMOL/L
PROT SERPL-MCNC: 6.5 G/DL
RBC # BLD AUTO: 3.25 M/UL
SODIUM SERPL-SCNC: 136 MMOL/L
WBC # BLD AUTO: 6.27 K/UL

## 2018-08-20 PROCEDURE — 82306 VITAMIN D 25 HYDROXY: CPT

## 2018-08-20 PROCEDURE — 82523 COLLAGEN CROSSLINKS: CPT

## 2018-08-20 PROCEDURE — 80053 COMPREHEN METABOLIC PANEL: CPT

## 2018-08-20 PROCEDURE — 85025 COMPLETE CBC W/AUTO DIFF WBC: CPT

## 2018-08-21 LAB — COLLAGEN CTX SERPL-MCNC: 209 PG/ML

## 2018-08-24 RX ORDER — DICLOFENAC SODIUM 75 MG/1
TABLET, DELAYED RELEASE ORAL
Qty: 180 TABLET | Refills: 0 | Status: SHIPPED | OUTPATIENT
Start: 2018-08-24 | End: 2018-11-29 | Stop reason: SDUPTHER

## 2018-08-30 ENCOUNTER — PATIENT OUTREACH (OUTPATIENT)
Dept: OTHER | Facility: OTHER | Age: 78
End: 2018-08-30

## 2018-08-30 NOTE — PROGRESS NOTES
"Last 5 Patient Entered Readings                                      Current 30 Day Average: 144/70     Recent Readings 8/29/2018 8/28/2018 8/27/2018 8/26/2018 8/24/2018    SBP (mmHg) 147 129 138 163 144    DBP (mmHg) 71 71 72 78 71    Pulse 67 76 74 66 79        9/4-Sent snail mail Home Strength Training guide for People with Paraplegia. http://[employer]/docs/home-strength-training-guide-paraplegia.pdf    Brief encounter. Patient states she is watching a 2 year old.     Also discussed patient's overall decrease in BP readings over the recent months. Congratulated patient and encouraged her to continue her great work.     Will call in 2 weeks to assess patient exercise progress.     Digital Medicine: Health  Follow Up    Lifestyle Modifications:    1.Dietary Modifications (Sodium intake <2,000mg/day, food labels, dining out): Patient reports she is constantly reading labels for sodium. She states "things we wouldn't suspect have a lot of sodium." Patient states she is trying her best to limit the amount of sodium in her food. She states "my family thinks I am crazy for how much I monitor the sodium." Encouraged patient to continue her low-sodium approach.     2.Physical Activity: Patient states she is using the sitting exercises [facility] sent her. She states she can only the ones where she is sitting down. Encouraged patient to continue doing what she can to keep the blood moving. She states she is trying.     3.Medication Therapy: Patient has been compliant with the medication regimen.    4.Patient has the following medication side effects/concerns:   (Frequency/Alleviating factors/Precipitating factors, etc.)     Follow up with Mrs. Emilia Alan completed. No further questions or concerns. Will continue follow up to achieve health goals.  "

## 2018-09-04 ENCOUNTER — PATIENT OUTREACH (OUTPATIENT)
Dept: OTHER | Facility: OTHER | Age: 78
End: 2018-09-04

## 2018-09-04 DIAGNOSIS — T50.905A BRADYCARDIA, DRUG INDUCED: Primary | ICD-10-CM

## 2018-09-04 DIAGNOSIS — I10 SEVERE HYPERTENSION: ICD-10-CM

## 2018-09-04 DIAGNOSIS — R00.1 BRADYCARDIA, DRUG INDUCED: Primary | ICD-10-CM

## 2018-09-04 RX ORDER — HYDRALAZINE HYDROCHLORIDE 50 MG/1
TABLET, FILM COATED ORAL
Start: 2018-09-04 | End: 2018-11-27

## 2018-09-04 NOTE — PROGRESS NOTES
HPI:  Called patient for follow-up. She endorses adherence to medication regimen with no complaints. Discussed elevated morning readings and patient had taken prior to blood pressure medication. States that she wanted to check prior to medications and she is aware of appropriate technique. Follow-up with neurology regarding tremor however, she is unable to tolerate other medications. She continues to monitor sodium intake. Patient received exercises from  however, she is requesting additional resources that focus on upper arm strength and can be done in a chair.     Last 5 Patient Entered Readings                                      Current 30 Day Average: 146/72     Recent Readings 9/1/2018 8/29/2018 8/28/2018 8/27/2018 8/26/2018    SBP (mmHg) 153 147 129 138 163    DBP (mmHg) 70 71 71 72 78    Pulse 76 67 76 74 66        Patient denies s/s of hypotension (lightheadedness, dizziness, nausea, fatigue) associated with low readings. Instructed patient to inform me if this occurs, patient confirms understanding.    Patient denies s/s of hypertension (SOB, CP, severe headaches, changes in vision) associated with high readings. Instructed patient to go to the ED if BP >180/110 and accompanied by hypertensive s/s, patient confirms understanding.    Assessment:  Per 30-day average blood pressure is slightly above goal of < 140/90 mmHg. Blood pressure goal increased based on age and other co-morbidities.     Plan:  Increase evening hydralazine to 75 mg and continue 50 mg twice daily. Continue other antihypertensive agents.   Patients health , Aleks Chicas, will be following up every 3-4 weeks. Task placed regarding additional resources.   I will continue to monitor regularly and will follow-up in 1 week, sooner if blood pressure begins to trend upward or downward.     Current medication regimen:  Hypertension Medications             amLODIPine (NORVASC) 10 MG tablet Take 1 tablet (10 mg total) by mouth once  daily.    hydrALAZINE (APRESOLINE) 50 MG tablet Take 1 tablet (50 mg total) by mouth every 8 (eight) hours.    valsartan-hydrochlorothiazide (DIOVAN-HCT) 320-25 mg per tablet TAKE 1 TABLET BY MOUTH EVERY DAY        Patient has my contact information and knows to call with any concerns or clinical changes.

## 2018-09-13 ENCOUNTER — PATIENT OUTREACH (OUTPATIENT)
Dept: OTHER | Facility: OTHER | Age: 78
End: 2018-09-13

## 2018-09-13 NOTE — PROGRESS NOTES
Last 5 Patient Entered Readings                                      Current 30 Day Average: 148/74     Recent Readings 9/12/2018 9/11/2018 9/10/2018 9/9/2018 9/9/2018    SBP (mmHg) 156 154 145 140 191    DBP (mmHg) 77 78 73 71 93    Pulse 72 71 70 77 85        Patient confirms receiving the additional exercises in the mail. Patient appreciates [facility] sending the material and requested a follow up in 2 weeks.     Digital Medicine: Health  Follow Up    Lifestyle Modifications:    1.Dietary Modifications (Sodium intake <2,000mg/day, food labels, dining out): Deferred.     2.Physical Activity: Patient reports enjoying the chair exercises health  sent her. She reports doing 20 arm exercises for 1 arm and 10 for the other. She states she will continue working on them. Encouraged patient to continue working on arm exercises. Will call in 2 weeks to assess patient exercise progress.     3.Medication Therapy: Patient has been compliant with the medication regimen.    4.Patient has the following medication side effects/concerns:   (Frequency/Alleviating factors/Precipitating factors, etc.)     Follow up with Mrs. Emilia Alan completed. No further questions or concerns. Will continue follow up to achieve health goals.

## 2018-09-13 NOTE — PROGRESS NOTES
Last 5 Patient Entered Readings                                      Current 30 Day Average: 148/74     Recent Readings 9/12/2018 9/11/2018 9/10/2018 9/9/2018 9/9/2018    SBP (mmHg) 156 154 145 140 191    DBP (mmHg) 77 78 73 71 93    Pulse 72 71 70 77 85

## 2018-09-14 ENCOUNTER — PATIENT OUTREACH (OUTPATIENT)
Dept: OTHER | Facility: OTHER | Age: 78
End: 2018-09-14

## 2018-09-14 NOTE — PROGRESS NOTES
Last 5 Patient Entered Readings                                      Current 30 Day Average: 148/74     Recent Readings 9/12/2018 9/11/2018 9/10/2018 9/9/2018 9/9/2018    SBP (mmHg) 156 154 145 140 191    DBP (mmHg) 77 78 73 71 93    Pulse 72 71 70 77 85        Patient's BP average is above goal of <130/80.     Patient denies s/s of hypotension (lightheadedness, dizziness, nausea, fatigue) associated with low readings. Instructed patient to inform me if this occurs, patient confirms understanding.      Patient denies s/s of hypertension (SOB, CP, severe headaches, changes in vision) associated with high readings. Instructed patient to go to the ED if BP > 180/110 and accompanied by hypertensive s/s, patient confirms understanding.    Patient is tolerating the adjustment in hydralazine dose. Her BP is on an upward trend despite this adjustment. She remains hydrated and denies diet or lifestyle adjustments to cause her upward trend. She takes her medications at 8am, 2pm and 8pm. This is also the time that she takes her blood pressure. I asked her to take her blood pressure an hour or later after taking her medications to determine if further adjustments are needed. Will follow up in 2-3 weeks, sooner if BP begins to trend upward or downward.    Patient has RANDI Cabezas's contact information and knows to call with any concerns or clinical changes.     Current HTN regimen:  Hypertension Medications             amLODIPine (NORVASC) 10 MG tablet Take 1 tablet (10 mg total) by mouth once daily.    hydrALAZINE (APRESOLINE) 50 MG tablet Take one tablet (50 mg) by mouth every morning and afternoon then one and one-half tablets (75 mg) every evening.    valsartan-hydrochlorothiazide (DIOVAN-HCT) 320-25 mg per tablet TAKE 1 TABLET BY MOUTH EVERY DAY

## 2018-09-27 ENCOUNTER — PATIENT OUTREACH (OUTPATIENT)
Dept: OTHER | Facility: OTHER | Age: 78
End: 2018-09-27

## 2018-09-27 NOTE — PROGRESS NOTES
Last 5 Patient Entered Readings                                      Current 30 Day Average: 148/72     Recent Readings 9/26/2018 9/24/2018 9/23/2018 9/23/2018 9/22/2018    SBP (mmHg) 160 158 175 180 149    DBP (mmHg) 74 79 78 81 69    Pulse 72 76 88 87 87        9/27-Continuous ringing. Unable to assess trending readings and if patient is waiting 1 hour post BP med per clinical pharmacist note.

## 2018-09-28 NOTE — PROGRESS NOTES
Last 5 Patient Entered Readings                                      Current 30 Day Average: 149/73     Recent Readings 9/26/2018 9/24/2018 9/23/2018 9/23/2018 9/22/2018    SBP (mmHg) 160 158 175 180 149    DBP (mmHg) 74 79 78 81 69    Pulse 72 76 88 87 87        9/28/18: HC LVM on 9/27. Technique needs to be assessed further. I will continue monitoring and follow-up in 2 weeks after GABRIEL Chicas as had the opportunity to assess technique. If readings are elevated and technique is correct plan to increase hydralazine.

## 2018-10-04 NOTE — PROGRESS NOTES
Last 5 Patient Entered Readings                                      Current 30 Day Average: 149/73     Recent Readings 10/3/2018 9/30/2018 9/29/2018 9/26/2018 9/24/2018    SBP (mmHg) 166 151 147 160 158    DBP (mmHg) 85 69 69 74 79    Pulse 80 83 68 72 76        Very brief encounter. Patient reporst being preoccupied with smart tablet.    Patient states she has not been waiting 1 hour post-medication for BP reading. Encouraged patient to wait 1 hour post BP medication. She states she will.     Digital Medicine: Health  Follow Up    Lifestyle Modifications:    1.Dietary Modifications (Sodium intake <2,000mg/day, food labels, dining out): Patient reports her low-sodium diet is good. Encouraged patient to continue low-sodium diet.     2.Physical Activity: Patient reports using the exercise information HC sent. Patient reports using a resistance band to stretch. Encouraged patient to continue using resistance bands as much as she is comfortably able to decrease top number. She reports she will try.     3.Medication Therapy: Patient has been any compliant with the medication regimen.    4.Patient has the following medication side effects/concerns:   (Frequency/Alleviating factors/Precipitating factors, etc.)     Follow up with Radha Emilia Silvia Alan completed. No further questions or concerns. Will continue to follow up to achieve health goals.

## 2018-10-25 ENCOUNTER — PATIENT OUTREACH (OUTPATIENT)
Dept: OTHER | Facility: OTHER | Age: 78
End: 2018-10-25

## 2018-10-25 NOTE — PROGRESS NOTES
Last 5 Patient Entered Readings                                      Current 30 Day Average: 154/73     Recent Readings 10/16/2018 10/15/2018 10/11/2018 10/8/2018 10/7/2018    SBP (mmHg) 165 141 161 147 164    DBP (mmHg) 71 64 72 68 86    Pulse 76 77 78 77 78        10/25-Clinical pharmacist attempted to reach patient. Pushed encounter.

## 2018-10-25 NOTE — PROGRESS NOTES
HPI:  Patient returned call for follow-up. States that she has not taken a reading because her daughter was out of town. Patient is adamant that she is monitoring sodium intake and has been using correct technique. She questioning the accuracy of her blood pressure cuff. Patient does not have another home cuff to compare readings. Patient has upcoming appointment next week and will bring cuff to [facility].   Last 5 Patient Entered Readings                                      Current 30 Day Average: 154/73     Recent Readings 10/16/2018 10/15/2018 10/11/2018 10/8/2018 10/7/2018    SBP (mmHg) 165 141 161 147 164    DBP (mmHg) 71 64 72 68 86    Pulse 76 77 78 77 78        Patient denies s/s of hypotension (lightheadedness, dizziness, nausea, fatigue) associated with low readings. Instructed patient to inform me if this occurs, patient confirms understanding.    Patient denies s/s of hypertension (SOB, CP, severe headaches, changes in vision) associated with high readings. Instructed patient to go to the ED if BP >180/110 and accompanied by hypertensive s/s, patient confirms understanding.    Assessment:  Per 30-day average blood pressure is not at goal. See above. Medication adjustments deferred until after patient has cuff assessed.     Plan:  Patients health , Aleks Chicas, will be following up every 3-4 weeks.   I will continue to monitor regularly and will follow-up in 1 to 2  weeks, sooner if blood pressure begins to trend upward or downward.     Current medication regimen:  Hypertension Medications             amLODIPine (NORVASC) 10 MG tablet Take 1 tablet (10 mg total) by mouth once daily.    hydrALAZINE (APRESOLINE) 50 MG tablet Take one tablet (50 mg) by mouth every morning and afternoon then one and one-half tablets (75 mg) every evening.    valsartan-hydrochlorothiazide (DIOVAN-HCT) 320-25 mg per tablet TAKE 1 TABLET BY MOUTH EVERY DAY          Patient has my contact information and knows to call  with any concerns or clinical changes.

## 2018-10-30 ENCOUNTER — OFFICE VISIT (OUTPATIENT)
Dept: PHYSICAL MEDICINE AND REHAB | Facility: CLINIC | Age: 78
End: 2018-10-30
Payer: MEDICARE

## 2018-10-30 ENCOUNTER — OFFICE VISIT (OUTPATIENT)
Dept: PODIATRY | Facility: CLINIC | Age: 78
End: 2018-10-30
Payer: MEDICARE

## 2018-10-30 ENCOUNTER — IMMUNIZATION (OUTPATIENT)
Dept: PHARMACY | Facility: CLINIC | Age: 78
End: 2018-10-30
Payer: MEDICARE

## 2018-10-30 ENCOUNTER — LAB VISIT (OUTPATIENT)
Dept: LAB | Facility: HOSPITAL | Age: 78
End: 2018-10-30
Attending: PHYSICAL MEDICINE & REHABILITATION
Payer: MEDICARE

## 2018-10-30 VITALS
SYSTOLIC BLOOD PRESSURE: 123 MMHG | BODY MASS INDEX: 32.98 KG/M2 | HEIGHT: 60 IN | HEART RATE: 72 BPM | WEIGHT: 168 LBS | DIASTOLIC BLOOD PRESSURE: 65 MMHG

## 2018-10-30 VITALS
HEIGHT: 60 IN | WEIGHT: 168 LBS | SYSTOLIC BLOOD PRESSURE: 115 MMHG | HEART RATE: 69 BPM | BODY MASS INDEX: 32.98 KG/M2 | DIASTOLIC BLOOD PRESSURE: 55 MMHG | RESPIRATION RATE: 18 BRPM

## 2018-10-30 DIAGNOSIS — E08.44 DIABETIC AMYOTROPHY ASSOCIATED WITH DIABETES MELLITUS DUE TO UNDERLYING CONDITION: ICD-10-CM

## 2018-10-30 DIAGNOSIS — G89.29 CHRONIC PAIN OF RIGHT KNEE: ICD-10-CM

## 2018-10-30 DIAGNOSIS — Z79.891 OPIOID USE AGREEMENT EXISTS: ICD-10-CM

## 2018-10-30 DIAGNOSIS — M54.16 LEFT LUMBAR RADICULOPATHY: ICD-10-CM

## 2018-10-30 DIAGNOSIS — G14 POST POLIOMYELITIS SYNDROME: ICD-10-CM

## 2018-10-30 DIAGNOSIS — G14 POST-POLIO SYNDROME: Primary | ICD-10-CM

## 2018-10-30 DIAGNOSIS — M17.11 PRIMARY OSTEOARTHRITIS OF RIGHT KNEE: ICD-10-CM

## 2018-10-30 DIAGNOSIS — B35.1 ONYCHOMYCOSIS DUE TO DERMATOPHYTE: ICD-10-CM
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
## 2018-10-30 DIAGNOSIS — M48.062 SPINAL STENOSIS OF LUMBAR REGION WITH NEUROGENIC CLAUDICATION: ICD-10-CM

## 2018-10-30 DIAGNOSIS — M25.561 CHRONIC PAIN OF RIGHT KNEE: ICD-10-CM

## 2018-10-30 DIAGNOSIS — L84 CORN OR CALLUS: ICD-10-CM

## 2018-10-30 DIAGNOSIS — M54.16 LUMBAR RADICULOPATHY: ICD-10-CM

## 2018-10-30 DIAGNOSIS — Z79.891 OPIOID USE AGREEMENT EXISTS: Primary | ICD-10-CM

## 2018-10-30 DIAGNOSIS — G89.29 CHRONIC BILATERAL LOW BACK PAIN WITH SCIATICA, SCIATICA LATERALITY UNSPECIFIED: ICD-10-CM

## 2018-10-30 DIAGNOSIS — M21.372 FOOT DROP, LEFT: ICD-10-CM

## 2018-10-30 DIAGNOSIS — R26.9 GAIT DISORDER: ICD-10-CM

## 2018-10-30 DIAGNOSIS — G60.9 IDIOPATHIC PERIPHERAL NEUROPATHY: ICD-10-CM

## 2018-10-30 DIAGNOSIS — M47.26 OSTEOARTHRITIS OF SPINE WITH RADICULOPATHY, LUMBAR REGION: ICD-10-CM

## 2018-10-30 DIAGNOSIS — M54.40 CHRONIC BILATERAL LOW BACK PAIN WITH SCIATICA, SCIATICA LATERALITY UNSPECIFIED: ICD-10-CM

## 2018-10-30 DIAGNOSIS — E11.40 TYPE 2 DIABETES MELLITUS WITH DIABETIC NEUROPATHY, WITHOUT LONG-TERM CURRENT USE OF INSULIN: ICD-10-CM

## 2018-10-30 DIAGNOSIS — G82.20 PARAPARESIS OF BOTH LOWER LIMBS: ICD-10-CM

## 2018-10-30 DIAGNOSIS — Z86.12 HISTORY OF POLIOMYELITIS: ICD-10-CM

## 2018-10-30 DIAGNOSIS — E11.42 DIABETIC POLYNEUROPATHY ASSOCIATED WITH TYPE 2 DIABETES MELLITUS: ICD-10-CM

## 2018-10-30 DIAGNOSIS — M47.16 LUMBAR SPONDYLOSIS WITH MYELOPATHY: ICD-10-CM

## 2018-10-30 DIAGNOSIS — M47.816 LUMBAR SPONDYLOSIS: ICD-10-CM

## 2018-10-30 DIAGNOSIS — M81.0 AGE-RELATED OSTEOPOROSIS WITHOUT CURRENT PATHOLOGICAL FRACTURE: ICD-10-CM

## 2018-10-30 PROCEDURE — 3078F DIAST BP <80 MM HG: CPT | Mod: CPTII,,, | Performed by: PHYSICAL MEDICINE & REHABILITATION

## 2018-10-30 PROCEDURE — 99999 PR PBB SHADOW E&M-EST. PATIENT-LVL III: CPT | Mod: PBBFAC,,, | Performed by: PHYSICAL MEDICINE & REHABILITATION

## 2018-10-30 PROCEDURE — 99213 OFFICE O/P EST LOW 20 MIN: CPT | Mod: PBBFAC,25 | Performed by: PODIATRIST

## 2018-10-30 PROCEDURE — 99999 PR PBB SHADOW E&M-EST. PATIENT-LVL III: CPT | Mod: PBBFAC,,, | Performed by: PODIATRIST

## 2018-10-30 PROCEDURE — 11056 PARNG/CUTG B9 HYPRKR LES 2-4: CPT | Mod: Q9,S$PBB,, | Performed by: PODIATRIST

## 2018-10-30 PROCEDURE — 99214 OFFICE O/P EST MOD 30 MIN: CPT | Mod: S$PBB,,, | Performed by: PHYSICAL MEDICINE & REHABILITATION

## 2018-10-30 PROCEDURE — 99213 OFFICE O/P EST LOW 20 MIN: CPT | Mod: PBBFAC,25,27 | Performed by: PHYSICAL MEDICINE & REHABILITATION

## 2018-10-30 PROCEDURE — 11056 PARNG/CUTG B9 HYPRKR LES 2-4: CPT | Mod: 59,Q9,PBBFAC | Performed by: PODIATRIST

## 2018-10-30 PROCEDURE — 99499 UNLISTED E&M SERVICE: CPT | Mod: S$PBB,,, | Performed by: PODIATRIST

## 2018-10-30 PROCEDURE — 1101F PT FALLS ASSESS-DOCD LE1/YR: CPT | Mod: CPTII,,, | Performed by: PHYSICAL MEDICINE & REHABILITATION

## 2018-10-30 PROCEDURE — 99499 UNLISTED E&M SERVICE: CPT | Mod: HCNC,S$GLB,, | Performed by: PHYSICAL MEDICINE & REHABILITATION

## 2018-10-30 PROCEDURE — 11721 DEBRIDE NAIL 6 OR MORE: CPT | Mod: 59,Q9,S$PBB, | Performed by: PODIATRIST

## 2018-10-30 PROCEDURE — 3074F SYST BP LT 130 MM HG: CPT | Mod: CPTII,,, | Performed by: PHYSICAL MEDICINE & REHABILITATION

## 2018-10-30 PROCEDURE — 80307 DRUG TEST PRSMV CHEM ANLYZR: CPT

## 2018-10-30 PROCEDURE — 11721 DEBRIDE NAIL 6 OR MORE: CPT | Mod: Q9,PBBFAC | Performed by: PODIATRIST

## 2018-10-30 RX ORDER — HYDROCODONE BITARTRATE AND ACETAMINOPHEN 10; 325 MG/1; MG/1
1 TABLET ORAL EVERY 6 HOURS PRN
Qty: 120 TABLET | Refills: 0 | Status: SHIPPED | OUTPATIENT
Start: 2018-10-30 | End: 2018-11-29

## 2018-10-30 RX ORDER — VALSARTAN AND HYDROCHLOROTHIAZIDE 160; 25 MG/1; MG/1
TABLET ORAL
Refills: 3 | COMMUNITY
Start: 2018-09-21 | End: 2018-11-27 | Stop reason: SDUPTHER

## 2018-10-30 RX ORDER — HYDROCODONE BITARTRATE AND ACETAMINOPHEN 10; 325 MG/1; MG/1
1 TABLET ORAL EVERY 6 HOURS PRN
Qty: 120 TABLET | Refills: 0 | Status: SHIPPED | OUTPATIENT
Start: 2018-11-30 | End: 2018-12-30

## 2018-10-30 RX ORDER — HYDROCODONE BITARTRATE AND ACETAMINOPHEN 10; 325 MG/1; MG/1
1 TABLET ORAL EVERY 6 HOURS PRN
Qty: 120 TABLET | Refills: 0 | Status: SHIPPED | OUTPATIENT
Start: 2018-12-30 | End: 2019-01-31 | Stop reason: SDUPTHER

## 2018-10-30 RX ORDER — PRIMIDONE 50 MG/1
TABLET ORAL
Refills: 5 | COMMUNITY
Start: 2018-08-28 | End: 2019-05-16

## 2018-10-30 NOTE — PROGRESS NOTES
Subjective:       Patient ID: Emilia Alan is a 78 y.o. female.    Chief Complaint: Leg Pain and Back Pain    Back Pain   Associated symptoms include leg pain. Pertinent negatives include no abdominal pain, chest pain, fever, numbness or weakness. Headaches:     Leg Pain    Pertinent negatives include no numbness.   Knee Pain    Pertinent negatives include no numbness.   Spine Injury   Associated symptoms include neck pain. Pertinent negatives include no abdominal pain, arthralgias, chest pain, chills, fatigue, fever, joint swelling, myalgias, numbness, rash or weakness. Headaches:     Extremity Weakness    Pertinent negatives include no fever or numbness.   Neck Pain    Associated symptoms include leg pain. Pertinent negatives include no chest pain, fever, numbness, trouble swallowing or weakness. Headaches:       Subjective:       Patient ID: Emilia Alan is a 78 y.o. female.    Chief Complaint: Leg Pain and Back Pain    Extremity Weakness    Pertinent negatives include no fever or numbness.   Back Pain   Associated symptoms include leg pain. Pertinent negatives include no abdominal pain, chest pain, fever, numbness or weakness. Headaches:     Leg Pain    Pertinent negatives include no numbness.   Knee Pain    Pertinent negatives include no numbness.     Ms. 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  She has B LE weakness, paraparesis, secondary to severe Lumbar spinal stenosis at L3-4, and L4-5, complicated with  Leg length discrepancy ( left is shorter than right)  ,with severe DJD of both knees , genu valgus in R knee.  LCV   07/19/18.  She reports no changes in her pain, no new injury nor fall since LCV>  Her pain is well controlled with current pain regimen.   Patient takes  Hydrocodone 10/325 mg, takes 3-4 x/day, and Neurontin 600 mg, po QID, tolerates it well, and she knows that both medications are  helping.    She is here for follow up visit for back pain, leg weakness, and chronic pain management.   She has paraparesis, a right leg is weaker than Left leg- that is weak in ankle.   She cannot actively  Right LE, still walks short distances, but majority of time she spent sitting.    Today, she states that she is slowing down, she walks slower, and can do less than in past. She also complains about back pain.   Current back pain is 7, worst pain is 9-10/10 while upright and walking.    Pain is localized across lower back and in upper spine.    Back pain is off/on, present mainly during day time, sharp, severe ,stabbing pain.  Pain is worse with lying or sitting and getting up from chair.   With that pain she is afraid she will fall down, since she gets more weakness in Right leg.   Complains also about right knee pain, that is weak, and goes backwards   during walking.   Patient takes  Hydrocodone 10/325 mg, takes 3-4 x/day, and Neurontin 600 mg, po QID, tolerates it well, and she knows that both medications are helping.  Patient refuses neurosurgical evaluation, and  ASHLEY to back.   The patient has had gradual worsening of her gait over the last few years.   She was prescribed a left AFO ( that she wears).   She cannot tolerate swedish knee cage as well.   She also has a neoprene sleee brace , to stabilized knee, and to take pressure off bone.   Right hinge knee brace that is all warned up,since she wears it all the time.  She continues to complain of progressive bilateral lower extremity weakness.   She reports frequent falls, especially in the last 3-6 months.   The patient lives in a single-silvia home with a ramp access.   She is independent with her dressing, feeding and grooming.   She is also independent with toileting and bathing using a shower chair.   She is able to ambulate with single cane, RW.  She can walk about 20 feet, but has to stop frequently.   She does better with a Rollator walker.    She has manual wheel chair that is bulky, and she cannot propel, RW with seat, cane and RW.   She is restricted by lower extremity weakness, especially on the right side as noted above, she has frequent falls.  She did not sustain any significant injuries.   She recieved a new SCOOTER, and it is helping her greatly.  She uses it for linger distances, but tries to walk as much as possible leslye. Inside the house, with her braces, and  RW with seat.    She is here for follow up, and chronic pain management with opiates.    Past Medical History:   Diagnosis Date    Allergy     Anemia 10/20/2014    Arthritis     Atherosclerosis of artery of right lower extremity: see xray 07    Diabetes mellitus     Diabetic neuropathy 2012    Gait disorder 2012    High cholesterol     History of poliomyelitis 2012    Hyperlipidemia 2013    Hypertension     Obstructive sleep apnea syndrome: dx 2008 needs CPAP 11 2017    Osteopenia     Osteoporosis, unspecified 2014    Other specified anemias 2015    Post poliomyelitis syndrome 2012    Renal manifestation of secondary diabetes mellitus     Sleep apnea     Type II or unspecified type diabetes mellitus without mention of complication, not stated as uncontrolled 2015    Unspecified essential hypertension 2015       Past Surgical History:   Procedure Laterality Date    CATARACT EXTRACTION       SECTION      CHOLECYSTECTOMY      COLONOSCOPY N/A 2017    Procedure: COLONOSCOPY;  Surgeon: Karen Lebron MD;  Location: [address]);  Service: Endoscopy;  Laterality: N/A;    COLONOSCOPY N/A 2017    [facility]    ESOPHAGOGASTRODUODENOSCOPY (EGD) N/A 2017    [facility]    EYE SURGERY      FRACTURE SURGERY         Family History   Problem Relation Age of Onset    Diabetes Father     Heart  disease Father     Heart attack Father     Heart disease Brother     No Known Problems Daughter     Diabetes Son     Heart disease Brother     Diabetes Daughter     Diabetes Daughter     Cataracts Neg Hx     Glaucoma Neg Hx     Hypertension Neg Hx     Cancer Neg Hx     Blindness Neg Hx     Amblyopia Neg Hx     Strabismus Neg Hx     Retinal detachment Neg Hx     Macular degeneration Neg Hx     Melanoma Neg Hx        Social History     Socioeconomic History    Marital status:      Spouse name: None    Number of children: 4    Years of education: None    Highest education level: None   Social Needs    Financial resource strain: None    Food insecurity - worry: None    Food insecurity - inability: None    Transportation needs - medical: None    Transportation needs - non-medical: None   Occupational History    None   Tobacco Use    Smoking status: Never Smoker    Smokeless tobacco: Never Used   Substance and Sexual Activity    Alcohol use: No    Drug use: No    Sexual activity: No   Other Topics Concern    Are you pregnant or think you may be? No    Breast-feeding Not Asked   Social History Narrative    None       Current Outpatient Medications   Medication Sig Dispense Refill    amLODIPine (NORVASC) 10 MG tablet Take 1 tablet (10 mg total) by mouth once daily. 30 tablet 11    blood sugar diagnostic Strp 1 strip by Misc.(Non-Drug; Combo Route) route 2 (two) times daily. TRUE RESULT SYSTEM 180 strip 4    calcium-vitamin D3-vitamin K (VIACTIV) 500-100-40 mg-unit-mcg Chew Take 2 each by mouth.      desloratadine (CLARINEX) 5 mg tablet Take 5 mg by mouth once daily.      diclofenac (VOLTAREN) 75 MG EC tablet TAKE 1 TABLET BY MOUTH TWICE DAILY AS NEEDED ONLY WITH FOOD OR A MEAL. STOP IF ANY STOMACH IRRITATION 180 tablet 0    diclofenac sodium (VOLTAREN) 1 % Gel Apply 4 gm to both knees 3x/day. (Patient taking differently: 3 (three) times daily as needed. Apply 4 gm to both  knees 3x/day.) 500 g 4    docusate sodium (COLACE) 50 MG capsule Take 1 capsule (50 mg total) by mouth 2 (two) times daily as needed for Constipation.      glipiZIDE (GLUCOTROL) 5 MG tablet TAKE 1 TABLET(5 MG) BY MOUTH EVERY DAY 90 tablet 3    hydrALAZINE (APRESOLINE) 50 MG tablet Take one tablet (50 mg) by mouth every morning and afternoon then one and one-half tablets (75 mg) every evening.      lancets Misc TEST 2 X DAILY PROSPER RESULT SYSTEM 180 each 3    metFORMIN (GLUCOPHAGE-XR) 500 MG 24 hr tablet Take 1 tablet (500 mg total) by mouth once daily. 90 tablet 3    multivitamin (THERAGRAN) per tablet Take 1 tablet by mouth once daily.       oxybutynin (DITROPAN) 5 MG Tab Take 1 tablet (5 mg total) by mouth 2 (two) times daily. 180 tablet 3    pravastatin (PRAVACHOL) 40 MG tablet Take 1.5 tablets (60 mg total) by mouth nightly. 135 tablet 3    primidone (MYSOLINE) 50 MG Tab   5    TRUEPLUS LANCETS 33 gauge Misc USE BID  3    valsartan-hydrochlorothiazide (DIOVAN-HCT) 160-25 mg per tablet TK 1 T PO QD  3    valsartan-hydrochlorothiazide (DIOVAN-HCT) 320-25 mg per tablet TAKE 1 TABLET BY MOUTH EVERY DAY 30 tablet 2    aspirin (ECOTRIN) 81 MG EC tablet Take 1 tablet (81 mg total) by mouth once daily. 30 tablet 12    blood-glucose meter (TRUERESULT BLOOD GLUCOSE SYSTM) kit Use as instructed 1 each 0    gabapentin (NEURONTIN) 600 MG tablet Take 1 tablet (600 mg total) by mouth 4 (four) times daily with meals and nightly. 360 tablet 3    HYDROcodone-acetaminophen (NORCO)  mg per tablet Take 1 tablet by mouth every 6 (six) hours as needed for Pain (pain). 120 tablet 0    [START ON 11/30/2018] HYDROcodone-acetaminophen (NORCO)  mg per tablet Take 1 tablet by mouth every 6 (six) hours as needed for Pain (pain). 120 tablet 0    [START ON 12/30/2018] HYDROcodone-acetaminophen (NORCO)  mg per tablet Take 1 tablet by mouth every 6 (six) hours as needed for Pain (pain). 120 tablet 0     influenza (FLUZONE HIGH-DOSE 2018-19, PF,) 180 mcg/0.5 mL vaccine Inject 0.5 mLs into the muscle once. for 1 dose 0.5 mL 0     No current facility-administered medications for this visit.        Review of patient's allergies indicates:  No Known Allergies    Review of Systems   Constitutional: Negative for appetite change, chills, fatigue, fever and unexpected weight change.   HENT: Negative for drooling, trouble swallowing and voice change.    Eyes: Negative for pain and visual disturbance.   Respiratory: Negative for shortness of breath and wheezing.    Cardiovascular: Negative for chest pain and palpitations.   Gastrointestinal: Negative for abdominal distention, abdominal pain, constipation and diarrhea.   Genitourinary: Negative for difficulty urinating.   Musculoskeletal: Positive for back pain, extremity weakness and neck pain. Negative for arthralgias, gait problem, joint swelling, myalgias and neck stiffness.               Skin: Negative for color change and rash.   Neurological: Negative for dizziness, facial asymmetry, speech difficulty, weakness, light-headedness and numbness. Headaches:     Hematological: Negative for adenopathy.   Psychiatric/Behavioral: Negative for behavioral problems, confusion and sleep disturbance. The patient is not nervous/anxious.        Objective:      Physical Exam    Constitutional: She is oriented to person, place, and time.   She appears well-developed and well-nourished.   HENT:   Head: Normocephalic.   Eyes: EOM are normal.   Neck: Normal range of motion.   Cardiovascular: Normal rate, regular rhythm and normal heart sounds.   Pulmonary/Chest: Breath sounds normal.   Musculoskeletal: Normal range of motion.   BUE:  ROM:full.  Strength: 5/5 at shoulders, elbows & hands.  Sensation to pinprick: intact  BLE: ROM:full.  Strength:   RLE: HF 0/5, KE 0/5,   Ankle DF 2-, Ankle PF 3  LLE:   HF 3-, KE 3-.  Ankle DF 1,  Ankle PF 3  Leg length discrepancy ( left is shorter than  right), wears Left AFO.   Sensation to pinprick: intact .   DTR: decreased.       Xray of Right knee ( 2014)  Showed:  Standing AP knees and lateral of the right knee and merchant view of both knees are submitted.    Advanced degenerative change seen in the tricompartmental areas of the right knee.    Left knee shows mild degenerative change.  Both patellas show significant lateral deviation    MRI of Lumbar spine  ( 2015) ;  L2-L3:There is a circumferential disk bulge with moderate bilateral facet osseous hypertrophy and ligamentum flavum buckling. This results and mild narrowing of the spinal canal. The bilateral neural foramen remain patent.  L3-L4: There is a circumferential disk bulge with left paracentral disk protrusion. This is associated with severe bilateral facet osseous hypertrophy, bilateral facet edema, and ligamentum flavum buckling.   These findings result in severe narrowing of the spinal canal and near complete obliteration of the left neural foramen.  The right neural foramen is moderately narrowed as well.  L4-L5: There is a circumferential disk bulge with superimposed central disk protrusion. This is associated with severe bilateral facet osseous hypertrophy and ligamentum flavum buckling.   These findings result in severe narrowing of the spinal canal and bilateral neural foramina, left greater than right.     Assessment:       1. Opioid use agreement exists    2. Spinal stenosis of lumbar region with neurogenic claudication    3. Lumbar spondylosis    4. Paraparesis of both lower limbs    5. Post poliomyelitis syndrome    6. Osteoarthritis of spine with radiculopathy, lumbar region    7. Lumbar spondylosis with myelopathy    8. Diabetic polyneuropathy associated with type 2 diabetes mellitus    9. 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 Gait disorder    11. History of poliomyelitis    12. Primary osteoarthritis of right knee    13. Type 2  diabetes mellitus with diabetic neuropathy, without long-term current use of insulin    14. Chronic bilateral low back pain with sciatica, sciatica laterality unspecified    15. Fall, sequela    16. Left lumbar radiculopathy    17. Lumbar radiculopathy    18. Chronic pain of right knee    19. Osteoporosis without current pathological fracture: worse on fosamax 5/16- Reclast 8/16       Plan:        Opioid use agreement exists  -     Pain Clinic Drug Screen; Future    Spinal stenosis of lumbar region with neurogenic claudication  -     HYDROcodone-acetaminophen (NORCO)  mg per tablet; Take 1 tablet by mouth every 6 (six) hours as needed for Pain (pain).  Dispense: 120 tablet; Refill: 0  -     HYDROcodone-acetaminophen (NORCO)  mg per tablet; Take 1 tablet by mouth every 6 (six) hours as needed for Pain (pain).  Dispense: 120 tablet; Refill: 0  -     HYDROcodone-acetaminophen (NORCO)  mg per tablet; Take 1 tablet by mouth every 6 (six) hours as needed for Pain (pain).  Dispense: 120 tablet; Refill: 0    Lumbar spondylosis  -     HYDROcodone-acetaminophen (NORCO)  mg per tablet; Take 1 tablet by mouth every 6 (six) hours as needed for Pain (pain).  Dispense: 120 tablet; Refill: 0  -     HYDROcodone-acetaminophen (NORCO)  mg per tablet; Take 1 tablet by mouth every 6 (six) hours as needed for Pain (pain).  Dispense: 120 tablet; Refill: 0  -     HYDROcodone-acetaminophen (NORCO)  mg per tablet; Take 1 tablet by mouth every 6 (six) hours as needed for Pain (pain).  Dispense: 120 tablet; Refill: 0    Paraparesis of both lower limbs  -     HYDROcodone-acetaminophen (NORCO)  mg per tablet; Take 1 tablet by mouth every 6 (six) hours as needed for Pain (pain).  Dispense: 120 tablet; Refill: 0  -     HYDROcodone-acetaminophen (NORCO)  mg per tablet; Take 1 tablet by mouth every 6 (six) hours as needed for Pain (pain).  Dispense: 120 tablet; Refill: 0  -      HYDROcodone-acetaminophen (NORCO)  mg per tablet; Take 1 tablet by mouth every 6 (six) hours as needed for Pain (pain).  Dispense: 120 tablet; Refill: 0    Post poliomyelitis syndrome  -     HYDROcodone-acetaminophen (NORCO)  mg per tablet; Take 1 tablet by mouth every 6 (six) hours as needed for Pain (pain).  Dispense: 120 tablet; Refill: 0  -     HYDROcodone-acetaminophen (NORCO)  mg per tablet; Take 1 tablet by mouth every 6 (six) hours as needed for Pain (pain).  Dispense: 120 tablet; Refill: 0  -     HYDROcodone-acetaminophen (NORCO)  mg per tablet; Take 1 tablet by mouth every 6 (six) hours as needed for Pain (pain).  Dispense: 120 tablet; Refill: 0    Osteoarthritis of spine with radiculopathy, lumbar region  -     HYDROcodone-acetaminophen (NORCO)  mg per tablet; Take 1 tablet by mouth every 6 (six) hours as needed for Pain (pain).  Dispense: 120 tablet; Refill: 0  -     HYDROcodone-acetaminophen (NORCO)  mg per tablet; Take 1 tablet by mouth every 6 (six) hours as needed for Pain (pain).  Dispense: 120 tablet; Refill: 0  -     HYDROcodone-acetaminophen (NORCO)  mg per tablet; Take 1 tablet by mouth every 6 (six) hours as needed for Pain (pain).  Dispense: 120 tablet; Refill: 0    Lumbar spondylosis with myelopathy  -     HYDROcodone-acetaminophen (NORCO)  mg per tablet; Take 1 tablet by mouth every 6 (six) hours as needed for Pain (pain).  Dispense: 120 tablet; Refill: 0  -     HYDROcodone-acetaminophen (NORCO)  mg per tablet; Take 1 tablet by mouth every 6 (six) hours as needed for Pain (pain).  Dispense: 120 tablet; Refill: 0  -     HYDROcodone-acetaminophen (NORCO)  mg per tablet; Take 1 tablet by mouth every 6 (six) hours as needed for Pain (pain).  Dispense: 120 tablet; Refill: 0    Diabetic polyneuropathy associated with type 2 diabetes mellitus  -     HYDROcodone-acetaminophen (NORCO)  mg per tablet; Take 1 tablet by mouth every 6  (six) hours as needed for Pain (pain).  Dispense: 120 tablet; Refill: 0  -     HYDROcodone-acetaminophen (NORCO)  mg per tablet; Take 1 tablet by mouth every 6 (six) hours as needed for Pain (pain).  Dispense: 120 tablet; Refill: 0  -     HYDROcodone-acetaminophen (NORCO)  mg per tablet; Take 1 tablet by mouth every 6 (six) hours as needed for Pain (pain).  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  Dispense: 120 tablet; Refill: 0  -     HYDROcodone-acetaminophen (NORCO)  mg per tablet; Take 1 tablet by mouth every 6 (six) hours as needed for Pain (pain).  Dispense: 120 tablet; Refill: 0  -     HYDROcodone-acetaminophen (NORCO)  mg per tablet; Take 1 tablet by mouth every 6 (six) hours as needed for Pain (pain).  Dispense: 120 tablet; Refill: 0    Gait disorder  -     HYDROcodone-acetaminophen (NORCO)  mg per tablet; Take 1 tablet by mouth every 6 (six) hours as needed for Pain (pain).  Dispense: 120 tablet; Refill: 0  -     HYDROcodone-acetaminophen (NORCO)  mg per tablet; Take 1 tablet by mouth every 6 (six) hours as needed for Pain (pain).  Dispense: 120 tablet; Refill: 0  -     HYDROcodone-acetaminophen (NORCO)  mg per tablet; Take 1 tablet by mouth every 6 (six) hours as needed for Pain (pain).  Dispense: 120 tablet; Refill: 0    History of poliomyelitis  -     HYDROcodone-acetaminophen (NORCO)  mg per tablet; Take 1 tablet by mouth every 6 (six) hours as needed for Pain (pain).  Dispense: 120 tablet; Refill: 0  -     HYDROcodone-acetaminophen (NORCO)  mg per tablet; Take 1 tablet by mouth every 6 (six) hours as needed for Pain (pain).  Dispense: 120 tablet; Refill: 0  -     HYDROcodone-acetaminophen (NORCO)  mg per tablet; Take 1 tablet by mouth  every 6 (six) hours as needed for Pain (pain).  Dispense: 120 tablet; Refill: 0    Primary osteoarthritis of right knee  -     HYDROcodone-acetaminophen (NORCO)  mg per tablet; Take 1 tablet by mouth every 6 (six) hours as needed for Pain (pain).  Dispense: 120 tablet; Refill: 0  -     HYDROcodone-acetaminophen (NORCO)  mg per tablet; Take 1 tablet by mouth every 6 (six) hours as needed for Pain (pain).  Dispense: 120 tablet; Refill: 0  -     HYDROcodone-acetaminophen (NORCO)  mg per tablet; Take 1 tablet by mouth every 6 (six) hours as needed for Pain (pain).  Dispense: 120 tablet; Refill: 0    Type 2 diabetes mellitus with diabetic neuropathy, without long-term current use of insulin  -     HYDROcodone-acetaminophen (NORCO)  mg per tablet; Take 1 tablet by mouth every 6 (six) hours as needed for Pain (pain).  Dispense: 120 tablet; Refill: 0  -     HYDROcodone-acetaminophen (NORCO)  mg per tablet; Take 1 tablet by mouth every 6 (six) hours as needed for Pain (pain).  Dispense: 120 tablet; Refill: 0  -     HYDROcodone-acetaminophen (NORCO)  mg per tablet; Take 1 tablet by mouth every 6 (six) hours as needed for Pain (pain).  Dispense: 120 tablet; Refill: 0    Chronic bilateral low back pain with sciatica, sciatica laterality unspecified  -     HYDROcodone-acetaminophen (NORCO)  mg per tablet; Take 1 tablet by mouth every 6 (six) hours as needed for Pain (pain).  Dispense: 120 tablet; Refill: 0  -     HYDROcodone-acetaminophen (NORCO)  mg per tablet; Take 1 tablet by mouth every 6 (six) hours as needed for Pain (pain).  Dispense: 120 tablet; Refill: 0  -     HYDROcodone-acetaminophen (NORCO)  mg per tablet; Take 1 tablet by mouth every 6 (six) hours as needed for Pain (pain).  Dispense: 120 tablet; Refill: 0    Fall, sequela  -     HYDROcodone-acetaminophen (NORCO)  mg per tablet; Take 1 tablet by mouth every 6 (six) hours as needed for Pain (pain).   Dispense: 120 tablet; Refill: 0  -     HYDROcodone-acetaminophen (NORCO)  mg per tablet; Take 1 tablet by mouth every 6 (six) hours as needed for Pain (pain).  Dispense: 120 tablet; Refill: 0  -     HYDROcodone-acetaminophen (NORCO)  mg per tablet; Take 1 tablet by mouth every 6 (six) hours as needed for Pain (pain).  Dispense: 120 tablet; Refill: 0    Left lumbar radiculopathy  -     HYDROcodone-acetaminophen (NORCO)  mg per tablet; Take 1 tablet by mouth every 6 (six) hours as needed for Pain (pain).  Dispense: 120 tablet; Refill: 0  -     HYDROcodone-acetaminophen (NORCO)  mg per tablet; Take 1 tablet by mouth every 6 (six) hours as needed for Pain (pain).  Dispense: 120 tablet; Refill: 0  -     HYDROcodone-acetaminophen (NORCO)  mg per tablet; Take 1 tablet by mouth every 6 (six) hours as needed for Pain (pain).  Dispense: 120 tablet; Refill: 0    Lumbar radiculopathy  -     HYDROcodone-acetaminophen (NORCO)  mg per tablet; Take 1 tablet by mouth every 6 (six) hours as needed for Pain (pain).  Dispense: 120 tablet; Refill: 0  -     HYDROcodone-acetaminophen (NORCO)  mg per tablet; Take 1 tablet by mouth every 6 (six) hours as needed for Pain (pain).  Dispense: 120 tablet; Refill: 0  -     HYDROcodone-acetaminophen (NORCO)  mg per tablet; Take 1 tablet by mouth every 6 (six) hours as needed for Pain (pain).  Dispense: 120 tablet; Refill: 0    Chronic pain of right knee  -     HYDROcodone-acetaminophen (NORCO)  mg per tablet; Take 1 tablet by mouth every 6 (six) hours as needed for Pain (pain).  Dispense: 120 tablet; Refill: 0  -     HYDROcodone-acetaminophen (NORCO)  mg per tablet; Take 1 tablet by mouth every 6 (six) hours as needed for Pain (pain).  Dispense: 120 tablet; Refill: 0  -     HYDROcodone-acetaminophen (NORCO)  mg per tablet; Take 1 tablet by mouth every 6 (six) hours as needed for Pain (pain).  Dispense: 120 tablet; Refill:  0    Osteoporosis without current pathological fracture: worse on fosamax 5/16- Reclast 8/16  -     HYDROcodone-acetaminophen (NORCO)  mg per tablet; Take 1 tablet by mouth every 6 (six) hours as needed for Pain (pain).  Dispense: 120 tablet; Refill: 0  -     HYDROcodone-acetaminophen (NORCO)  mg per tablet; Take 1 tablet by mouth every 6 (six) hours as needed for Pain (pain).  Dispense: 120 tablet; Refill: 0  -     HYDROcodone-acetaminophen (NORCO)  mg per tablet; Take 1 tablet by mouth every 6 (six) hours as needed for Pain (pain).  Dispense: 120 tablet; Refill: 0      Patient with C.palsy, with  Paraparesis., severe Lumbar spinal stenosis at L3-4, and L4-5, with  Leg length discrepancy ( left is shorter than right),  wears Left AFO, and has more proximal strength in LLE, than in RLE .   Also with severe DJD , genu valgus in R knee ( cannot toleate custom made  knee brace, nor Swedish knee cage).   She also wears  neoprene sleee brace , that stabilizes knee, and improves proprioception, helps with gait.    1. Back pain   Will resume Hydrocodone 10/325 mg, takes 3-4 x/day, and Neurontin 600 mg, po QID.   Opioid Risk Score       Value Time User    Opioid Risk Score  0 4/11/2018 10:39 AM Krista Burger MD         reviewed and appropriate.  Hydrocodone refills on 9/20, 8/19, 7/19/18. \  UDS ordered.     2. Patient refuses neurosurgical evaluation, and  ASHLEY to back.     3. Right knee pain, secondary to advanced tricompartmental DJD,   refusing Ortho consult, does not want steroid injection, nor knee replacement.   Wears brace with genu recurvatum and valgus in R knee,   She has her hinge knee brace, that she wears when pain is severe, and left AFO ( cannot walk safely without AFO.   Voltaren gel topical.     RTC in 3 months..    Total time spent face to face with patient was 25 minutes.   More than 50% of that time was spent in counseling on diagnosis , prognosis and treatment options.   I also  caunsel patient  on common and most usual side effect of prescribed medications.   Risk and benefits of opiates, possible risk of developing opiate dependence and tolerance, need of strict compliance with prescribed medications.  I reviewed Primary care , and other specialty's notes to better coordinate patient's  care.   All questions were answered, and patient voiced understanding.

## 2018-10-30 NOTE — PROGRESS NOTES
Subjective:      Patient ID: Emilia Alan is a 78 y.o. female.    Chief Complaint: PCP (Zoë Sousa NP 7/25/18); Peripheral Neuropathy; Nail Problem; and Nail Care    Emilia is a 78 y.o. female who presents to the clinic for evaluation and treatment of high risk feet. 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 The patient's chief complaint is long, thick toenails.       PCP: Lauren Deras MD    Date Last Seen by PCP:   Chief Complaint   Patient presents with    PCP     Zoë Sousa NP 7/25/18    Peripheral Neuropathy    Nail Problem    Nail Care         Current shoe gear: DM shoes w/ AFO brace( L)    Hemoglobin A1C   Date Value Ref Range Status   05/11/2018 6.1 (H) 4.0 - 5.6 % Final     Comment:     According to ADA guidelines, hemoglobin A1c <7.0% represents  optimal control in non-pregnant diabetic patients. Different  metrics may apply to specific patient populations.   Standards of Medical Care in Diabetes-2016.  For the purpose of screening for the presence of diabetes:  <5.7%     Consistent with the absence of diabetes  5.7-6.4%  Consistent with increasing risk for diabetes   (prediabetes)  >or=6.5%  Consistent with diabetes  Currently, no consensus exists for use of  hemoglobin A1c  for diagnosis of diabetes for children.  This Hemoglobin A1c assay has significant interference with fetal   hemoglobin   (HbF). The results are invalid for patients with abnormal amounts of   HbF,   including those with known Hereditary Persistence   of Fetal Hemoglobin. Heterozygous hemoglobin variants (HbAS, HbAC,   HbAD, HbAE, HbA2) do not significantly interfere with this assay;   however, presence of multiple variants in a sample may impact the %   interference.     01/26/2018 6.1 (H) 4.0 - 5.6 % Final     Comment:     According to ADA guidelines, hemoglobin A1c <7.0% represents  optimal control in non-pregnant diabetic patients. Different  metrics may apply to specific patient populations.   Standards of Medical Care in Diabetes-2016.  For the purpose of screening for the presence of diabetes:  <5.7%     Consistent with the absence of diabetes  5.7-6.4%  Consistent with increasing risk for diabetes   (prediabetes)  >or=6.5%  Consistent with diabetes  Currently, no consensus exists for use of hemoglobin A1c  for diagnosis of diabetes for children.  This Hemoglobin A1c assay has significant interference with fetal   hemoglobin   (HbF). The results are invalid for patients with abnormal amounts of   HbF,   including those with known Hereditary Persistence   of Fetal Hemoglobin. Heterozygous hemoglobin variants (HbAS, HbAC,   HbAD, HbAE, HbA2) do not significantly interfere with this assay;   however, presence of multiple variants in a sample may impact the %   interference.     10/09/2017 6.8 (H) 4.0 - 5.6 % Final     Comment:     According to ADA guidelines, hemoglobin A1c <7.0% represents  optimal control in non-pregnant diabetic patients. Different  metrics may apply to specific patient populations.   Standards of Medical Care in Diabetes-2016.  For the purpose of screening for the presence of diabetes:  <5.7%     Consistent with the absence of diabetes  5.7-6.4%  Consistent with increasing  risk for diabetes   (prediabetes)  >or=6.5%  Consistent with diabetes  Currently, no consensus exists for use of hemoglobin A1c  for diagnosis of diabetes for children.  This Hemoglobin A1c assay has significant interference with fetal   hemoglobin   (HbF). The results are invalid for patients with abnormal amounts of   HbF,   including those with known Hereditary Persistence   of Fetal Hemoglobin. Heterozygous hemoglobin variants (HbAS, HbAC,   HbAD, HbAE, HbA2) do not significantly interfere with this assay;   however, presence of multiple variants in a sample may impact the %   interference.           Review of Systems   Constitution: Negative for chills, decreased appetite and fever.   Cardiovascular: Negative for chest pain, claudication and leg swelling.   Respiratory: Negative for cough.    Skin: Positive for dry skin and nail changes. Negative for color change, flushing and itching.   Musculoskeletal: Positive for muscle weakness (foot drop). Negative for arthritis, back pain, gout, joint pain and myalgias.   Gastrointestinal: Negative for nausea and vomiting.   Neurological: Positive for numbness and paresthesias. Negative for loss of balance.           Objective:      Physical Exam   Constitutional: She is oriented to person, place, and time. She appears well-developed and well-nourished. No distress.   Cardiovascular:   Dorsalis pedis and posterior tibial pulses are diminished Bilaterally. Toes are cool to touch. Feet are warm proximally.There is decreased digital hair. Skin is atrophic, slightly hyperpigmented, and mildly edematous       Musculoskeletal: She exhibits no edema or tenderness.        Right ankle: Normal.        Left ankle: Normal.        Right foot: There is no swelling, no crepitus and no deformity.        Left foot: There is no swelling, no crepitus and no deformity.   Dropfoot left.      Lymphadenopathy:   No palpable lymph nodes   Neurological: She is alert and oriented to person,  place, and time. She has normal strength.   [place]-Sarah 5.07 monofilamant testing is diminished Charbel feet. Sharp/dull sensation diminished Bilaterally. Light touch absent Bilaterally.       Skin: Skin is warm, dry and intact. No abrasion, no bruising, no burn, no laceration, no lesion, no petechiae and no rash noted. She is not diaphoretic. No pallor. Nails show no clubbing.   Nails 1-5 b/l  are elongated by  2-6 mm's, thickened by 3-5 mm's, dystrophic, and are darkened in  coloration . Xerosis Bilaterally. No open lesions noted.    Hyperkeratotic tissue noted to plantar L 5th MPJ and plantar L 5th toe   Psychiatric: She has a normal mood and affect. Judgment and thought content normal.   Nursing note and vitals reviewed.          Assessment:       Encounter Diagnoses   Name Primary?    Post-polio syndrome Yes    Idiopathic peripheral neuropathy     Onychomycosis due to dermatophyte     Corn or callus     Foot drop, left          Plan:       Emilia was seen today for pcp, peripheral neuropathy, nail problem and nail care.    Diagnoses and all orders for this visit:    Post-polio syndrome    Idiopathic peripheral neuropathy    Onychomycosis due to dermatophyte    Corn or callus    Foot drop, left      I counseled the patient on her conditions, their implications and medical management.    - Shoe inspection.Patient instructed on proper foot hygeine. We discussed wearing proper shoe gear, daily foot inspections, never walking without protective shoe gear, never putting sharp instruments to feet, routine podiatric nail visits every 2-3 months.      - With patient's permission, nails were aggressively reduced and debrided x 10 to their soft tissue attachment mechanically and with electric , removing all offending nail and debris. Patient relates relief following the procedure. She will continue to monitor the areas daily, inspect her feet, wear protective shoe gear when ambulatory, moisturizer to maintain  skin integrity and follow in this office in approximately 2-3 months, sooner p.r.n.    - After cleansing the  area w/ alcohol prep pad the above mentioned hyperkeratosis was trimmed utilizing No 15 scapel, to a smooth base with out incident. Patient tolerated this  well and reported comfort to the area of plantar L 5th MPJ and lateral l 5th toe

## 2018-11-04 LAB
6MAM UR QL: NOT DETECTED
7AMINOCLONAZEPAM UR QL: NOT DETECTED
A-OH ALPRAZ UR QL: NOT DETECTED
ALPRAZ UR QL: NOT DETECTED
AMPHET UR QL SCN: NOT DETECTED
ANNOTATION COMMENT IMP: NORMAL
ANNOTATION COMMENT IMP: NORMAL
BARBITURATES UR QL: NOT DETECTED
BUPRENORPHINE UR QL: NOT DETECTED
BZE UR QL: NOT DETECTED
CARBOXYTHC UR QL: NOT DETECTED
CARISOPRODOL UR QL: NOT DETECTED
CLONAZEPAM UR QL: NOT DETECTED
CODEINE UR QL: NOT DETECTED
CREAT UR-MCNC: 108.8 MG/DL (ref 20–400)
DIAZEPAM UR QL: NOT DETECTED
ETHYL GLUCURONIDE UR QL: NOT DETECTED
FENTANYL UR QL: NOT DETECTED
HYDROCODONE UR QL: PRESENT
HYDROMORPHONE UR QL: NOT DETECTED
LORAZEPAM UR QL: NOT DETECTED
MDA UR QL: NOT DETECTED
MDEA UR QL: NOT DETECTED
MDMA UR QL: NOT DETECTED
ME-PHENIDATE UR QL: NOT DETECTED
MEPERIDINE UR QL: NOT DETECTED
METHADONE UR QL: NOT DETECTED
METHAMPHET UR QL: NOT DETECTED
MIDAZOLAM UR QL SCN: NOT DETECTED
MORPHINE UR QL: NOT DETECTED
NORBUPRENORPHINE UR QL CFM: NOT DETECTED
NORDIAZEPAM UR QL: NOT DETECTED
NORFENTANYL UR QL: NOT DETECTED
NORHYDROCODONE UR QL CFM: PRESENT
NOROXYCODONE UR QL CFM: NOT DETECTED
NOROXYMORPHONE: NOT DETECTED
OXAZEPAM UR QL: NOT DETECTED
OXYCODONE UR QL: NOT DETECTED
OXYMORPHONE UR QL: NOT DETECTED
PATHOLOGY STUDY: NORMAL
PCP UR QL: NOT DETECTED
PHENTERMINE UR QL: NOT DETECTED
PROPOXYPH UR QL: NOT DETECTED
SERVICE CMNT-IMP: NORMAL
TAPENTADOL UR QL SCN: NOT DETECTED
TAPENTADOL-O-SULF: NOT DETECTED
TEMAZEPAM UR QL: NOT DETECTED
TRAMADOL UR QL: NOT DETECTED
ZOLPIDEM UR QL: NOT DETECTED

## 2018-11-06 ENCOUNTER — PATIENT OUTREACH (OUTPATIENT)
Dept: OTHER | Facility: OTHER | Age: 78
End: 2018-11-06

## 2018-11-06 NOTE — PROGRESS NOTES
Last 5 Patient Entered Readings                                      Current 30 Day Average: 144/70     Recent Readings 11/3/2018 11/1/2018 10/31/2018 10/29/2018 10/28/2018    SBP (mmHg) 131 140 120 130 149    DBP (mmHg) 67 58 55 60 69    Pulse 81 73 78 73 76          11/6: Called patient to introduce myself as new Health .  Patient was receptive and inviting of my call and thanked me for the introduction.  Did ask if pt needed anything or had anything of importance to discuss.  Pt denies needing to talk about anything.  Will do more in depth follow up on next outreach in 1 week or so.

## 2018-11-07 ENCOUNTER — PATIENT OUTREACH (OUTPATIENT)
Dept: OTHER | Facility: OTHER | Age: 78
End: 2018-11-07

## 2018-11-07 DIAGNOSIS — I10 SEVERE HYPERTENSION: ICD-10-CM

## 2018-11-07 NOTE — PROGRESS NOTES
HPI:  Called patient for follow-up. States that cuff was exchanged with no problems. Patient has been taking two tablets of hydralazine in the evening because tablet is breaking. Tolerating regimen with no complaints.     Last 5 Patient Entered Readings                                      Current 30 Day Average: 137/67     Recent Readings 11/21/2018 11/15/2018 11/14/2018 11/12/2018 11/11/2018    SBP (mmHg) 137 155 137 137 146    DBP (mmHg) 69 81 67 64 70    Pulse 75 74 78 79 78        Patient denies s/s of hypotension (lightheadedness, dizziness, nausea, fatigue) associated with low readings. Instructed patient to inform me if this occurs, patient confirms understanding.    Patient denies s/s of hypertension (SOB, CP, severe headaches, changes in vision) associated with high readings. Instructed patient to go to the ED if BP >180/110 and accompanied by hypertensive s/s, patient confirms understanding.    Assessment:  Per 30-day average blood pressure is at goal of <140/90 mmHg.     Plan:  Continue current medication regimen.  Patients health , Richi Miller, will be following up every 3-4 weeks.   I will continue to monitor regularly and will follow-up in 5 weeks, sooner if blood pressure begins to trend upward or downward.     Current medication regimen:  Hypertension Medications             amLODIPine (NORVASC) 10 MG tablet Take 1 tablet (10 mg total) by mouth once daily.    hydrALAZINE (APRESOLINE) 50 MG tablet Take one tablet (50 mg) by mouth every morning and afternoon then two tablets (100 mg) every evening.    valsartan-hydrochlorothiazide (DIOVAN-HCT) 320-25 mg per tablet TAKE 1 TABLET BY MOUTH EVERY DAY        Patient has my contact information and knows to call with any concerns or clinical changes.

## 2018-11-13 ENCOUNTER — PATIENT OUTREACH (OUTPATIENT)
Dept: OTHER | Facility: OTHER | Age: 78
End: 2018-11-13

## 2018-11-13 NOTE — PROGRESS NOTES
Last 5 Patient Entered Readings                                      Current 30 Day Average: 139/66     Recent Readings 11/12/2018 11/11/2018 11/9/2018 11/8/2018 11/8/2018    SBP (mmHg) 137 146 107 157 187    DBP (mmHg) 64 70 57 88 76    Pulse 79 78 77 87 92            Digital Medicine: Health  Follow Up    11/13: Unable to leave voicemail to follow up with Ms. Emilia Alan. Will send [employer] message.   Current BP average 139/66 mmHg is not at goal, <130/80 mmHg.  Will follow up on technique of taking BP readings, and if she took her machine to [facility].

## 2018-11-15 DIAGNOSIS — D50.0 ANEMIA DUE TO CHRONIC BLOOD LOSS: Primary | ICD-10-CM

## 2018-11-19 NOTE — PROGRESS NOTES
"Last 5 Patient Entered Readings                                      Current 30 Day Average: 138/67     Recent Readings 11/15/2018 11/14/2018 11/12/2018 11/11/2018 11/9/2018    SBP (mmHg) 155 137 137 146 107    DBP (mmHg) 81 67 64 70 57    Pulse 74 78 79 78 77        Digital Medicine: Health  Follow Up    Pt confirms going to the [facility] and getting a new cuff. She reports that "her readings have been way lower".   Reviewed timing and body position of taking BP readings.  Pt verbally understood.    When asked about elevated reading on 11/15, pt is unsure what could have caused it.     Lifestyle Modifications:    1.Dietary Modifications (Sodium intake <2,000mg/day, food labels, dining out):   Deferred    2.Physical Activity:   Deferred    3.Medication Therapy:   Patient has been compliant with the medication regimen.  Patient states she has problems with "cutting the pill in half".    4.Patient has the following medication side effects/concerns: None  (Frequency/Alleviating factors/Precipitating factors, etc.)     Follow up with Ms. Emilia Alan completed. No further questions or concerns. Will continue to follow up to achieve health goals.  Patient is currently not at goal, 138/67 mmHg does exceed <130/80 mmHg.  "

## 2018-11-27 RX ORDER — HYDRALAZINE HYDROCHLORIDE 50 MG/1
TABLET, FILM COATED ORAL
Start: 2018-11-27 | End: 2018-12-19 | Stop reason: SDUPTHER

## 2018-11-29 DIAGNOSIS — G25.0 ESSENTIAL TREMOR: ICD-10-CM

## 2018-11-29 RX ORDER — PRIMIDONE 50 MG/1
TABLET ORAL
Qty: 30 TABLET | Refills: 0 | OUTPATIENT
Start: 2018-11-29

## 2018-11-29 RX ORDER — DICLOFENAC SODIUM 75 MG/1
TABLET, DELAYED RELEASE ORAL
Qty: 180 TABLET | Refills: 0 | Status: SHIPPED | OUTPATIENT
Start: 2018-11-29 | End: 2019-02-24 | Stop reason: SDUPTHER

## 2018-12-10 ENCOUNTER — TELEPHONE (OUTPATIENT)
Dept: HEMATOLOGY/ONCOLOGY | Facility: CLINIC | Age: 78
End: 2018-12-10

## 2018-12-10 ENCOUNTER — PATIENT MESSAGE (OUTPATIENT)
Dept: HEMATOLOGY/ONCOLOGY | Facility: CLINIC | Age: 78
End: 2018-12-10

## 2018-12-10 ENCOUNTER — LAB VISIT (OUTPATIENT)
Dept: LAB | Facility: HOSPITAL | Age: 78
End: 2018-12-10
Attending: INTERNAL MEDICINE
Payer: MEDICARE

## 2018-12-10 DIAGNOSIS — D64.9 ANEMIA, UNSPECIFIED TYPE: ICD-10-CM

## 2018-12-10 LAB
BASOPHILS # BLD AUTO: 0.12 K/UL
BASOPHILS NFR BLD: 1.8 %
DIFFERENTIAL METHOD: ABNORMAL
EOSINOPHIL # BLD AUTO: 0.4 K/UL
EOSINOPHIL NFR BLD: 6.4 %
ERYTHROCYTE [DISTWIDTH] IN BLOOD BY AUTOMATED COUNT: 12.6 %
HCT VFR BLD AUTO: 35.5 %
HGB BLD-MCNC: 11.5 G/DL
IMM GRANULOCYTES # BLD AUTO: 0.03 K/UL
IMM GRANULOCYTES NFR BLD AUTO: 0.4 %
LYMPHOCYTES # BLD AUTO: 1.7 K/UL
LYMPHOCYTES NFR BLD: 24.9 %
MCH RBC QN AUTO: 31.4 PG
MCHC RBC AUTO-ENTMCNC: 32.4 G/DL
MCV RBC AUTO: 97 FL
MONOCYTES # BLD AUTO: 0.5 K/UL
MONOCYTES NFR BLD: 6.8 %
NEUTROPHILS # BLD AUTO: 4 K/UL
NEUTROPHILS NFR BLD: 59.7 %
NRBC BLD-RTO: 0 /100 WBC
PLATELET # BLD AUTO: 214 K/UL
PMV BLD AUTO: 11.1 FL
RBC # BLD AUTO: 3.66 M/UL
WBC # BLD AUTO: 6.75 K/UL

## 2018-12-10 PROCEDURE — 85025 COMPLETE CBC W/AUTO DIFF WBC: CPT | Mod: HCNC

## 2018-12-10 PROCEDURE — 36415 COLL VENOUS BLD VENIPUNCTURE: CPT | Mod: HCNC

## 2018-12-10 NOTE — TELEPHONE ENCOUNTER
----- Message from Wanda Fitzpatrick sent at 12/10/2018 11:39 AM CST -----  Contact: self  Pt is calling in regards to her labs on 01/31/19. Pt would like to know if she has to do the lab work on that day, since she had lab work on today(12/10). Pt would like a callback in regards to this matter.    She can be reached at [phone].    Thank you       Patient was told that                   Anemia better     Continue cbc q 2 months

## 2018-12-17 ENCOUNTER — PATIENT OUTREACH (OUTPATIENT)
Dept: OTHER | Facility: OTHER | Age: 78
End: 2018-12-17

## 2018-12-17 NOTE — PROGRESS NOTES
Last 5 Patient Entered Readings                                      Current 30 Day Average: 138/67     Recent Readings 12/12/2018 12/12/2018 12/11/2018 12/10/2018 12/7/2018    SBP (mmHg) 149 191 137 140 159    DBP (mmHg) 71 91 62 70 76    Pulse 82 87 72 74 79          Digital Medicine: Health  Follow Up    12/17: Left voicemail to follow up with Mr. Emilia Alan.  Current BP average 138/67 mmHg is not at goal, <130/80 mmHg.

## 2018-12-19 ENCOUNTER — PATIENT MESSAGE (OUTPATIENT)
Dept: INTERNAL MEDICINE | Facility: CLINIC | Age: 78
End: 2018-12-19

## 2018-12-19 ENCOUNTER — PATIENT OUTREACH (OUTPATIENT)
Dept: OTHER | Facility: OTHER | Age: 78
End: 2018-12-19

## 2018-12-19 ENCOUNTER — TELEPHONE (OUTPATIENT)
Dept: INTERNAL MEDICINE | Facility: CLINIC | Age: 78
End: 2018-12-19

## 2018-12-19 DIAGNOSIS — I10 SEVERE HYPERTENSION: ICD-10-CM

## 2018-12-19 RX ORDER — HYDRALAZINE HYDROCHLORIDE 50 MG/1
TABLET, FILM COATED ORAL
Qty: 360 TABLET | Refills: 1 | Status: SHIPPED | OUTPATIENT
Start: 2018-12-19 | End: 2018-12-20 | Stop reason: SDUPTHER

## 2018-12-19 NOTE — PROGRESS NOTES
HPI:  Called patient for follow-up. She is tolerating increased dose of hydralazine in the evening and notes no additional side effects. Discussed elevated reading on 12/12/18 and patient states that she moved to address one of her grandchildren. Repeat reading much lower. Patient requesting refill for hydralazine with updated directions.    Last 5 Patient Entered Readings                                      Current 30 Day Average: 137/67     Recent Readings 12/18/2018 12/17/2018 12/12/2018 12/12/2018 12/11/2018    SBP (mmHg) 132 126 149 191 137    DBP (mmHg) 62 66 71 91 62    Pulse 74 81 82 87 72        Patient denies s/s of hypotension (lightheadedness, dizziness, nausea, fatigue) associated with low readings. Instructed patient to inform me if this occurs, patient confirms understanding.    Patient denies s/s of hypertension (SOB, CP, severe headaches, changes in vision) associated with high readings. Instructed patient to go to the ED if BP >180/110 and accompanied by hypertensive s/s, patient confirms understanding.    Assessment:  Patient's current 30-day average is at goal of <140/90 mmHg based on ACC/AHA HTN guidelines.     Plan:  Continue current regimen.  Refill sent to patient's preferred pharmacy.  Patients health , Richi Miller, will be following up every 3-4 weeks.   I will continue to monitor regularly and will follow-up in 4 to 5 weeks, sooner if blood pressure begins to trend upward or downward.     Current medication regimen:  Hypertension Medications             amLODIPine (NORVASC) 10 MG tablet Take 1 tablet (10 mg total) by mouth once daily.    hydrALAZINE (APRESOLINE) 50 MG tablet Take one tablet (50 mg) by mouth every morning and afternoon then two tablets (100 mg) every evening.    valsartan-hydrochlorothiazide (DIOVAN-HCT) 320-25 mg per tablet TAKE 1 TABLET BY MOUTH EVERY DAY        Patient has my contact information and knows to call with any concerns or clinical changes.

## 2018-12-19 NOTE — TELEPHONE ENCOUNTER
"Pt stated that she can no longer get this medication form her "Cardiologist" but she has been receiving from [facility], can you please help answer this Questions where doea pt refill should come from?   Last fill was 11/2018 by Altagracia Cabezas   Co-sign by Dr. Deras.   Please advise   "

## 2018-12-19 NOTE — PROGRESS NOTES
"Last 5 Patient Entered Readings                                      Current 30 Day Average: 137/67     Recent Readings 12/18/2018 12/17/2018 12/12/2018 12/12/2018 12/11/2018    SBP (mmHg) 132 126 149 191 137    DBP (mmHg) 62 66 71 91 62    Pulse 74 81 82 87 72          12/19: Called patient for follow up.  Patient states "everything is going fine".   Patient spoke with PharmD also today because of having medication "refill" problems.  Everything has been sorted out. Pt denies needing help with anything at this time.  Will follow up in 2-4 weeks.  "

## 2018-12-19 NOTE — TELEPHONE ENCOUNTER
"----- Message from Taylor Jaquez sent at 12/19/2018  3:13 PM CST -----  Contact: self/[phone]  RX request - refill or new RX.  Is this a refill or new RX: refill   RX name and strength: hydrALAZINE (APRESOLINE) 50 MG tablet  Directions: Take one tablet (50 mg) by mouth every morning and afternoon then two tablets (100 mg) every evening.  Is this a 30 day or 90 day RX:    Local pharmacy or mail order pharmacy:  Local pharmacy  Pharmacy name and phone # (DON'T enter "on file" or "in chart"): [facility] [address] [facility] [address]  AT [facility] [address] [phone] (Phone)[phone] (Fax)  Comments:  Patient stated that she take 4 pill per day, and the cardiology who usually refill it is not longer in the clinic. She would like for PCP to refill it.   Please call and advise. Thank you      "

## 2018-12-20 RX ORDER — HYDRALAZINE HYDROCHLORIDE 50 MG/1
TABLET, FILM COATED ORAL
Qty: 360 TABLET | Refills: 1 | Status: SHIPPED | OUTPATIENT
Start: 2018-12-20 | End: 2019-06-13

## 2018-12-20 NOTE — TELEPHONE ENCOUNTER
I can antonia this    But on the rx it says 1 in the am and 2 in the pm (3 daily); how is she taking this?

## 2019-01-11 ENCOUNTER — PATIENT OUTREACH (OUTPATIENT)
Dept: OTHER | Facility: OTHER | Age: 79
End: 2019-01-11

## 2019-01-11 NOTE — PROGRESS NOTES
Last 5 Patient Entered Readings                                      Current 30 Day Average: 144/74     Recent Readings 1/9/2019 1/8/2019 1/5/2019 1/4/2019 1/3/2019    SBP (mmHg) 152 149 149 148 120    DBP (mmHg) 74 70 82 78 57    Pulse 72 77 87 77 79            Digital Medicine: Health  Follow Up    1/11: Left voicemail to follow up with Ms. Emilia Alan.  Current BP average 144/74 mmHg is not at goal, <130/80 mmHg.  Will address recent elevation in BP readings.

## 2019-01-18 NOTE — PROGRESS NOTES
"Last 5 Patient Entered Readings                                      Current 30 Day Average: 149/75     Recent Readings 1/17/2019 1/16/2019 1/15/2019 1/14/2019 1/12/2019    SBP (mmHg) 152 173 120 150 154    DBP (mmHg) 68 85 68 84 70    Pulse 84 78 81 88 80          Digital Medicine: Health  Follow Up    When asked about recent elevation in readings, patient states "she is unsure".  Patient reports the reading of 173/85 mmHg was taken before taking her BP medication because she "wanted to see".  When asked, patient states she is unsure as to the last time she charged her cuff. Reminded patient to charge her cuff regularly.     Patient denies any changes in diet or exercise and denies needing help with anything at this time.         Lifestyle Modifications:    1.Dietary Modifications (Sodium intake <2,000mg/day, food labels, dining out):   Pt denies needing any other help with nutrition at this time.    2.Physical Activity:   Pt denies needing any other help with activity at this time.    3.Medication Therapy:   Patient has been compliant with the medication regimen.    4.Patient has the following medication side effects/concerns: None  (Frequency/Alleviating factors/Precipitating factors, etc.)     Follow up with Ms. Emilia Alan completed. No further questions or concerns. Will continue to follow up to achieve health goals.  Patient is currently not at goal, 149/75 mmHg does exceed <130/80 mmHg.      "

## 2019-01-25 ENCOUNTER — PATIENT OUTREACH (OUTPATIENT)
Dept: OTHER | Facility: OTHER | Age: 79
End: 2019-01-25

## 2019-01-25 NOTE — PROGRESS NOTES
"HPI:  Called patient for follow-up. Adherent to medication regimen with no complaints. Reviewed readings and patient states that higher readings were caused by her granddaughter pulling the cuff. Patient dependent on daughter to check blood pressure with her phone and reports being "aggravated on occasion". She is frustrated that readings are not consistent as diet has improved. Patient is willing to have daughter place applications on tablet so that she can check blood pressure more frequently without interruptions.     Last 5 Patient Entered Readings                                      Current 30 Day Average: 146/72     Recent Readings 2/10/2019 2/9/2019 2/7/2019 2/6/2019 2/5/2019    SBP (mmHg) 137 138 120 131 153    DBP (mmHg) 78 71 67 53 72    Pulse 89 68 88 77 82        Patient denies s/s of hypotension (lightheadedness, dizziness, nausea, fatigue) associated with low readings. Instructed patient to inform me if this occurs, patient confirms understanding.    Patient denies s/s of hypertension (SOB, CP, severe headaches, changes in vision) associated with high readings. Instructed patient to go to the ED if BP >180/110 and accompanied by hypertensive s/s, patient confirms understanding.    Assessment:  Patient's current 30-day average is approaching goal of <140/90 mmHg based on ACC/AHA HTN guidelines.     Plan:  Continue current regimen.  Patients health , Richi Miller, will be following up every 3-4 weeks.   I will continue to monitor regularly and will follow-up in 4 weeks, sooner if blood pressure begins to trend upward or downward.     Current medication regimen:  Hypertension Medications             amLODIPine (NORVASC) 10 MG tablet Take 1 tablet (10 mg total) by mouth once daily.    hydrALAZINE (APRESOLINE) 50 MG tablet Take one tablet (50 mg) by mouth every morning and afternoon then two tablets (100 mg) every evening.    valsartan-hydrochlorothiazide (DIOVAN-HCT) 320-25 mg per tablet TAKE 1 " TABLET BY MOUTH EVERY DAY        Patient has my contact information and knows to call with any concerns or clinical changes.

## 2019-01-27 NOTE — TELEPHONE ENCOUNTER
SITTING UP IN BED SIDE CHAIR.  NO NEEDS VOICED AT THIS TIME. Spoke with Lab. They were able to add Iron and TIBC but pt would have to come back for a re-draw for the rest.

## 2019-01-31 ENCOUNTER — OFFICE VISIT (OUTPATIENT)
Dept: HEMATOLOGY/ONCOLOGY | Facility: CLINIC | Age: 79
End: 2019-01-31
Payer: MEDICARE

## 2019-01-31 ENCOUNTER — OFFICE VISIT (OUTPATIENT)
Dept: PODIATRY | Facility: CLINIC | Age: 79
End: 2019-01-31
Payer: MEDICARE

## 2019-01-31 ENCOUNTER — LAB VISIT (OUTPATIENT)
Dept: LAB | Facility: HOSPITAL | Age: 79
End: 2019-01-31
Attending: INTERNAL MEDICINE
Payer: MEDICARE

## 2019-01-31 VITALS
HEART RATE: 86 BPM | RESPIRATION RATE: 18 BRPM | WEIGHT: 168 LBS | SYSTOLIC BLOOD PRESSURE: 127 MMHG | DIASTOLIC BLOOD PRESSURE: 67 MMHG | BODY MASS INDEX: 32.98 KG/M2 | HEIGHT: 60 IN

## 2019-01-31 VITALS
WEIGHT: 162.5 LBS | SYSTOLIC BLOOD PRESSURE: 134 MMHG | HEART RATE: 80 BPM | DIASTOLIC BLOOD PRESSURE: 76 MMHG | HEIGHT: 60 IN | BODY MASS INDEX: 31.9 KG/M2

## 2019-01-31 DIAGNOSIS — B35.1 ONYCHOMYCOSIS DUE TO DERMATOPHYTE: ICD-10-CM

## 2019-01-31 DIAGNOSIS — M47.16 LUMBAR SPONDYLOSIS WITH MYELOPATHY: ICD-10-CM

## 2019-01-31 DIAGNOSIS — M47.816 LUMBAR SPONDYLOSIS: ICD-10-CM

## 2019-01-31 DIAGNOSIS — E08.44 DIABETIC AMYOTROPHY ASSOCIATED WITH DIABETES MELLITUS DUE TO UNDERLYING CONDITION: ICD-10-CM

## 2019-01-31 DIAGNOSIS — G82.20 PARAPARESIS OF BOTH LOWER LIMBS: ICD-10-CM

## 2019-01-31 DIAGNOSIS — R26.9 GAIT DISORDER: ICD-10-CM

## 2019-01-31 DIAGNOSIS — I10 SEVERE HYPERTENSION: ICD-10-CM

## 2019-01-31 DIAGNOSIS — Z86.12 HISTORY OF POLIOMYELITIS: ICD-10-CM

## 2019-01-31 DIAGNOSIS — M48.062 SPINAL STENOSIS OF LUMBAR REGION WITH NEUROGENIC CLAUDICATION: ICD-10-CM

## 2019-01-31 DIAGNOSIS — G14 POST-POLIO SYNDROME: Primary | ICD-10-CM

## 2019-01-31 DIAGNOSIS — E11.42 DIABETIC POLYNEUROPATHY ASSOCIATED WITH TYPE 2 DIABETES MELLITUS: ICD-10-CM

## 2019-01-31 DIAGNOSIS — M25.561 CHRONIC PAIN OF RIGHT KNEE: ICD-10-CM

## 2019-01-31 DIAGNOSIS — M54.40 CHRONIC BILATERAL LOW BACK PAIN WITH SCIATICA, SCIATICA LATERALITY UNSPECIFIED: ICD-10-CM

## 2019-01-31 DIAGNOSIS — L30.9 DERMATITIS OF LEFT FOOT: ICD-10-CM

## 2019-01-31 DIAGNOSIS — G60.9 IDIOPATHIC PERIPHERAL NEUROPATHY: ICD-10-CM

## 2019-01-31 DIAGNOSIS — M54.16 LUMBAR RADICULOPATHY: ICD-10-CM

## 2019-01-31 DIAGNOSIS — E11.40 TYPE 2 DIABETES MELLITUS WITH DIABETIC NEUROPATHY, WITHOUT LONG-TERM CURRENT USE OF INSULIN: ICD-10-CM

## 2019-01-31 DIAGNOSIS — M81.0 AGE-RELATED OSTEOPOROSIS WITHOUT CURRENT PATHOLOGICAL FRACTURE: ICD-10-CM

## 2019-01-31 DIAGNOSIS — M17.11 PRIMARY OSTEOARTHRITIS OF RIGHT KNEE: ICD-10-CM

## 2019-01-31 DIAGNOSIS — G14 POST POLIOMYELITIS SYNDROME: ICD-10-CM
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
## 2019-01-31 DIAGNOSIS — M54.16 LEFT LUMBAR RADICULOPATHY: ICD-10-CM

## 2019-01-31 DIAGNOSIS — L84 CORN OR CALLUS: ICD-10-CM

## 2019-01-31 DIAGNOSIS — D46.4 REFRACTORY ANEMIA: ICD-10-CM

## 2019-01-31 DIAGNOSIS — G89.29 CHRONIC PAIN OF RIGHT KNEE: ICD-10-CM

## 2019-01-31 DIAGNOSIS — G89.29 CHRONIC BILATERAL LOW BACK PAIN WITH SCIATICA, SCIATICA LATERALITY UNSPECIFIED: ICD-10-CM

## 2019-01-31 DIAGNOSIS — D64.9 ANEMIA, UNSPECIFIED TYPE: ICD-10-CM

## 2019-01-31 DIAGNOSIS — M21.372 FOOT DROP, LEFT: ICD-10-CM

## 2019-01-31 DIAGNOSIS — M47.26 OSTEOARTHRITIS OF SPINE WITH RADICULOPATHY, LUMBAR REGION: ICD-10-CM

## 2019-01-31 PROBLEM — D75.9 CYTOPENIA: Status: RESOLVED | Noted: 2018-06-25 | Resolved: 2019-01-31

## 2019-01-31 LAB
ALBUMIN SERPL BCP-MCNC: 3.3 G/DL
ALP SERPL-CCNC: 81 U/L
ALT SERPL W/O P-5'-P-CCNC: 21 U/L
ANION GAP SERPL CALC-SCNC: 10 MMOL/L
AST SERPL-CCNC: 18 U/L
BASOPHILS # BLD AUTO: 0.15 K/UL
BASOPHILS NFR BLD: 1.5 %
BILIRUB SERPL-MCNC: 0.3 MG/DL
BUN SERPL-MCNC: 27 MG/DL
CALCIUM SERPL-MCNC: 10.1 MG/DL
CHLORIDE SERPL-SCNC: 104 MMOL/L
CO2 SERPL-SCNC: 24 MMOL/L
CREAT SERPL-MCNC: 0.8 MG/DL
DIFFERENTIAL METHOD: ABNORMAL
EOSINOPHIL # BLD AUTO: 0.3 K/UL
EOSINOPHIL NFR BLD: 2.5 %
ERYTHROCYTE [DISTWIDTH] IN BLOOD BY AUTOMATED COUNT: 12.5 %
EST. GFR  (AFRICAN AMERICAN): >60 ML/MIN/1.73 M^2
EST. GFR  (NON AFRICAN AMERICAN): >60 ML/MIN/1.73 M^2
GLUCOSE SERPL-MCNC: 123 MG/DL
HCT VFR BLD AUTO: 35.4 %
HGB BLD-MCNC: 11.2 G/DL
IMM GRANULOCYTES # BLD AUTO: 0.04 K/UL
IMM GRANULOCYTES NFR BLD AUTO: 0.4 %
LYMPHOCYTES # BLD AUTO: 1.8 K/UL
LYMPHOCYTES NFR BLD: 17.8 %
MCH RBC QN AUTO: 30.5 PG
MCHC RBC AUTO-ENTMCNC: 31.6 G/DL
MCV RBC AUTO: 97 FL
MONOCYTES # BLD AUTO: 0.6 K/UL
MONOCYTES NFR BLD: 6.1 %
NEUTROPHILS # BLD AUTO: 7.1 K/UL
NEUTROPHILS NFR BLD: 71.7 %
NRBC BLD-RTO: 0 /100 WBC
PLATELET # BLD AUTO: 287 K/UL
PMV BLD AUTO: 11 FL
POTASSIUM SERPL-SCNC: 3.7 MMOL/L
PROT SERPL-MCNC: 7.1 G/DL
RBC # BLD AUTO: 3.67 M/UL
SODIUM SERPL-SCNC: 138 MMOL/L
WBC # BLD AUTO: 9.94 K/UL

## 2019-01-31 PROCEDURE — 3075F SYST BP GE 130 - 139MM HG: CPT | Mod: HCNC,CPTII,S$GLB, | Performed by: INTERNAL MEDICINE

## 2019-01-31 PROCEDURE — 99999 PR PBB SHADOW E&M-EST. PATIENT-LVL III: ICD-10-PCS | Mod: PBBFAC,HCNC,, | Performed by: PODIATRIST

## 2019-01-31 PROCEDURE — 1101F PR PT FALLS ASSESS DOC 0-1 FALLS W/OUT INJ PAST YR: ICD-10-PCS | Mod: HCNC,CPTII,S$GLB, | Performed by: INTERNAL MEDICINE

## 2019-01-31 PROCEDURE — 1101F PT FALLS ASSESS-DOCD LE1/YR: CPT | Mod: HCNC,CPTII,S$GLB, | Performed by: PODIATRIST

## 2019-01-31 PROCEDURE — 99214 PR OFFICE/OUTPT VISIT, EST, LEVL IV, 30-39 MIN: ICD-10-PCS | Mod: HCNC,S$GLB,, | Performed by: INTERNAL MEDICINE

## 2019-01-31 PROCEDURE — 99499 UNLISTED E&M SERVICE: CPT | Mod: HCNC,S$GLB,, | Performed by: INTERNAL MEDICINE

## 2019-01-31 PROCEDURE — 80053 COMPREHEN METABOLIC PANEL: CPT | Mod: HCNC

## 2019-01-31 PROCEDURE — 36415 COLL VENOUS BLD VENIPUNCTURE: CPT | Mod: HCNC

## 2019-01-31 PROCEDURE — 99999 PR PBB SHADOW E&M-EST. PATIENT-LVL III: ICD-10-PCS | Mod: PBBFAC,HCNC,, | Performed by: INTERNAL MEDICINE

## 2019-01-31 PROCEDURE — 1101F PR PT FALLS ASSESS DOC 0-1 FALLS W/OUT INJ PAST YR: ICD-10-PCS | Mod: HCNC,CPTII,S$GLB, | Performed by: PODIATRIST

## 2019-01-31 PROCEDURE — 3074F PR MOST RECENT SYSTOLIC BLOOD PRESSURE < 130 MM HG: ICD-10-PCS | Mod: HCNC,CPTII,S$GLB, | Performed by: PODIATRIST

## 2019-01-31 PROCEDURE — 99213 OFFICE O/P EST LOW 20 MIN: CPT | Mod: 25,HCNC,S$GLB, | Performed by: PODIATRIST

## 2019-01-31 PROCEDURE — 3074F SYST BP LT 130 MM HG: CPT | Mod: HCNC,CPTII,S$GLB, | Performed by: PODIATRIST

## 2019-01-31 PROCEDURE — 99499 RISK ADDL DX/OHS AUDIT: ICD-10-PCS | Mod: HCNC,S$GLB,, | Performed by: INTERNAL MEDICINE

## 2019-01-31 PROCEDURE — 3078F DIAST BP <80 MM HG: CPT | Mod: HCNC,CPTII,S$GLB, | Performed by: INTERNAL MEDICINE

## 2019-01-31 PROCEDURE — 11721 PR DEBRIDEMENT OF NAILS, 6 OR MORE: ICD-10-PCS | Mod: 59,Q9,HCNC,S$GLB | Performed by: PODIATRIST

## 2019-01-31 PROCEDURE — 11056 PARNG/CUTG B9 HYPRKR LES 2-4: CPT | Mod: Q9,HCNC,S$GLB, | Performed by: PODIATRIST

## 2019-01-31 PROCEDURE — 85025 COMPLETE CBC W/AUTO DIFF WBC: CPT | Mod: HCNC

## 2019-01-31 PROCEDURE — 99213 PR OFFICE/OUTPT VISIT, EST, LEVL III, 20-29 MIN: ICD-10-PCS | Mod: 25,HCNC,S$GLB, | Performed by: PODIATRIST

## 2019-01-31 PROCEDURE — 11056 PR TRIM BENIGN HYPERKERATOTIC SKIN LESION,2-4: ICD-10-PCS | Mod: Q9,HCNC,S$GLB, | Performed by: PODIATRIST

## 2019-01-31 PROCEDURE — 99214 OFFICE O/P EST MOD 30 MIN: CPT | Mod: HCNC,S$GLB,, | Performed by: INTERNAL MEDICINE

## 2019-01-31 PROCEDURE — 3078F DIAST BP <80 MM HG: CPT | Mod: HCNC,CPTII,S$GLB, | Performed by: PODIATRIST

## 2019-01-31 PROCEDURE — 99999 PR PBB SHADOW E&M-EST. PATIENT-LVL III: CPT | Mod: PBBFAC,HCNC,, | Performed by: PODIATRIST

## 2019-01-31 PROCEDURE — 1101F PT FALLS ASSESS-DOCD LE1/YR: CPT | Mod: HCNC,CPTII,S$GLB, | Performed by: INTERNAL MEDICINE

## 2019-01-31 PROCEDURE — 99999 PR PBB SHADOW E&M-EST. PATIENT-LVL III: CPT | Mod: PBBFAC,HCNC,, | Performed by: INTERNAL MEDICINE

## 2019-01-31 PROCEDURE — 11721 DEBRIDE NAIL 6 OR MORE: CPT | Mod: 59,Q9,HCNC,S$GLB | Performed by: PODIATRIST

## 2019-01-31 PROCEDURE — 3075F PR MOST RECENT SYSTOLIC BLOOD PRESS GE 130-139MM HG: ICD-10-PCS | Mod: HCNC,CPTII,S$GLB, | Performed by: INTERNAL MEDICINE

## 2019-01-31 PROCEDURE — 3078F PR MOST RECENT DIASTOLIC BLOOD PRESSURE < 80 MM HG: ICD-10-PCS | Mod: HCNC,CPTII,S$GLB, | Performed by: INTERNAL MEDICINE

## 2019-01-31 PROCEDURE — 3078F PR MOST RECENT DIASTOLIC BLOOD PRESSURE < 80 MM HG: ICD-10-PCS | Mod: HCNC,CPTII,S$GLB, | Performed by: PODIATRIST

## 2019-01-31 RX ORDER — CLOTRIMAZOLE AND BETAMETHASONE DIPROPIONATE 10; .64 MG/G; MG/G
CREAM TOPICAL 2 TIMES DAILY
Qty: 45 G | Refills: 3 | Status: SHIPPED | OUTPATIENT
Start: 2019-01-31 | End: 2022-04-19 | Stop reason: SDUPTHER

## 2019-01-31 NOTE — PROGRESS NOTES
"Mr. Michaels is a 78-year-old woman whom I evaluated in May 2018.  She was   referred for anemia.  Her hemoglobin values were normal from 2006 until 2016.    Since 2017, she has usually been anemic, although she did have a low normal   hemoglobin values 2 or 3 times in 2017 and 2018.  In the past year, her   hemoglobin has varied from 10.4-12.4.    I performed a bone marrow examination in June 2018.  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  There was no evidence of   reticulin fibrosis.  The cytogenetic study was normal and the FISH analyses for   aberrations associated with myelodysplasia were negative.  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  She has not had thrombocytosis or polycythemia and she   does not have fibrosis in her bone marrow, so the 2 mutations suggest to me that she   has an increased risk of developing a myelodysplastic disorder.  It seems   unlikely that these are precursors of a myeloproliferative disease, although it   is possible that she could develop a mixed myelodysplastic/myeloproliferative   disorder in the future.  At the present time, I think the more appropriate label   for her anemia is "clonal hematopoiesis of indeterminate significance."    There has been little change since I last saw her in May 2018.  She has diabetes   mellitus with peripheral neuropathy, severe essential hypertension and a   history of poliomyelitis with post-polio syndrome.  She also has osteoporosis   and obstructive sleep apnea.  She is using a wheelchair today, but she can use a   walker in her home.    No family members have had hematologic disorders.    ADDITIONAL PAST HISTORY, SYSTEM REVIEW, SOCIAL HISTORY AND FAMILY HISTORY:  Have   been reviewed with the patient and her daughter and updated in the electronic "   record.    PHYSICAL EXAMINATION:  GENERAL APPEARANCE:  A moderately overweight female who can stand and sit on a   table, which raises her with a hydraulic lift.  EYES:  No jaundice or pallor.  EARS:  Considerable wax in the left canal.  The portions of the tympanic   membranes that are visible are clear.    NOSE:  Clear nares.  SINUSES:  No tenderness.  MOUTH AND THROAT:  No mucosal lesions.  Adequate dentition.  NECK:  No thyroid abnormalities.  No masses or bruits.  LYMPH NODES:  No enlarged cervical, axillary or inguinal nodes.  CHEST AND LUNGS:  Normal respiratory effort.  Clear to auscultation and   percussion.  HEART:  Regular rate and rhythm without murmur or gallop.  ABDOMEN:  Soft without masses or tenderness.  No hepatosplenomegaly.  EXTREMITIES:  Trace pretibial and ankle edema on the right.  There is muscle   atrophy of the left lower leg.  PERIPHERAL VASCULATURE:  Adequate pulses in the feet and ankles.  NEUROLOGIC:  Mental status is good.  She is fully oriented.  Lower extremity   weakness, which is more severe in the left leg.    LABORATORY STUDIES:  Comprehensive metabolic profile today is normal with the   exceptions of glucose 123 and albumin 3.3.  The CBC includes hemoglobin 11.2,   WBC 9940 and platelets 287,000.    IMPRESSION:  1.  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  2.  Diabetes mellitus with neuropathy.  3.  Post-polio syndrome.    RECOMMENDATIONS:  1.  I discussed the hematologic findings at length with Mrs. Michaels and her   daughter.  She understands that the mutations detected can be precursors of   serious hematologic disorders, but it is reassuring that there has been no major   change in her blood counts in the past year.  2.  CBC every three months.  3.  Return visit in one year with Dr. German Parra.  At that time, she should   have a CBC and CMP.  She understands that if there is a major  deterioration in   her blood counts, she should be seen earlier and will likely have another bone   marrow examination.      DESHAWN/IN  dd: 01/31/2019 14:13:57 (CST)  td: 02/01/2019 03:23:40 (CST)  Doc ID   #[id number]  Job ID #[id number]    CC: ANUJ Parra MD

## 2019-01-31 NOTE — PROGRESS NOTES
Subjective:      Patient ID: Emilia Alan is a 78 y.o. female.    Chief Complaint: PCP (Zoë Sousa NP 7/25/18); Nail Problem (thick nails ); Nail Care; and Callouses    Emilia is a 78 y.o. female who presents to the clinic for evaluation and treatment of high risk feet. 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 The patient's chief complaint is long, thick toenails and dry red itchy skin L foot       PCP: Lauren Deras MD    Date Last Seen by PCP:   Chief Complaint   Patient presents with    PCP     Zoë Sousa NP 7/25/18    Nail Problem     thick nails     Nail Care    Callouses         Current shoe gear: DM shoes w/ AFO brace( L)    Hemoglobin A1C   Date Value Ref Range Status   05/11/2018 6.1 (H) 4.0 - 5.6 % Final     Comment:     According to ADA guidelines, hemoglobin A1c <7.0% represents  optimal control in non-pregnant diabetic patients. Different  metrics may apply to specific patient populations.   Standards of Medical Care in Diabetes-2016.  For the purpose of screening for the presence of diabetes:  <5.7%     Consistent with the absence of diabetes  5.7-6.4%  Consistent with increasing risk for diabetes   (prediabetes)  >or=6.5%  Consistent with  diabetes  Currently, no consensus exists for use of hemoglobin A1c  for diagnosis of diabetes for children.  This Hemoglobin A1c assay has significant interference with fetal   hemoglobin   (HbF). The results are invalid for patients with abnormal amounts of   HbF,   including those with known Hereditary Persistence   of Fetal Hemoglobin. Heterozygous hemoglobin variants (HbAS, HbAC,   HbAD, HbAE, HbA2) do not significantly interfere with this assay;   however, presence of multiple variants in a sample may impact the %   interference.     01/26/2018 6.1 (H) 4.0 - 5.6 % Final     Comment:     According to ADA guidelines, hemoglobin A1c <7.0% represents  optimal control in non-pregnant diabetic patients. Different  metrics may apply to specific patient populations.   Standards of Medical Care in Diabetes-2016.  For the purpose of screening for the presence of diabetes:  <5.7%     Consistent with the absence of diabetes  5.7-6.4%  Consistent with increasing risk for diabetes   (prediabetes)  >or=6.5%  Consistent with diabetes  Currently, no consensus exists for use of hemoglobin A1c  for diagnosis of diabetes for children.  This Hemoglobin A1c assay has significant interference with fetal   hemoglobin   (HbF). The results are invalid for patients with abnormal amounts of   HbF,   including those with known Hereditary Persistence   of Fetal Hemoglobin. Heterozygous hemoglobin variants (HbAS, HbAC,   HbAD, HbAE, HbA2) do not significantly interfere with this assay;   however, presence of multiple variants in a sample may impact the %   interference.     10/09/2017 6.8 (H) 4.0 - 5.6 % Final     Comment:     According to ADA guidelines, hemoglobin A1c <7.0% represents  optimal control in non-pregnant diabetic patients. Different  metrics may apply to specific patient populations.   Standards of Medical Care in Diabetes-2016.  For the purpose of screening for the presence of diabetes:  <5.7%     Consistent with the absence  of diabetes  5.7-6.4%  Consistent with increasing risk for diabetes   (prediabetes)  >or=6.5%  Consistent with diabetes  Currently, no consensus exists for use of hemoglobin A1c  for diagnosis of diabetes for children.  This Hemoglobin A1c assay has significant interference with fetal   hemoglobin   (HbF). The results are invalid for patients with abnormal amounts of   HbF,   including those with known Hereditary Persistence   of Fetal Hemoglobin. Heterozygous hemoglobin variants (HbAS, HbAC,   HbAD, HbAE, HbA2) do not significantly interfere with this assay;   however, presence of multiple variants in a sample may impact the %   interference.           Review of Systems   Constitution: Negative for chills, decreased appetite and fever.   Cardiovascular: Negative for chest pain, claudication and leg swelling.   Respiratory: Negative for cough.    Skin: Positive for dry skin and nail changes. Negative for color change, flushing, itching and poor wound healing.   Musculoskeletal: Positive for muscle weakness (foot drop). Negative for arthritis, back pain, gout, joint pain and myalgias.   Gastrointestinal: Negative for nausea and vomiting.   Neurological: Positive for numbness and paresthesias. Negative for loss of balance.           Objective:       Vitals:    01/31/19 1312   BP: 127/67   Pulse: 86   Resp: 18   Weight: 76.2 kg (168 lb)   Height: 5' (1.524 m)   PainSc: 0-No pain        Physical Exam   Constitutional: She is oriented to person, place, and time. She appears well-developed and well-nourished. No distress.   Cardiovascular:   Dorsalis pedis and posterior tibial pulses are diminished Bilaterally. Toes are cool to touch. Feet are warm proximally.There is decreased digital hair. Skin is atrophic, slightly hyperpigmented, and mildly edematous       Musculoskeletal: She exhibits no edema or tenderness.        Right ankle: Normal.        Left ankle: Normal.        Right foot: There is no swelling, no crepitus  and no deformity.        Left foot: There is no swelling, no crepitus and no deformity.   Dropfoot left.      Lymphadenopathy:   No palpable lymph nodes   Neurological: She is alert and oriented to person, place, and time. She has normal strength.   [place] 5.07 monofilamant testing is diminished Charbel feet. Sharp/dull sensation diminished Bilaterally. Light touch absent Bilaterally.       Skin: Skin is warm, dry and intact. No abrasion, no bruising, no burn, no laceration, no lesion, no petechiae and no rash noted. She is not diaphoretic. No pallor. Nails show no clubbing.   Nails 1-5 b/l  are elongated by  4-6 mm's, thickened by 3-5 mm's, dystrophic, and are darkened in  coloration . Xerosis Bilaterally. No open lesions noted.    Hyperkeratotic tissue noted to plantar L 5th MPJ and plantar L 5th toe    Scaling dryness in a moccasin distribution is noted to the L foot  with associated erythema.       Psychiatric: She has a normal mood and affect. Judgment and thought content normal.   Nursing note and vitals reviewed.          Assessment:       Encounter Diagnoses   Name Primary?    Post-polio syndrome Yes    Idiopathic peripheral neuropathy     Onychomycosis due to dermatophyte     Corn or callus     Foot drop, left     Dermatitis of left foot          Plan:       Emilia was seen today for pcp, nail problem, nail care and callouses.    Diagnoses and all orders for this visit:    Post-polio syndrome    Idiopathic peripheral neuropathy    Onychomycosis due to dermatophyte    Corn or callus    Foot drop, left    Dermatitis of left foot    Other orders  -     clotrimazole-betamethasone 1-0.05% (LOTRISONE) cream; Apply topically 2 (two) times daily.      I counseled the patient on her conditions, their implications and medical management.    - Shoe inspection.Patient instructed on proper foot hygeine. We discussed wearing proper shoe gear, daily foot inspections, never walking without protective shoe gear,  never putting sharp instruments to feet, routine podiatric nail visits every 2-3 months.      - With patient's permission, nails were aggressively reduced and debrided x 10 to their soft tissue attachment mechanically and with electric , removing all offending nail and debris. Patient relates relief following the procedure. She will continue to monitor the areas daily, inspect her feet, wear protective shoe gear when ambulatory, moisturizer to maintain skin integrity and follow in this office in approximately 2-3 months, sooner p.r.n.    - After cleansing the  area w/ alcohol prep pad the above mentioned hyperkeratosis was trimmed utilizing No 15 scapel, to a smooth base with out incident. Patient tolerated this  well and reported comfort to the area of plantar L 5th MPJ and lateral l 5th toe    - Rx Lotrisone

## 2019-02-01 RX ORDER — HYDROCODONE BITARTRATE AND ACETAMINOPHEN 10; 325 MG/1; MG/1
1 TABLET ORAL EVERY 6 HOURS PRN
Qty: 120 TABLET | Refills: 0 | Status: SHIPPED | OUTPATIENT
Start: 2019-02-01 | End: 2019-02-07 | Stop reason: SDUPTHER

## 2019-02-06 ENCOUNTER — PES CALL (OUTPATIENT)
Dept: ADMINISTRATIVE | Facility: CLINIC | Age: 79
End: 2019-02-06

## 2019-02-07 ENCOUNTER — OFFICE VISIT (OUTPATIENT)
Dept: PHYSICAL MEDICINE AND REHAB | Facility: CLINIC | Age: 79
End: 2019-02-07
Payer: MEDICARE

## 2019-02-07 VITALS
WEIGHT: 165 LBS | SYSTOLIC BLOOD PRESSURE: 140 MMHG | DIASTOLIC BLOOD PRESSURE: 71 MMHG | HEART RATE: 76 BPM | HEIGHT: 60 IN | BODY MASS INDEX: 32.39 KG/M2

## 2019-02-07 DIAGNOSIS — M48.062 SPINAL STENOSIS OF LUMBAR REGION WITH NEUROGENIC CLAUDICATION: Primary | ICD-10-CM
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
## 2019-02-07 DIAGNOSIS — M47.816 LUMBAR SPONDYLOSIS: ICD-10-CM

## 2019-02-07 DIAGNOSIS — M54.16 LUMBAR RADICULOPATHY: ICD-10-CM

## 2019-02-07 DIAGNOSIS — Z86.12 HISTORY OF POLIOMYELITIS: ICD-10-CM

## 2019-02-07 DIAGNOSIS — M17.11 PRIMARY OSTEOARTHRITIS OF RIGHT KNEE: ICD-10-CM

## 2019-02-07 DIAGNOSIS — G82.20 PARAPARESIS OF BOTH LOWER LIMBS: ICD-10-CM

## 2019-02-07 DIAGNOSIS — G89.29 CHRONIC PAIN OF RIGHT KNEE: ICD-10-CM

## 2019-02-07 DIAGNOSIS — M47.26 OSTEOARTHRITIS OF SPINE WITH RADICULOPATHY, LUMBAR REGION: ICD-10-CM

## 2019-02-07 DIAGNOSIS — M54.40 CHRONIC BILATERAL LOW BACK PAIN WITH SCIATICA, SCIATICA LATERALITY UNSPECIFIED: ICD-10-CM

## 2019-02-07 DIAGNOSIS — R26.9 GAIT DISORDER: ICD-10-CM

## 2019-02-07 DIAGNOSIS — E11.40 TYPE 2 DIABETES MELLITUS WITH DIABETIC NEUROPATHY, WITHOUT LONG-TERM CURRENT USE OF INSULIN: ICD-10-CM

## 2019-02-07 DIAGNOSIS — E08.44 DIABETIC AMYOTROPHY ASSOCIATED WITH DIABETES MELLITUS DUE TO UNDERLYING CONDITION: ICD-10-CM

## 2019-02-07 DIAGNOSIS — M81.0 AGE-RELATED OSTEOPOROSIS WITHOUT CURRENT PATHOLOGICAL FRACTURE: ICD-10-CM

## 2019-02-07 DIAGNOSIS — M25.561 CHRONIC PAIN OF RIGHT KNEE: ICD-10-CM

## 2019-02-07 DIAGNOSIS — E11.42 DIABETIC POLYNEUROPATHY ASSOCIATED WITH TYPE 2 DIABETES MELLITUS: ICD-10-CM

## 2019-02-07 DIAGNOSIS — M54.16 LEFT LUMBAR RADICULOPATHY: ICD-10-CM

## 2019-02-07 DIAGNOSIS — G14 POST POLIOMYELITIS SYNDROME: ICD-10-CM

## 2019-02-07 DIAGNOSIS — M47.16 LUMBAR SPONDYLOSIS WITH MYELOPATHY: ICD-10-CM

## 2019-02-07 DIAGNOSIS — G89.29 CHRONIC BILATERAL LOW BACK PAIN WITH SCIATICA, SCIATICA LATERALITY UNSPECIFIED: ICD-10-CM

## 2019-02-07 PROCEDURE — 3077F PR MOST RECENT SYSTOLIC BLOOD PRESSURE >= 140 MM HG: ICD-10-PCS | Mod: HCNC,CPTII,S$GLB, | Performed by: PHYSICAL MEDICINE & REHABILITATION

## 2019-02-07 PROCEDURE — 3077F SYST BP >= 140 MM HG: CPT | Mod: HCNC,CPTII,S$GLB, | Performed by: PHYSICAL MEDICINE & REHABILITATION

## 2019-02-07 PROCEDURE — 3078F DIAST BP <80 MM HG: CPT | Mod: HCNC,CPTII,S$GLB, | Performed by: PHYSICAL MEDICINE & REHABILITATION

## 2019-02-07 PROCEDURE — 99214 OFFICE O/P EST MOD 30 MIN: CPT | Mod: HCNC,S$GLB,, | Performed by: PHYSICAL MEDICINE & REHABILITATION

## 2019-02-07 PROCEDURE — 1101F PR PT FALLS ASSESS DOC 0-1 FALLS W/OUT INJ PAST YR: ICD-10-PCS | Mod: HCNC,CPTII,S$GLB, | Performed by: PHYSICAL MEDICINE & REHABILITATION

## 2019-02-07 PROCEDURE — 99499 UNLISTED E&M SERVICE: CPT | Mod: HCNC,S$GLB,, | Performed by: PHYSICAL MEDICINE & REHABILITATION

## 2019-02-07 PROCEDURE — 1101F PT FALLS ASSESS-DOCD LE1/YR: CPT | Mod: HCNC,CPTII,S$GLB, | Performed by: PHYSICAL MEDICINE & REHABILITATION

## 2019-02-07 PROCEDURE — 3078F PR MOST RECENT DIASTOLIC BLOOD PRESSURE < 80 MM HG: ICD-10-PCS | Mod: HCNC,CPTII,S$GLB, | Performed by: PHYSICAL MEDICINE & REHABILITATION

## 2019-02-07 PROCEDURE — 99999 PR PBB SHADOW E&M-EST. PATIENT-LVL III: ICD-10-PCS | Mod: PBBFAC,HCNC,, | Performed by: PHYSICAL MEDICINE & REHABILITATION

## 2019-02-07 PROCEDURE — 99214 PR OFFICE/OUTPT VISIT, EST, LEVL IV, 30-39 MIN: ICD-10-PCS | Mod: HCNC,S$GLB,, | Performed by: PHYSICAL MEDICINE & REHABILITATION

## 2019-02-07 PROCEDURE — 99999 PR PBB SHADOW E&M-EST. PATIENT-LVL III: CPT | Mod: PBBFAC,HCNC,, | Performed by: PHYSICAL MEDICINE & REHABILITATION

## 2019-02-07 PROCEDURE — 99499 RISK ADDL DX/OHS AUDIT: ICD-10-PCS | Mod: HCNC,S$GLB,, | Performed by: PHYSICAL MEDICINE & REHABILITATION

## 2019-02-07 RX ORDER — HYDROCODONE BITARTRATE AND ACETAMINOPHEN 10; 325 MG/1; MG/1
1 TABLET ORAL EVERY 6 HOURS PRN
Qty: 120 TABLET | Refills: 0 | Status: SHIPPED | OUTPATIENT
Start: 2019-05-01 | End: 2019-06-03 | Stop reason: SDUPTHER

## 2019-02-07 RX ORDER — HYDROCODONE BITARTRATE AND ACETAMINOPHEN 10; 325 MG/1; MG/1
1 TABLET ORAL EVERY 6 HOURS PRN
Qty: 120 TABLET | Refills: 0 | Status: SHIPPED | OUTPATIENT
Start: 2019-03-01 | End: 2019-03-31

## 2019-02-07 RX ORDER — HYDROCODONE BITARTRATE AND ACETAMINOPHEN 10; 325 MG/1; MG/1
1 TABLET ORAL EVERY 6 HOURS PRN
Qty: 120 TABLET | Refills: 0 | Status: SHIPPED | OUTPATIENT
Start: 2019-04-01 | End: 2019-05-01

## 2019-02-07 NOTE — PROGRESS NOTES
Subjective:       Patient ID: Emilia Alan is a 78 y.o. female.    Chief Complaint: Back Pain    Extremity Weakness    Pertinent negatives include no fever or numbness.   Back Pain   Associated symptoms include leg pain. Pertinent negatives include no abdominal pain, chest pain, fever, numbness or weakness. Headaches:     Leg Pain    Pertinent negatives include no numbness.   Knee Pain    Pertinent negatives include no numbness.     Ms. 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  She has B LE weakness, paraparesis, secondary to severe Lumbar spinal stenosis at L3-4, and L4-5, complicated with  Leg length discrepancy ( left is shorter than right)  ,with severe DJD of both knees , genu valgus in R knee.  LCV   10/30/18.  She reports no changes in her pain, no new injury nor fall since LCV>  Her pain is well controlled with current pain regimen.   Patient takes  Hydrocodone 10/325 mg, takes 3-4 x/day, and Neurontin 600 mg, po QID, tolerates it well, and she knows that both medications are helping.    She is here for follow up visit for back pain, leg weakness, and chronic pain management.   She has paraparesis, a right leg is weaker than Left leg- that is weak in ankle.   She still does not want any new imaging, nor ASHLEY, nor NS evaluation.  She cannot actively  Right LE, states that is getting weaker,still walks short distances, but majority of time she spent sitting.    Today, she states that she is slowing down, she walks slower, and can do less than in past. She also complains about back pain.   Current back pain is 7, worst pain is 9-10/10 while upright and walking.    Pain is localized across lower back and in upper spine.    Back pain is off/on, present mainly during day time, sharp, severe ,stabbing pain.  Pain is worse with lying or sitting and getting up from chair.   With that pain she is afraid she  will fall down, since she gets more weakness in Right leg.   Complains also about right knee pain, that is weak, and goes backwards   during walking.   Patient takes  Hydrocodone 10/325 mg, takes 3-4 x/day, and Neurontin 600 mg, po QID, tolerates it well, and she knows that both medications are helping.  Patient refuses neurosurgical evaluation, and  ASHLEY to back.   The patient has had gradual worsening of her gait over the last few years.   She was prescribed a left AFO ( that she wears).   She cannot tolerate swedish knee cage as well.   She also has a neoprene sleee brace , to stabilized knee, and to take pressure off bone.   Right hinge knee brace that is all warned up,since she wears it all the time.  She continues to complain of progressive bilateral lower extremity weakness.   She reports frequent falls, especially in the last 3-6 months.   The patient lives in a single-silvia home with a ramp access.   She is independent with her dressing, feeding and grooming.   She is also independent with toileting and bathing using a shower chair.   She is able to ambulate with single cane, RW.  She can walk about 20 feet, but has to stop frequently.   She does better with a Rollator walker.   She has manual wheel chair that is bulky, and she cannot propel, RW with seat, cane and RW.   She is restricted by lower extremity weakness, especially on the right side as noted above, she has frequent falls.  She did not sustain any significant injuries.   She recieved a new SCOOTER, and it is helping her greatly.  She uses it for linger distances, but tries to walk as much as possible leslye. Inside the house, with her braces, and  RW with seat.    She is here for follow up, and chronic pain management with opiates.    Past Medical History:   Diagnosis Date    Allergy     Anemia 10/20/2014    Arthritis     Atherosclerosis of artery of right lower extremity: see xray 4/4/07 8/8/2017    Diabetes mellitus     Diabetic neuropathy  2012    Gait disorder 2012    High cholesterol     History of poliomyelitis 2012    Hyperlipidemia 2013    Hypertension     Obstructive sleep apnea syndrome: dx 2008 needs CPAP 11 2017    Osteopenia     Osteoporosis, unspecified 2014    Other specified anemias 2015    Post poliomyelitis syndrome 2012    Renal manifestation of secondary diabetes mellitus     Sleep apnea     Type II or unspecified type diabetes mellitus without mention of complication, not stated as uncontrolled 2015    Unspecified essential hypertension 2015       Past Surgical History:   Procedure Laterality Date    CATARACT EXTRACTION       SECTION      CHOLECYSTECTOMY      COLONOSCOPY N/A 2017    Performed by [facility] (4TH F[facility])    ESOPHAGOGASTRODUODENOSCOPY (EGD) N/A 2017    [facility]formed [facility] at [facility] ([facility] FLR)    EYE SURGERY      FRACTURE SURGERY         Family History   Problem Relation Age of Onset    Diabetes Father     Heart disease Father     Heart attack Father     Heart disease Brother     No Known Problems Daughter     Diabetes Son     Heart disease Brother     Diabetes Daughter     Diabetes Daughter     Cataracts Neg Hx     Glaucoma Neg Hx     Hypertension Neg Hx     Cancer Neg Hx     Blindness Neg Hx     Amblyopia Neg Hx     Strabismus Neg Hx     Retinal detachment Neg Hx     Macular degeneration Neg Hx     Melanoma Neg Hx        Social History     Socioeconomic History    Marital status:      Spouse name: None    Number of children: 4    Years of education: None    Highest education level: None   Social Needs    Financial resource strain: None    Food insecurity - worry: None    Food insecurity - inability: None    Transportation needs - medical: None    Transportation needs - non-medical: None   Occupational History    None   Tobacco Use    Smoking status: Never Smoker     Smokeless tobacco: Never Used   Substance and Sexual Activity    Alcohol use: No    Drug use: No    Sexual activity: No   Other Topics Concern    Are you pregnant or think you may be? No    Breast-feeding Not Asked   Social History Narrative    None       Current Outpatient Medications   Medication Sig Dispense Refill    amLODIPine (NORVASC) 10 MG tablet Take 1 tablet (10 mg total) by mouth once daily. 30 tablet 11    aspirin (ECOTRIN) 81 MG EC tablet Take 1 tablet (81 mg total) by mouth once daily. 30 tablet 12    blood sugar diagnostic Strp 1 strip by Misc.(Non-Drug; Combo Route) route 2 (two) times daily. TRUE RESULT SYSTEM 180 strip 4    blood-glucose meter (TRUERESULT BLOOD GLUCOSE SYSTM) kit Use as instructed 1 each 0    calcium-vitamin D3-vitamin K (VIACTIV) 500-100-40 mg-unit-mcg Chew Take 2 each by mouth.      clotrimazole-betamethasone 1-0.05% (LOTRISONE) cream Apply topically 2 (two) times daily. 45 g 3    desloratadine (CLARINEX) 5 mg tablet Take 5 mg by mouth once daily.      diclofenac (VOLTAREN) 75 MG EC tablet TAKE 1 TABLET BY MOUTH TWICE DAILY AS NEEDED ONLY WITH FOOD OR A MEAL. STOP IF ANY STOMACH IRRITATION 180 tablet 0    diclofenac sodium (VOLTAREN) 1 % Gel Apply 4 gm to both knees 3x/day. (Patient taking differently: 3 (three) times daily as needed. Apply 4 gm to both knees 3x/day.) 500 g 4    docusate sodium (COLACE) 50 MG capsule Take 1 capsule (50 mg total) by mouth 2 (two) times daily as needed for Constipation.      gabapentin (NEURONTIN) 600 MG tablet Take 1 tablet (600 mg total) by mouth 4 (four) times daily with meals and nightly. 360 tablet 3    glipiZIDE (GLUCOTROL) 5 MG tablet TAKE 1 TABLET(5 MG) BY MOUTH EVERY DAY 90 tablet 3    hydrALAZINE (APRESOLINE) 50 MG tablet Take one tablet (50 mg) by mouth every morning and afternoon then two tablets (100 mg) every evening. 360 tablet 1    [START ON 3/1/2019] HYDROcodone-acetaminophen (NORCO)  mg per tablet Take 1  tablet by mouth every 6 (six) hours as needed for Pain (pain). 120 tablet 0    [START ON 4/1/2019] HYDROcodone-acetaminophen (NORCO)  mg per tablet Take 1 tablet by mouth every 6 (six) hours as needed for Pain (pain). 120 tablet 0    [START ON 5/1/2019] HYDROcodone-acetaminophen (NORCO)  mg per tablet Take 1 tablet by mouth every 6 (six) hours as needed for Pain (pain). 120 tablet 0    lancets Misc TEST 2 X DAILY PROSPER RESULT SYSTEM 180 each 3    metFORMIN (GLUCOPHAGE-XR) 500 MG 24 hr tablet Take 1 tablet (500 mg total) by mouth once daily. 90 tablet 3    multivitamin (THERAGRAN) per tablet Take 1 tablet by mouth once daily.       oxybutynin (DITROPAN) 5 MG Tab Take 1 tablet (5 mg total) by mouth 2 (two) times daily. 180 tablet 3    pravastatin (PRAVACHOL) 40 MG tablet Take 1.5 tablets (60 mg total) by mouth nightly. 135 tablet 3    primidone (MYSOLINE) 50 MG Tab   5    TRUEPLUS LANCETS 33 gauge Misc USE BID  3    valsartan-hydrochlorothiazide (DIOVAN-HCT) 320-25 mg per tablet TAKE 1 TABLET BY MOUTH EVERY DAY 30 tablet 2     No current facility-administered medications for this visit.        Review of patient's allergies indicates:  No Known Allergies    Review of Systems   Constitutional: Negative for appetite change, chills, fatigue, fever and unexpected weight change.   HENT: Negative for drooling, trouble swallowing and voice change.    Eyes: Negative for pain and visual disturbance.   Respiratory: Negative for shortness of breath and wheezing.    Cardiovascular: Negative for chest pain and palpitations.   Gastrointestinal: Negative for abdominal distention, abdominal pain, constipation and diarrhea.   Genitourinary: Negative for difficulty urinating.   Musculoskeletal: Positive for back pain, extremity weakness and neck pain. Negative for arthralgias, gait problem, joint swelling, myalgias and neck stiffness.               Skin: Negative for color change and rash.   Neurological: Negative  for dizziness, facial asymmetry, speech difficulty, weakness, light-headedness and numbness. Headaches:     Hematological: Negative for adenopathy.   Psychiatric/Behavioral: Negative for behavioral problems, confusion and sleep disturbance. The patient is not nervous/anxious.        Objective:      Physical Exam    Constitutional: She is oriented to person, place, and time.   She appears well-developed and well-nourished.   HENT:   Head: Normocephalic.   Eyes: EOM are normal.   Neck: Normal range of motion.   Cardiovascular: Normal rate, regular rhythm and normal heart sounds.   Pulmonary/Chest: Breath sounds normal.   Musculoskeletal: Normal range of motion.   BUE:  ROM:full.  Strength: 5/5 at shoulders, elbows & hands.  Sensation to pinprick: intact  BLE: ROM:full.  Strength:   RLE: HF 0/5, KE 0/5,   Ankle DF 2-, Ankle PF 3  LLE:   HF 3-, KE 3-.  Ankle DF 1,  Ankle PF 3  Leg length discrepancy ( left is shorter than right), wears Left AFO.   Sensation to pinprick: intact .   DTR: decreased.       Xray of Right knee ( 2014)  Showed:  Standing AP knees and lateral of the right knee and merchant view of both knees are submitted.    Advanced degenerative change seen in the tricompartmental areas of the right knee.    Left knee shows mild degenerative change.  Both patellas show significant lateral deviation    MRI of Lumbar spine  ( 2015) ;  L2-L3:There is a circumferential disk bulge with moderate bilateral facet osseous hypertrophy and ligamentum flavum buckling. This results and mild narrowing of the spinal canal. The bilateral neural foramen remain patent.  L3-L4: There is a circumferential disk bulge with left paracentral disk protrusion. This is associated with severe bilateral facet osseous hypertrophy, bilateral facet edema, and ligamentum flavum buckling.   These findings result in severe narrowing of the spinal canal and near complete obliteration of the left neural foramen.  The right neural foramen is  moderately narrowed as well.  L4-L5: There is a circumferential disk bulge with superimposed central disk protrusion. This is associated with severe bilateral facet osseous hypertrophy and ligamentum flavum buckling.   These findings result in severe narrowing of the spinal canal and bilateral neural foramina, left greater than right.     Assessment:       1. Spinal stenosis of lumbar region with neurogenic claudication    2. Paraparesis of both lower limbs    3. Post poliomyelitis syndrome    4. Lumbar spondylosis    5. Diabetic polyneuropathy associated with type 2 diabetes mellitus    6. Osteoarthritis of spine with radiculopathy, lumbar region    7. Lumbar spondylosis with myelopathy    8. 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 Primary osteoarthritis of right knee    10. History of poliomyelitis    11. Gait disorder    12. Type 2 diabetes mellitus with diabetic neuropathy, without long-term current use of insulin    13. Chronic bilateral low back pain with sciatica, sciatica laterality unspecified    14. Fall, sequela    15. Left lumbar radiculopathy    16. Lumbar radiculopathy    17. Chronic pain of right knee    18. Osteoporosis without current pathological fracture: worse on fosamax 5/16- Reclast 8/16       Plan:        Spinal stenosis of lumbar region with neurogenic claudication  -     HYDROcodone-acetaminophen (NORCO)  mg per tablet; Take 1 tablet by mouth every 6 (six) hours as needed for Pain (pain).  Dispense: 120 tablet; Refill: 0  -     HYDROcodone-acetaminophen (NORCO)  mg per tablet; Take 1 tablet by mouth every 6 (six) hours as needed for Pain (pain).  Dispense: 120 tablet; Refill: 0  -     HYDROcodone-acetaminophen (NORCO)  mg per tablet; Take 1 tablet by mouth every 6 (six) hours as needed for Pain (pain).  Dispense: 120 tablet; Refill: 0    Paraparesis of both lower limbs  -     HYDROcodone-acetaminophen (NORCO)  mg per  tablet; Take 1 tablet by mouth every 6 (six) hours as needed for Pain (pain).  Dispense: 120 tablet; Refill: 0  -     HYDROcodone-acetaminophen (NORCO)  mg per tablet; Take 1 tablet by mouth every 6 (six) hours as needed for Pain (pain).  Dispense: 120 tablet; Refill: 0  -     HYDROcodone-acetaminophen (NORCO)  mg per tablet; Take 1 tablet by mouth every 6 (six) hours as needed for Pain (pain).  Dispense: 120 tablet; Refill: 0    Post poliomyelitis syndrome  -     HYDROcodone-acetaminophen (NORCO)  mg per tablet; Take 1 tablet by mouth every 6 (six) hours as needed for Pain (pain).  Dispense: 120 tablet; Refill: 0  -     HYDROcodone-acetaminophen (NORCO)  mg per tablet; Take 1 tablet by mouth every 6 (six) hours as needed for Pain (pain).  Dispense: 120 tablet; Refill: 0  -     HYDROcodone-acetaminophen (NORCO)  mg per tablet; Take 1 tablet by mouth every 6 (six) hours as needed for Pain (pain).  Dispense: 120 tablet; Refill: 0    Lumbar spondylosis  -     HYDROcodone-acetaminophen (NORCO)  mg per tablet; Take 1 tablet by mouth every 6 (six) hours as needed for Pain (pain).  Dispense: 120 tablet; Refill: 0  -     HYDROcodone-acetaminophen (NORCO)  mg per tablet; Take 1 tablet by mouth every 6 (six) hours as needed for Pain (pain).  Dispense: 120 tablet; Refill: 0  -     HYDROcodone-acetaminophen (NORCO)  mg per tablet; Take 1 tablet by mouth every 6 (six) hours as needed for Pain (pain).  Dispense: 120 tablet; Refill: 0    Diabetic polyneuropathy associated with type 2 diabetes mellitus  -     HYDROcodone-acetaminophen (NORCO)  mg per tablet; Take 1 tablet by mouth every 6 (six) hours as needed for Pain (pain).  Dispense: 120 tablet; Refill: 0  -     HYDROcodone-acetaminophen (NORCO)  mg per tablet; Take 1 tablet by mouth every 6 (six) hours as needed for Pain (pain).  Dispense: 120 tablet; Refill: 0  -     HYDROcodone-acetaminophen (NORCO)  mg per  tablet; Take 1 tablet by mouth every 6 (six) hours as needed for Pain (pain).  Dispense: 120 tablet; Refill: 0    Osteoarthritis of spine with radiculopathy, lumbar region  -     HYDROcodone-acetaminophen (NORCO)  mg per tablet; Take 1 tablet by mouth every 6 (six) hours as needed for Pain (pain).  Dispense: 120 tablet; Refill: 0  -     HYDROcodone-acetaminophen (NORCO)  mg per tablet; Take 1 tablet by mouth every 6 (six) hours as needed for Pain (pain).  Dispense: 120 tablet; Refill: 0  -     HYDROcodone-acetaminophen (NORCO)  mg per tablet; Take 1 tablet by mouth every 6 (six) hours as needed for Pain (pain).  Dispense: 120 tablet; Refill: 0    Lumbar spondylosis with myelopathy  -     HYDROcodone-acetaminophen (NORCO)  mg per tablet; Take 1 tablet by mouth every 6 (six) hours as needed for Pain (pain).  Dispense: 120 tablet; Refill: 0  -     HYDROcodone-acetaminophen (NORCO)  mg per tablet; Take 1 tablet by mouth every 6 (six) hours as needed for Pain (pain).  Dispense: 120 tablet; Refill: 0  -     HYDROcodone-acetaminophen (NORCO)  mg per tablet; Take 1 tablet by mouth every 6 (six) hours as needed for Pain (pain).  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  Dispense: 120 tablet; Refill: 0  -     HYDROcodone-acetaminophen (NORCO)  mg per tablet; Take 1 tablet by mouth every 6 (six) hours as needed for Pain (pain).  Dispense: 120 tablet; Refill: 0  -     HYDROcodone-acetaminophen (NORCO)  mg per tablet; Take 1 tablet by mouth every 6 (six) hours as needed for Pain (pain).  Dispense: 120 tablet; Refill: 0    Primary osteoarthritis of right knee  -     HYDROcodone-acetaminophen (NORCO)  mg per tablet; Take 1 tablet by mouth every 6 (six) hours as needed for  Pain (pain).  Dispense: 120 tablet; Refill: 0  -     HYDROcodone-acetaminophen (NORCO)  mg per tablet; Take 1 tablet by mouth every 6 (six) hours as needed for Pain (pain).  Dispense: 120 tablet; Refill: 0  -     HYDROcodone-acetaminophen (NORCO)  mg per tablet; Take 1 tablet by mouth every 6 (six) hours as needed for Pain (pain).  Dispense: 120 tablet; Refill: 0    History of poliomyelitis  -     HYDROcodone-acetaminophen (NORCO)  mg per tablet; Take 1 tablet by mouth every 6 (six) hours as needed for Pain (pain).  Dispense: 120 tablet; Refill: 0  -     HYDROcodone-acetaminophen (NORCO)  mg per tablet; Take 1 tablet by mouth every 6 (six) hours as needed for Pain (pain).  Dispense: 120 tablet; Refill: 0  -     HYDROcodone-acetaminophen (NORCO)  mg per tablet; Take 1 tablet by mouth every 6 (six) hours as needed for Pain (pain).  Dispense: 120 tablet; Refill: 0    Gait disorder  -     HYDROcodone-acetaminophen (NORCO)  mg per tablet; Take 1 tablet by mouth every 6 (six) hours as needed for Pain (pain).  Dispense: 120 tablet; Refill: 0  -     HYDROcodone-acetaminophen (NORCO)  mg per tablet; Take 1 tablet by mouth every 6 (six) hours as needed for Pain (pain).  Dispense: 120 tablet; Refill: 0  -     HYDROcodone-acetaminophen (NORCO)  mg per tablet; Take 1 tablet by mouth every 6 (six) hours as needed for Pain (pain).  Dispense: 120 tablet; Refill: 0    Type 2 diabetes mellitus with diabetic neuropathy, without long-term current use of insulin  -     HYDROcodone-acetaminophen (NORCO)  mg per tablet; Take 1 tablet by mouth every 6 (six) hours as needed for Pain (pain).  Dispense: 120 tablet; Refill: 0  -     HYDROcodone-acetaminophen (NORCO)  mg per tablet; Take 1 tablet by mouth every 6 (six) hours as needed for Pain (pain).  Dispense: 120 tablet; Refill: 0  -     HYDROcodone-acetaminophen (NORCO)  mg per tablet; Take 1 tablet by mouth every 6 (six)  hours as needed for Pain (pain).  Dispense: 120 tablet; Refill: 0    Chronic bilateral low back pain with sciatica, sciatica laterality unspecified  -     HYDROcodone-acetaminophen (NORCO)  mg per tablet; Take 1 tablet by mouth every 6 (six) hours as needed for Pain (pain).  Dispense: 120 tablet; Refill: 0  -     HYDROcodone-acetaminophen (NORCO)  mg per tablet; Take 1 tablet by mouth every 6 (six) hours as needed for Pain (pain).  Dispense: 120 tablet; Refill: 0  -     HYDROcodone-acetaminophen (NORCO)  mg per tablet; Take 1 tablet by mouth every 6 (six) hours as needed for Pain (pain).  Dispense: 120 tablet; Refill: 0    Fall, sequela  -     HYDROcodone-acetaminophen (NORCO)  mg per tablet; Take 1 tablet by mouth every 6 (six) hours as needed for Pain (pain).  Dispense: 120 tablet; Refill: 0  -     HYDROcodone-acetaminophen (NORCO)  mg per tablet; Take 1 tablet by mouth every 6 (six) hours as needed for Pain (pain).  Dispense: 120 tablet; Refill: 0  -     HYDROcodone-acetaminophen (NORCO)  mg per tablet; Take 1 tablet by mouth every 6 (six) hours as needed for Pain (pain).  Dispense: 120 tablet; Refill: 0    Left lumbar radiculopathy  -     HYDROcodone-acetaminophen (NORCO)  mg per tablet; Take 1 tablet by mouth every 6 (six) hours as needed for Pain (pain).  Dispense: 120 tablet; Refill: 0  -     HYDROcodone-acetaminophen (NORCO)  mg per tablet; Take 1 tablet by mouth every 6 (six) hours as needed for Pain (pain).  Dispense: 120 tablet; Refill: 0  -     HYDROcodone-acetaminophen (NORCO)  mg per tablet; Take 1 tablet by mouth every 6 (six) hours as needed for Pain (pain).  Dispense: 120 tablet; Refill: 0    Lumbar radiculopathy  -     HYDROcodone-acetaminophen (NORCO)  mg per tablet; Take 1 tablet by mouth every 6 (six) hours as needed for Pain (pain).  Dispense: 120 tablet; Refill: 0  -     HYDROcodone-acetaminophen (NORCO)  mg per tablet; Take 1  tablet by mouth every 6 (six) hours as needed for Pain (pain).  Dispense: 120 tablet; Refill: 0  -     HYDROcodone-acetaminophen (NORCO)  mg per tablet; Take 1 tablet by mouth every 6 (six) hours as needed for Pain (pain).  Dispense: 120 tablet; Refill: 0    Chronic pain of right knee  -     HYDROcodone-acetaminophen (NORCO)  mg per tablet; Take 1 tablet by mouth every 6 (six) hours as needed for Pain (pain).  Dispense: 120 tablet; Refill: 0  -     HYDROcodone-acetaminophen (NORCO)  mg per tablet; Take 1 tablet by mouth every 6 (six) hours as needed for Pain (pain).  Dispense: 120 tablet; Refill: 0  -     HYDROcodone-acetaminophen (NORCO)  mg per tablet; Take 1 tablet by mouth every 6 (six) hours as needed for Pain (pain).  Dispense: 120 tablet; Refill: 0    Osteoporosis without current pathological fracture: worse on fosamax 5/16- Reclast 8/16  -     HYDROcodone-acetaminophen (NORCO)  mg per tablet; Take 1 tablet by mouth every 6 (six) hours as needed for Pain (pain).  Dispense: 120 tablet; Refill: 0  -     HYDROcodone-acetaminophen (NORCO)  mg per tablet; Take 1 tablet by mouth every 6 (six) hours as needed for Pain (pain).  Dispense: 120 tablet; Refill: 0  -     HYDROcodone-acetaminophen (NORCO)  mg per tablet; Take 1 tablet by mouth every 6 (six) hours as needed for Pain (pain).  Dispense: 120 tablet; Refill: 0      Patient with C.palsy, with  Paraparesis., severe Lumbar spinal stenosis at L3-4, and L4-5, with  Leg length discrepancy ( left is shorter than right),  wears Left AFO, and has more proximal strength in LLE, than in RLE .   Also with severe DJD , genu valgus in R knee ( cannot toleate custom made  knee brace, nor Swedish knee cage).   She also wears  neoprene sleee brace , that stabilizes knee, and improves proprioception, helps with gait.    1. Back pain   Will resume Hydrocodone 10/325 mg, takes 3-4 x/day, and Neurontin 600 mg, po QID.   Opioid Risk Score        Value Time User    Opioid Risk Score  0 4/11/2018 10:39 AM Krista Burger MD         reviewed and appropriate.  Hydrocodone refills on 02/01/19, 01/02/19, 12/01, 10/30/18.  UDS done and appropriately positive ( states that her insurance changred her $ 200  ( total cost was $ 800)     2. Patient refuses neurosurgical evaluation, and  ASHLEY to back.     3. Right knee pain, secondary to advanced tricompartmental DJD,   refusing Ortho consult, does not want steroid injection, nor knee replacement.   Wears brace with genu recurvatum and valgus in R knee,   She has her hinge knee brace, that she wears when pain is severe, and left AFO ( cannot walk safely without AFO.   Voltaren gel topical.     RTC in 4 months..    Total time spent face to face with patient was 25 minutes.   More than 50% of that time was spent in counseling on diagnosis , prognosis and treatment options.   I also caunsel patient  on common and most usual side effect of prescribed medications.   Risk and benefits of opiates, possible risk of developing opiate dependence and tolerance, need of strict compliance with prescribed medications.  I reviewed Primary care , and other specialty's notes to better coordinate patient's  care.   All questions were answered, and patient voiced understanding.

## 2019-02-15 ENCOUNTER — PATIENT OUTREACH (OUTPATIENT)
Dept: OTHER | Facility: OTHER | Age: 79
End: 2019-02-15

## 2019-02-15 NOTE — PROGRESS NOTES
"Last 5 Patient Entered Readings                                      Current 30 Day Average: 147/71     Recent Readings 2/13/2019 2/11/2019 2/10/2019 2/9/2019 2/7/2019    SBP (mmHg) 149 143 137 138 120    DBP (mmHg) 68 61 78 71 67    Pulse 75 75 89 68 88        Digital Medicine: Health  Follow Up    Patient has yet to get the "apps" set up on her tablet yet.  Patient reports she will be doing that so she can "relax" more and taker her readings by herself.  Encouraged patient to relax and take some deep breathes before taking her BP readings.  Patient acknowledged.     Lifestyle Modifications:    1.Dietary Modifications (Sodium intake <2,000mg/day, food labels, dining out):   Patient reports she has been eating "a lot of spaghetti squash".  Patient states she has one daughter who looks at the sodium and one daughter who gets what she thinks she will like.  Patient is very aware of salt within her foods and does not eat items high in sodium. Encouraged patient to keep up the great work.     2.Physical Activity:   Pt denies needing any other help with activity at this time.    3.Medication Therapy:   Patient has been compliant with the medication regimen.    4.Patient has the following medication side effects/concerns: None  (Frequency/Alleviating factors/Precipitating factors, etc.)     Follow up with Ms. Emilia Alan completed. No further questions or concerns. Will continue to follow up to achieve health goals.  Patient is currently not at goal, 147/71 mmHg does exceed <140/90 mmHg.    "

## 2019-02-24 ENCOUNTER — TELEPHONE (OUTPATIENT)
Dept: INTERNAL MEDICINE | Facility: CLINIC | Age: 79
End: 2019-02-24

## 2019-02-24 DIAGNOSIS — I10 SEVERE HYPERTENSION: Primary | ICD-10-CM

## 2019-02-24 DIAGNOSIS — E11.40 TYPE 2 DIABETES MELLITUS WITH DIABETIC NEUROPATHY, WITHOUT LONG-TERM CURRENT USE OF INSULIN: ICD-10-CM

## 2019-02-25 RX ORDER — METFORMIN HYDROCHLORIDE 500 MG/1
TABLET, EXTENDED RELEASE ORAL
Qty: 90 TABLET | Refills: 0 | Status: SHIPPED | OUTPATIENT
Start: 2019-02-25 | End: 2019-05-28 | Stop reason: SDUPTHER

## 2019-02-25 RX ORDER — DICLOFENAC SODIUM 75 MG/1
TABLET, DELAYED RELEASE ORAL
Qty: 180 TABLET | Refills: 0 | OUTPATIENT
Start: 2019-02-25

## 2019-02-25 RX ORDER — DICLOFENAC SODIUM 75 MG/1
TABLET, DELAYED RELEASE ORAL
Qty: 90 TABLET | Refills: 0 | Status: SHIPPED | OUTPATIENT
Start: 2019-02-25 | End: 2019-04-25 | Stop reason: SDUPTHER

## 2019-02-25 NOTE — TELEPHONE ENCOUNTER
Please call her, she needs to do lab work for me this week, she has not had her A1c checked since May of 2018.  Orders in.      She is also due for an eye exam and foot exam and eye at ordered both of those as well.  Please schedule.  Thank you

## 2019-03-05 DIAGNOSIS — N39.46 MIXED INCONTINENCE: ICD-10-CM

## 2019-03-06 RX ORDER — OXYBUTYNIN CHLORIDE 5 MG/1
TABLET ORAL
Qty: 180 TABLET | Refills: 0 | Status: SHIPPED | OUTPATIENT
Start: 2019-03-06 | End: 2019-06-05 | Stop reason: SDUPTHER

## 2019-03-13 RX ORDER — GLIPIZIDE 5 MG/1
TABLET ORAL
Qty: 90 TABLET | Refills: 0 | Status: SHIPPED | OUTPATIENT
Start: 2019-03-13 | End: 2019-05-16 | Stop reason: SDUPTHER

## 2019-03-15 ENCOUNTER — PATIENT OUTREACH (OUTPATIENT)
Dept: OTHER | Facility: OTHER | Age: 79
End: 2019-03-15

## 2019-03-15 NOTE — PROGRESS NOTES
"Last 5 Patient Entered Readings                                      Current 30 Day Average: 143/69     Recent Readings 3/12/2019 3/12/2019 3/11/2019 3/8/2019 2/28/2019    SBP (mmHg) 153 179 144 159 126    DBP (mmHg) 67 80 75 70 61    Pulse 80 92 77 73 72          Digital Medicine: Health  Follow Up    Patient has yet to get her tablet set up so she can take readings.  Patient reports she is going to see her NP, and will bring her tablet to the [facility] then.      Lifestyle Modifications:    1.Dietary Modifications (Sodium intake <2,000mg/day, food labels, dining out):   Patient reports continuing to restrict sodium and states "she under salts" everything.  Patient reports if a label reads over >140mg, she does not eat it.  Gave praise and encouragement to patient.     2.Physical Activity:   Patient reports she will walk around the house and walk outside with her walker, but it is too far she will take her wheel chair.  Encouraged patient to continue doing things around the house so she does not lose movement with her everyday activities.  Patient verbalized understanding.       3.Medication Therapy:   Patient has been compliant with the medication regimen.    4.Patient has the following medication side effects/concerns: None   (Frequency/Alleviating factors/Precipitating factors, etc.)     Follow up with Ms. Emilia Alan completed. No further questions or concerns. Will continue to follow up to achieve health goals.  Patient is currently not at goal, 143/69 mmHg does exceed <130/80 mmHg.    "

## 2019-03-18 ENCOUNTER — LAB VISIT (OUTPATIENT)
Dept: LAB | Facility: HOSPITAL | Age: 79
End: 2019-03-18
Attending: INTERNAL MEDICINE
Payer: MEDICARE

## 2019-03-18 DIAGNOSIS — E11.40 TYPE 2 DIABETES MELLITUS WITH DIABETIC NEUROPATHY, WITHOUT LONG-TERM CURRENT USE OF INSULIN: ICD-10-CM

## 2019-03-18 LAB
ALBUMIN SERPL BCP-MCNC: 3.4 G/DL
ALP SERPL-CCNC: 88 U/L
ALT SERPL W/O P-5'-P-CCNC: 21 U/L
ANION GAP SERPL CALC-SCNC: 11 MMOL/L
AST SERPL-CCNC: 22 U/L
BILIRUB SERPL-MCNC: 0.5 MG/DL
BUN SERPL-MCNC: 29 MG/DL
CALCIUM SERPL-MCNC: 9.5 MG/DL
CHLORIDE SERPL-SCNC: 105 MMOL/L
CHOLEST SERPL-MCNC: 143 MG/DL
CHOLEST/HDLC SERPL: 3.4 {RATIO}
CO2 SERPL-SCNC: 25 MMOL/L
CREAT SERPL-MCNC: 1 MG/DL
EST. GFR  (AFRICAN AMERICAN): >60 ML/MIN/1.73 M^2
EST. GFR  (NON AFRICAN AMERICAN): 54 ML/MIN/1.73 M^2
ESTIMATED AVG GLUCOSE: 151 MG/DL
GLUCOSE SERPL-MCNC: 173 MG/DL
HBA1C MFR BLD HPLC: 6.9 %
HDLC SERPL-MCNC: 42 MG/DL
HDLC SERPL: 29.4 %
LDLC SERPL CALC-MCNC: 53.2 MG/DL
NONHDLC SERPL-MCNC: 101 MG/DL
POTASSIUM SERPL-SCNC: 3.7 MMOL/L
PROT SERPL-MCNC: 6.7 G/DL
SODIUM SERPL-SCNC: 141 MMOL/L
TRIGL SERPL-MCNC: 239 MG/DL

## 2019-03-18 PROCEDURE — 36415 COLL VENOUS BLD VENIPUNCTURE: CPT | Mod: HCNC

## 2019-03-18 PROCEDURE — 80053 COMPREHEN METABOLIC PANEL: CPT | Mod: HCNC

## 2019-03-18 PROCEDURE — 80061 LIPID PANEL: CPT | Mod: HCNC

## 2019-03-18 PROCEDURE — 83036 HEMOGLOBIN GLYCOSYLATED A1C: CPT | Mod: HCNC

## 2019-03-22 DIAGNOSIS — I10 SEVERE HYPERTENSION: ICD-10-CM

## 2019-03-22 RX ORDER — VALSARTAN AND HYDROCHLOROTHIAZIDE 160; 25 MG/1; MG/1
TABLET ORAL
Qty: 90 TABLET | Refills: 0 | Status: SHIPPED | OUTPATIENT
Start: 2019-03-22 | End: 2019-03-29

## 2019-03-29 ENCOUNTER — PATIENT OUTREACH (OUTPATIENT)
Dept: OTHER | Facility: OTHER | Age: 79
End: 2019-03-29

## 2019-03-29 DIAGNOSIS — I10 SEVERE HYPERTENSION: Primary | ICD-10-CM

## 2019-03-29 RX ORDER — VALSARTAN AND HYDROCHLOROTHIAZIDE 320; 25 MG/1; MG/1
1 TABLET, FILM COATED ORAL DAILY
Qty: 90 TABLET | Refills: 1 | Status: SHIPPED | OUTPATIENT
Start: 2019-03-29 | End: 2019-05-16 | Stop reason: DRUGHIGH

## 2019-04-12 DIAGNOSIS — E78.5 HYPERLIPIDEMIA, UNSPECIFIED HYPERLIPIDEMIA TYPE: ICD-10-CM

## 2019-04-12 RX ORDER — PRAVASTATIN SODIUM 40 MG/1
TABLET ORAL
Qty: 135 TABLET | Refills: 0 | Status: SHIPPED | OUTPATIENT
Start: 2019-04-12 | End: 2019-07-11 | Stop reason: SDUPTHER

## 2019-04-12 RX ORDER — ASPIRIN 81 MG/1
81 TABLET ORAL DAILY
Qty: 30 TABLET | Refills: 12 | Status: SHIPPED | OUTPATIENT
Start: 2019-04-12 | End: 2020-09-21 | Stop reason: SDUPTHER

## 2019-04-18 ENCOUNTER — PATIENT OUTREACH (OUTPATIENT)
Dept: OTHER | Facility: OTHER | Age: 79
End: 2019-04-18

## 2019-04-18 NOTE — PROGRESS NOTES
Last 5 Patient Entered Readings                                      Current 30 Day Average: 133/64     Recent Readings 4/17/2019 4/17/2019 4/16/2019 4/16/2019 4/10/2019    SBP (mmHg) 104 84 119 90 139    DBP (mmHg) 50 40 57 55 71    Pulse 87 90 81 79 72          Digital Medicine: Health  Follow Up    4/18: Left voicemail to follow up with Ms. Emilia Alan.  Current BP average 133/64 mmHg is at goal, <140/90 mmHg.  Will address lower readings on 4/16 & 4/17.  Will notify PharmD.

## 2019-04-25 RX ORDER — DICLOFENAC SODIUM 75 MG/1
TABLET, DELAYED RELEASE ORAL
Qty: 90 TABLET | Refills: 0 | Status: SHIPPED | OUTPATIENT
Start: 2019-04-25 | End: 2019-06-13 | Stop reason: SDUPTHER

## 2019-04-29 ENCOUNTER — LAB VISIT (OUTPATIENT)
Dept: LAB | Facility: HOSPITAL | Age: 79
End: 2019-04-29
Attending: INTERNAL MEDICINE
Payer: MEDICARE

## 2019-04-29 DIAGNOSIS — D64.9 ANEMIA, UNSPECIFIED TYPE: ICD-10-CM

## 2019-04-29 LAB
BASOPHILS # BLD AUTO: 0.09 K/UL (ref 0–0.2)
BASOPHILS NFR BLD: 1.4 % (ref 0–1.9)
DIFFERENTIAL METHOD: ABNORMAL
EOSINOPHIL # BLD AUTO: 0.7 K/UL (ref 0–0.5)
EOSINOPHIL NFR BLD: 11.1 % (ref 0–8)
ERYTHROCYTE [DISTWIDTH] IN BLOOD BY AUTOMATED COUNT: 12.6 % (ref 11.5–14.5)
HCT VFR BLD AUTO: 35.9 % (ref 37–48.5)
HGB BLD-MCNC: 11.7 G/DL (ref 12–16)
IMM GRANULOCYTES # BLD AUTO: 0.01 K/UL (ref 0–0.04)
IMM GRANULOCYTES NFR BLD AUTO: 0.2 % (ref 0–0.5)
LYMPHOCYTES # BLD AUTO: 1.4 K/UL (ref 1–4.8)
LYMPHOCYTES NFR BLD: 22.6 % (ref 18–48)
MCH RBC QN AUTO: 31.5 PG (ref 27–31)
MCHC RBC AUTO-ENTMCNC: 32.6 G/DL (ref 32–36)
MCV RBC AUTO: 97 FL (ref 82–98)
MONOCYTES # BLD AUTO: 0.5 K/UL (ref 0.3–1)
MONOCYTES NFR BLD: 7.3 % (ref 4–15)
NEUTROPHILS # BLD AUTO: 3.6 K/UL (ref 1.8–7.7)
NEUTROPHILS NFR BLD: 57.4 % (ref 38–73)
NRBC BLD-RTO: 0 /100 WBC
PLATELET # BLD AUTO: 228 K/UL (ref 150–350)
PMV BLD AUTO: 10.8 FL (ref 9.2–12.9)
RBC # BLD AUTO: 3.71 M/UL (ref 4–5.4)
WBC # BLD AUTO: 6.32 K/UL (ref 3.9–12.7)

## 2019-04-29 PROCEDURE — 36415 COLL VENOUS BLD VENIPUNCTURE: CPT | Mod: HCNC

## 2019-04-29 PROCEDURE — 85025 COMPLETE CBC W/AUTO DIFF WBC: CPT | Mod: HCNC

## 2019-05-09 NOTE — PROGRESS NOTES
Last 5 Patient Entered Readings                                      Current 30 Day Average: 136/59     Recent Readings 5/2/2019 4/30/2019 4/20/2019 4/18/2019 4/17/2019    SBP (mmHg) 147 149 140 157 104    DBP (mmHg) 65 66 63 68 50    Pulse 87 80 69 74 87          Digital Medicine: Health  Follow Up    5/9: Left voicemail to follow up with Ms. Emilia Alan.  Current BP average 136/59 mmHg is at goal, <140/90 mmHg.  Sending [employer] message.

## 2019-05-13 ENCOUNTER — TELEPHONE (OUTPATIENT)
Dept: INTERNAL MEDICINE | Facility: CLINIC | Age: 79
End: 2019-05-13

## 2019-05-13 ENCOUNTER — OFFICE VISIT (OUTPATIENT)
Dept: PODIATRY | Facility: CLINIC | Age: 79
End: 2019-05-13
Payer: MEDICARE

## 2019-05-13 VITALS
BODY MASS INDEX: 32.2 KG/M2 | HEIGHT: 60 IN | HEART RATE: 89 BPM | WEIGHT: 164 LBS | DIASTOLIC BLOOD PRESSURE: 70 MMHG | SYSTOLIC BLOOD PRESSURE: 144 MMHG | RESPIRATION RATE: 18 BRPM

## 2019-05-13 DIAGNOSIS — B35.1 ONYCHOMYCOSIS DUE TO DERMATOPHYTE: ICD-10-CM

## 2019-05-13 DIAGNOSIS — G60.9 IDIOPATHIC PERIPHERAL NEUROPATHY: ICD-10-CM

## 2019-05-13 DIAGNOSIS — L84 CORN OR CALLUS: ICD-10-CM

## 2019-05-13 DIAGNOSIS — M21.372 FOOT DROP, LEFT: ICD-10-CM

## 2019-05-13 DIAGNOSIS — G14 POST-POLIO SYNDROME: Primary | ICD-10-CM

## 2019-05-13 DIAGNOSIS — E11.40 TYPE 2 DIABETES MELLITUS WITH DIABETIC NEUROPATHY, WITHOUT LONG-TERM CURRENT USE OF INSULIN: Primary | ICD-10-CM

## 2019-05-13 PROCEDURE — 99999 PR PBB SHADOW E&M-EST. PATIENT-LVL III: CPT | Mod: PBBFAC,HCNC,, | Performed by: PODIATRIST

## 2019-05-13 PROCEDURE — 11721 PR DEBRIDEMENT OF NAILS, 6 OR MORE: ICD-10-PCS | Mod: 59,Q9,HCNC,S$GLB | Performed by: PODIATRIST

## 2019-05-13 PROCEDURE — 99499 UNLISTED E&M SERVICE: CPT | Mod: HCNC,S$GLB,, | Performed by: PODIATRIST

## 2019-05-13 PROCEDURE — 11056 PR TRIM BENIGN HYPERKERATOTIC SKIN LESION,2-4: ICD-10-PCS | Mod: Q9,HCNC,S$GLB, | Performed by: PODIATRIST

## 2019-05-13 PROCEDURE — 11056 PARNG/CUTG B9 HYPRKR LES 2-4: CPT | Mod: Q9,HCNC,S$GLB, | Performed by: PODIATRIST

## 2019-05-13 PROCEDURE — 11721 DEBRIDE NAIL 6 OR MORE: CPT | Mod: 59,Q9,HCNC,S$GLB | Performed by: PODIATRIST

## 2019-05-13 PROCEDURE — 99999 PR PBB SHADOW E&M-EST. PATIENT-LVL III: ICD-10-PCS | Mod: PBBFAC,HCNC,, | Performed by: PODIATRIST

## 2019-05-13 PROCEDURE — 99499 NO LOS: ICD-10-PCS | Mod: HCNC,S$GLB,, | Performed by: PODIATRIST

## 2019-05-13 NOTE — TELEPHONE ENCOUNTER
Spoke with pt. Says she had a complete exam at [facility] [address] approx 4 months ago.     Contacted [facility]/ Dr. Sheppard. Record request faxed.

## 2019-05-13 NOTE — LETTER
May 13, 2019      Lauren Deras MD  [address]  [facility] [address]           [facility]  [address]  [facility] [address]  Phone: [phone]          Patient: Emilia Alan   MR Number: [id number]   YOB: 1940   Date of Visit: 5/13/2019       Dear Dr. Lauren Deras:    Thank you for referring Emilia Alan to me for evaluation. Attached you will find relevant portions of my assessment and plan of care.    If you have questions, please do not hesitate to call me. I look forward to following Emilia Alan along with you.    Sincerely,    Carole Niño, DORIS    Enclosure  CC:  No Recipients    If you would like to receive this communication electronically, please contact [email or web address] or [phone] to request more information on [employer] access.    For providers and/or their staff who would like to refer a patient to [facility], please contact us through our one-stop-shop provider referral line, [facility], at [phone].    If you feel you have received this communication in error or would no longer like to receive these types of communications, please e-mail [email or web address]

## 2019-05-13 NOTE — PROGRESS NOTES
Subjective:      Patient ID: Emilia Alan is a 78 y.o. female.    Chief Complaint: PCP (Zoë Sousa, JENNY  7/25/18); Nail Care; Nail Problem; and Foot Problem (swelling )    Emilia is a 78 y.o. female who presents to the clinic for evaluation and treatment of high risk feet. 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 The patient's chief complaint is long, thick toenails and dry red itchy skin L foot       PCP: Lauren Deras MD    Date Last Seen by PCP:   Chief Complaint   Patient presents with    PCP     Zoë Sousa, JENNY  7/25/18    Nail Care    Nail Problem    Foot Problem     swelling          Current shoe gear: DM shoes w/ AFO brace( L)    Hemoglobin A1C   Date Value Ref Range Status   03/18/2019 6.9 (H) 4.0 - 5.6 % Final     Comment:     ADA Screening Guidelines:  5.7-6.4%  Consistent with prediabetes  >or=6.5%  Consistent with diabetes  High levels of fetal hemoglobin interfere with the HbA1C  assay. Heterozygous hemoglobin variants (HbS, HgC, etc)do  not significantly interfere with this assay.   However, presence of multiple variants may affect accuracy.     05/11/2018 6.1 (H) 4.0 - 5.6 % Final     Comment:     According to ADA guidelines, hemoglobin A1c <7.0%  represents  optimal control in non-pregnant diabetic patients. Different  metrics may apply to specific patient populations.   Standards of Medical Care in Diabetes-2016.  For the purpose of screening for the presence of diabetes:  <5.7%     Consistent with the absence of diabetes  5.7-6.4%  Consistent with increasing risk for diabetes   (prediabetes)  >or=6.5%  Consistent with diabetes  Currently, no consensus exists for use of hemoglobin A1c  for diagnosis of diabetes for children.  This Hemoglobin A1c assay has significant interference with fetal   hemoglobin   (HbF). The results are invalid for patients with abnormal amounts of   HbF,   including those with known Hereditary Persistence   of Fetal Hemoglobin. Heterozygous hemoglobin variants (HbAS, HbAC,   HbAD, HbAE, HbA2) do not significantly interfere with this assay;   however, presence of multiple variants in a sample may impact the %   interference.     01/26/2018 6.1 (H) 4.0 - 5.6 % Final     Comment:     According to ADA guidelines, hemoglobin A1c <7.0% represents  optimal control in non-pregnant diabetic patients. Different  metrics may apply to specific patient populations.   Standards of Medical Care in Diabetes-2016.  For the purpose of screening for the presence of diabetes:  <5.7%     Consistent with the absence of diabetes  5.7-6.4%  Consistent with increasing risk for diabetes   (prediabetes)  >or=6.5%  Consistent with diabetes  Currently, no consensus exists for use of hemoglobin A1c  for diagnosis of diabetes for children.  This Hemoglobin A1c assay has significant interference with fetal   hemoglobin   (HbF). The results are invalid for patients with abnormal amounts of   HbF,   including those with known Hereditary Persistence   of Fetal Hemoglobin. Heterozygous hemoglobin variants (HbAS, HbAC,   HbAD, HbAE, HbA2) do not significantly interfere with this assay;   however, presence of multiple variants in a sample may impact the %    interference.           Review of Systems   Constitution: Negative for chills, decreased appetite and fever.   Cardiovascular: Negative for chest pain, claudication and leg swelling.   Respiratory: Negative for cough.    Skin: Positive for dry skin and nail changes. Negative for color change, flushing, itching, poor wound healing and rash.   Musculoskeletal: Positive for muscle weakness (foot drop). Negative for arthritis, back pain, gout, joint pain, joint swelling and myalgias.   Gastrointestinal: Negative for nausea and vomiting.   Neurological: Positive for numbness and paresthesias. Negative for loss of balance.           Objective:       Vitals:    05/13/19 0945   BP: (!) 144/70   Pulse: 89   Resp: 18   Weight: 74.4 kg (164 lb)   Height: 5' (1.524 m)   PainSc:   7   PainLoc: Foot        Physical Exam   Constitutional: She is oriented to person, place, and time. She appears well-developed and well-nourished. No distress.   Cardiovascular:   Dorsalis pedis and posterior tibial pulses are diminished Bilaterally. Toes are cool to touch. Feet are warm proximally.There is decreased digital hair. Skin is atrophic, slightly hyperpigmented, and mildly edematous       Musculoskeletal: She exhibits no edema or tenderness.        Right ankle: Normal.        Left ankle: Normal.        Right foot: There is no swelling, no crepitus and no deformity.        Left foot: There is no swelling, no crepitus and no deformity.   Dropfoot left.      Lymphadenopathy:   No palpable lymph nodes   Neurological: She is alert and oriented to person, place, and time. She has normal strength.   [place]-Sarah 5.07 monofilamant testing is diminished Charbel feet. Sharp/dull sensation diminished Bilaterally. Light touch absent Bilaterally.       Skin: Skin is warm, dry and intact. No abrasion, no bruising, no burn, no laceration, no lesion, no petechiae and no rash noted. She is not diaphoretic. No pallor. Nails show no clubbing.   Nails 1-5  b/l  are elongated by  3-7mm's, thickened by 3-5 mm's, dystrophic, and are darkened in  coloration . Xerosis Bilaterally. No open lesions noted.    Hyperkeratotic tissue noted to plantar L 5th MPJ and plantar L 5th toe   Psychiatric: She has a normal mood and affect. Judgment and thought content normal.   Nursing note and vitals reviewed.          Assessment:       Encounter Diagnoses   Name Primary?    Post-polio syndrome Yes    Idiopathic peripheral neuropathy     Onychomycosis due to dermatophyte     Corn or callus     Foot drop, left          Plan:       Emilia was seen today for pcp, nail care, nail problem and foot problem.    Diagnoses and all orders for this visit:    Post-polio syndrome    Idiopathic peripheral neuropathy    Onychomycosis due to dermatophyte    Corn or callus    Foot drop, left      I counseled the patient on her conditions, their implications and medical management.    - Shoe inspection.Patient instructed on proper foot hygeine. We discussed wearing proper shoe gear, daily foot inspections, never walking without protective shoe gear, never putting sharp instruments to feet, routine podiatric nail visits every 2-3 months.      - With patient's permission, nails were aggressively reduced and debrided x 10 to their soft tissue attachment mechanically and with electric , removing all offending nail and debris. Patient relates relief following the procedure. She will continue to monitor the areas daily, inspect her feet, wear protective shoe gear when ambulatory, moisturizer to maintain skin integrity and follow in this office in approximately 2-3 months, sooner p.r.n.    - After cleansing the  area w/ alcohol prep pad the above mentioned hyperkeratosis was trimmed utilizing No 15 scapel, to a smooth base with out incident. Patient tolerated this  well and reported comfort to the area of plantar L 5th MPJ and lateral l 5th toe

## 2019-05-13 NOTE — LETTER
May 13, 2019    Dr[facility] \[facility] [place] [facility]\""             [facility]  [address] [place] [address] May 13, 2019     Patient: Emilia Alan    YOB: 1940   Date of Visit: 5/13/2019     To Whom It May Concern:    The patient listed above was seen recently by her primary care provider, Dr. Lauren Deras, at [facility]. Please release the following information specified below for the patient:    Most recent eye exam results (4-6 months ago?)    Please fax the requested record to our secure fax number [phone], Attention: KAMALJIT Law.    Thank you for your help! If I can be of any assistance please contact me at the number listed below.      Regards,    Thanh Madden LPN  Clinical Care Coordinator  [facility]  [phone] phone  [phone] fax  [email or web address]

## 2019-05-15 ENCOUNTER — TELEPHONE (OUTPATIENT)
Dept: INTERNAL MEDICINE | Facility: CLINIC | Age: 79
End: 2019-05-15

## 2019-05-16 ENCOUNTER — OFFICE VISIT (OUTPATIENT)
Dept: INTERNAL MEDICINE | Facility: CLINIC | Age: 79
End: 2019-05-16
Payer: MEDICARE

## 2019-05-16 ENCOUNTER — TELEPHONE (OUTPATIENT)
Dept: INTERNAL MEDICINE | Facility: CLINIC | Age: 79
End: 2019-05-16

## 2019-05-16 VITALS
HEIGHT: 60 IN | HEART RATE: 78 BPM | BODY MASS INDEX: 33.89 KG/M2 | DIASTOLIC BLOOD PRESSURE: 64 MMHG | TEMPERATURE: 98 F | WEIGHT: 172.63 LBS | OXYGEN SATURATION: 97 % | SYSTOLIC BLOOD PRESSURE: 126 MMHG

## 2019-05-16 VITALS
BODY MASS INDEX: 31.94 KG/M2 | WEIGHT: 162.69 LBS | HEART RATE: 78 BPM | HEIGHT: 60 IN | SYSTOLIC BLOOD PRESSURE: 126 MMHG | DIASTOLIC BLOOD PRESSURE: 64 MMHG

## 2019-05-16 DIAGNOSIS — R00.1 BRADYCARDIA, DRUG INDUCED: ICD-10-CM

## 2019-05-16 DIAGNOSIS — E66.09 CLASS 1 OBESITY DUE TO EXCESS CALORIES WITH SERIOUS COMORBIDITY IN ADULT, UNSPECIFIED BMI: ICD-10-CM

## 2019-05-16 DIAGNOSIS — G82.20 PARAPARESIS OF BOTH LOWER LIMBS: ICD-10-CM

## 2019-05-16 DIAGNOSIS — E11.21 TYPE 2 DIABETES MELLITUS WITH DIABETIC NEPHROPATHY, WITHOUT LONG-TERM CURRENT USE OF INSULIN: ICD-10-CM

## 2019-05-16 DIAGNOSIS — M17.11 PRIMARY OSTEOARTHRITIS OF RIGHT KNEE: ICD-10-CM

## 2019-05-16 DIAGNOSIS — N18.30 STAGE 3 CHRONIC KIDNEY DISEASE: ICD-10-CM

## 2019-05-16 DIAGNOSIS — E78.2 MIXED HYPERLIPIDEMIA: ICD-10-CM

## 2019-05-16 DIAGNOSIS — Z86.12 HISTORY OF POLIOMYELITIS: ICD-10-CM

## 2019-05-16 DIAGNOSIS — E11.42 DIABETIC POLYNEUROPATHY ASSOCIATED WITH TYPE 2 DIABETES MELLITUS: ICD-10-CM

## 2019-05-16 DIAGNOSIS — M48.061 SPINAL STENOSIS OF LUMBAR REGION, UNSPECIFIED WHETHER NEUROGENIC CLAUDICATION PRESENT: ICD-10-CM

## 2019-05-16 DIAGNOSIS — D46.4 REFRACTORY ANEMIA: ICD-10-CM

## 2019-05-16 DIAGNOSIS — M81.0 AGE-RELATED OSTEOPOROSIS WITHOUT CURRENT PATHOLOGICAL FRACTURE: ICD-10-CM

## 2019-05-16 DIAGNOSIS — T50.905A BRADYCARDIA, DRUG INDUCED: ICD-10-CM

## 2019-05-16 DIAGNOSIS — I10 SEVERE HYPERTENSION: ICD-10-CM

## 2019-05-16 DIAGNOSIS — R60.0 PEDAL EDEMA: ICD-10-CM

## 2019-05-16 DIAGNOSIS — G14 POST POLIOMYELITIS SYNDROME: ICD-10-CM

## 2019-05-16 DIAGNOSIS — I70.201 ATHEROSCLEROSIS OF ARTERY OF RIGHT LOWER EXTREMITY: ICD-10-CM

## 2019-05-16 DIAGNOSIS — Z00.00 ENCOUNTER FOR PREVENTIVE HEALTH EXAMINATION: Primary | ICD-10-CM

## 2019-05-16 DIAGNOSIS — G25.0 ESSENTIAL TREMOR: ICD-10-CM

## 2019-05-16 DIAGNOSIS — E11.40 TYPE 2 DIABETES MELLITUS WITH DIABETIC NEUROPATHY, WITHOUT LONG-TERM CURRENT USE OF INSULIN: ICD-10-CM

## 2019-05-16 DIAGNOSIS — R26.9 GAIT DISORDER: ICD-10-CM

## 2019-05-16 DIAGNOSIS — G47.33 OSA (OBSTRUCTIVE SLEEP APNEA): Primary | ICD-10-CM

## 2019-05-16 DIAGNOSIS — M47.816 FACET ARTHRITIS OF LUMBAR REGION: ICD-10-CM

## 2019-05-16 DIAGNOSIS — G47.33 OBSTRUCTIVE SLEEP APNEA SYNDROME: ICD-10-CM

## 2019-05-16 PROCEDURE — 99213 PR OFFICE/OUTPT VISIT, EST, LEVL III, 20-29 MIN: ICD-10-PCS | Mod: HCNC,S$GLB,, | Performed by: INTERNAL MEDICINE

## 2019-05-16 PROCEDURE — 3074F PR MOST RECENT SYSTOLIC BLOOD PRESSURE < 130 MM HG: ICD-10-PCS | Mod: HCNC,CPTII,S$GLB, | Performed by: NURSE PRACTITIONER

## 2019-05-16 PROCEDURE — 99999 PR PBB SHADOW E&M-EST. PATIENT-LVL V: ICD-10-PCS | Mod: PBBFAC,HCNC,, | Performed by: NURSE PRACTITIONER

## 2019-05-16 PROCEDURE — 1101F PT FALLS ASSESS-DOCD LE1/YR: CPT | Mod: HCNC,CPTII,S$GLB, | Performed by: INTERNAL MEDICINE

## 2019-05-16 PROCEDURE — 1101F PR PT FALLS ASSESS DOC 0-1 FALLS W/OUT INJ PAST YR: ICD-10-PCS | Mod: HCNC,CPTII,S$GLB, | Performed by: INTERNAL MEDICINE

## 2019-05-16 PROCEDURE — 99999 PR PBB SHADOW E&M-EST. PATIENT-LVL III: ICD-10-PCS | Mod: PBBFAC,HCNC,, | Performed by: INTERNAL MEDICINE

## 2019-05-16 PROCEDURE — 3074F PR MOST RECENT SYSTOLIC BLOOD PRESSURE < 130 MM HG: ICD-10-PCS | Mod: HCNC,CPTII,S$GLB, | Performed by: INTERNAL MEDICINE

## 2019-05-16 PROCEDURE — 99499 UNLISTED E&M SERVICE: CPT | Mod: HCNC,S$GLB,, | Performed by: NURSE PRACTITIONER

## 2019-05-16 PROCEDURE — G0439 PPPS, SUBSEQ VISIT: HCPCS | Mod: HCNC,S$GLB,, | Performed by: NURSE PRACTITIONER

## 2019-05-16 PROCEDURE — 3078F DIAST BP <80 MM HG: CPT | Mod: HCNC,CPTII,S$GLB, | Performed by: NURSE PRACTITIONER

## 2019-05-16 PROCEDURE — G0439 PR MEDICARE ANNUAL WELLNESS SUBSEQUENT VISIT: ICD-10-PCS | Mod: HCNC,S$GLB,, | Performed by: NURSE PRACTITIONER

## 2019-05-16 PROCEDURE — 3078F PR MOST RECENT DIASTOLIC BLOOD PRESSURE < 80 MM HG: ICD-10-PCS | Mod: HCNC,CPTII,S$GLB, | Performed by: NURSE PRACTITIONER

## 2019-05-16 PROCEDURE — 99999 PR PBB SHADOW E&M-EST. PATIENT-LVL III: CPT | Mod: PBBFAC,HCNC,, | Performed by: INTERNAL MEDICINE

## 2019-05-16 PROCEDURE — 3074F SYST BP LT 130 MM HG: CPT | Mod: HCNC,CPTII,S$GLB, | Performed by: INTERNAL MEDICINE

## 2019-05-16 PROCEDURE — 99999 PR PBB SHADOW E&M-EST. PATIENT-LVL V: CPT | Mod: PBBFAC,HCNC,, | Performed by: NURSE PRACTITIONER

## 2019-05-16 PROCEDURE — 3078F DIAST BP <80 MM HG: CPT | Mod: HCNC,CPTII,S$GLB, | Performed by: INTERNAL MEDICINE

## 2019-05-16 PROCEDURE — 3074F SYST BP LT 130 MM HG: CPT | Mod: HCNC,CPTII,S$GLB, | Performed by: NURSE PRACTITIONER

## 2019-05-16 PROCEDURE — 3078F PR MOST RECENT DIASTOLIC BLOOD PRESSURE < 80 MM HG: ICD-10-PCS | Mod: HCNC,CPTII,S$GLB, | Performed by: INTERNAL MEDICINE

## 2019-05-16 PROCEDURE — 99213 OFFICE O/P EST LOW 20 MIN: CPT | Mod: HCNC,S$GLB,, | Performed by: INTERNAL MEDICINE

## 2019-05-16 PROCEDURE — 99499 RISK ADDL DX/OHS AUDIT: ICD-10-PCS | Mod: HCNC,S$GLB,, | Performed by: NURSE PRACTITIONER

## 2019-05-16 RX ORDER — VALSARTAN AND HYDROCHLOROTHIAZIDE 160; 25 MG/1; MG/1
1 TABLET ORAL DAILY
Qty: 90 TABLET | Refills: 3 | Status: SHIPPED | OUTPATIENT
Start: 2019-05-16 | End: 2019-07-11 | Stop reason: SINTOL

## 2019-05-16 RX ORDER — HYDROCHLOROTHIAZIDE 25 MG/1
25 TABLET ORAL EVERY OTHER DAY
Qty: 30 TABLET | Refills: 1 | Status: SHIPPED | OUTPATIENT
Start: 2019-05-16 | End: 2019-09-16 | Stop reason: SDUPTHER

## 2019-05-16 NOTE — PROGRESS NOTES
Emilia Alan presented for a  Medicare AWV and comprehensive Health Risk Assessment today. The following components were reviewed and updated:    · Medical history  · Family History  · Social history  · Allergies and Current Medications  · Health Risk Assessment  · Health Maintenance  · Care Team     ** See Completed Assessments for Annual Wellness Visit within the encounter summary.**       The following assessments were completed:  · Living Situation  · CAGE  · Depression Screening  · Timed Get Up and Go  · Whisper Test  · Cognitive Function Screening  ·   ·   ·   · Nutrition Screening  · ADL Screening  · PAQ Screening    Vitals:    05/16/19 0937   BP: 126/64   BP Location: Right arm   Pulse: 78   Weight: 73.8 kg (162 lb 11.2 oz)   Height: 5' (1.524 m)     Body mass index is 31.78 kg/m².  Physical Exam   Constitutional: She is oriented to person, place, and time.   Obese   HENT:   Head: Normocephalic.   Cardiovascular: Normal rate and regular rhythm.   Pulmonary/Chest: Effort normal and breath sounds normal.   Abdominal: Soft. Bowel sounds are normal.   Musculoskeletal:   In wheelchair.  2+ edema bilateral ankles   Neurological: She is alert and oriented to person, place, and time.   Psychiatric: She has a normal mood and affect.   Nursing note and vitals reviewed.        Diagnoses and health risks identified today and associated recommendations/orders:    1. Encounter for preventive health examination  Here for Health Risk Assessment/Annual Wellness Visit.  Health maintenance reviewed and updated. Follow up in one year.    2. Severe hypertension  Chronic, stable on current medications. Followed by PCP.    3. Atherosclerosis of artery of right lower extremity: see xray 4/4/07  Chronic, stable on current medications. Followed by PCP.    4. Bradycardia, drug induced  Chronic, stable. Followed by PCP.    5. Mixed hyperlipidemia  Chronic, stable on current medication. Followed by PCP.    6. Type 2 diabetes mellitus  with diabetic nephropathy, without long-term current use of insulin  Chronic, stable on current medication. Last A1C 6.9. Followed by PCP.    7. Stage 3 chronic kidney disease  Chronic, stable on current medications. Followed by PCP.    8. Type 2 diabetes mellitus with diabetic neuropathy, without long-term current use of insulin  Chronic, stable on current medication. Followed by PCP.    9. Diabetic polyneuropathy associated with type 2 diabetes mellitus  Chronic, stable on current medications. Followed by PCP.    10. Obstructive sleep apnea syndrome: dx 2008 needs CPAP 11  Chronic, stable. Followed by Sleep Medicine.    11. Osteoporosis without current pathological fracture: worse on fosamax 5/16- Reclast 8/16  Chronic, stable on current medications. Followed by PCP.    12. Refractory anemia  Chronic, stable. Followed by PCP, Hematology.    13. History of poliomyelitis  Stable. Followed by PCP.    14. Paraparesis of both lower limbs  Chronic, stable, ambulates with walker. Followed by Physical Medicine, PCP.    15. Post poliomyelitis syndrome  Chronic, stable. Followed by PCP, Neurology, Physical Medicine.    16. Essential tremor  Chronic, stable. Followed by PCP, Neurology.    17. Facet arthritis of lumbar region  Chronic, stable on current medications. Followed by Physical Medicine, PCP.    18. Class 1 obesity due to excess calories with serious comorbidity in adult, unspecified BMI  Chronic, stable. Followed by PCP.    19. Gait disorder  Chronic, ambulates with walker, reports no recent falls.  Followed by PCP.    20. Primary osteoarthritis of right knee  Chronic, stable on current medications. Followed by Physical Medicine, PCP.    21. Spinal stenosis of lumbar region, unspecified whether neurogenic claudication present  Chronic, stable on current medications. Followed by Physical Medicine, PCP.    22. Shortness of breath/edema  New onset. Reporting increased shortness of breath and leg swelling. Urgent Care  visit scheduled    Provided Emilia with a 5-10 year written screening schedule and personal prevention plan. Recommendations were developed using the USPSTF age appropriate recommendations. Education, counseling, and referrals were provided as needed. After Visit Summary printed and given to patient which includes a list of additional screenings\tests needed.    Follow up today (on 5/16/2019).- Urgent Care visit scheduled    Zoë Sousa NP

## 2019-05-16 NOTE — TELEPHONE ENCOUNTER
Call pt left detail message on voice mail that Dr. Deras is out and her appointment will have to be reschedule  Cell phone voice mail is not set up

## 2019-05-16 NOTE — PATIENT INSTRUCTIONS
Counseling and Referral of Other Preventative  (Italic type indicates deductible and co-insurance are waived)    Patient Name: Emilia Alan  Today's Date: 5/16/2019    Health Maintenance       Date Due Completion Date    Shingles Vaccine (2 of 3) 06/04/2015 4/9/2015    Influenza Vaccine 08/01/2019 10/30/2018    Hemoglobin A1c 09/18/2019 3/18/2019    Eye Exam 10/31/2019 10/31/2018    Lipid Panel 03/18/2020 3/18/2019    Aspirin/Antiplatelet Therapy 05/13/2020 5/13/2019    Foot Exam 05/13/2020 5/13/2019 (Done)    Override on 5/13/2019: Done    Override on 1/11/2018: Done    Override on 4/6/2017: Done    Override on 4/29/2016: Done (Podiatry)    Override on 7/29/2015: Done    Override on 12/1/2014: Done    Mammogram 05/14/2020 5/14/2018    DEXA SCAN 05/28/2021 5/28/2018    Override on 5/17/2011: Done    Colonoscopy 06/14/2027 6/14/2017    Override on 9/29/2006: Done    TETANUS VACCINE 12/14/2027 12/14/2017        No orders of the defined types were placed in this encounter.    The following information is provided to all patients.  This information is to help you find resources for any of the problems found today that may be affecting your health:                Living healthy guide: www.[facility].gov      Understanding Diabetes: www.diabetes.org      Eating healthy: www.cdc.gov/healthyweight      CDC home safety checklist: www.cdc.gov/steadi/patient.html      Agency on Aging: www.goea.[place].[facility]      Alcoholics anonymous (AA): www.aa.org      Physical Activity: www.court.nih.gov/pm8tpmc      Tobacco use: www.quitwithusla.org

## 2019-05-16 NOTE — PROGRESS NOTES
Subjective:       Patient ID: Emilia Alan is a 78 y.o. female.    Chief Complaint: Shortness of Breath (leg swelling)    78 year old lady with post polio syndrome and paraparesis complains of swelling in feet and lower legs.  Is intermittently SOB with exertion.    Review of Systems   Constitutional: Negative for activity change, chills, fatigue and fever.   HENT: Negative for congestion, ear pain, nosebleeds, postnasal drip, sinus pressure and sore throat.    Eyes: Negative.  Negative for visual disturbance.   Respiratory: Negative for cough, chest tightness, shortness of breath and wheezing.    Cardiovascular: Negative for chest pain.   Gastrointestinal: Negative for abdominal pain, diarrhea, nausea and vomiting.   Genitourinary: Negative for difficulty urinating, dysuria, frequency and urgency.   Musculoskeletal: Negative for arthralgias and neck stiffness.   Skin: Negative for rash.   Neurological: Negative for dizziness, weakness and headaches.   Psychiatric/Behavioral: Negative for sleep disturbance. The patient is not nervous/anxious.        Objective:      Physical Exam   Constitutional: She is oriented to person, place, and time. She appears well-developed and well-nourished.  Non-toxic appearance. No distress.   HENT:   Head: Normocephalic and atraumatic.   Right Ear: Tympanic membrane, external ear and ear canal normal.   Left Ear: Tympanic membrane, external ear and ear canal normal.   Eyes: Pupils are equal, round, and reactive to light. EOM are normal. No scleral icterus.   Neck: Normal range of motion. Neck supple. No thyromegaly present.   Cardiovascular: Normal rate, regular rhythm and normal heart sounds.   Pulmonary/Chest: Effort normal and breath sounds normal.   Abdominal: Soft. Bowel sounds are normal. She exhibits no mass. There is no tenderness. There is no rebound.   Musculoskeletal: Normal range of motion.        Arms:  Lymphadenopathy:     She has no cervical adenopathy.    Neurological: She is alert and oriented to person, place, and time. She has normal reflexes. She displays normal reflexes. No cranial nerve deficit. She exhibits normal muscle tone. Coordination normal.   Skin: Skin is warm and dry.   Psychiatric: She has a normal mood and affect. Her behavior is normal.       Assessment:       1. VIVEK (obstructive sleep apnea)    2. Pedal edema    3. Paraparesis of both lower limbs        Plan:   Emilia was seen today for shortness of breath.    Diagnoses and all orders for this visit:    VIVEK (obstructive sleep apnea)  -     Ambulatory referral to Sleep Disorders    Pedal edema    Paraparesis of both lower limbs    Other orders  -     valsartan-hydrochlorothiazide (DIOVAN-HCT) 160-25 mg per tablet; Take 1 tablet by mouth once daily.  -     hydroCHLOROthiazide (HYDRODIURIL) 25 MG tablet; Take 1 tablet (25 mg total) by mouth every other day.

## 2019-05-16 NOTE — PROGRESS NOTES
Last 5 Patient Entered Readings                                      Current 30 Day Average: 134/59     Recent Readings 5/15/2019 5/14/2019 5/13/2019 5/2/2019 4/30/2019    SBP (mmHg) 122 134 138 147 149    DBP (mmHg) 58 61 65 65 66    Pulse 72 82 75 87 80        Chart reviewed. Current average is at goal of <140/90 mmHg. Seen on office today and BP was 126/64 mmHg. No medication recommendations at this time. Will continue monitoring and follow-up as scheduled.

## 2019-05-21 ENCOUNTER — TELEPHONE (OUTPATIENT)
Dept: INTERNAL MEDICINE | Facility: CLINIC | Age: 79
End: 2019-05-21

## 2019-05-21 DIAGNOSIS — N28.9 RENAL INSUFFICIENCY: Primary | ICD-10-CM

## 2019-05-21 DIAGNOSIS — R60.9 EDEMA, UNSPECIFIED TYPE: ICD-10-CM

## 2019-05-22 NOTE — PROGRESS NOTES
"Last 5 Patient Entered Readings                                      Current 30 Day Average: 133/63     Recent Readings 5/20/2019 5/18/2019 5/17/2019 5/16/2019 5/15/2019    SBP (mmHg) 114 129 133 135 122    DBP (mmHg) 55 62 66 66 58    Pulse 71 87 82 72 72        Digital Medicine: Health  Follow Up    Patient reports she was going to use her tablet to take readings, but the tablet was unable to connect to the device.  Patient reports she is back to relying on her daughter for taking readings.  BP is controlled.    Lifestyle Modifications:    1.Dietary Modifications (Sodium intake <2,000mg/day, food labels, dining out):   Patient denies needing any other help with nutrition at this time.    2.Physical Activity:   Patient reports "exercise is getting less and less".  Encouraged patient to remain as active as she can so she does not lose her ability to do every day activities.  Patient verbalized understanding.    3.Medication Therapy:   Patient has been compliant with the medication regimen.    4.Patient has the following medication side effects/concerns: None  (Frequency/Alleviating factors/Precipitating factors, etc.)     Follow up with Ms. Emilia Alan completed. No further questions or concerns. Will continue to follow up to achieve health goals.  Patient is currently at goal, 133/63 mmHg does not exceed <140/90 mmHg.    "

## 2019-05-22 NOTE — TELEPHONE ENCOUNTER
Spoke to pt--she advised that she is doing better and that Dr. Barone prescribed a fluid pill. Pt stated that she will call back to schedule labs once she speak to her daughter.

## 2019-05-22 NOTE — TELEPHONE ENCOUNTER
I know she saw Dr Barone when I was out sick    Please call to check on how she is doing with swelling    Please schedule labs in next week orders in thanks

## 2019-05-28 ENCOUNTER — TELEPHONE (OUTPATIENT)
Dept: INTERNAL MEDICINE | Facility: CLINIC | Age: 79
End: 2019-05-28

## 2019-05-28 DIAGNOSIS — N17.9 ACUTE RENAL FAILURE, UNSPECIFIED ACUTE RENAL FAILURE TYPE: Primary | ICD-10-CM

## 2019-05-28 RX ORDER — METFORMIN HYDROCHLORIDE 500 MG/1
TABLET, EXTENDED RELEASE ORAL
Qty: 90 TABLET | Refills: 0 | Status: SHIPPED | OUTPATIENT
Start: 2019-05-28 | End: 2019-08-02 | Stop reason: SDUPTHER

## 2019-05-28 NOTE — TELEPHONE ENCOUNTER
----- Message from Jarred Muñoz sent at 5/28/2019  9:00 AM CDT -----  Contact: Patient [phone]  RX request - refill or new RX.  Is this a refill or new RX:  Refill  RX name and strength: metFORMIN (GLUCOPHAGE-XR) 500 MG 24 hr tablet and all other Rx's     Pharmacy name and phone # [id number]  [place] - [address]  AT [facility] [address] [phone] (Phone) [phone] (Fax)      Comments:  Patient stating is needing all Rx's refilled, call to inform all has been sent.    Please call an advise  Thank you

## 2019-05-29 ENCOUNTER — LAB VISIT (OUTPATIENT)
Dept: LAB | Facility: HOSPITAL | Age: 79
End: 2019-05-29
Attending: INTERNAL MEDICINE
Payer: MEDICARE

## 2019-05-29 DIAGNOSIS — R60.9 EDEMA, UNSPECIFIED TYPE: ICD-10-CM

## 2019-05-29 DIAGNOSIS — N28.9 RENAL INSUFFICIENCY: ICD-10-CM

## 2019-05-29 LAB
ALBUMIN SERPL BCP-MCNC: 3.4 G/DL (ref 3.5–5.2)
ANION GAP SERPL CALC-SCNC: 9 MMOL/L (ref 8–16)
BNP SERPL-MCNC: 43 PG/ML (ref 0–99)
BUN SERPL-MCNC: 38 MG/DL (ref 8–23)
CALCIUM SERPL-MCNC: 9.5 MG/DL (ref 8.7–10.5)
CHLORIDE SERPL-SCNC: 103 MMOL/L (ref 95–110)
CO2 SERPL-SCNC: 25 MMOL/L (ref 23–29)
CREAT SERPL-MCNC: 1.2 MG/DL (ref 0.5–1.4)
EST. GFR  (AFRICAN AMERICAN): 50 ML/MIN/1.73 M^2
EST. GFR  (NON AFRICAN AMERICAN): 43.4 ML/MIN/1.73 M^2
GLUCOSE SERPL-MCNC: 139 MG/DL (ref 70–110)
PHOSPHATE SERPL-MCNC: 3.8 MG/DL (ref 2.7–4.5)
POTASSIUM SERPL-SCNC: 3.8 MMOL/L (ref 3.5–5.1)
SODIUM SERPL-SCNC: 137 MMOL/L (ref 136–145)

## 2019-05-29 PROCEDURE — 36415 COLL VENOUS BLD VENIPUNCTURE: CPT | Mod: HCNC

## 2019-05-29 PROCEDURE — 83880 ASSAY OF NATRIURETIC PEPTIDE: CPT | Mod: HCNC

## 2019-05-29 PROCEDURE — 80069 RENAL FUNCTION PANEL: CPT | Mod: HCNC

## 2019-05-29 NOTE — TELEPHONE ENCOUNTER
Please let her know that her kidney function is worse.  How much water has she been drinking?    I am recommending a Nephrology appointment and a kidney ultrasound and a urinalysis, orders in for all.  Please let me know when scheduled.

## 2019-05-30 NOTE — TELEPHONE ENCOUNTER
Spoke with pt, notified her of the results, pt verbalized understanding. Please, schedule appointments.

## 2019-06-03 DIAGNOSIS — G89.29 CHRONIC BILATERAL LOW BACK PAIN WITH SCIATICA, SCIATICA LATERALITY UNSPECIFIED: ICD-10-CM

## 2019-06-03 DIAGNOSIS — R26.9 GAIT DISORDER: ICD-10-CM

## 2019-06-03 DIAGNOSIS — G89.29 CHRONIC PAIN OF RIGHT KNEE: ICD-10-CM

## 2019-06-03 DIAGNOSIS — Z86.12 HISTORY OF POLIOMYELITIS: ICD-10-CM
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
## 2019-06-03 DIAGNOSIS — E11.40 TYPE 2 DIABETES MELLITUS WITH DIABETIC NEUROPATHY, WITHOUT LONG-TERM CURRENT USE OF INSULIN: ICD-10-CM

## 2019-06-03 DIAGNOSIS — M54.40 CHRONIC BILATERAL LOW BACK PAIN WITH SCIATICA, SCIATICA LATERALITY UNSPECIFIED: ICD-10-CM

## 2019-06-03 DIAGNOSIS — M47.816 LUMBAR SPONDYLOSIS: ICD-10-CM

## 2019-06-03 DIAGNOSIS — M47.26 OSTEOARTHRITIS OF SPINE WITH RADICULOPATHY, LUMBAR REGION: ICD-10-CM

## 2019-06-03 DIAGNOSIS — M48.062 SPINAL STENOSIS OF LUMBAR REGION WITH NEUROGENIC CLAUDICATION: ICD-10-CM

## 2019-06-03 DIAGNOSIS — G14 POST POLIOMYELITIS SYNDROME: ICD-10-CM

## 2019-06-03 DIAGNOSIS — G82.20 PARAPARESIS OF BOTH LOWER LIMBS: ICD-10-CM

## 2019-06-03 DIAGNOSIS — M25.561 CHRONIC PAIN OF RIGHT KNEE: ICD-10-CM

## 2019-06-03 DIAGNOSIS — M54.16 LUMBAR RADICULOPATHY: ICD-10-CM

## 2019-06-03 DIAGNOSIS — E08.44 DIABETIC AMYOTROPHY ASSOCIATED WITH DIABETES MELLITUS DUE TO UNDERLYING CONDITION: ICD-10-CM

## 2019-06-03 DIAGNOSIS — M47.16 LUMBAR SPONDYLOSIS WITH MYELOPATHY: ICD-10-CM

## 2019-06-03 DIAGNOSIS — M17.11 PRIMARY OSTEOARTHRITIS OF RIGHT KNEE: ICD-10-CM

## 2019-06-03 DIAGNOSIS — M81.0 AGE-RELATED OSTEOPOROSIS WITHOUT CURRENT PATHOLOGICAL FRACTURE: ICD-10-CM

## 2019-06-03 DIAGNOSIS — M54.16 LEFT LUMBAR RADICULOPATHY: ICD-10-CM

## 2019-06-03 DIAGNOSIS — E11.42 DIABETIC POLYNEUROPATHY ASSOCIATED WITH TYPE 2 DIABETES MELLITUS: ICD-10-CM

## 2019-06-03 NOTE — TELEPHONE ENCOUNTER
Last visit: 02/07/2019  Canceled/No Show visit: 05/06/2019  Next visit: 06/27/2019  Last refill Norco: 05/01/2019      ----- Message from Elaine Rashid sent at 6/3/2019  2:15 PM CDT -----  Rx Refill/Request     Is this a Refill or New Rx:  Refill    Rx Name and Strength:  HYDROcodone-acetaminophen (NORCO)  mg per tablet    Preferred Pharm[facility] with phone number:     [id number] [place] - [address]  AT [facility] [address]  [address] [place] [address]  Phone: [phone] Fax: [phone]      Communication Preference:Jessica ivan-dtmaral) @ [phone]  Additional Information: please call when done

## 2019-06-03 NOTE — TELEPHONE ENCOUNTER
----- Message from Lazarus Levine sent at 6/3/2019  8:46 AM CDT -----  Rx Refill/Request     Is this a Refill or New Rx: Refill    Rx Name and Strength: HYDROcodone-acetaminophen (NORCO)  mg per tablet  Preferred Pharmacy with phone number: see below  Communication Preference: [phone]  Additional Information:    [facility] [place]  [address]  AT [facility] [address]  [address] [place] [address]  Phone: [phone] Fax: [phone]

## 2019-06-04 RX ORDER — HYDROCODONE BITARTRATE AND ACETAMINOPHEN 10; 325 MG/1; MG/1
1 TABLET ORAL EVERY 6 HOURS PRN
Qty: 120 TABLET | Refills: 0 | Status: SHIPPED | OUTPATIENT
Start: 2019-06-04 | End: 2019-06-27 | Stop reason: SDUPTHER

## 2019-06-05 DIAGNOSIS — N39.46 MIXED INCONTINENCE: ICD-10-CM

## 2019-06-05 RX ORDER — OXYBUTYNIN CHLORIDE 5 MG/1
TABLET ORAL
Qty: 180 TABLET | Refills: 0 | Status: SHIPPED | OUTPATIENT
Start: 2019-06-05 | End: 2019-08-02 | Stop reason: SDUPTHER

## 2019-06-12 ENCOUNTER — PATIENT MESSAGE (OUTPATIENT)
Dept: INTERNAL MEDICINE | Facility: CLINIC | Age: 79
End: 2019-06-12

## 2019-06-12 ENCOUNTER — TELEPHONE (OUTPATIENT)
Dept: INTERNAL MEDICINE | Facility: CLINIC | Age: 79
End: 2019-06-12

## 2019-06-12 ENCOUNTER — HOSPITAL ENCOUNTER (OUTPATIENT)
Dept: RADIOLOGY | Facility: HOSPITAL | Age: 79
Discharge: HOME | End: 2019-06-12
Attending: INTERNAL MEDICINE
Payer: MEDICARE

## 2019-06-12 DIAGNOSIS — N18.30 STAGE 3 CHRONIC KIDNEY DISEASE: Primary | ICD-10-CM

## 2019-06-12 DIAGNOSIS — E11.21 TYPE 2 DIABETES MELLITUS WITH DIABETIC NEPHROPATHY, WITHOUT LONG-TERM CURRENT USE OF INSULIN: ICD-10-CM

## 2019-06-12 DIAGNOSIS — N17.9 ACUTE RENAL FAILURE, UNSPECIFIED ACUTE RENAL FAILURE TYPE: ICD-10-CM

## 2019-06-12 PROCEDURE — 76770 US EXAM ABDO BACK WALL COMP: CPT | Mod: TC,HCNC

## 2019-06-12 PROCEDURE — 76770 US RETROPERITONEAL COMPLETE: ICD-10-PCS | Mod: 26,HCNC,, | Performed by: RADIOLOGY

## 2019-06-12 PROCEDURE — 76770 US EXAM ABDO BACK WALL COMP: CPT | Mod: 26,HCNC,, | Performed by: RADIOLOGY

## 2019-06-12 NOTE — TELEPHONE ENCOUNTER
Good afternoon, this is another patient who needs a Nephrology appointment, if you could please assist with this we would greatly appreciated.  Thank you

## 2019-06-13 ENCOUNTER — TELEPHONE (OUTPATIENT)
Dept: NEPHROLOGY | Facility: CLINIC | Age: 79
End: 2019-06-13

## 2019-06-13 DIAGNOSIS — E11.22 TYPE 2 DIABETES MELLITUS WITH DIABETIC CHRONIC KIDNEY DISEASE, UNSPECIFIED CKD STAGE, UNSPECIFIED WHETHER LONG TERM INSULIN USE: ICD-10-CM

## 2019-06-13 DIAGNOSIS — I10 SEVERE HYPERTENSION: ICD-10-CM

## 2019-06-13 DIAGNOSIS — E78.2 MIXED HYPERLIPIDEMIA: ICD-10-CM

## 2019-06-13 DIAGNOSIS — N18.30 STAGE 3 CHRONIC KIDNEY DISEASE: Primary | ICD-10-CM

## 2019-06-13 RX ORDER — GLIPIZIDE 5 MG/1
TABLET ORAL
Qty: 90 TABLET | Refills: 0 | Status: SHIPPED | OUTPATIENT
Start: 2019-06-13 | End: 2019-08-02 | Stop reason: SDUPTHER

## 2019-06-13 RX ORDER — DICLOFENAC SODIUM 75 MG/1
TABLET, DELAYED RELEASE ORAL
Qty: 90 TABLET | Refills: 0 | Status: SHIPPED | OUTPATIENT
Start: 2019-06-13 | End: 2019-07-11 | Stop reason: SINTOL

## 2019-06-13 RX ORDER — HYDRALAZINE HYDROCHLORIDE 50 MG/1
TABLET, FILM COATED ORAL
Qty: 360 TABLET | Refills: 0 | Status: SHIPPED | OUTPATIENT
Start: 2019-06-13 | End: 2019-08-02 | Stop reason: SDUPTHER

## 2019-06-13 RX ORDER — AMLODIPINE BESYLATE 10 MG/1
TABLET ORAL
Qty: 30 TABLET | Refills: 0 | Status: SHIPPED | OUTPATIENT
Start: 2019-06-13 | End: 2019-07-10 | Stop reason: SDUPTHER

## 2019-06-13 NOTE — TELEPHONE ENCOUNTER
MD Lauren Pulido MD; Yvonne Turner RN   Cc: Soo Torres MA; Vidya Fields NP; Kylah Reardon LPN   Caller: Unspecified (Yesterday,  2:23 PM)             Soo, this patient should be scheduled as new patient for Vidya on a Thursday pm (fellows clinic time). I will staff the patient with her on that afternoon. Some time in the next 6 weeks, please.  We have to make sure that it is scheduled on a day that Vidya is also seeing patients of Dr. Packer

## 2019-06-27 ENCOUNTER — OFFICE VISIT (OUTPATIENT)
Dept: PHYSICAL MEDICINE AND REHAB | Facility: CLINIC | Age: 79
End: 2019-06-27
Payer: MEDICARE

## 2019-06-27 VITALS
HEART RATE: 80 BPM | HEIGHT: 60 IN | WEIGHT: 167 LBS | BODY MASS INDEX: 32.79 KG/M2 | SYSTOLIC BLOOD PRESSURE: 131 MMHG | DIASTOLIC BLOOD PRESSURE: 63 MMHG

## 2019-06-27 DIAGNOSIS — E11.42 DIABETIC POLYNEUROPATHY ASSOCIATED WITH TYPE 2 DIABETES MELLITUS: ICD-10-CM

## 2019-06-27 DIAGNOSIS — E08.44 DIABETIC AMYOTROPHY ASSOCIATED WITH DIABETES MELLITUS DUE TO UNDERLYING CONDITION: ICD-10-CM

## 2019-06-27 DIAGNOSIS — Z86.12 HISTORY OF POLIOMYELITIS: ICD-10-CM

## 2019-06-27 DIAGNOSIS — M17.11 PRIMARY OSTEOARTHRITIS OF RIGHT KNEE: ICD-10-CM

## 2019-06-27 DIAGNOSIS — M48.062 SPINAL STENOSIS OF LUMBAR REGION WITH NEUROGENIC CLAUDICATION: Primary | ICD-10-CM

## 2019-06-27 DIAGNOSIS — G89.29 CHRONIC BILATERAL LOW BACK PAIN WITH SCIATICA, SCIATICA LATERALITY UNSPECIFIED: ICD-10-CM

## 2019-06-27 DIAGNOSIS — M54.40 CHRONIC BILATERAL LOW BACK PAIN WITH SCIATICA, SCIATICA LATERALITY UNSPECIFIED: ICD-10-CM

## 2019-06-27 DIAGNOSIS — M47.26 OSTEOARTHRITIS OF SPINE WITH RADICULOPATHY, LUMBAR REGION: ICD-10-CM

## 2019-06-27 DIAGNOSIS — G14 POST POLIOMYELITIS SYNDROME: ICD-10-CM

## 2019-06-27 DIAGNOSIS — M25.561 CHRONIC PAIN OF RIGHT KNEE: ICD-10-CM
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
## 2019-06-27 DIAGNOSIS — R26.9 GAIT DISORDER: ICD-10-CM

## 2019-06-27 DIAGNOSIS — Z79.891 OPIOID USE AGREEMENT EXISTS: ICD-10-CM

## 2019-06-27 DIAGNOSIS — E11.40 TYPE 2 DIABETES MELLITUS WITH DIABETIC NEUROPATHY, WITHOUT LONG-TERM CURRENT USE OF INSULIN: ICD-10-CM

## 2019-06-27 DIAGNOSIS — M81.0 AGE-RELATED OSTEOPOROSIS WITHOUT CURRENT PATHOLOGICAL FRACTURE: ICD-10-CM

## 2019-06-27 DIAGNOSIS — M47.816 LUMBAR SPONDYLOSIS: ICD-10-CM

## 2019-06-27 DIAGNOSIS — M54.16 LEFT LUMBAR RADICULOPATHY: ICD-10-CM

## 2019-06-27 DIAGNOSIS — M47.16 LUMBAR SPONDYLOSIS WITH MYELOPATHY: ICD-10-CM

## 2019-06-27 DIAGNOSIS — M54.16 LUMBAR RADICULOPATHY: ICD-10-CM

## 2019-06-27 DIAGNOSIS — G89.29 CHRONIC PAIN OF RIGHT KNEE: ICD-10-CM

## 2019-06-27 DIAGNOSIS — G82.20 PARAPARESIS OF BOTH LOWER LIMBS: ICD-10-CM

## 2019-06-27 PROCEDURE — 3078F DIAST BP <80 MM HG: CPT | Mod: HCNC,CPTII,S$GLB, | Performed by: PHYSICAL MEDICINE & REHABILITATION

## 2019-06-27 PROCEDURE — 3075F SYST BP GE 130 - 139MM HG: CPT | Mod: HCNC,CPTII,S$GLB, | Performed by: PHYSICAL MEDICINE & REHABILITATION

## 2019-06-27 PROCEDURE — 99214 PR OFFICE/OUTPT VISIT, EST, LEVL IV, 30-39 MIN: ICD-10-PCS | Mod: HCNC,S$GLB,, | Performed by: PHYSICAL MEDICINE & REHABILITATION

## 2019-06-27 PROCEDURE — 3075F PR MOST RECENT SYSTOLIC BLOOD PRESS GE 130-139MM HG: ICD-10-PCS | Mod: HCNC,CPTII,S$GLB, | Performed by: PHYSICAL MEDICINE & REHABILITATION

## 2019-06-27 PROCEDURE — 1101F PT FALLS ASSESS-DOCD LE1/YR: CPT | Mod: HCNC,CPTII,S$GLB, | Performed by: PHYSICAL MEDICINE & REHABILITATION

## 2019-06-27 PROCEDURE — 99214 OFFICE O/P EST MOD 30 MIN: CPT | Mod: HCNC,S$GLB,, | Performed by: PHYSICAL MEDICINE & REHABILITATION

## 2019-06-27 PROCEDURE — 99499 UNLISTED E&M SERVICE: CPT | Mod: HCNC,S$GLB,, | Performed by: PHYSICAL MEDICINE & REHABILITATION

## 2019-06-27 PROCEDURE — 1101F PR PT FALLS ASSESS DOC 0-1 FALLS W/OUT INJ PAST YR: ICD-10-PCS | Mod: HCNC,CPTII,S$GLB, | Performed by: PHYSICAL MEDICINE & REHABILITATION

## 2019-06-27 PROCEDURE — 99999 PR PBB SHADOW E&M-EST. PATIENT-LVL III: ICD-10-PCS | Mod: PBBFAC,HCNC,, | Performed by: PHYSICAL MEDICINE & REHABILITATION

## 2019-06-27 PROCEDURE — 3078F PR MOST RECENT DIASTOLIC BLOOD PRESSURE < 80 MM HG: ICD-10-PCS | Mod: HCNC,CPTII,S$GLB, | Performed by: PHYSICAL MEDICINE & REHABILITATION

## 2019-06-27 PROCEDURE — 99999 PR PBB SHADOW E&M-EST. PATIENT-LVL III: CPT | Mod: PBBFAC,HCNC,, | Performed by: PHYSICAL MEDICINE & REHABILITATION

## 2019-06-27 PROCEDURE — 99499 RISK ADDL DX/OHS AUDIT: ICD-10-PCS | Mod: HCNC,S$GLB,, | Performed by: PHYSICAL MEDICINE & REHABILITATION

## 2019-06-27 RX ORDER — HYDROCODONE BITARTRATE AND ACETAMINOPHEN 10; 325 MG/1; MG/1
1 TABLET ORAL EVERY 6 HOURS PRN
Qty: 120 TABLET | Refills: 0 | Status: SHIPPED | OUTPATIENT
Start: 2019-09-03 | End: 2019-10-08 | Stop reason: SDUPTHER

## 2019-06-27 RX ORDER — GABAPENTIN 600 MG/1
600 TABLET ORAL
Qty: 360 TABLET | Refills: 3 | Status: SHIPPED | OUTPATIENT
Start: 2019-06-27 | End: 2020-08-11

## 2019-06-27 RX ORDER — HYDROCODONE BITARTRATE AND ACETAMINOPHEN 10; 325 MG/1; MG/1
1 TABLET ORAL EVERY 6 HOURS PRN
Qty: 120 TABLET | Refills: 0 | Status: SHIPPED | OUTPATIENT
Start: 2019-08-03 | End: 2019-09-02

## 2019-06-27 RX ORDER — HYDROCODONE BITARTRATE AND ACETAMINOPHEN 10; 325 MG/1; MG/1
1 TABLET ORAL EVERY 6 HOURS PRN
Qty: 120 TABLET | Refills: 0 | Status: SHIPPED | OUTPATIENT
Start: 2019-07-03 | End: 2019-08-02

## 2019-06-27 NOTE — PROGRESS NOTES
"    Subjective:       Patient ID: Emilia Alan is a 78 y.o. female.    Chief Complaint: Back Pain and Extremity Weakness    Extremity Weakness    Pertinent negatives include no fever or numbness.   Back Pain   Associated symptoms include leg pain. Pertinent negatives include no abdominal pain, chest pain, fever, numbness or weakness. Headaches:     Leg Pain    Pertinent negatives include no numbness.   Knee Pain    Pertinent negatives include no numbness.     Ms. 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  She has B LE weakness, paraparesis, secondary to severe Lumbar spinal stenosis at L3-4, and L4-5, complicated with  Leg length discrepancy ( left is shorter than right)  ,with severe DJD of both knees , genu valgus in R knee.  LCV 02/07/19.   She states that she is " going downhill"  She reports no changes in her pain, no new injury nor fall since LCV.  Her pain is well controlled with current pain regimen.   Patient takes  Hydrocodone 10/325 mg, takes 3-4 x/day, and Neurontin 600 mg, po QID, tolerates it well, and she knows that both medications are helping.  But she reports more weakness in legs, and  difficulties straightening up.   Nevertheless, she does not want to get new MRI of LS, nor she wants to be evaluated by NS, nor she wants to get ASHLEY.    She is here for follow up visit for back pain, leg weakness, and chronic pain management.   She has paraparesis, a right leg is weaker than Left leg- that is weak in ankle.   She still does not want any new imaging, nor ASHLEY, nor NS evaluation.  She cannot actively  Right LE, states that is getting weaker,still walks short distances, but majority of time she spent sitting.    Today, she states that she is slowing down, she walks slower, and can do less than in past. She also complains about back pain.   Current back pain is 7, worst pain is 9-10/10 while " upright and walking.    Pain is localized across lower back and in upper spine.    Back pain is off/on, present mainly during day time, sharp, severe ,stabbing pain.  Pain is worse with lying or sitting and getting up from chair.   With that pain she is afraid she will fall down, since she gets more weakness in Right leg.   Complains also about right knee pain, that is weak, and goes backwards   during walking.   Patient takes  Hydrocodone 10/325 mg, takes 3-4 x/day, and Neurontin 600 mg, po QID, tolerates it well, and she knows that both medications are helping.  Patient refuses neurosurgical evaluation, and  ASHLEY to back.   The patient has had gradual worsening of her gait over the last few years.   She was prescribed a left AFO ( that she wears).   She cannot tolerate swedish knee cage as well.   She also has a neoprene sleee brace , to stabilized knee, and to take pressure off bone.   Right hinge knee brace that is all warned up,since she wears it all the time.  She continues to complain of progressive bilateral lower extremity weakness.   She reports frequent falls, especially in the last 3-6 months.   The patient lives in a single-silvia home with a ramp access.   She is independent with her dressing, feeding and grooming.   She is also independent with toileting and bathing using a shower chair.   She is able to ambulate with single cane, RW.  She can walk about 20 feet, but has to stop frequently.   She does better with a Rollator walker.   She has manual wheel chair that is bulky, and she cannot propel, RW with seat, cane and RW.   She is restricted by lower extremity weakness, especially on the right side as noted above, she has frequent falls.  She did not sustain any significant injuries.   She recieved a new SCOOTER, and it is helping her greatly.  She uses it for linger distances, but tries to walk as much as possible leslye. Inside the house, with her braces, and  RW with seat.    She is here for follow  up, and chronic pain management with opiates.    Past Medical History:   Diagnosis Date    Allergy     Anemia 10/20/2014    Arthritis     Atherosclerosis of artery of right lower extremity: see xray 07    Diabetes mellitus     Diabetic neuropathy 2012    Gait disorder 2012    High cholesterol     History of poliomyelitis 2012    Hyperlipidemia 2013    Hypertension     Obstructive sleep apnea syndrome: dx 2008 needs CPAP 11 2017    Osteopenia     Osteoporosis, unspecified 2014    Other specified anemias 2015    Post poliomyelitis syndrome 2012    Renal manifestation of secondary diabetes mellitus     Sleep apnea     Type II or unspecified type diabetes mellitus without mention of complication, not stated as uncontrolled 2015    Unspecified essential hypertension 2015       Past Surgical History:   Procedure Laterality Date    CATARACT EXTRACTION       SECTION      CHOLECYSTECTOMY      COLONOSCOPY N/A 2017    Performed by Karen Lebron [facility] at [facility] (4TH F[facility])    ESOPHAGOGASTRODUODENOSCOPY (EGD) N/A 2017    Performed by Karen Lebron MD at [facility] (4TH FLR)    EYE SURGERY      FRACTURE SURGERY         Family History   Problem Relation Age of Onset    Diabetes Father     Heart disease Father     Heart attack Father     Heart disease Brother     No Known Problems Daughter     Diabetes Son     Heart disease Brother     Diabetes Daughter     Diabetes Daughter     Cataracts Neg Hx     Glaucoma Neg Hx     Hypertension Neg Hx     Cancer Neg Hx     Blindness Neg Hx     Amblyopia Neg Hx     Strabismus Neg Hx     Retinal detachment Neg Hx     Macular degeneration Neg Hx     Melanoma Neg Hx        Social History     Socioeconomic History    Marital status:      Spouse name: Not on file    Number of children: 4    Years of education: Not on file    Highest education level: Not on file    Occupational History    Not on file   Social Needs    Financial resource strain: Not on file    Food insecurity:     Worry: Not on file     Inability: Not on file    Transportation needs:     Medical: Not on file     Non-medical: Not on file   Tobacco Use    Smoking status: Never Smoker    Smokeless tobacco: Never Used   Substance and Sexual Activity    Alcohol use: No    Drug use: No    Sexual activity: Never   Lifestyle    Physical activity:     Days per week: Not on file     Minutes per session: Not on file    Stress: Not on file   Relationships    Social connections:     Talks on phone: Not on file     Gets together: Not on file     Attends [employer] service: Not on file     Active member of club or organization: Not on file     Attends meetings of clubs or organizations: Not on file     Relationship status: Not on file   Other Topics Concern    Are you pregnant or think you may be? No    Breast-feeding Not Asked   Social History Narrative    Not on file       Current Outpatient Medications   Medication Sig Dispense Refill    amLODIPine (NORVASC) 10 MG tablet TAKE 1 TABLET(10 MG) BY MOUTH EVERY DAY 30 tablet 0    aspirin (ECOTRIN) 81 MG EC tablet Take 1 tablet (81 mg total) by mouth once daily. 30 tablet 12    blood sugar diagnostic Strp 1 strip by Misc.(Non-Drug; Combo Route) route 2 (two) times daily. TRUE RESULT SYSTEM 180 strip 4    blood-glucose meter (TRUERESULT BLOOD GLUCOSE SYSTM) kit Use as instructed 1 each 0    calcium-vitamin D3-vitamin K (VIACTIV) 500-100-40 mg-unit-mcg Chew Take 2 each by mouth.      clotrimazole-betamethasone 1-0.05% (LOTRISONE) cream Apply topically 2 (two) times daily. 45 g 3    desloratadine (CLARINEX) 5 mg tablet Take 5 mg by mouth once daily.      diclofenac (VOLTAREN) 75 MG EC tablet TAKE 1 TABLET BY MOUTH TWICE DAILY WITH FOOD AND MEAL AS NEEDED. STOP IF ANY STOMACH IRRITATION 90 tablet 0    docusate sodium (COLACE) 50 MG capsule Take 1 capsule (50  mg total) by mouth 2 (two) times daily as needed for Constipation.      gabapentin (NEURONTIN) 600 MG tablet Take 1 tablet (600 mg total) by mouth 4 (four) times daily with meals and nightly. 360 tablet 3    glipiZIDE (GLUCOTROL) 5 MG tablet TAKE 1 TABLET(5 MG) BY MOUTH EVERY DAY 90 tablet 0    hydrALAZINE (APRESOLINE) 50 MG tablet TAKE 1 TABLET(50 MG) BY MOUTH EVERY MORNING AND EVERY AFTERNOON THEN TAKE 2 TABLETS(100 MG) BY MOUTH EVERY EVENING 360 tablet 0    hydroCHLOROthiazide (HYDRODIURIL) 25 MG tablet Take 1 tablet (25 mg total) by mouth every other day. 30 tablet 1    [START ON 7/3/2019] HYDROcodone-acetaminophen (NORCO)  mg per tablet Take 1 tablet by mouth every 6 (six) hours as needed for Pain (pain). 120 tablet 0    [START ON 8/3/2019] HYDROcodone-acetaminophen (NORCO)  mg per tablet Take 1 tablet by mouth every 6 (six) hours as needed for Pain (pain). 120 tablet 0    [START ON 9/3/2019] HYDROcodone-acetaminophen (NORCO)  mg per tablet Take 1 tablet by mouth every 6 (six) hours as needed for Pain (pain). 120 tablet 0    lancets Misc TEST 2 X DAILY PROSPER RESULT SYSTEM 180 each 3    metFORMIN (GLUCOPHAGE-XR) 500 MG 24 hr tablet TAKE 1 TABLET(500 MG) BY MOUTH EVERY DAY 90 tablet 0    multivitamin (THERAGRAN) per tablet Take 1 tablet by mouth once daily.       oxybutynin (DITROPAN) 5 MG Tab TAKE 1 TABLET(5 MG) BY MOUTH TWICE DAILY 180 tablet 0    pravastatin (PRAVACHOL) 40 MG tablet TAKE 1 AND 1/2 TABLETS(60 MG) BY MOUTH EVERY NIGHT 135 tablet 0    TRUEPLUS LANCETS 33 gauge Misc USE BID  3    valsartan-hydrochlorothiazide (DIOVAN-HCT) 160-25 mg per tablet Take 1 tablet by mouth once daily. 90 tablet 3     No current facility-administered medications for this visit.        Review of patient's allergies indicates:  No Known Allergies    Review of Systems   Constitutional: Negative for appetite change, chills, fatigue, fever and unexpected weight change.   HENT: Negative for  drooling, trouble swallowing and voice change.    Eyes: Negative for pain and visual disturbance.   Respiratory: Negative for shortness of breath and wheezing.    Cardiovascular: Negative for chest pain and palpitations.   Gastrointestinal: Negative for abdominal distention, abdominal pain, constipation and diarrhea.   Genitourinary: Negative for difficulty urinating.   Musculoskeletal: Positive for back pain and neck pain. Negative for arthralgias, gait problem, joint swelling, myalgias and neck stiffness.               Skin: Negative for color change and rash.   Neurological: Negative for dizziness, facial asymmetry, speech difficulty, weakness, light-headedness and numbness. Headaches:     Hematological: Negative for adenopathy.   Psychiatric/Behavioral: Negative for behavioral problems, confusion and sleep disturbance. The patient is not nervous/anxious.        Objective:      Physical Exam    Constitutional: She is oriented to person, place, and time.   She appears well-developed and well-nourished.   HENT:   Head: Normocephalic.   Eyes: EOM are normal.   Neck: Normal range of motion.   Cardiovascular: Normal rate, regular rhythm and normal heart sounds.   Pulmonary/Chest: Breath sounds normal.   Musculoskeletal: Normal range of motion.   BUE:  ROM:full.  Strength: 5/5 at shoulders, elbows & hands.  Sensation to pinprick: intact  BLE: ROM:full.  Strength:   RLE: HF 0/5, KE 0/5,   Ankle DF 2-, Ankle PF 3  LLE:   HF 3-, KE 3-.  Ankle DF 1,  Ankle PF 3  Leg length discrepancy ( left is shorter than right), wears Left AFO.   Sensation to pinprick: intact .   DTR: decreased.       Xray of Right knee ( 2014)  Showed:  Standing AP knees and lateral of the right knee and merchant view of both knees are submitted.    Advanced degenerative change seen in the tricompartmental areas of the right knee.    Left knee shows mild degenerative change.  Both patellas show significant lateral deviation    MRI of Lumbar spine  (  2015) ;  L2-L3:There is a circumferential disk bulge with moderate bilateral facet osseous hypertrophy and ligamentum flavum buckling. This results and mild narrowing of the spinal canal. The bilateral neural foramen remain patent.  L3-L4: There is a circumferential disk bulge with left paracentral disk protrusion. This is associated with severe bilateral facet osseous hypertrophy, bilateral facet edema, and ligamentum flavum buckling.   These findings result in severe narrowing of the spinal canal and near complete obliteration of the left neural foramen.  The right neural foramen is moderately narrowed as well.  L4-L5: There is a circumferential disk bulge with superimposed central disk protrusion. This is associated with severe bilateral facet osseous hypertrophy and ligamentum flavum buckling.   These findings result in severe narrowing of the spinal canal and bilateral neural foramina, left greater than right.     Assessment:       1. Spinal stenosis of lumbar region with neurogenic claudication    2. Paraparesis of both lower limbs    3. Post poliomyelitis syndrome    4. History of poliomyelitis    5. Gait disorder    6. Opioid use agreement exists    7. Osteoporosis without current pathological fracture: worse on fosamax 5/16- Reclast 8/16    8. Primary osteoarthritis of right knee    9. Chronic pain of right knee    10. Type 2 diabetes mellitus with diabetic neuropathy, without long-term current use of insulin    11. Osteoarthritis of spine with radiculopathy, lumbar region    12. Lumbar spondylosis with myelopathy    13. Diabetic polyneuropathy associated with type 2 diabetes mellitus    14. 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 Chronic bilateral low back pain with sciatica, sciatica laterality unspecified    16. Fall, sequela    17. Left lumbar radiculopathy    18. Lumbar radiculopathy    19. Lumbar spondylosis       Plan:        Spinal stenosis of lumbar  region with neurogenic claudication  -     HYDROcodone-acetaminophen (NORCO)  mg per tablet; Take 1 tablet by mouth every 6 (six) hours as needed for Pain (pain).  Dispense: 120 tablet; Refill: 0  -     HYDROcodone-acetaminophen (NORCO)  mg per tablet; Take 1 tablet by mouth every 6 (six) hours as needed for Pain (pain).  Dispense: 120 tablet; Refill: 0  -     HYDROcodone-acetaminophen (NORCO)  mg per tablet; Take 1 tablet by mouth every 6 (six) hours as needed for Pain (pain).  Dispense: 120 tablet; Refill: 0  -     gabapentin (NEURONTIN) 600 MG tablet; Take 1 tablet (600 mg total) by mouth 4 (four) times daily with meals and nightly.  Dispense: 360 tablet; Refill: 3    Paraparesis of both lower limbs  -     HYDROcodone-acetaminophen (NORCO)  mg per tablet; Take 1 tablet by mouth every 6 (six) hours as needed for Pain (pain).  Dispense: 120 tablet; Refill: 0  -     HYDROcodone-acetaminophen (NORCO)  mg per tablet; Take 1 tablet by mouth every 6 (six) hours as needed for Pain (pain).  Dispense: 120 tablet; Refill: 0  -     HYDROcodone-acetaminophen (NORCO)  mg per tablet; Take 1 tablet by mouth every 6 (six) hours as needed for Pain (pain).  Dispense: 120 tablet; Refill: 0  -     gabapentin (NEURONTIN) 600 MG tablet; Take 1 tablet (600 mg total) by mouth 4 (four) times daily with meals and nightly.  Dispense: 360 tablet; Refill: 3    Post poliomyelitis syndrome  -     HYDROcodone-acetaminophen (NORCO)  mg per tablet; Take 1 tablet by mouth every 6 (six) hours as needed for Pain (pain).  Dispense: 120 tablet; Refill: 0  -     HYDROcodone-acetaminophen (NORCO)  mg per tablet; Take 1 tablet by mouth every 6 (six) hours as needed for Pain (pain).  Dispense: 120 tablet; Refill: 0  -     HYDROcodone-acetaminophen (NORCO)  mg per tablet; Take 1 tablet by mouth every 6 (six) hours as needed for Pain (pain).  Dispense: 120 tablet; Refill: 0  -     gabapentin (NEURONTIN)  600 MG tablet; Take 1 tablet (600 mg total) by mouth 4 (four) times daily with meals and nightly.  Dispense: 360 tablet; Refill: 3    History of poliomyelitis  -     HYDROcodone-acetaminophen (NORCO)  mg per tablet; Take 1 tablet by mouth every 6 (six) hours as needed for Pain (pain).  Dispense: 120 tablet; Refill: 0  -     HYDROcodone-acetaminophen (NORCO)  mg per tablet; Take 1 tablet by mouth every 6 (six) hours as needed for Pain (pain).  Dispense: 120 tablet; Refill: 0  -     HYDROcodone-acetaminophen (NORCO)  mg per tablet; Take 1 tablet by mouth every 6 (six) hours as needed for Pain (pain).  Dispense: 120 tablet; Refill: 0  -     gabapentin (NEURONTIN) 600 MG tablet; Take 1 tablet (600 mg total) by mouth 4 (four) times daily with meals and nightly.  Dispense: 360 tablet; Refill: 3    Gait disorder  -     HYDROcodone-acetaminophen (NORCO)  mg per tablet; Take 1 tablet by mouth every 6 (six) hours as needed for Pain (pain).  Dispense: 120 tablet; Refill: 0  -     HYDROcodone-acetaminophen (NORCO)  mg per tablet; Take 1 tablet by mouth every 6 (six) hours as needed for Pain (pain).  Dispense: 120 tablet; Refill: 0  -     HYDROcodone-acetaminophen (NORCO)  mg per tablet; Take 1 tablet by mouth every 6 (six) hours as needed for Pain (pain).  Dispense: 120 tablet; Refill: 0  -     gabapentin (NEURONTIN) 600 MG tablet; Take 1 tablet (600 mg total) by mouth 4 (four) times daily with meals and nightly.  Dispense: 360 tablet; Refill: 3    Opioid use agreement exists    Osteoporosis without current pathological fracture: worse on fosamax 5/16- Reclast 8/16  -     HYDROcodone-acetaminophen (NORCO)  mg per tablet; Take 1 tablet by mouth every 6 (six) hours as needed for Pain (pain).  Dispense: 120 tablet; Refill: 0  -     HYDROcodone-acetaminophen (NORCO)  mg per tablet; Take 1 tablet by mouth every 6 (six) hours as needed for Pain (pain).  Dispense: 120 tablet; Refill:  0  -     HYDROcodone-acetaminophen (NORCO)  mg per tablet; Take 1 tablet by mouth every 6 (six) hours as needed for Pain (pain).  Dispense: 120 tablet; Refill: 0  -     gabapentin (NEURONTIN) 600 MG tablet; Take 1 tablet (600 mg total) by mouth 4 (four) times daily with meals and nightly.  Dispense: 360 tablet; Refill: 3    Primary osteoarthritis of right knee  -     HYDROcodone-acetaminophen (NORCO)  mg per tablet; Take 1 tablet by mouth every 6 (six) hours as needed for Pain (pain).  Dispense: 120 tablet; Refill: 0  -     HYDROcodone-acetaminophen (NORCO)  mg per tablet; Take 1 tablet by mouth every 6 (six) hours as needed for Pain (pain).  Dispense: 120 tablet; Refill: 0  -     HYDROcodone-acetaminophen (NORCO)  mg per tablet; Take 1 tablet by mouth every 6 (six) hours as needed for Pain (pain).  Dispense: 120 tablet; Refill: 0  -     gabapentin (NEURONTIN) 600 MG tablet; Take 1 tablet (600 mg total) by mouth 4 (four) times daily with meals and nightly.  Dispense: 360 tablet; Refill: 3    Chronic pain of right knee  -     HYDROcodone-acetaminophen (NORCO)  mg per tablet; Take 1 tablet by mouth every 6 (six) hours as needed for Pain (pain).  Dispense: 120 tablet; Refill: 0  -     HYDROcodone-acetaminophen (NORCO)  mg per tablet; Take 1 tablet by mouth every 6 (six) hours as needed for Pain (pain).  Dispense: 120 tablet; Refill: 0  -     HYDROcodone-acetaminophen (NORCO)  mg per tablet; Take 1 tablet by mouth every 6 (six) hours as needed for Pain (pain).  Dispense: 120 tablet; Refill: 0  -     gabapentin (NEURONTIN) 600 MG tablet; Take 1 tablet (600 mg total) by mouth 4 (four) times daily with meals and nightly.  Dispense: 360 tablet; Refill: 3    Type 2 diabetes mellitus with diabetic neuropathy, without long-term current use of insulin  -     HYDROcodone-acetaminophen (NORCO)  mg per tablet; Take 1 tablet by mouth every 6 (six) hours as needed for Pain (pain).   Dispense: 120 tablet; Refill: 0  -     HYDROcodone-acetaminophen (NORCO)  mg per tablet; Take 1 tablet by mouth every 6 (six) hours as needed for Pain (pain).  Dispense: 120 tablet; Refill: 0  -     HYDROcodone-acetaminophen (NORCO)  mg per tablet; Take 1 tablet by mouth every 6 (six) hours as needed for Pain (pain).  Dispense: 120 tablet; Refill: 0  -     gabapentin (NEURONTIN) 600 MG tablet; Take 1 tablet (600 mg total) by mouth 4 (four) times daily with meals and nightly.  Dispense: 360 tablet; Refill: 3    Osteoarthritis of spine with radiculopathy, lumbar region  -     HYDROcodone-acetaminophen (NORCO)  mg per tablet; Take 1 tablet by mouth every 6 (six) hours as needed for Pain (pain).  Dispense: 120 tablet; Refill: 0  -     HYDROcodone-acetaminophen (NORCO)  mg per tablet; Take 1 tablet by mouth every 6 (six) hours as needed for Pain (pain).  Dispense: 120 tablet; Refill: 0  -     HYDROcodone-acetaminophen (NORCO)  mg per tablet; Take 1 tablet by mouth every 6 (six) hours as needed for Pain (pain).  Dispense: 120 tablet; Refill: 0  -     gabapentin (NEURONTIN) 600 MG tablet; Take 1 tablet (600 mg total) by mouth 4 (four) times daily with meals and nightly.  Dispense: 360 tablet; Refill: 3    Lumbar spondylosis with myelopathy  -     HYDROcodone-acetaminophen (NORCO)  mg per tablet; Take 1 tablet by mouth every 6 (six) hours as needed for Pain (pain).  Dispense: 120 tablet; Refill: 0  -     HYDROcodone-acetaminophen (NORCO)  mg per tablet; Take 1 tablet by mouth every 6 (six) hours as needed for Pain (pain).  Dispense: 120 tablet; Refill: 0  -     HYDROcodone-acetaminophen (NORCO)  mg per tablet; Take 1 tablet by mouth every 6 (six) hours as needed for Pain (pain).  Dispense: 120 tablet; Refill: 0  -     gabapentin (NEURONTIN) 600 MG tablet; Take 1 tablet (600 mg total) by mouth 4 (four) times daily with meals and nightly.  Dispense: 360 tablet; Refill:  3    Diabetic polyneuropathy associated with type 2 diabetes mellitus  -     HYDROcodone-acetaminophen (NORCO)  mg per tablet; Take 1 tablet by mouth every 6 (six) hours as needed for Pain (pain).  Dispense: 120 tablet; Refill: 0  -     HYDROcodone-acetaminophen (NORCO)  mg per tablet; Take 1 tablet by mouth every 6 (six) hours as needed for Pain (pain).  Dispense: 120 tablet; Refill: 0  -     HYDROcodone-acetaminophen (NORCO)  mg per tablet; Take 1 tablet by mouth every 6 (six) hours as needed for Pain (pain).  Dispense: 120 tablet; Refill: 0  -     gabapentin (NEURONTIN) 600 MG tablet; Take 1 tablet (600 mg total) by mouth 4 (four) times daily with meals and nightly.  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  Dispense: 120 tablet; Refill: 0  -     HYDROcodone-acetaminophen (NORCO)  mg per tablet; Take 1 tablet by mouth every 6 (six) hours as needed for Pain (pain).  Dispense: 120 tablet; Refill: 0  -     HYDROcodone-acetaminophen (NORCO)  mg per tablet; Take 1 tablet by mouth every 6 (six) hours as needed for Pain (pain).  Dispense: 120 tablet; Refill: 0  -     gabapentin (NEURONTIN) 600 MG tablet; Take 1 tablet (600 mg total) by mouth 4 (four) times daily with meals and nightly.  Dispense: 360 tablet; Refill: 3    Chronic bilateral low back pain with sciatica, sciatica laterality unspecified  -     HYDROcodone-acetaminophen (NORCO)  mg per tablet; Take 1 tablet by mouth every 6 (six) hours as needed for Pain (pain).  Dispense: 120 tablet; Refill: 0  -     HYDROcodone-acetaminophen (NORCO)  mg per tablet; Take 1 tablet by mouth every 6 (six) hours as needed for Pain (pain).  Dispense: 120 tablet; Refill: 0  -     HYDROcodone-acetaminophen (NORCO)  mg per tablet;  Take 1 tablet by mouth every 6 (six) hours as needed for Pain (pain).  Dispense: 120 tablet; Refill: 0  -     gabapentin (NEURONTIN) 600 MG tablet; Take 1 tablet (600 mg total) by mouth 4 (four) times daily with meals and nightly.  Dispense: 360 tablet; Refill: 3    Fall, sequela  -     HYDROcodone-acetaminophen (NORCO)  mg per tablet; Take 1 tablet by mouth every 6 (six) hours as needed for Pain (pain).  Dispense: 120 tablet; Refill: 0  -     HYDROcodone-acetaminophen (NORCO)  mg per tablet; Take 1 tablet by mouth every 6 (six) hours as needed for Pain (pain).  Dispense: 120 tablet; Refill: 0  -     HYDROcodone-acetaminophen (NORCO)  mg per tablet; Take 1 tablet by mouth every 6 (six) hours as needed for Pain (pain).  Dispense: 120 tablet; Refill: 0  -     gabapentin (NEURONTIN) 600 MG tablet; Take 1 tablet (600 mg total) by mouth 4 (four) times daily with meals and nightly.  Dispense: 360 tablet; Refill: 3    Left lumbar radiculopathy  -     HYDROcodone-acetaminophen (NORCO)  mg per tablet; Take 1 tablet by mouth every 6 (six) hours as needed for Pain (pain).  Dispense: 120 tablet; Refill: 0  -     HYDROcodone-acetaminophen (NORCO)  mg per tablet; Take 1 tablet by mouth every 6 (six) hours as needed for Pain (pain).  Dispense: 120 tablet; Refill: 0  -     HYDROcodone-acetaminophen (NORCO)  mg per tablet; Take 1 tablet by mouth every 6 (six) hours as needed for Pain (pain).  Dispense: 120 tablet; Refill: 0  -     gabapentin (NEURONTIN) 600 MG tablet; Take 1 tablet (600 mg total) by mouth 4 (four) times daily with meals and nightly.  Dispense: 360 tablet; Refill: 3    Lumbar radiculopathy  -     HYDROcodone-acetaminophen (NORCO)  mg per tablet; Take 1 tablet by mouth every 6 (six) hours as needed for Pain (pain).  Dispense: 120 tablet; Refill: 0  -     HYDROcodone-acetaminophen (NORCO)  mg per tablet; Take 1 tablet by mouth every 6 (six) hours as needed for Pain  (pain).  Dispense: 120 tablet; Refill: 0  -     HYDROcodone-acetaminophen (NORCO)  mg per tablet; Take 1 tablet by mouth every 6 (six) hours as needed for Pain (pain).  Dispense: 120 tablet; Refill: 0  -     gabapentin (NEURONTIN) 600 MG tablet; Take 1 tablet (600 mg total) by mouth 4 (four) times daily with meals and nightly.  Dispense: 360 tablet; Refill: 3    Lumbar spondylosis  -     HYDROcodone-acetaminophen (NORCO)  mg per tablet; Take 1 tablet by mouth every 6 (six) hours as needed for Pain (pain).  Dispense: 120 tablet; Refill: 0  -     HYDROcodone-acetaminophen (NORCO)  mg per tablet; Take 1 tablet by mouth every 6 (six) hours as needed for Pain (pain).  Dispense: 120 tablet; Refill: 0  -     HYDROcodone-acetaminophen (NORCO)  mg per tablet; Take 1 tablet by mouth every 6 (six) hours as needed for Pain (pain).  Dispense: 120 tablet; Refill: 0      Patient with C.palsy, with  Paraparesis., severe Lumbar spinal stenosis at L3-4, and L4-5, with  Leg length discrepancy ( left is shorter than right),  wears Left AFO, and has more proximal strength in LLE, than in RLE .   Also with severe DJD , genu valgus in R knee ( cannot toleate custom made  knee brace, nor Swedish knee cage).   She also wears  neoprene sleee brace , that stabilizes knee, and improves proprioception, helps with gait.    1. Back pain   Will resume Hydrocodone 10/325 mg, takes 3-4 x/day, and Neurontin 600 mg, po QID.   Opioid Risk Score       Value Time User    Opioid Risk Score  0 4/11/2018 10:39 AM Krista Burger MD         reviewed and appropriate.  Hydrocodone refills on 06/04, 05/02, 04/02,  02/01/19, 01/02/19, 12/01, 10/30/18.  UDS done and appropriately positive ( states that her insurance changred her $ 200  ( total cost was $ 800)     2. Patient refuses new MRI of LS, neurosurgical evaluation, and  ASHLEY to back.     3. Right knee pain, secondary to advanced tricompartmental DJD,   refusing Ortho consult, does  not want steroid injection, nor knee replacement.   Wears brace with genu recurvatum and valgus in R knee,   She has her hinge knee brace, that she wears when pain is severe, and left AFO ( cannot walk safely without AFO.   Voltaren gel topical.     RTC in 4 months..    Total time spent face to face with patient was 25 minutes.   More than 50% of that time was spent in counseling on diagnosis , prognosis and treatment options.   I also caunsel patient  on common and most usual side effect of prescribed medications.   Risk and benefits of opiates, possible risk of developing opiate dependence and tolerance, need of strict compliance with prescribed medications.  I reviewed Primary care , and other specialty's notes to better coordinate patient's  care.   All questions were answered, and patient voiced understanding.

## 2019-06-28 ENCOUNTER — PATIENT OUTREACH (OUTPATIENT)
Dept: OTHER | Facility: OTHER | Age: 79
End: 2019-06-28

## 2019-06-28 NOTE — PROGRESS NOTES
HPI:  Called Ms. Emilia Alan for hypertension digital medicine follow-up. Valsartan reduced and additional dose of thiazide added every other day for swelling. Patient tolerating current regimen with no complaints. Reviewed readings and most are within range. Patient scheduled to see nephrologist on 7/11/19.     Last 5 Patient Entered Readings                                      Current 30 Day Average: 129/69     Recent Readings 6/27/2019 6/24/2019 6/22/2019 6/22/2019 6/22/2019    SBP (mmHg) 139 120 - - -    DBP (mmHg) 68 80 - - -    Pulse 81 80 80 80 80          Patient denies s/s of hypotension (lightheadedness, dizziness, nausea, fatigue) associated with low readings. Instructed patient to inform me if this occurs, patient confirms understanding.    Patient denies s/s of hypertension (SOB, CP, severe headaches, changes in vision) associated with high readings. Instructed patient to go to the ED if BP >180/110 and accompanied by hypertensive s/s, patient confirms understanding.    Assessment:  Patient's current 30-day average is at goal of <140/90 mmHg.    Plan:  Continue current regimen.   Patients health , Richi Miller, will follow-up as scheduled.    I will continue to monitor regularly and will follow-up in 6 to 8 weeks, sooner if blood pressure begins to trend upward or downward.     Current medication regimen:  Hypertension Medications             amLODIPine (NORVASC) 10 MG tablet TAKE 1 TABLET(10 MG) BY MOUTH EVERY DAY    hydrALAZINE (APRESOLINE) 50 MG tablet TAKE 1 TABLET(50 MG) BY MOUTH EVERY MORNING AND EVERY AFTERNOON THEN TAKE 2 TABLETS(100 MG) BY MOUTH EVERY EVENING    hydroCHLOROthiazide (HYDRODIURIL) 25 MG tablet Take 1 tablet (25 mg total) by mouth every other day.    valsartan-hydrochlorothiazide (DIOVAN-HCT) 160-25 mg per tablet Take 1 tablet by mouth once daily.        Patient has my contact information and knows to call with any concerns or clinical changes.

## 2019-07-08 ENCOUNTER — LAB VISIT (OUTPATIENT)
Dept: LAB | Facility: HOSPITAL | Age: 79
End: 2019-07-08
Attending: INTERNAL MEDICINE
Payer: MEDICARE

## 2019-07-08 DIAGNOSIS — E78.2 MIXED HYPERLIPIDEMIA: ICD-10-CM

## 2019-07-08 DIAGNOSIS — E11.22 TYPE 2 DIABETES MELLITUS WITH DIABETIC CHRONIC KIDNEY DISEASE, UNSPECIFIED CKD STAGE, UNSPECIFIED WHETHER LONG TERM INSULIN USE: ICD-10-CM

## 2019-07-08 DIAGNOSIS — N18.30 STAGE 3 CHRONIC KIDNEY DISEASE: ICD-10-CM

## 2019-07-08 LAB
ALBUMIN SERPL BCP-MCNC: 2.8 G/DL (ref 3.5–5.2)
ALP SERPL-CCNC: 86 U/L (ref 55–135)
ALT SERPL W/O P-5'-P-CCNC: 13 U/L (ref 10–44)
ANION GAP SERPL CALC-SCNC: 10 MMOL/L (ref 8–16)
AST SERPL-CCNC: 40 U/L (ref 10–40)
BASOPHILS # BLD AUTO: 0.06 K/UL (ref 0–0.2)
BASOPHILS NFR BLD: 0.4 % (ref 0–1.9)
BILIRUB SERPL-MCNC: 0.4 MG/DL (ref 0.1–1)
BUN SERPL-MCNC: 45 MG/DL (ref 8–23)
CALCIUM SERPL-MCNC: 9.7 MG/DL (ref 8.7–10.5)
CHLORIDE SERPL-SCNC: 101 MMOL/L (ref 95–110)
CHOLEST SERPL-MCNC: 111 MG/DL (ref 120–199)
CHOLEST/HDLC SERPL: 2.8 {RATIO} (ref 2–5)
CO2 SERPL-SCNC: 25 MMOL/L (ref 23–29)
CREAT SERPL-MCNC: 1.4 MG/DL (ref 0.5–1.4)
DIFFERENTIAL METHOD: ABNORMAL
EOSINOPHIL # BLD AUTO: 0.1 K/UL (ref 0–0.5)
EOSINOPHIL NFR BLD: 0.6 % (ref 0–8)
ERYTHROCYTE [DISTWIDTH] IN BLOOD BY AUTOMATED COUNT: 13.1 % (ref 11.5–14.5)
EST. GFR  (AFRICAN AMERICAN): 42 ML/MIN/1.73 M^2
EST. GFR  (NON AFRICAN AMERICAN): 36 ML/MIN/1.73 M^2
ESTIMATED AVG GLUCOSE: 163 MG/DL (ref 68–131)
GLUCOSE SERPL-MCNC: 338 MG/DL (ref 70–110)
HBA1C MFR BLD HPLC: 7.3 % (ref 4–5.6)
HCT VFR BLD AUTO: 32.5 % (ref 37–48.5)
HDLC SERPL-MCNC: 40 MG/DL (ref 40–75)
HDLC SERPL: 36 % (ref 20–50)
HGB BLD-MCNC: 10.5 G/DL (ref 12–16)
LDLC SERPL CALC-MCNC: 48.4 MG/DL (ref 63–159)
LYMPHOCYTES # BLD AUTO: 0.8 K/UL (ref 1–4.8)
LYMPHOCYTES NFR BLD: 5.2 % (ref 18–48)
MCH RBC QN AUTO: 30.8 PG (ref 27–31)
MCHC RBC AUTO-ENTMCNC: 32.3 G/DL (ref 32–36)
MCV RBC AUTO: 95 FL (ref 82–98)
MONOCYTES # BLD AUTO: 0.9 K/UL (ref 0.3–1)
MONOCYTES NFR BLD: 5.4 % (ref 4–15)
NEUTROPHILS # BLD AUTO: 13.9 K/UL (ref 1.8–7.7)
NEUTROPHILS NFR BLD: 88.4 % (ref 38–73)
NONHDLC SERPL-MCNC: 71 MG/DL
PHOSPHATE SERPL-MCNC: 3.5 MG/DL (ref 2.7–4.5)
PLATELET # BLD AUTO: 350 K/UL (ref 150–350)
PMV BLD AUTO: 10.7 FL (ref 9.2–12.9)
POTASSIUM SERPL-SCNC: 3.7 MMOL/L (ref 3.5–5.1)
PROT SERPL-MCNC: 7.1 G/DL (ref 6–8.4)
PTH-INTACT SERPL-MCNC: 41 PG/ML (ref 9–77)
RBC # BLD AUTO: 3.41 M/UL (ref 4–5.4)
SODIUM SERPL-SCNC: 136 MMOL/L (ref 136–145)
TRIGL SERPL-MCNC: 113 MG/DL (ref 30–150)
URATE SERPL-MCNC: 9.5 MG/DL (ref 2.4–5.7)
WBC # BLD AUTO: 15.87 K/UL (ref 3.9–12.7)

## 2019-07-08 PROCEDURE — 80061 LIPID PANEL: CPT | Mod: HCNC

## 2019-07-08 PROCEDURE — 83036 HEMOGLOBIN GLYCOSYLATED A1C: CPT | Mod: HCNC

## 2019-07-08 PROCEDURE — 83970 ASSAY OF PARATHORMONE: CPT | Mod: HCNC

## 2019-07-08 PROCEDURE — 36415 COLL VENOUS BLD VENIPUNCTURE: CPT | Mod: HCNC

## 2019-07-08 PROCEDURE — 80053 COMPREHEN METABOLIC PANEL: CPT | Mod: HCNC

## 2019-07-08 PROCEDURE — 84550 ASSAY OF BLOOD/URIC ACID: CPT | Mod: HCNC

## 2019-07-08 PROCEDURE — 84100 ASSAY OF PHOSPHORUS: CPT | Mod: HCNC

## 2019-07-08 PROCEDURE — 85025 COMPLETE CBC W/AUTO DIFF WBC: CPT | Mod: HCNC

## 2019-07-10 ENCOUNTER — TELEPHONE (OUTPATIENT)
Dept: INTERNAL MEDICINE | Facility: CLINIC | Age: 79
End: 2019-07-10

## 2019-07-10 DIAGNOSIS — I10 SEVERE HYPERTENSION: ICD-10-CM

## 2019-07-10 DIAGNOSIS — E11.40 TYPE 2 DIABETES MELLITUS WITH DIABETIC NEUROPATHY, WITHOUT LONG-TERM CURRENT USE OF INSULIN: Primary | ICD-10-CM

## 2019-07-10 RX ORDER — AMLODIPINE BESYLATE 10 MG/1
TABLET ORAL
Qty: 30 TABLET | Refills: 0 | Status: SHIPPED | OUTPATIENT
Start: 2019-07-10 | End: 2019-07-11 | Stop reason: SDUPTHER

## 2019-07-10 NOTE — TELEPHONE ENCOUNTER
Med refilled, keep appointment to see me.    She is due for an eye exam, I have placed order for both eye camera and Optometry, please schedule and let me know.  Thank you

## 2019-07-11 ENCOUNTER — OFFICE VISIT (OUTPATIENT)
Dept: NEPHROLOGY | Facility: CLINIC | Age: 79
End: 2019-07-11
Payer: MEDICARE

## 2019-07-11 ENCOUNTER — TELEPHONE (OUTPATIENT)
Dept: INTERNAL MEDICINE | Facility: CLINIC | Age: 79
End: 2019-07-11

## 2019-07-11 VITALS
BODY MASS INDEX: 32.51 KG/M2 | OXYGEN SATURATION: 95 % | HEART RATE: 75 BPM | WEIGHT: 165.56 LBS | HEIGHT: 60 IN | SYSTOLIC BLOOD PRESSURE: 104 MMHG | DIASTOLIC BLOOD PRESSURE: 48 MMHG

## 2019-07-11 DIAGNOSIS — I10 HYPERTENSION, UNSPECIFIED TYPE: ICD-10-CM

## 2019-07-11 DIAGNOSIS — E78.5 HYPERLIPIDEMIA, UNSPECIFIED HYPERLIPIDEMIA TYPE: ICD-10-CM

## 2019-07-11 DIAGNOSIS — M47.816 FACET ARTHRITIS OF LUMBAR REGION: ICD-10-CM

## 2019-07-11 DIAGNOSIS — N18.30 STAGE 3 CHRONIC KIDNEY DISEASE: ICD-10-CM

## 2019-07-11 DIAGNOSIS — E11.42 DIABETIC POLYNEUROPATHY ASSOCIATED WITH TYPE 2 DIABETES MELLITUS: Primary | ICD-10-CM

## 2019-07-11 DIAGNOSIS — I10 SEVERE HYPERTENSION: ICD-10-CM

## 2019-07-11 PROCEDURE — 99204 PR OFFICE/OUTPT VISIT, NEW, LEVL IV, 45-59 MIN: ICD-10-PCS | Mod: HCNC,S$GLB,, | Performed by: NURSE PRACTITIONER

## 2019-07-11 PROCEDURE — 99999 PR PBB SHADOW E&M-EST. PATIENT-LVL III: ICD-10-PCS | Mod: PBBFAC,HCNC,, | Performed by: NURSE PRACTITIONER

## 2019-07-11 PROCEDURE — 99999 PR PBB SHADOW E&M-EST. PATIENT-LVL III: CPT | Mod: PBBFAC,HCNC,, | Performed by: NURSE PRACTITIONER

## 2019-07-11 PROCEDURE — 99204 OFFICE O/P NEW MOD 45 MIN: CPT | Mod: HCNC,S$GLB,, | Performed by: NURSE PRACTITIONER

## 2019-07-11 PROCEDURE — 3074F PR MOST RECENT SYSTOLIC BLOOD PRESSURE < 130 MM HG: ICD-10-PCS | Mod: HCNC,CPTII,S$GLB, | Performed by: NURSE PRACTITIONER

## 2019-07-11 PROCEDURE — 3078F DIAST BP <80 MM HG: CPT | Mod: HCNC,CPTII,S$GLB, | Performed by: NURSE PRACTITIONER

## 2019-07-11 PROCEDURE — 3078F PR MOST RECENT DIASTOLIC BLOOD PRESSURE < 80 MM HG: ICD-10-PCS | Mod: HCNC,CPTII,S$GLB, | Performed by: NURSE PRACTITIONER

## 2019-07-11 PROCEDURE — 1101F PR PT FALLS ASSESS DOC 0-1 FALLS W/OUT INJ PAST YR: ICD-10-PCS | Mod: HCNC,CPTII,S$GLB, | Performed by: NURSE PRACTITIONER

## 2019-07-11 PROCEDURE — 3074F SYST BP LT 130 MM HG: CPT | Mod: HCNC,CPTII,S$GLB, | Performed by: NURSE PRACTITIONER

## 2019-07-11 PROCEDURE — 1101F PT FALLS ASSESS-DOCD LE1/YR: CPT | Mod: HCNC,CPTII,S$GLB, | Performed by: NURSE PRACTITIONER

## 2019-07-11 RX ORDER — PRAVASTATIN SODIUM 40 MG/1
TABLET ORAL
Qty: 135 TABLET | Refills: 0 | OUTPATIENT
Start: 2019-07-11 | End: 2019-08-02 | Stop reason: SDUPTHER

## 2019-07-11 RX ORDER — VALSARTAN 160 MG/1
160 TABLET ORAL DAILY
COMMUNITY
End: 2019-07-11

## 2019-07-11 RX ORDER — AMLODIPINE BESYLATE 10 MG/1
TABLET ORAL
Qty: 90 TABLET | Refills: 1 | Status: SHIPPED | OUTPATIENT
Start: 2019-07-11 | End: 2019-08-02 | Stop reason: SDUPTHER

## 2019-07-11 RX ORDER — VALSARTAN 160 MG/1
160 TABLET ORAL DAILY
Qty: 90 TABLET | Refills: 3 | Status: SHIPPED | OUTPATIENT
Start: 2019-07-11 | End: 2019-08-02 | Stop reason: SDUPTHER

## 2019-07-11 NOTE — TELEPHONE ENCOUNTER
----- Message from Yvonne Zhao sent at 7/11/2019 10:08 AM CDT -----  Contact: Patient [phone]  Patient wants to know if RX for  amLODIPine (NORVASC) 10 MG tablet be changed to 90 refill.    [facility] [address] [address] [address]  AT [facility] [address] [phone] (Phone)  [phone] (Fax)    Please call and advise  Thank you

## 2019-07-11 NOTE — LETTER
July 26, 2019      Lauren Deras MD  [address]  [facility] [address]           [facility] - Nephrology  [address]  [facility] [address]  Phone: [phone]  Fax: [phone]          Patient: Emilia Alan   MR Number: [id number]   YOB: 1940   Date of Visit: 7/11/2019       Dear Dr. Laruen Deras:    Thank you for referring Emilia Alan to me for evaluation. Attached you will find relevant portions of my assessment and plan of care.    If you have questions, please do not hesitate to call me. I look forward to following Emilia Alan along with you.    Sincerely,    Vidya Fields, NP    Enclosure  CC:  No Recipients    If you would like to receive this communication electronically, please contact [email or web address] or [phone] to request more information on [employer] access.    For providers and/or their staff who would like to refer a patient to [facility], please contact us through our one-stop-shop provider referral line, [facility], at [phone].    If you feel you have received this communication in error or would no longer like to receive these types of communications, please e-mail [email or web address]

## 2019-07-11 NOTE — PATIENT INSTRUCTIONS
Stop Diclofenac and Diovan/HCTZ.  Start Valsartan 160 mg daily.  Continue HCTZ 25mg.   Return to clinic in 3 months.  Labs in 3 months before return.   Maintain optimal BP/DM control.  Stay hydrated w/ water.

## 2019-07-17 ENCOUNTER — PATIENT OUTREACH (OUTPATIENT)
Dept: OTHER | Facility: OTHER | Age: 79
End: 2019-07-17

## 2019-07-17 NOTE — PROGRESS NOTES
Last 5 Patient Entered Readings                                      Current 30 Day Average: 128/69     Recent Readings 7/16/2019 7/15/2019 7/14/2019 7/13/2019 7/12/2019    SBP (mmHg) 124 147 127 167 145    DBP (mmHg) 66 75 77 68 69    Pulse 83 96 91 96 97          Digital Medicine: Health  Follow Up    Patient reports her machine was messing up and had to go to the [facility].  Patient reports she did receive a new machine and denies having any problems with it.   BP is well controlled.  Patient reports she is doing well.    Lifestyle Modifications:    1.Dietary Modifications (Sodium intake <2,000mg/day, food labels, dining out):   Patient reports no changes and denies needing any other help with nutrition at this time.    2.Physical Activity:   Patient reports no changes and denies needing any other help with activity at this time.    3.Medication Therapy:   Patient has been compliant with the medication regimen.    4.Patient has the following medication side effects/concerns:   (Frequency/Alleviating factors/Precipitating factors, etc.)     Follow up with Ms. Emilia Alan completed. No further questions or concerns. Will continue to follow up to achieve health goals.  Patient is currently at goal, 128/69 mmHg does not exceed <140/90 mmHg.

## 2019-07-18 DIAGNOSIS — E78.5 HYPERLIPIDEMIA, UNSPECIFIED HYPERLIPIDEMIA TYPE: ICD-10-CM

## 2019-07-18 RX ORDER — PRAVASTATIN SODIUM 40 MG/1
TABLET ORAL
Qty: 135 TABLET | Refills: 0 | Status: SHIPPED | OUTPATIENT
Start: 2019-07-18 | End: 2019-08-02 | Stop reason: SDUPTHER

## 2019-07-25 NOTE — PROGRESS NOTES
Subjective:       Patient ID: Emilia Alan is a 78 y.o. White female who presents for new consult for CKD stage 3.    HPI   Pt presents for a new patient evaluation of CKD 3.  She was referred by Dr. Deras.  Pt has a long standing history of DM and HTN as well as hyperlipidemia, lumbar spinal stenosis, diabetic neuropathy, poliomyelitis, facet arthritis of lumbar region, Osteoarthritis of Right knee.  Pt has had a long history of taking NSAIDs for pain with multiple musculoskeletal ailments.  She is currently taking diclofenac, gabapentin, and Norco for pain.    Review of current labs from 7/8/19 show creatinine of 1.4 mg/dL and eGFR of 36 ml/min.  There has been a significant decline since 1/2019 where prior to this date renal function was WNL.  BUN is 45.  Potassium, Calcium and Phosphorus remain in range at this time.  U/A is negative for protein and UPCR is 0.16.    Pt denies change in urination, SOB, edema.  BP is 104/48 and pt is currently taking a combination of diovan and HCTZ as well as an additional HCTZ 25mg.  This is probably too much diuretic where BP is running very low and could be causing some dehydration and worsening renal function.  Will need to reduce HCTZ dose and stop NSAIDs.    Review of Systems   Constitutional: Negative.  Negative for activity change, appetite change, fatigue, fever and unexpected weight change.   HENT: Negative.  Negative for ear pain, sinus pain and tinnitus.    Eyes: Negative.  Negative for pain and visual disturbance.   Respiratory: Negative.  Negative for cough, shortness of breath and wheezing.    Cardiovascular: Positive for leg swelling. Negative for chest pain and palpitations.   Gastrointestinal: Negative.  Negative for abdominal distention, abdominal pain, constipation, diarrhea, nausea and vomiting.   Genitourinary: Negative.  Negative for difficulty urinating, flank pain and hematuria.   Musculoskeletal: Positive for arthralgias, back pain, gait problem  and joint swelling.   Skin: Negative.  Negative for color change.   Neurological: Negative for dizziness, weakness and headaches.       Objective:      Physical Exam   Constitutional: She is oriented to person, place, and time. She appears well-developed and well-nourished. No distress.   HENT:   Head: Normocephalic.   Eyes: Pupils are equal, round, and reactive to light.   Neck: Normal range of motion. Neck supple.   Cardiovascular: Normal rate, regular rhythm and normal heart sounds.   No murmur heard.  Pulmonary/Chest: Effort normal and breath sounds normal. No respiratory distress. She has no wheezes. She has no rales.   Abdominal: Soft. Bowel sounds are normal. There is no tenderness.   Musculoskeletal: She exhibits edema.   Gait problem/pain with multiple musculoskeletal problems  +1 ankle edema   Lymphadenopathy:     She has no cervical adenopathy.   Neurological: She is alert and oriented to person, place, and time.   Skin: Skin is warm and dry. Capillary refill takes less than 2 seconds.   Psychiatric: She has a normal mood and affect. Her behavior is normal.   Nursing note and vitals reviewed.      Assessment/Plan:    1. Chronic Kidney Disease Stage 3    Stop Diclofenac and Diovan/HCTZ combination.  Start Valsartan 160 mg daily.  Continue HCTZ 25mg.     Return to clinic in 3 months.    Labs in 3 months before return.     Maintain optimal BP/DM control.    Stay hydrated w/ water  2. Hypertension    Stop Diovan/HCTZ combination.  Start Valsartan 160 mg daily.  Continue HCTZ 25mg  3. Facet Arthritis of Lumbar Region     Stop Diclofenac, may continue Norco and gabapentin.

## 2019-07-26 ENCOUNTER — TELEPHONE (OUTPATIENT)
Dept: INTERNAL MEDICINE | Facility: CLINIC | Age: 79
End: 2019-07-26

## 2019-07-26 ENCOUNTER — LAB VISIT (OUTPATIENT)
Dept: LAB | Facility: HOSPITAL | Age: 79
End: 2019-07-26
Attending: INTERNAL MEDICINE
Payer: MEDICARE

## 2019-07-26 DIAGNOSIS — N18.30 STAGE 3 CHRONIC KIDNEY DISEASE: ICD-10-CM

## 2019-07-26 DIAGNOSIS — D53.9 NUTRITIONAL ANEMIA: ICD-10-CM

## 2019-07-26 DIAGNOSIS — D64.9 ANEMIA, UNSPECIFIED TYPE: Primary | ICD-10-CM

## 2019-07-26 DIAGNOSIS — E11.21 TYPE 2 DIABETES MELLITUS WITH DIABETIC NEPHROPATHY, WITHOUT LONG-TERM CURRENT USE OF INSULIN: ICD-10-CM

## 2019-07-26 DIAGNOSIS — D64.9 ANEMIA, UNSPECIFIED TYPE: ICD-10-CM

## 2019-07-26 LAB
ALBUMIN SERPL BCP-MCNC: 2.7 G/DL (ref 3.5–5.2)
ALP SERPL-CCNC: 82 U/L (ref 55–135)
ALT SERPL W/O P-5'-P-CCNC: 8 U/L (ref 10–44)
ANION GAP SERPL CALC-SCNC: 12 MMOL/L (ref 8–16)
AST SERPL-CCNC: 17 U/L (ref 10–40)
BASOPHILS # BLD AUTO: 0.13 K/UL (ref 0–0.2)
BASOPHILS NFR BLD: 1.7 % (ref 0–1.9)
BILIRUB SERPL-MCNC: 0.3 MG/DL (ref 0.1–1)
BUN SERPL-MCNC: 20 MG/DL (ref 8–23)
CALCIUM SERPL-MCNC: 9.6 MG/DL (ref 8.7–10.5)
CHLORIDE SERPL-SCNC: 105 MMOL/L (ref 95–110)
CHOLEST SERPL-MCNC: 110 MG/DL (ref 120–199)
CHOLEST/HDLC SERPL: 2.8 {RATIO} (ref 2–5)
CO2 SERPL-SCNC: 25 MMOL/L (ref 23–29)
CREAT SERPL-MCNC: 0.8 MG/DL (ref 0.5–1.4)
DIFFERENTIAL METHOD: ABNORMAL
EOSINOPHIL # BLD AUTO: 0.4 K/UL (ref 0–0.5)
EOSINOPHIL NFR BLD: 5.6 % (ref 0–8)
ERYTHROCYTE [DISTWIDTH] IN BLOOD BY AUTOMATED COUNT: 13.4 % (ref 11.5–14.5)
EST. GFR  (AFRICAN AMERICAN): >60 ML/MIN/1.73 M^2
EST. GFR  (NON AFRICAN AMERICAN): >60 ML/MIN/1.73 M^2
ESTIMATED AVG GLUCOSE: 157 MG/DL (ref 68–131)
FERRITIN SERPL-MCNC: 123 NG/ML (ref 20–300)
GLUCOSE SERPL-MCNC: 193 MG/DL (ref 70–110)
HBA1C MFR BLD HPLC: 7.1 % (ref 4–5.6)
HCT VFR BLD AUTO: 32.6 % (ref 37–48.5)
HDLC SERPL-MCNC: 39 MG/DL (ref 40–75)
HDLC SERPL: 35.5 % (ref 20–50)
HGB BLD-MCNC: 9.9 G/DL (ref 12–16)
IRON SERPL-MCNC: 63 UG/DL (ref 30–160)
LDLC SERPL CALC-MCNC: 44.6 MG/DL (ref 63–159)
LYMPHOCYTES # BLD AUTO: 1.5 K/UL (ref 1–4.8)
LYMPHOCYTES NFR BLD: 20.3 % (ref 18–48)
MCH RBC QN AUTO: 29.5 PG (ref 27–31)
MCHC RBC AUTO-ENTMCNC: 30.4 G/DL (ref 32–36)
MCV RBC AUTO: 97 FL (ref 82–98)
MONOCYTES # BLD AUTO: 0.6 K/UL (ref 0.3–1)
MONOCYTES NFR BLD: 7.8 % (ref 4–15)
NEUTROPHILS # BLD AUTO: 4.8 K/UL (ref 1.8–7.7)
NEUTROPHILS NFR BLD: 64.6 % (ref 38–73)
NONHDLC SERPL-MCNC: 71 MG/DL
PLATELET # BLD AUTO: 363 K/UL (ref 150–350)
PMV BLD AUTO: 10.6 FL (ref 9.2–12.9)
POTASSIUM SERPL-SCNC: 3.9 MMOL/L (ref 3.5–5.1)
PROT SERPL-MCNC: 7.1 G/DL (ref 6–8.4)
RBC # BLD AUTO: 3.36 M/UL (ref 4–5.4)
SATURATED IRON: 22 % (ref 20–50)
SODIUM SERPL-SCNC: 142 MMOL/L (ref 136–145)
TOTAL IRON BINDING CAPACITY: 286 UG/DL (ref 250–450)
TRANSFERRIN SERPL-MCNC: 193 MG/DL (ref 200–375)
TRIGL SERPL-MCNC: 132 MG/DL (ref 30–150)
WBC # BLD AUTO: 7.48 K/UL (ref 3.9–12.7)

## 2019-07-26 PROCEDURE — 83540 ASSAY OF IRON: CPT | Mod: HCNC

## 2019-07-26 PROCEDURE — 80053 COMPREHEN METABOLIC PANEL: CPT | Mod: HCNC

## 2019-07-26 PROCEDURE — 85025 COMPLETE CBC W/AUTO DIFF WBC: CPT | Mod: HCNC

## 2019-07-26 PROCEDURE — 36415 COLL VENOUS BLD VENIPUNCTURE: CPT | Mod: HCNC

## 2019-07-26 PROCEDURE — 82728 ASSAY OF FERRITIN: CPT | Mod: HCNC

## 2019-07-26 PROCEDURE — 83036 HEMOGLOBIN GLYCOSYLATED A1C: CPT | Mod: HCNC

## 2019-07-26 PROCEDURE — 80061 LIPID PANEL: CPT | Mod: HCNC

## 2019-07-26 NOTE — TELEPHONE ENCOUNTER
Spoke to the lab and only the Iron ,tibc, ferritin can be added , pt will have to come back for the B12 and Protein   Please advise

## 2019-07-26 NOTE — TELEPHONE ENCOUNTER
Okay to add whatever they can and I will review the results when she comes in next week.  Thank you

## 2019-08-02 ENCOUNTER — OFFICE VISIT (OUTPATIENT)
Dept: INTERNAL MEDICINE | Facility: CLINIC | Age: 79
End: 2019-08-02
Payer: MEDICARE

## 2019-08-02 VITALS
BODY MASS INDEX: 29.06 KG/M2 | SYSTOLIC BLOOD PRESSURE: 118 MMHG | DIASTOLIC BLOOD PRESSURE: 65 MMHG | HEART RATE: 65 BPM | HEIGHT: 63 IN | WEIGHT: 164 LBS

## 2019-08-02 DIAGNOSIS — E11.42 DIABETIC POLYNEUROPATHY ASSOCIATED WITH TYPE 2 DIABETES MELLITUS: ICD-10-CM

## 2019-08-02 DIAGNOSIS — N39.46 MIXED INCONTINENCE: ICD-10-CM

## 2019-08-02 DIAGNOSIS — E78.2 MIXED HYPERLIPIDEMIA: ICD-10-CM

## 2019-08-02 DIAGNOSIS — G47.33 OBSTRUCTIVE SLEEP APNEA SYNDROME: ICD-10-CM

## 2019-08-02 DIAGNOSIS — I10 HYPERTENSION, UNSPECIFIED TYPE: ICD-10-CM

## 2019-08-02 DIAGNOSIS — N18.30 STAGE 3 CHRONIC KIDNEY DISEASE: ICD-10-CM

## 2019-08-02 DIAGNOSIS — E78.5 HYPERLIPIDEMIA, UNSPECIFIED HYPERLIPIDEMIA TYPE: ICD-10-CM

## 2019-08-02 DIAGNOSIS — E11.40 TYPE 2 DIABETES MELLITUS WITH DIABETIC NEUROPATHY, WITHOUT LONG-TERM CURRENT USE OF INSULIN: ICD-10-CM

## 2019-08-02 DIAGNOSIS — G14 POST POLIOMYELITIS SYNDROME: ICD-10-CM

## 2019-08-02 DIAGNOSIS — I10 SEVERE HYPERTENSION: ICD-10-CM

## 2019-08-02 DIAGNOSIS — Z00.00 ANNUAL PHYSICAL EXAM: Primary | ICD-10-CM

## 2019-08-02 DIAGNOSIS — D64.9 ANEMIA, UNSPECIFIED TYPE: ICD-10-CM

## 2019-08-02 PROBLEM — E66.811 CLASS 1 OBESITY DUE TO EXCESS CALORIES IN ADULT: Status: RESOLVED | Noted: 2018-07-26 | Resolved: 2019-08-02

## 2019-08-02 PROBLEM — E66.09 CLASS 1 OBESITY DUE TO EXCESS CALORIES IN ADULT: Status: RESOLVED | Noted: 2018-07-26 | Resolved: 2019-08-02

## 2019-08-02 PROCEDURE — 3074F SYST BP LT 130 MM HG: CPT | Mod: HCNC,CPTII,S$GLB, | Performed by: INTERNAL MEDICINE

## 2019-08-02 PROCEDURE — 99999 PR PBB SHADOW E&M-EST. PATIENT-LVL V: ICD-10-PCS | Mod: PBBFAC,HCNC,, | Performed by: INTERNAL MEDICINE

## 2019-08-02 PROCEDURE — 99397 PR PREVENTIVE VISIT,EST,65 & OVER: ICD-10-PCS | Mod: HCNC,S$GLB,, | Performed by: INTERNAL MEDICINE

## 2019-08-02 PROCEDURE — 3078F PR MOST RECENT DIASTOLIC BLOOD PRESSURE < 80 MM HG: ICD-10-PCS | Mod: HCNC,CPTII,S$GLB, | Performed by: INTERNAL MEDICINE

## 2019-08-02 PROCEDURE — 3078F DIAST BP <80 MM HG: CPT | Mod: HCNC,CPTII,S$GLB, | Performed by: INTERNAL MEDICINE

## 2019-08-02 PROCEDURE — 99999 PR PBB SHADOW E&M-EST. PATIENT-LVL V: CPT | Mod: PBBFAC,HCNC,, | Performed by: INTERNAL MEDICINE

## 2019-08-02 PROCEDURE — 3074F PR MOST RECENT SYSTOLIC BLOOD PRESSURE < 130 MM HG: ICD-10-PCS | Mod: HCNC,CPTII,S$GLB, | Performed by: INTERNAL MEDICINE

## 2019-08-02 PROCEDURE — 99397 PER PM REEVAL EST PAT 65+ YR: CPT | Mod: HCNC,S$GLB,, | Performed by: INTERNAL MEDICINE

## 2019-08-02 RX ORDER — HYDRALAZINE HYDROCHLORIDE 50 MG/1
TABLET, FILM COATED ORAL
Qty: 360 TABLET | Refills: 3 | Status: SHIPPED | OUTPATIENT
Start: 2019-08-02 | End: 2020-09-06

## 2019-08-02 RX ORDER — OXYBUTYNIN CHLORIDE 5 MG/1
TABLET ORAL
Qty: 180 TABLET | Refills: 3 | Status: SHIPPED | OUTPATIENT
Start: 2019-08-02 | End: 2020-08-11 | Stop reason: SDUPTHER

## 2019-08-02 RX ORDER — GLIPIZIDE 5 MG/1
TABLET ORAL
Qty: 90 TABLET | Refills: 3 | Status: SHIPPED | OUTPATIENT
Start: 2019-08-02 | End: 2020-02-18 | Stop reason: SDUPTHER

## 2019-08-02 RX ORDER — AMLODIPINE BESYLATE 10 MG/1
TABLET ORAL
Qty: 90 TABLET | Refills: 3 | Status: SHIPPED | OUTPATIENT
Start: 2019-08-02 | End: 2020-02-18 | Stop reason: SDUPTHER

## 2019-08-02 RX ORDER — VALSARTAN 160 MG/1
160 TABLET ORAL DAILY
Qty: 90 TABLET | Refills: 3 | Status: SHIPPED | OUTPATIENT
Start: 2019-08-02 | End: 2019-11-19

## 2019-08-02 RX ORDER — PRAVASTATIN SODIUM 40 MG/1
TABLET ORAL
Qty: 135 TABLET | Refills: 3 | Status: SHIPPED | OUTPATIENT
Start: 2019-08-02 | End: 2020-02-18 | Stop reason: SDUPTHER

## 2019-08-02 RX ORDER — METFORMIN HYDROCHLORIDE 500 MG/1
TABLET, EXTENDED RELEASE ORAL
Qty: 90 TABLET | Refills: 3 | Status: SHIPPED | OUTPATIENT
Start: 2019-08-02 | End: 2020-02-18

## 2019-08-02 NOTE — PATIENT INSTRUCTIONS
Prevention Guidelines, Women Ages 65 and Older  Screening tests and vaccines are an important part of managing your health. Health counseling is essential, too. Below are guidelines for these, for women ages 65 and older. Talk with your healthcare provider to make sure youre up to date on what you need.  Screening Who needs it How often   Type 2 diabetes or prediabetes All adults beginning at age 45 and adults without symptoms at any age who are overweight or obese and have 1 or more additional risk factors for diabetes At least every 3 years   Alcohol misuse All women in this age group At routine exams   Blood pressure All women in this age group Every 2 years if your blood pressure is less than 120/80 mm Hg; yearly if your systolic blood pressure is 120 to 139 mm Hg, or your diastolic blood pressure reading is 80 to 89 mm Hg   Breast cancer All women in this age group Yearly mammogram and clinical breast exam1   Cervical cancer Only women who had abnormal screening results before age 65 Talk with your healthcare provider   Chlamydia Women at increased risk for infection At routine exams   Colorectal cancer All women in this age group1 Flexible sigmoidoscopy every 5 years, or colonoscopy every 10 years, or double-contrast barium enema every 5 years; yearly fecal occult blood test or fecal immunochemical test; or a stool DNA test as often as your healthcare provider advises; talk with your healthcare provider about which tests are best for you   Depression All women in this age group At routine exams   Gonorrhea Sexually active women at increased risk for infection At routine exams   Hepatitis C Anyone at increased risk; 1 time for those born between 1945 and 1965 At routine exams   High cholesterol or triglycerides All women in this age group who are at risk for coronary artery disease At least every 5 years   HIV Women at increased risk for infection - talk with your healthcare provider At routine exams   Lung  cancer Adults age 55 to 80 who have smoked Yearly screening in smokers with 30 pack-year history of smoking or who quit within 15 years   Obesity All women in this age group At routine exams   Osteoporosis All women in this age group Bone density test at age 65, then follow-up as advised by your healthcare provider   Syphilis Women at increased risk for infection - talk with your healthcare provider At routine exams   Thyroid-Stimulating Hormone (TSH) All women in this age group Every 5 years   Tuberculosis Women at increased risk for infection - talk with your healthcare provider Ask your healthcare provider   Vision All women in this age group Every 1 to 2 years; if you have a chronic health condition, ask your healthcare provider if you need exams more often   Vaccine Who needs it How often   Chickenpox (varicella) All women in this age group who have no record of this infection or vaccine 2 doses; second dose should be given at least 4 weeks after the first dose   Hepatitis A Women at increased risk for infection - talk with your healthcare provider 2 doses given 6 months apart   Hepatitis B Women at increased risk for infection - talk with your healthcare provider 3 doses over 6 months; second dose should be given 1 month after the first dose; the third dose should be given at least 2 months after the second dose and at least 4 months after the first dose   Haemophilus influenza Type B (HIB) Women at increased risk for infection - talk with your healthcare provider 1 to 3 doses   Influenza (flu) All women in this age group Once a year   Pneumococcal conjugate vaccine (PCV13) and pneumococcal polysaccharide vaccine (PPSV23) All women in this age group 1 dose of each vaccine   Tetanus/diphtheria/pertussis (Td/Tdap) booster All women in this age group Td every 10 years, or a one-time dose of Tdap instead of a Td booster after age 18, then Td every 10 years   Zoster All women in this age group 1 dose   Counseling  Who needs it How often   Diet and exercise Women who are overweight or obese When diagnosed, and then at routine exams   Fall prevention (exercise and vitamin D supplements) All women in this age group At routine exams   Sexually transmitted infection prevention Women at increased risk for infection - talk with your healthcare provider At routine exams   Use of daily aspirin Women ages 55 and up in this age group who are at risk for cardiovascular health problems such as stroke When your risk is known   Use of tobacco and the health effects it can cause All women in this age group Every exam   1[employer]  Date Last Reviewed: 8/9/2015  © 4330-3548 [employer]. [address] [place] [address], [place] [address]. All rights reserved. This information is not intended as a substitute for professional medical care. Always follow your healthcare professional's instructions.

## 2019-08-02 NOTE — PROGRESS NOTES
Subjective:       Patient ID: Emilia Alan is a 78 y.o. female.    Chief Complaint: Annual Exam    Annual exam    Blood pressure improved at this point.    Following closely in Nephrology, CKD stable to improved.    Osteopenia diagnosed in 2018, Reclast had been recommended.  However, she then saw Endocrinology who recommended calcium and vitamin-D and 2 year follow-up.    A1c stable at 7.1    Not using CPAP- trouble breathing with this.    Refractory anemia, see bone marrow 2018; saw Hematology January and 1 year follow up recommended.  No low iron.        Patient Active Problem List:     Gait disorder     History of poliomyelitis     Post poliomyelitis syndrome     Diabetic polyneuropathy associated with type 2 diabetes mellitus     Mixed hyperlipidemia     Osteoporosis without current pathological fracture: worse on fosamax 5/16- Reclast 8/16     Paraparesis of both lower limbs     Severe hypertension     Type 2 diabetes mellitus with diabetic neuropathy, without long-term current use of insulin     Osteoarthritis of right knee     Lumbar spinal stenosis     Obstructive sleep apnea syndrome: dx 2008 needs CPAP 11     Bradycardia, drug induced     Atherosclerosis of artery of right lower extremity: see xray 4/4/07     Essential tremor     Type 2 diabetes mellitus with renal manifestations     Stage 3 chronic kidney disease     Facet arthritis of lumbar region     Opioid use agreement exists     Refractory anemia      Review of Systems   Constitutional: Negative for activity change, appetite change, chills, fatigue and fever.   HENT: Negative for congestion, hearing loss, sinus pressure and sore throat.    Eyes: Negative for visual disturbance.   Respiratory: Negative for apnea, cough, shortness of breath and wheezing.    Cardiovascular: Negative for chest pain, palpitations and leg swelling.   Gastrointestinal: Negative for abdominal distention, abdominal pain, constipation, diarrhea, nausea and vomiting.    Genitourinary: Negative for dysuria, frequency, hematuria and vaginal bleeding.   Musculoskeletal: Negative for gait problem, joint swelling and myalgias.   Skin: Negative for rash.   Neurological: Negative for dizziness, weakness, light-headedness and headaches.   Hematological: Negative for adenopathy. Does not bruise/bleed easily.   Psychiatric/Behavioral: Negative for confusion, hallucinations, sleep disturbance and suicidal ideas.       Objective:      Physical Exam   Constitutional: She is oriented to person, place, and time. She appears well-developed and well-nourished.   In a wheelchair   HENT:   Head: Normocephalic and atraumatic.   Right Ear: External ear normal.   Left Ear: External ear normal.   Nose: Nose normal.   Mouth/Throat: Oropharynx is clear and moist. No oropharyngeal exudate.   Eyes: Conjunctivae and EOM are normal. No scleral icterus.   Neck: Normal range of motion. Neck supple. No JVD present. No thyromegaly present.   Cardiovascular: Normal rate, regular rhythm, normal heart sounds and intact distal pulses. Exam reveals no gallop.   No murmur heard.  Pulmonary/Chest: Effort normal and breath sounds normal. No respiratory distress. She has no wheezes.   Abdominal: Soft. Bowel sounds are normal. She exhibits no distension and no mass. There is no tenderness. There is no rebound and no guarding.   Musculoskeletal: Normal range of motion. She exhibits no edema.   Post polio changes   Lymphadenopathy:     She has no cervical adenopathy.   Neurological: She is alert and oriented to person, place, and time. No cranial nerve deficit. Coordination normal.   Skin: Skin is warm. No rash noted. No erythema.   Psychiatric: She has a normal mood and affect. Her behavior is normal. Judgment and thought content normal.   Nursing note and vitals reviewed.      Assessment:       1. Annual physical exam    2. Diabetic polyneuropathy associated with type 2 diabetes mellitus    3. Post poliomyelitis syndrome     4. Mixed hyperlipidemia    5. Severe hypertension    6. Stage 3 chronic kidney disease    7. Type 2 diabetes mellitus with diabetic neuropathy, without long-term current use of insulin    8. Obstructive sleep apnea syndrome: dx 2008 needs CPAP 11    9. Mixed incontinence    10. Hyperlipidemia, unspecified hyperlipidemia type    11. Hypertension, unspecified type    12. Anemia, unspecified type        Plan:         Emilia was seen today for annual exam.    Diagnoses and all orders for this visit:    Annual physical exam    Diabetic polyneuropathy associated with type 2 diabetes mellitus:  Stable at    Post poliomyelitis syndrome  -     WALKER FOR HOME USE    Mixed hyperlipidemia  -     Lipid panel; Future    Severe hypertension:  Stable at present  -     amLODIPine (NORVASC) 10 MG tablet; TAKE 1 TABLET(10 MG) BY MOUTH EVERY DAY  -     hydrALAZINE (APRESOLINE) 50 MG tablet; TAKE 1 TABLET(50 MG) BY MOUTH EVERY MORNING AND EVERY AFTERNOON THEN TAKE 2 TABLETS(100 MG) BY MOUTH EVERY EVENING    Stage 3 chronic kidney disease  -     valsartan (DIOVAN) 160 MG tablet; Take 1 tablet (160 mg total) by mouth once daily.  -     Comprehensive metabolic panel; Future    Type 2 diabetes mellitus with diabetic neuropathy, without long-term current use of insulin; continue regimen  -     Ambulatory referral to Optometry  -     Hemoglobin A1c; Future    Obstructive sleep apnea syndrome: dx 2008 needs CPAP 11:  Treatment recommendations discussed, she is willing to consider, follow-up  -     Ambulatory referral to Sleep Disorders    Mixed incontinence  -     oxybutynin (DITROPAN) 5 MG Tab; TAKE 1 TABLET(5 MG) BY MOUTH TWICE DAILY    Hyperlipidemia, unspecified hyperlipidemia type:  Continue regimen  -     pravastatin (PRAVACHOL) 40 MG tablet; TAKE 1 AND 1/2 TABLETS(60 MG) BY MOUTH EVERY NIGHT    Hypertension, unspecified type:  Continue regimen  -     valsartan (DIOVAN) 160 MG tablet; Take 1 tablet (160 mg total) by mouth once  daily.    Anemia, unspecified type:  Likely chronic.  No evidence iron deficiency.  See Hematology notes, follow-up in 6 months per Hematology  -     CBC auto differential; Future  -     Ambulatory referral to Hematology / Oncology    Other orders  -     glipiZIDE (GLUCOTROL) 5 MG tablet; TAKE 1 TABLET(5 MG) BY MOUTH EVERY DAY  -     metFORMIN (GLUCOPHAGE-XR) 500 MG 24 hr tablet; TAKE 1 TABLET(500 MG) BY MOUTH EVERY DAY    Flu vaccine when available  Shingles vaccine when available    Eye exam in next several months    Labs and see me in 6 months, sooner with problems in the interim

## 2019-08-05 ENCOUNTER — TELEPHONE (OUTPATIENT)
Dept: ADMINISTRATIVE | Facility: OTHER | Age: 79
End: 2019-08-05

## 2019-08-05 ENCOUNTER — PATIENT MESSAGE (OUTPATIENT)
Dept: INTERNAL MEDICINE | Facility: CLINIC | Age: 79
End: 2019-08-05

## 2019-08-12 ENCOUNTER — TELEPHONE (OUTPATIENT)
Dept: HEMATOLOGY/ONCOLOGY | Facility: CLINIC | Age: 79
End: 2019-08-12

## 2019-08-13 ENCOUNTER — TELEPHONE (OUTPATIENT)
Dept: HEMATOLOGY/ONCOLOGY | Facility: CLINIC | Age: 79
End: 2019-08-13

## 2019-08-15 ENCOUNTER — OFFICE VISIT (OUTPATIENT)
Dept: PODIATRY | Facility: CLINIC | Age: 79
End: 2019-08-15
Payer: MEDICARE

## 2019-08-15 VITALS
HEART RATE: 71 BPM | RESPIRATION RATE: 18 BRPM | HEIGHT: 63 IN | BODY MASS INDEX: 29.06 KG/M2 | DIASTOLIC BLOOD PRESSURE: 63 MMHG | WEIGHT: 164 LBS | SYSTOLIC BLOOD PRESSURE: 127 MMHG

## 2019-08-15 DIAGNOSIS — G60.9 IDIOPATHIC PERIPHERAL NEUROPATHY: ICD-10-CM

## 2019-08-15 DIAGNOSIS — B35.1 ONYCHOMYCOSIS DUE TO DERMATOPHYTE: ICD-10-CM

## 2019-08-15 DIAGNOSIS — L84 CORN OR CALLUS: ICD-10-CM

## 2019-08-15 DIAGNOSIS — G14 POST-POLIO SYNDROME: Primary | ICD-10-CM

## 2019-08-15 PROCEDURE — 99999 PR PBB SHADOW E&M-EST. PATIENT-LVL III: CPT | Mod: PBBFAC,HCNC,, | Performed by: PODIATRIST

## 2019-08-15 PROCEDURE — 99499 UNLISTED E&M SERVICE: CPT | Mod: HCNC,S$GLB,, | Performed by: PODIATRIST

## 2019-08-15 PROCEDURE — 11056 PR TRIM BENIGN HYPERKERATOTIC SKIN LESION,2-4: ICD-10-PCS | Mod: Q9,HCNC,S$GLB, | Performed by: PODIATRIST

## 2019-08-15 PROCEDURE — 99999 PR PBB SHADOW E&M-EST. PATIENT-LVL III: ICD-10-PCS | Mod: PBBFAC,HCNC,, | Performed by: PODIATRIST

## 2019-08-15 PROCEDURE — 11721 DEBRIDE NAIL 6 OR MORE: CPT | Mod: 59,Q9,HCNC,S$GLB | Performed by: PODIATRIST

## 2019-08-15 PROCEDURE — 99499 RISK ADDL DX/OHS AUDIT: ICD-10-PCS | Mod: HCNC,S$GLB,, | Performed by: PODIATRIST

## 2019-08-15 PROCEDURE — 11056 PARNG/CUTG B9 HYPRKR LES 2-4: CPT | Mod: Q9,HCNC,S$GLB, | Performed by: PODIATRIST

## 2019-08-15 PROCEDURE — 11721 PR DEBRIDEMENT OF NAILS, 6 OR MORE: ICD-10-PCS | Mod: 59,Q9,HCNC,S$GLB | Performed by: PODIATRIST

## 2019-08-15 NOTE — PROGRESS NOTES
Subjective:      Patient ID: Emilia Alan is a 78 y.o. female.    Chief Complaint: PCP (Lauren Deras 8/2/19); Foot Problem (foot swelling ); and Nail Care    Emilia is a 78 y.o. female who presents to the clinic for evaluation and treatment of high risk feet. 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 The patient's chief complaint is long, thick toenails and dry red itchy skin L foot       PCP: Lauren Deras MD    Date Last Seen by PCP:   Chief Complaint   Patient presents with    PCP     Lauren Deras 8/2/19    Foot Problem     foot swelling     Nail Care         Current shoe gear: DM shoes w/ AFO brace( L)    Hemoglobin A1C   Date Value Ref Range Status   07/26/2019 7.1 (H) 4.0 - 5.6 % Final     Comment:     ADA Screening Guidelines:  5.7-6.4%  Consistent with prediabetes  >or=6.5%  Consistent with diabetes  High levels of fetal hemoglobin interfere with the HbA1C  assay. Heterozygous hemoglobin variants (HbS, HgC, etc)do  not significantly interfere with this assay.   However, presence of multiple variants may affect accuracy.     07/08/2019 7.3 (H) 4.0 - 5.6 % Final     Comment:     ADA Screening Guidelines:  5.7-6.4%  Consistent with prediabetes  >or=6.5%  Consistent with diabetes  High levels  "of fetal hemoglobin interfere with the HbA1C  assay. Heterozygous hemoglobin variants (HbS, HgC, etc)do  not significantly interfere with this assay.   However, presence of multiple variants may affect accuracy.     03/18/2019 6.9 (H) 4.0 - 5.6 % Final     Comment:     ADA Screening Guidelines:  5.7-6.4%  Consistent with prediabetes  >or=6.5%  Consistent with diabetes  High levels of fetal hemoglobin interfere with the HbA1C  assay. Heterozygous hemoglobin variants (HbS, HgC, etc)do  not significantly interfere with this assay.   However, presence of multiple variants may affect accuracy.           Review of Systems   Constitution: Negative for chills, decreased appetite and fever.   Cardiovascular: Negative for chest pain, claudication and leg swelling.   Respiratory: Negative for cough and shortness of breath.    Skin: Positive for dry skin and nail changes. Negative for color change, flushing, itching, poor wound healing and rash.   Musculoskeletal: Positive for muscle weakness (foot drop). Negative for arthritis, back pain, gout, joint pain, joint swelling and myalgias.   Gastrointestinal: Negative for nausea and vomiting.   Neurological: Positive for numbness and paresthesias. Negative for loss of balance.           Objective:       Vitals:    08/15/19 1128   BP: 127/63   Pulse: 71   Resp: 18   Weight: 74.4 kg (164 lb)   Height: 5' 3" (1.6 m)   PainSc: 0-No pain        Physical Exam   Constitutional: She is oriented to person, place, and time. She appears well-developed and well-nourished. No distress.   Cardiovascular:   Dorsalis pedis and posterior tibial pulses are diminished Bilaterally. Toes are cool to touch. Feet are warm proximally.There is decreased digital hair. Skin is atrophic, slightly hyperpigmented, and mildly edematous       Musculoskeletal: She exhibits no edema or tenderness.        Right ankle: Normal.        Left ankle: Normal.        Right foot: There is no swelling, no crepitus and no " deformity.        Left foot: There is no swelling, no crepitus and no deformity.   Dropfoot left.      Lymphadenopathy:   No palpable lymph nodes   Neurological: She is alert and oriented to person, place, and time. She has normal strength.   Denio-Sarah 5.07 monofilamant testing is diminished Charbel feet. Sharp/dull sensation diminished Bilaterally. Light touch absent Bilaterally.       Skin: Skin is warm, dry and intact. No abrasion, no bruising, no burn, no laceration, no lesion, no petechiae and no rash noted. She is not diaphoretic. No pallor. Nails show no clubbing.   Nails 1-5 b/l  are elongated by  3-5mm's, thickened by 3-5 mm's, dystrophic, and are darkened in  coloration . Xerosis Bilaterally. No open lesions noted.    Hyperkeratotic tissue noted to plantar L 5th MPJ and plantar L 5th toe   Psychiatric: She has a normal mood and affect. Judgment and thought content normal.   Nursing note and vitals reviewed.          Assessment:       Encounter Diagnoses   Name Primary?    Post-polio syndrome Yes    Idiopathic peripheral neuropathy     Onychomycosis due to dermatophyte     Corn or callus          Plan:       Emilia was seen today for pcp, foot problem and nail care.    Diagnoses and all orders for this visit:    Post-polio syndrome    Idiopathic peripheral neuropathy    Onychomycosis due to dermatophyte    Corn or callus      I counseled the patient on her conditions, their implications and medical management.    - Shoe inspection.Patient instructed on proper foot hygeine. We discussed wearing proper shoe gear, daily foot inspections, never walking without protective shoe gear, never putting sharp instruments to feet, routine podiatric nail visits every 2-3 months.      - With patient's permission, nails were aggressively reduced and debrided x 10 to their soft tissue attachment mechanically and with electric , removing all offending nail and debris. Patient relates relief following the  procedure. She will continue to monitor the areas daily, inspect her feet, wear protective shoe gear when ambulatory, moisturizer to maintain skin integrity and follow in this office in approximately 2-3 months, sooner p.r.n.    - After cleansing the  area w/ alcohol prep pad the above mentioned hyperkeratosis was trimmed utilizing No 15 scapel, to a smooth base with out incident. Patient tolerated this  well and reported comfort to the area of plantar L 5th MPJ and lateral l 5th toe

## 2019-08-21 ENCOUNTER — PATIENT OUTREACH (OUTPATIENT)
Dept: OTHER | Facility: OTHER | Age: 79
End: 2019-08-21

## 2019-08-21 NOTE — PROGRESS NOTES
"HPI:  Called Ms. Emilia Alan for hypertension digital medicine follow-up. Tolerating current regimen with no complaints. Valsartan reduced in setting of worsening renal function and most recent labs are stable and WNL. Discussed elevated BP of 178/95 mmHg and patient states that she had "tons of fried seafood". She felt well however, she was not pleased with her blood pressure. She has resumed low sodium diet.     Last 5 Patient Entered Readings                                      Current 30 Day Average: 141/70     Recent Readings 8/20/2019 8/19/2019 8/19/2019 8/16/2019 8/15/2019    SBP (mmHg) 122 120 148 139 178    DBP (mmHg) 71 80 75 58 95    Pulse 77 80 75 87 89          Patient denies s/s of hypotension (lightheadedness, dizziness, nausea, fatigue) associated with low readings. Instructed patient to inform me if this occurs, patient confirms understanding.    Patient denies s/s of hypertension (SOB, CP, severe headaches, changes in vision) associated with high readings. Instructed patient to go to the ED if BP >180/110 and accompanied by hypertensive s/s, patient confirms understanding.    Assessment:  Patient's current 30-day average is slightly above goal of <140/90 mmHg.       Plan:  Continue current regimen.   Patients health , Richi Miller, will follow-up as scheduled.    I will continue to monitor regularly and will follow-up in 8-10 weeks, sooner if blood pressure begins to trend upward or downward.     Current medication regimen:  Hypertension Medications             amLODIPine (NORVASC) 10 MG tablet TAKE 1 TABLET(10 MG) BY MOUTH EVERY DAY    hydrALAZINE (APRESOLINE) 50 MG tablet TAKE 1 TABLET(50 MG) BY MOUTH EVERY MORNING AND EVERY AFTERNOON THEN TAKE 2 TABLETS(100 MG) BY MOUTH EVERY EVENING    hydroCHLOROthiazide (HYDRODIURIL) 25 MG tablet Take 1 tablet (25 mg total) by mouth every other day.    valsartan (DIOVAN) 160 MG tablet Take 1 tablet (160 mg total) by mouth once daily.    "     Patient has my contact information and knows to call with any concerns or clinical changes.

## 2019-08-26 RX ORDER — METFORMIN HYDROCHLORIDE 500 MG/1
TABLET, EXTENDED RELEASE ORAL
Qty: 90 TABLET | Refills: 0 | Status: SHIPPED | OUTPATIENT
Start: 2019-08-26 | End: 2019-08-26 | Stop reason: SDUPTHER

## 2019-08-26 RX ORDER — METFORMIN HYDROCHLORIDE 500 MG/1
TABLET, EXTENDED RELEASE ORAL
Qty: 90 TABLET | Refills: 0 | Status: SHIPPED | OUTPATIENT
Start: 2019-08-26 | End: 2020-02-18 | Stop reason: SDUPTHER

## 2019-08-26 NOTE — TELEPHONE ENCOUNTER
----- Message from Faith Hurtado sent at 8/26/2019 12:30 PM CDT -----  Contact: Self Call  [phone]  Patient is calling for an RX refill or new RX.  Is this a refill or new RX: Refill   RX name and strength: hydroCHLOROthiazide (HYDRODIURIL) 25 MG tablet  Directions (copy/paste from chart): Take 1 tablet (25 mg total) by mouth every other day. - Oral   Is this a 30 day or 90 day RX:  90  Local pharmacy or mail order pharmacy:  Local   Pharmacy name and phone # (copy/paste from chart):  [facility]  [phone] (Phone)  [phone] (Fax)  Comments:  Given by RANDI Barone

## 2019-09-05 ENCOUNTER — TELEPHONE (OUTPATIENT)
Dept: PHYSICAL MEDICINE AND REHAB | Facility: CLINIC | Age: 79
End: 2019-09-05

## 2019-09-05 NOTE — TELEPHONE ENCOUNTER
----- Message from Zaida Nava sent at 9/5/2019  8:06 AM CDT -----  Contact:   Pt  [phone]  Pt  Called stating that she can not fill prescription due to not having medical necessity     Written  on it by the ..   Call  Back to verify

## 2019-09-11 ENCOUNTER — PATIENT OUTREACH (OUTPATIENT)
Dept: OTHER | Facility: OTHER | Age: 79
End: 2019-09-11

## 2019-09-16 RX ORDER — HYDROCHLOROTHIAZIDE 25 MG/1
TABLET ORAL
Qty: 60 TABLET | Refills: 0 | Status: SHIPPED | OUTPATIENT
Start: 2019-09-16 | End: 2020-02-06

## 2019-10-07 ENCOUNTER — IMMUNIZATION (OUTPATIENT)
Dept: PHARMACY | Facility: CLINIC | Age: 79
End: 2019-10-07
Payer: MEDICARE

## 2019-10-08 DIAGNOSIS — R26.9 GAIT DISORDER: ICD-10-CM

## 2019-10-08 DIAGNOSIS — M47.26 OSTEOARTHRITIS OF SPINE WITH RADICULOPATHY, LUMBAR REGION: ICD-10-CM

## 2019-10-08 DIAGNOSIS — M48.062 SPINAL STENOSIS OF LUMBAR REGION WITH NEUROGENIC CLAUDICATION: ICD-10-CM

## 2019-10-08 DIAGNOSIS — M54.40 CHRONIC BILATERAL LOW BACK PAIN WITH SCIATICA, SCIATICA LATERALITY UNSPECIFIED: ICD-10-CM

## 2019-10-08 DIAGNOSIS — E08.44 DIABETIC AMYOTROPHY ASSOCIATED WITH DIABETES MELLITUS DUE TO UNDERLYING CONDITION: ICD-10-CM

## 2019-10-08 DIAGNOSIS — G89.29 CHRONIC BILATERAL LOW BACK PAIN WITH SCIATICA, SCIATICA LATERALITY UNSPECIFIED: ICD-10-CM

## 2019-10-08 DIAGNOSIS — E11.40 TYPE 2 DIABETES MELLITUS WITH DIABETIC NEUROPATHY, WITHOUT LONG-TERM CURRENT USE OF INSULIN: ICD-10-CM

## 2019-10-08 DIAGNOSIS — M17.11 PRIMARY OSTEOARTHRITIS OF RIGHT KNEE: ICD-10-CM

## 2019-10-08 DIAGNOSIS — E11.42 DIABETIC POLYNEUROPATHY ASSOCIATED WITH TYPE 2 DIABETES MELLITUS: ICD-10-CM

## 2019-10-08 DIAGNOSIS — G82.20 PARAPARESIS OF BOTH LOWER LIMBS: ICD-10-CM

## 2019-10-08 DIAGNOSIS — M25.561 CHRONIC PAIN OF RIGHT KNEE: ICD-10-CM

## 2019-10-08 DIAGNOSIS — Z86.12 HISTORY OF POLIOMYELITIS: ICD-10-CM

## 2019-10-08 DIAGNOSIS — M54.16 LUMBAR RADICULOPATHY: ICD-10-CM

## 2019-10-08 DIAGNOSIS — M47.16 LUMBAR SPONDYLOSIS WITH MYELOPATHY: ICD-10-CM

## 2019-10-08 DIAGNOSIS — M47.816 LUMBAR SPONDYLOSIS: ICD-10-CM

## 2019-10-08 DIAGNOSIS — G89.29 CHRONIC PAIN OF RIGHT KNEE: ICD-10-CM

## 2019-10-08 DIAGNOSIS — M81.0 AGE-RELATED OSTEOPOROSIS WITHOUT CURRENT PATHOLOGICAL FRACTURE: ICD-10-CM
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
## 2019-10-08 DIAGNOSIS — G14 POST POLIOMYELITIS SYNDROME: ICD-10-CM

## 2019-10-08 DIAGNOSIS — M54.16 LEFT LUMBAR RADICULOPATHY: ICD-10-CM

## 2019-10-08 NOTE — TELEPHONE ENCOUNTER
----- Message from Karsten Santos sent at 10/8/2019  8:17 AM CDT -----  Contact: patient  Please call pt at [phone]    Refill request for HYDROcodone-acetaminophen (NORCO)  mg per tablet     Use [facility] #[id number] - [place], [place] - [address] AT [facility] OF [address] [phone] (Phone)  [phone] (Fax)    Please write medical necessary on th Rx per pt    Thank you

## 2019-10-09 NOTE — PROGRESS NOTES
"Digital Medicine: Health  Follow-Up    Patient reports she is doing well.   Patient denies any other concerns at this time.         Follow Up  Follow-up reason(s): reading review    Patient states "her readings are up and down".  Upon further discussion, patient states it hard for her to sit still.  Patient attributes spikes to moving or not sitting long enough.  Encouraged patient to try to sit and relax 5-10 minutes before taking BP readings.    Patient and I also discussed varying readings through out the week.  Patient knows to wait 1hr or more after taking BP medication to take her BP readings.       INTERVENTION(S)  reviewed monitoring technique and encouragement/support    PLAN  patient verbalizes understanding          Topic    Eye Exam        Last 5 Patient Entered Readings                                      Current 30 Day Average: 136/70     Recent Readings 10/8/2019 10/7/2019 10/5/2019 10/3/2019 10/2/2019    SBP (mmHg) 127 150 155 147 145    DBP (mmHg) 63 74 77 80 72    Pulse 77 85 73 85 92                "

## 2019-10-11 DIAGNOSIS — M25.561 CHRONIC PAIN OF RIGHT KNEE: ICD-10-CM

## 2019-10-11 DIAGNOSIS — G14 POST POLIOMYELITIS SYNDROME: ICD-10-CM

## 2019-10-11 DIAGNOSIS — R26.9 GAIT DISORDER: ICD-10-CM

## 2019-10-11 DIAGNOSIS — E08.44 DIABETIC AMYOTROPHY ASSOCIATED WITH DIABETES MELLITUS DUE TO UNDERLYING CONDITION: ICD-10-CM

## 2019-10-11 DIAGNOSIS — Z86.12 HISTORY OF POLIOMYELITIS: ICD-10-CM

## 2019-10-11 DIAGNOSIS — M47.26 OSTEOARTHRITIS OF SPINE WITH RADICULOPATHY, LUMBAR REGION: ICD-10-CM

## 2019-10-11 DIAGNOSIS — M48.062 SPINAL STENOSIS OF LUMBAR REGION WITH NEUROGENIC CLAUDICATION: ICD-10-CM

## 2019-10-11 DIAGNOSIS — M54.16 LUMBAR RADICULOPATHY: ICD-10-CM

## 2019-10-11 DIAGNOSIS — G82.20 PARAPARESIS OF BOTH LOWER LIMBS: ICD-10-CM

## 2019-10-11 DIAGNOSIS — M54.40 CHRONIC BILATERAL LOW BACK PAIN WITH SCIATICA, SCIATICA LATERALITY UNSPECIFIED: ICD-10-CM

## 2019-10-11 DIAGNOSIS — M81.0 AGE-RELATED OSTEOPOROSIS WITHOUT CURRENT PATHOLOGICAL FRACTURE: ICD-10-CM

## 2019-10-11 DIAGNOSIS — E11.42 DIABETIC POLYNEUROPATHY ASSOCIATED WITH TYPE 2 DIABETES MELLITUS: ICD-10-CM

## 2019-10-11 DIAGNOSIS — G89.29 CHRONIC PAIN OF RIGHT KNEE: ICD-10-CM

## 2019-10-11 DIAGNOSIS — M17.11 PRIMARY OSTEOARTHRITIS OF RIGHT KNEE: ICD-10-CM

## 2019-10-11 DIAGNOSIS — E11.40 TYPE 2 DIABETES MELLITUS WITH DIABETIC NEUROPATHY, WITHOUT LONG-TERM CURRENT USE OF INSULIN: ICD-10-CM

## 2019-10-11 DIAGNOSIS — M47.16 LUMBAR SPONDYLOSIS WITH MYELOPATHY: ICD-10-CM

## 2019-10-11 DIAGNOSIS — G89.29 CHRONIC BILATERAL LOW BACK PAIN WITH SCIATICA, SCIATICA LATERALITY UNSPECIFIED: ICD-10-CM
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
## 2019-10-11 DIAGNOSIS — M47.816 LUMBAR SPONDYLOSIS: ICD-10-CM

## 2019-10-11 DIAGNOSIS — M54.16 LEFT LUMBAR RADICULOPATHY: ICD-10-CM

## 2019-10-11 NOTE — TELEPHONE ENCOUNTER
----- Message from Karsten Santos sent at 10/11/2019  8:33 AM CDT -----  Contact: patient   Please call pt at [phone]    Refill request for HYDROcodone-acetaminophen (NORCO)  mg per tablet     Use [facility] #[id number] - [place], [place] - [address] AT [facility] OF [address] [phone] (Phone)  [phone] (Fax)    Patient is completely out of medication and 3rd call request    Thank you

## 2019-10-14 RX ORDER — HYDROCODONE BITARTRATE AND ACETAMINOPHEN 10; 325 MG/1; MG/1
1 TABLET ORAL EVERY 6 HOURS PRN
Qty: 120 TABLET | Refills: 0 | OUTPATIENT
Start: 2019-10-14 | End: 2019-11-13

## 2019-10-14 RX ORDER — HYDROCODONE BITARTRATE AND ACETAMINOPHEN 10; 325 MG/1; MG/1
1 TABLET ORAL EVERY 6 HOURS PRN
Qty: 120 TABLET | Refills: 0 | Status: SHIPPED | OUTPATIENT
Start: 2019-10-14 | End: 2019-11-14 | Stop reason: SDUPTHER

## 2019-10-17 DIAGNOSIS — E78.5 HYPERLIPIDEMIA, UNSPECIFIED HYPERLIPIDEMIA TYPE: ICD-10-CM

## 2019-10-17 RX ORDER — PRAVASTATIN SODIUM 40 MG/1
TABLET ORAL
Qty: 135 TABLET | Refills: 0 | Status: SHIPPED | OUTPATIENT
Start: 2019-10-17 | End: 2020-02-18

## 2019-11-12 ENCOUNTER — PATIENT OUTREACH (OUTPATIENT)
Dept: ADMINISTRATIVE | Facility: OTHER | Age: 79
End: 2019-11-12

## 2019-11-14 ENCOUNTER — HOSPITAL ENCOUNTER (OUTPATIENT)
Dept: RADIOLOGY | Facility: HOSPITAL | Age: 79
Discharge: HOME | End: 2019-11-14
Attending: PHYSICAL MEDICINE & REHABILITATION
Payer: MEDICARE

## 2019-11-14 ENCOUNTER — OFFICE VISIT (OUTPATIENT)
Dept: PHYSICAL MEDICINE AND REHAB | Facility: CLINIC | Age: 79
End: 2019-11-14
Payer: MEDICARE

## 2019-11-14 VITALS
HEIGHT: 60 IN | HEART RATE: 95 BPM | BODY MASS INDEX: 30.77 KG/M2 | DIASTOLIC BLOOD PRESSURE: 68 MMHG | WEIGHT: 156.75 LBS | SYSTOLIC BLOOD PRESSURE: 128 MMHG

## 2019-11-14 DIAGNOSIS — M47.26 OSTEOARTHRITIS OF SPINE WITH RADICULOPATHY, LUMBAR REGION: ICD-10-CM

## 2019-11-14 DIAGNOSIS — M54.16 LUMBAR RADICULOPATHY: ICD-10-CM

## 2019-11-14 DIAGNOSIS — E11.40 TYPE 2 DIABETES MELLITUS WITH DIABETIC NEUROPATHY, WITHOUT LONG-TERM CURRENT USE OF INSULIN: ICD-10-CM

## 2019-11-14 DIAGNOSIS — M19.012 PRIMARY OSTEOARTHRITIS OF LEFT SHOULDER: ICD-10-CM

## 2019-11-14 DIAGNOSIS — M47.16 LUMBAR SPONDYLOSIS WITH MYELOPATHY: ICD-10-CM

## 2019-11-14 DIAGNOSIS — M48.062 SPINAL STENOSIS OF LUMBAR REGION WITH NEUROGENIC CLAUDICATION: ICD-10-CM

## 2019-11-14 DIAGNOSIS — Z86.12 HISTORY OF POLIOMYELITIS: ICD-10-CM

## 2019-11-14 DIAGNOSIS — G14 POST POLIOMYELITIS SYNDROME: ICD-10-CM

## 2019-11-14 DIAGNOSIS — M17.11 PRIMARY OSTEOARTHRITIS OF RIGHT KNEE: ICD-10-CM

## 2019-11-14 DIAGNOSIS — R26.9 GAIT DISORDER: ICD-10-CM

## 2019-11-14 DIAGNOSIS — M47.816 LUMBAR SPONDYLOSIS: ICD-10-CM

## 2019-11-14 DIAGNOSIS — E08.44 DIABETIC AMYOTROPHY ASSOCIATED WITH DIABETES MELLITUS DUE TO UNDERLYING CONDITION: ICD-10-CM

## 2019-11-14 DIAGNOSIS — M81.0 AGE-RELATED OSTEOPOROSIS WITHOUT CURRENT PATHOLOGICAL FRACTURE: ICD-10-CM

## 2019-11-14 DIAGNOSIS — M25.561 CHRONIC PAIN OF RIGHT KNEE: ICD-10-CM

## 2019-11-14 DIAGNOSIS — G89.29 CHRONIC PAIN OF RIGHT KNEE: ICD-10-CM

## 2019-11-14 DIAGNOSIS — M54.40 CHRONIC BILATERAL LOW BACK PAIN WITH SCIATICA, SCIATICA LATERALITY UNSPECIFIED: ICD-10-CM

## 2019-11-14 DIAGNOSIS — Z79.891 OPIOID USE AGREEMENT EXISTS: Primary | ICD-10-CM

## 2019-11-14 DIAGNOSIS — G89.29 CHRONIC BILATERAL LOW BACK PAIN WITH SCIATICA, SCIATICA LATERALITY UNSPECIFIED: ICD-10-CM

## 2019-11-14 DIAGNOSIS — E11.42 DIABETIC POLYNEUROPATHY ASSOCIATED WITH TYPE 2 DIABETES MELLITUS: ICD-10-CM

## 2019-11-14 DIAGNOSIS — M54.16 LEFT LUMBAR RADICULOPATHY: ICD-10-CM
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
## 2019-11-14 DIAGNOSIS — G82.20 PARAPARESIS OF BOTH LOWER LIMBS: ICD-10-CM

## 2019-11-14 PROCEDURE — 99499 RISK ADDL DX/OHS AUDIT: ICD-10-PCS | Mod: HCNC,S$GLB,, | Performed by: PHYSICAL MEDICINE & REHABILITATION

## 2019-11-14 PROCEDURE — 73030 XR SHOULDER COMPLETE 2 OR MORE VIEWS LEFT: ICD-10-PCS | Mod: 26,HCNC,LT, | Performed by: RADIOLOGY

## 2019-11-14 PROCEDURE — 1125F AMNT PAIN NOTED PAIN PRSNT: CPT | Mod: HCNC,S$GLB,, | Performed by: PHYSICAL MEDICINE & REHABILITATION

## 2019-11-14 PROCEDURE — 99999 PR PBB SHADOW E&M-EST. PATIENT-LVL III: CPT | Mod: PBBFAC,HCNC,, | Performed by: PHYSICAL MEDICINE & REHABILITATION

## 2019-11-14 PROCEDURE — 1101F PT FALLS ASSESS-DOCD LE1/YR: CPT | Mod: HCNC,CPTII,S$GLB, | Performed by: PHYSICAL MEDICINE & REHABILITATION

## 2019-11-14 PROCEDURE — 73030 X-RAY EXAM OF SHOULDER: CPT | Mod: 26,HCNC,LT, | Performed by: RADIOLOGY

## 2019-11-14 PROCEDURE — 73030 X-RAY EXAM OF SHOULDER: CPT | Mod: TC,HCNC,LT

## 2019-11-14 PROCEDURE — 3078F PR MOST RECENT DIASTOLIC BLOOD PRESSURE < 80 MM HG: ICD-10-PCS | Mod: HCNC,CPTII,S$GLB, | Performed by: PHYSICAL MEDICINE & REHABILITATION

## 2019-11-14 PROCEDURE — 99214 PR OFFICE/OUTPT VISIT, EST, LEVL IV, 30-39 MIN: ICD-10-PCS | Mod: HCNC,S$GLB,, | Performed by: PHYSICAL MEDICINE & REHABILITATION

## 2019-11-14 PROCEDURE — 3078F DIAST BP <80 MM HG: CPT | Mod: HCNC,CPTII,S$GLB, | Performed by: PHYSICAL MEDICINE & REHABILITATION

## 2019-11-14 PROCEDURE — 99214 OFFICE O/P EST MOD 30 MIN: CPT | Mod: HCNC,S$GLB,, | Performed by: PHYSICAL MEDICINE & REHABILITATION

## 2019-11-14 PROCEDURE — 3074F SYST BP LT 130 MM HG: CPT | Mod: HCNC,CPTII,S$GLB, | Performed by: PHYSICAL MEDICINE & REHABILITATION

## 2019-11-14 PROCEDURE — 1101F PR PT FALLS ASSESS DOC 0-1 FALLS W/OUT INJ PAST YR: ICD-10-PCS | Mod: HCNC,CPTII,S$GLB, | Performed by: PHYSICAL MEDICINE & REHABILITATION

## 2019-11-14 PROCEDURE — 1125F PR PAIN SEVERITY QUANTIFIED, PAIN PRESENT: ICD-10-PCS | Mod: HCNC,S$GLB,, | Performed by: PHYSICAL MEDICINE & REHABILITATION

## 2019-11-14 PROCEDURE — 99999 PR PBB SHADOW E&M-EST. PATIENT-LVL III: ICD-10-PCS | Mod: PBBFAC,HCNC,, | Performed by: PHYSICAL MEDICINE & REHABILITATION

## 2019-11-14 PROCEDURE — 1159F MED LIST DOCD IN RCRD: CPT | Mod: HCNC,S$GLB,, | Performed by: PHYSICAL MEDICINE & REHABILITATION

## 2019-11-14 PROCEDURE — 1159F PR MEDICATION LIST DOCUMENTED IN MEDICAL RECORD: ICD-10-PCS | Mod: HCNC,S$GLB,, | Performed by: PHYSICAL MEDICINE & REHABILITATION

## 2019-11-14 PROCEDURE — 99499 UNLISTED E&M SERVICE: CPT | Mod: HCNC,S$GLB,, | Performed by: PHYSICAL MEDICINE & REHABILITATION

## 2019-11-14 PROCEDURE — 3074F PR MOST RECENT SYSTOLIC BLOOD PRESSURE < 130 MM HG: ICD-10-PCS | Mod: HCNC,CPTII,S$GLB, | Performed by: PHYSICAL MEDICINE & REHABILITATION

## 2019-11-14 RX ORDER — HYDROCODONE BITARTRATE AND ACETAMINOPHEN 10; 325 MG/1; MG/1
1 TABLET ORAL EVERY 6 HOURS PRN
Qty: 120 TABLET | Refills: 0 | Status: SHIPPED | OUTPATIENT
Start: 2019-12-14 | End: 2020-01-13

## 2019-11-14 RX ORDER — DICLOFENAC SODIUM 10 MG/G
2 GEL TOPICAL 4 TIMES DAILY
Qty: 500 G | Refills: 3 | Status: SHIPPED | OUTPATIENT
Start: 2019-11-14 | End: 2019-12-14

## 2019-11-14 RX ORDER — HYDROCODONE BITARTRATE AND ACETAMINOPHEN 10; 325 MG/1; MG/1
1 TABLET ORAL EVERY 6 HOURS PRN
Qty: 120 TABLET | Refills: 0 | Status: SHIPPED | OUTPATIENT
Start: 2019-11-14 | End: 2019-12-14

## 2019-11-14 RX ORDER — HYDROCODONE BITARTRATE AND ACETAMINOPHEN 10; 325 MG/1; MG/1
1 TABLET ORAL EVERY 6 HOURS PRN
Qty: 120 TABLET | Refills: 0 | Status: SHIPPED | OUTPATIENT
Start: 2020-01-14 | End: 2020-02-28 | Stop reason: SDUPTHER

## 2019-11-14 NOTE — PROGRESS NOTES
"    Subjective:       Patient ID: Emilia Alan is a 79 y.o. female.    Chief Complaint: Back Pain    Extremity Weakness    Pertinent negatives include no fever or numbness.   Back Pain   Associated symptoms include leg pain. Pertinent negatives include no abdominal pain, chest pain, fever, numbness or weakness. Headaches:     Leg Pain    Pertinent negatives include no numbness.   Knee Pain    Pertinent negatives include no numbness.     Ms. 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  She has B LE weakness, paraparesis, secondary to severe Lumbar spinal stenosis at L3-4, and L4-5, complicated with  Leg length discrepancy ( left is shorter than right)  ,with severe DJD of both knees , genu valgus in R knee.  LCV 06/27/19.   Today, she c/o Left shoulder pain, for last 2 months. It is severe and she cannot stand, pain medications do not help.    She states that she is " going downhill"  She reports no changes in her pain, no new injury nor fall since LCV.  Her pain is well controlled with current pain regimen.   Patient takes  Hydrocodone 10/325 mg, takes 3-4 x/day, and Neurontin 600 mg, po QID, tolerates it well, and she knows that both medications are helping.  But she reports more weakness in legs, and  difficulties straightening up.   Nevertheless, she does not want to get new MRI of LS, nor she wants to be evaluated by NS, nor she wants to get ASHLEY.    She is here for follow up visit for back pain, leg weakness, and chronic pain management.   She has paraparesis, a right leg is weaker than Left leg- that is weak in ankle.   She still does not want any new imaging, nor ASHLEY, nor NS evaluation.  She cannot actively  Right LE, states that is getting weaker,still walks short distances, but majority of time she spent sitting.    Today, she states that she is slowing down, she walks slower, and can do less than in " past. She also complains about back pain.   Current back pain is 7, worst pain is 9-10/10 while upright and walking.    Pain is localized across lower back and in upper spine.    Back pain is off/on, present mainly during day time, sharp, severe ,stabbing pain.  Pain is worse with lying or sitting and getting up from chair.   With that pain she is afraid she will fall down, since she gets more weakness in Right leg.   Complains also about right knee pain, that is weak, and goes backwards   during walking.   Patient takes  Hydrocodone 10/325 mg, takes 3-4 x/day, and Neurontin 600 mg, po QID, tolerates it well, and she knows that both medications are helping.  Patient refuses neurosurgical evaluation, and  ASHLEY to back.   The patient has had gradual worsening of her gait over the last few years.   She was prescribed a left AFO ( that she wears).   She cannot tolerate swedish knee cage as well.   She also has a neoprene sleee brace , to stabilized knee, and to take pressure off bone.   Right hinge knee brace that is all warned up,since she wears it all the time.  She continues to complain of progressive bilateral lower extremity weakness.   She reports frequent falls, especially in the last 3-6 months.   The patient lives in a single-silvia home with a ramp access.   She is independent with her dressing, feeding and grooming.   She is also independent with toileting and bathing using a shower chair.   She is able to ambulate with single cane, RW.  She can walk about 20 feet, but has to stop frequently.   She does better with a Rollator walker.   She has manual wheel chair that is bulky, and she cannot propel, RW with seat, cane and RW.   She is restricted by lower extremity weakness, especially on the right side as noted above, she has frequent falls.  She did not sustain any significant injuries.   She recieved a new SCOOTER, and it is helping her greatly.  She uses it for linger distances, but tries to walk as much as  possible leslye. Inside the house, with her braces, and  RW with seat.    She is here for follow up, and chronic pain management with opiates.    Past Medical History:   Diagnosis Date    Allergy     Anemia 10/20/2014    Arthritis     Atherosclerosis of artery of right lower extremity: see xray 07    Diabetes mellitus     Diabetic neuropathy 2012    Gait disorder 2012    High cholesterol     History of poliomyelitis 2012    Hyperlipidemia 2013    Hypertension     Obstructive sleep apnea syndrome: dx 2008 needs CPAP 11 2017    Osteopenia     Osteoporosis, unspecified 2014    Other specified anemias 2015    Post poliomyelitis syndrome 2012    Renal manifestation of secondary diabetes mellitus     Sleep apnea     Type II or unspecified type diabetes mellitus without mention of complication, not stated as uncontrolled 2015    Unspecified essential hypertension 2015       Past Surgical History:   Procedure Laterality Date    CATARACT EXTRACTION       SECTION      CHOLECYSTECTOMY      COLONOSCOPY N/A 2017    Procedure: COLONOSCOPY;  Surgeon: Karen Lebron MD;  Location: [facility] ([address] [place] [address]);  Service: Endoscopy;  Laterality: N/A;    EYE SURGERY      FRACTURE SURGERY         Family History   Problem Relation Age of Onset    Diabetes Father     Heart disease Father     Heart attack Father     Heart disease Brother     No Known Problems Daughter     Diabetes Son     Heart disease Brother     Diabetes Daughter     Diabetes Daughter     Cataracts Neg Hx     Glaucoma Neg Hx     Hypertension Neg Hx     Cancer Neg Hx     Blindness Neg Hx     Amblyopia Neg Hx     Strabismus Neg Hx     Retinal detachment Neg Hx     Macular degeneration Neg Hx     Melanoma Neg Hx        Social History     Socioeconomic History    Marital status:      Spouse name: Not on file    Number of children: 4    Years of education: Not  on file    Highest education level: Not on file   Occupational History    Not on file   Social Needs    Financial resource strain: Not on file    Food insecurity:     Worry: Not on file     Inability: Not on file    Transportation needs:     Medical: Not on file     Non-medical: Not on file   Tobacco Use    Smoking status: Never Smoker    Smokeless tobacco: Never Used   Substance and Sexual Activity    Alcohol use: No    Drug use: No    Sexual activity: Never   Lifestyle    Physical activity:     Days per week: Not on file     Minutes per session: Not on file    Stress: Not on file   Relationships    Social connections:     Talks on phone: Not on file     Gets together: Not on file     Attends Congregation service: Not on file     Active member of club or organization: Not on file     Attends meetings of clubs or organizations: Not on file     Relationship status: Not on file   Other Topics Concern    Are you pregnant or think you may be? No    Breast-feeding Not Asked   Social History Narrative    Not on file       Current Outpatient Medications   Medication Sig Dispense Refill    amLODIPine (NORVASC) 10 MG tablet TAKE 1 TABLET(10 MG) BY MOUTH EVERY DAY 90 tablet 3    aspirin (ECOTRIN) 81 MG EC tablet Take 1 tablet (81 mg total) by mouth once daily. 30 tablet 12    blood sugar diagnostic Strp 1 strip by Misc.(Non-Drug; Combo Route) route 2 (two) times daily. TRUE RESULT SYSTEM 180 strip 4    blood-glucose meter (TRUERESULT BLOOD GLUCOSE SYSTM) kit Use as instructed 1 each 0    calcium-vitamin D3-vitamin K (VIACTIV) 500-100-40 mg-unit-mcg Chew Take 2 each by mouth.      clotrimazole-betamethasone 1-0.05% (LOTRISONE) cream Apply topically 2 (two) times daily. 45 g 3    desloratadine (CLARINEX) 5 mg tablet Take 5 mg by mouth once daily.      diclofenac sodium (VOLTAREN) 1 % Gel Apply 2 g topically 4 (four) times daily. Apply to B knee, and L shoulder 500 g 3    docusate sodium (COLACE) 50 MG  capsule Take 1 capsule (50 mg total) by mouth 2 (two) times daily as needed for Constipation.      gabapentin (NEURONTIN) 600 MG tablet Take 1 tablet (600 mg total) by mouth 4 (four) times daily with meals and nightly. 360 tablet 3    glipiZIDE (GLUCOTROL) 5 MG tablet TAKE 1 TABLET(5 MG) BY MOUTH EVERY DAY 90 tablet 3    hydrALAZINE (APRESOLINE) 50 MG tablet TAKE 1 TABLET(50 MG) BY MOUTH EVERY MORNING AND EVERY AFTERNOON THEN TAKE 2 TABLETS(100 MG) BY MOUTH EVERY EVENING 360 tablet 3    hydroCHLOROthiazide (HYDRODIURIL) 25 MG tablet TAKE 1 TABLET(25 MG) BY MOUTH EVERY OTHER DAY 60 tablet 0    HYDROcodone-acetaminophen (NORCO)  mg per tablet Take 1 tablet by mouth every 6 (six) hours as needed for Pain (pain). 120 tablet 0    [START ON 12/14/2019] HYDROcodone-acetaminophen (NORCO)  mg per tablet Take 1 tablet by mouth every 6 (six) hours as needed for Pain (pain). 120 tablet 0    [START ON 1/14/2020] HYDROcodone-acetaminophen (NORCO)  mg per tablet Take 1 tablet by mouth every 6 (six) hours as needed for Pain (pain). 120 tablet 0    lancets Misc TEST 2 X DAILY PROSPER RESULT SYSTEM 180 each 3    metFORMIN (GLUCOPHAGE-XR) 500 MG 24 hr tablet TAKE 1 TABLET(500 MG) BY MOUTH EVERY DAY 90 tablet 3    metFORMIN (GLUCOPHAGE-XR) 500 MG 24 hr tablet TAKE 1 TABLET(500 MG) BY MOUTH EVERY DAY 90 tablet 0    multivitamin (THERAGRAN) per tablet Take 1 tablet by mouth once daily.       oxybutynin (DITROPAN) 5 MG Tab TAKE 1 TABLET(5 MG) BY MOUTH TWICE DAILY 180 tablet 3    pravastatin (PRAVACHOL) 40 MG tablet TAKE 1 AND 1/2 TABLETS(60 MG) BY MOUTH EVERY NIGHT 135 tablet 3    pravastatin (PRAVACHOL) 40 MG tablet TAKE 1 AND 1/2 TABLETS(60 MG) BY MOUTH EVERY NIGHT 135 tablet 0    TRUEPLUS LANCETS 33 gauge Misc USE BID  3    valsartan (DIOVAN) 320 MG tablet Take 1 tablet (320 mg total) by mouth every morning. 90 tablet 1     No current facility-administered medications for this visit.        Review of  patient's allergies indicates:  No Known Allergies    Review of Systems   Constitutional: Negative for appetite change, chills, fatigue, fever and unexpected weight change.   HENT: Negative for drooling, trouble swallowing and voice change.    Eyes: Negative for pain and visual disturbance.   Respiratory: Negative for shortness of breath and wheezing.    Cardiovascular: Negative for chest pain and palpitations.   Gastrointestinal: Negative for abdominal distention, abdominal pain, constipation and diarrhea.   Genitourinary: Negative for difficulty urinating.   Musculoskeletal: Positive for back pain and neck pain. Negative for arthralgias (left shoulder hurts), gait problem, joint swelling, myalgias and neck stiffness.               Skin: Negative for color change and rash.   Neurological: Negative for dizziness, facial asymmetry, speech difficulty, weakness, light-headedness and numbness. Headaches:     Hematological: Negative for adenopathy.   Psychiatric/Behavioral: Negative for behavioral problems, confusion and sleep disturbance. The patient is not nervous/anxious.        Objective:      Physical Exam    Constitutional: She is oriented to person, place, and time.   She appears well-developed and well-nourished.   HENT:   Head: Normocephalic.   Eyes: EOM are normal.   Neck: Normal range of motion.   Cardiovascular: Normal rate, regular rhythm and normal heart sounds.   Pulmonary/Chest: Breath sounds normal.   Musculoskeletal: Normal range of motion.   BUE:  ROM:full.  Strength: 5/5 at shoulders, elbows & hands.  Sensation to pinprick: intact  BLE: ROM:full.  Strength:   RLE: HF 0/5, KE 0/5,   Ankle DF 2-, Ankle PF 3  LLE:   HF 3-, KE 3-.  Ankle DF 1,  Ankle PF 3  Leg length discrepancy ( left is shorter than right), wears Left AFO.   Sensation to pinprick: intact .   DTR: decreased.       Xray of Right knee ( 2014)  Showed:  Standing AP knees and lateral of the right knee and merchant view of both knees are  submitted.    Advanced degenerative change seen in the tricompartmental areas of the right knee.    Left knee shows mild degenerative change.  Both patellas show significant lateral deviation    MRI of Lumbar spine  ( 2015) ;  L2-L3:There is a circumferential disk bulge with moderate bilateral facet osseous hypertrophy and ligamentum flavum buckling. This results and mild narrowing of the spinal canal. The bilateral neural foramen remain patent.  L3-L4: There is a circumferential disk bulge with left paracentral disk protrusion. This is associated with severe bilateral facet osseous hypertrophy, bilateral facet edema, and ligamentum flavum buckling.   These findings result in severe narrowing of the spinal canal and near complete obliteration of the left neural foramen.  The right neural foramen is moderately narrowed as well.  L4-L5: There is a circumferential disk bulge with superimposed central disk protrusion. This is associated with severe bilateral facet osseous hypertrophy and ligamentum flavum buckling.   These findings result in severe narrowing of the spinal canal and bilateral neural foramina, left greater than right.     Assessment:       1. Opioid use agreement exists    2. Spinal stenosis of lumbar region with neurogenic claudication    3. Paraparesis of both lower limbs    4. Post poliomyelitis syndrome    5. Gait disorder    6. Lumbar spondylosis    7. Lumbar radiculopathy    8. Osteoarthritis of spine with radiculopathy, lumbar region    9. History of poliomyelitis    10. Lumbar spondylosis with myelopathy    11. Diabetic polyneuropathy associated with type 2 diabetes mellitus    12. Primary osteoarthritis of right knee    13. 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 Type 2 diabetes mellitus with diabetic neuropathy, without long-term current use of insulin    15. Chronic bilateral low back pain with sciatica, sciatica laterality unspecified    16.  Fall, sequela    17. Left lumbar radiculopathy    18. Chronic pain of right knee    19. Osteoporosis without current pathological fracture: worse on fosamax 5/16- Reclast 8/16    20. Primary osteoarthritis of left shoulder       Plan:        Opioid use agreement exists  -     Pain Clinic Drug Screen; Future; Expected date: 11/14/2019    Spinal stenosis of lumbar region with neurogenic claudication  -     HYDROcodone-acetaminophen (NORCO)  mg per tablet; Take 1 tablet by mouth every 6 (six) hours as needed for Pain (pain).  Dispense: 120 tablet; Refill: 0  -     HYDROcodone-acetaminophen (NORCO)  mg per tablet; Take 1 tablet by mouth every 6 (six) hours as needed for Pain (pain).  Dispense: 120 tablet; Refill: 0  -     HYDROcodone-acetaminophen (NORCO)  mg per tablet; Take 1 tablet by mouth every 6 (six) hours as needed for Pain (pain).  Dispense: 120 tablet; Refill: 0    Paraparesis of both lower limbs  -     HYDROcodone-acetaminophen (NORCO)  mg per tablet; Take 1 tablet by mouth every 6 (six) hours as needed for Pain (pain).  Dispense: 120 tablet; Refill: 0  -     HYDROcodone-acetaminophen (NORCO)  mg per tablet; Take 1 tablet by mouth every 6 (six) hours as needed for Pain (pain).  Dispense: 120 tablet; Refill: 0  -     HYDROcodone-acetaminophen (NORCO)  mg per tablet; Take 1 tablet by mouth every 6 (six) hours as needed for Pain (pain).  Dispense: 120 tablet; Refill: 0    Post poliomyelitis syndrome  -     HYDROcodone-acetaminophen (NORCO)  mg per tablet; Take 1 tablet by mouth every 6 (six) hours as needed for Pain (pain).  Dispense: 120 tablet; Refill: 0  -     HYDROcodone-acetaminophen (NORCO)  mg per tablet; Take 1 tablet by mouth every 6 (six) hours as needed for Pain (pain).  Dispense: 120 tablet; Refill: 0  -     HYDROcodone-acetaminophen (NORCO)  mg per tablet; Take 1 tablet by mouth every 6 (six) hours as needed for Pain (pain).  Dispense: 120  tablet; Refill: 0    Gait disorder  -     HYDROcodone-acetaminophen (NORCO)  mg per tablet; Take 1 tablet by mouth every 6 (six) hours as needed for Pain (pain).  Dispense: 120 tablet; Refill: 0  -     HYDROcodone-acetaminophen (NORCO)  mg per tablet; Take 1 tablet by mouth every 6 (six) hours as needed for Pain (pain).  Dispense: 120 tablet; Refill: 0  -     HYDROcodone-acetaminophen (NORCO)  mg per tablet; Take 1 tablet by mouth every 6 (six) hours as needed for Pain (pain).  Dispense: 120 tablet; Refill: 0    Lumbar spondylosis  -     HYDROcodone-acetaminophen (NORCO)  mg per tablet; Take 1 tablet by mouth every 6 (six) hours as needed for Pain (pain).  Dispense: 120 tablet; Refill: 0  -     HYDROcodone-acetaminophen (NORCO)  mg per tablet; Take 1 tablet by mouth every 6 (six) hours as needed for Pain (pain).  Dispense: 120 tablet; Refill: 0  -     HYDROcodone-acetaminophen (NORCO)  mg per tablet; Take 1 tablet by mouth every 6 (six) hours as needed for Pain (pain).  Dispense: 120 tablet; Refill: 0    Lumbar radiculopathy  -     HYDROcodone-acetaminophen (NORCO)  mg per tablet; Take 1 tablet by mouth every 6 (six) hours as needed for Pain (pain).  Dispense: 120 tablet; Refill: 0  -     HYDROcodone-acetaminophen (NORCO)  mg per tablet; Take 1 tablet by mouth every 6 (six) hours as needed for Pain (pain).  Dispense: 120 tablet; Refill: 0  -     HYDROcodone-acetaminophen (NORCO)  mg per tablet; Take 1 tablet by mouth every 6 (six) hours as needed for Pain (pain).  Dispense: 120 tablet; Refill: 0    Osteoarthritis of spine with radiculopathy, lumbar region  -     HYDROcodone-acetaminophen (NORCO)  mg per tablet; Take 1 tablet by mouth every 6 (six) hours as needed for Pain (pain).  Dispense: 120 tablet; Refill: 0  -     HYDROcodone-acetaminophen (NORCO)  mg per tablet; Take 1 tablet by mouth every 6 (six) hours as needed for Pain (pain).  Dispense: 120  tablet; Refill: 0  -     HYDROcodone-acetaminophen (NORCO)  mg per tablet; Take 1 tablet by mouth every 6 (six) hours as needed for Pain (pain).  Dispense: 120 tablet; Refill: 0    History of poliomyelitis  -     HYDROcodone-acetaminophen (NORCO)  mg per tablet; Take 1 tablet by mouth every 6 (six) hours as needed for Pain (pain).  Dispense: 120 tablet; Refill: 0  -     HYDROcodone-acetaminophen (NORCO)  mg per tablet; Take 1 tablet by mouth every 6 (six) hours as needed for Pain (pain).  Dispense: 120 tablet; Refill: 0  -     HYDROcodone-acetaminophen (NORCO)  mg per tablet; Take 1 tablet by mouth every 6 (six) hours as needed for Pain (pain).  Dispense: 120 tablet; Refill: 0    Lumbar spondylosis with myelopathy  -     HYDROcodone-acetaminophen (NORCO)  mg per tablet; Take 1 tablet by mouth every 6 (six) hours as needed for Pain (pain).  Dispense: 120 tablet; Refill: 0  -     HYDROcodone-acetaminophen (NORCO)  mg per tablet; Take 1 tablet by mouth every 6 (six) hours as needed for Pain (pain).  Dispense: 120 tablet; Refill: 0  -     HYDROcodone-acetaminophen (NORCO)  mg per tablet; Take 1 tablet by mouth every 6 (six) hours as needed for Pain (pain).  Dispense: 120 tablet; Refill: 0    Diabetic polyneuropathy associated with type 2 diabetes mellitus  -     HYDROcodone-acetaminophen (NORCO)  mg per tablet; Take 1 tablet by mouth every 6 (six) hours as needed for Pain (pain).  Dispense: 120 tablet; Refill: 0  -     HYDROcodone-acetaminophen (NORCO)  mg per tablet; Take 1 tablet by mouth every 6 (six) hours as needed for Pain (pain).  Dispense: 120 tablet; Refill: 0  -     HYDROcodone-acetaminophen (NORCO)  mg per tablet; Take 1 tablet by mouth every 6 (six) hours as needed for Pain (pain).  Dispense: 120 tablet; Refill: 0    Primary osteoarthritis of right knee  -     HYDROcodone-acetaminophen (NORCO)  mg per tablet; Take 1 tablet by mouth every 6  (six) hours as needed for Pain (pain).  Dispense: 120 tablet; Refill: 0  -     HYDROcodone-acetaminophen (NORCO)  mg per tablet; Take 1 tablet by mouth every 6 (six) hours as needed for Pain (pain).  Dispense: 120 tablet; Refill: 0  -     HYDROcodone-acetaminophen (NORCO)  mg per tablet; Take 1 tablet by mouth every 6 (six) hours as needed for Pain (pain).  Dispense: 120 tablet; Refill: 0  -     diclofenac sodium (VOLTAREN) 1 % Gel; Apply 2 g topically 4 (four) times daily. 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  Dispense: 120 tablet; Refill: 0  -     HYDROcodone-acetaminophen (NORCO)  mg per tablet; Take 1 tablet by mouth every 6 (six) hours as needed for Pain (pain).  Dispense: 120 tablet; Refill: 0  -     HYDROcodone-acetaminophen (NORCO)  mg per tablet; Take 1 tablet by mouth every 6 (six) hours as needed for Pain (pain).  Dispense: 120 tablet; Refill: 0    Type 2 diabetes mellitus with diabetic neuropathy, without long-term current use of insulin  -     HYDROcodone-acetaminophen (NORCO)  mg per tablet; Take 1 tablet by mouth every 6 (six) hours as needed for Pain (pain).  Dispense: 120 tablet; Refill: 0  -     HYDROcodone-acetaminophen (NORCO)  mg per tablet; Take 1 tablet by mouth every 6 (six) hours as needed for Pain (pain).  Dispense: 120 tablet; Refill: 0  -     HYDROcodone-acetaminophen (NORCO)  mg per tablet; Take 1 tablet by mouth every 6 (six) hours as needed for Pain (pain).  Dispense: 120 tablet; Refill: 0    Chronic bilateral low back pain with sciatica, sciatica laterality unspecified  -     HYDROcodone-acetaminophen (NORCO)  mg per tablet; Take 1 tablet by mouth every 6 (six) hours as needed for Pain (pain).   Dispense: 120 tablet; Refill: 0  -     HYDROcodone-acetaminophen (NORCO)  mg per tablet; Take 1 tablet by mouth every 6 (six) hours as needed for Pain (pain).  Dispense: 120 tablet; Refill: 0  -     HYDROcodone-acetaminophen (NORCO)  mg per tablet; Take 1 tablet by mouth every 6 (six) hours as needed for Pain (pain).  Dispense: 120 tablet; Refill: 0    Fall, sequela  -     HYDROcodone-acetaminophen (NORCO)  mg per tablet; Take 1 tablet by mouth every 6 (six) hours as needed for Pain (pain).  Dispense: 120 tablet; Refill: 0  -     HYDROcodone-acetaminophen (NORCO)  mg per tablet; Take 1 tablet by mouth every 6 (six) hours as needed for Pain (pain).  Dispense: 120 tablet; Refill: 0  -     HYDROcodone-acetaminophen (NORCO)  mg per tablet; Take 1 tablet by mouth every 6 (six) hours as needed for Pain (pain).  Dispense: 120 tablet; Refill: 0    Left lumbar radiculopathy  -     HYDROcodone-acetaminophen (NORCO)  mg per tablet; Take 1 tablet by mouth every 6 (six) hours as needed for Pain (pain).  Dispense: 120 tablet; Refill: 0  -     HYDROcodone-acetaminophen (NORCO)  mg per tablet; Take 1 tablet by mouth every 6 (six) hours as needed for Pain (pain).  Dispense: 120 tablet; Refill: 0  -     HYDROcodone-acetaminophen (NORCO)  mg per tablet; Take 1 tablet by mouth every 6 (six) hours as needed for Pain (pain).  Dispense: 120 tablet; Refill: 0    Chronic pain of right knee  -     HYDROcodone-acetaminophen (NORCO)  mg per tablet; Take 1 tablet by mouth every 6 (six) hours as needed for Pain (pain).  Dispense: 120 tablet; Refill: 0  -     HYDROcodone-acetaminophen (NORCO)  mg per tablet; Take 1 tablet by mouth every 6 (six) hours as needed for Pain (pain).  Dispense: 120 tablet; Refill: 0  -     HYDROcodone-acetaminophen (NORCO)  mg per tablet; Take 1 tablet by mouth every 6 (six) hours as needed for Pain (pain).  Dispense: 120 tablet; Refill: 0  -      diclofenac sodium (VOLTAREN) 1 % Gel; Apply 2 g topically 4 (four) times daily. Apply to B knee, and L shoulder  Dispense: 500 g; Refill: 3    Osteoporosis without current pathological fracture: worse on fosamax 5/16- Reclast 8/16  -     HYDROcodone-acetaminophen (NORCO)  mg per tablet; Take 1 tablet by mouth every 6 (six) hours as needed for Pain (pain).  Dispense: 120 tablet; Refill: 0  -     HYDROcodone-acetaminophen (NORCO)  mg per tablet; Take 1 tablet by mouth every 6 (six) hours as needed for Pain (pain).  Dispense: 120 tablet; Refill: 0  -     HYDROcodone-acetaminophen (NORCO)  mg per tablet; Take 1 tablet by mouth every 6 (six) hours as needed for Pain (pain).  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   Also with severe DJD , genu valgus in R knee ( cannot toleate custom made  knee brace, nor Swedish knee cage).   She also wears  neoprene sleee brace , that stabilizes knee, and improves proprioception, helps with gait.    1. Back pain   Will resume Hydrocodone 10/325 mg, takes 3-4 x/day, and Neurontin 600 mg, po QID.   Opioid Risk Score       Value Time User    Opioid Risk Score  0 4/11/2018 10:39 AM Krista Burger MD         reviewed and appropriate.  Hydrocodone refills on 10/14, 09/05, 08/04, 07/05,  06/04, 05/02, 04/02,  02/01/19, 01/02/19, 12/01, 10/30/18.  UDS done and appropriately positive ( states that her insurance changred her $ 200  ( total cost was $ 800)   UDS ordered again for this year.    2. Patient refuses new MRI of LS, neurosurgical evaluation, and   ASHLEY to back.     3. Left shoulder pain, most likely secondary to DJD/OA.  Xray of shoulder.  Refer to Ortho ( refuses steroid injection to shoulder, refuses OT.   Voltaren gel topical.    Resume pain meds.    RTC in 4 months..    Total time spent face to face with patient was 25 minutes.   More than 50% of that time was spent in counseling on diagnosis , prognosis and treatment options.   I also caunsel patient  on common and most usual side effect of prescribed medications.   Risk and benefits of opiates, possible risk of developing opiate dependence and tolerance, need of strict compliance with prescribed medications.  I reviewed Primary care , and other specialty's notes to better coordinate patient's  care.   All questions were answered, and patient voiced understanding.

## 2019-11-18 ENCOUNTER — PATIENT OUTREACH (OUTPATIENT)
Dept: ADMINISTRATIVE | Facility: OTHER | Age: 79
End: 2019-11-18

## 2019-11-19 ENCOUNTER — PATIENT OUTREACH (OUTPATIENT)
Dept: OTHER | Facility: OTHER | Age: 79
End: 2019-11-19

## 2019-11-19 DIAGNOSIS — I10 SEVERE HYPERTENSION: Primary | ICD-10-CM

## 2019-11-19 RX ORDER — VALSARTAN 320 MG/1
320 TABLET ORAL EVERY MORNING
Qty: 90 TABLET | Refills: 1 | Status: SHIPPED | OUTPATIENT
Start: 2019-11-19 | End: 2020-02-14 | Stop reason: SDUPTHER

## 2019-11-19 NOTE — PROGRESS NOTES
Digital Medicine: Clinician Follow-Up    Called patient for digital medicine follow-up. Overall, she is doing well. Blood pressure has been trending up consistently despite correcting technique and charging cuff. Patient denies change in diet and sodium intake or use of OTC medications. Previously on valsartan 320 mg in the past however, dose was reduced when thiazide was increased to combat MANDY. Patient experienced decline in renal function and thiazide was ultimately with no changes in valsartan dosing.     The history is provided by the patient. No  was used.     Follow Up  Follow-up reason(s): reading review      Readings are trending up       INTERVENTION(S)  recommended med change    PLAN  patient amenable to changes    Current 30-day average is slightly above goal of <140/90 mmHg.  Resume valsartan 320 mg daily and continue other medications as prescribed.   Follow-up in 2-3 weeks.           Topic    Eye Exam        Last 5 Patient Entered Readings                                      Current 30 Day Average: 141/72     Recent Readings 11/18/2019 11/17/2019 11/16/2019 11/16/2019 11/14/2019    SBP (mmHg) 155 154 188 171 141    DBP (mmHg) 85 72 105 78 49    Pulse 95 85 97 87 77           Hypertension Medications             amLODIPine (NORVASC) 10 MG tablet TAKE 1 TABLET(10 MG) BY MOUTH EVERY DAY    hydrALAZINE (APRESOLINE) 50 MG tablet TAKE 1 TABLET(50 MG) BY MOUTH EVERY MORNING AND EVERY AFTERNOON THEN TAKE 2 TABLETS(100 MG) BY MOUTH EVERY EVENING    hydroCHLOROthiazide (HYDRODIURIL) 25 MG tablet TAKE 1 TABLET(25 MG) BY MOUTH EVERY OTHER DAY    valsartan (DIOVAN) 320 MG tablet Take 1 tablet (320 mg total) by mouth every morning.

## 2019-11-20 ENCOUNTER — TELEPHONE (OUTPATIENT)
Dept: PODIATRY | Facility: CLINIC | Age: 79
End: 2019-11-20

## 2019-11-20 NOTE — TELEPHONE ENCOUNTER
----- Message from Janki Spear sent at 11/20/2019  2:22 PM CST -----  Contact: Pt   Pt calling in to reschedule her missed appointment  She can be reached at [phone]    Thanks

## 2019-11-20 NOTE — TELEPHONE ENCOUNTER
Called darrion back and sched appt for Dec. Darrion was o.k. With that, told her I will send it out to her house

## 2019-11-27 ENCOUNTER — PATIENT OUTREACH (OUTPATIENT)
Dept: ADMINISTRATIVE | Facility: OTHER | Age: 79
End: 2019-11-27

## 2019-12-02 ENCOUNTER — HOSPITAL ENCOUNTER (OUTPATIENT)
Dept: RADIOLOGY | Facility: HOSPITAL | Age: 79
Discharge: HOME | End: 2019-12-02
Attending: ORTHOPAEDIC SURGERY
Payer: MEDICARE

## 2019-12-02 ENCOUNTER — OFFICE VISIT (OUTPATIENT)
Dept: SPORTS MEDICINE | Facility: CLINIC | Age: 79
End: 2019-12-02
Payer: MEDICARE

## 2019-12-02 VITALS
HEART RATE: 83 BPM | SYSTOLIC BLOOD PRESSURE: 153 MMHG | HEIGHT: 60 IN | BODY MASS INDEX: 30.63 KG/M2 | WEIGHT: 156 LBS | DIASTOLIC BLOOD PRESSURE: 77 MMHG

## 2019-12-02 DIAGNOSIS — M25.512 LEFT SHOULDER PAIN, UNSPECIFIED CHRONICITY: ICD-10-CM

## 2019-12-02 DIAGNOSIS — M19.012 PRIMARY OSTEOARTHRITIS OF LEFT SHOULDER: Primary | ICD-10-CM

## 2019-12-02 PROCEDURE — 99999 PR PBB SHADOW E&M-EST. PATIENT-LVL V: CPT | Mod: PBBFAC,HCNC,, | Performed by: ORTHOPAEDIC SURGERY

## 2019-12-02 PROCEDURE — 20610 LARGE JOINT ASPIRATION/INJECTION: L SUBACROMIAL BURSA: ICD-10-PCS | Mod: HCNC,LT,S$GLB, | Performed by: ORTHOPAEDIC SURGERY

## 2019-12-02 PROCEDURE — 99999 PR PBB SHADOW E&M-EST. PATIENT-LVL V: ICD-10-PCS | Mod: PBBFAC,HCNC,, | Performed by: ORTHOPAEDIC SURGERY

## 2019-12-02 PROCEDURE — 1101F PT FALLS ASSESS-DOCD LE1/YR: CPT | Mod: HCNC,CPTII,S$GLB, | Performed by: ORTHOPAEDIC SURGERY

## 2019-12-02 PROCEDURE — 1159F PR MEDICATION LIST DOCUMENTED IN MEDICAL RECORD: ICD-10-PCS | Mod: HCNC,S$GLB,, | Performed by: ORTHOPAEDIC SURGERY

## 2019-12-02 PROCEDURE — 99203 PR OFFICE/OUTPT VISIT, NEW, LEVL III, 30-44 MIN: ICD-10-PCS | Mod: 25,HCNC,S$GLB, | Performed by: ORTHOPAEDIC SURGERY

## 2019-12-02 PROCEDURE — 3077F SYST BP >= 140 MM HG: CPT | Mod: HCNC,CPTII,S$GLB, | Performed by: ORTHOPAEDIC SURGERY

## 2019-12-02 PROCEDURE — 73030 XR SHOULDER COMPLETE 2 OR MORE VIEWS LEFT: ICD-10-PCS | Mod: 26,HCNC,LT, | Performed by: RADIOLOGY

## 2019-12-02 PROCEDURE — 3078F PR MOST RECENT DIASTOLIC BLOOD PRESSURE < 80 MM HG: ICD-10-PCS | Mod: HCNC,CPTII,S$GLB, | Performed by: ORTHOPAEDIC SURGERY

## 2019-12-02 PROCEDURE — 99203 OFFICE O/P NEW LOW 30 MIN: CPT | Mod: 25,HCNC,S$GLB, | Performed by: ORTHOPAEDIC SURGERY

## 2019-12-02 PROCEDURE — 1125F PR PAIN SEVERITY QUANTIFIED, PAIN PRESENT: ICD-10-PCS | Mod: HCNC,S$GLB,, | Performed by: ORTHOPAEDIC SURGERY

## 2019-12-02 PROCEDURE — 3077F PR MOST RECENT SYSTOLIC BLOOD PRESSURE >= 140 MM HG: ICD-10-PCS | Mod: HCNC,CPTII,S$GLB, | Performed by: ORTHOPAEDIC SURGERY

## 2019-12-02 PROCEDURE — 1125F AMNT PAIN NOTED PAIN PRSNT: CPT | Mod: HCNC,S$GLB,, | Performed by: ORTHOPAEDIC SURGERY

## 2019-12-02 PROCEDURE — 3078F DIAST BP <80 MM HG: CPT | Mod: HCNC,CPTII,S$GLB, | Performed by: ORTHOPAEDIC SURGERY

## 2019-12-02 PROCEDURE — 1101F PR PT FALLS ASSESS DOC 0-1 FALLS W/OUT INJ PAST YR: ICD-10-PCS | Mod: HCNC,CPTII,S$GLB, | Performed by: ORTHOPAEDIC SURGERY

## 2019-12-02 PROCEDURE — 73030 X-RAY EXAM OF SHOULDER: CPT | Mod: 26,HCNC,LT, | Performed by: RADIOLOGY

## 2019-12-02 PROCEDURE — 73030 X-RAY EXAM OF SHOULDER: CPT | Mod: TC,HCNC,LT

## 2019-12-02 PROCEDURE — 1159F MED LIST DOCD IN RCRD: CPT | Mod: HCNC,S$GLB,, | Performed by: ORTHOPAEDIC SURGERY

## 2019-12-02 PROCEDURE — 20610 DRAIN/INJ JOINT/BURSA W/O US: CPT | Mod: HCNC,LT,S$GLB, | Performed by: ORTHOPAEDIC SURGERY

## 2019-12-02 RX ADMIN — TRIAMCINOLONE ACETONIDE 80 MG: 40 INJECTION, SUSPENSION INTRA-ARTICULAR; INTRAMUSCULAR at 11:12

## 2019-12-02 NOTE — PROGRESS NOTES
CC: left shoulder pain     79 y.o. Female with hx of rheumatoid arthritis. .  reports that the pain is severe and not responding to any conservative care.  Now had had three months of left lateral shoulder pain. It is stable. Has been seeing pain management who sent her here. She has mostly pain, does not report weakness. She has not tried anything for this. She tries cream on it, no injection, takes NSAIDS for this.     She reports that the pain is worse with overhead activity. It also bothers her at night.    She is retired.     Is affecting ADLs.     Review of Systems   Constitution: Negative. Negative for chills, fever and night sweats.   HENT: Negative for congestion and headaches.    Eyes: Negative for blurred vision, left vision loss and right vision loss.   Cardiovascular: Negative for chest pain and syncope.   Respiratory: Negative for cough and shortness of breath.    Endocrine: Negative for polydipsia, polyphagia and polyuria.   Hematologic/Lymphatic: Negative for bleeding problem. Does not bruise/bleed easily.   Skin: Negative for dry skin, itching and rash.   Musculoskeletal: Negative for falls and muscle weakness.   Gastrointestinal: Negative for abdominal pain and bowel incontinence.   Genitourinary: Negative for bladder incontinence and nocturia.   Neurological: Negative for disturbances in coordination, loss of balance and seizures.   Psychiatric/Behavioral: Negative for depression. The patient does not have insomnia.    Allergic/Immunologic: Negative for hives and persistent infections.     PAST MEDICAL HISTORY:   Past Medical History:   Diagnosis Date    Allergy     Anemia 10/20/2014    Arthritis     Atherosclerosis of artery of right lower extremity: see xray 4/4/07 8/8/2017    Diabetes mellitus     Diabetic neuropathy 7/25/2012    Gait disorder 7/25/2012    High cholesterol     History of poliomyelitis 7/25/2012    Hyperlipidemia 8/5/2013    Hypertension     Obstructive sleep apnea  syndrome: dx 2008 needs CPAP 11 2017    Osteopenia     Osteoporosis, unspecified 2014    Other specified anemias 2015    Post poliomyelitis syndrome 2012    Renal manifestation of secondary diabetes mellitus     Sleep apnea     Type II or unspecified type diabetes mellitus without mention of complication, not stated as uncontrolled 2015    Unspecified essential hypertension 2015     PAST SURGICAL HISTORY:   Past Surgical History:   Procedure Laterality Date    CATARACT EXTRACTION       SECTION      CHOLECYSTECTOMY      COLONOSCOPY N/A 2017    Procedure: COLONOSCOPY;  Surgeon: Karen Lebron MD;  Location: [address];  Service: Endoscopy;  Laterality: N/A;    EYE SURGERY      FRACTURE SURGERY       FAMILY HISTORY:   Family History   Problem Relation Age of Onset    Diabetes Father     Heart disease Father     Heart attack Father     Heart disease Brother     No Known Problems Daughter     Diabetes Son     Heart disease Brother     Diabetes Daughter     Diabetes Daughter     Cataracts Neg Hx     Glaucoma Neg Hx     Hypertension Neg Hx     Cancer Neg Hx     Blindness Neg Hx     Amblyopia Neg Hx     Strabismus Neg Hx     Retinal detachment Neg Hx     Macular degeneration Neg Hx     Melanoma Neg Hx      SOCIAL HISTORY:   Social History     Socioeconomic History    Marital status:      Spouse name: Not on file    Number of children: 4    Years of education: Not on file    Highest education level: Not on file   Occupational History    Not on file   Social Needs    Financial resource strain: Not on file    Food insecurity:     Worry: Not on file     Inability: Not on file    Transportation needs:     Medical: Not on file     Non-medical: Not on file   Tobacco Use    Smoking status: Never Smoker    Smokeless tobacco: Never Used   Substance and Sexual Activity    Alcohol use: No    Drug use: No    Sexual activity: Never    Lifestyle    Physical activity:     Days per week: Not on file     Minutes per session: Not on file    Stress: Not on file   Relationships    Social connections:     Talks on phone: Not on file     Gets together: Not on file     Attends [employer] service: Not on file     Active member of club or organization: Not on file     Attends meetings of clubs or organizations: Not on file     Relationship status: Not on file   Other Topics Concern    Are you pregnant or think you may be? No    Breast-feeding Not Asked   Social History Narrative    Not on file       MEDICATIONS:   Current Outpatient Medications:     amLODIPine (NORVASC) 10 MG tablet, TAKE 1 TABLET(10 MG) BY MOUTH EVERY DAY, Disp: 90 tablet, Rfl: 3    aspirin (ECOTRIN) 81 MG EC tablet, Take 1 tablet (81 mg total) by mouth once daily., Disp: 30 tablet, Rfl: 12    blood sugar diagnostic Strp, 1 strip by Misc.(Non-Drug; Combo Route) route 2 (two) times daily. TRUE RESULT SYSTEM, Disp: 180 strip, Rfl: 4    calcium-vitamin D3-vitamin K (VIACTIV) 500-100-40 mg-unit-mcg Chew, Take 2 each by mouth., Disp: , Rfl:     clotrimazole-betamethasone 1-0.05% (LOTRISONE) cream, Apply topically 2 (two) times daily., Disp: 45 g, Rfl: 3    desloratadine (CLARINEX) 5 mg tablet, Take 5 mg by mouth once daily., Disp: , Rfl:     diclofenac sodium (VOLTAREN) 1 % Gel, Apply 2 g topically 4 (four) times daily. Apply to B knee, and L shoulder, Disp: 500 g, Rfl: 3    docusate sodium (COLACE) 50 MG capsule, Take 1 capsule (50 mg total) by mouth 2 (two) times daily as needed for Constipation., Disp: , Rfl:     gabapentin (NEURONTIN) 600 MG tablet, Take 1 tablet (600 mg total) by mouth 4 (four) times daily with meals and nightly., Disp: 360 tablet, Rfl: 3    glipiZIDE (GLUCOTROL) 5 MG tablet, TAKE 1 TABLET(5 MG) BY MOUTH EVERY DAY, Disp: 90 tablet, Rfl: 3    hydrALAZINE (APRESOLINE) 50 MG tablet, TAKE 1 TABLET(50 MG) BY MOUTH EVERY MORNING AND EVERY AFTERNOON THEN TAKE 2  TABLETS(100 MG) BY MOUTH EVERY EVENING, Disp: 360 tablet, Rfl: 3    hydroCHLOROthiazide (HYDRODIURIL) 25 MG tablet, TAKE 1 TABLET(25 MG) BY MOUTH EVERY OTHER DAY, Disp: 60 tablet, Rfl: 0    HYDROcodone-acetaminophen (NORCO)  mg per tablet, Take 1 tablet by mouth every 6 (six) hours as needed for Pain (pain)., Disp: 120 tablet, Rfl: 0    [START ON 12/14/2019] HYDROcodone-acetaminophen (NORCO)  mg per tablet, Take 1 tablet by mouth every 6 (six) hours as needed for Pain (pain)., Disp: 120 tablet, Rfl: 0    [START ON 1/14/2020] HYDROcodone-acetaminophen (NORCO)  mg per tablet, Take 1 tablet by mouth every 6 (six) hours as needed for Pain (pain)., Disp: 120 tablet, Rfl: 0    lancets Misc, TEST 2 X DAILY PROSPER RESULT SYSTEM, Disp: 180 each, Rfl: 3    metFORMIN (GLUCOPHAGE-XR) 500 MG 24 hr tablet, TAKE 1 TABLET(500 MG) BY MOUTH EVERY DAY, Disp: 90 tablet, Rfl: 3    metFORMIN (GLUCOPHAGE-XR) 500 MG 24 hr tablet, TAKE 1 TABLET(500 MG) BY MOUTH EVERY DAY, Disp: 90 tablet, Rfl: 0    multivitamin (THERAGRAN) per tablet, Take 1 tablet by mouth once daily. , Disp: , Rfl:     oxybutynin (DITROPAN) 5 MG Tab, TAKE 1 TABLET(5 MG) BY MOUTH TWICE DAILY, Disp: 180 tablet, Rfl: 3    pravastatin (PRAVACHOL) 40 MG tablet, TAKE 1 AND 1/2 TABLETS(60 MG) BY MOUTH EVERY NIGHT, Disp: 135 tablet, Rfl: 3    pravastatin (PRAVACHOL) 40 MG tablet, TAKE 1 AND 1/2 TABLETS(60 MG) BY MOUTH EVERY NIGHT, Disp: 135 tablet, Rfl: 0    TRUEPLUS LANCETS 33 gauge Misc, USE BID, Disp: , Rfl: 3    valsartan (DIOVAN) 320 MG tablet, Take 1 tablet (320 mg total) by mouth every morning., Disp: 90 tablet, Rfl: 1    blood-glucose meter (TRUERESULT BLOOD GLUCOSE SYSTM) kit, Use as instructed, Disp: 1 each, Rfl: 0  ALLERGIES:   Review of patient's allergies indicates:   Allergen Reactions    Atenolol     Verapamil        VITAL SIGNS: BP (!) 153/77   Pulse 83   Ht 5' (1.524 m)   Wt 70.8 kg (156 lb)   BMI 30.47 kg/m²      PHYSICAL  EXAMINATION:  General:  The patient is alert and oriented x 3.  Mood is pleasant.  Observation of ears, eyes and nose reveal no gross abnormalities.  No labored breathing observed.  Gait is coordinated. Patient can toe walk and heel walk without difficulty.      left Shoulder / Upper Extremity Exam    OBSERVATION:     Swelling  none  Deformity  none   Discoloration  none   Scapular winging none   Scars   none  Atrophy  none    TENDERNESS / CREPITUS (T/C):          T/C      T/C   Clavicle   -/-  SUPRAspinatus    +     AC Jt.    -/-  INFRAspinatus  -/-    SC Jt.    -/-  Deltoid    +      G. Tuberosity  +  LH BICEP groove  +/-   Acromion:  +  Midline Neck   -/-     Scapular Spine -/-  Trapezium   -/-   SMA Scapula  -/-  GH jt. line - post  -/-     Scapulothoracic  -/-         ROM: (* = with pain)  Right shoulder   Left shoulder        AROM (PROM)   AROM (PROM)   FE    170° (175°)     0° (100°)     ER at 0°    60°  (65°)    30°  (50°)   ER at 90° ABD  90°  (90°)    90°  (90°)   IR at 90°  ABD   NA  (40°)     NA  (40°)      IR (spine level)   T10     Nil    STRENGTH: (* = with pain) RIGHT SHOULDER  LEFT SHOULDER   SCAPTION at 0  5/5    5/5   SCAPTION at 30  5/5    5/5    IR    5/5    5/5   ER    5/5    5/5   BICEPS   5/5    5/5   Deltoid    5/5    5/5     SIGNS:  Painful side       NEER   +    OLATRICES  neg    VALENZUELA   +    SPEEDS  neg     DROP ARM   neg   BELLY PRESS neg   Superior escape none    LIFT-OFF  neg   X-Body ADD    neg    MOVING VALGUS neg        STABILITY TESTING    RIGHT SHOULDER   LEFT SHOULDER     Translation     Anterior  up face     up face    Posterior  up face    up face    Sulcus   < 10mm    < 10 mm     Signs   Apprehension   neg      neg       Relocation   no change     no change      Jerk test  neg     neg    EXTREMITY NEURO-VASCULAR EXAM    Sensation grossly intact to light touch all dermatomal regions.    DTR 2+ Biceps, Triceps, BR and Negative Philipps sign   Grossly intact motor  function at Elbow, Wrist and Hand   Distal pulses radial and ulnar 2+, brisk cap refill, symmetric.      NECK:  Painless FROM and spinous processes non-tender. Negative Spurlings sign.      XRAYS:  Shoulder trauma series left,  were obtained and reviewed  No convincing fracture or dislocation is noted. Severe DJD right shoulder.        ASSESSMENT:   right:  1. Osteoarthritis right shoulder.      PLAN:      1. At this time the OA is severe and limits her ADLs  2. Does not want surgery at this time.   3. CSI at today's visit      PROCEDURE NOTE:   After cortisone consent and post-injection information was given, the shoulder was prepped with betadyne and alcohol. The left subacromial space of the shoulder was injected with 2 cc 40 mg kenalog and 4 cc lidocaine and 4 cc .5% marcaine.   Bandaid was applied. Patient was reminded to rest with RICE for 48 hours post injection and to call clinic immediately for any adverse side effects as explained in clinic today.    PT for scapular stabilization: Scapular dyskinesia   Scapular stabilization - [place] protocol, 1-3x/week x 6-8 weeks with HEP        All questions were answered, pt will contact us for questions or concerns in the interim.

## 2019-12-02 NOTE — LETTER
December 9, 2019      Krista Burger MD  [address]  [place] [address]           [facility]  [address]  [facility] [address]  Phone: [phone]          Patient: Emilia Alan   MR Number: [id number]   YOB: 1940   Date of Visit: 12/2/2019       Dear Dr. Krista Burger:    Thank you for referring Emilia Alan to me for evaluation. Attached you will find relevant portions of my assessment and plan of care.    If you have questions, please do not hesitate to call me. I look forward to following Emilia Alan along with you.    Sincerely,    Sofya Luke MD    Enclosure  CC:  No Recipients    If you would like to receive this communication electronically, please contact [email or web address] or [phone] to request more information on [employer] access.    For providers and/or their staff who would like to refer a patient to [facility], please contact us through our one-stop-shop provider referral line, [facility], at [phone].    If you feel you have received this communication in error or would no longer like to receive these types of communications, please e-mail [email or web address]

## 2019-12-03 ENCOUNTER — PATIENT OUTREACH (OUTPATIENT)
Dept: OTHER | Facility: OTHER | Age: 79
End: 2019-12-03

## 2019-12-04 NOTE — PROGRESS NOTES
Digital Medicine: Clinician Follow-Up    Called patient for digital medicine follow-up after increasing valsartan to 320 mg. Patient is doing well and tolerating dose with no complaints. Confirmed that elevated reading on 11/27/19 was an error. Patient states that she moved while the cuff cuff was going and when she attempted to recheck she had a problem with the application. She was able to resolve her tech issues and has resumed regular monitoring.     The history is provided by the patient. No  was used.     Follow Up  Follow-up reason(s): medication change follow-up      Patient started new medication.    Is patient tolerating med change?:  Yes          Assessment:  Patient's current 30-day average is at goal of <140/90 mmHg.       Plan:  Continue current regimen.  Patients health , Richi Miller, will follow-up as scheduled.    I will continue to monitor regularly and will follow-up in 1-2 months, sooner if blood pressure begins to trend upward or downward.       Patient has my contact information and knows to call with any concerns or clinical changes.                Topic    Eye Exam        Last 5 Patient Entered Readings                                      Current 30 Day Average: 136/73     Recent Readings 12/3/2019 12/2/2019 11/27/2019 11/27/2019 11/27/2019    SBP (mmHg) 120 138 120 120 205    DBP (mmHg) 80 69 80 80 82    Pulse 80 76 80 80 88             Hypertension Medications             amLODIPine (NORVASC) 10 MG tablet TAKE 1 TABLET(10 MG) BY MOUTH EVERY DAY    hydrALAZINE (APRESOLINE) 50 MG tablet TAKE 1 TABLET(50 MG) BY MOUTH EVERY MORNING AND EVERY AFTERNOON THEN TAKE 2 TABLETS(100 MG) BY MOUTH EVERY EVENING    hydroCHLOROthiazide (HYDRODIURIL) 25 MG tablet TAKE 1 TABLET(25 MG) BY MOUTH EVERY OTHER DAY    valsartan (DIOVAN) 320 MG tablet Take 1 tablet (320 mg total) by mouth every morning.                 Screenings

## 2019-12-09 RX ORDER — TRIAMCINOLONE ACETONIDE 40 MG/ML
80 INJECTION, SUSPENSION INTRA-ARTICULAR; INTRAMUSCULAR
Status: DISCONTINUED | OUTPATIENT
Start: 2019-12-02 | End: 2019-12-09 | Stop reason: HOSPADM

## 2019-12-10 NOTE — PROCEDURES
Large Joint Aspiration/Injection: L subacromial bursa  Date/Time: 12/2/2019 11:00 AM  Performed by: Sofya Luke MD  Authorized by: Sofya Luke MD     Consent Done?:  Yes (Verbal)  Indications:  Pain  Procedure site marked: Yes    Timeout: Prior to procedure the correct patient, procedure, and site was verified      Location:  Shoulder  Site:  L subacromial bursa  Prep: Patient was prepped and draped in usual sterile fashion    Needle size:  22 G  Approach:  Posterior  Medications:  80 mg triamcinolone acetonide 40 mg/mL  Patient tolerance:  Patient tolerated the procedure well with no immediate complications

## 2019-12-11 ENCOUNTER — PATIENT OUTREACH (OUTPATIENT)
Dept: OTHER | Facility: OTHER | Age: 79
End: 2019-12-11

## 2019-12-18 ENCOUNTER — PATIENT OUTREACH (OUTPATIENT)
Dept: ADMINISTRATIVE | Facility: OTHER | Age: 79
End: 2019-12-18

## 2019-12-23 ENCOUNTER — OFFICE VISIT (OUTPATIENT)
Dept: PODIATRY | Facility: CLINIC | Age: 79
End: 2019-12-23
Payer: MEDICARE

## 2019-12-23 VITALS
DIASTOLIC BLOOD PRESSURE: 68 MMHG | WEIGHT: 156 LBS | SYSTOLIC BLOOD PRESSURE: 159 MMHG | BODY MASS INDEX: 30.63 KG/M2 | HEART RATE: 75 BPM | RESPIRATION RATE: 18 BRPM | HEIGHT: 60 IN

## 2019-12-23 DIAGNOSIS — G60.9 IDIOPATHIC PERIPHERAL NEUROPATHY: ICD-10-CM

## 2019-12-23 DIAGNOSIS — B35.1 ONYCHOMYCOSIS DUE TO DERMATOPHYTE: ICD-10-CM

## 2019-12-23 DIAGNOSIS — G14 POST-POLIO SYNDROME: Primary | ICD-10-CM

## 2019-12-23 DIAGNOSIS — L84 CORN OR CALLUS: ICD-10-CM

## 2019-12-23 PROCEDURE — 3078F PR MOST RECENT DIASTOLIC BLOOD PRESSURE < 80 MM HG: ICD-10-PCS | Mod: HCNC,CPTII,S$GLB, | Performed by: PODIATRIST

## 2019-12-23 PROCEDURE — 3077F PR MOST RECENT SYSTOLIC BLOOD PRESSURE >= 140 MM HG: ICD-10-PCS | Mod: HCNC,CPTII,S$GLB, | Performed by: PODIATRIST

## 2019-12-23 PROCEDURE — 11721 PR DEBRIDEMENT OF NAILS, 6 OR MORE: ICD-10-PCS | Mod: Q9,59,HCNC,S$GLB | Performed by: PODIATRIST

## 2019-12-23 PROCEDURE — 99213 PR OFFICE/OUTPT VISIT, EST, LEVL III, 20-29 MIN: ICD-10-PCS | Mod: 25,HCNC,S$GLB, | Performed by: PODIATRIST

## 2019-12-23 PROCEDURE — 1101F PT FALLS ASSESS-DOCD LE1/YR: CPT | Mod: HCNC,CPTII,S$GLB, | Performed by: PODIATRIST

## 2019-12-23 PROCEDURE — 99999 PR PBB SHADOW E&M-EST. PATIENT-LVL III: CPT | Mod: PBBFAC,HCNC,, | Performed by: PODIATRIST

## 2019-12-23 PROCEDURE — 3078F DIAST BP <80 MM HG: CPT | Mod: HCNC,CPTII,S$GLB, | Performed by: PODIATRIST

## 2019-12-23 PROCEDURE — 11721 DEBRIDE NAIL 6 OR MORE: CPT | Mod: Q9,59,HCNC,S$GLB | Performed by: PODIATRIST

## 2019-12-23 PROCEDURE — 1159F PR MEDICATION LIST DOCUMENTED IN MEDICAL RECORD: ICD-10-PCS | Mod: HCNC,S$GLB,, | Performed by: PODIATRIST

## 2019-12-23 PROCEDURE — 1126F AMNT PAIN NOTED NONE PRSNT: CPT | Mod: HCNC,S$GLB,, | Performed by: PODIATRIST

## 2019-12-23 PROCEDURE — 1101F PR PT FALLS ASSESS DOC 0-1 FALLS W/OUT INJ PAST YR: ICD-10-PCS | Mod: HCNC,CPTII,S$GLB, | Performed by: PODIATRIST

## 2019-12-23 PROCEDURE — 11056 PR TRIM BENIGN HYPERKERATOTIC SKIN LESION,2-4: ICD-10-PCS | Mod: Q9,HCNC,S$GLB, | Performed by: PODIATRIST

## 2019-12-23 PROCEDURE — 99213 OFFICE O/P EST LOW 20 MIN: CPT | Mod: 25,HCNC,S$GLB, | Performed by: PODIATRIST

## 2019-12-23 PROCEDURE — 99999 PR PBB SHADOW E&M-EST. PATIENT-LVL III: ICD-10-PCS | Mod: PBBFAC,HCNC,, | Performed by: PODIATRIST

## 2019-12-23 PROCEDURE — 1159F MED LIST DOCD IN RCRD: CPT | Mod: HCNC,S$GLB,, | Performed by: PODIATRIST

## 2019-12-23 PROCEDURE — 3077F SYST BP >= 140 MM HG: CPT | Mod: HCNC,CPTII,S$GLB, | Performed by: PODIATRIST

## 2019-12-23 PROCEDURE — 1126F PR PAIN SEVERITY QUANTIFIED, NO PAIN PRESENT: ICD-10-PCS | Mod: HCNC,S$GLB,, | Performed by: PODIATRIST

## 2019-12-23 PROCEDURE — 11056 PARNG/CUTG B9 HYPRKR LES 2-4: CPT | Mod: Q9,HCNC,S$GLB, | Performed by: PODIATRIST

## 2019-12-23 RX ORDER — VALSARTAN AND HYDROCHLOROTHIAZIDE 160; 25 MG/1; MG/1
TABLET ORAL
Refills: 3 | COMMUNITY
Start: 2019-09-29 | End: 2020-02-06

## 2019-12-23 RX ORDER — DICLOFENAC SODIUM 10 MG/G
GEL TOPICAL
Refills: 3 | COMMUNITY
Start: 2019-11-14

## 2019-12-23 RX ORDER — DICLOFENAC SODIUM 75 MG/1
TABLET, DELAYED RELEASE ORAL
Qty: 90 TABLET | Refills: 0 | Status: SHIPPED | OUTPATIENT
Start: 2019-12-23 | End: 2019-12-23

## 2019-12-23 RX ORDER — KETOCONAZOLE 20 MG/G
CREAM TOPICAL DAILY
Qty: 60 G | Refills: 3 | Status: SHIPPED | OUTPATIENT
Start: 2019-12-23

## 2019-12-23 RX ORDER — DICLOFENAC SODIUM 75 MG/1
TABLET, DELAYED RELEASE ORAL
Qty: 180 TABLET | Refills: 0 | Status: SHIPPED | OUTPATIENT
Start: 2019-12-23 | End: 2020-04-30

## 2019-12-23 RX ORDER — VALSARTAN AND HYDROCHLOROTHIAZIDE 320; 25 MG/1; MG/1
TABLET, FILM COATED ORAL
Refills: 1 | COMMUNITY
Start: 2019-09-18 | End: 2020-02-06

## 2019-12-23 NOTE — PROGRESS NOTES
Subjective:      Patient ID: Emilia Alan is a 79 y.o. female.    Chief Complaint: PCP (Lauren Deras MD 8/02/19); Routine Foot Care; and Nail Care    Emilia is a 79 y.o. female who presents to the clinic for evaluation and treatment of high risk feet. 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 The patient's chief complaint is long, thick toenails and dry red itchy skin L foot       PCP: Lauren Deras MD    Date Last Seen by PCP:   Chief Complaint   Patient presents with    PCP     Lauren Deras MD 8/02/19    Routine Foot Care    Nail Care         Current shoe gear: DM shoes w/ AFO brace( L)    Hemoglobin A1C   Date Value Ref Range Status   07/26/2019 7.1 (H) 4.0 - 5.6 % Final     Comment:     ADA Screening Guidelines:  5.7-6.4%  Consistent with prediabetes  >or=6.5%  Consistent with diabetes  High levels of fetal hemoglobin interfere with the HbA1C  assay. Heterozygous hemoglobin variants (HbS, HgC, etc)do  not significantly interfere with this assay.   However, presence of multiple variants may affect accuracy.     07/08/2019 7.3 (H) 4.0 - 5.6 % Final     Comment:     ADA Screening Guidelines:  5.7-6.4%  Consistent with prediabetes  >or=6.5%  Consistent with diabetes  High levels of fetal  hemoglobin interfere with the HbA1C  assay. Heterozygous hemoglobin variants (HbS, HgC, etc)do  not significantly interfere with this assay.   However, presence of multiple variants may affect accuracy.     03/18/2019 6.9 (H) 4.0 - 5.6 % Final     Comment:     ADA Screening Guidelines:  5.7-6.4%  Consistent with prediabetes  >or=6.5%  Consistent with diabetes  High levels of fetal hemoglobin interfere with the HbA1C  assay. Heterozygous hemoglobin variants (HbS, HgC, etc)do  not significantly interfere with this assay.   However, presence of multiple variants may affect accuracy.           Review of Systems   Constitution: Negative for chills, decreased appetite and fever.   Cardiovascular: Negative for chest pain, claudication and leg swelling.   Respiratory: Negative for cough and shortness of breath.    Skin: Positive for dry skin, nail changes and rash. Negative for color change, flushing, itching and poor wound healing.   Musculoskeletal: Positive for muscle weakness (foot drop). Negative for arthritis, back pain, gout, joint pain, joint swelling and myalgias.   Gastrointestinal: Negative for nausea and vomiting.   Neurological: Positive for numbness and paresthesias. Negative for loss of balance.           Objective:       Vitals:    12/23/19 1530   BP: (!) 159/68   Pulse: 75   Resp: 18   Weight: 70.8 kg (156 lb)   Height: 5' (1.524 m)   PainSc: 0-No pain        Physical Exam   Constitutional: She is oriented to person, place, and time. She appears well-developed and well-nourished. No distress.   Cardiovascular:   Dorsalis pedis and posterior tibial pulses are diminished Bilaterally. Toes are cool to touch. Feet are warm proximally.There is decreased digital hair. Skin is atrophic, slightly hyperpigmented, and mildly edematous       Musculoskeletal: She exhibits no edema or tenderness.        Right ankle: Normal.        Left ankle: Normal.        Right foot: There is no swelling, no crepitus and no deformity.         Left foot: There is no swelling, no crepitus and no deformity.   Dropfoot left.      Lymphadenopathy:   No palpable lymph nodes   Neurological: She is alert and oriented to person, place, and time. She has normal strength.   Kimberton-Sarah 5.07 monofilamant testing is diminished Charbel feet. Sharp/dull sensation diminished Bilaterally. Light touch absent Bilaterally.       Skin: Skin is warm, dry and intact. No abrasion, no bruising, no burn, no laceration, no lesion, no petechiae and no rash noted. She is not diaphoretic. No pallor. Nails show no clubbing.   Nails 1-5 b/l  are elongated by  3-7mm's, thickened by 3-5 mm's, dystrophic, and are darkened in  coloration . Xerosis Bilaterally. No open lesions noted.    Hyperkeratotic tissue noted to plantar L 5th MPJ and plantar L 5th toe    Scaling dryness in a moccasin distribution is noted to the bilateral lower extremities with associated erythema.       Psychiatric: She has a normal mood and affect. Judgment and thought content normal.   Nursing note and vitals reviewed.          Assessment:       Encounter Diagnoses   Name Primary?    Post-polio syndrome Yes    Idiopathic peripheral neuropathy     Onychomycosis due to dermatophyte     Corn or callus          Plan:       Emilia was seen today for pcp, routine foot care and nail care.    Diagnoses and all orders for this visit:    Post-polio syndrome    Idiopathic peripheral neuropathy    Onychomycosis due to dermatophyte    Corn or callus    Other orders  -     ketoconazole (NIZORAL) 2 % cream; Apply topically once daily.      I counseled the patient on her conditions, their implications and medical management.    - Shoe inspection.Patient instructed on proper foot hygeine. We discussed wearing proper shoe gear, daily foot inspections, never walking without protective shoe gear, never putting sharp instruments to feet, routine podiatric nail visits every 2-3 months.      - With patient's permission, nails  were aggressively reduced and debrided x 10 to their soft tissue attachment mechanically and with electric , removing all offending nail and debris. Patient relates relief following the procedure. She will continue to monitor the areas daily, inspect her feet, wear protective shoe gear when ambulatory, moisturizer to maintain skin integrity and follow in this office in approximately 2-3 months, sooner p.r.n.    - After cleansing the  area w/ alcohol prep pad the above mentioned hyperkeratosis was trimmed utilizing No 15 scapel, to a smooth base with out incident. Patient tolerated this  well and reported comfort to the area of plantar L 5th MPJ and lateral l 5th toe     Advised the patient that fungus likes warm, dark, and moist environments such as bathrooms, gyms, and pools. Advised to spray shower, shower mat , and any shoes w/ Lysol periodically

## 2020-01-02 DIAGNOSIS — D46.4 REFRACTORY ANEMIA: Primary | ICD-10-CM

## 2020-01-10 NOTE — PROGRESS NOTES
Digital Medicine: Health  Follow-Up    Patient denies any other concerns at this time.           Follow Up  Follow-up reason(s): reading review      Patient is unsure as to why her reading was elevated on 1/8.  Patient denies feeling any s/sx oh hypertension.  Confirmed patient is using correct timing and technique.     INTERVENTION(S)  encouragement/support and denied further coaching    PLAN  continue monitoring          Topic    Eye Exam        Last 5 Patient Entered Readings                                      Current 30 Day Average: 128/68     Recent Readings 1/9/2020 1/8/2020 1/8/2020 1/7/2020 1/6/2020    SBP (mmHg) 121 143 156 117 137    DBP (mmHg) 55 76 70 62 59    Pulse 90 80 78 71 89            Diet Screening   No change to diet.      Physical Activity Screening   No change to exercise routine.

## 2020-01-17 ENCOUNTER — PATIENT OUTREACH (OUTPATIENT)
Dept: OTHER | Facility: OTHER | Age: 80
End: 2020-01-17

## 2020-01-17 NOTE — PROGRESS NOTES
Digital Medicine: Clinician Follow-Up    Called patient for digital medicine follow-up. Overall, she is doing well with no complaints. Received a high alert on 1/2/20 and patient says that her granddaughter disturbed her during the reading. Most recent readings have been at goal and patient is pleased.      The history is provided by the patient. No  was used.     Follow Up  Follow-up reason(s): reading review              Current 30-day average is at goal of <140/90 mmHg.  Continue current medication regimen as prescribed.  Continue monitoring and regular follow-up.           Topic    Eye Exam        Last 5 Patient Entered Readings                                      Current 30 Day Average: 129/67     Recent Readings 1/16/2020 1/15/2020 1/13/2020 1/12/2020 1/11/2020    SBP (mmHg) 137 126 138 134 107    DBP (mmHg) 63 63 64 63 52    Pulse 79 85 84 90 75             Hypertension Medications             amLODIPine (NORVASC) 10 MG tablet TAKE 1 TABLET(10 MG) BY MOUTH EVERY DAY    hydrALAZINE (APRESOLINE) 50 MG tablet TAKE 1 TABLET(50 MG) BY MOUTH EVERY MORNING AND EVERY AFTERNOON THEN TAKE 2 TABLETS(100 MG) BY MOUTH EVERY EVENING    hydroCHLOROthiazide (HYDRODIURIL) 25 MG tablet TAKE 1 TABLET(25 MG) BY MOUTH EVERY OTHER DAY    valsartan (DIOVAN) 320 MG tablet Take 1 tablet (320 mg total) by mouth every morning.    valsartan-hydrochlorothiazide (DIOVAN-HCT) 160-25 mg per tablet TK 1 T PO QD    valsartan-hydrochlorothiazide (DIOVAN-HCT) 320-25 mg per tablet TK 1 T PO ONCE D                 Screenings

## 2020-01-31 ENCOUNTER — OFFICE VISIT (OUTPATIENT)
Dept: HEMATOLOGY/ONCOLOGY | Facility: CLINIC | Age: 80
End: 2020-01-31
Payer: MEDICARE

## 2020-01-31 ENCOUNTER — LAB VISIT (OUTPATIENT)
Dept: LAB | Facility: HOSPITAL | Age: 80
End: 2020-01-31
Payer: MEDICARE

## 2020-01-31 VITALS
BODY MASS INDEX: 30.66 KG/M2 | HEART RATE: 90 BPM | DIASTOLIC BLOOD PRESSURE: 69 MMHG | OXYGEN SATURATION: 97 % | SYSTOLIC BLOOD PRESSURE: 166 MMHG | WEIGHT: 156.94 LBS | TEMPERATURE: 98 F | RESPIRATION RATE: 17 BRPM

## 2020-01-31 DIAGNOSIS — D46.4 REFRACTORY ANEMIA: ICD-10-CM

## 2020-01-31 DIAGNOSIS — I70.201 ATHEROSCLEROSIS OF ARTERY OF RIGHT LOWER EXTREMITY: ICD-10-CM

## 2020-01-31 DIAGNOSIS — E11.21 TYPE 2 DIABETES MELLITUS WITH DIABETIC NEPHROPATHY, WITHOUT LONG-TERM CURRENT USE OF INSULIN: ICD-10-CM

## 2020-01-31 DIAGNOSIS — D46.4 REFRACTORY ANEMIA: Primary | ICD-10-CM

## 2020-01-31 DIAGNOSIS — E11.42 DIABETIC POLYNEUROPATHY ASSOCIATED WITH TYPE 2 DIABETES MELLITUS: ICD-10-CM

## 2020-01-31 DIAGNOSIS — G82.20 PARAPARESIS OF BOTH LOWER LIMBS: ICD-10-CM

## 2020-01-31 DIAGNOSIS — N18.30 STAGE 3 CHRONIC KIDNEY DISEASE: ICD-10-CM

## 2020-01-31 LAB
ALBUMIN SERPL BCP-MCNC: 3.4 G/DL (ref 3.5–5.2)
ALP SERPL-CCNC: 84 U/L (ref 55–135)
ALT SERPL W/O P-5'-P-CCNC: 12 U/L (ref 10–44)
ANION GAP SERPL CALC-SCNC: 11 MMOL/L (ref 8–16)
AST SERPL-CCNC: 18 U/L (ref 10–40)
BASOPHILS # BLD AUTO: 0.11 K/UL (ref 0–0.2)
BASOPHILS NFR BLD: 1.4 % (ref 0–1.9)
BILIRUB SERPL-MCNC: 0.4 MG/DL (ref 0.1–1)
BUN SERPL-MCNC: 18 MG/DL (ref 8–23)
CALCIUM SERPL-MCNC: 9.4 MG/DL (ref 8.7–10.5)
CHLORIDE SERPL-SCNC: 104 MMOL/L (ref 95–110)
CO2 SERPL-SCNC: 25 MMOL/L (ref 23–29)
CREAT SERPL-MCNC: 0.9 MG/DL (ref 0.5–1.4)
DIFFERENTIAL METHOD: ABNORMAL
EOSINOPHIL # BLD AUTO: 0.4 K/UL (ref 0–0.5)
EOSINOPHIL NFR BLD: 5 % (ref 0–8)
ERYTHROCYTE [DISTWIDTH] IN BLOOD BY AUTOMATED COUNT: 13.7 % (ref 11.5–14.5)
EST. GFR  (AFRICAN AMERICAN): >60 ML/MIN/1.73 M^2
EST. GFR  (NON AFRICAN AMERICAN): >60 ML/MIN/1.73 M^2
GLUCOSE SERPL-MCNC: 122 MG/DL (ref 70–110)
HCT VFR BLD AUTO: 38.3 % (ref 37–48.5)
HGB BLD-MCNC: 11.8 G/DL (ref 12–16)
IMM GRANULOCYTES # BLD AUTO: 0.02 K/UL (ref 0–0.04)
IMM GRANULOCYTES NFR BLD AUTO: 0.3 % (ref 0–0.5)
LYMPHOCYTES # BLD AUTO: 1.6 K/UL (ref 1–4.8)
LYMPHOCYTES NFR BLD: 19.6 % (ref 18–48)
MCH RBC QN AUTO: 30.6 PG (ref 27–31)
MCHC RBC AUTO-ENTMCNC: 30.8 G/DL (ref 32–36)
MCV RBC AUTO: 100 FL (ref 82–98)
MONOCYTES # BLD AUTO: 0.5 K/UL (ref 0.3–1)
MONOCYTES NFR BLD: 6.7 % (ref 4–15)
NEUTROPHILS # BLD AUTO: 5.3 K/UL (ref 1.8–7.7)
NEUTROPHILS NFR BLD: 67 % (ref 38–73)
NRBC BLD-RTO: 0 /100 WBC
PLATELET # BLD AUTO: 254 K/UL (ref 150–350)
PMV BLD AUTO: 10.7 FL (ref 9.2–12.9)
POTASSIUM SERPL-SCNC: 4 MMOL/L (ref 3.5–5.1)
PROT SERPL-MCNC: 6.9 G/DL (ref 6–8.4)
RBC # BLD AUTO: 3.85 M/UL (ref 4–5.4)
SODIUM SERPL-SCNC: 140 MMOL/L (ref 136–145)
WBC # BLD AUTO: 7.96 K/UL (ref 3.9–12.7)

## 2020-01-31 PROCEDURE — 36415 COLL VENOUS BLD VENIPUNCTURE: CPT | Mod: HCNC

## 2020-01-31 PROCEDURE — 99499 UNLISTED E&M SERVICE: CPT | Mod: HCNC,S$GLB,, | Performed by: INTERNAL MEDICINE

## 2020-01-31 PROCEDURE — 1101F PT FALLS ASSESS-DOCD LE1/YR: CPT | Mod: HCNC,CPTII,S$GLB, | Performed by: INTERNAL MEDICINE

## 2020-01-31 PROCEDURE — 1126F PR PAIN SEVERITY QUANTIFIED, NO PAIN PRESENT: ICD-10-PCS | Mod: HCNC,S$GLB,, | Performed by: INTERNAL MEDICINE

## 2020-01-31 PROCEDURE — 85025 COMPLETE CBC W/AUTO DIFF WBC: CPT | Mod: HCNC

## 2020-01-31 PROCEDURE — 3077F PR MOST RECENT SYSTOLIC BLOOD PRESSURE >= 140 MM HG: ICD-10-PCS | Mod: HCNC,CPTII,S$GLB, | Performed by: INTERNAL MEDICINE

## 2020-01-31 PROCEDURE — 80053 COMPREHEN METABOLIC PANEL: CPT | Mod: HCNC

## 2020-01-31 PROCEDURE — 99215 PR OFFICE/OUTPT VISIT, EST, LEVL V, 40-54 MIN: ICD-10-PCS | Mod: HCNC,S$GLB,, | Performed by: INTERNAL MEDICINE

## 2020-01-31 PROCEDURE — 99999 PR PBB SHADOW E&M-EST. PATIENT-LVL III: CPT | Mod: PBBFAC,HCNC,, | Performed by: INTERNAL MEDICINE

## 2020-01-31 PROCEDURE — 1126F AMNT PAIN NOTED NONE PRSNT: CPT | Mod: HCNC,S$GLB,, | Performed by: INTERNAL MEDICINE

## 2020-01-31 PROCEDURE — 99999 PR PBB SHADOW E&M-EST. PATIENT-LVL III: ICD-10-PCS | Mod: PBBFAC,HCNC,, | Performed by: INTERNAL MEDICINE

## 2020-01-31 PROCEDURE — 99215 OFFICE O/P EST HI 40 MIN: CPT | Mod: HCNC,S$GLB,, | Performed by: INTERNAL MEDICINE

## 2020-01-31 PROCEDURE — 3078F DIAST BP <80 MM HG: CPT | Mod: HCNC,CPTII,S$GLB, | Performed by: INTERNAL MEDICINE

## 2020-01-31 PROCEDURE — 99499 RISK ADDL DX/OHS AUDIT: ICD-10-PCS | Mod: HCNC,S$GLB,, | Performed by: INTERNAL MEDICINE

## 2020-01-31 PROCEDURE — 3077F SYST BP >= 140 MM HG: CPT | Mod: HCNC,CPTII,S$GLB, | Performed by: INTERNAL MEDICINE

## 2020-01-31 PROCEDURE — 1159F PR MEDICATION LIST DOCUMENTED IN MEDICAL RECORD: ICD-10-PCS | Mod: HCNC,S$GLB,, | Performed by: INTERNAL MEDICINE

## 2020-01-31 PROCEDURE — 1159F MED LIST DOCD IN RCRD: CPT | Mod: HCNC,S$GLB,, | Performed by: INTERNAL MEDICINE

## 2020-01-31 PROCEDURE — 3078F PR MOST RECENT DIASTOLIC BLOOD PRESSURE < 80 MM HG: ICD-10-PCS | Mod: HCNC,CPTII,S$GLB, | Performed by: INTERNAL MEDICINE

## 2020-01-31 PROCEDURE — 1101F PR PT FALLS ASSESS DOC 0-1 FALLS W/OUT INJ PAST YR: ICD-10-PCS | Mod: HCNC,CPTII,S$GLB, | Performed by: INTERNAL MEDICINE

## 2020-01-31 NOTE — LETTER
February 5, 2020      Brayan Medina Jr., MD  [address]  [facility] [address]           [facility]  [address]  [facility] [address]  Phone: [phone]          Patient: Emilia Alan   MR Number: [id number]   YOB: 1940   Date of Visit: 1/31/2020       Dear Dr. Brayan Medina Jr.:    Thank you for referring Emilai Alan to me for evaluation. Attached you will find relevant portions of my assessment and plan of care.    If you have questions, please do not hesitate to call me. I look forward to following Emilia Alan along with you.    Sincerely,    German Parra MD    Enclosure  CC:  No Recipients    If you would like to receive this communication electronically, please contact [email or web address] or [phone] to request more information on [employer] access.    For providers and/or their staff who would like to refer a patient to [facility], please contact us through our one-stop-shop provider referral line, [facility], at [phone].    If you feel you have received this communication in error or would no longer like to receive these types of communications, please e-mail [email or web address]

## 2020-02-05 NOTE — ASSESSMENT & PLAN NOTE
Last hemoglobin A1c 7.3%. Creatinine normal today. Continue follow-up with Dr. Arguello and with PCP (Dr. Deras).

## 2020-02-05 NOTE — ASSESSMENT & PLAN NOTE
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 Evidence of clonality was demonstrated on Dr. 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 She does not meet criteria for a myeloid neoplasm.     She likely has increased risk of developing a myeloid disorder; however, it is uncertain when, or if, this may occur. Therefore, I agree with Dr. Medina's prior recommendation that she should have periodic monitoring of the CBC to determine when reassessment is needed.

## 2020-02-05 NOTE — PROGRESS NOTES
"HEMATOLOGIC MALIGNANCIES PROGRESS NOTE    IDENTIFYING STATEMENT   Emilia Michaels (Emilia) is a 79 y.o. female with a  of 1940 from [place], [place] with the diagnosis of anemia.      ONCOLOGY HISTORY:    Per Dr. Medina's last note:    "Mr. Michaels is a 78-year-old woman whom I evaluated in May 2018.  She was   referred for anemia.  Her hemoglobin values were normal from 2006 until 2016.    Since 2017, she has usually been anemic, although she did have a low normal   hemoglobin values 2 or 3 times in 2017 and 2018.  In the past year, her   hemoglobin has varied from 10.4-12.4.     I performed a bone marrow examination in 2018.  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  There was no evidence of   reticulin fibrosis.  The cytogenetic study was normal and the FISH analyses for   aberrations associated with myelodysplasia were negative.  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  She has not had thrombocytosis or polycythemia and she   does not have fibrosis in her bone marrow, so the 2 mutations suggest to me that she   has an increased risk of developing a myelodysplastic disorder.  It seems   unlikely that these are precursors of a myeloproliferative disease, although it   is possible that she could develop a mixed myelodysplastic/myeloproliferative   disorder in the future.  At the present time, I think the more appropriate label   for her anemia is "clonal hematopoiesis of indeterminate significance."     There has been little change since I last saw her in May 2018.  She has diabetes   mellitus with peripheral neuropathy, severe essential hypertension and a   history of poliomyelitis with post-polio syndrome.  She also has osteoporosis   and obstructive sleep apnea.  She is using a wheelchair today, but she " "can use a   walker in her home."    INTERVAL HISTORY:      Ms. Alan returns to clinic for follow-up of her anemia. This is her first visit with me, as her prior hematologist, Dr. Brayan Medina, is now retired.     Since her last visit, she has continued to follow for chronic conditions. She has not had any hospitalizations. She feels okay at this time.     Past Medical History, Past Social History and Past Family History have been reviewed and are unchanged except as noted in the interval history.    MEDICATIONS:     Current Outpatient Medications on File Prior to Visit   Medication Sig Dispense Refill    amLODIPine (NORVASC) 10 MG tablet TAKE 1 TABLET(10 MG) BY MOUTH EVERY DAY 90 tablet 3    aspirin (ECOTRIN) 81 MG EC tablet Take 1 tablet (81 mg total) by mouth once daily. 30 tablet 12    blood sugar diagnostic Strp 1 strip by Misc.(Non-Drug; Combo Route) route 2 (two) times daily. TRUE RESULT SYSTEM 180 strip 4    calcium-vitamin D3-vitamin K (VIACTIV) 500-100-40 mg-unit-mcg Chew Take 2 each by mouth.      clotrimazole-betamethasone 1-0.05% (LOTRISONE) cream Apply topically 2 (two) times daily. 45 g 3    desloratadine (CLARINEX) 5 mg tablet Take 5 mg by mouth once daily.      diclofenac (VOLTAREN) 75 MG EC tablet TAKE 1 TABLET BY MOUTH TWICE DAILY WITH FOOD AND MEAL AS NEEDED. STOP IF ANY STOMACH IRRITATION 180 tablet 0    diclofenac sodium (VOLTAREN) 1 % Gel GRETEL 2 GRAMS TO BOTH KNEES AND LEFT SHOULDER QID  3    docusate sodium (COLACE) 50 MG capsule Take 1 capsule (50 mg total) by mouth 2 (two) times daily as needed for Constipation.      gabapentin (NEURONTIN) 600 MG tablet Take 1 tablet (600 mg total) by mouth 4 (four) times daily with meals and nightly. 360 tablet 3    glipiZIDE (GLUCOTROL) 5 MG tablet TAKE 1 TABLET(5 MG) BY MOUTH EVERY DAY 90 tablet 3    hydrALAZINE (APRESOLINE) 50 MG tablet TAKE 1 TABLET(50 MG) BY MOUTH EVERY MORNING AND EVERY AFTERNOON THEN TAKE 2 TABLETS(100 MG) BY MOUTH " EVERY EVENING 360 tablet 3    hydroCHLOROthiazide (HYDRODIURIL) 25 MG tablet TAKE 1 TABLET(25 MG) BY MOUTH EVERY OTHER DAY 60 tablet 0    HYDROcodone-acetaminophen (NORCO)  mg per tablet Take 1 tablet by mouth every 6 (six) hours as needed for Pain (pain). 120 tablet 0    ketoconazole (NIZORAL) 2 % cream Apply topically once daily. 60 g 3    lancets Misc TEST 2 X DAILY PROSPER RESULT SYSTEM 180 each 3    metFORMIN (GLUCOPHAGE-XR) 500 MG 24 hr tablet TAKE 1 TABLET(500 MG) BY MOUTH EVERY DAY 90 tablet 3    metFORMIN (GLUCOPHAGE-XR) 500 MG 24 hr tablet TAKE 1 TABLET(500 MG) BY MOUTH EVERY DAY 90 tablet 0    multivitamin (THERAGRAN) per tablet Take 1 tablet by mouth once daily.       oxybutynin (DITROPAN) 5 MG Tab TAKE 1 TABLET(5 MG) BY MOUTH TWICE DAILY 180 tablet 3    pravastatin (PRAVACHOL) 40 MG tablet TAKE 1 AND 1/2 TABLETS(60 MG) BY MOUTH EVERY NIGHT 135 tablet 3    pravastatin (PRAVACHOL) 40 MG tablet TAKE 1 AND 1/2 TABLETS(60 MG) BY MOUTH EVERY NIGHT 135 tablet 0    TRUEPLUS LANCETS 33 gauge Misc USE BID  3    valsartan (DIOVAN) 320 MG tablet Take 1 tablet (320 mg total) by mouth every morning. 90 tablet 1    valsartan-hydrochlorothiazide (DIOVAN-HCT) 160-25 mg per tablet TK 1 T PO QD  3    valsartan-hydrochlorothiazide (DIOVAN-HCT) 320-25 mg per tablet TK 1 T PO ONCE D  1    blood-glucose meter (TRUERESULT BLOOD GLUCOSE SYSTM) kit Use as instructed 1 each 0     No current facility-administered medications on file prior to visit.        ALLERGIES:   Review of patient's allergies indicates:   Allergen Reactions    Atenolol     Verapamil         ROS:       Review of Systems   Constitutional: Negative for diaphoresis, fatigue, fever and unexpected weight change.   HENT:   Negative for lump/mass and sore throat.    Eyes: Negative for icterus.   Respiratory: Negative for cough and shortness of breath.    Cardiovascular: Negative for chest pain and palpitations.   Gastrointestinal: Negative for  abdominal distention, constipation, diarrhea, nausea and vomiting.   Genitourinary: Negative for dysuria and frequency.    Musculoskeletal: Negative for arthralgias, gait problem and myalgias.   Skin: Negative for rash.   Neurological: Negative for dizziness, gait problem and headaches.   Hematological: Negative for adenopathy. Does not bruise/bleed easily.   Psychiatric/Behavioral: The patient is not nervous/anxious.        PHYSICAL EXAM:  Vitals:    01/31/20 1308   BP: (!) 166/69   Pulse: 90   Resp: 17   Temp: 98.1 °F (36.7 °C)   SpO2: 97%   Weight: 71.2 kg (156 lb 15.5 oz)   PainSc: 0-No pain       KARNOFSKY PERFORMANCE STATUS 60%  ECOG 2    Physical Exam   Constitutional: She is oriented to person, place, and time. She appears well-developed and well-nourished. No distress.   HENT:   Head: Normocephalic and atraumatic.   Mouth/Throat: Mucous membranes are normal. No oral lesions.   Eyes: Conjunctivae are normal.   Neck: No thyromegaly present.   Cardiovascular: Normal rate, regular rhythm and normal heart sounds.   No murmur heard.  Pulmonary/Chest: Breath sounds normal. She has no wheezes. She has no rales.   Abdominal: Soft. She exhibits no distension and no mass. There is no splenomegaly or hepatomegaly. There is no tenderness.   Lymphadenopathy:     She has no cervical adenopathy.        Right cervical: No deep cervical adenopathy present.       Left cervical: No deep cervical adenopathy present.     She has no axillary adenopathy.        Right: No inguinal adenopathy present.        Left: No inguinal adenopathy present.   Neurological: She is alert and oriented to person, place, and time. She has normal reflexes. No cranial nerve deficit. Coordination normal.   Bilateral lower extremity weakness   Skin: No rash noted.       LAB:   Results for orders placed or performed in visit on 01/31/20   Rapid BMT CBC with Diff   Result Value Ref Range    WBC 7.96 3.90 - 12.70 K/uL    RBC 3.85 (L) 4.00 - 5.40 M/uL     Hemoglobin 11.8 (L) 12.0 - 16.0 g/dL    Hematocrit 38.3 37.0 - 48.5 %    Mean Corpuscular Volume 100 (H) 82 - 98 fL    Mean Corpuscular Hemoglobin 30.6 27.0 - 31.0 pg    Mean Corpuscular Hemoglobin Conc 30.8 (L) 32.0 - 36.0 g/dL    RDW 13.7 11.5 - 14.5 %    Platelets 254 150 - 350 K/uL    MPV 10.7 9.2 - 12.9 fL    Immature Granulocytes 0.3 0.0 - 0.5 %    Gran # (ANC) 5.3 1.8 - 7.7 K/uL    Immature Grans (Abs) 0.02 0.00 - 0.04 K/uL    Lymph # 1.6 1.0 - 4.8 K/uL    Mono # 0.5 0.3 - 1.0 K/uL    Eos # 0.4 0.0 - 0.5 K/uL    Baso # 0.11 0.00 - 0.20 K/uL    nRBC 0 0 /100 WBC    Gran% 67.0 38.0 - 73.0 %    Lymph% 19.6 18.0 - 48.0 %    Mono% 6.7 4.0 - 15.0 %    Eosinophil% 5.0 0.0 - 8.0 %    Basophil% 1.4 0.0 - 1.9 %    Differential Method Automated    Comprehensive metabolic panel   Result Value Ref Range    Sodium 140 136 - 145 mmol/L    Potassium 4.0 3.5 - 5.1 mmol/L    Chloride 104 95 - 110 mmol/L    CO2 25 23 - 29 mmol/L    Glucose 122 (H) 70 - 110 mg/dL    BUN, Bld 18 8 - 23 mg/dL    Creatinine 0.9 0.5 - 1.4 mg/dL    Calcium 9.4 8.7 - 10.5 mg/dL    Total Protein 6.9 6.0 - 8.4 g/dL    Albumin 3.4 (L) 3.5 - 5.2 g/dL    Total Bilirubin 0.4 0.1 - 1.0 mg/dL    Alkaline Phosphatase 84 55 - 135 U/L    AST 18 10 - 40 U/L    ALT 12 10 - 44 U/L    Anion Gap 11 8 - 16 mmol/L    eGFR if African American >60.0 >60 mL/min/1.73 m^2    eGFR if non African American >60.0 >60 mL/min/1.73 m^2       PROBLEMS ASSESSED THIS VISIT:    1. Refractory anemia    2. Paraparesis of both lower limbs    3. Type 2 diabetes mellitus with diabetic nephropathy, without long-term current use of insulin    4. Diabetic polyneuropathy associated with type 2 diabetes mellitus    5. Atherosclerosis of artery of right lower extremity: see xray 4/4/07    6. 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 Evidence of clonality was  demonstrated on Dr. 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 She does not meet criteria for a myeloid neoplasm.     She likely has increased risk of developing a myeloid disorder; however, it is uncertain when, or if, this may occur. Therefore, I agree with Dr. Medina's prior recommendation that she should have periodic monitoring of the CBC to determine when reassessment is needed.     Paraparesis of both lower limbs  Due to post-polio syndrome. Chronic. Continue follow-up with PM&R.     Type 2 diabetes mellitus with renal manifestations  Last hemoglobin A1c 7.3%. Creatinine normal today. Continue follow-up with Dr. Arguello and with PCP (Dr. Deras).     Diabetic polyneuropathy associated with type 2 diabetes mellitus  Continue gabapentin. Continue podiatry follow-up.     Atherosclerosis of artery of right lower extremity: see xray 4/4/07  Continue aspirin and pravastatin.     Follow-up  Please schedule labs in 6 months: CBC, CMP  Follow-up in 1 year with same labs as above     German Parra MD  Hematology and Stem Cell Transplant

## 2020-02-06 ENCOUNTER — PES CALL (OUTPATIENT)
Dept: ADMINISTRATIVE | Facility: CLINIC | Age: 80
End: 2020-02-06

## 2020-02-06 RX ORDER — HYDROCHLOROTHIAZIDE 25 MG/1
TABLET ORAL
Qty: 60 TABLET | Refills: 0 | Status: SHIPPED | OUTPATIENT
Start: 2020-02-06 | End: 2020-02-18 | Stop reason: SDUPTHER

## 2020-02-14 ENCOUNTER — TELEPHONE (OUTPATIENT)
Dept: INTERNAL MEDICINE | Facility: CLINIC | Age: 80
End: 2020-02-14

## 2020-02-14 ENCOUNTER — PATIENT MESSAGE (OUTPATIENT)
Dept: ADMINISTRATIVE | Facility: OTHER | Age: 80
End: 2020-02-14

## 2020-02-14 DIAGNOSIS — I10 SEVERE HYPERTENSION: ICD-10-CM

## 2020-02-14 RX ORDER — VALSARTAN 320 MG/1
320 TABLET ORAL EVERY MORNING
Qty: 90 TABLET | Refills: 1 | Status: SHIPPED | OUTPATIENT
Start: 2020-02-14 | End: 2020-02-14

## 2020-02-14 RX ORDER — VALSARTAN 160 MG/1
160 TABLET ORAL 2 TIMES DAILY
Qty: 180 TABLET | Refills: 3 | Status: SHIPPED | OUTPATIENT
Start: 2020-02-14 | End: 2020-02-18 | Stop reason: RX

## 2020-02-14 NOTE — TELEPHONE ENCOUNTER
----- Message from Sheila Pulido sent at 2/14/2020  2:37 PM CST -----  Contact: Self [phone]  Patient will like to speak to the nurse in regards to B/P and valsartan (DIOVAN) 320 MG tablet, pt states the pharmacy does not have medication in stock, please advise

## 2020-02-14 NOTE — TELEPHONE ENCOUNTER
Can recheck on what they have available that is similar to that?  Do they have 160 mg of the Diovan?  Or should I switch to something else?  thanks

## 2020-02-14 NOTE — TELEPHONE ENCOUNTER
----- Message from Nancy Sheppard sent at 2/14/2020  9:42 AM CST -----  Contact: [facility] [phone]  Type: Rx Clarification/ Additional Information/ Questions    Medication:valsartan (DIOVAN) 320 MG tablet    What questions do you have about the medication, if any? Medication on back order    What information is needed? - medication change     Pharmacy Contact Name:[id number]  [place] - [address]  AT [facility] [address]   [phone] (Phone)  [phone] (Fax)    Comments:please advise, thanks

## 2020-02-17 ENCOUNTER — PATIENT MESSAGE (OUTPATIENT)
Dept: ADMINISTRATIVE | Facility: OTHER | Age: 80
End: 2020-02-17

## 2020-02-18 ENCOUNTER — PATIENT OUTREACH (OUTPATIENT)
Dept: OTHER | Facility: OTHER | Age: 80
End: 2020-02-18

## 2020-02-18 DIAGNOSIS — Z86.12 HISTORY OF POLIOMYELITIS: ICD-10-CM

## 2020-02-18 DIAGNOSIS — M17.11 PRIMARY OSTEOARTHRITIS OF RIGHT KNEE: ICD-10-CM

## 2020-02-18 DIAGNOSIS — E11.42 DIABETIC POLYNEUROPATHY ASSOCIATED WITH TYPE 2 DIABETES MELLITUS: ICD-10-CM

## 2020-02-18 DIAGNOSIS — R26.9 GAIT DISORDER: ICD-10-CM

## 2020-02-18 DIAGNOSIS — I10 SEVERE HYPERTENSION: ICD-10-CM

## 2020-02-18 DIAGNOSIS — M54.16 LUMBAR RADICULOPATHY: ICD-10-CM

## 2020-02-18 DIAGNOSIS — E78.5 HYPERLIPIDEMIA, UNSPECIFIED HYPERLIPIDEMIA TYPE: ICD-10-CM

## 2020-02-18 DIAGNOSIS — E08.44 DIABETIC AMYOTROPHY ASSOCIATED WITH DIABETES MELLITUS DUE TO UNDERLYING CONDITION: ICD-10-CM

## 2020-02-18 DIAGNOSIS — I10 SEVERE HYPERTENSION: Primary | ICD-10-CM

## 2020-02-18 DIAGNOSIS — G14 POST POLIOMYELITIS SYNDROME: ICD-10-CM

## 2020-02-18 DIAGNOSIS — M81.0 AGE-RELATED OSTEOPOROSIS WITHOUT CURRENT PATHOLOGICAL FRACTURE: ICD-10-CM

## 2020-02-18 DIAGNOSIS — M25.561 CHRONIC PAIN OF RIGHT KNEE: ICD-10-CM

## 2020-02-18 DIAGNOSIS — M47.16 LUMBAR SPONDYLOSIS WITH MYELOPATHY: ICD-10-CM

## 2020-02-18 DIAGNOSIS — G89.29 CHRONIC PAIN OF RIGHT KNEE: ICD-10-CM
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
## 2020-02-18 DIAGNOSIS — G89.29 CHRONIC BILATERAL LOW BACK PAIN WITH SCIATICA, SCIATICA LATERALITY UNSPECIFIED: ICD-10-CM

## 2020-02-18 DIAGNOSIS — M54.16 LEFT LUMBAR RADICULOPATHY: ICD-10-CM

## 2020-02-18 DIAGNOSIS — M48.062 SPINAL STENOSIS OF LUMBAR REGION WITH NEUROGENIC CLAUDICATION: ICD-10-CM

## 2020-02-18 DIAGNOSIS — G82.20 PARAPARESIS OF BOTH LOWER LIMBS: ICD-10-CM

## 2020-02-18 DIAGNOSIS — E11.40 TYPE 2 DIABETES MELLITUS WITH DIABETIC NEUROPATHY, WITHOUT LONG-TERM CURRENT USE OF INSULIN: ICD-10-CM

## 2020-02-18 DIAGNOSIS — M47.26 OSTEOARTHRITIS OF SPINE WITH RADICULOPATHY, LUMBAR REGION: ICD-10-CM

## 2020-02-18 DIAGNOSIS — M54.40 CHRONIC BILATERAL LOW BACK PAIN WITH SCIATICA, SCIATICA LATERALITY UNSPECIFIED: ICD-10-CM

## 2020-02-18 RX ORDER — IRBESARTAN 300 MG/1
300 TABLET ORAL DAILY
Qty: 90 TABLET | Refills: 1 | Status: SHIPPED | OUTPATIENT
Start: 2020-02-18 | End: 2020-02-18 | Stop reason: SDUPTHER

## 2020-02-18 RX ORDER — PRAVASTATIN SODIUM 40 MG/1
TABLET ORAL
Qty: 135 TABLET | Refills: 3 | Status: SHIPPED | OUTPATIENT
Start: 2020-02-18 | End: 2020-10-26 | Stop reason: SDUPTHER

## 2020-02-18 RX ORDER — IRBESARTAN 300 MG/1
300 TABLET ORAL DAILY
Qty: 90 TABLET | Refills: 1 | Status: SHIPPED | OUTPATIENT
Start: 2020-02-18 | End: 2020-10-26 | Stop reason: SDUPTHER

## 2020-02-18 RX ORDER — AMLODIPINE BESYLATE 10 MG/1
TABLET ORAL
Qty: 90 TABLET | Refills: 3 | Status: SHIPPED | OUTPATIENT
Start: 2020-02-18 | End: 2020-10-26 | Stop reason: SDUPTHER

## 2020-02-18 RX ORDER — METFORMIN HYDROCHLORIDE 500 MG/1
TABLET, EXTENDED RELEASE ORAL
Qty: 90 TABLET | Refills: 0 | Status: SHIPPED | OUTPATIENT
Start: 2020-02-18 | End: 2020-08-16 | Stop reason: SDUPTHER

## 2020-02-18 RX ORDER — HYDROCHLOROTHIAZIDE 25 MG/1
25 TABLET ORAL EVERY OTHER DAY
Qty: 60 TABLET | Refills: 1 | Status: SHIPPED | OUTPATIENT
Start: 2020-02-18 | End: 2020-10-26 | Stop reason: SDUPTHER

## 2020-02-18 RX ORDER — GABAPENTIN 600 MG/1
600 TABLET ORAL
Qty: 360 TABLET | Refills: 3 | Status: CANCELLED | OUTPATIENT
Start: 2020-02-18 | End: 2020-12-15

## 2020-02-18 RX ORDER — GLIPIZIDE 5 MG/1
TABLET ORAL
Qty: 90 TABLET | Refills: 3 | Status: SHIPPED | OUTPATIENT
Start: 2020-02-18 | End: 2020-09-21

## 2020-02-18 NOTE — TELEPHONE ENCOUNTER
Spoke to pt and she needs refills   On her Diovan pt stated that the Digital Hypertension program will send a new Rx to the pharmacy   Please advise

## 2020-02-18 NOTE — TELEPHONE ENCOUNTER
----- Message from Nancy Sheppard sent at 2/18/2020  9:28 AM CST -----  Contact: [phone]  Patient will like to speak to the nurse in regards to B/P and valsartan (DIOVAN) 320 MG tablet. Patient needs a refill. Patient stated the pharmacy hasn't received the prescription.  Please advise, thanks

## 2020-02-28 DIAGNOSIS — G89.29 CHRONIC BILATERAL LOW BACK PAIN WITH SCIATICA, SCIATICA LATERALITY UNSPECIFIED: ICD-10-CM

## 2020-02-28 DIAGNOSIS — E11.42 DIABETIC POLYNEUROPATHY ASSOCIATED WITH TYPE 2 DIABETES MELLITUS: ICD-10-CM

## 2020-02-28 DIAGNOSIS — Z86.12 HISTORY OF POLIOMYELITIS: ICD-10-CM

## 2020-02-28 DIAGNOSIS — M17.11 PRIMARY OSTEOARTHRITIS OF RIGHT KNEE: ICD-10-CM
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
## 2020-02-28 DIAGNOSIS — G89.29 CHRONIC PAIN OF RIGHT KNEE: ICD-10-CM

## 2020-02-28 DIAGNOSIS — M25.561 CHRONIC PAIN OF RIGHT KNEE: ICD-10-CM

## 2020-02-28 DIAGNOSIS — E08.44 DIABETIC AMYOTROPHY ASSOCIATED WITH DIABETES MELLITUS DUE TO UNDERLYING CONDITION: ICD-10-CM

## 2020-02-28 DIAGNOSIS — M54.16 LEFT LUMBAR RADICULOPATHY: ICD-10-CM

## 2020-02-28 DIAGNOSIS — M81.0 AGE-RELATED OSTEOPOROSIS WITHOUT CURRENT PATHOLOGICAL FRACTURE: ICD-10-CM

## 2020-02-28 DIAGNOSIS — M54.40 CHRONIC BILATERAL LOW BACK PAIN WITH SCIATICA, SCIATICA LATERALITY UNSPECIFIED: ICD-10-CM

## 2020-02-28 DIAGNOSIS — M54.16 LUMBAR RADICULOPATHY: ICD-10-CM

## 2020-02-28 DIAGNOSIS — G14 POST POLIOMYELITIS SYNDROME: ICD-10-CM

## 2020-02-28 DIAGNOSIS — M47.816 LUMBAR SPONDYLOSIS: ICD-10-CM

## 2020-02-28 DIAGNOSIS — M48.062 SPINAL STENOSIS OF LUMBAR REGION WITH NEUROGENIC CLAUDICATION: ICD-10-CM

## 2020-02-28 DIAGNOSIS — E11.40 TYPE 2 DIABETES MELLITUS WITH DIABETIC NEUROPATHY, WITHOUT LONG-TERM CURRENT USE OF INSULIN: ICD-10-CM

## 2020-02-28 DIAGNOSIS — G82.20 PARAPARESIS OF BOTH LOWER LIMBS: ICD-10-CM

## 2020-02-28 DIAGNOSIS — R26.9 GAIT DISORDER: ICD-10-CM

## 2020-02-28 DIAGNOSIS — M47.26 OSTEOARTHRITIS OF SPINE WITH RADICULOPATHY, LUMBAR REGION: ICD-10-CM

## 2020-02-28 DIAGNOSIS — M47.16 LUMBAR SPONDYLOSIS WITH MYELOPATHY: ICD-10-CM

## 2020-02-28 NOTE — TELEPHONE ENCOUNTER
----- Message from Michelle Stark sent at 2/28/2020  1:06 PM CST -----  Contact: pt  Reason:  Refill request for HYDROcodone-acetaminophen (NORCO)  mg per tablet    Communication: [phone]

## 2020-03-01 RX ORDER — HYDROCODONE BITARTRATE AND ACETAMINOPHEN 10; 325 MG/1; MG/1
1 TABLET ORAL EVERY 6 HOURS PRN
Qty: 120 TABLET | Refills: 0 | Status: SHIPPED | OUTPATIENT
Start: 2020-03-01 | End: 2020-04-13 | Stop reason: SDUPTHER

## 2020-03-17 NOTE — PROGRESS NOTES
Called patient in response to message. Valsartan is unavailable at patient's preferred pharmacy. Change to irbesartan 300 mg and follow up in 3-4 weeks.

## 2020-03-19 ENCOUNTER — PATIENT OUTREACH (OUTPATIENT)
Dept: OTHER | Facility: OTHER | Age: 80
End: 2020-03-19

## 2020-03-19 NOTE — PROGRESS NOTES
Digital Medicine: Clinician Follow-Up    Called patient for digital medicine follow up. Ms. Alan is doing well overall. Valsartan was changed to irbesartan and patient is tolerating regimen with no complaints.     The history is provided by the patient. No  was used.       Intervention/Plan  Assessment:  Patient's current 30-day average is at goal of <130/80 mmHg.       Plan:  Continue current regimen.  Patients health , Richi Miller, will follow-up as scheduled.    I will continue to monitor regularly and will follow-up in 3-6 months, sooner if blood pressure begins to trend upward or downward.     Current medication regimen:  Hypertension Medications             amLODIPine (NORVASC) 10 MG tablet TAKE 1 TABLET(10 MG) BY MOUTH EVERY DAY    hydrALAZINE (APRESOLINE) 50 MG tablet TAKE 1 TABLET(50 MG) BY MOUTH EVERY MORNING AND EVERY AFTERNOON THEN TAKE 2 TABLETS(100 MG) BY MOUTH EVERY EVENING    hydroCHLOROthiazide (HYDRODIURIL) 25 MG tablet Take 1 tablet (25 mg total) by mouth every other day.    irbesartan (AVAPRO) 300 MG tablet Take 1 tablet (300 mg total) by mouth once daily.        Patient has my contact information and knows to call with any concerns or clinical changes.          Last 5 Patient Entered Readings                                      Current 30 Day Average: 128/67     Recent Readings 3/17/2020 3/15/2020 3/14/2020 3/13/2020 3/10/2020    SBP (mmHg) 137 137 130 120 106    DBP (mmHg) 84 61 59 63 52    Pulse 73 68 81 84 86             Hypertension Medications             amLODIPine (NORVASC) 10 MG tablet TAKE 1 TABLET(10 MG) BY MOUTH EVERY DAY    hydrALAZINE (APRESOLINE) 50 MG tablet TAKE 1 TABLET(50 MG) BY MOUTH EVERY MORNING AND EVERY AFTERNOON THEN TAKE 2 TABLETS(100 MG) BY MOUTH EVERY EVENING    hydroCHLOROthiazide (HYDRODIURIL) 25 MG tablet Take 1 tablet (25 mg total) by mouth every other day.    irbesartan (AVAPRO) 300 MG tablet Take 1 tablet (300 mg total) by mouth  once daily.                 Screenings

## 2020-03-27 ENCOUNTER — PATIENT OUTREACH (OUTPATIENT)
Dept: OTHER | Facility: OTHER | Age: 80
End: 2020-03-27

## 2020-03-27 NOTE — PROGRESS NOTES
Digital Medicine: Health  Follow-Up    Patient denies any other concerns at this time.           Follow Up  Follow-up reason(s): reading review      Patient reports she is doing well.       INTERVENTION(S)  encouragement/support and denied further coaching    PLAN  continue monitoring          Topic    Eye Exam     Hemoglobin A1C        Last 5 Patient Entered Readings                                      Current 30 Day Average: 127/65     Recent Readings 3/26/2020 3/25/2020 3/23/2020 3/21/2020 3/19/2020    SBP (mmHg) 96 129 141 144 133    DBP (mmHg) 57 53 90 51 64    Pulse 67 73 91 74 78            Diet Screening   No change to diet.      Physical Activity Screening   No change to exercise routine.

## 2020-04-13 DIAGNOSIS — G82.20 PARAPARESIS OF BOTH LOWER LIMBS: ICD-10-CM

## 2020-04-13 DIAGNOSIS — M47.26 OSTEOARTHRITIS OF SPINE WITH RADICULOPATHY, LUMBAR REGION: ICD-10-CM

## 2020-04-13 DIAGNOSIS — M47.16 LUMBAR SPONDYLOSIS WITH MYELOPATHY: ICD-10-CM

## 2020-04-13 DIAGNOSIS — G89.29 CHRONIC BILATERAL LOW BACK PAIN WITH SCIATICA, SCIATICA LATERALITY UNSPECIFIED: ICD-10-CM

## 2020-04-13 DIAGNOSIS — M54.16 LEFT LUMBAR RADICULOPATHY: ICD-10-CM

## 2020-04-13 DIAGNOSIS — M48.062 SPINAL STENOSIS OF LUMBAR REGION WITH NEUROGENIC CLAUDICATION: ICD-10-CM

## 2020-04-13 DIAGNOSIS — M25.561 CHRONIC PAIN OF RIGHT KNEE: ICD-10-CM

## 2020-04-13 DIAGNOSIS — R26.9 GAIT DISORDER: ICD-10-CM
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
## 2020-04-13 DIAGNOSIS — M81.0 AGE-RELATED OSTEOPOROSIS WITHOUT CURRENT PATHOLOGICAL FRACTURE: ICD-10-CM

## 2020-04-13 DIAGNOSIS — G89.29 CHRONIC PAIN OF RIGHT KNEE: ICD-10-CM

## 2020-04-13 DIAGNOSIS — M17.11 PRIMARY OSTEOARTHRITIS OF RIGHT KNEE: ICD-10-CM

## 2020-04-13 DIAGNOSIS — G14 POST POLIOMYELITIS SYNDROME: ICD-10-CM

## 2020-04-13 DIAGNOSIS — M47.816 LUMBAR SPONDYLOSIS: ICD-10-CM

## 2020-04-13 DIAGNOSIS — E11.40 TYPE 2 DIABETES MELLITUS WITH DIABETIC NEUROPATHY, WITHOUT LONG-TERM CURRENT USE OF INSULIN: ICD-10-CM

## 2020-04-13 DIAGNOSIS — M54.16 LUMBAR RADICULOPATHY: ICD-10-CM

## 2020-04-13 DIAGNOSIS — E08.44 DIABETIC AMYOTROPHY ASSOCIATED WITH DIABETES MELLITUS DUE TO UNDERLYING CONDITION: ICD-10-CM

## 2020-04-13 DIAGNOSIS — Z86.12 HISTORY OF POLIOMYELITIS: ICD-10-CM

## 2020-04-13 DIAGNOSIS — E11.42 DIABETIC POLYNEUROPATHY ASSOCIATED WITH TYPE 2 DIABETES MELLITUS: ICD-10-CM

## 2020-04-13 DIAGNOSIS — M54.40 CHRONIC BILATERAL LOW BACK PAIN WITH SCIATICA, SCIATICA LATERALITY UNSPECIFIED: ICD-10-CM

## 2020-04-13 NOTE — TELEPHONE ENCOUNTER
----- Message from Garrett Tong sent at 4/13/2020  2:50 PM CDT -----  Contact: Patient   Rx Refill/Request     Is this a Refill or New Rx:  Yes   Rx Name and Strength:  HYDROcodone-acetaminophen (NORCO)  mg per tablet  Preferred Pharmacy with phone number:     [id number] - [place] - [address]  [address] [facility] [address]  [address] [place] [address]  Phone: [phone] Fax: [phone]

## 2020-04-18 RX ORDER — HYDROCODONE BITARTRATE AND ACETAMINOPHEN 10; 325 MG/1; MG/1
1 TABLET ORAL EVERY 6 HOURS PRN
Qty: 120 TABLET | Refills: 0 | Status: SHIPPED | OUTPATIENT
Start: 2020-04-18 | End: 2020-04-30 | Stop reason: SDUPTHER

## 2020-04-29 ENCOUNTER — PATIENT OUTREACH (OUTPATIENT)
Dept: ADMINISTRATIVE | Facility: OTHER | Age: 80
End: 2020-04-29

## 2020-04-30 ENCOUNTER — OFFICE VISIT (OUTPATIENT)
Dept: PHYSICAL MEDICINE AND REHAB | Facility: CLINIC | Age: 80
End: 2020-04-30
Payer: MEDICARE

## 2020-04-30 DIAGNOSIS — Z86.12 HISTORY OF POLIOMYELITIS: ICD-10-CM

## 2020-04-30 DIAGNOSIS — E08.44 DIABETIC AMYOTROPHY ASSOCIATED WITH DIABETES MELLITUS DUE TO UNDERLYING CONDITION: ICD-10-CM

## 2020-04-30 DIAGNOSIS — R26.9 GAIT DISORDER: ICD-10-CM

## 2020-04-30 DIAGNOSIS — M54.16 LUMBAR RADICULOPATHY: ICD-10-CM

## 2020-04-30 DIAGNOSIS — M47.816 LUMBAR SPONDYLOSIS: ICD-10-CM
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
## 2020-04-30 DIAGNOSIS — G89.29 CHRONIC BILATERAL LOW BACK PAIN WITH SCIATICA, SCIATICA LATERALITY UNSPECIFIED: ICD-10-CM

## 2020-04-30 DIAGNOSIS — E11.40 TYPE 2 DIABETES MELLITUS WITH DIABETIC NEUROPATHY, WITHOUT LONG-TERM CURRENT USE OF INSULIN: ICD-10-CM

## 2020-04-30 DIAGNOSIS — M48.062 SPINAL STENOSIS OF LUMBAR REGION WITH NEUROGENIC CLAUDICATION: Primary | ICD-10-CM

## 2020-04-30 DIAGNOSIS — M47.16 LUMBAR SPONDYLOSIS WITH MYELOPATHY: ICD-10-CM

## 2020-04-30 DIAGNOSIS — M81.0 AGE-RELATED OSTEOPOROSIS WITHOUT CURRENT PATHOLOGICAL FRACTURE: ICD-10-CM

## 2020-04-30 DIAGNOSIS — M47.26 OSTEOARTHRITIS OF SPINE WITH RADICULOPATHY, LUMBAR REGION: ICD-10-CM

## 2020-04-30 DIAGNOSIS — Z79.891 OPIOID USE AGREEMENT EXISTS: ICD-10-CM

## 2020-04-30 DIAGNOSIS — E11.42 DIABETIC POLYNEUROPATHY ASSOCIATED WITH TYPE 2 DIABETES MELLITUS: ICD-10-CM

## 2020-04-30 DIAGNOSIS — M25.561 CHRONIC PAIN OF RIGHT KNEE: ICD-10-CM

## 2020-04-30 DIAGNOSIS — M54.40 CHRONIC BILATERAL LOW BACK PAIN WITH SCIATICA, SCIATICA LATERALITY UNSPECIFIED: ICD-10-CM

## 2020-04-30 DIAGNOSIS — G14 POST POLIOMYELITIS SYNDROME: ICD-10-CM

## 2020-04-30 DIAGNOSIS — M17.11 PRIMARY OSTEOARTHRITIS OF RIGHT KNEE: ICD-10-CM

## 2020-04-30 DIAGNOSIS — M54.16 LEFT LUMBAR RADICULOPATHY: ICD-10-CM

## 2020-04-30 DIAGNOSIS — G82.20 PARAPARESIS OF BOTH LOWER LIMBS: ICD-10-CM

## 2020-04-30 DIAGNOSIS — G89.29 CHRONIC PAIN OF RIGHT KNEE: ICD-10-CM

## 2020-04-30 PROCEDURE — 1159F MED LIST DOCD IN RCRD: CPT | Mod: HCNC,95,, | Performed by: PHYSICAL MEDICINE & REHABILITATION

## 2020-04-30 PROCEDURE — 1101F PR PT FALLS ASSESS DOC 0-1 FALLS W/OUT INJ PAST YR: ICD-10-PCS | Mod: HCNC,CPTII,95, | Performed by: PHYSICAL MEDICINE & REHABILITATION

## 2020-04-30 PROCEDURE — 1159F PR MEDICATION LIST DOCUMENTED IN MEDICAL RECORD: ICD-10-PCS | Mod: HCNC,95,, | Performed by: PHYSICAL MEDICINE & REHABILITATION

## 2020-04-30 PROCEDURE — 99443 PR PHYSICIAN TELEPHONE EVALUATION 21-30 MIN: CPT | Mod: HCNC,95,, | Performed by: PHYSICAL MEDICINE & REHABILITATION

## 2020-04-30 PROCEDURE — 1101F PT FALLS ASSESS-DOCD LE1/YR: CPT | Mod: HCNC,CPTII,95, | Performed by: PHYSICAL MEDICINE & REHABILITATION

## 2020-04-30 PROCEDURE — 3051F PR MOST RECENT HEMOGLOBIN A1C LEVEL 7.0 - < 8.0%: ICD-10-PCS | Mod: HCNC,CPTII,95, | Performed by: PHYSICAL MEDICINE & REHABILITATION

## 2020-04-30 PROCEDURE — 3051F HG A1C>EQUAL 7.0%<8.0%: CPT | Mod: HCNC,CPTII,95, | Performed by: PHYSICAL MEDICINE & REHABILITATION

## 2020-04-30 PROCEDURE — 99443 PR PHYSICIAN TELEPHONE EVALUATION 21-30 MIN: ICD-10-PCS | Mod: HCNC,95,, | Performed by: PHYSICAL MEDICINE & REHABILITATION

## 2020-04-30 RX ORDER — DICLOFENAC SODIUM 75 MG/1
TABLET, DELAYED RELEASE ORAL
Qty: 180 TABLET | Refills: 0 | Status: SHIPPED | OUTPATIENT
Start: 2020-04-30 | End: 2020-08-16 | Stop reason: SDUPTHER

## 2020-04-30 RX ORDER — HYDROCODONE BITARTRATE AND ACETAMINOPHEN 10; 325 MG/1; MG/1
1 TABLET ORAL EVERY 6 HOURS PRN
Qty: 120 TABLET | Refills: 0 | Status: SHIPPED | OUTPATIENT
Start: 2020-05-18 | End: 2020-07-09 | Stop reason: SDUPTHER

## 2020-05-14 ENCOUNTER — PES CALL (OUTPATIENT)
Dept: ADMINISTRATIVE | Facility: CLINIC | Age: 80
End: 2020-05-14

## 2020-05-18 ENCOUNTER — TELEPHONE (OUTPATIENT)
Dept: PHYSICAL MEDICINE AND REHAB | Facility: CLINIC | Age: 80
End: 2020-05-18

## 2020-05-18 NOTE — PROGRESS NOTES
Established Patient - Audio Only Telehealth Visit     The patient location is: home  The chief complaint leading to consultation is: back pain, and leg pain and weakness.  Visit type: Virtual visit with audio only (telephone)  Total time spent with patient:  25 min.       The reason for the audio only service rather than synchronous audio and video virtual visit was related to technical difficulties or patient preference/necessity.     Each patient to whom I provide medical services by telemedicine is:  (1) informed of the relationship between the physician and patient and the respective role of any other health care provider with respect to management of the patient; and (2) notified that they may decline to receive medical services by telemedicine and may withdraw from such care at any time. Patient verbally consented to receive this service via voice-only telephone call.             This service was not originating from a related E/M service provided within the previous 7 days nor will  to an E/M service or procedure within the next 24 hours or my soonest available appointment.  Prevailing standard of care was able to be met in this audio-only visit.          Subjective:       Patient ID: Emilia Alan is a 79 y.o. female.    Chief Complaint: Back Pain    Ms. 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  She has B LE weakness, paraparesis, secondary to severe Lumbar spinal stenosis at L3-4, and L4-5, complicated with  Leg length discrepancy ( left is shorter than right), with severe DJD of both knees , genu valgus in R knee.   She f/u with [facility] pain mgm clinic for chronic pain mgm with opioids.  LCV 11/14//19.      She reports no changes in her pain, no new injury nor fall since LC.  Her pain is well controlled with current pain regimen.   Patient takes  Hydrocodone 10/325 mg, takes 3-4 x/day, and Neurontin  600 mg, po QID, tolerates it well, and she knows that both medications are helping.  But she reports more weakness in legs, and  difficulties straightening up.   Nevertheless, she does not want to get new MRI of LS, nor she wants to be evaluated by NS, nor she wants to get ASHLEY.    She follow up visit for back pain, leg weakness, and chronic pain management.   She has paraparesis, a right leg is weaker than Left leg- that is weak in ankle.   She still does not want any new imaging, nor ASHLEY, nor NS evaluation.  She cannot actively  Right LE, states that is getting weaker,still walks short distances, but majority of time she spent sitting.    Today, she states that she is slowing down, she walks slower, and can do less than in past. She also complains about back pain.   Current back pain is 6, worst pain is 9-10/10 while upright and walking.    Pain is localized across lower back and in upper spine.    Back pain is off/on, present mainly during day time, sharp, severe ,stabbing pain.  Pain is worse with lying or sitting and getting up from chair.   With that pain she is afraid she will fall down, since she gets more weakness in Right leg.   Complains also about right knee pain, that is weak, and goes backwards during walking.   Patient takes  Hydrocodone 10/325 mg, takes 3-4 x/day, and Neurontin 600 mg, po QID, tolerates it well, and she knows that both medications are helping.  Patient refuses neurosurgical evaluation, and  ASHLEY to back.   The patient has had gradual worsening of her gait over the last few years.   She was prescribed a left AFO ( that she wears).   She cannot tolerate swedish knee cage as well.   She also has a neoprene sleee brace , to stabilized knee, and to take pressure off bone.   Right hinge knee brace that is all warned up,since she wears it all the time.  She continues to complain of progressive bilateral lower extremity weakness.   She reports frequent falls, especially in the last 3-6  months.   The patient lives in a single-silvia home with a ramp access.   She is independent with her dressing, feeding and grooming.   She is also independent with toileting and bathing using a shower chair.   She is able to ambulate with single cane, RW.  She can walk about 20 feet, but has to stop frequently.   She does better with a Rollator walker.   She has manual wheel chair that is bulky, and she cannot propel, RW with seat, cane and RW.   She is restricted by lower extremity weakness, especially on the right side as noted above, she has frequent falls.  She did not sustain any significant injuries.   She recieved a new SCOOTER, and it is helping her greatly.  She uses it for linger distances, but tries to walk as much as possible leslye. Inside the house, with her braces, and  RW with seat.    She follows up, for chronic pain management with opiates.    Past Medical History:   Diagnosis Date    Allergy     Anemia 10/20/2014    Arthritis     Atherosclerosis of artery of right lower extremity: see xray 07    Diabetes mellitus     Diabetic neuropathy 2012    Gait disorder 2012    High cholesterol     History of poliomyelitis 2012    Hyperlipidemia 2013    Hypertension     Obstructive sleep apnea syndrome: dx 2008 needs CPAP 11 2017    Osteopenia     Osteoporosis, unspecified 2014    Other specified anemias 2015    Post poliomyelitis syndrome 2012    Renal manifestation of secondary diabetes mellitus     Sleep apnea     Type II or unspecified type diabetes mellitus without mention of complication, not stated as uncontrolled 2015    Unspecified essential hypertension 2015       Past Surgical History:   Procedure Laterality Date    CATARACT EXTRACTION       SECTION      CHOLECYSTECTOMY      COLONOSCOPY N/A 2017    Procedure: COLONOSCOPY;  Surgeon: Karen Lebron MD;  Location: [facility] ([address] [place] [address]);  Service: Endoscopy;   Laterality: N/A;    EYE SURGERY      FRACTURE SURGERY         Family History   Problem Relation Age of Onset    Diabetes Father     Heart disease Father     Heart attack Father     Heart disease Brother     No Known Problems Daughter     Diabetes Son     Heart disease Brother     Diabetes Daughter     Diabetes Daughter     Cataracts Neg Hx     Glaucoma Neg Hx     Hypertension Neg Hx     Cancer Neg Hx     Blindness Neg Hx     Amblyopia Neg Hx     Strabismus Neg Hx     Retinal detachment Neg Hx     Macular degeneration Neg Hx     Melanoma Neg Hx        Social History     Socioeconomic History    Marital status:      Spouse name: Not on file    Number of children: 4    Years of education: Not on file    Highest education level: Not on file   Occupational History    Not on file   Social Needs    Financial resource strain: Not on file    Food insecurity:     Worry: Not on file     Inability: Not on file    Transportation needs:     Medical: Not on file     Non-medical: Not on file   Tobacco Use    Smoking status: Never Smoker    Smokeless tobacco: Never Used   Substance and Sexual Activity    Alcohol use: No    Drug use: No    Sexual activity: Never   Lifestyle    Physical activity:     Days per week: Not on file     Minutes per session: Not on file    Stress: Not on file   Relationships    Social connections:     Talks on phone: Not on file     Gets together: Not on file     Attends Caodaism service: Not on file     Active member of club or organization: Not on file     Attends meetings of clubs or organizations: Not on file     Relationship status: Not on file   Other Topics Concern    Are you pregnant or think you may be? No    Breast-feeding Not Asked   Social History Narrative    Not on file       Current Outpatient Medications   Medication Sig Dispense Refill    amLODIPine (NORVASC) 10 MG tablet TAKE 1 TABLET(10 MG) BY MOUTH EVERY DAY 90 tablet 3    aspirin  (ECOTRIN) 81 MG EC tablet Take 1 tablet (81 mg total) by mouth once daily. 30 tablet 12    blood sugar diagnostic Strp 1 strip by Misc.(Non-Drug; Combo Route) route 2 (two) times daily. TRUE RESULT SYSTEM 180 strip 4    blood-glucose meter (TRUERESULT BLOOD GLUCOSE SYSTM) kit Use as instructed 1 each 0    calcium-vitamin D3-vitamin K (VIACTIV) 500-100-40 mg-unit-mcg Chew Take 2 each by mouth.      clotrimazole-betamethasone 1-0.05% (LOTRISONE) cream Apply topically 2 (two) times daily. 45 g 3    desloratadine (CLARINEX) 5 mg tablet Take 5 mg by mouth once daily.      diclofenac (VOLTAREN) 75 MG EC tablet TAKE 1 TABLET BY MOUTH TWICE DAILY WITH FOOD AND MEAL AS NEEDED. STOP IF ANY STOMACH IRRITATION 180 tablet 0    diclofenac sodium (VOLTAREN) 1 % Gel GRETEL 2 GRAMS TO BOTH KNEES AND LEFT SHOULDER QID  3    docusate sodium (COLACE) 50 MG capsule Take 1 capsule (50 mg total) by mouth 2 (two) times daily as needed for Constipation.      gabapentin (NEURONTIN) 600 MG tablet Take 1 tablet (600 mg total) by mouth 4 (four) times daily with meals and nightly. 360 tablet 3    glipiZIDE (GLUCOTROL) 5 MG tablet TAKE 1 TABLET(5 MG) BY MOUTH EVERY DAY 90 tablet 3    hydrALAZINE (APRESOLINE) 50 MG tablet TAKE 1 TABLET(50 MG) BY MOUTH EVERY MORNING AND EVERY AFTERNOON THEN TAKE 2 TABLETS(100 MG) BY MOUTH EVERY EVENING 360 tablet 3    hydroCHLOROthiazide (HYDRODIURIL) 25 MG tablet Take 1 tablet (25 mg total) by mouth every other day. 60 tablet 1    HYDROcodone-acetaminophen (NORCO)  mg per tablet Take 1 tablet by mouth every 6 (six) hours as needed for Pain (pain). 120 tablet 0    irbesartan (AVAPRO) 300 MG tablet Take 1 tablet (300 mg total) by mouth once daily. 90 tablet 1    ketoconazole (NIZORAL) 2 % cream Apply topically once daily. 60 g 3    lancets Misc TEST 2 X DAILY PROSPER RESULT SYSTEM 180 each 3    metFORMIN (GLUCOPHAGE-XR) 500 MG XR 24hr tablet TAKE 1 TABLET(500 MG) BY MOUTH EVERY DAY 90 tablet 0     multivitamin (THERAGRAN) per tablet Take 1 tablet by mouth once daily.       oxybutynin (DITROPAN) 5 MG Tab TAKE 1 TABLET(5 MG) BY MOUTH TWICE DAILY 180 tablet 3    pravastatin (PRAVACHOL) 40 MG tablet TAKE 1 AND 1/2 TABLETS(60 MG) BY MOUTH EVERY NIGHT 135 tablet 3    TRUEPLUS LANCETS 33 gauge Misc USE BID  3     No current facility-administered medications for this visit.        Review of patient's allergies indicates:  No Known Allergies    Review of Systems   Constitutional: Negative for appetite change, chills, fatigue, fever and unexpected weight change.   HENT: Negative for drooling, trouble swallowing and voice change.    Eyes: Negative for pain and visual disturbance.   Respiratory: Negative for shortness of breath and wheezing.    Cardiovascular: Negative for chest pain and palpitations.   Gastrointestinal: Negative for abdominal distention, abdominal pain, constipation and diarrhea.   Genitourinary: Negative for difficulty urinating.   Musculoskeletal: Positive for back pain and neck pain. Negative for arthralgias (left shoulder hurts), gait problem, joint swelling, myalgias and neck stiffness.               Skin: Negative for color change and rash.   Neurological: Negative for dizziness, facial asymmetry, speech difficulty, weakness, light-headedness and numbness. Headaches:     Hematological: Negative for adenopathy.   Psychiatric/Behavioral: Negative for behavioral problems, confusion and sleep disturbance. The patient is not nervous/anxious.        Objective:      Physical Exam       PE could not be performed 2/2 to virtual visit.     IMAGING:  reviewed  Xray of Right knee ( 2014)  Showed:  Standing AP knees and lateral of the right knee and merchant view of both knees are submitted.    Advanced degenerative change seen in the tricompartmental areas of the right knee.    Left knee shows mild degenerative change.  Both patellas show significant lateral deviation    MRI of Lumbar spine  ( 2015)  ;  L2-L3:There is a circumferential disk bulge with moderate bilateral facet osseous hypertrophy and ligamentum flavum buckling. This results and mild narrowing of the spinal canal. The bilateral neural foramen remain patent.  L3-L4: There is a circumferential disk bulge with left paracentral disk protrusion. This is associated with severe bilateral facet osseous hypertrophy, bilateral facet edema, and ligamentum flavum buckling.   These findings result in severe narrowing of the spinal canal and near complete obliteration of the left neural foramen.  The right neural foramen is moderately narrowed as well.  L4-L5: There is a circumferential disk bulge with superimposed central disk protrusion. This is associated with severe bilateral facet osseous hypertrophy and ligamentum flavum buckling.   These findings result in severe narrowing of the spinal canal and bilateral neural foramina, left greater than right.     Assessment:       1. Spinal stenosis of lumbar region with neurogenic claudication    2. Paraparesis of both lower limbs    3. Post poliomyelitis syndrome    4. Lumbar spondylosis    5. Lumbar radiculopathy    6. History of poliomyelitis    7. Osteoarthritis of spine with radiculopathy, lumbar region    8. Fall, sequela    9. Opioid use agreement exists    10. Lumbar spondylosis with myelopathy    11. Diabetic polyneuropathy associated with type 2 diabetes mellitus    12. Primary osteoarthritis of right knee    13. Gait disorder    14. 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 Type 2 diabetes mellitus with diabetic neuropathy, without long-term current use of insulin    16. Chronic bilateral low back pain with sciatica, sciatica laterality unspecified    17. Left lumbar radiculopathy    18. Chronic pain of right knee    19. Osteoporosis without current pathological fracture: worse on fosamax 5/16- Reclast 8/16       Plan:        Spinal stenosis of lumbar region  with neurogenic claudication  -     HYDROcodone-acetaminophen (NORCO)  mg per tablet; Take 1 tablet by mouth every 6 (six) hours as needed for Pain (pain).  Dispense: 120 tablet; Refill: 0    Paraparesis of both lower limbs  -     HYDROcodone-acetaminophen (NORCO)  mg per tablet; Take 1 tablet by mouth every 6 (six) hours as needed for Pain (pain).  Dispense: 120 tablet; Refill: 0    Post poliomyelitis syndrome  -     HYDROcodone-acetaminophen (NORCO)  mg per tablet; Take 1 tablet by mouth every 6 (six) hours as needed for Pain (pain).  Dispense: 120 tablet; Refill: 0    Lumbar spondylosis  -     HYDROcodone-acetaminophen (NORCO)  mg per tablet; Take 1 tablet by mouth every 6 (six) hours as needed for Pain (pain).  Dispense: 120 tablet; Refill: 0    Lumbar radiculopathy  -     HYDROcodone-acetaminophen (NORCO)  mg per tablet; Take 1 tablet by mouth every 6 (six) hours as needed for Pain (pain).  Dispense: 120 tablet; Refill: 0    History of poliomyelitis  -     HYDROcodone-acetaminophen (NORCO)  mg per tablet; Take 1 tablet by mouth every 6 (six) hours as needed for Pain (pain).  Dispense: 120 tablet; Refill: 0    Osteoarthritis of spine with radiculopathy, lumbar region  -     HYDROcodone-acetaminophen (NORCO)  mg per tablet; Take 1 tablet by mouth every 6 (six) hours as needed for Pain (pain).  Dispense: 120 tablet; Refill: 0    Fall, sequela  -     HYDROcodone-acetaminophen (NORCO)  mg per tablet; Take 1 tablet by mouth every 6 (six) hours as needed for Pain (pain).  Dispense: 120 tablet; Refill: 0    Opioid use agreement exists    Lumbar spondylosis with myelopathy  -     HYDROcodone-acetaminophen (NORCO)  mg per tablet; Take 1 tablet by mouth every 6 (six) hours as needed for Pain (pain).  Dispense: 120 tablet; Refill: 0    Diabetic polyneuropathy associated with type 2 diabetes mellitus  -     HYDROcodone-acetaminophen (NORCO)  mg per tablet; Take 1  tablet by mouth every 6 (six) hours as needed for Pain (pain).  Dispense: 120 tablet; Refill: 0    Primary osteoarthritis of right knee  -     HYDROcodone-acetaminophen (NORCO)  mg per tablet; Take 1 tablet by mouth every 6 (six) hours as needed for Pain (pain).  Dispense: 120 tablet; Refill: 0    Gait disorder  -     HYDROcodone-acetaminophen (NORCO)  mg per tablet; Take 1 tablet by mouth every 6 (six) hours as needed for Pain (pain).  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  Dispense: 120 tablet; Refill: 0    Type 2 diabetes mellitus with diabetic neuropathy, without long-term current use of insulin  -     HYDROcodone-acetaminophen (NORCO)  mg per tablet; Take 1 tablet by mouth every 6 (six) hours as needed for Pain (pain).  Dispense: 120 tablet; Refill: 0    Chronic bilateral low back pain with sciatica, sciatica laterality unspecified  -     HYDROcodone-acetaminophen (NORCO)  mg per tablet; Take 1 tablet by mouth every 6 (six) hours as needed for Pain (pain).  Dispense: 120 tablet; Refill: 0    Left lumbar radiculopathy  -     HYDROcodone-acetaminophen (NORCO)  mg per tablet; Take 1 tablet by mouth every 6 (six) hours as needed for Pain (pain).  Dispense: 120 tablet; Refill: 0    Chronic pain of right knee  -     HYDROcodone-acetaminophen (NORCO)  mg per tablet; Take 1 tablet by mouth every 6 (six) hours as needed for Pain (pain).  Dispense: 120 tablet; Refill: 0    Osteoporosis without current pathological fracture: worse on fosamax 5/16- Reclast 8/16  -     HYDROcodone-acetaminophen (NORCO)  mg per tablet; Take 1 tablet by mouth every 6 (six) hours as needed for Pain (pain).  Dispense: 120 tablet; Refill: 0      Patient with C.palsy, with  Paraparesis., severe  Lumbar spinal stenosis at L3-4, and L4-5, with  Leg length discrepancy ( left is shorter than right),  wears Left AFO, and has more proximal strength in LLE, than in RLE .   Also with severe DJD , genu valgus in R knee ( cannot toleate custom made  knee brace, nor Swedish knee cage).   She also wears  neoprene sleee brace , that stabilizes knee, and improves proprioception, helps with gait.    1. Back pain   Will resume Hydrocodone 10/325 mg, takes 3-4 x/day, and Neurontin 600 mg, po QID.     Opioid Risk Score       Value Time User    Opioid Risk Score  0 4/11/2018 10:39 AM Krista Burger MD         reviewed and appropriate.  Hydrocodone refills on 10/14, 09/05, 08/04, 07/05,  06/04, 05/02, 04/02,  02/01/19, 01/02/19, 12/01, 10/30/18.  UDS done and appropriately positive ( states that her insurance changred her $ 200  ( total cost was $ 800)   UDS ordered on LCV, and appropriate.    A telemedicine Audio Visit is being done today (due to the Coronavirus outbreak).       The patient location is: home   The chief complaint leading to consultation is back and leg pain and weakness  Visit type: Virtual visit with synchronous audio  Total time spent with patient:  25 min  Each patient to whom he or she provides medical services by telemedicine is:    (1) informed of the relationship between the physician and patient and the respective role of any other health care provider with respect to management of the patient; and   (2) notified that he or she may decline to receive medical services by telemedicine and may withdraw from such care at any time.         RTC in 4 months..    Total time spent  with patient was 25 minutes.   More than 50% of that time was spent in counseling on diagnosis , prognosis and treatment options.   I also caunsel patient  on common and most usual side effect of prescribed medications.   Risk and benefits of opiates, possible risk of developing opiate dependence and tolerance, need of strict  compliance with prescribed medications.  I reviewed Primary care , and other specialty's notes to better coordinate patient's  care.   All questions were answered, and patient voiced understanding.

## 2020-05-18 NOTE — TELEPHONE ENCOUNTER
"----- Message from Zahraa Peace sent at 5/18/2020  1:28 PM CDT -----  Name of caller: maryse   Provider name:   Contact Preference: [id number]  Is this regarding current patient or new patient?: current   What is the nature of the call?    - refill needed for listed script:  HYDROcodone-acetaminophen (NORCO)  mg per tablet  Pharmacy:  [facility] [address] [facility].       Additional Notes:   "Thank you for all that you do for our patients'"     "

## 2020-05-21 ENCOUNTER — OFFICE VISIT (OUTPATIENT)
Dept: INTERNAL MEDICINE | Facility: CLINIC | Age: 80
End: 2020-05-21
Payer: MEDICARE

## 2020-05-21 VITALS
DIASTOLIC BLOOD PRESSURE: 72 MMHG | HEART RATE: 64 BPM | BODY MASS INDEX: 31.71 KG/M2 | HEIGHT: 60 IN | WEIGHT: 161.5 LBS | SYSTOLIC BLOOD PRESSURE: 126 MMHG

## 2020-05-21 DIAGNOSIS — G82.20 PARAPARESIS OF BOTH LOWER LIMBS: ICD-10-CM

## 2020-05-21 DIAGNOSIS — E11.40 TYPE 2 DIABETES MELLITUS WITH DIABETIC NEUROPATHY, WITHOUT LONG-TERM CURRENT USE OF INSULIN: ICD-10-CM

## 2020-05-21 DIAGNOSIS — R26.9 ABNORMALITY OF GAIT AND MOBILITY: ICD-10-CM

## 2020-05-21 DIAGNOSIS — Z12.31 OTHER SCREENING MAMMOGRAM: ICD-10-CM

## 2020-05-21 DIAGNOSIS — N18.30 STAGE 3 CHRONIC KIDNEY DISEASE: ICD-10-CM

## 2020-05-21 DIAGNOSIS — M81.0 AGE-RELATED OSTEOPOROSIS WITHOUT CURRENT PATHOLOGICAL FRACTURE: ICD-10-CM

## 2020-05-21 DIAGNOSIS — E11.21 TYPE 2 DIABETES MELLITUS WITH DIABETIC NEPHROPATHY, WITHOUT LONG-TERM CURRENT USE OF INSULIN: ICD-10-CM

## 2020-05-21 DIAGNOSIS — I70.201 ATHEROSCLEROSIS OF ARTERY OF RIGHT LOWER EXTREMITY: ICD-10-CM

## 2020-05-21 DIAGNOSIS — Z00.00 ENCOUNTER FOR PREVENTIVE HEALTH EXAMINATION: Primary | ICD-10-CM

## 2020-05-21 DIAGNOSIS — E11.42 DIABETIC POLYNEUROPATHY ASSOCIATED WITH TYPE 2 DIABETES MELLITUS: ICD-10-CM

## 2020-05-21 PROCEDURE — 99499 RISK ADDL DX/OHS AUDIT: ICD-10-PCS | Mod: HCNC,S$GLB,, | Performed by: NURSE PRACTITIONER

## 2020-05-21 PROCEDURE — 99999 PR PBB SHADOW E&M-EST. PATIENT-LVL V: CPT | Mod: PBBFAC,HCNC,, | Performed by: NURSE PRACTITIONER

## 2020-05-21 PROCEDURE — 3074F PR MOST RECENT SYSTOLIC BLOOD PRESSURE < 130 MM HG: ICD-10-PCS | Mod: HCNC,CPTII,S$GLB, | Performed by: NURSE PRACTITIONER

## 2020-05-21 PROCEDURE — 3078F DIAST BP <80 MM HG: CPT | Mod: HCNC,CPTII,S$GLB, | Performed by: NURSE PRACTITIONER

## 2020-05-21 PROCEDURE — G0439 PPPS, SUBSEQ VISIT: HCPCS | Mod: HCNC,S$GLB,, | Performed by: NURSE PRACTITIONER

## 2020-05-21 PROCEDURE — 99999 PR PBB SHADOW E&M-EST. PATIENT-LVL V: ICD-10-PCS | Mod: PBBFAC,HCNC,, | Performed by: NURSE PRACTITIONER

## 2020-05-21 PROCEDURE — G0439 PR MEDICARE ANNUAL WELLNESS SUBSEQUENT VISIT: ICD-10-PCS | Mod: HCNC,S$GLB,, | Performed by: NURSE PRACTITIONER

## 2020-05-21 PROCEDURE — 3078F PR MOST RECENT DIASTOLIC BLOOD PRESSURE < 80 MM HG: ICD-10-PCS | Mod: HCNC,CPTII,S$GLB, | Performed by: NURSE PRACTITIONER

## 2020-05-21 PROCEDURE — 3051F PR MOST RECENT HEMOGLOBIN A1C LEVEL 7.0 - < 8.0%: ICD-10-PCS | Mod: HCNC,CPTII,S$GLB, | Performed by: NURSE PRACTITIONER

## 2020-05-21 PROCEDURE — 99499 UNLISTED E&M SERVICE: CPT | Mod: HCNC,S$GLB,, | Performed by: NURSE PRACTITIONER

## 2020-05-21 PROCEDURE — 3074F SYST BP LT 130 MM HG: CPT | Mod: HCNC,CPTII,S$GLB, | Performed by: NURSE PRACTITIONER

## 2020-05-21 PROCEDURE — 3051F HG A1C>EQUAL 7.0%<8.0%: CPT | Mod: HCNC,CPTII,S$GLB, | Performed by: NURSE PRACTITIONER

## 2020-05-21 NOTE — PROGRESS NOTES
Emilia Alan presented for a  Medicare AWV and comprehensive Health Risk Assessment today. The following components were reviewed and updated:    · Medical history  · Family History  · Social history  · Allergies and Current Medications  · Health Risk Assessment  · Health Maintenance  · Care Team     ** See Completed Assessments for Annual Wellness Visit within the encounter summary.**       The following assessments were completed:  · Living Situation  · CAGE  · Depression Screening  · Timed Get Up and Go  · Whisper Test  Cognitive Function Screening    ·   · Nutrition Screening  · ADL Screening  · PAQ Screening    Vitals:    05/21/20 1304   BP: 126/72   Pulse: 64   Weight: 73.2 kg (161 lb 7.8 oz)   Height: 5' (1.524 m)     Body mass index is 31.54 kg/m².  Physical Exam   Constitutional: She is oriented to person, place, and time. She appears well-developed and well-nourished.   HENT:   Head: Normocephalic and atraumatic.   Nose: Nose normal.   Eyes: Conjunctivae and EOM are normal.   Cardiovascular: Normal rate and regular rhythm.   No murmur heard.  Pulmonary/Chest: Effort normal and breath sounds normal. She has no wheezes.   Musculoskeletal: Normal range of motion.   Neurological: She is alert and oriented to person, place, and time.   Skin: Skin is warm and dry.   Psychiatric: She has a normal mood and affect. Her behavior is normal. Judgment and thought content normal.   Nursing note and vitals reviewed.        Diagnoses and health risks identified today and associated recommendations/orders:    1. Encounter for preventive health examination  Assessment performed. Health maintenance updated. Chart review completed.  Dicussed compression stock use, 15 mmhg.  Requested refill for NORCO from Dr Burger see telephone encounter.    2. Other screening mammogram  - Mammo Digital Screening Bilateral With CAD; Future    3. Abnormality of gait and mobility  Chronic stable with home walker use. No recent  falls.  Wheelchair today.    4. Diabetic polyneuropathy associated with type 2 diabetes mellitus  Chronic. Stable with current regimen. Followed by PCP  Component      Latest Ref Rng & Units 7/26/2019   Hemoglobin A1C External      4.0 - 5.6 % 7.1 (H)   Estimated Avg Glucose      68 - 131 mg/dL 157 (H)       5. 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   Stable. Chronic.  Followed by PCP.    6. Atherosclerosis of artery of right lower extremity: see xray 4/4/07  Chronic. Continue blood pressure control and statin therapy. Followed by PCP.    7. Stage 3 chronic kidney disease  Chronic. Continue current regimen.  Followed by PCP.    8. Type 2 diabetes mellitus with diabetic neuropathy, without long-term current use of insulin  Chronic. Stable with current regimen. Followed by PCP  Component      Latest Ref Rng & Units 7/26/2019   Hemoglobin A1C External      4.0 - 5.6 % 7.1 (H)   Estimated Avg Glucose      68 - 131 mg/dL 157 (H)     9. Type 2 diabetes mellitus with diabetic nephropathy, without long-term current use of insulin  Chronic. Stable with current regimen. Followed by PCP  Component      Latest Ref Rng & Units 7/26/2019   Hemoglobin A1C External      4.0 - 5.6 % 7.1 (H)   Estimated Avg Glucose      68 - 131 mg/dL 157 (H)       10. Osteoporosis without current pathological fracture: worse on fosamax 5/16- Reclast 8/16  Noted.      Provided Emilia with a 5-10 year written screening schedule and personal prevention plan. Recommendations were developed using the USPSTF age appropriate recommendations. Education, counseling, and referrals were provided as needed. After Visit Summary printed and given to patient which includes a list of additional screenings\tests needed.    Follow up for Annual Wellness Visit in 1 year, F/U with PCP as instructed, ;sooner if problems.    ELAINE Matos

## 2020-05-21 NOTE — PATIENT INSTRUCTIONS
Keep specialty appointments.    Annual exam due in August.    Schedule Optometry visit.    Podiatry scheduled.    Mammogram scheduled.  Counseling and Referral of Other Preventative  (Italic type indicates deductible and co-insurance are waived)    Patient Name: Emilia Alan  Today's Date: 5/21/2020    Health Maintenance       Date Due Completion Date    Shingles Vaccine (2 of 3) 06/04/2015 4/9/2015    Eye Exam 10/31/2019 10/31/2018    Hemoglobin A1c 01/26/2020 7/26/2019    Mammogram 05/14/2020 5/14/2018    Lipid Panel 07/26/2020 7/26/2019    Foot Exam 12/23/2020 12/23/2019 (Done)    Override on 12/23/2019: Done    Override on 5/13/2019: Done    Override on 1/11/2018: Done    Override on 4/6/2017: Done    Override on 4/29/2016: Done (Podiatry)    Override on 7/29/2015: Done    Override on 12/1/2014: Done    Aspirin/Antiplatelet Therapy 05/21/2021 5/21/2020    DEXA SCAN 05/28/2021 5/28/2018    Override on 5/17/2011: Done    Colonoscopy 06/14/2027 6/14/2017    Override on 9/29/2006: Done    TETANUS VACCINE 12/14/2027 12/14/2017        Orders Placed This Encounter   Procedures    Mammo Digital Screening Bilateral With CAD     The following information is provided to all patients.  This information is to help you find resources for any of the problems found today that may be affecting your health:                Living healthy guide: www.[facility].[place].gov      Understanding Diabetes: www.diabetes.org      Eating healthy: www.cdc.gov/healthyweight      CDC home safety checklist: www.cdc.gov/steadi/patient.html      Agency on Aging: www.goea.[place].gov      Alcoholics anonymous (AA): www.aa.org      Physical Activity: www.court.nih.gov/ze3hlik      Tobacco use: www.quitwithusla.org      25

## 2020-05-22 ENCOUNTER — TELEPHONE (OUTPATIENT)
Dept: PHYSICAL MEDICINE AND REHAB | Facility: CLINIC | Age: 80
End: 2020-05-22

## 2020-05-22 NOTE — TELEPHONE ENCOUNTER
----- Message from Lizbeth Brian sent at 5/22/2020 10:35 AM CDT -----  Contact: self   Pt is asking for a refill on medication HYDROcodone-acetaminophen (NORCO)  mg per tablet        Contact info

## 2020-05-31 PROCEDURE — 99454 REM MNTR PHYSIOL PARAM 16-30: CPT | Mod: S$GLB,,, | Performed by: INTERNAL MEDICINE

## 2020-05-31 PROCEDURE — 99454 PR REMOTE MNTR, PHYS PARAM, INITIAL, EA 30 DAYS: ICD-10-PCS | Mod: S$GLB,,, | Performed by: INTERNAL MEDICINE

## 2020-06-15 ENCOUNTER — PATIENT OUTREACH (OUTPATIENT)
Dept: OTHER | Facility: OTHER | Age: 80
End: 2020-06-15

## 2020-06-15 NOTE — PROGRESS NOTES
Digital Medicine: Clinician Follow-Up    Called Ms. Emilia Alan for hypertension digital medicine follow-up. Patient reports adherence  to medication regimen with no complaints.    Patient denies s/s of hypotension (lightheadedness, dizziness, nausea, fatigue) associated with low readings. Instructed patient to inform me if this occurs, patient confirms understanding. Patient attributes spike in blood pressure to technique. States that reading was taken prior to medication and that she was moving during the reading. Also has not charged her cuff recently and plans to do so tonight.     Patient denies s/s of hypertension (SOB, CP, severe headaches, changes in vision) associated with high readings. Instructed patient to go to the ED if BP >180/110 and accompanied by hypertensive s/s, patient confirms understanding.        The history is provided by the patient. No  was used.   Follow Up  Follow-up reason(s): reading review and routine education      Alert received.   Care Team received high BP alert.  Patient is not experiencing symptoms.          Assessment:  Patient's current 30-day average is at goal of <140/90 mmHg.       Plan:  Continue current regimen.  Patient's health , Josee Trinidad, will follow-up as scheduled.    I will continue to monitor regularly and will follow-up in 2-3 months, sooner if blood pressure begins to trend upward or downward.     Patient has my contact information and knows to call with any concerns or clinical changes.              Last 5 Patient Entered Readings                                      Current 30 Day Average: 135/69     Recent Readings 6/13/2020 6/13/2020 6/12/2020 6/10/2020 6/9/2020    SBP (mmHg) 137 171 150 151 141    DBP (mmHg) 50 131 67 65 48    Pulse 86 101 79 76 75             Hypertension Medications             amLODIPine (NORVASC) 10 MG tablet TAKE 1 TABLET(10 MG) BY MOUTH EVERY DAY    hydrALAZINE (APRESOLINE) 50 MG tablet TAKE 1  TABLET(50 MG) BY MOUTH EVERY MORNING AND EVERY AFTERNOON THEN TAKE 2 TABLETS(100 MG) BY MOUTH EVERY EVENING    hydroCHLOROthiazide (HYDRODIURIL) 25 MG tablet Take 1 tablet (25 mg total) by mouth every other day.    irbesartan (AVAPRO) 300 MG tablet Take 1 tablet (300 mg total) by mouth once daily.

## 2020-06-17 ENCOUNTER — PATIENT OUTREACH (OUTPATIENT)
Dept: ADMINISTRATIVE | Facility: OTHER | Age: 80
End: 2020-06-17

## 2020-06-17 ENCOUNTER — PATIENT OUTREACH (OUTPATIENT)
Dept: OTHER | Facility: OTHER | Age: 80
End: 2020-06-17

## 2020-06-17 NOTE — PROGRESS NOTES
Digital Medicine: Health  Follow-Up    The history is provided by the patient.   Follow Up  Follow-up reason(s): reading review    Called pt for follow up, and to introduce myself as her new health  for digital medicine. Provided my contact information.     Pt denies any questions or concerns today. Average /69.    Encouraged her to reach out to me with any future questions or concerns.       INTERVENTION(S)  encouragement/support and denied questions    PLAN  patient verbalizes understanding and continue monitoring          Topic    Eye Exam     Hemoglobin A1C        Last 5 Patient Entered Readings                                      Current 30 Day Average: 133/69     Recent Readings 6/16/2020 6/15/2020 6/13/2020 6/13/2020 6/12/2020    SBP (mmHg) 116 116 137 171 150    DBP (mmHg) 61 60 50 131 67    Pulse 83 76 86 101 79                      Diet Screening   No change to diet.  She has the following dietary restrictions: low sodium diet    Physical Activity Screening   No change to exercise routine.          SDOH

## 2020-06-18 ENCOUNTER — OFFICE VISIT (OUTPATIENT)
Dept: PODIATRY | Facility: CLINIC | Age: 80
End: 2020-06-18
Payer: MEDICARE

## 2020-06-18 ENCOUNTER — HOSPITAL ENCOUNTER (OUTPATIENT)
Dept: RADIOLOGY | Facility: HOSPITAL | Age: 80
Discharge: HOME | End: 2020-06-18
Attending: NURSE PRACTITIONER
Payer: MEDICARE

## 2020-06-18 VITALS
WEIGHT: 161.38 LBS | HEIGHT: 61 IN | HEART RATE: 74 BPM | DIASTOLIC BLOOD PRESSURE: 76 MMHG | BODY MASS INDEX: 30.47 KG/M2 | SYSTOLIC BLOOD PRESSURE: 158 MMHG

## 2020-06-18 DIAGNOSIS — G14 POST-POLIO SYNDROME: Primary | ICD-10-CM

## 2020-06-18 DIAGNOSIS — B35.1 ONYCHOMYCOSIS DUE TO DERMATOPHYTE: ICD-10-CM

## 2020-06-18 DIAGNOSIS — M21.372 FOOT DROP, LEFT: ICD-10-CM

## 2020-06-18 DIAGNOSIS — G60.9 IDIOPATHIC PERIPHERAL NEUROPATHY: ICD-10-CM

## 2020-06-18 DIAGNOSIS — L84 CORN OR CALLUS: ICD-10-CM

## 2020-06-18 DIAGNOSIS — Z12.31 OTHER SCREENING MAMMOGRAM: ICD-10-CM

## 2020-06-18 PROCEDURE — 77067 SCR MAMMO BI INCL CAD: CPT | Mod: 26,HCNC,, | Performed by: RADIOLOGY

## 2020-06-18 PROCEDURE — 99499 UNLISTED E&M SERVICE: CPT | Mod: HCNC,S$GLB,, | Performed by: PODIATRIST

## 2020-06-18 PROCEDURE — 11055 PR TRIM HYPERKERATOTIC SKIN LESION, ONE: ICD-10-PCS | Mod: Q9,HCNC,S$GLB, | Performed by: PODIATRIST

## 2020-06-18 PROCEDURE — 11721 PR DEBRIDEMENT OF NAILS, 6 OR MORE: ICD-10-PCS | Mod: Q9,59,HCNC,S$GLB | Performed by: PODIATRIST

## 2020-06-18 PROCEDURE — 99999 PR PBB SHADOW E&M-EST. PATIENT-LVL IV: CPT | Mod: PBBFAC,HCNC,, | Performed by: PODIATRIST

## 2020-06-18 PROCEDURE — 77067 MAMMO DIGITAL SCREENING BILAT WITH CAD: ICD-10-PCS | Mod: 26,HCNC,, | Performed by: RADIOLOGY

## 2020-06-18 PROCEDURE — 77067 SCR MAMMO BI INCL CAD: CPT | Mod: TC,HCNC

## 2020-06-18 PROCEDURE — 11055 PARING/CUTG B9 HYPRKER LES 1: CPT | Mod: Q9,HCNC,S$GLB, | Performed by: PODIATRIST

## 2020-06-18 PROCEDURE — 11721 DEBRIDE NAIL 6 OR MORE: CPT | Mod: Q9,59,HCNC,S$GLB | Performed by: PODIATRIST

## 2020-06-18 PROCEDURE — 99999 PR PBB SHADOW E&M-EST. PATIENT-LVL IV: ICD-10-PCS | Mod: PBBFAC,HCNC,, | Performed by: PODIATRIST

## 2020-06-18 PROCEDURE — 99499 RISK ADDL DX/OHS AUDIT: ICD-10-PCS | Mod: HCNC,S$GLB,, | Performed by: PODIATRIST

## 2020-06-18 NOTE — PROGRESS NOTES
Subjective:      Patient ID: Emilia Alan is a 79 y.o. female.    Chief Complaint: Diabetes Mellitus (PT SEEN DR GONZALEZ () ON 05/16/20), Nail Care, and Diabetic Foot Exam    Emilia is a 79 y.o. female who presents to the clinic for evaluation and treatment of high risk feet. 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 The patient's chief complaint is long, thick toenails and dry red itchy skin L foot       PCP: Lauren Deras MD    Date Last Seen by PCP:   Chief Complaint   Patient presents with    Diabetes Mellitus     PT SEEN DR GONZALEZ () ON 05/16/20    Nail Care    Diabetic Foot Exam         Current shoe gear: DM shoes w/ AFO brace( L)    Hemoglobin A1C   Date Value Ref Range Status   07/26/2019 7.1 (H) 4.0 - 5.6 % Final     Comment:     ADA Screening Guidelines:  5.7-6.4%  Consistent with prediabetes  >or=6.5%  Consistent with diabetes  High levels of fetal hemoglobin interfere with the HbA1C  assay. Heterozygous hemoglobin variants (HbS, HgC, etc)do  not significantly interfere with this assay.   However, presence of multiple variants may affect accuracy.     07/08/2019 7.3 (H) 4.0 - 5.6 % Final     Comment:     ADA Screening Guidelines:  5.7-6.4%  Consistent with prediabetes  >or=6.5%   "Consistent with diabetes  High levels of fetal hemoglobin interfere with the HbA1C  assay. Heterozygous hemoglobin variants (HbS, HgC, etc)do  not significantly interfere with this assay.   However, presence of multiple variants may affect accuracy.     03/18/2019 6.9 (H) 4.0 - 5.6 % Final     Comment:     ADA Screening Guidelines:  5.7-6.4%  Consistent with prediabetes  >or=6.5%  Consistent with diabetes  High levels of fetal hemoglobin interfere with the HbA1C  assay. Heterozygous hemoglobin variants (HbS, HgC, etc)do  not significantly interfere with this assay.   However, presence of multiple variants may affect accuracy.           Review of Systems   Constitution: Negative for chills, decreased appetite and fever.   Cardiovascular: Positive for leg swelling (new onset). Negative for chest pain and claudication.   Respiratory: Negative for cough and shortness of breath.    Skin: Positive for dry skin, nail changes and rash. Negative for color change, flushing, itching and poor wound healing.   Musculoskeletal: Positive for muscle weakness (foot drop). Negative for arthritis, back pain, gout, joint pain, joint swelling and myalgias.   Gastrointestinal: Negative for nausea and vomiting.   Neurological: Positive for numbness and paresthesias. Negative for loss of balance.           Objective:       Vitals:    06/18/20 1410   BP: (!) 158/76   Pulse: 74   Weight: 73.2 kg (161 lb 6 oz)   Height: 5' 1" (1.549 m)   PainSc: 0-No pain        Physical Exam   Constitutional: She is oriented to person, place, and time. She appears well-developed and well-nourished. No distress.   Cardiovascular:   Dorsalis pedis and posterior tibial pulses are diminished Bilaterally. Toes are cool to touch. Feet are warm proximally.There is decreased digital hair. Skin is atrophic, slightly hyperpigmented, and mildly edematous       Musculoskeletal:         General: No tenderness or edema.      Right ankle: Normal.      Left ankle: Normal. "      Right foot: No swelling, crepitus or deformity.      Left foot: No swelling, crepitus or deformity.      Comments: Dropfoot left.      Lymphadenopathy:   No palpable lymph nodes   Neurological: She is alert and oriented to person, place, and time. She has normal strength.   [place] 5.07 monofilamant testing is diminished Charbel feet. Sharp/dull sensation diminished Bilaterally. Light touch absent Bilaterally.       Skin: Skin is warm, dry and intact. No abrasion, no bruising, no burn, no laceration, no lesion, no petechiae and no rash noted. She is not diaphoretic. No pallor. Nails show no clubbing.   Nails 1-5 b/l  are elongated by  4-9mm's, thickened by 3-5 mm's, dystrophic, and are darkened in  coloration . Xerosis Bilaterally. No open lesions noted.    Hyperkeratotic tissue noted to plantar L 5th MPJ    Psychiatric: She has a normal mood and affect. Judgment and thought content normal.   Nursing note and vitals reviewed.          Assessment:       Encounter Diagnoses   Name Primary?    Post-polio syndrome Yes    Idiopathic peripheral neuropathy     Onychomycosis due to dermatophyte     Corn or callus     Foot drop, left          Plan:       Emilia was seen today for diabetes mellitus, nail care and diabetic foot exam.    Diagnoses and all orders for this visit:    Post-polio syndrome    Idiopathic peripheral neuropathy    Onychomycosis due to dermatophyte    Corn or callus    Foot drop, left      I counseled the patient on her conditions, their implications and medical management.    - Shoe inspection.Patient instructed on proper foot hygeine. We discussed wearing proper shoe gear, daily foot inspections, never walking without protective shoe gear, never putting sharp instruments to feet, routine podiatric nail visits every 2-3 months.      - With patient's permission, nails were aggressively reduced and debrided x 10 to their soft tissue attachment mechanically and with electric , removing  all offending nail and debris. Patient relates relief following the procedure. She will continue to monitor the areas daily, inspect her feet, wear protective shoe gear when ambulatory, moisturizer to maintain skin integrity and follow in this office in approximately 2-3 months, sooner p.r.n.    - After cleansing the  area w/ alcohol prep pad the above mentioned hyperkeratosis was trimmed utilizing No 15 scapel, to a smooth base with out incident. Patient tolerated this  well and reported comfort to the area x1

## 2020-07-01 ENCOUNTER — TELEPHONE (OUTPATIENT)
Dept: PHYSICAL MEDICINE AND REHAB | Facility: CLINIC | Age: 80
End: 2020-07-01

## 2020-07-01 NOTE — TELEPHONE ENCOUNTER
----- Message from Nikkie Toro sent at 7/1/2020 12:32 PM CDT -----  The patient would like to speak to someone regarding the wheelchair. [facility] needs a order for chair adjustment. Please contact the patient to discuss further.

## 2020-07-01 NOTE — TELEPHONE ENCOUNTER
Spoke with the patient and informed her that ya'll was out of the office and someone would give her a call to discuss her wheelchair

## 2020-07-08 DIAGNOSIS — M47.816 LUMBAR SPONDYLOSIS: ICD-10-CM

## 2020-07-08 DIAGNOSIS — M17.11 PRIMARY OSTEOARTHRITIS OF RIGHT KNEE: ICD-10-CM

## 2020-07-08 DIAGNOSIS — M47.16 LUMBAR SPONDYLOSIS WITH MYELOPATHY: ICD-10-CM

## 2020-07-08 DIAGNOSIS — M81.0 AGE-RELATED OSTEOPOROSIS WITHOUT CURRENT PATHOLOGICAL FRACTURE: ICD-10-CM
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
## 2020-07-08 DIAGNOSIS — G14 POST POLIOMYELITIS SYNDROME: ICD-10-CM

## 2020-07-08 DIAGNOSIS — M25.561 CHRONIC PAIN OF RIGHT KNEE: ICD-10-CM

## 2020-07-08 DIAGNOSIS — G89.29 CHRONIC PAIN OF RIGHT KNEE: ICD-10-CM

## 2020-07-08 DIAGNOSIS — G82.20 PARAPARESIS OF BOTH LOWER LIMBS: ICD-10-CM

## 2020-07-08 DIAGNOSIS — M48.062 SPINAL STENOSIS OF LUMBAR REGION WITH NEUROGENIC CLAUDICATION: ICD-10-CM

## 2020-07-08 DIAGNOSIS — E08.44 DIABETIC AMYOTROPHY ASSOCIATED WITH DIABETES MELLITUS DUE TO UNDERLYING CONDITION: ICD-10-CM

## 2020-07-08 DIAGNOSIS — M54.40 CHRONIC BILATERAL LOW BACK PAIN WITH SCIATICA, SCIATICA LATERALITY UNSPECIFIED: ICD-10-CM

## 2020-07-08 DIAGNOSIS — M54.16 LEFT LUMBAR RADICULOPATHY: ICD-10-CM

## 2020-07-08 DIAGNOSIS — Z86.12 HISTORY OF POLIOMYELITIS: ICD-10-CM

## 2020-07-08 DIAGNOSIS — M47.26 OSTEOARTHRITIS OF SPINE WITH RADICULOPATHY, LUMBAR REGION: ICD-10-CM

## 2020-07-08 DIAGNOSIS — E11.42 DIABETIC POLYNEUROPATHY ASSOCIATED WITH TYPE 2 DIABETES MELLITUS: ICD-10-CM

## 2020-07-08 DIAGNOSIS — G89.29 CHRONIC BILATERAL LOW BACK PAIN WITH SCIATICA, SCIATICA LATERALITY UNSPECIFIED: ICD-10-CM

## 2020-07-08 DIAGNOSIS — R26.9 GAIT DISORDER: ICD-10-CM

## 2020-07-08 DIAGNOSIS — E11.40 TYPE 2 DIABETES MELLITUS WITH DIABETIC NEUROPATHY, WITHOUT LONG-TERM CURRENT USE OF INSULIN: ICD-10-CM

## 2020-07-08 DIAGNOSIS — M54.16 LUMBAR RADICULOPATHY: ICD-10-CM

## 2020-07-08 NOTE — TELEPHONE ENCOUNTER
----- Message from Lazarus Levine sent at 7/8/2020 10:16 AM CDT -----  Contact: pt @ [phone]  Pt asking speak w/ Sybil about getting new script for wheelchair. Pt states it needs to go to [facility] in [place], latrice fax to .

## 2020-07-09 NOTE — TELEPHONE ENCOUNTER
----- Message from Karsten Santos sent at 7/9/2020 12:40 PM CDT -----  Contact: pt  Please call pt at [phone]    Refill request for HYDROcodone-acetaminophen (NORCO)  mg per tablet     [id number] - [place] - [address] AT [facility] [address]    Patient is out of medication     Patient stated that her remote wheelchair is broken     [facility] is requesting a repair order    Thank you

## 2020-07-11 RX ORDER — HYDROCODONE BITARTRATE AND ACETAMINOPHEN 10; 325 MG/1; MG/1
1 TABLET ORAL EVERY 6 HOURS PRN
Qty: 120 TABLET | Refills: 0 | Status: SHIPPED | OUTPATIENT
Start: 2020-07-11 | End: 2020-08-13 | Stop reason: SDUPTHER

## 2020-07-15 DIAGNOSIS — M47.16 LUMBAR SPONDYLOSIS WITH MYELOPATHY: Primary | ICD-10-CM

## 2020-07-31 ENCOUNTER — LAB VISIT (OUTPATIENT)
Dept: LAB | Facility: HOSPITAL | Age: 80
End: 2020-07-31
Payer: MEDICARE

## 2020-07-31 DIAGNOSIS — D46.4 REFRACTORY ANEMIA: ICD-10-CM

## 2020-07-31 LAB
ALBUMIN SERPL BCP-MCNC: 3.5 G/DL (ref 3.5–5.2)
ALP SERPL-CCNC: 102 U/L (ref 55–135)
ALT SERPL W/O P-5'-P-CCNC: 11 U/L (ref 10–44)
ANION GAP SERPL CALC-SCNC: 8 MMOL/L (ref 8–16)
AST SERPL-CCNC: 15 U/L (ref 10–40)
BASOPHILS # BLD AUTO: 0.11 K/UL (ref 0–0.2)
BASOPHILS NFR BLD: 1.4 % (ref 0–1.9)
BILIRUB SERPL-MCNC: 0.5 MG/DL (ref 0.1–1)
BUN SERPL-MCNC: 28 MG/DL (ref 8–23)
CALCIUM SERPL-MCNC: 9.7 MG/DL (ref 8.7–10.5)
CHLORIDE SERPL-SCNC: 107 MMOL/L (ref 95–110)
CO2 SERPL-SCNC: 25 MMOL/L (ref 23–29)
CREAT SERPL-MCNC: 0.9 MG/DL (ref 0.5–1.4)
DIFFERENTIAL METHOD: ABNORMAL
EOSINOPHIL # BLD AUTO: 0.4 K/UL (ref 0–0.5)
EOSINOPHIL NFR BLD: 4.5 % (ref 0–8)
ERYTHROCYTE [DISTWIDTH] IN BLOOD BY AUTOMATED COUNT: 12.8 % (ref 11.5–14.5)
EST. GFR  (AFRICAN AMERICAN): >60 ML/MIN/1.73 M^2
EST. GFR  (NON AFRICAN AMERICAN): >60 ML/MIN/1.73 M^2
GLUCOSE SERPL-MCNC: 155 MG/DL (ref 70–110)
HCT VFR BLD AUTO: 35.4 % (ref 37–48.5)
HGB BLD-MCNC: 11.2 G/DL (ref 12–16)
IMM GRANULOCYTES # BLD AUTO: 0.04 K/UL (ref 0–0.04)
IMM GRANULOCYTES NFR BLD AUTO: 0.5 % (ref 0–0.5)
LYMPHOCYTES # BLD AUTO: 1.6 K/UL (ref 1–4.8)
LYMPHOCYTES NFR BLD: 20.8 % (ref 18–48)
MCH RBC QN AUTO: 31.5 PG (ref 27–31)
MCHC RBC AUTO-ENTMCNC: 31.6 G/DL (ref 32–36)
MCV RBC AUTO: 100 FL (ref 82–98)
MONOCYTES # BLD AUTO: 0.4 K/UL (ref 0.3–1)
MONOCYTES NFR BLD: 5.5 % (ref 4–15)
NEUTROPHILS # BLD AUTO: 5.3 K/UL (ref 1.8–7.7)
NEUTROPHILS NFR BLD: 67.3 % (ref 38–73)
NRBC BLD-RTO: 0 /100 WBC
PLATELET # BLD AUTO: 321 K/UL (ref 150–350)
PMV BLD AUTO: 10.6 FL (ref 9.2–12.9)
POTASSIUM SERPL-SCNC: 4.2 MMOL/L (ref 3.5–5.1)
PROT SERPL-MCNC: 7 G/DL (ref 6–8.4)
RBC # BLD AUTO: 3.55 M/UL (ref 4–5.4)
SODIUM SERPL-SCNC: 140 MMOL/L (ref 136–145)
WBC # BLD AUTO: 7.82 K/UL (ref 3.9–12.7)

## 2020-07-31 PROCEDURE — 85025 COMPLETE CBC W/AUTO DIFF WBC: CPT | Mod: HCNC

## 2020-07-31 PROCEDURE — 99454 PR REMOTE MNTR, PHYS PARAM, INITIAL, EA 30 DAYS: ICD-10-PCS | Mod: S$GLB,,, | Performed by: INTERNAL MEDICINE

## 2020-07-31 PROCEDURE — 99454 REM MNTR PHYSIOL PARAM 16-30: CPT | Mod: S$GLB,,, | Performed by: INTERNAL MEDICINE

## 2020-07-31 PROCEDURE — 80053 COMPREHEN METABOLIC PANEL: CPT | Mod: HCNC

## 2020-08-13 DIAGNOSIS — E08.44 DIABETIC AMYOTROPHY ASSOCIATED WITH DIABETES MELLITUS DUE TO UNDERLYING CONDITION: ICD-10-CM

## 2020-08-13 DIAGNOSIS — Z86.12 HISTORY OF POLIOMYELITIS: ICD-10-CM

## 2020-08-13 DIAGNOSIS — E11.40 TYPE 2 DIABETES MELLITUS WITH DIABETIC NEUROPATHY, WITHOUT LONG-TERM CURRENT USE OF INSULIN: ICD-10-CM

## 2020-08-13 DIAGNOSIS — M47.16 LUMBAR SPONDYLOSIS WITH MYELOPATHY: ICD-10-CM

## 2020-08-13 DIAGNOSIS — M17.11 PRIMARY OSTEOARTHRITIS OF RIGHT KNEE: ICD-10-CM

## 2020-08-13 DIAGNOSIS — G14 POST POLIOMYELITIS SYNDROME: ICD-10-CM

## 2020-08-13 DIAGNOSIS — M54.40 CHRONIC BILATERAL LOW BACK PAIN WITH SCIATICA, SCIATICA LATERALITY UNSPECIFIED: ICD-10-CM

## 2020-08-13 DIAGNOSIS — M54.16 LEFT LUMBAR RADICULOPATHY: ICD-10-CM

## 2020-08-13 DIAGNOSIS — M47.26 OSTEOARTHRITIS OF SPINE WITH RADICULOPATHY, LUMBAR REGION: ICD-10-CM

## 2020-08-13 DIAGNOSIS — E11.42 DIABETIC POLYNEUROPATHY ASSOCIATED WITH TYPE 2 DIABETES MELLITUS: ICD-10-CM

## 2020-08-13 DIAGNOSIS — G89.29 CHRONIC PAIN OF RIGHT KNEE: ICD-10-CM

## 2020-08-13 DIAGNOSIS — G82.20 PARAPARESIS OF BOTH LOWER LIMBS: ICD-10-CM

## 2020-08-13 DIAGNOSIS — M48.062 SPINAL STENOSIS OF LUMBAR REGION WITH NEUROGENIC CLAUDICATION: ICD-10-CM

## 2020-08-13 DIAGNOSIS — M81.0 AGE-RELATED OSTEOPOROSIS WITHOUT CURRENT PATHOLOGICAL FRACTURE: ICD-10-CM

## 2020-08-13 DIAGNOSIS — R26.9 GAIT DISORDER: ICD-10-CM
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
## 2020-08-13 DIAGNOSIS — M25.561 CHRONIC PAIN OF RIGHT KNEE: ICD-10-CM

## 2020-08-13 DIAGNOSIS — G89.29 CHRONIC BILATERAL LOW BACK PAIN WITH SCIATICA, SCIATICA LATERALITY UNSPECIFIED: ICD-10-CM

## 2020-08-13 DIAGNOSIS — M54.16 LUMBAR RADICULOPATHY: ICD-10-CM

## 2020-08-13 DIAGNOSIS — M47.816 LUMBAR SPONDYLOSIS: ICD-10-CM

## 2020-08-13 NOTE — TELEPHONE ENCOUNTER
----- Message from Karsten Duarte sent at 8/13/2020 10:46 AM CDT -----  Regarding: refill request  HYDROcodone-acetaminophen (NORCO)  mg per tabletRx Refill/Request     Is this a Refill or New Rx: refill     Rx Name and Strength:  HYDROcodone-acetaminophen (NORCO)  mg per tablet  Preferred Pharmacy with phone number:   [id number] - [place]  [address]  AT [facility] [address]  [facility] [address] [place] [address]  Phone: [phone] Fax: [phone]       Communication Preference:[phone]     Additional Information: refill

## 2020-08-15 RX ORDER — HYDROCODONE BITARTRATE AND ACETAMINOPHEN 10; 325 MG/1; MG/1
1 TABLET ORAL EVERY 6 HOURS PRN
Qty: 120 TABLET | Refills: 0 | Status: SHIPPED | OUTPATIENT
Start: 2020-08-15 | End: 2020-09-18 | Stop reason: SDUPTHER

## 2020-08-31 PROCEDURE — 99454 PR REMOTE MNTR, PHYS PARAM, INITIAL, EA 30 DAYS: ICD-10-PCS | Mod: S$GLB,,, | Performed by: INTERNAL MEDICINE

## 2020-08-31 PROCEDURE — 99454 REM MNTR PHYSIOL PARAM 16-30: CPT | Mod: S$GLB,,, | Performed by: INTERNAL MEDICINE

## 2020-09-09 ENCOUNTER — PATIENT OUTREACH (OUTPATIENT)
Dept: OTHER | Facility: OTHER | Age: 80
End: 2020-09-09

## 2020-09-09 NOTE — PROGRESS NOTES
Digital Medicine: Health  Follow-Up    The history is provided by the patient.             Reason for review: Blood pressure at goal      Additional Follow-up details: Called patient for follow up. Average /63. Patient states she is very pleased with her readings. She did ask if her BP was too low. She denies symptoms of hypotension. Encouraged patient to consume a salty snack, drink plenty of fluids, and move around slowly if BP <90/60 with hypotension symptoms. Patient verbalized understanding. Encouraged patient to reach out to digital medicine if experiencing hypotension symptoms.         Diet-no change to diet    No change to diet.        Physical Activity-no change to routine  No change to exercise routine.     Medication Adherence-Medication adherence was assessed.      Substance, Sleep, Stress-Not assessed      Continue current diet/physical activity routine.  Provided patient education.       Addressed any questions or concerns and patient has my contact information if needed prior to next outreach. Patient verbalizes understanding.      Explained the importance of self-monitoring and medication adherence. Encouraged the patient to communicate with their health  for lifestyle modifications to help improve or maintain a healthy lifestyle.                Topic    Eye Exam     Hemoglobin A1C     Lipid (Cholesterol) Test        Last 5 Patient Entered Readings                                      Current 30 Day Average: 125/63     Recent Readings 9/8/2020 9/6/2020 9/4/2020 9/4/2020 9/3/2020    SBP (mmHg) 107 123 91 120 97    DBP (mmHg) 49 55 48 80 50    Pulse 76 70 78 80 78

## 2020-09-18 DIAGNOSIS — M54.40 CHRONIC BILATERAL LOW BACK PAIN WITH SCIATICA, SCIATICA LATERALITY UNSPECIFIED: ICD-10-CM

## 2020-09-18 DIAGNOSIS — M54.16 LEFT LUMBAR RADICULOPATHY: ICD-10-CM

## 2020-09-18 DIAGNOSIS — G14 POST POLIOMYELITIS SYNDROME: ICD-10-CM

## 2020-09-18 DIAGNOSIS — M25.561 CHRONIC PAIN OF RIGHT KNEE: ICD-10-CM

## 2020-09-18 DIAGNOSIS — E08.44 DIABETIC AMYOTROPHY ASSOCIATED WITH DIABETES MELLITUS DUE TO UNDERLYING CONDITION: ICD-10-CM

## 2020-09-18 DIAGNOSIS — M48.062 SPINAL STENOSIS OF LUMBAR REGION WITH NEUROGENIC CLAUDICATION: ICD-10-CM

## 2020-09-18 DIAGNOSIS — M47.16 LUMBAR SPONDYLOSIS WITH MYELOPATHY: ICD-10-CM

## 2020-09-18 DIAGNOSIS — E11.40 TYPE 2 DIABETES MELLITUS WITH DIABETIC NEUROPATHY, WITHOUT LONG-TERM CURRENT USE OF INSULIN: ICD-10-CM

## 2020-09-18 DIAGNOSIS — E11.42 DIABETIC POLYNEUROPATHY ASSOCIATED WITH TYPE 2 DIABETES MELLITUS: ICD-10-CM

## 2020-09-18 DIAGNOSIS — R26.9 GAIT DISORDER: ICD-10-CM

## 2020-09-18 DIAGNOSIS — G82.20 PARAPARESIS OF BOTH LOWER LIMBS: ICD-10-CM
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
## 2020-09-18 DIAGNOSIS — G89.29 CHRONIC BILATERAL LOW BACK PAIN WITH SCIATICA, SCIATICA LATERALITY UNSPECIFIED: ICD-10-CM

## 2020-09-18 DIAGNOSIS — M47.816 LUMBAR SPONDYLOSIS: ICD-10-CM

## 2020-09-18 DIAGNOSIS — M54.16 LUMBAR RADICULOPATHY: ICD-10-CM

## 2020-09-18 DIAGNOSIS — M47.26 OSTEOARTHRITIS OF SPINE WITH RADICULOPATHY, LUMBAR REGION: ICD-10-CM

## 2020-09-18 DIAGNOSIS — M17.11 PRIMARY OSTEOARTHRITIS OF RIGHT KNEE: ICD-10-CM

## 2020-09-18 DIAGNOSIS — G89.29 CHRONIC PAIN OF RIGHT KNEE: ICD-10-CM

## 2020-09-18 DIAGNOSIS — Z86.12 HISTORY OF POLIOMYELITIS: ICD-10-CM

## 2020-09-18 DIAGNOSIS — M81.0 AGE-RELATED OSTEOPOROSIS WITHOUT CURRENT PATHOLOGICAL FRACTURE: ICD-10-CM

## 2020-09-18 NOTE — TELEPHONE ENCOUNTER
----- Message from Garrett Tong sent at 2020  9:55 AM CDT -----  Contact: Pt @HYDROcodone-acetaminophen (NORCO)  mg per tablet ()  Rx Refill/Request     Is this a Refill or New Rx:  yes   Rx Name and Strength:  HYDROcodone-acetaminophen (NORCO)  mg per tablet ()  Preferred Pharmacy with phone number:     [id number] - [place] - [address]  AT [facility] [address]  [address] [place] [address]  Phone: [phone] Fax: [phone]

## 2020-09-19 RX ORDER — HYDROCODONE BITARTRATE AND ACETAMINOPHEN 10; 325 MG/1; MG/1
1 TABLET ORAL EVERY 6 HOURS PRN
Qty: 120 TABLET | Refills: 0 | Status: SHIPPED | OUTPATIENT
Start: 2020-09-19 | End: 2020-10-22 | Stop reason: SDUPTHER

## 2020-09-21 ENCOUNTER — TELEPHONE (OUTPATIENT)
Dept: INTERNAL MEDICINE | Facility: CLINIC | Age: 80
End: 2020-09-21

## 2020-09-21 DIAGNOSIS — E78.2 MIXED HYPERLIPIDEMIA: Primary | ICD-10-CM

## 2020-09-21 DIAGNOSIS — E11.40 TYPE 2 DIABETES MELLITUS WITH DIABETIC NEUROPATHY, WITHOUT LONG-TERM CURRENT USE OF INSULIN: ICD-10-CM

## 2020-09-21 RX ORDER — GLIPIZIDE 5 MG/1
TABLET ORAL
Qty: 90 TABLET | Refills: 0 | Status: SHIPPED | OUTPATIENT
Start: 2020-09-21 | End: 2020-10-26 | Stop reason: SDUPTHER

## 2020-09-21 RX ORDER — ASPIRIN 81 MG/1
81 TABLET ORAL DAILY
Qty: 30 TABLET | Refills: 12
Start: 2020-09-21 | End: 2021-02-09 | Stop reason: SDUPTHER

## 2020-09-21 NOTE — TELEPHONE ENCOUNTER
Please call.  She is due for an appointment with me with labs prior, please schedule and let me know, orders in    Also due for an eye exam which I have ordered

## 2020-09-23 ENCOUNTER — PATIENT OUTREACH (OUTPATIENT)
Dept: ADMINISTRATIVE | Facility: OTHER | Age: 80
End: 2020-09-23

## 2020-09-23 NOTE — PROGRESS NOTES
LINKS immunization registry not responding  Care Everywhere updated  Health Maintenance updated  Chart reviewed for overdue Proactive [facility] Encounters (KARMA) health maintenance testing (CRS, Breast Ca, Diabetic Eye Exam)   Orders entered:N/A

## 2020-09-24 ENCOUNTER — OFFICE VISIT (OUTPATIENT)
Dept: PODIATRY | Facility: CLINIC | Age: 80
End: 2020-09-24
Payer: MEDICARE

## 2020-09-24 VITALS
HEIGHT: 61 IN | SYSTOLIC BLOOD PRESSURE: 139 MMHG | WEIGHT: 161 LBS | DIASTOLIC BLOOD PRESSURE: 66 MMHG | BODY MASS INDEX: 30.4 KG/M2 | HEART RATE: 70 BPM | RESPIRATION RATE: 18 BRPM

## 2020-09-24 DIAGNOSIS — M21.372 FOOT DROP, LEFT: ICD-10-CM

## 2020-09-24 DIAGNOSIS — L84 CORN OR CALLUS: ICD-10-CM

## 2020-09-24 DIAGNOSIS — B35.1 ONYCHOMYCOSIS DUE TO DERMATOPHYTE: ICD-10-CM

## 2020-09-24 DIAGNOSIS — G14 POST-POLIO SYNDROME: Primary | ICD-10-CM

## 2020-09-24 DIAGNOSIS — G60.9 IDIOPATHIC PERIPHERAL NEUROPATHY: ICD-10-CM

## 2020-09-24 PROCEDURE — 11721 DEBRIDE NAIL 6 OR MORE: CPT | Mod: Q9,59,HCNC,S$GLB | Performed by: PODIATRIST

## 2020-09-24 PROCEDURE — 99499 UNLISTED E&M SERVICE: CPT | Mod: HCNC,S$GLB,, | Performed by: PODIATRIST

## 2020-09-24 PROCEDURE — 99999 PR PBB SHADOW E&M-EST. PATIENT-LVL IV: ICD-10-PCS | Mod: PBBFAC,HCNC,, | Performed by: PODIATRIST

## 2020-09-24 PROCEDURE — 11055 PARING/CUTG B9 HYPRKER LES 1: CPT | Mod: Q9,HCNC,S$GLB, | Performed by: PODIATRIST

## 2020-09-24 PROCEDURE — 11055 PR TRIM HYPERKERATOTIC SKIN LESION, ONE: ICD-10-PCS | Mod: Q9,HCNC,S$GLB, | Performed by: PODIATRIST

## 2020-09-24 PROCEDURE — 99999 PR PBB SHADOW E&M-EST. PATIENT-LVL IV: CPT | Mod: PBBFAC,HCNC,, | Performed by: PODIATRIST

## 2020-09-24 PROCEDURE — 11721 PR DEBRIDEMENT OF NAILS, 6 OR MORE: ICD-10-PCS | Mod: Q9,59,HCNC,S$GLB | Performed by: PODIATRIST

## 2020-09-24 PROCEDURE — 99499 NO LOS: ICD-10-PCS | Mod: HCNC,S$GLB,, | Performed by: PODIATRIST

## 2020-09-24 NOTE — PROGRESS NOTES
Subjective:      Patient ID: Emilia Alan is a 80 y.o. female.    Chief Complaint: PCP (Cindi Perez 5/21/20), Routine Foot Care, and Nail Care    Emilia is a 80 y.o. female who presents to the clinic for evaluation and treatment of high risk feet. 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 The patient's chief complaint is long, thick toenails and dry red itchy skin L foot       PCP: Lauren Deras MD    Date Last Seen by PCP:   Chief Complaint   Patient presents with    PCP     Cindi Perez 5/21/20    Routine Foot Care    Nail Care         Current shoe gear: DM shoes w/ AFO brace( L)    Hemoglobin A1C   Date Value Ref Range Status   07/26/2019 7.1 (H) 4.0 - 5.6 % Final     Comment:     ADA Screening Guidelines:  5.7-6.4%  Consistent with prediabetes  >or=6.5%  Consistent with diabetes  High levels of fetal hemoglobin interfere with the HbA1C  assay. Heterozygous hemoglobin variants (HbS, HgC, etc)do  not significantly interfere with this assay.   However, presence of multiple variants may affect accuracy.     07/08/2019 7.3 (H) 4.0 - 5.6 % Final     Comment:     ADA Screening Guidelines:  5.7-6.4%  Consistent with prediabetes  >or=6.5%  Consistent with diabetes  High levels of fetal  "hemoglobin interfere with the HbA1C  assay. Heterozygous hemoglobin variants (HbS, HgC, etc)do  not significantly interfere with this assay.   However, presence of multiple variants may affect accuracy.     03/18/2019 6.9 (H) 4.0 - 5.6 % Final     Comment:     ADA Screening Guidelines:  5.7-6.4%  Consistent with prediabetes  >or=6.5%  Consistent with diabetes  High levels of fetal hemoglobin interfere with the HbA1C  assay. Heterozygous hemoglobin variants (HbS, HgC, etc)do  not significantly interfere with this assay.   However, presence of multiple variants may affect accuracy.           Review of Systems   Constitution: Negative for chills, decreased appetite and fever.   Cardiovascular: Positive for leg swelling (new onset). Negative for chest pain and claudication.   Respiratory: Negative for cough and shortness of breath.    Skin: Positive for dry skin, nail changes and rash. Negative for color change, flushing, itching, poor wound healing and skin cancer.   Musculoskeletal: Positive for muscle weakness (foot drop). Negative for arthritis, back pain, gout, joint pain, joint swelling and myalgias.   Gastrointestinal: Negative for nausea and vomiting.   Neurological: Positive for numbness and paresthesias. Negative for loss of balance.           Objective:       Vitals:    09/24/20 1311   BP: 139/66   Pulse: 70   Resp: 18   Weight: 73 kg (161 lb)   Height: 5' 1" (1.549 m)   PainSc: 0-No pain        Physical Exam   Constitutional: She is oriented to person, place, and time. She appears well-developed and well-nourished. No distress.   Cardiovascular:   Dorsalis pedis and posterior tibial pulses are diminished Bilaterally. Toes are cool to touch. Feet are warm proximally.There is decreased digital hair. Skin is atrophic, slightly hyperpigmented, and mildly edematous       Musculoskeletal:         General: No tenderness or edema.      Right ankle: Normal.      Left ankle: Normal.      Right foot: No swelling, " crepitus or deformity.      Left foot: No swelling, crepitus or deformity.      Comments: Dropfoot left.      Lymphadenopathy:   No palpable lymph nodes   Neurological: She is alert and oriented to person, place, and time. She has normal strength.   Dacono-Sarah 5.07 monofilamant testing is diminished Charbel feet. Sharp/dull sensation diminished Bilaterally. Light touch absent Bilaterally.       Skin: Skin is warm, dry and intact. No abrasion, no bruising, no burn, no laceration, no lesion, no petechiae and no rash noted. She is not diaphoretic. No pallor. Nails show no clubbing.   Nails 1-5 b/l  are elongated by  4-6mm's, thickened by 3-5 mm's, dystrophic, and are darkened in  coloration . Xerosis Bilaterally. No open lesions noted.    Hyperkeratotic tissue noted to plantar L 5th MPJ    Psychiatric: She has a normal mood and affect. Judgment and thought content normal.   Nursing note and vitals reviewed.          Assessment:       Encounter Diagnoses   Name Primary?    Post-polio syndrome Yes    Idiopathic peripheral neuropathy     Onychomycosis due to dermatophyte     Corn or callus     Foot drop, left          Plan:       Emilia was seen today for pcp, routine foot care and nail care.    Diagnoses and all orders for this visit:    Post-polio syndrome    Idiopathic peripheral neuropathy    Onychomycosis due to dermatophyte    Corn or callus    Foot drop, left      I counseled the patient on her conditions, their implications and medical management.    - Shoe inspection.Patient instructed on proper foot hygeine. We discussed wearing proper shoe gear, daily foot inspections, never walking without protective shoe gear, never putting sharp instruments to feet, routine podiatric nail visits every 2-3 months.      - With patient's permission, nails were aggressively reduced and debrided x 10 to their soft tissue attachment mechanically and with electric , removing all offending nail and debris. Patient  relates relief following the procedure. She will continue to monitor the areas daily, inspect her feet, wear protective shoe gear when ambulatory, moisturizer to maintain skin integrity and follow in this office in approximately 2-3 months, sooner p.r.n.    - After cleansing the  area w/ alcohol prep pad the above mentioned hyperkeratosis was trimmed utilizing No 15 scapel, to a smooth base with out incident. Patient tolerated this  well and reported comfort to the area x1

## 2020-09-25 ENCOUNTER — IMMUNIZATION (OUTPATIENT)
Dept: PHARMACY | Facility: CLINIC | Age: 80
End: 2020-09-25
Payer: MEDICARE

## 2020-09-27 NOTE — LETTER
August 4, 2017      Lauren Deras MD  [address]  [facility] [address]           [facility]  [address]  [facility] [address]  Phone: [phone]          Patient: Emilia Alan   MR Number: [id number]   YOB: 1940   Date of Visit: 8/4/2017       Dear Dr. Lauren Deras:    Thank you for referring Emilia Alan to me for evaluation. Attached you will find relevant portions of my assessment and plan of care.    If you have questions, please do not hesitate to call me. I look forward to following Emilia Alan along with you.    Sincerely,    Brittany Millan, NP    Enclosure  CC:  No Recipients    If you would like to receive this communication electronically, please contact [email or web address] or [phone] to request more information on [employer] access.    For providers and/or their staff who would like to refer a patient to [facility], please contact us through our one-stop-shop provider referral line, [facility], at [phone].    If you feel you have received this communication in error or would no longer like to receive these types of communications, please e-mail [email or web address]          Spinal Block    Patient location during procedure: OB  Start time: 9/27/2020 2:08 PM  End time: 9/27/2020 2:25 PM  Reason for block: primary anesthetic  Staffing  Resident/CRNA: LAMAR Rubio CRNA  Performed: resident/CRNA   Preanesthetic Checklist  Completed: patient identified, site marked, surgical consent, pre-op evaluation, timeout performed, IV checked, risks and benefits discussed, monitors and equipment checked, anesthesia consent given, oxygen available and patient being monitored  Spinal Block  Patient position: sitting  Prep: Betadine  Patient monitoring: continuous pulse ox  Approach: midline  Location: L3/L4  Procedures: paresthesia technique  Provider prep: mask and sterile gloves  Local infiltration: lidocaine  Needle  Needle type: Pencan   Needle gauge: 24 G  Needle length: 4 in  Assessment  Sensory level: T6  Swirl obtained: Yes  CSF: clear  Attempts: 3+  Additional Notes  History and Physical, Risk and Benefits reviewed with patient. Patient positioned into sitting position. Approximately L3-L4 identified. Sterile technique maintained. Betadine x 3 applied to site and allowed to dry then wiped clean. Sterile drape applied. Spinal needle used to access CSF. Adequate swirl before administration and after administration. Spinal needle removed and pt asked to lay flat immediately. Adequate level achieved. Patient comfortable.

## 2020-10-19 ENCOUNTER — LAB VISIT (OUTPATIENT)
Dept: LAB | Facility: HOSPITAL | Age: 80
End: 2020-10-19
Attending: INTERNAL MEDICINE
Payer: MEDICARE

## 2020-10-19 ENCOUNTER — PATIENT MESSAGE (OUTPATIENT)
Dept: INTERNAL MEDICINE | Facility: CLINIC | Age: 80
End: 2020-10-19

## 2020-10-19 ENCOUNTER — TELEPHONE (OUTPATIENT)
Dept: INTERNAL MEDICINE | Facility: CLINIC | Age: 80
End: 2020-10-19

## 2020-10-19 DIAGNOSIS — E11.40 TYPE 2 DIABETES MELLITUS WITH DIABETIC NEUROPATHY, WITHOUT LONG-TERM CURRENT USE OF INSULIN: ICD-10-CM

## 2020-10-19 DIAGNOSIS — E87.5 HYPERKALEMIA: Primary | ICD-10-CM

## 2020-10-19 LAB
ALBUMIN SERPL BCP-MCNC: 3.5 G/DL (ref 3.5–5.2)
ALP SERPL-CCNC: 90 U/L (ref 55–135)
ALT SERPL W/O P-5'-P-CCNC: 12 U/L (ref 10–44)
ANION GAP SERPL CALC-SCNC: 8 MMOL/L (ref 8–16)
AST SERPL-CCNC: 16 U/L (ref 10–40)
BASOPHILS # BLD AUTO: 0.14 K/UL (ref 0–0.2)
BASOPHILS NFR BLD: 2 % (ref 0–1.9)
BILIRUB SERPL-MCNC: 0.5 MG/DL (ref 0.1–1)
BUN SERPL-MCNC: 27 MG/DL (ref 8–23)
CALCIUM SERPL-MCNC: 9.9 MG/DL (ref 8.7–10.5)
CHLORIDE SERPL-SCNC: 106 MMOL/L (ref 95–110)
CHOLEST SERPL-MCNC: 139 MG/DL (ref 120–199)
CHOLEST/HDLC SERPL: 3.2 {RATIO} (ref 2–5)
CO2 SERPL-SCNC: 25 MMOL/L (ref 23–29)
CREAT SERPL-MCNC: 0.9 MG/DL (ref 0.5–1.4)
DIFFERENTIAL METHOD: ABNORMAL
EOSINOPHIL # BLD AUTO: 0.4 K/UL (ref 0–0.5)
EOSINOPHIL NFR BLD: 5 % (ref 0–8)
ERYTHROCYTE [DISTWIDTH] IN BLOOD BY AUTOMATED COUNT: 12.9 % (ref 11.5–14.5)
EST. GFR  (AFRICAN AMERICAN): >60 ML/MIN/1.73 M^2
EST. GFR  (NON AFRICAN AMERICAN): >60 ML/MIN/1.73 M^2
ESTIMATED AVG GLUCOSE: 117 MG/DL (ref 68–131)
GLUCOSE SERPL-MCNC: 112 MG/DL (ref 70–110)
HBA1C MFR BLD HPLC: 5.7 % (ref 4–5.6)
HCT VFR BLD AUTO: 39.1 % (ref 37–48.5)
HDLC SERPL-MCNC: 44 MG/DL (ref 40–75)
HDLC SERPL: 31.7 % (ref 20–50)
HGB BLD-MCNC: 11.9 G/DL (ref 12–16)
IMM GRANULOCYTES # BLD AUTO: 0.02 K/UL (ref 0–0.04)
IMM GRANULOCYTES NFR BLD AUTO: 0.3 % (ref 0–0.5)
LDLC SERPL CALC-MCNC: 62.8 MG/DL (ref 63–159)
LYMPHOCYTES # BLD AUTO: 2.1 K/UL (ref 1–4.8)
LYMPHOCYTES NFR BLD: 29 % (ref 18–48)
MCH RBC QN AUTO: 30.8 PG (ref 27–31)
MCHC RBC AUTO-ENTMCNC: 30.4 G/DL (ref 32–36)
MCV RBC AUTO: 101 FL (ref 82–98)
MONOCYTES # BLD AUTO: 0.5 K/UL (ref 0.3–1)
MONOCYTES NFR BLD: 7.2 % (ref 4–15)
NEUTROPHILS # BLD AUTO: 4 K/UL (ref 1.8–7.7)
NEUTROPHILS NFR BLD: 56.5 % (ref 38–73)
NONHDLC SERPL-MCNC: 95 MG/DL
NRBC BLD-RTO: 0 /100 WBC
PLATELET # BLD AUTO: 280 K/UL (ref 150–350)
PMV BLD AUTO: 11 FL (ref 9.2–12.9)
POTASSIUM SERPL-SCNC: 5.4 MMOL/L (ref 3.5–5.1)
PROT SERPL-MCNC: 6.9 G/DL (ref 6–8.4)
RBC # BLD AUTO: 3.86 M/UL (ref 4–5.4)
SODIUM SERPL-SCNC: 139 MMOL/L (ref 136–145)
TRIGL SERPL-MCNC: 161 MG/DL (ref 30–150)
WBC # BLD AUTO: 7.06 K/UL (ref 3.9–12.7)

## 2020-10-19 PROCEDURE — 85025 COMPLETE CBC W/AUTO DIFF WBC: CPT | Mod: HCNC

## 2020-10-19 PROCEDURE — 80061 LIPID PANEL: CPT | Mod: HCNC

## 2020-10-19 PROCEDURE — 83036 HEMOGLOBIN GLYCOSYLATED A1C: CPT | Mod: HCNC

## 2020-10-19 PROCEDURE — 80053 COMPREHEN METABOLIC PANEL: CPT | Mod: HCNC

## 2020-10-19 PROCEDURE — 36415 COLL VENOUS BLD VENIPUNCTURE: CPT | Mod: HCNC

## 2020-10-19 NOTE — TELEPHONE ENCOUNTER
Spoke to pt and advised. Pt stated that she eats bananas daily and oranges. Reviewed low potassium diet and also placed a copy in the mail. Pt will call back to schedule lab appt. Her daughter will be being her.

## 2020-10-19 NOTE — TELEPHONE ENCOUNTER
----- Message from Gisel Henderson sent at 10/19/2020  3:18 PM CDT -----  Contact: Patient [phone]  Patient states that she just spoke to Rosita and would like a call back.    Please call and advise.    Thank You

## 2020-10-19 NOTE — TELEPHONE ENCOUNTER
Please call- labs ok but potassium is high.  Please review low potassium diet.  Repeat labs this week

## 2020-10-22 ENCOUNTER — LAB VISIT (OUTPATIENT)
Dept: LAB | Facility: HOSPITAL | Age: 80
End: 2020-10-22
Attending: INTERNAL MEDICINE
Payer: MEDICARE

## 2020-10-22 ENCOUNTER — PATIENT MESSAGE (OUTPATIENT)
Dept: INTERNAL MEDICINE | Facility: CLINIC | Age: 80
End: 2020-10-22

## 2020-10-22 DIAGNOSIS — M54.16 LUMBAR RADICULOPATHY: ICD-10-CM

## 2020-10-22 DIAGNOSIS — M54.40 CHRONIC BILATERAL LOW BACK PAIN WITH SCIATICA, SCIATICA LATERALITY UNSPECIFIED: ICD-10-CM

## 2020-10-22 DIAGNOSIS — E08.44 DIABETIC AMYOTROPHY ASSOCIATED WITH DIABETES MELLITUS DUE TO UNDERLYING CONDITION: ICD-10-CM

## 2020-10-22 DIAGNOSIS — E87.5 HYPERKALEMIA: ICD-10-CM

## 2020-10-22 DIAGNOSIS — G89.29 CHRONIC BILATERAL LOW BACK PAIN WITH SCIATICA, SCIATICA LATERALITY UNSPECIFIED: ICD-10-CM

## 2020-10-22 DIAGNOSIS — M48.062 SPINAL STENOSIS OF LUMBAR REGION WITH NEUROGENIC CLAUDICATION: ICD-10-CM

## 2020-10-22 DIAGNOSIS — E11.42 DIABETIC POLYNEUROPATHY ASSOCIATED WITH TYPE 2 DIABETES MELLITUS: ICD-10-CM

## 2020-10-22 DIAGNOSIS — G89.29 CHRONIC PAIN OF RIGHT KNEE: ICD-10-CM

## 2020-10-22 DIAGNOSIS — M17.11 PRIMARY OSTEOARTHRITIS OF RIGHT KNEE: ICD-10-CM

## 2020-10-22 DIAGNOSIS — G82.20 PARAPARESIS OF BOTH LOWER LIMBS: ICD-10-CM

## 2020-10-22 DIAGNOSIS — G14 POST POLIOMYELITIS SYNDROME: ICD-10-CM

## 2020-10-22 DIAGNOSIS — M47.26 OSTEOARTHRITIS OF SPINE WITH RADICULOPATHY, LUMBAR REGION: ICD-10-CM

## 2020-10-22 DIAGNOSIS — M81.0 AGE-RELATED OSTEOPOROSIS WITHOUT CURRENT PATHOLOGICAL FRACTURE: ICD-10-CM

## 2020-10-22 DIAGNOSIS — M54.16 LEFT LUMBAR RADICULOPATHY: ICD-10-CM

## 2020-10-22 DIAGNOSIS — M25.561 CHRONIC PAIN OF RIGHT KNEE: ICD-10-CM
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
## 2020-10-22 DIAGNOSIS — M47.16 LUMBAR SPONDYLOSIS WITH MYELOPATHY: ICD-10-CM

## 2020-10-22 DIAGNOSIS — M47.816 LUMBAR SPONDYLOSIS: ICD-10-CM

## 2020-10-22 DIAGNOSIS — E11.40 TYPE 2 DIABETES MELLITUS WITH DIABETIC NEUROPATHY, WITHOUT LONG-TERM CURRENT USE OF INSULIN: ICD-10-CM

## 2020-10-22 DIAGNOSIS — Z86.12 HISTORY OF POLIOMYELITIS: ICD-10-CM

## 2020-10-22 DIAGNOSIS — R26.9 GAIT DISORDER: ICD-10-CM

## 2020-10-22 LAB — POTASSIUM SERPL-SCNC: 4.4 MMOL/L (ref 3.5–5.1)

## 2020-10-22 PROCEDURE — 36415 COLL VENOUS BLD VENIPUNCTURE: CPT | Mod: HCNC

## 2020-10-22 PROCEDURE — 84132 ASSAY OF SERUM POTASSIUM: CPT | Mod: HCNC

## 2020-10-22 NOTE — TELEPHONE ENCOUNTER
----- Message from Elaine Rashid sent at 10/22/2020  8:47 AM CDT -----  Regarding: refill  Contact: pt@ [phone]  Rx Refill/Request     Is this a Refill or New Rx:  refill    Rx Name and Strength:  HYDROcodone-acetaminophen (NORCO)  mg per tablet    Preferred Pharmacy with phone number:     [id number] [place] [facility] [address]  [address] [facility] [address]  [address] [place] [address]  Phone: [phone] Fax: [phone]    Communication Preference:pt@ [phone]  Additional Information:

## 2020-10-24 RX ORDER — HYDROCODONE BITARTRATE AND ACETAMINOPHEN 10; 325 MG/1; MG/1
1 TABLET ORAL EVERY 6 HOURS PRN
Qty: 120 TABLET | Refills: 0 | Status: SHIPPED | OUTPATIENT
Start: 2020-10-24 | End: 2020-11-24 | Stop reason: SDUPTHER

## 2020-10-26 ENCOUNTER — PATIENT MESSAGE (OUTPATIENT)
Dept: PHARMACY | Facility: CLINIC | Age: 80
End: 2020-10-26

## 2020-10-26 ENCOUNTER — OFFICE VISIT (OUTPATIENT)
Dept: INTERNAL MEDICINE | Facility: CLINIC | Age: 80
End: 2020-10-26
Payer: MEDICARE

## 2020-10-26 ENCOUNTER — IMMUNIZATION (OUTPATIENT)
Dept: PHARMACY | Facility: CLINIC | Age: 80
End: 2020-10-26
Payer: MEDICARE

## 2020-10-26 DIAGNOSIS — M54.40 CHRONIC BILATERAL LOW BACK PAIN WITH SCIATICA, SCIATICA LATERALITY UNSPECIFIED: ICD-10-CM

## 2020-10-26 DIAGNOSIS — E11.21 TYPE 2 DIABETES MELLITUS WITH DIABETIC NEPHROPATHY, WITHOUT LONG-TERM CURRENT USE OF INSULIN: ICD-10-CM

## 2020-10-26 DIAGNOSIS — Z86.12 HISTORY OF POLIOMYELITIS: ICD-10-CM

## 2020-10-26 DIAGNOSIS — G89.29 CHRONIC PAIN OF RIGHT KNEE: ICD-10-CM

## 2020-10-26 DIAGNOSIS — E11.42 DIABETIC POLYNEUROPATHY ASSOCIATED WITH TYPE 2 DIABETES MELLITUS: ICD-10-CM

## 2020-10-26 DIAGNOSIS — M46.96 UNSPECIFIED INFLAMMATORY SPONDYLOPATHY, LUMBAR REGION: ICD-10-CM

## 2020-10-26 DIAGNOSIS — M54.16 LEFT LUMBAR RADICULOPATHY: ICD-10-CM

## 2020-10-26 DIAGNOSIS — I10 SEVERE HYPERTENSION: ICD-10-CM

## 2020-10-26 DIAGNOSIS — G89.29 CHRONIC BILATERAL LOW BACK PAIN WITH SCIATICA, SCIATICA LATERALITY UNSPECIFIED: ICD-10-CM

## 2020-10-26 DIAGNOSIS — R26.9 GAIT DISORDER: ICD-10-CM

## 2020-10-26 DIAGNOSIS — M81.0 AGE-RELATED OSTEOPOROSIS WITHOUT CURRENT PATHOLOGICAL FRACTURE: ICD-10-CM

## 2020-10-26 DIAGNOSIS — M54.16 LUMBAR RADICULOPATHY: ICD-10-CM

## 2020-10-26 DIAGNOSIS — E11.40 TYPE 2 DIABETES MELLITUS WITH DIABETIC NEUROPATHY, WITHOUT LONG-TERM CURRENT USE OF INSULIN: ICD-10-CM

## 2020-10-26 DIAGNOSIS — G14 POST POLIOMYELITIS SYNDROME: ICD-10-CM

## 2020-10-26 DIAGNOSIS — G82.20 PARAPARESIS OF BOTH LOWER LIMBS: ICD-10-CM

## 2020-10-26 DIAGNOSIS — D46.4 REFRACTORY ANEMIA: ICD-10-CM

## 2020-10-26 DIAGNOSIS — M47.26 OSTEOARTHRITIS OF SPINE WITH RADICULOPATHY, LUMBAR REGION: ICD-10-CM

## 2020-10-26 DIAGNOSIS — N18.31 STAGE 3A CHRONIC KIDNEY DISEASE: ICD-10-CM

## 2020-10-26 DIAGNOSIS — M47.16 LUMBAR SPONDYLOSIS WITH MYELOPATHY: ICD-10-CM

## 2020-10-26 DIAGNOSIS — E08.44 DIABETIC AMYOTROPHY ASSOCIATED WITH DIABETES MELLITUS DUE TO UNDERLYING CONDITION: ICD-10-CM

## 2020-10-26 DIAGNOSIS — M48.062 SPINAL STENOSIS OF LUMBAR REGION WITH NEUROGENIC CLAUDICATION: ICD-10-CM

## 2020-10-26 DIAGNOSIS — E78.2 MIXED HYPERLIPIDEMIA: ICD-10-CM

## 2020-10-26 DIAGNOSIS — G47.33 OBSTRUCTIVE SLEEP APNEA SYNDROME: ICD-10-CM

## 2020-10-26 DIAGNOSIS — M25.561 CHRONIC PAIN OF RIGHT KNEE: ICD-10-CM

## 2020-10-26 DIAGNOSIS — E78.5 HYPERLIPIDEMIA, UNSPECIFIED HYPERLIPIDEMIA TYPE: ICD-10-CM

## 2020-10-26 DIAGNOSIS — Z00.00 ANNUAL PHYSICAL EXAM: Primary | ICD-10-CM
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
## 2020-10-26 DIAGNOSIS — M17.11 PRIMARY OSTEOARTHRITIS OF RIGHT KNEE: ICD-10-CM

## 2020-10-26 PROCEDURE — 99999 PR PBB SHADOW E&M-EST. PATIENT-LVL III: ICD-10-PCS | Mod: PBBFAC,HCNC,, | Performed by: INTERNAL MEDICINE

## 2020-10-26 PROCEDURE — 99397 PR PREVENTIVE VISIT,EST,65 & OVER: ICD-10-PCS | Mod: HCNC,S$GLB,, | Performed by: INTERNAL MEDICINE

## 2020-10-26 PROCEDURE — 99499 UNLISTED E&M SERVICE: CPT | Mod: S$GLB,,, | Performed by: INTERNAL MEDICINE

## 2020-10-26 PROCEDURE — 99999 PR PBB SHADOW E&M-EST. PATIENT-LVL III: CPT | Mod: PBBFAC,HCNC,, | Performed by: INTERNAL MEDICINE

## 2020-10-26 PROCEDURE — 99397 PER PM REEVAL EST PAT 65+ YR: CPT | Mod: HCNC,S$GLB,, | Performed by: INTERNAL MEDICINE

## 2020-10-26 PROCEDURE — 99499 RISK ADDL DX/OHS AUDIT: ICD-10-PCS | Mod: S$GLB,,, | Performed by: INTERNAL MEDICINE

## 2020-10-26 RX ORDER — IRBESARTAN 300 MG/1
300 TABLET ORAL DAILY
Qty: 90 TABLET | Refills: 1 | Status: SHIPPED | OUTPATIENT
Start: 2020-10-26 | End: 2021-02-09 | Stop reason: SDUPTHER

## 2020-10-26 RX ORDER — HYDRALAZINE HYDROCHLORIDE 50 MG/1
TABLET, FILM COATED ORAL
Qty: 360 TABLET | Refills: 3 | Status: SHIPPED | OUTPATIENT
Start: 2020-10-26 | End: 2021-02-09 | Stop reason: SDUPTHER

## 2020-10-26 RX ORDER — GABAPENTIN 600 MG/1
TABLET ORAL
Qty: 360 TABLET | Refills: 1 | Status: SHIPPED | OUTPATIENT
Start: 2020-10-26 | End: 2021-02-09 | Stop reason: SDUPTHER

## 2020-10-26 RX ORDER — GLIPIZIDE 5 MG/1
5 TABLET ORAL DAILY
Qty: 90 TABLET | Refills: 0 | Status: SHIPPED | OUTPATIENT
Start: 2020-10-26 | End: 2021-02-09 | Stop reason: SDUPTHER

## 2020-10-26 RX ORDER — AMLODIPINE BESYLATE 10 MG/1
TABLET ORAL
Qty: 90 TABLET | Refills: 3 | Status: SHIPPED | OUTPATIENT
Start: 2020-10-26 | End: 2021-02-09 | Stop reason: SDUPTHER

## 2020-10-26 RX ORDER — PRAVASTATIN SODIUM 40 MG/1
TABLET ORAL
Qty: 135 TABLET | Refills: 3 | Status: SHIPPED | OUTPATIENT
Start: 2020-10-26 | End: 2021-02-09 | Stop reason: SDUPTHER

## 2020-10-26 RX ORDER — HYDROCHLOROTHIAZIDE 25 MG/1
25 TABLET ORAL EVERY OTHER DAY
Qty: 60 TABLET | Refills: 2 | Status: SHIPPED | OUTPATIENT
Start: 2020-10-26 | End: 2021-02-09 | Stop reason: SDUPTHER

## 2020-10-26 RX ORDER — METFORMIN HYDROCHLORIDE 500 MG/1
500 TABLET, EXTENDED RELEASE ORAL DAILY
Qty: 90 TABLET | Refills: 3 | Status: SHIPPED | OUTPATIENT
Start: 2020-10-26 | End: 2021-02-09 | Stop reason: SDUPTHER

## 2020-10-26 NOTE — PROGRESS NOTES
Subjective:       Patient ID: Emilia Alan is a 80 y.o. female.    Chief Complaint: Annual Exam    Annual exam     Blood pressure improved at this point.     Following occasionally in Nephrology, CKD stable to improved.  Last seen July 2019 told to follow-up in 3 months, this has not transpired.  However, kidney function is normal at this time.     Osteopenia diagnosed in 2018, Reclast had been recommended.  However, she then saw Endocrinology August 2018 who recommended calcium and vitamin-D and 2 year follow-up.  This needs to be scheduled.     A1c stable at 5.7.      Not using CPAP- trouble breathing with this.     Refractory anemia, see bone marrow 2018; saw Hematology January 2020 and 1 year follow up recommended.      Podiatry September 2020  Mammogram June 2020  Physical medicine rehab April 2020  Hematology January 2020  Endocrinology August 2018  DEXA May 2018    Patient Active Problem List:     Gait disorder     History of poliomyelitis     Post poliomyelitis syndrome     Diabetic polyneuropathy associated with type 2 diabetes mellitus     Mixed hyperlipidemia     Osteoporosis without current pathological fracture: worse on fosamax 5/16- Reclast 8/16     Paraparesis of both lower limbs     Severe hypertension     Type 2 diabetes mellitus with diabetic neuropathy, without long-term current use of insulin     Osteoarthritis of right knee     Lumbar spinal stenosis     Obstructive sleep apnea syndrome: dx 2008 needs CPAP 11     Bradycardia, drug induced     Atherosclerosis of artery of right lower extremity: see xray 4/4/07     Essential tremor     Type 2 diabetes mellitus with renal manifestations     Stage 3 chronic kidney disease     Facet arthritis of lumbar region     Opioid use agreement exists     Refractory anemia     Primary osteoarthritis of left shoulder     Left shoulder pain     Unspecified inflammatory spondylopathy, lumbar region           Review of Systems   Constitutional: Negative  for activity change, appetite change, chills, fatigue and fever.   HENT: Negative for congestion, hearing loss, sinus pressure and sore throat.    Eyes: Negative for visual disturbance.   Respiratory: Positive for apnea. Negative for cough, shortness of breath and wheezing.         Sleep apnea, has been unable tolerate treatment in the past   Cardiovascular: Negative for chest pain, palpitations and leg swelling.   Gastrointestinal: Negative for abdominal distention, abdominal pain, constipation, diarrhea, nausea and vomiting.   Genitourinary: Negative for dysuria, frequency, hematuria and vaginal bleeding.   Musculoskeletal: Positive for arthralgias. Negative for gait problem, joint swelling and myalgias.        Stable arthralgias   Skin: Negative for rash.   Neurological: Negative for dizziness, weakness, light-headedness and headaches.   Hematological: Negative for adenopathy. Does not bruise/bleed easily.   Psychiatric/Behavioral: Negative for confusion, hallucinations, sleep disturbance and suicidal ideas.       Objective:      Physical Exam  Constitutional:       Appearance: She is well-developed.   HENT:      Head: Normocephalic and atraumatic.      Right Ear: External ear normal.      Left Ear: External ear normal.   Eyes:      Extraocular Movements: Extraocular movements intact.      Conjunctiva/sclera: Conjunctivae normal.   Neck:      Musculoskeletal: Normal range of motion and neck supple.      Thyroid: No thyromegaly.   Cardiovascular:      Rate and Rhythm: Normal rate and regular rhythm.      Heart sounds: Normal heart sounds.   Pulmonary:      Effort: No respiratory distress.      Breath sounds: No wheezing or rales.   Abdominal:      General: Bowel sounds are normal.      Palpations: Abdomen is soft.   Lymphadenopathy:      Cervical: No cervical adenopathy.   Skin:     General: Skin is warm and dry.      Findings: No erythema or rash.   Neurological:      General: No focal deficit present.       Mental Status: She is alert and oriented to person, place, and time.   Psychiatric:         Mood and Affect: Mood normal.         Behavior: Behavior normal.         Assessment:       1. Annual physical exam    2. Unspecified inflammatory spondylopathy, lumbar region    3. Post poliomyelitis syndrome    4. Diabetic polyneuropathy associated with type 2 diabetes mellitus    5. Mixed hyperlipidemia    6. Severe hypertension    7. Stage 3a chronic kidney disease    8. Refractory anemia    9. Type 2 diabetes mellitus with diabetic neuropathy, without long-term current use of insulin    10. Type 2 diabetes mellitus with diabetic nephropathy, without long-term current use of insulin    11. Osteoporosis without current pathological fracture: worse on fosamax 5/16- Reclast 8/16    12. Obstructive sleep apnea syndrome: dx 2008 needs CPAP 11        Plan:           Emilia was seen today for annual exam.    Diagnoses and all orders for this visit:    Annual physical exam    Unspecified inflammatory spondylopathy, lumbar region    Post poliomyelitis syndrome    Diabetic polyneuropathy associated with type 2 diabetes mellitus    Mixed hyperlipidemia    Severe hypertension    Stage 3a chronic kidney disease    Refractory anemia    Type 2 diabetes mellitus with diabetic neuropathy, without long-term current use of insulin  -     Ambulatory referral/consult to Optometry; Future    Type 2 diabetes mellitus with diabetic nephropathy, without long-term current use of insulin  -     Ambulatory referral/consult to Optometry; Future    Osteoporosis without current pathological fracture: worse on fosamax 5/16- Reclast 8/16  -     Ambulatory referral/consult to Endocrinology; Future    Obstructive sleep apnea syndrome: dx 2008 needs CPAP 11     Shingles vaccine recommended   She has had outside eye exam, will obtain records   Continue current regimen   Bone density and endocrine follow-up   Sleep clinic follow-up   Will be due for hematology  follow-up in January

## 2020-10-26 NOTE — PATIENT INSTRUCTIONS
What Are Snoring and Obstructive Sleep Apnea?  If youve ever had a stuffed-up nose, you know the feeling of trying to breathe through a very narrow passageway. This is what happens in your throat when you snore. While you sleep, structures in your throat partially block your air passage, making the passage narrow and hard to breathe through. If the entire passage becomes blocked and you cant breathe at all, you have sleep apnea.      Snoring Obstructive sleep apnea   Snoring  If your throat structures are too large or the muscles relax too much during sleep, the air passage may be partially blocked. As air from the nose or mouth passes around this blockage, the throat structures vibrate, causing the familiar sound of snoring. At times, this sound can be so loud that snorers wake up others, or even themselves, during the night. Snoring gets worse as more and more of the air passage is blocked.  Obstructive sleep apnea  If the structures completely block the throat, air cant flow to the lungs at all. This is called apnea (meaning no breathing). Since the lungs arent getting fresh air, the brain tells the body to wake up just enough to tighten the muscles and unblock the air passage. With a loud gasp, breathing begins again. This process may be repeated over and over again throughout the night, making your sleep fragmented with a lighter stage of sleep. Even though you do not remember waking up many times during the night to a lighter sleep, you feel tired the next day. The lack of sleep and fresh air can also strain your lungs, heart, and other organs, leading to problems such as high blood pressure, heart attack, or stroke.  Problems in the nose and jaw  Problems in the structure of the nose may obstruct breathing. A crooked (deviated) septum or swollen turbinates can make snoring worse or lead to apnea. Also, a receding jaw may make the tongue sit too far back, so its more likely to block the airway when  youre asleep.        Date Last Reviewed: 7/18/2015  © 8786-4371 [employer]. [address] [place] [address], [place] [address]. All rights reserved. This information is not intended as a substitute for professional medical care. Always follow your healthcare professional's instructions.

## 2020-10-29 ENCOUNTER — OFFICE VISIT (OUTPATIENT)
Dept: ENDOCRINOLOGY | Facility: CLINIC | Age: 80
End: 2020-10-29
Payer: MEDICARE

## 2020-10-29 VITALS
WEIGHT: 163 LBS | HEART RATE: 68 BPM | HEIGHT: 61 IN | BODY MASS INDEX: 30.78 KG/M2 | SYSTOLIC BLOOD PRESSURE: 152 MMHG | DIASTOLIC BLOOD PRESSURE: 82 MMHG

## 2020-10-29 DIAGNOSIS — M81.0 OSTEOPOROSIS, UNSPECIFIED OSTEOPOROSIS TYPE, UNSPECIFIED PATHOLOGICAL FRACTURE PRESENCE: ICD-10-CM

## 2020-10-29 DIAGNOSIS — M81.0 AGE-RELATED OSTEOPOROSIS WITHOUT CURRENT PATHOLOGICAL FRACTURE: ICD-10-CM

## 2020-10-29 PROCEDURE — 1159F MED LIST DOCD IN RCRD: CPT | Mod: HCNC,S$GLB,, | Performed by: INTERNAL MEDICINE

## 2020-10-29 PROCEDURE — 99214 OFFICE O/P EST MOD 30 MIN: CPT | Mod: HCNC,S$GLB,, | Performed by: INTERNAL MEDICINE

## 2020-10-29 PROCEDURE — 3077F SYST BP >= 140 MM HG: CPT | Mod: HCNC,CPTII,S$GLB, | Performed by: INTERNAL MEDICINE

## 2020-10-29 PROCEDURE — 1101F PT FALLS ASSESS-DOCD LE1/YR: CPT | Mod: HCNC,CPTII,S$GLB, | Performed by: INTERNAL MEDICINE

## 2020-10-29 PROCEDURE — 1101F PR PT FALLS ASSESS DOC 0-1 FALLS W/OUT INJ PAST YR: ICD-10-PCS | Mod: HCNC,CPTII,S$GLB, | Performed by: INTERNAL MEDICINE

## 2020-10-29 PROCEDURE — 3079F DIAST BP 80-89 MM HG: CPT | Mod: HCNC,CPTII,S$GLB, | Performed by: INTERNAL MEDICINE

## 2020-10-29 PROCEDURE — 3077F PR MOST RECENT SYSTOLIC BLOOD PRESSURE >= 140 MM HG: ICD-10-PCS | Mod: HCNC,CPTII,S$GLB, | Performed by: INTERNAL MEDICINE

## 2020-10-29 PROCEDURE — 99214 PR OFFICE/OUTPT VISIT, EST, LEVL IV, 30-39 MIN: ICD-10-PCS | Mod: HCNC,S$GLB,, | Performed by: INTERNAL MEDICINE

## 2020-10-29 PROCEDURE — 3079F PR MOST RECENT DIASTOLIC BLOOD PRESSURE 80-89 MM HG: ICD-10-PCS | Mod: HCNC,CPTII,S$GLB, | Performed by: INTERNAL MEDICINE

## 2020-10-29 PROCEDURE — 1125F AMNT PAIN NOTED PAIN PRSNT: CPT | Mod: HCNC,S$GLB,, | Performed by: INTERNAL MEDICINE

## 2020-10-29 PROCEDURE — 99999 PR PBB SHADOW E&M-EST. PATIENT-LVL V: ICD-10-PCS | Mod: PBBFAC,HCNC,, | Performed by: INTERNAL MEDICINE

## 2020-10-29 PROCEDURE — 1159F PR MEDICATION LIST DOCUMENTED IN MEDICAL RECORD: ICD-10-PCS | Mod: HCNC,S$GLB,, | Performed by: INTERNAL MEDICINE

## 2020-10-29 PROCEDURE — 1125F PR PAIN SEVERITY QUANTIFIED, PAIN PRESENT: ICD-10-PCS | Mod: HCNC,S$GLB,, | Performed by: INTERNAL MEDICINE

## 2020-10-29 PROCEDURE — 99999 PR PBB SHADOW E&M-EST. PATIENT-LVL V: CPT | Mod: PBBFAC,HCNC,, | Performed by: INTERNAL MEDICINE

## 2020-10-29 NOTE — PROGRESS NOTES
ENDOCRINOLOGY CLINIC   10/29/2020     Subjective:      CC:  management of osteoporosis    HPI:   Emilia Alan is a 80 y.o. female who presents for evaluation of low bone density.  She was last seen in endocrine clinic by Dr. Canales in August 2018 at which time her bone density was stable so the decision was made to start a drug holiday.  Secondary workup for osteoporosis was negative in the past.  She was treated with Fosamax for a couple of years but did not have improvement in her bone density so was transition to Reclast and got 2 doses in 2016 and 2017.  She does have history of a wrist fracture but unclear if this was during therapy or after she completed Reclast.    She denies any new medical problems or concerns.  She has decreased mobility (due to history of polio) and is using a wheelchair today.  Denies any recent falls or fractures    DEXA   Lumbar Spine: Lumbar bone mineral density L1-L4 is 1.483g/cm2, which is a T-score of 4.0. The Z-score is 6.5.  Total Hip: Total hip bone mineral density is 0.842g/cm2.  The T-score is -0.8, and the Z-score is 1.1.  Femoral neck: Bone mineral density is 0.645g/cm2 and the T-score is -1.8 and the Z-score is 0.4 g/cm2.  There is a  19% risk of a major osteoporotic fracture and a 4.4% risk of hip fracture in the next 10 years (FRAX).      Compared with previous DXA, BMD at the lumbar spine has increased, and the BMD at the total hip has remained stable.    Secondary osteoporosis workup:   postmenopausal estrogen deficiency  Lab Results   Component Value Date    CALCIUM 9.9 10/19/2020    ALBUMIN 3.5 10/19/2020    ESTGFRAFRICA >60.0 10/19/2020    EGFRNONAA >60.0 10/19/2020    PHOS 3.5 07/08/2019    ALKPHOS 90 10/19/2020    GGWIZCOP32BI 42 08/20/2018    PTH 41.0 07/08/2019       Previous Treatment:   Fosamax 9183-6513   reclast x 2 2016 and 2017  History of previous fracture: Yes L wrist fracture in 2017,  broke left leg, right knee cap in 30s after falls  Parent with fractured  hip: No  Smoking status: no  Glucocorticoid use: No on steroid injection 2 years ago  History of RA: No  Alcohol 3 or more/day: No  Nephrolithiasis: No  Dental up to date: yes, no upcoming procedures  Supplements: Ca pill once a day, no separate vitD.    gerd - denies      Past Medical History:   Diagnosis Date    Allergy     Anemia 10/20/2014    Arthritis     Atherosclerosis of artery of right lower extremity: see xray 07    Diabetes mellitus     Diabetic neuropathy 2012    Gait disorder 2012    High cholesterol     History of poliomyelitis 2012    Hyperlipidemia 2013    Hypertension     Obstructive sleep apnea syndrome: dx 2008 needs CPAP 11 2017    Osteopenia     Osteoporosis, unspecified 2014    Other specified anemias 2015    Post poliomyelitis syndrome 2012    Renal manifestation of secondary diabetes mellitus     Sleep apnea     Type II or unspecified type diabetes mellitus without mention of complication, not stated as uncontrolled 2015    Unspecified essential hypertension 2015       Past Surgical History:   Procedure Laterality Date    CATARACT EXTRACTION       SECTION      CHOLECYSTECTOMY      COLONOSCOPY N/A 2017    Procedure: COLONOSCOPY;  Surgeon: Karen Lebron MD;  Location: [address]);  Service: Endoscopy;  Laterality: N/A;    EYE SURGERY      FRACTURE SURGERY         Review of patient's allergies indicates:   Allergen Reactions    Atenolol     Verapamil          Current Outpatient Medications:     amLODIPine (NORVASC) 10 MG tablet, TAKE 1 TABLET(10 MG) BY MOUTH EVERY DAY, Disp: 90 tablet, Rfl: 3    aspirin (ECOTRIN) 81 MG EC tablet, Take 1 tablet (81 mg total) by mouth once daily., Disp: 30 tablet, Rfl: 12    blood sugar diagnostic Strp, 1 strip by Misc.(Non-Drug; Combo Route) route 2 (two) times daily. TRUE RESULT SYSTEM, Disp: 180 strip, Rfl: 4    blood-glucose meter (TRUERESULT BLOOD  GLUCOSE SYSTM) kit, Use as instructed, Disp: 1 each, Rfl: 0    calcium-vitamin D3-vitamin K (VIACTIV) 500-100-40 mg-unit-mcg Chew, Take 2 each by mouth., Disp: , Rfl:     desloratadine (CLARINEX) 5 mg tablet, Take 5 mg by mouth once daily., Disp: , Rfl:     diclofenac (VOLTAREN) 75 MG EC tablet, TAKE 1 TABLET BY MOUTH TWICE DAILY WITH FOOD AND MEAL AS NEEDED. STOP IF ANY STOMACH IRRITATION, Disp: 180 tablet, Rfl: 0    docusate sodium (COLACE) 50 MG capsule, Take 1 capsule (50 mg total) by mouth 2 (two) times daily as needed for Constipation., Disp: , Rfl:     gabapentin (NEURONTIN) 600 MG tablet, TAKE 1 TABLET(600 MG) BY MOUTH FOUR TIMES DAILY WITH MEALS AND EVERY NIGHT AT BEDTIME, Disp: 360 tablet, Rfl: 1    glipiZIDE (GLUCOTROL) 5 MG tablet, Take 1 tablet (5 mg total) by mouth once daily., Disp: 90 tablet, Rfl: 0    hydrALAZINE (APRESOLINE) 50 MG tablet, TAKE 1 TABLET(50 MG) BY MOUTH EVERY MORNING AND EVERY AFTERNOON THEN TAKE 2 TABLETS(100 MG) BY MOUTH EVERY EVENING, Disp: 360 tablet, Rfl: 3    hydroCHLOROthiazide (HYDRODIURIL) 25 MG tablet, Take 1 tablet (25 mg total) by mouth every other day., Disp: 60 tablet, Rfl: 2    HYDROcodone-acetaminophen (NORCO)  mg per tablet, Take 1 tablet by mouth every 6 (six) hours as needed for Pain (pain)., Disp: 120 tablet, Rfl: 0    irbesartan (AVAPRO) 300 MG tablet, Take 1 tablet (300 mg total) by mouth once daily., Disp: 90 tablet, Rfl: 1    lancets Misc, TEST 2 X DAILY PROSPER RESULT SYSTEM, Disp: 180 each, Rfl: 3    metFORMIN (GLUCOPHAGE-XR) 500 MG ER 24hr tablet, Take 1 tablet (500 mg total) by mouth once daily., Disp: 90 tablet, Rfl: 3    multivitamin (THERAGRAN) per tablet, Take 1 tablet by mouth once daily. , Disp: , Rfl:     oxybutynin (DITROPAN) 5 MG Tab, TAKE 1 TABLET(5 MG) BY MOUTH TWICE DAILY, Disp: 180 tablet, Rfl: 3    pravastatin (PRAVACHOL) 40 MG tablet, TAKE 1 AND 1/2 TABLETS(60 MG) BY MOUTH EVERY NIGHT, Disp: 135 tablet, Rfl: 3    TRUEPLUS  LANCETS 33 gauge Misc, USE BID, Disp: , Rfl: 3    clotrimazole-betamethasone 1-0.05% (LOTRISONE) cream, Apply topically 2 (two) times daily., Disp: 45 g, Rfl: 3    diclofenac sodium (VOLTAREN) 1 % Gel, GRETEL 2 GRAMS TO BOTH KNEES AND LEFT SHOULDER QID, Disp: , Rfl: 3    ketoconazole (NIZORAL) 2 % cream, Apply topically once daily. (Patient not taking: Reported on 10/29/2020), Disp: 60 g, Rfl: 3    Social History     Socioeconomic History    Marital status:      Spouse name: Not on file    Number of children: 4    Years of education: Not on file    Highest education level: Not on file   Occupational History    Not on file   Social Needs    Financial resource strain: Not on file    Food insecurity     Worry: Not on file     Inability: Not on file    Transportation needs     Medical: Not on file     Non-medical: Not on file   Tobacco Use    Smoking status: Never Smoker    Smokeless tobacco: Never Used   Substance and Sexual Activity    Alcohol use: No    Drug use: No    Sexual activity: Never   Lifestyle    Physical activity     Days per week: Not on file     Minutes per session: Not on file    Stress: Not on file   Relationships    Social connections     Talks on phone: Not on file     Gets together: Not on file     Attends Zoroastrian service: Not on file     Active member of club or organization: Not on file     Attends meetings of clubs or organizations: Not on file     Relationship status: Not on file   Other Topics Concern    Are you pregnant or think you may be? No    Breast-feeding Not Asked   Social History Narrative    Not on file       Family History   Problem Relation Age of Onset    Diabetes Father     Heart disease Father     Heart attack Father     Heart disease Brother     No Known Problems Daughter     Diabetes Son     Heart disease Brother     Diabetes Daughter     Diabetes Daughter     Cataracts Neg Hx     Glaucoma Neg Hx     Hypertension Neg Hx     Cancer Neg Hx  "    Blindness Neg Hx     Amblyopia Neg Hx     Strabismus Neg Hx     Retinal detachment Neg Hx     Macular degeneration Neg Hx     Melanoma Neg Hx        A 14 point ROS was obtained with pertinent positives and negatives per HPI, all others negative.     Objective:   Physical Exam   BP (!) 152/82 (BP Location: Left arm, Patient Position: Sitting, BP Method: Medium (Manual))   Pulse 68   Ht 5' 1" (1.549 m)   Wt 73.9 kg (163 lb)   BMI 30.80 kg/m²   Wt Readings from Last 3 Encounters:   10/29/20 73.9 kg (163 lb)   09/24/20 73 kg (161 lb)   06/18/20 73.2 kg (161 lb 6 oz)   ]    Constitutional:  Pleasant,  in no acute distress.   HENT:   Head:    Normocephalic and atraumatic.   Mouth/Throat:   Oropharynx is clear and moist. No oropharyngeal exudate.   Eyes:    EOMI. No scleral icterus.   Neck:    No thyromegaly or palpable thyroid nodules, no cervical LAD  Cardiovascular:  Normal rate, regular rhythm, 2+ radial pulses bilaterally   Respiratory:   Effort normal and breath sounds normal.   Gastrointestinal: Soft, nontender  Neurological:  No tremor, normal speech  Skin:    Skin is warm, dry  Psych:   Normal mood and affect.   Extremity:  No edema or wounds, no deformity, no tenderness to palpation over spine    LABORATORY REVIEW:    Chemistry        Component Value Date/Time     10/19/2020 0736    K 4.4 10/22/2020 0825     10/19/2020 0736    CO2 25 10/19/2020 0736    BUN 27 (H) 10/19/2020 0736    CREATININE 0.9 10/19/2020 0736     (H) 10/19/2020 0736        Component Value Date/Time    CALCIUM 9.9 10/19/2020 0736    ALKPHOS 90 10/19/2020 0736    AST 16 10/19/2020 0736    ALT 12 10/19/2020 0736    BILITOT 0.5 10/19/2020 0736    ESTGFRAFRICA >60.0 10/19/2020 0736    EGFRNONAA >60.0 10/19/2020 0736          Lab Results   Component Value Date    HGBA1C 5.7 (H) 10/19/2020    HGBA1C 7.1 (H) 07/26/2019    HGBA1C 7.3 (H) 07/08/2019         Assessment/Plan:     Problem List Items Addressed This Visit     "    Unprioritized    Osteoporosis without current pathological fracture: worse on fosamax 5/16- Reclast 8/16    Relevant Orders    DXA Bone Density Spine And Hip    Vitamin D (Completed)    TSH (Completed)    Osteoporosis     Did not have improvement of bone density on Fosamax from 5689-2553 (questionable compliance) then was on Reclast with 2 doses given in 2016 and 2017.  Due to stable bone density in 2018 she was started on a drug holiday.  Will repeat bone density scan now to determine if she needs to restart treatment.  Given normal kidney function and questionable compliance with oral bisphosphonates in the past, would resume Reclast warranted.  Will check TSH and vitamin-D today         Relevant Orders    DXA Bone Density Spine And Hip    Vitamin D (Completed)    TSH (Completed)          Return to clinic in 12 months    Eric Corbin MD

## 2020-10-29 NOTE — LETTER
November 1, 2020      Lauren Deras MD  [address]  [facility] [address]           [facility] 6th [facility]  [address]  [facility] [address]  Phone: [phone]  Fax: [phone]          Patient: Emilia Alan   MR Number: [id number]   YOB: 1940   Date of Visit: 10/29/2020       Dear Dr. Lauren Deras:    Thank you for referring Emilia Alan to me for evaluation. Attached you will find relevant portions of my assessment and plan of care.    If you have questions, please do not hesitate to call me. I look forward to following Emilia Alan along with you.    Sincerely,    Eric Corbin MD    Enclosure  CC:  No Recipients    If you would like to receive this communication electronically, please contact [email or web address] or [phone] to request more information on [employer] access.    For providers and/or their staff who would like to refer a patient to [facility], please contact us through our one-stop-shop provider referral line, [facility], at [phone].    If you feel you have received this communication in error or would no longer like to receive these types of communications, please e-mail [email or web address]

## 2020-11-02 ENCOUNTER — HOSPITAL ENCOUNTER (OUTPATIENT)
Dept: RADIOLOGY | Facility: CLINIC | Age: 80
Discharge: HOME | End: 2020-11-02
Attending: INTERNAL MEDICINE
Payer: MEDICARE

## 2020-11-02 DIAGNOSIS — M81.0 AGE-RELATED OSTEOPOROSIS WITHOUT CURRENT PATHOLOGICAL FRACTURE: ICD-10-CM

## 2020-11-02 PROCEDURE — 77080 DXA BONE DENSITY AXIAL: CPT | Mod: 26,HCNC,, | Performed by: INTERNAL MEDICINE

## 2020-11-02 PROCEDURE — 77080 DXA BONE DENSITY AXIAL: CPT | Mod: TC,HCNC

## 2020-11-02 PROCEDURE — 77080 DEXA BONE DENSITY SPINE HIP: ICD-10-PCS | Mod: 26,HCNC,, | Performed by: INTERNAL MEDICINE

## 2020-11-02 NOTE — ASSESSMENT & PLAN NOTE
Did not have improvement of bone density on Fosamax from 0304-6962 (questionable compliance) then was on Reclast with 2 doses given in 2016 and 2017.  Due to stable bone density in 2018 she was started on a drug holiday.  Will repeat bone density scan now to determine if she needs to restart treatment.  Given normal kidney function and questionable compliance with oral bisphosphonates in the past, would resume Reclast warranted.  Will check TSH and vitamin-D today

## 2020-11-17 RX ORDER — ACETAMINOPHEN 500 MG
500 TABLET ORAL
Status: CANCELLED | OUTPATIENT
Start: 2020-11-17

## 2020-11-17 RX ORDER — HEPARIN 100 UNIT/ML
500 SYRINGE INTRAVENOUS
Status: CANCELLED | OUTPATIENT
Start: 2020-11-17

## 2020-11-17 RX ORDER — SODIUM CHLORIDE 0.9 % (FLUSH) 0.9 %
10 SYRINGE (ML) INJECTION
Status: CANCELLED | OUTPATIENT
Start: 2020-11-17

## 2020-11-17 RX ORDER — ZOLEDRONIC ACID 5 MG/100ML
5 INJECTION, SOLUTION INTRAVENOUS
Status: CANCELLED | OUTPATIENT
Start: 2020-11-17

## 2020-11-17 NOTE — PROGRESS NOTES
Although BMD stable on drug holiday will resume reclast due to fracture on therapy and high fall risk.

## 2020-11-20 ENCOUNTER — TELEPHONE (OUTPATIENT)
Dept: INTERNAL MEDICINE | Facility: CLINIC | Age: 80
End: 2020-11-20

## 2020-11-24 DIAGNOSIS — Z86.12 HISTORY OF POLIOMYELITIS: ICD-10-CM

## 2020-11-24 DIAGNOSIS — G89.29 CHRONIC BILATERAL LOW BACK PAIN WITH SCIATICA, SCIATICA LATERALITY UNSPECIFIED: ICD-10-CM

## 2020-11-24 DIAGNOSIS — M47.26 OSTEOARTHRITIS OF SPINE WITH RADICULOPATHY, LUMBAR REGION: ICD-10-CM

## 2020-11-24 DIAGNOSIS — G14 POST POLIOMYELITIS SYNDROME: ICD-10-CM

## 2020-11-24 DIAGNOSIS — G89.29 CHRONIC PAIN OF RIGHT KNEE: ICD-10-CM

## 2020-11-24 DIAGNOSIS — R26.9 GAIT DISORDER: ICD-10-CM

## 2020-11-24 DIAGNOSIS — M17.11 PRIMARY OSTEOARTHRITIS OF RIGHT KNEE: ICD-10-CM

## 2020-11-24 DIAGNOSIS — M81.0 AGE-RELATED OSTEOPOROSIS WITHOUT CURRENT PATHOLOGICAL FRACTURE: ICD-10-CM

## 2020-11-24 DIAGNOSIS — M54.16 LEFT LUMBAR RADICULOPATHY: ICD-10-CM

## 2020-11-24 DIAGNOSIS — M47.16 LUMBAR SPONDYLOSIS WITH MYELOPATHY: ICD-10-CM

## 2020-11-24 DIAGNOSIS — M48.062 SPINAL STENOSIS OF LUMBAR REGION WITH NEUROGENIC CLAUDICATION: ICD-10-CM

## 2020-11-24 DIAGNOSIS — M54.16 LUMBAR RADICULOPATHY: ICD-10-CM
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
## 2020-11-24 DIAGNOSIS — M25.561 CHRONIC PAIN OF RIGHT KNEE: ICD-10-CM

## 2020-11-24 DIAGNOSIS — M47.816 LUMBAR SPONDYLOSIS: ICD-10-CM

## 2020-11-24 DIAGNOSIS — E08.44 DIABETIC AMYOTROPHY ASSOCIATED WITH DIABETES MELLITUS DUE TO UNDERLYING CONDITION: ICD-10-CM

## 2020-11-24 DIAGNOSIS — G82.20 PARAPARESIS OF BOTH LOWER LIMBS: ICD-10-CM

## 2020-11-24 DIAGNOSIS — E11.40 TYPE 2 DIABETES MELLITUS WITH DIABETIC NEUROPATHY, WITHOUT LONG-TERM CURRENT USE OF INSULIN: ICD-10-CM

## 2020-11-24 DIAGNOSIS — E11.42 DIABETIC POLYNEUROPATHY ASSOCIATED WITH TYPE 2 DIABETES MELLITUS: ICD-10-CM

## 2020-11-24 DIAGNOSIS — M54.40 CHRONIC BILATERAL LOW BACK PAIN WITH SCIATICA, SCIATICA LATERALITY UNSPECIFIED: ICD-10-CM

## 2020-11-24 NOTE — TELEPHONE ENCOUNTER
----- Message from Yvonne Livingston sent at 11/24/2020  9:58 AM CST -----  Contact: self @ [phone]  Pt is req a refill for hydrocodone 10/325.     [id number] [place] - [address] AT [facility] [address] [phone] (Phone)      [phone] (Fax)

## 2020-11-26 RX ORDER — HYDROCODONE BITARTRATE AND ACETAMINOPHEN 10; 325 MG/1; MG/1
1 TABLET ORAL EVERY 6 HOURS PRN
Qty: 120 TABLET | Refills: 0 | Status: SHIPPED | OUTPATIENT
Start: 2020-11-26 | End: 2021-01-14 | Stop reason: SDUPTHER

## 2020-12-03 ENCOUNTER — INFUSION (OUTPATIENT)
Dept: INFECTIOUS DISEASES | Facility: HOSPITAL | Age: 80
End: 2020-12-03
Attending: INTERNAL MEDICINE
Payer: MEDICARE

## 2020-12-03 VITALS
SYSTOLIC BLOOD PRESSURE: 177 MMHG | HEART RATE: 92 BPM | TEMPERATURE: 99 F | WEIGHT: 162.94 LBS | BODY MASS INDEX: 30.78 KG/M2 | DIASTOLIC BLOOD PRESSURE: 75 MMHG | RESPIRATION RATE: 18 BRPM | OXYGEN SATURATION: 97 %

## 2020-12-03 DIAGNOSIS — M81.0 AGE-RELATED OSTEOPOROSIS WITHOUT CURRENT PATHOLOGICAL FRACTURE: Primary | ICD-10-CM

## 2020-12-03 PROCEDURE — 96365 THER/PROPH/DIAG IV INF INIT: CPT | Mod: HCNC

## 2020-12-03 PROCEDURE — 63600175 PHARM REV CODE 636 W HCPCS: Mod: HCNC | Performed by: INTERNAL MEDICINE

## 2020-12-03 PROCEDURE — 25000003 PHARM REV CODE 250: Mod: HCNC | Performed by: INTERNAL MEDICINE

## 2020-12-03 RX ORDER — ZOLEDRONIC ACID 5 MG/100ML
5 INJECTION, SOLUTION INTRAVENOUS
Status: CANCELLED | OUTPATIENT
Start: 2020-12-03

## 2020-12-03 RX ORDER — HEPARIN 100 UNIT/ML
500 SYRINGE INTRAVENOUS
Status: CANCELLED | OUTPATIENT
Start: 2020-12-03

## 2020-12-03 RX ORDER — ACETAMINOPHEN 500 MG
500 TABLET ORAL
Status: DISCONTINUED | OUTPATIENT
Start: 2020-12-03 | End: 2020-12-03 | Stop reason: HOSPADM

## 2020-12-03 RX ORDER — ACETAMINOPHEN 500 MG
500 TABLET ORAL
Status: CANCELLED | OUTPATIENT
Start: 2020-12-03

## 2020-12-03 RX ORDER — ZOLEDRONIC ACID 5 MG/100ML
5 INJECTION, SOLUTION INTRAVENOUS
Status: COMPLETED | OUTPATIENT
Start: 2020-12-03 | End: 2020-12-03

## 2020-12-03 RX ORDER — SODIUM CHLORIDE 0.9 % (FLUSH) 0.9 %
10 SYRINGE (ML) INJECTION
Status: CANCELLED | OUTPATIENT
Start: 2020-12-03

## 2020-12-03 RX ADMIN — ZOLEDRONIC ACID 5 MG: 5 INJECTION, SOLUTION INTRAVENOUS at 03:12

## 2020-12-03 RX ADMIN — ACETAMINOPHEN 500 MG: 500 TABLET ORAL at 03:12

## 2020-12-09 ENCOUNTER — PATIENT OUTREACH (OUTPATIENT)
Dept: OTHER | Facility: OTHER | Age: 80
End: 2020-12-09

## 2021-01-11 ENCOUNTER — TELEPHONE (OUTPATIENT)
Dept: PHYSICAL MEDICINE AND REHAB | Facility: CLINIC | Age: 81
End: 2021-01-11

## 2021-01-14 DIAGNOSIS — E11.42 DIABETIC POLYNEUROPATHY ASSOCIATED WITH TYPE 2 DIABETES MELLITUS: ICD-10-CM

## 2021-01-14 DIAGNOSIS — G89.29 CHRONIC PAIN OF RIGHT KNEE: ICD-10-CM

## 2021-01-14 DIAGNOSIS — G14 POST POLIOMYELITIS SYNDROME: ICD-10-CM

## 2021-01-14 DIAGNOSIS — Z86.12 HISTORY OF POLIOMYELITIS: ICD-10-CM

## 2021-01-14 DIAGNOSIS — E11.40 TYPE 2 DIABETES MELLITUS WITH DIABETIC NEUROPATHY, WITHOUT LONG-TERM CURRENT USE OF INSULIN: ICD-10-CM

## 2021-01-14 DIAGNOSIS — M48.062 SPINAL STENOSIS OF LUMBAR REGION WITH NEUROGENIC CLAUDICATION: ICD-10-CM

## 2021-01-14 DIAGNOSIS — G82.20 PARAPARESIS OF BOTH LOWER LIMBS: ICD-10-CM
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
## 2021-01-14 DIAGNOSIS — G89.29 CHRONIC BILATERAL LOW BACK PAIN WITH SCIATICA, SCIATICA LATERALITY UNSPECIFIED: ICD-10-CM

## 2021-01-14 DIAGNOSIS — M47.26 OSTEOARTHRITIS OF SPINE WITH RADICULOPATHY, LUMBAR REGION: ICD-10-CM

## 2021-01-14 DIAGNOSIS — M25.561 CHRONIC PAIN OF RIGHT KNEE: ICD-10-CM

## 2021-01-14 DIAGNOSIS — M47.16 LUMBAR SPONDYLOSIS WITH MYELOPATHY: ICD-10-CM

## 2021-01-14 DIAGNOSIS — M54.40 CHRONIC BILATERAL LOW BACK PAIN WITH SCIATICA, SCIATICA LATERALITY UNSPECIFIED: ICD-10-CM

## 2021-01-14 DIAGNOSIS — M81.0 AGE-RELATED OSTEOPOROSIS WITHOUT CURRENT PATHOLOGICAL FRACTURE: ICD-10-CM

## 2021-01-14 DIAGNOSIS — E08.44 DIABETIC AMYOTROPHY ASSOCIATED WITH DIABETES MELLITUS DUE TO UNDERLYING CONDITION: ICD-10-CM

## 2021-01-14 DIAGNOSIS — M47.816 LUMBAR SPONDYLOSIS: ICD-10-CM

## 2021-01-14 DIAGNOSIS — R26.9 GAIT DISORDER: ICD-10-CM

## 2021-01-14 DIAGNOSIS — M54.16 LUMBAR RADICULOPATHY: ICD-10-CM

## 2021-01-14 DIAGNOSIS — M17.11 PRIMARY OSTEOARTHRITIS OF RIGHT KNEE: ICD-10-CM

## 2021-01-14 DIAGNOSIS — M54.16 LEFT LUMBAR RADICULOPATHY: ICD-10-CM

## 2021-01-16 RX ORDER — HYDROCODONE BITARTRATE AND ACETAMINOPHEN 10; 325 MG/1; MG/1
1 TABLET ORAL EVERY 6 HOURS PRN
Qty: 120 TABLET | Refills: 0 | Status: SHIPPED | OUTPATIENT
Start: 2021-01-16 | End: 2021-02-11 | Stop reason: SDUPTHER
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
## 2021-02-08 DIAGNOSIS — M47.26 OSTEOARTHRITIS OF SPINE WITH RADICULOPATHY, LUMBAR REGION: ICD-10-CM

## 2021-02-08 DIAGNOSIS — E11.42 DIABETIC POLYNEUROPATHY ASSOCIATED WITH TYPE 2 DIABETES MELLITUS: ICD-10-CM

## 2021-02-08 DIAGNOSIS — G14 POST POLIOMYELITIS SYNDROME: ICD-10-CM

## 2021-02-08 DIAGNOSIS — Z86.12 HISTORY OF POLIOMYELITIS: ICD-10-CM

## 2021-02-08 DIAGNOSIS — G89.29 CHRONIC BILATERAL LOW BACK PAIN WITH SCIATICA, SCIATICA LATERALITY UNSPECIFIED: ICD-10-CM

## 2021-02-08 DIAGNOSIS — M54.16 LUMBAR RADICULOPATHY: ICD-10-CM

## 2021-02-08 DIAGNOSIS — M48.062 SPINAL STENOSIS OF LUMBAR REGION WITH NEUROGENIC CLAUDICATION: ICD-10-CM

## 2021-02-08 DIAGNOSIS — E08.44 DIABETIC AMYOTROPHY ASSOCIATED WITH DIABETES MELLITUS DUE TO UNDERLYING CONDITION: ICD-10-CM

## 2021-02-08 DIAGNOSIS — M81.0 AGE-RELATED OSTEOPOROSIS WITHOUT CURRENT PATHOLOGICAL FRACTURE: ICD-10-CM

## 2021-02-08 DIAGNOSIS — M17.11 PRIMARY OSTEOARTHRITIS OF RIGHT KNEE: ICD-10-CM

## 2021-02-08 DIAGNOSIS — E11.40 TYPE 2 DIABETES MELLITUS WITH DIABETIC NEUROPATHY, WITHOUT LONG-TERM CURRENT USE OF INSULIN: ICD-10-CM

## 2021-02-08 DIAGNOSIS — M54.16 LEFT LUMBAR RADICULOPATHY: ICD-10-CM

## 2021-02-08 DIAGNOSIS — G82.20 PARAPARESIS OF BOTH LOWER LIMBS: ICD-10-CM

## 2021-02-08 DIAGNOSIS — R26.9 GAIT DISORDER: ICD-10-CM

## 2021-02-08 DIAGNOSIS — G89.29 CHRONIC PAIN OF RIGHT KNEE: ICD-10-CM

## 2021-02-08 DIAGNOSIS — M25.561 CHRONIC PAIN OF RIGHT KNEE: ICD-10-CM

## 2021-02-08 DIAGNOSIS — M54.40 CHRONIC BILATERAL LOW BACK PAIN WITH SCIATICA, SCIATICA LATERALITY UNSPECIFIED: ICD-10-CM

## 2021-02-08 DIAGNOSIS — M47.16 LUMBAR SPONDYLOSIS WITH MYELOPATHY: ICD-10-CM

## 2021-02-09 DIAGNOSIS — M48.062 SPINAL STENOSIS OF LUMBAR REGION WITH NEUROGENIC CLAUDICATION: ICD-10-CM

## 2021-02-09 DIAGNOSIS — I10 SEVERE HYPERTENSION: ICD-10-CM

## 2021-02-09 DIAGNOSIS — M17.11 PRIMARY OSTEOARTHRITIS OF RIGHT KNEE: ICD-10-CM

## 2021-02-09 DIAGNOSIS — M54.16 LEFT LUMBAR RADICULOPATHY: ICD-10-CM

## 2021-02-09 DIAGNOSIS — Z86.12 HISTORY OF POLIOMYELITIS: ICD-10-CM

## 2021-02-09 DIAGNOSIS — E08.44 DIABETIC AMYOTROPHY ASSOCIATED WITH DIABETES MELLITUS DUE TO UNDERLYING CONDITION: ICD-10-CM

## 2021-02-09 DIAGNOSIS — G14 POST POLIOMYELITIS SYNDROME: ICD-10-CM

## 2021-02-09 DIAGNOSIS — M54.16 LUMBAR RADICULOPATHY: ICD-10-CM

## 2021-02-09 DIAGNOSIS — E11.42 DIABETIC POLYNEUROPATHY ASSOCIATED WITH TYPE 2 DIABETES MELLITUS: ICD-10-CM

## 2021-02-09 DIAGNOSIS — M47.26 OSTEOARTHRITIS OF SPINE WITH RADICULOPATHY, LUMBAR REGION: ICD-10-CM

## 2021-02-09 DIAGNOSIS — G89.29 CHRONIC BILATERAL LOW BACK PAIN WITH SCIATICA, SCIATICA LATERALITY UNSPECIFIED: ICD-10-CM
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
## 2021-02-09 DIAGNOSIS — R26.9 GAIT DISORDER: ICD-10-CM

## 2021-02-09 DIAGNOSIS — M81.0 AGE-RELATED OSTEOPOROSIS WITHOUT CURRENT PATHOLOGICAL FRACTURE: ICD-10-CM

## 2021-02-09 DIAGNOSIS — M47.16 LUMBAR SPONDYLOSIS WITH MYELOPATHY: ICD-10-CM

## 2021-02-09 DIAGNOSIS — G89.29 CHRONIC PAIN OF RIGHT KNEE: ICD-10-CM

## 2021-02-09 DIAGNOSIS — N39.46 MIXED INCONTINENCE: ICD-10-CM

## 2021-02-09 DIAGNOSIS — G82.20 PARAPARESIS OF BOTH LOWER LIMBS: ICD-10-CM

## 2021-02-09 DIAGNOSIS — M25.561 CHRONIC PAIN OF RIGHT KNEE: ICD-10-CM

## 2021-02-09 DIAGNOSIS — M54.40 CHRONIC BILATERAL LOW BACK PAIN WITH SCIATICA, SCIATICA LATERALITY UNSPECIFIED: ICD-10-CM

## 2021-02-09 DIAGNOSIS — E78.5 HYPERLIPIDEMIA, UNSPECIFIED HYPERLIPIDEMIA TYPE: ICD-10-CM

## 2021-02-09 DIAGNOSIS — E11.40 TYPE 2 DIABETES MELLITUS WITH DIABETIC NEUROPATHY, WITHOUT LONG-TERM CURRENT USE OF INSULIN: ICD-10-CM

## 2021-02-09 DIAGNOSIS — E11.43 TYPE 2 DIABETES MELLITUS WITH AUTONOMIC NEUROPATHY: ICD-10-CM

## 2021-02-10 ENCOUNTER — LAB VISIT (OUTPATIENT)
Dept: LAB | Facility: HOSPITAL | Age: 81
End: 2021-02-10
Payer: MEDICARE

## 2021-02-10 ENCOUNTER — OFFICE VISIT (OUTPATIENT)
Dept: HEMATOLOGY/ONCOLOGY | Facility: CLINIC | Age: 81
End: 2021-02-10
Payer: MEDICARE

## 2021-02-10 VITALS
SYSTOLIC BLOOD PRESSURE: 127 MMHG | BODY MASS INDEX: 33.01 KG/M2 | HEART RATE: 65 BPM | OXYGEN SATURATION: 97 % | DIASTOLIC BLOOD PRESSURE: 61 MMHG | RESPIRATION RATE: 16 BRPM | WEIGHT: 168.13 LBS | HEIGHT: 60 IN

## 2021-02-10 DIAGNOSIS — D46.4 REFRACTORY ANEMIA: ICD-10-CM

## 2021-02-10 DIAGNOSIS — D46.4 REFRACTORY ANEMIA: Primary | ICD-10-CM

## 2021-02-10 LAB
ALBUMIN SERPL BCP-MCNC: 3.6 G/DL (ref 3.5–5.2)
ALP SERPL-CCNC: 86 U/L (ref 55–135)
ALT SERPL W/O P-5'-P-CCNC: 16 U/L (ref 10–44)
ANION GAP SERPL CALC-SCNC: 9 MMOL/L (ref 8–16)
AST SERPL-CCNC: 18 U/L (ref 10–40)
BASOPHILS # BLD AUTO: 0.15 K/UL (ref 0–0.2)
BASOPHILS NFR BLD: 1.9 % (ref 0–1.9)
BILIRUB SERPL-MCNC: 0.4 MG/DL (ref 0.1–1)
BUN SERPL-MCNC: 28 MG/DL (ref 8–23)
CALCIUM SERPL-MCNC: 8.9 MG/DL (ref 8.7–10.5)
CHLORIDE SERPL-SCNC: 106 MMOL/L (ref 95–110)
CO2 SERPL-SCNC: 23 MMOL/L (ref 23–29)
CREAT SERPL-MCNC: 0.9 MG/DL (ref 0.5–1.4)
DIFFERENTIAL METHOD: ABNORMAL
EOSINOPHIL # BLD AUTO: 0.4 K/UL (ref 0–0.5)
EOSINOPHIL NFR BLD: 5 % (ref 0–8)
ERYTHROCYTE [DISTWIDTH] IN BLOOD BY AUTOMATED COUNT: 13.2 % (ref 11.5–14.5)
EST. GFR  (AFRICAN AMERICAN): >60 ML/MIN/1.73 M^2
EST. GFR  (NON AFRICAN AMERICAN): >60 ML/MIN/1.73 M^2
GLUCOSE SERPL-MCNC: 128 MG/DL (ref 70–110)
HCT VFR BLD AUTO: 37 % (ref 37–48.5)
HGB BLD-MCNC: 11.7 G/DL (ref 12–16)
IMM GRANULOCYTES # BLD AUTO: 0.04 K/UL (ref 0–0.04)
IMM GRANULOCYTES NFR BLD AUTO: 0.5 % (ref 0–0.5)
LYMPHOCYTES # BLD AUTO: 1.9 K/UL (ref 1–4.8)
LYMPHOCYTES NFR BLD: 23.3 % (ref 18–48)
MCH RBC QN AUTO: 30.5 PG (ref 27–31)
MCHC RBC AUTO-ENTMCNC: 31.6 G/DL (ref 32–36)
MCV RBC AUTO: 97 FL (ref 82–98)
MONOCYTES # BLD AUTO: 0.7 K/UL (ref 0.3–1)
MONOCYTES NFR BLD: 8.4 % (ref 4–15)
NEUTROPHILS # BLD AUTO: 4.9 K/UL (ref 1.8–7.7)
NEUTROPHILS NFR BLD: 60.9 % (ref 38–73)
NRBC BLD-RTO: 0 /100 WBC
PLATELET # BLD AUTO: 296 K/UL (ref 150–350)
PMV BLD AUTO: 10.5 FL (ref 9.2–12.9)
POTASSIUM SERPL-SCNC: 4.6 MMOL/L (ref 3.5–5.1)
PROT SERPL-MCNC: 7.1 G/DL (ref 6–8.4)
RBC # BLD AUTO: 3.83 M/UL (ref 4–5.4)
SODIUM SERPL-SCNC: 138 MMOL/L (ref 136–145)
WBC # BLD AUTO: 8.02 K/UL (ref 3.9–12.7)

## 2021-02-10 PROCEDURE — 3074F PR MOST RECENT SYSTOLIC BLOOD PRESSURE < 130 MM HG: ICD-10-PCS | Mod: CPTII,S$GLB,, | Performed by: INTERNAL MEDICINE

## 2021-02-10 PROCEDURE — 1101F PR PT FALLS ASSESS DOC 0-1 FALLS W/OUT INJ PAST YR: ICD-10-PCS | Mod: CPTII,S$GLB,, | Performed by: INTERNAL MEDICINE

## 2021-02-10 PROCEDURE — 1101F PT FALLS ASSESS-DOCD LE1/YR: CPT | Mod: CPTII,S$GLB,, | Performed by: INTERNAL MEDICINE

## 2021-02-10 PROCEDURE — 3288F PR FALLS RISK ASSESSMENT DOCUMENTED: ICD-10-PCS | Mod: CPTII,S$GLB,, | Performed by: INTERNAL MEDICINE

## 2021-02-10 PROCEDURE — 99999 PR PBB SHADOW E&M-EST. PATIENT-LVL V: CPT | Mod: PBBFAC,,, | Performed by: INTERNAL MEDICINE

## 2021-02-10 PROCEDURE — 3288F FALL RISK ASSESSMENT DOCD: CPT | Mod: CPTII,S$GLB,, | Performed by: INTERNAL MEDICINE

## 2021-02-10 PROCEDURE — 99499 RISK ADDL DX/OHS AUDIT: ICD-10-PCS | Mod: S$GLB,,, | Performed by: INTERNAL MEDICINE

## 2021-02-10 PROCEDURE — 36415 COLL VENOUS BLD VENIPUNCTURE: CPT

## 2021-02-10 PROCEDURE — 1125F AMNT PAIN NOTED PAIN PRSNT: CPT | Mod: S$GLB,,, | Performed by: INTERNAL MEDICINE

## 2021-02-10 PROCEDURE — 99499 UNLISTED E&M SERVICE: CPT | Mod: S$GLB,,, | Performed by: INTERNAL MEDICINE

## 2021-02-10 PROCEDURE — 99213 OFFICE O/P EST LOW 20 MIN: CPT | Mod: S$GLB,,, | Performed by: INTERNAL MEDICINE

## 2021-02-10 PROCEDURE — 99213 PR OFFICE/OUTPT VISIT, EST, LEVL III, 20-29 MIN: ICD-10-PCS | Mod: S$GLB,,, | Performed by: INTERNAL MEDICINE

## 2021-02-10 PROCEDURE — 99999 PR PBB SHADOW E&M-EST. PATIENT-LVL V: ICD-10-PCS | Mod: PBBFAC,,, | Performed by: INTERNAL MEDICINE

## 2021-02-10 PROCEDURE — 3074F SYST BP LT 130 MM HG: CPT | Mod: CPTII,S$GLB,, | Performed by: INTERNAL MEDICINE

## 2021-02-10 PROCEDURE — 1159F MED LIST DOCD IN RCRD: CPT | Mod: S$GLB,,, | Performed by: INTERNAL MEDICINE

## 2021-02-10 PROCEDURE — 3078F PR MOST RECENT DIASTOLIC BLOOD PRESSURE < 80 MM HG: ICD-10-PCS | Mod: CPTII,S$GLB,, | Performed by: INTERNAL MEDICINE

## 2021-02-10 PROCEDURE — 3078F DIAST BP <80 MM HG: CPT | Mod: CPTII,S$GLB,, | Performed by: INTERNAL MEDICINE

## 2021-02-10 PROCEDURE — 1159F PR MEDICATION LIST DOCUMENTED IN MEDICAL RECORD: ICD-10-PCS | Mod: S$GLB,,, | Performed by: INTERNAL MEDICINE

## 2021-02-10 PROCEDURE — 85025 COMPLETE CBC W/AUTO DIFF WBC: CPT

## 2021-02-10 PROCEDURE — 80053 COMPREHEN METABOLIC PANEL: CPT

## 2021-02-10 PROCEDURE — 1125F PR PAIN SEVERITY QUANTIFIED, PAIN PRESENT: ICD-10-PCS | Mod: S$GLB,,, | Performed by: INTERNAL MEDICINE

## 2021-02-10 RX ORDER — HYDRALAZINE HYDROCHLORIDE 50 MG/1
TABLET, FILM COATED ORAL
Qty: 360 TABLET | Refills: 3 | Status: SHIPPED | OUTPATIENT
Start: 2021-02-10 | End: 2021-11-11

## 2021-02-10 RX ORDER — ASPIRIN 81 MG/1
81 TABLET ORAL DAILY
Qty: 30 TABLET | Refills: 12
Start: 2021-02-10 | End: 2022-03-25 | Stop reason: SDUPTHER

## 2021-02-10 RX ORDER — IRBESARTAN 300 MG/1
300 TABLET ORAL DAILY
Qty: 90 TABLET | Refills: 1 | Status: SHIPPED | OUTPATIENT
Start: 2021-02-10 | End: 2021-03-29

## 2021-02-10 RX ORDER — GLIPIZIDE 5 MG/1
5 TABLET ORAL DAILY
Qty: 90 TABLET | Refills: 0 | Status: SHIPPED | OUTPATIENT
Start: 2021-02-10 | End: 2021-03-14

## 2021-02-10 RX ORDER — LANCETS
EACH MISCELLANEOUS
Qty: 180 EACH | Refills: 3 | Status: SHIPPED | OUTPATIENT
Start: 2021-02-10 | End: 2021-11-11

## 2021-02-10 RX ORDER — OXYBUTYNIN CHLORIDE 5 MG/1
5 TABLET ORAL 2 TIMES DAILY
Qty: 180 TABLET | Refills: 3 | Status: SHIPPED | OUTPATIENT
Start: 2021-02-10 | End: 2021-02-19 | Stop reason: SDUPTHER

## 2021-02-10 RX ORDER — INSULIN PUMP SYRINGE, 3 ML
EACH MISCELLANEOUS
Qty: 1 EACH | Refills: 0 | Status: SHIPPED | OUTPATIENT
Start: 2021-02-10 | End: 2029-01-23

## 2021-02-10 RX ORDER — HYDROCHLOROTHIAZIDE 25 MG/1
25 TABLET ORAL EVERY OTHER DAY
Qty: 60 TABLET | Refills: 2 | Status: SHIPPED | OUTPATIENT
Start: 2021-02-10 | End: 2021-08-27 | Stop reason: SDUPTHER

## 2021-02-10 RX ORDER — GABAPENTIN 600 MG/1
TABLET ORAL
Qty: 360 TABLET | Refills: 1 | Status: SHIPPED | OUTPATIENT
Start: 2021-02-10 | End: 2021-08-27

## 2021-02-10 RX ORDER — METFORMIN HYDROCHLORIDE 500 MG/1
500 TABLET, EXTENDED RELEASE ORAL DAILY
Qty: 90 TABLET | Refills: 3 | Status: SHIPPED | OUTPATIENT
Start: 2021-02-10 | End: 2022-02-04 | Stop reason: SDUPTHER

## 2021-02-10 RX ORDER — PRAVASTATIN SODIUM 40 MG/1
TABLET ORAL
Qty: 135 TABLET | Refills: 3 | Status: SHIPPED | OUTPATIENT
Start: 2021-02-10 | End: 2021-04-03

## 2021-02-10 RX ORDER — DICLOFENAC SODIUM 75 MG/1
TABLET, DELAYED RELEASE ORAL
Qty: 180 TABLET | Refills: 0 | Status: SHIPPED | OUTPATIENT
Start: 2021-02-10 | End: 2021-02-19 | Stop reason: SDUPTHER

## 2021-02-10 RX ORDER — AMLODIPINE BESYLATE 10 MG/1
TABLET ORAL
Qty: 90 TABLET | Refills: 3 | Status: SHIPPED | OUTPATIENT
Start: 2021-02-10 | End: 2022-05-03

## 2021-02-11 DIAGNOSIS — G89.29 CHRONIC BILATERAL LOW BACK PAIN WITH SCIATICA, SCIATICA LATERALITY UNSPECIFIED: ICD-10-CM

## 2021-02-11 DIAGNOSIS — M81.0 AGE-RELATED OSTEOPOROSIS WITHOUT CURRENT PATHOLOGICAL FRACTURE: ICD-10-CM

## 2021-02-11 DIAGNOSIS — M47.816 LUMBAR SPONDYLOSIS: ICD-10-CM

## 2021-02-11 DIAGNOSIS — E08.44 DIABETIC AMYOTROPHY ASSOCIATED WITH DIABETES MELLITUS DUE TO UNDERLYING CONDITION: ICD-10-CM

## 2021-02-11 DIAGNOSIS — E11.42 DIABETIC POLYNEUROPATHY ASSOCIATED WITH TYPE 2 DIABETES MELLITUS: ICD-10-CM

## 2021-02-11 DIAGNOSIS — M25.561 CHRONIC PAIN OF RIGHT KNEE: ICD-10-CM

## 2021-02-11 DIAGNOSIS — E11.40 TYPE 2 DIABETES MELLITUS WITH DIABETIC NEUROPATHY, WITHOUT LONG-TERM CURRENT USE OF INSULIN: ICD-10-CM

## 2021-02-11 DIAGNOSIS — Z86.12 HISTORY OF POLIOMYELITIS: ICD-10-CM

## 2021-02-11 DIAGNOSIS — M54.40 CHRONIC BILATERAL LOW BACK PAIN WITH SCIATICA, SCIATICA LATERALITY UNSPECIFIED: ICD-10-CM

## 2021-02-11 DIAGNOSIS — M47.16 LUMBAR SPONDYLOSIS WITH MYELOPATHY: ICD-10-CM

## 2021-02-11 DIAGNOSIS — G82.20 PARAPARESIS OF BOTH LOWER LIMBS: ICD-10-CM

## 2021-02-11 DIAGNOSIS — M47.26 OSTEOARTHRITIS OF SPINE WITH RADICULOPATHY, LUMBAR REGION: ICD-10-CM

## 2021-02-11 DIAGNOSIS — G89.29 CHRONIC PAIN OF RIGHT KNEE: ICD-10-CM

## 2021-02-11 DIAGNOSIS — M17.11 PRIMARY OSTEOARTHRITIS OF RIGHT KNEE: ICD-10-CM

## 2021-02-11 DIAGNOSIS — M54.16 LUMBAR RADICULOPATHY: ICD-10-CM

## 2021-02-11 DIAGNOSIS — M48.062 SPINAL STENOSIS OF LUMBAR REGION WITH NEUROGENIC CLAUDICATION: ICD-10-CM

## 2021-02-11 DIAGNOSIS — M54.16 LEFT LUMBAR RADICULOPATHY: ICD-10-CM
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
## 2021-02-11 DIAGNOSIS — R26.9 GAIT DISORDER: ICD-10-CM

## 2021-02-11 DIAGNOSIS — G14 POST POLIOMYELITIS SYNDROME: ICD-10-CM

## 2021-02-13 RX ORDER — GABAPENTIN 600 MG/1
TABLET ORAL
Qty: 360 TABLET | Refills: 1 | Status: SHIPPED | OUTPATIENT
Start: 2021-02-13 | End: 2022-05-04

## 2021-02-13 RX ORDER — HYDROCODONE BITARTRATE AND ACETAMINOPHEN 10; 325 MG/1; MG/1
1 TABLET ORAL EVERY 6 HOURS PRN
Qty: 120 TABLET | Refills: 0 | Status: SHIPPED | OUTPATIENT
Start: 2021-02-13 | End: 2021-03-15 | Stop reason: SDUPTHER

## 2021-02-19 DIAGNOSIS — N39.46 MIXED INCONTINENCE: ICD-10-CM

## 2021-02-19 RX ORDER — DICLOFENAC SODIUM 75 MG/1
TABLET, DELAYED RELEASE ORAL
Qty: 180 TABLET | Refills: 0 | Status: SHIPPED | OUTPATIENT
Start: 2021-02-19 | End: 2021-03-29

## 2021-02-19 RX ORDER — OXYBUTYNIN CHLORIDE 5 MG/1
5 TABLET ORAL 2 TIMES DAILY
Qty: 180 TABLET | Refills: 3 | Status: SHIPPED | OUTPATIENT
Start: 2021-02-19 | End: 2022-03-25 | Stop reason: SDUPTHER

## 2021-02-20 PROCEDURE — 99454 REM MNTR PHYSIOL PARAM 16-30: CPT | Mod: S$GLB,,, | Performed by: INTERNAL MEDICINE

## 2021-02-20 PROCEDURE — 99454 PR REMOTE MNTR, PHYS PARAM, INITIAL, EA 30 DAYS: ICD-10-PCS | Mod: S$GLB,,, | Performed by: INTERNAL MEDICINE

## 2021-03-12 ENCOUNTER — TELEPHONE (OUTPATIENT)
Dept: PHYSICAL MEDICINE AND REHAB | Facility: CLINIC | Age: 81
End: 2021-03-12

## 2021-03-15 DIAGNOSIS — M54.16 LUMBAR RADICULOPATHY: ICD-10-CM

## 2021-03-15 DIAGNOSIS — R26.9 GAIT DISORDER: ICD-10-CM

## 2021-03-15 DIAGNOSIS — E11.42 DIABETIC POLYNEUROPATHY ASSOCIATED WITH TYPE 2 DIABETES MELLITUS: ICD-10-CM

## 2021-03-15 DIAGNOSIS — M81.0 AGE-RELATED OSTEOPOROSIS WITHOUT CURRENT PATHOLOGICAL FRACTURE: ICD-10-CM

## 2021-03-15 DIAGNOSIS — M47.16 LUMBAR SPONDYLOSIS WITH MYELOPATHY: ICD-10-CM

## 2021-03-15 DIAGNOSIS — M47.26 OSTEOARTHRITIS OF SPINE WITH RADICULOPATHY, LUMBAR REGION: ICD-10-CM

## 2021-03-15 DIAGNOSIS — E08.44 DIABETIC AMYOTROPHY ASSOCIATED WITH DIABETES MELLITUS DUE TO UNDERLYING CONDITION: ICD-10-CM

## 2021-03-15 DIAGNOSIS — M17.11 PRIMARY OSTEOARTHRITIS OF RIGHT KNEE: ICD-10-CM

## 2021-03-15 DIAGNOSIS — M47.816 LUMBAR SPONDYLOSIS: ICD-10-CM

## 2021-03-15 DIAGNOSIS — M54.16 LEFT LUMBAR RADICULOPATHY: ICD-10-CM

## 2021-03-15 DIAGNOSIS — Z86.12 HISTORY OF POLIOMYELITIS: ICD-10-CM

## 2021-03-15 DIAGNOSIS — M25.561 CHRONIC PAIN OF RIGHT KNEE: ICD-10-CM

## 2021-03-15 DIAGNOSIS — M48.062 SPINAL STENOSIS OF LUMBAR REGION WITH NEUROGENIC CLAUDICATION: ICD-10-CM

## 2021-03-15 DIAGNOSIS — G89.29 CHRONIC BILATERAL LOW BACK PAIN WITH SCIATICA, SCIATICA LATERALITY UNSPECIFIED: ICD-10-CM

## 2021-03-15 DIAGNOSIS — G82.20 PARAPARESIS OF BOTH LOWER LIMBS: ICD-10-CM

## 2021-03-15 DIAGNOSIS — M54.40 CHRONIC BILATERAL LOW BACK PAIN WITH SCIATICA, SCIATICA LATERALITY UNSPECIFIED: ICD-10-CM

## 2021-03-15 DIAGNOSIS — G14 POST POLIOMYELITIS SYNDROME: ICD-10-CM

## 2021-03-15 DIAGNOSIS — G89.29 CHRONIC PAIN OF RIGHT KNEE: ICD-10-CM

## 2021-03-15 DIAGNOSIS — E11.40 TYPE 2 DIABETES MELLITUS WITH DIABETIC NEUROPATHY, WITHOUT LONG-TERM CURRENT USE OF INSULIN: ICD-10-CM
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
## 2021-03-18 RX ORDER — HYDROCODONE BITARTRATE AND ACETAMINOPHEN 10; 325 MG/1; MG/1
1 TABLET ORAL EVERY 6 HOURS PRN
Qty: 120 TABLET | Refills: 0 | Status: SHIPPED | OUTPATIENT
Start: 2021-03-18 | End: 2021-04-19 | Stop reason: SDUPTHER

## 2021-03-22 ENCOUNTER — PATIENT MESSAGE (OUTPATIENT)
Dept: ADMINISTRATIVE | Facility: OTHER | Age: 81
End: 2021-03-22

## 2021-03-29 ENCOUNTER — TELEPHONE (OUTPATIENT)
Dept: INTERNAL MEDICINE | Facility: CLINIC | Age: 81
End: 2021-03-29

## 2021-03-29 DIAGNOSIS — I10 SEVERE HYPERTENSION: ICD-10-CM

## 2021-03-29 DIAGNOSIS — E11.40 TYPE 2 DIABETES MELLITUS WITH DIABETIC NEUROPATHY, WITHOUT LONG-TERM CURRENT USE OF INSULIN: Primary | ICD-10-CM

## 2021-03-29 RX ORDER — GLIPIZIDE 5 MG/1
TABLET ORAL
Qty: 90 TABLET | Refills: 0 | Status: SHIPPED | OUTPATIENT
Start: 2021-03-29 | End: 2021-06-28

## 2021-03-29 RX ORDER — IRBESARTAN 300 MG/1
TABLET ORAL
Qty: 90 TABLET | Refills: 1 | Status: SHIPPED | OUTPATIENT
Start: 2021-03-29 | End: 2021-09-23

## 2021-03-29 RX ORDER — DICLOFENAC SODIUM 75 MG/1
TABLET, DELAYED RELEASE ORAL
Qty: 180 TABLET | Refills: 0 | Status: SHIPPED | OUTPATIENT
Start: 2021-03-29 | End: 2021-06-15

## 2021-04-08 ENCOUNTER — LAB VISIT (OUTPATIENT)
Dept: LAB | Facility: HOSPITAL | Age: 81
End: 2021-04-08
Attending: INTERNAL MEDICINE
Payer: MEDICARE

## 2021-04-08 DIAGNOSIS — E11.21 TYPE 2 DIABETES MELLITUS WITH DIABETIC NEPHROPATHY, WITHOUT LONG-TERM CURRENT USE OF INSULIN: ICD-10-CM

## 2021-04-08 LAB
ESTIMATED AVG GLUCOSE: 126 MG/DL (ref 68–131)
HBA1C MFR BLD: 6 % (ref 4–5.6)

## 2021-04-08 PROCEDURE — 83036 HEMOGLOBIN GLYCOSYLATED A1C: CPT | Performed by: INTERNAL MEDICINE

## 2021-04-08 PROCEDURE — 80053 COMPREHEN METABOLIC PANEL: CPT | Performed by: INTERNAL MEDICINE

## 2021-04-08 PROCEDURE — 80061 LIPID PANEL: CPT | Performed by: INTERNAL MEDICINE

## 2021-04-08 PROCEDURE — 36415 COLL VENOUS BLD VENIPUNCTURE: CPT | Performed by: INTERNAL MEDICINE

## 2021-04-09 LAB
ALBUMIN SERPL BCP-MCNC: 3.6 G/DL (ref 3.5–5.2)
ALP SERPL-CCNC: 92 U/L (ref 55–135)
ALT SERPL W/O P-5'-P-CCNC: 17 U/L (ref 10–44)
ANION GAP SERPL CALC-SCNC: 12 MMOL/L (ref 8–16)
AST SERPL-CCNC: 19 U/L (ref 10–40)
BILIRUB SERPL-MCNC: 0.5 MG/DL (ref 0.1–1)
BUN SERPL-MCNC: 29 MG/DL (ref 8–23)
CALCIUM SERPL-MCNC: 9.3 MG/DL (ref 8.7–10.5)
CHLORIDE SERPL-SCNC: 106 MMOL/L (ref 95–110)
CHOLEST SERPL-MCNC: 148 MG/DL (ref 120–199)
CHOLEST/HDLC SERPL: 3.4 {RATIO} (ref 2–5)
CO2 SERPL-SCNC: 22 MMOL/L (ref 23–29)
CREAT SERPL-MCNC: 0.8 MG/DL (ref 0.5–1.4)
EST. GFR  (AFRICAN AMERICAN): >60 ML/MIN/1.73 M^2
EST. GFR  (NON AFRICAN AMERICAN): >60 ML/MIN/1.73 M^2
GLUCOSE SERPL-MCNC: 121 MG/DL (ref 70–110)
HDLC SERPL-MCNC: 43 MG/DL (ref 40–75)
HDLC SERPL: 29.1 % (ref 20–50)
LDLC SERPL CALC-MCNC: 75.6 MG/DL (ref 63–159)
NONHDLC SERPL-MCNC: 105 MG/DL
POTASSIUM SERPL-SCNC: 4.9 MMOL/L (ref 3.5–5.1)
PROT SERPL-MCNC: 7 G/DL (ref 6–8.4)
SODIUM SERPL-SCNC: 140 MMOL/L (ref 136–145)
TRIGL SERPL-MCNC: 147 MG/DL (ref 30–150)

## 2021-04-19 DIAGNOSIS — G89.29 CHRONIC PAIN OF RIGHT KNEE: ICD-10-CM

## 2021-04-19 DIAGNOSIS — R26.9 GAIT DISORDER: ICD-10-CM

## 2021-04-19 DIAGNOSIS — M47.16 LUMBAR SPONDYLOSIS WITH MYELOPATHY: ICD-10-CM

## 2021-04-19 DIAGNOSIS — E08.44 DIABETIC AMYOTROPHY ASSOCIATED WITH DIABETES MELLITUS DUE TO UNDERLYING CONDITION: ICD-10-CM

## 2021-04-19 DIAGNOSIS — M47.816 LUMBAR SPONDYLOSIS: ICD-10-CM

## 2021-04-19 DIAGNOSIS — M54.40 CHRONIC BILATERAL LOW BACK PAIN WITH SCIATICA, SCIATICA LATERALITY UNSPECIFIED: ICD-10-CM

## 2021-04-19 DIAGNOSIS — M17.11 PRIMARY OSTEOARTHRITIS OF RIGHT KNEE: ICD-10-CM

## 2021-04-19 DIAGNOSIS — M81.0 AGE-RELATED OSTEOPOROSIS WITHOUT CURRENT PATHOLOGICAL FRACTURE: ICD-10-CM

## 2021-04-19 DIAGNOSIS — M47.26 OSTEOARTHRITIS OF SPINE WITH RADICULOPATHY, LUMBAR REGION: ICD-10-CM

## 2021-04-19 DIAGNOSIS — G82.20 PARAPARESIS OF BOTH LOWER LIMBS: ICD-10-CM

## 2021-04-19 DIAGNOSIS — M54.16 LUMBAR RADICULOPATHY: ICD-10-CM

## 2021-04-19 DIAGNOSIS — M25.561 CHRONIC PAIN OF RIGHT KNEE: ICD-10-CM

## 2021-04-19 DIAGNOSIS — E11.40 TYPE 2 DIABETES MELLITUS WITH DIABETIC NEUROPATHY, WITHOUT LONG-TERM CURRENT USE OF INSULIN: ICD-10-CM
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
## 2021-04-19 DIAGNOSIS — M48.062 SPINAL STENOSIS OF LUMBAR REGION WITH NEUROGENIC CLAUDICATION: ICD-10-CM

## 2021-04-19 DIAGNOSIS — M54.16 LEFT LUMBAR RADICULOPATHY: ICD-10-CM

## 2021-04-19 DIAGNOSIS — G89.29 CHRONIC BILATERAL LOW BACK PAIN WITH SCIATICA, SCIATICA LATERALITY UNSPECIFIED: ICD-10-CM

## 2021-04-19 DIAGNOSIS — E11.42 DIABETIC POLYNEUROPATHY ASSOCIATED WITH TYPE 2 DIABETES MELLITUS: ICD-10-CM

## 2021-04-19 DIAGNOSIS — G14 POST POLIOMYELITIS SYNDROME: ICD-10-CM

## 2021-04-19 DIAGNOSIS — Z86.12 HISTORY OF POLIOMYELITIS: ICD-10-CM

## 2021-04-21 ENCOUNTER — PES CALL (OUTPATIENT)
Dept: ADMINISTRATIVE | Facility: CLINIC | Age: 81
End: 2021-04-21

## 2021-04-26 ENCOUNTER — PATIENT OUTREACH (OUTPATIENT)
Dept: ADMINISTRATIVE | Facility: HOSPITAL | Age: 81
End: 2021-04-26

## 2021-04-26 RX ORDER — HYDROCODONE BITARTRATE AND ACETAMINOPHEN 10; 325 MG/1; MG/1
1 TABLET ORAL EVERY 6 HOURS PRN
Qty: 120 TABLET | Refills: 0 | Status: SHIPPED | OUTPATIENT
Start: 2021-04-26 | End: 2021-05-26 | Stop reason: SDUPTHER

## 2021-04-30 ENCOUNTER — PES CALL (OUTPATIENT)
Dept: ADMINISTRATIVE | Facility: CLINIC | Age: 81
End: 2021-04-30

## 2021-05-05 ENCOUNTER — PES CALL (OUTPATIENT)
Dept: ADMINISTRATIVE | Facility: CLINIC | Age: 81
End: 2021-05-05

## 2021-05-12 ENCOUNTER — OFFICE VISIT (OUTPATIENT)
Dept: INTERNAL MEDICINE | Facility: CLINIC | Age: 81
End: 2021-05-12
Payer: MEDICARE

## 2021-05-12 ENCOUNTER — OFFICE VISIT (OUTPATIENT)
Dept: OTOLARYNGOLOGY | Facility: CLINIC | Age: 81
End: 2021-05-12
Payer: MEDICARE

## 2021-05-12 ENCOUNTER — CLINICAL SUPPORT (OUTPATIENT)
Dept: AUDIOLOGY | Facility: CLINIC | Age: 81
End: 2021-05-12
Payer: MEDICARE

## 2021-05-12 VITALS
DIASTOLIC BLOOD PRESSURE: 70 MMHG | BODY MASS INDEX: 32.94 KG/M2 | HEART RATE: 88 BPM | WEIGHT: 168.63 LBS | SYSTOLIC BLOOD PRESSURE: 138 MMHG | OXYGEN SATURATION: 97 %

## 2021-05-12 DIAGNOSIS — E11.40 TYPE 2 DIABETES MELLITUS WITH DIABETIC NEUROPATHY, WITHOUT LONG-TERM CURRENT USE OF INSULIN: ICD-10-CM

## 2021-05-12 DIAGNOSIS — H60.91 OTITIS EXTERNA OF RIGHT EAR, UNSPECIFIED CHRONICITY, UNSPECIFIED TYPE: Primary | ICD-10-CM

## 2021-05-12 DIAGNOSIS — I70.201 ATHEROSCLEROSIS OF ARTERY OF RIGHT LOWER EXTREMITY: ICD-10-CM

## 2021-05-12 DIAGNOSIS — E11.42 DIABETIC POLYNEUROPATHY ASSOCIATED WITH TYPE 2 DIABETES MELLITUS: ICD-10-CM

## 2021-05-12 DIAGNOSIS — H91.93 BILATERAL HEARING LOSS, UNSPECIFIED HEARING LOSS TYPE: ICD-10-CM

## 2021-05-12 DIAGNOSIS — I10 SEVERE HYPERTENSION: ICD-10-CM

## 2021-05-12 DIAGNOSIS — R26.9 GAIT DISORDER: ICD-10-CM

## 2021-05-12 DIAGNOSIS — E78.2 MIXED HYPERLIPIDEMIA: ICD-10-CM

## 2021-05-12 DIAGNOSIS — G82.20 PARAPARESIS OF BOTH LOWER LIMBS: ICD-10-CM

## 2021-05-12 DIAGNOSIS — H60.312 ACUTE DIFFUSE OTITIS EXTERNA OF LEFT EAR: ICD-10-CM

## 2021-05-12 DIAGNOSIS — G14 POST POLIOMYELITIS SYNDROME: ICD-10-CM

## 2021-05-12 DIAGNOSIS — H61.23 BILATERAL IMPACTED CERUMEN: ICD-10-CM

## 2021-05-12 DIAGNOSIS — Z86.12 HISTORY OF POLIOMYELITIS: ICD-10-CM

## 2021-05-12 DIAGNOSIS — Z00.00 ENCOUNTER FOR PREVENTIVE HEALTH EXAMINATION: Primary | ICD-10-CM

## 2021-05-12 DIAGNOSIS — G25.0 ESSENTIAL TREMOR: ICD-10-CM

## 2021-05-12 DIAGNOSIS — N18.31 STAGE 3A CHRONIC KIDNEY DISEASE: ICD-10-CM

## 2021-05-12 DIAGNOSIS — M46.96 UNSPECIFIED INFLAMMATORY SPONDYLOPATHY, LUMBAR REGION: ICD-10-CM

## 2021-05-12 DIAGNOSIS — M81.8 OTHER OSTEOPOROSIS, UNSPECIFIED PATHOLOGICAL FRACTURE PRESENCE: ICD-10-CM

## 2021-05-12 PROCEDURE — 1158F PR ADVANCE CARE PLANNING DISCUSS DOCUMENTED IN MEDICAL RECORD: ICD-10-PCS | Mod: S$GLB,,, | Performed by: NURSE PRACTITIONER

## 2021-05-12 PROCEDURE — 99999 PR PBB SHADOW E&M-EST. PATIENT-LVL III: ICD-10-PCS | Mod: PBBFAC,,, | Performed by: NURSE PRACTITIONER

## 2021-05-12 PROCEDURE — 1101F PR PT FALLS ASSESS DOC 0-1 FALLS W/OUT INJ PAST YR: ICD-10-PCS | Mod: CPTII,S$GLB,, | Performed by: NURSE PRACTITIONER

## 2021-05-12 PROCEDURE — G0439 PR MEDICARE ANNUAL WELLNESS SUBSEQUENT VISIT: ICD-10-PCS | Mod: S$GLB,,, | Performed by: NURSE PRACTITIONER

## 2021-05-12 PROCEDURE — 99999 PR PBB SHADOW E&M-EST. PATIENT-LVL III: CPT | Mod: PBBFAC,,, | Performed by: NURSE PRACTITIONER

## 2021-05-12 PROCEDURE — 99499 UNLISTED E&M SERVICE: CPT | Mod: S$GLB,,, | Performed by: NURSE PRACTITIONER

## 2021-05-12 PROCEDURE — 99202 OFFICE O/P NEW SF 15 MIN: CPT | Mod: 25,S$GLB,, | Performed by: OTOLARYNGOLOGY

## 2021-05-12 PROCEDURE — G0439 PPPS, SUBSEQ VISIT: HCPCS | Mod: S$GLB,,, | Performed by: NURSE PRACTITIONER

## 2021-05-12 PROCEDURE — 3288F PR FALLS RISK ASSESSMENT DOCUMENTED: ICD-10-PCS | Mod: CPTII,S$GLB,, | Performed by: NURSE PRACTITIONER

## 2021-05-12 PROCEDURE — 1158F ADVNC CARE PLAN TLK DOCD: CPT | Mod: S$GLB,,, | Performed by: NURSE PRACTITIONER

## 2021-05-12 PROCEDURE — 1125F AMNT PAIN NOTED PAIN PRSNT: CPT | Mod: S$GLB,,, | Performed by: NURSE PRACTITIONER

## 2021-05-12 PROCEDURE — 69210 REMOVE IMPACTED EAR WAX UNI: CPT | Mod: S$GLB,,, | Performed by: OTOLARYNGOLOGY

## 2021-05-12 PROCEDURE — 69210 PR REMOVAL IMPACTED CERUMEN REQUIRING INSTRUMENTATION, UNILATERAL: ICD-10-PCS | Mod: S$GLB,,, | Performed by: OTOLARYNGOLOGY

## 2021-05-12 PROCEDURE — 99202 PR OFFICE/OUTPT VISIT, NEW, LEVL II, 15-29 MIN: ICD-10-PCS | Mod: 25,S$GLB,, | Performed by: OTOLARYNGOLOGY

## 2021-05-12 PROCEDURE — 1101F PT FALLS ASSESS-DOCD LE1/YR: CPT | Mod: CPTII,S$GLB,, | Performed by: NURSE PRACTITIONER

## 2021-05-12 PROCEDURE — 3288F FALL RISK ASSESSMENT DOCD: CPT | Mod: CPTII,S$GLB,, | Performed by: NURSE PRACTITIONER

## 2021-05-12 PROCEDURE — 1159F MED LIST DOCD IN RCRD: CPT | Mod: S$GLB,,, | Performed by: OTOLARYNGOLOGY

## 2021-05-12 PROCEDURE — 99499 RISK ADDL DX/OHS AUDIT: ICD-10-PCS | Mod: S$GLB,,, | Performed by: NURSE PRACTITIONER

## 2021-05-12 PROCEDURE — 1125F PR PAIN SEVERITY QUANTIFIED, PAIN PRESENT: ICD-10-PCS | Mod: S$GLB,,, | Performed by: NURSE PRACTITIONER

## 2021-05-12 PROCEDURE — 1159F PR MEDICATION LIST DOCUMENTED IN MEDICAL RECORD: ICD-10-PCS | Mod: S$GLB,,, | Performed by: OTOLARYNGOLOGY

## 2021-05-12 RX ORDER — NEOMYCIN SULFATE, POLYMYXIN B SULFATE, HYDROCORTISONE 3.5; 10000; 1 MG/ML; [USP'U]/ML; MG/ML
4 SOLUTION/ DROPS AURICULAR (OTIC) 3 TIMES DAILY
Qty: 10 ML | Refills: 5 | Status: SHIPPED | OUTPATIENT
Start: 2021-05-12 | End: 2021-06-02

## 2021-05-12 RX ORDER — NEOMYCIN SULFATE, POLYMYXIN B SULFATE AND HYDROCORTISONE 10; 3.5; 1 MG/ML; MG/ML; [USP'U]/ML
3 SUSPENSION/ DROPS AURICULAR (OTIC) 4 TIMES DAILY
Qty: 10 ML | Refills: 0 | Status: SHIPPED | OUTPATIENT
Start: 2021-05-12 | End: 2021-05-17

## 2021-05-25 RX ORDER — ZOLEDRONIC ACID 5 MG/100ML
INJECTION, SOLUTION INTRAVENOUS
COMMUNITY
Start: 2020-12-03

## 2021-05-26 DIAGNOSIS — G89.29 CHRONIC BILATERAL LOW BACK PAIN WITH SCIATICA, SCIATICA LATERALITY UNSPECIFIED: ICD-10-CM
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
## 2021-05-26 DIAGNOSIS — M17.11 PRIMARY OSTEOARTHRITIS OF RIGHT KNEE: ICD-10-CM

## 2021-05-26 DIAGNOSIS — M48.062 SPINAL STENOSIS OF LUMBAR REGION WITH NEUROGENIC CLAUDICATION: ICD-10-CM

## 2021-05-26 DIAGNOSIS — E08.44 DIABETIC AMYOTROPHY ASSOCIATED WITH DIABETES MELLITUS DUE TO UNDERLYING CONDITION: ICD-10-CM

## 2021-05-26 DIAGNOSIS — M54.16 LEFT LUMBAR RADICULOPATHY: ICD-10-CM

## 2021-05-26 DIAGNOSIS — M54.40 CHRONIC BILATERAL LOW BACK PAIN WITH SCIATICA, SCIATICA LATERALITY UNSPECIFIED: ICD-10-CM

## 2021-05-26 DIAGNOSIS — M47.26 OSTEOARTHRITIS OF SPINE WITH RADICULOPATHY, LUMBAR REGION: ICD-10-CM

## 2021-05-26 DIAGNOSIS — M81.0 AGE-RELATED OSTEOPOROSIS WITHOUT CURRENT PATHOLOGICAL FRACTURE: ICD-10-CM

## 2021-05-26 DIAGNOSIS — E11.40 TYPE 2 DIABETES MELLITUS WITH DIABETIC NEUROPATHY, WITHOUT LONG-TERM CURRENT USE OF INSULIN: ICD-10-CM

## 2021-05-26 DIAGNOSIS — M54.16 LUMBAR RADICULOPATHY: ICD-10-CM

## 2021-05-26 DIAGNOSIS — M47.16 LUMBAR SPONDYLOSIS WITH MYELOPATHY: ICD-10-CM

## 2021-05-26 DIAGNOSIS — G89.29 CHRONIC PAIN OF RIGHT KNEE: ICD-10-CM

## 2021-05-26 DIAGNOSIS — M25.561 CHRONIC PAIN OF RIGHT KNEE: ICD-10-CM

## 2021-05-26 DIAGNOSIS — E11.42 DIABETIC POLYNEUROPATHY ASSOCIATED WITH TYPE 2 DIABETES MELLITUS: ICD-10-CM

## 2021-05-26 DIAGNOSIS — M47.816 LUMBAR SPONDYLOSIS: ICD-10-CM

## 2021-05-26 DIAGNOSIS — Z86.12 HISTORY OF POLIOMYELITIS: ICD-10-CM

## 2021-05-26 DIAGNOSIS — G14 POST POLIOMYELITIS SYNDROME: ICD-10-CM

## 2021-05-26 DIAGNOSIS — G82.20 PARAPARESIS OF BOTH LOWER LIMBS: ICD-10-CM

## 2021-05-26 DIAGNOSIS — R26.9 GAIT DISORDER: ICD-10-CM

## 2021-06-01 ENCOUNTER — TELEPHONE (OUTPATIENT)
Dept: PHYSICAL MEDICINE AND REHAB | Facility: CLINIC | Age: 81
End: 2021-06-01

## 2021-06-01 DIAGNOSIS — Z79.891 OPIOID USE AGREEMENT EXISTS: Primary | ICD-10-CM

## 2021-06-01 RX ORDER — HYDROCODONE BITARTRATE AND ACETAMINOPHEN 10; 325 MG/1; MG/1
1 TABLET ORAL EVERY 6 HOURS PRN
Qty: 120 TABLET | Refills: 0 | Status: SHIPPED | OUTPATIENT
Start: 2021-06-01 | End: 2021-07-02 | Stop reason: SDUPTHER

## 2021-07-02 ENCOUNTER — TELEPHONE (OUTPATIENT)
Dept: NEUROLOGY | Facility: CLINIC | Age: 81
End: 2021-07-02

## 2021-07-02 DIAGNOSIS — E11.40 TYPE 2 DIABETES MELLITUS WITH DIABETIC NEUROPATHY, WITHOUT LONG-TERM CURRENT USE OF INSULIN: ICD-10-CM

## 2021-07-02 DIAGNOSIS — G89.29 CHRONIC PAIN OF RIGHT KNEE: ICD-10-CM

## 2021-07-02 DIAGNOSIS — M48.062 SPINAL STENOSIS OF LUMBAR REGION WITH NEUROGENIC CLAUDICATION: ICD-10-CM

## 2021-07-02 DIAGNOSIS — G89.29 CHRONIC BILATERAL LOW BACK PAIN WITH SCIATICA, SCIATICA LATERALITY UNSPECIFIED: ICD-10-CM

## 2021-07-02 DIAGNOSIS — M81.0 AGE-RELATED OSTEOPOROSIS WITHOUT CURRENT PATHOLOGICAL FRACTURE: ICD-10-CM

## 2021-07-02 DIAGNOSIS — M54.16 LEFT LUMBAR RADICULOPATHY: ICD-10-CM

## 2021-07-02 DIAGNOSIS — G14 POST POLIOMYELITIS SYNDROME: ICD-10-CM

## 2021-07-02 DIAGNOSIS — G82.20 PARAPARESIS OF BOTH LOWER LIMBS: ICD-10-CM

## 2021-07-02 DIAGNOSIS — M54.40 CHRONIC BILATERAL LOW BACK PAIN WITH SCIATICA, SCIATICA LATERALITY UNSPECIFIED: ICD-10-CM

## 2021-07-02 DIAGNOSIS — M25.561 CHRONIC PAIN OF RIGHT KNEE: ICD-10-CM

## 2021-07-02 DIAGNOSIS — E11.42 DIABETIC POLYNEUROPATHY ASSOCIATED WITH TYPE 2 DIABETES MELLITUS: ICD-10-CM

## 2021-07-02 DIAGNOSIS — M17.11 PRIMARY OSTEOARTHRITIS OF RIGHT KNEE: ICD-10-CM

## 2021-07-02 DIAGNOSIS — M47.26 OSTEOARTHRITIS OF SPINE WITH RADICULOPATHY, LUMBAR REGION: ICD-10-CM

## 2021-07-02 DIAGNOSIS — M47.816 LUMBAR SPONDYLOSIS: ICD-10-CM

## 2021-07-02 DIAGNOSIS — M54.16 LUMBAR RADICULOPATHY: ICD-10-CM

## 2021-07-02 DIAGNOSIS — M47.16 LUMBAR SPONDYLOSIS WITH MYELOPATHY: ICD-10-CM

## 2021-07-02 DIAGNOSIS — Z86.12 HISTORY OF POLIOMYELITIS: ICD-10-CM

## 2021-07-02 DIAGNOSIS — E08.44 DIABETIC AMYOTROPHY ASSOCIATED WITH DIABETES MELLITUS DUE TO UNDERLYING CONDITION: ICD-10-CM

## 2021-07-02 DIAGNOSIS — R26.9 GAIT DISORDER: ICD-10-CM
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
## 2021-07-06 ENCOUNTER — TELEPHONE (OUTPATIENT)
Dept: PHYSICAL MEDICINE AND REHAB | Facility: CLINIC | Age: 81
End: 2021-07-06

## 2021-07-07 ENCOUNTER — LAB VISIT (OUTPATIENT)
Dept: LAB | Facility: HOSPITAL | Age: 81
End: 2021-07-07
Payer: MEDICARE

## 2021-07-07 DIAGNOSIS — Z79.891 OPIOID USE AGREEMENT EXISTS: ICD-10-CM

## 2021-07-07 PROCEDURE — 80307 DRUG TEST PRSMV CHEM ANLYZR: CPT | Performed by: PHYSICAL MEDICINE & REHABILITATION

## 2021-07-08 ENCOUNTER — TELEPHONE (OUTPATIENT)
Dept: PHYSICAL MEDICINE AND REHAB | Facility: CLINIC | Age: 81
End: 2021-07-08

## 2021-07-08 RX ORDER — HYDROCODONE BITARTRATE AND ACETAMINOPHEN 10; 325 MG/1; MG/1
1 TABLET ORAL EVERY 6 HOURS PRN
Qty: 120 TABLET | Refills: 0 | Status: SHIPPED | OUTPATIENT
Start: 2021-07-08 | End: 2021-08-16 | Stop reason: SDUPTHER

## 2021-07-12 LAB
6MAM UR QL: NOT DETECTED
7AMINOCLONAZEPAM UR QL: NOT DETECTED
A-OH ALPRAZ UR QL: NOT DETECTED
ALPHA-OH-MIDAZOLAM: NOT DETECTED
ALPRAZ UR QL: NOT DETECTED
AMPHET UR QL SCN: NOT DETECTED
ANNOTATION COMMENT IMP: NORMAL
ANNOTATION COMMENT IMP: NORMAL
BARBITURATES UR QL: NOT DETECTED
BUPRENORPHINE UR QL: NOT DETECTED
BZE UR QL: NOT DETECTED
CARBOXYTHC UR QL: NOT DETECTED
CARISOPRODOL UR QL: NOT DETECTED
CLONAZEPAM UR QL: NOT DETECTED
CODEINE UR QL: NOT DETECTED
CREAT UR-MCNC: 178 MG/DL (ref 20–400)
DIAZEPAM UR QL: NOT DETECTED
ETHYL GLUCURONIDE UR QL: NOT DETECTED
FENTANYL UR QL: NOT DETECTED
GABAPENTIN: PRESENT
HYDROCODONE UR QL: PRESENT
HYDROMORPHONE UR QL: PRESENT
LORAZEPAM UR QL: NOT DETECTED
MDA UR QL: NOT DETECTED
MDEA UR QL: NOT DETECTED
MDMA UR QL: NOT DETECTED
ME-PHENIDATE UR QL: NOT DETECTED
METHADONE UR QL: NOT DETECTED
METHAMPHET UR QL: NOT DETECTED
MIDAZOLAM UR QL SCN: NOT DETECTED
MORPHINE UR QL: NOT DETECTED
NALOXONE: NOT DETECTED
NORBUPRENORPHINE UR QL CFM: NOT DETECTED
NORDIAZEPAM UR QL: NOT DETECTED
NORFENTANYL UR QL: NOT DETECTED
NORHYDROCODONE UR QL CFM: PRESENT
NORMEPERIDINE UR QL CFM: NOT DETECTED
NOROXYCODONE UR QL CFM: NOT DETECTED
NOROXYMORPHONE UR QL SCN: NOT DETECTED
OXAZEPAM UR QL: NOT DETECTED
OXYCODONE UR QL: NOT DETECTED
OXYMORPHONE UR QL: NOT DETECTED
PATHOLOGY STUDY: NORMAL
PCP UR QL: NOT DETECTED
PHENTERMINE UR QL: NOT DETECTED
PREGABALIN: NOT DETECTED
SERVICE CMNT-IMP: NORMAL
TAPENTADOL UR QL SCN: NOT DETECTED
TAPENTADOL-O-SULF: NOT DETECTED
TEMAZEPAM UR QL: NOT DETECTED
TRAMADOL UR QL: NOT DETECTED
ZOLPIDEM METABOLITE: NOT DETECTED
ZOLPIDEM UR QL: NOT DETECTED

## 2021-07-31 PROCEDURE — 99457 PR MONITORING, PHYSIOL PARAM, REMOTE, 1ST 20 MINS, PER MONTH: ICD-10-PCS | Mod: S$GLB,,, | Performed by: INTERNAL MEDICINE

## 2021-07-31 PROCEDURE — 99457 RPM TX MGMT 1ST 20 MIN: CPT | Mod: S$GLB,,, | Performed by: INTERNAL MEDICINE

## 2021-08-09 DIAGNOSIS — G82.20 PARAPARESIS OF BOTH LOWER LIMBS: ICD-10-CM

## 2021-08-09 DIAGNOSIS — M25.561 CHRONIC PAIN OF RIGHT KNEE: ICD-10-CM

## 2021-08-09 DIAGNOSIS — M54.16 LUMBAR RADICULOPATHY: ICD-10-CM

## 2021-08-09 DIAGNOSIS — M47.816 LUMBAR SPONDYLOSIS: ICD-10-CM

## 2021-08-09 DIAGNOSIS — M48.062 SPINAL STENOSIS OF LUMBAR REGION WITH NEUROGENIC CLAUDICATION: ICD-10-CM

## 2021-08-09 DIAGNOSIS — Z86.12 HISTORY OF POLIOMYELITIS: ICD-10-CM

## 2021-08-09 DIAGNOSIS — M47.16 LUMBAR SPONDYLOSIS WITH MYELOPATHY: ICD-10-CM

## 2021-08-09 DIAGNOSIS — E08.44 DIABETIC AMYOTROPHY ASSOCIATED WITH DIABETES MELLITUS DUE TO UNDERLYING CONDITION: ICD-10-CM

## 2021-08-09 DIAGNOSIS — R26.9 GAIT DISORDER: ICD-10-CM

## 2021-08-09 DIAGNOSIS — E11.40 TYPE 2 DIABETES MELLITUS WITH DIABETIC NEUROPATHY, WITHOUT LONG-TERM CURRENT USE OF INSULIN: ICD-10-CM

## 2021-08-09 DIAGNOSIS — G89.29 CHRONIC PAIN OF RIGHT KNEE: ICD-10-CM

## 2021-08-09 DIAGNOSIS — M81.0 AGE-RELATED OSTEOPOROSIS WITHOUT CURRENT PATHOLOGICAL FRACTURE: ICD-10-CM

## 2021-08-09 DIAGNOSIS — M54.40 CHRONIC BILATERAL LOW BACK PAIN WITH SCIATICA, SCIATICA LATERALITY UNSPECIFIED: ICD-10-CM

## 2021-08-09 DIAGNOSIS — M47.26 OSTEOARTHRITIS OF SPINE WITH RADICULOPATHY, LUMBAR REGION: ICD-10-CM

## 2021-08-09 DIAGNOSIS — M54.16 LEFT LUMBAR RADICULOPATHY: ICD-10-CM
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
## 2021-08-09 DIAGNOSIS — M17.11 PRIMARY OSTEOARTHRITIS OF RIGHT KNEE: ICD-10-CM

## 2021-08-09 DIAGNOSIS — E11.42 DIABETIC POLYNEUROPATHY ASSOCIATED WITH TYPE 2 DIABETES MELLITUS: ICD-10-CM

## 2021-08-09 DIAGNOSIS — G14 POST POLIOMYELITIS SYNDROME: ICD-10-CM

## 2021-08-09 DIAGNOSIS — G89.29 CHRONIC BILATERAL LOW BACK PAIN WITH SCIATICA, SCIATICA LATERALITY UNSPECIFIED: ICD-10-CM

## 2021-08-10 ENCOUNTER — LAB VISIT (OUTPATIENT)
Dept: LAB | Facility: HOSPITAL | Age: 81
End: 2021-08-10
Attending: INTERNAL MEDICINE
Payer: MEDICARE

## 2021-08-10 DIAGNOSIS — D46.4 REFRACTORY ANEMIA: ICD-10-CM

## 2021-08-10 LAB
BASOPHILS # BLD AUTO: 0.12 K/UL (ref 0–0.2)
BASOPHILS NFR BLD: 1.2 % (ref 0–1.9)
DIFFERENTIAL METHOD: ABNORMAL
EOSINOPHIL # BLD AUTO: 0.5 K/UL (ref 0–0.5)
EOSINOPHIL NFR BLD: 5.3 % (ref 0–8)
ERYTHROCYTE [DISTWIDTH] IN BLOOD BY AUTOMATED COUNT: 12.8 % (ref 11.5–14.5)
HCT VFR BLD AUTO: 35.8 % (ref 37–48.5)
HGB BLD-MCNC: 11.4 G/DL (ref 12–16)
IMM GRANULOCYTES # BLD AUTO: 0.13 K/UL (ref 0–0.04)
IMM GRANULOCYTES NFR BLD AUTO: 1.3 % (ref 0–0.5)
LYMPHOCYTES # BLD AUTO: 1.5 K/UL (ref 1–4.8)
LYMPHOCYTES NFR BLD: 15.1 % (ref 18–48)
MCH RBC QN AUTO: 30.8 PG (ref 27–31)
MCHC RBC AUTO-ENTMCNC: 31.8 G/DL (ref 32–36)
MCV RBC AUTO: 97 FL (ref 82–98)
MONOCYTES # BLD AUTO: 0.8 K/UL (ref 0.3–1)
MONOCYTES NFR BLD: 8.2 % (ref 4–15)
NEUTROPHILS # BLD AUTO: 7 K/UL (ref 1.8–7.7)
NEUTROPHILS NFR BLD: 68.9 % (ref 38–73)
NRBC BLD-RTO: 0 /100 WBC
PLATELET # BLD AUTO: 249 K/UL (ref 150–450)
PMV BLD AUTO: 10.7 FL (ref 9.2–12.9)
RBC # BLD AUTO: 3.7 M/UL (ref 4–5.4)
WBC # BLD AUTO: 10.17 K/UL (ref 3.9–12.7)

## 2021-08-10 PROCEDURE — 85025 COMPLETE CBC W/AUTO DIFF WBC: CPT | Performed by: INTERNAL MEDICINE

## 2021-08-10 PROCEDURE — 36415 COLL VENOUS BLD VENIPUNCTURE: CPT | Performed by: INTERNAL MEDICINE

## 2021-08-16 ENCOUNTER — PATIENT OUTREACH (OUTPATIENT)
Dept: ADMINISTRATIVE | Facility: HOSPITAL | Age: 81
End: 2021-08-16

## 2021-08-16 DIAGNOSIS — G89.29 CHRONIC PAIN OF RIGHT KNEE: ICD-10-CM

## 2021-08-16 DIAGNOSIS — M47.16 LUMBAR SPONDYLOSIS WITH MYELOPATHY: ICD-10-CM

## 2021-08-16 DIAGNOSIS — G14 POST POLIOMYELITIS SYNDROME: ICD-10-CM

## 2021-08-16 DIAGNOSIS — M47.816 LUMBAR SPONDYLOSIS: ICD-10-CM

## 2021-08-16 DIAGNOSIS — M54.40 CHRONIC BILATERAL LOW BACK PAIN WITH SCIATICA, SCIATICA LATERALITY UNSPECIFIED: ICD-10-CM

## 2021-08-16 DIAGNOSIS — M17.11 PRIMARY OSTEOARTHRITIS OF RIGHT KNEE: ICD-10-CM

## 2021-08-16 DIAGNOSIS — G82.20 PARAPARESIS OF BOTH LOWER LIMBS: ICD-10-CM

## 2021-08-16 DIAGNOSIS — M25.561 CHRONIC PAIN OF RIGHT KNEE: ICD-10-CM

## 2021-08-16 DIAGNOSIS — Z86.12 HISTORY OF POLIOMYELITIS: ICD-10-CM

## 2021-08-16 DIAGNOSIS — G89.29 CHRONIC BILATERAL LOW BACK PAIN WITH SCIATICA, SCIATICA LATERALITY UNSPECIFIED: ICD-10-CM

## 2021-08-16 DIAGNOSIS — M48.062 SPINAL STENOSIS OF LUMBAR REGION WITH NEUROGENIC CLAUDICATION: ICD-10-CM

## 2021-08-16 DIAGNOSIS — M47.26 OSTEOARTHRITIS OF SPINE WITH RADICULOPATHY, LUMBAR REGION: ICD-10-CM

## 2021-08-16 DIAGNOSIS — E11.40 TYPE 2 DIABETES MELLITUS WITH DIABETIC NEUROPATHY, WITHOUT LONG-TERM CURRENT USE OF INSULIN: ICD-10-CM

## 2021-08-16 DIAGNOSIS — E11.42 DIABETIC POLYNEUROPATHY ASSOCIATED WITH TYPE 2 DIABETES MELLITUS: ICD-10-CM

## 2021-08-16 DIAGNOSIS — M81.0 AGE-RELATED OSTEOPOROSIS WITHOUT CURRENT PATHOLOGICAL FRACTURE: ICD-10-CM

## 2021-08-16 DIAGNOSIS — R26.9 GAIT DISORDER: ICD-10-CM
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
## 2021-08-16 DIAGNOSIS — M54.16 LUMBAR RADICULOPATHY: ICD-10-CM

## 2021-08-16 DIAGNOSIS — E08.44 DIABETIC AMYOTROPHY ASSOCIATED WITH DIABETES MELLITUS DUE TO UNDERLYING CONDITION: ICD-10-CM

## 2021-08-16 DIAGNOSIS — M54.16 LEFT LUMBAR RADICULOPATHY: ICD-10-CM

## 2021-08-16 RX ORDER — HYDROCODONE BITARTRATE AND ACETAMINOPHEN 10; 325 MG/1; MG/1
1 TABLET ORAL EVERY 6 HOURS PRN
Qty: 120 TABLET | Refills: 0 | OUTPATIENT
Start: 2021-08-16 | End: 2021-09-15

## 2021-08-19 RX ORDER — HYDROCODONE BITARTRATE AND ACETAMINOPHEN 10; 325 MG/1; MG/1
1 TABLET ORAL EVERY 6 HOURS PRN
Qty: 120 TABLET | Refills: 0 | Status: SHIPPED | OUTPATIENT
Start: 2021-08-19 | End: 2021-09-21 | Stop reason: SDUPTHER

## 2021-08-27 ENCOUNTER — OFFICE VISIT (OUTPATIENT)
Dept: INTERNAL MEDICINE | Facility: CLINIC | Age: 81
End: 2021-08-27
Payer: MEDICARE

## 2021-08-27 VITALS
WEIGHT: 170 LBS | SYSTOLIC BLOOD PRESSURE: 120 MMHG | DIASTOLIC BLOOD PRESSURE: 75 MMHG | BODY MASS INDEX: 33.38 KG/M2 | HEIGHT: 60 IN

## 2021-08-27 DIAGNOSIS — E78.2 MIXED HYPERLIPIDEMIA: ICD-10-CM

## 2021-08-27 DIAGNOSIS — D46.4 REFRACTORY ANEMIA: ICD-10-CM

## 2021-08-27 DIAGNOSIS — E66.09 CLASS 1 OBESITY DUE TO EXCESS CALORIES WITH SERIOUS COMORBIDITY AND BODY MASS INDEX (BMI) OF 33.0 TO 33.9 IN ADULT: ICD-10-CM

## 2021-08-27 DIAGNOSIS — E11.40 TYPE 2 DIABETES MELLITUS WITH DIABETIC NEUROPATHY, WITHOUT LONG-TERM CURRENT USE OF INSULIN: ICD-10-CM

## 2021-08-27 DIAGNOSIS — E11.42 DIABETIC POLYNEUROPATHY ASSOCIATED WITH TYPE 2 DIABETES MELLITUS: ICD-10-CM

## 2021-08-27 DIAGNOSIS — M81.0 AGE-RELATED OSTEOPOROSIS WITHOUT CURRENT PATHOLOGICAL FRACTURE: ICD-10-CM

## 2021-08-27 DIAGNOSIS — E55.9 VITAMIN D DEFICIENCY DISEASE: ICD-10-CM

## 2021-08-27 DIAGNOSIS — Z00.00 ANNUAL PHYSICAL EXAM: Primary | ICD-10-CM

## 2021-08-27 DIAGNOSIS — G14 POST POLIOMYELITIS SYNDROME: ICD-10-CM

## 2021-08-27 PROBLEM — N18.30 STAGE 3 CHRONIC KIDNEY DISEASE: Status: RESOLVED | Noted: 2018-07-26 | Resolved: 2021-08-27

## 2021-08-27 PROCEDURE — 99397 PER PM REEVAL EST PAT 65+ YR: CPT | Mod: S$GLB,,, | Performed by: INTERNAL MEDICINE

## 2021-08-27 PROCEDURE — 1159F MED LIST DOCD IN RCRD: CPT | Mod: CPTII,S$GLB,, | Performed by: INTERNAL MEDICINE

## 2021-08-27 PROCEDURE — 3074F PR MOST RECENT SYSTOLIC BLOOD PRESSURE < 130 MM HG: ICD-10-PCS | Mod: CPTII,S$GLB,, | Performed by: INTERNAL MEDICINE

## 2021-08-27 PROCEDURE — 99999 PR PBB SHADOW E&M-EST. PATIENT-LVL IV: CPT | Mod: PBBFAC,,, | Performed by: INTERNAL MEDICINE

## 2021-08-27 PROCEDURE — 1125F AMNT PAIN NOTED PAIN PRSNT: CPT | Mod: CPTII,S$GLB,, | Performed by: INTERNAL MEDICINE

## 2021-08-27 PROCEDURE — 99499 UNLISTED E&M SERVICE: CPT | Mod: HCNC,S$GLB,, | Performed by: INTERNAL MEDICINE

## 2021-08-27 PROCEDURE — 99999 PR PBB SHADOW E&M-EST. PATIENT-LVL IV: ICD-10-PCS | Mod: PBBFAC,,, | Performed by: INTERNAL MEDICINE

## 2021-08-27 PROCEDURE — 1101F PR PT FALLS ASSESS DOC 0-1 FALLS W/OUT INJ PAST YR: ICD-10-PCS | Mod: CPTII,S$GLB,, | Performed by: INTERNAL MEDICINE

## 2021-08-27 PROCEDURE — 3288F FALL RISK ASSESSMENT DOCD: CPT | Mod: CPTII,S$GLB,, | Performed by: INTERNAL MEDICINE

## 2021-08-27 PROCEDURE — 3074F SYST BP LT 130 MM HG: CPT | Mod: CPTII,S$GLB,, | Performed by: INTERNAL MEDICINE

## 2021-08-27 PROCEDURE — 3078F DIAST BP <80 MM HG: CPT | Mod: CPTII,S$GLB,, | Performed by: INTERNAL MEDICINE

## 2021-08-27 PROCEDURE — 3288F PR FALLS RISK ASSESSMENT DOCUMENTED: ICD-10-PCS | Mod: CPTII,S$GLB,, | Performed by: INTERNAL MEDICINE

## 2021-08-27 PROCEDURE — 99499 RISK ADDL DX/OHS AUDIT: ICD-10-PCS | Mod: HCNC,S$GLB,, | Performed by: INTERNAL MEDICINE

## 2021-08-27 PROCEDURE — 99397 PR PREVENTIVE VISIT,EST,65 & OVER: ICD-10-PCS | Mod: S$GLB,,, | Performed by: INTERNAL MEDICINE

## 2021-08-27 PROCEDURE — 3078F PR MOST RECENT DIASTOLIC BLOOD PRESSURE < 80 MM HG: ICD-10-PCS | Mod: CPTII,S$GLB,, | Performed by: INTERNAL MEDICINE

## 2021-08-27 PROCEDURE — 1101F PT FALLS ASSESS-DOCD LE1/YR: CPT | Mod: CPTII,S$GLB,, | Performed by: INTERNAL MEDICINE

## 2021-08-27 PROCEDURE — 1159F PR MEDICATION LIST DOCUMENTED IN MEDICAL RECORD: ICD-10-PCS | Mod: CPTII,S$GLB,, | Performed by: INTERNAL MEDICINE

## 2021-08-27 PROCEDURE — 1125F PR PAIN SEVERITY QUANTIFIED, PAIN PRESENT: ICD-10-PCS | Mod: CPTII,S$GLB,, | Performed by: INTERNAL MEDICINE

## 2021-08-27 RX ORDER — HYDROCHLOROTHIAZIDE 25 MG/1
25 TABLET ORAL DAILY
Qty: 90 TABLET | Refills: 3 | Status: SHIPPED | OUTPATIENT
Start: 2021-08-27 | End: 2021-09-13

## 2021-09-13 RX ORDER — HYDROCHLOROTHIAZIDE 25 MG/1
25 TABLET ORAL DAILY
Qty: 90 TABLET | Refills: 3 | Status: SHIPPED | OUTPATIENT
Start: 2021-09-13 | End: 2022-07-16

## 2021-09-21 DIAGNOSIS — M54.16 LUMBAR RADICULOPATHY: ICD-10-CM

## 2021-09-21 DIAGNOSIS — Z86.12 HISTORY OF POLIOMYELITIS: ICD-10-CM

## 2021-09-21 DIAGNOSIS — M25.561 CHRONIC PAIN OF RIGHT KNEE: ICD-10-CM

## 2021-09-21 DIAGNOSIS — E11.42 DIABETIC POLYNEUROPATHY ASSOCIATED WITH TYPE 2 DIABETES MELLITUS: ICD-10-CM

## 2021-09-21 DIAGNOSIS — M47.16 LUMBAR SPONDYLOSIS WITH MYELOPATHY: ICD-10-CM

## 2021-09-21 DIAGNOSIS — G14 POST POLIOMYELITIS SYNDROME: ICD-10-CM

## 2021-09-21 DIAGNOSIS — M48.062 SPINAL STENOSIS OF LUMBAR REGION WITH NEUROGENIC CLAUDICATION: ICD-10-CM
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
## 2021-09-21 DIAGNOSIS — M54.40 CHRONIC BILATERAL LOW BACK PAIN WITH SCIATICA, SCIATICA LATERALITY UNSPECIFIED: ICD-10-CM

## 2021-09-21 DIAGNOSIS — G89.29 CHRONIC BILATERAL LOW BACK PAIN WITH SCIATICA, SCIATICA LATERALITY UNSPECIFIED: ICD-10-CM

## 2021-09-21 DIAGNOSIS — R26.9 GAIT DISORDER: ICD-10-CM

## 2021-09-21 DIAGNOSIS — M47.816 LUMBAR SPONDYLOSIS: ICD-10-CM

## 2021-09-21 DIAGNOSIS — M81.0 AGE-RELATED OSTEOPOROSIS WITHOUT CURRENT PATHOLOGICAL FRACTURE: ICD-10-CM

## 2021-09-21 DIAGNOSIS — M54.16 LEFT LUMBAR RADICULOPATHY: ICD-10-CM

## 2021-09-21 DIAGNOSIS — E08.44 DIABETIC AMYOTROPHY ASSOCIATED WITH DIABETES MELLITUS DUE TO UNDERLYING CONDITION: ICD-10-CM

## 2021-09-21 DIAGNOSIS — M17.11 PRIMARY OSTEOARTHRITIS OF RIGHT KNEE: ICD-10-CM

## 2021-09-21 DIAGNOSIS — G89.29 CHRONIC PAIN OF RIGHT KNEE: ICD-10-CM

## 2021-09-21 DIAGNOSIS — E11.40 TYPE 2 DIABETES MELLITUS WITH DIABETIC NEUROPATHY, WITHOUT LONG-TERM CURRENT USE OF INSULIN: ICD-10-CM

## 2021-09-21 DIAGNOSIS — M47.26 OSTEOARTHRITIS OF SPINE WITH RADICULOPATHY, LUMBAR REGION: ICD-10-CM

## 2021-09-21 DIAGNOSIS — G82.20 PARAPARESIS OF BOTH LOWER LIMBS: ICD-10-CM

## 2021-09-24 ENCOUNTER — TELEPHONE (OUTPATIENT)
Dept: PHYSICAL MEDICINE AND REHAB | Facility: CLINIC | Age: 81
End: 2021-09-24

## 2021-09-25 RX ORDER — HYDROCODONE BITARTRATE AND ACETAMINOPHEN 10; 325 MG/1; MG/1
1 TABLET ORAL EVERY 6 HOURS PRN
Qty: 120 TABLET | Refills: 0 | Status: SHIPPED | OUTPATIENT
Start: 2021-09-25 | End: 2021-10-29 | Stop reason: SDUPTHER

## 2021-10-13 ENCOUNTER — LAB VISIT (OUTPATIENT)
Dept: LAB | Facility: HOSPITAL | Age: 81
End: 2021-10-13
Attending: INTERNAL MEDICINE
Payer: MEDICARE

## 2021-10-13 DIAGNOSIS — E55.9 VITAMIN D DEFICIENCY DISEASE: ICD-10-CM

## 2021-10-13 DIAGNOSIS — E78.2 MIXED HYPERLIPIDEMIA: ICD-10-CM

## 2021-10-13 DIAGNOSIS — E11.40 TYPE 2 DIABETES MELLITUS WITH DIABETIC NEUROPATHY, WITHOUT LONG-TERM CURRENT USE OF INSULIN: ICD-10-CM

## 2021-10-13 LAB
25(OH)D3+25(OH)D2 SERPL-MCNC: 51 NG/ML (ref 30–96)
ALBUMIN SERPL BCP-MCNC: 3.4 G/DL (ref 3.5–5.2)
ALP SERPL-CCNC: 82 U/L (ref 55–135)
ALT SERPL W/O P-5'-P-CCNC: 20 U/L (ref 10–44)
ANION GAP SERPL CALC-SCNC: 13 MMOL/L (ref 8–16)
AST SERPL-CCNC: 20 U/L (ref 10–40)
BILIRUB SERPL-MCNC: 0.5 MG/DL (ref 0.1–1)
BUN SERPL-MCNC: 26 MG/DL (ref 8–23)
CALCIUM SERPL-MCNC: 10 MG/DL (ref 8.7–10.5)
CHLORIDE SERPL-SCNC: 101 MMOL/L (ref 95–110)
CHOLEST SERPL-MCNC: 123 MG/DL (ref 120–199)
CHOLEST/HDLC SERPL: 2.9 {RATIO} (ref 2–5)
CO2 SERPL-SCNC: 22 MMOL/L (ref 23–29)
CREAT SERPL-MCNC: 0.9 MG/DL (ref 0.5–1.4)
EST. GFR  (AFRICAN AMERICAN): >60 ML/MIN/1.73 M^2
EST. GFR  (NON AFRICAN AMERICAN): >60 ML/MIN/1.73 M^2
ESTIMATED AVG GLUCOSE: 143 MG/DL (ref 68–131)
GLUCOSE SERPL-MCNC: 146 MG/DL (ref 70–110)
HBA1C MFR BLD: 6.6 % (ref 4–5.6)
HDLC SERPL-MCNC: 42 MG/DL (ref 40–75)
HDLC SERPL: 34.1 % (ref 20–50)
LDLC SERPL CALC-MCNC: 42.2 MG/DL (ref 63–159)
NONHDLC SERPL-MCNC: 81 MG/DL
POTASSIUM SERPL-SCNC: 3.8 MMOL/L (ref 3.5–5.1)
PROT SERPL-MCNC: 6.7 G/DL (ref 6–8.4)
SODIUM SERPL-SCNC: 136 MMOL/L (ref 136–145)
TRIGL SERPL-MCNC: 194 MG/DL (ref 30–150)

## 2021-10-13 PROCEDURE — 36415 COLL VENOUS BLD VENIPUNCTURE: CPT | Mod: HCNC | Performed by: INTERNAL MEDICINE

## 2021-10-13 PROCEDURE — 83036 HEMOGLOBIN GLYCOSYLATED A1C: CPT | Mod: HCNC | Performed by: INTERNAL MEDICINE

## 2021-10-13 PROCEDURE — 80061 LIPID PANEL: CPT | Mod: HCNC | Performed by: INTERNAL MEDICINE

## 2021-10-13 PROCEDURE — 82306 VITAMIN D 25 HYDROXY: CPT | Mod: HCNC | Performed by: INTERNAL MEDICINE

## 2021-10-13 PROCEDURE — 80053 COMPREHEN METABOLIC PANEL: CPT | Mod: HCNC | Performed by: INTERNAL MEDICINE

## 2021-10-26 DIAGNOSIS — M54.16 LUMBAR RADICULOPATHY: ICD-10-CM

## 2021-10-26 DIAGNOSIS — M48.062 SPINAL STENOSIS OF LUMBAR REGION WITH NEUROGENIC CLAUDICATION: ICD-10-CM

## 2021-10-26 DIAGNOSIS — M47.16 LUMBAR SPONDYLOSIS WITH MYELOPATHY: ICD-10-CM

## 2021-10-26 DIAGNOSIS — E08.44 DIABETIC AMYOTROPHY ASSOCIATED WITH DIABETES MELLITUS DUE TO UNDERLYING CONDITION: ICD-10-CM
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
## 2021-10-26 DIAGNOSIS — G89.29 CHRONIC PAIN OF RIGHT KNEE: ICD-10-CM

## 2021-10-26 DIAGNOSIS — E11.40 TYPE 2 DIABETES MELLITUS WITH DIABETIC NEUROPATHY, WITHOUT LONG-TERM CURRENT USE OF INSULIN: ICD-10-CM

## 2021-10-26 DIAGNOSIS — E11.42 DIABETIC POLYNEUROPATHY ASSOCIATED WITH TYPE 2 DIABETES MELLITUS: ICD-10-CM

## 2021-10-26 DIAGNOSIS — G82.20 PARAPARESIS OF BOTH LOWER LIMBS: ICD-10-CM

## 2021-10-26 DIAGNOSIS — M47.816 LUMBAR SPONDYLOSIS: ICD-10-CM

## 2021-10-26 DIAGNOSIS — Z86.12 HISTORY OF POLIOMYELITIS: ICD-10-CM

## 2021-10-26 DIAGNOSIS — M25.561 CHRONIC PAIN OF RIGHT KNEE: ICD-10-CM

## 2021-10-26 DIAGNOSIS — R26.9 GAIT DISORDER: ICD-10-CM

## 2021-10-26 DIAGNOSIS — G89.29 CHRONIC BILATERAL LOW BACK PAIN WITH SCIATICA, SCIATICA LATERALITY UNSPECIFIED: ICD-10-CM

## 2021-10-26 DIAGNOSIS — M54.40 CHRONIC BILATERAL LOW BACK PAIN WITH SCIATICA, SCIATICA LATERALITY UNSPECIFIED: ICD-10-CM

## 2021-10-26 DIAGNOSIS — M17.11 PRIMARY OSTEOARTHRITIS OF RIGHT KNEE: ICD-10-CM

## 2021-10-26 DIAGNOSIS — M47.26 OSTEOARTHRITIS OF SPINE WITH RADICULOPATHY, LUMBAR REGION: ICD-10-CM

## 2021-10-26 DIAGNOSIS — M54.16 LEFT LUMBAR RADICULOPATHY: ICD-10-CM

## 2021-10-26 DIAGNOSIS — G14 POST POLIOMYELITIS SYNDROME: ICD-10-CM

## 2021-10-26 DIAGNOSIS — M81.0 AGE-RELATED OSTEOPOROSIS WITHOUT CURRENT PATHOLOGICAL FRACTURE: ICD-10-CM

## 2021-10-29 DIAGNOSIS — E11.40 TYPE 2 DIABETES MELLITUS WITH DIABETIC NEUROPATHY, WITHOUT LONG-TERM CURRENT USE OF INSULIN: ICD-10-CM

## 2021-10-29 DIAGNOSIS — M54.40 CHRONIC BILATERAL LOW BACK PAIN WITH SCIATICA, SCIATICA LATERALITY UNSPECIFIED: ICD-10-CM

## 2021-10-29 DIAGNOSIS — E08.44 DIABETIC AMYOTROPHY ASSOCIATED WITH DIABETES MELLITUS DUE TO UNDERLYING CONDITION: ICD-10-CM

## 2021-10-29 DIAGNOSIS — M54.16 LEFT LUMBAR RADICULOPATHY: ICD-10-CM
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
## 2021-10-29 DIAGNOSIS — M54.16 LUMBAR RADICULOPATHY: ICD-10-CM

## 2021-10-29 DIAGNOSIS — M47.816 LUMBAR SPONDYLOSIS: ICD-10-CM

## 2021-10-29 DIAGNOSIS — R26.9 GAIT DISORDER: ICD-10-CM

## 2021-10-29 DIAGNOSIS — G89.29 CHRONIC BILATERAL LOW BACK PAIN WITH SCIATICA, SCIATICA LATERALITY UNSPECIFIED: ICD-10-CM

## 2021-10-29 DIAGNOSIS — M81.0 AGE-RELATED OSTEOPOROSIS WITHOUT CURRENT PATHOLOGICAL FRACTURE: ICD-10-CM

## 2021-10-29 DIAGNOSIS — M48.062 SPINAL STENOSIS OF LUMBAR REGION WITH NEUROGENIC CLAUDICATION: ICD-10-CM

## 2021-10-29 DIAGNOSIS — M47.26 OSTEOARTHRITIS OF SPINE WITH RADICULOPATHY, LUMBAR REGION: ICD-10-CM

## 2021-10-29 DIAGNOSIS — E11.42 DIABETIC POLYNEUROPATHY ASSOCIATED WITH TYPE 2 DIABETES MELLITUS: ICD-10-CM

## 2021-10-29 DIAGNOSIS — M47.16 LUMBAR SPONDYLOSIS WITH MYELOPATHY: ICD-10-CM

## 2021-10-29 DIAGNOSIS — G14 POST POLIOMYELITIS SYNDROME: ICD-10-CM

## 2021-10-29 DIAGNOSIS — Z86.12 HISTORY OF POLIOMYELITIS: ICD-10-CM

## 2021-10-29 DIAGNOSIS — G89.29 CHRONIC PAIN OF RIGHT KNEE: ICD-10-CM

## 2021-10-29 DIAGNOSIS — G82.20 PARAPARESIS OF BOTH LOWER LIMBS: ICD-10-CM

## 2021-10-29 DIAGNOSIS — M25.561 CHRONIC PAIN OF RIGHT KNEE: ICD-10-CM

## 2021-10-29 DIAGNOSIS — M17.11 PRIMARY OSTEOARTHRITIS OF RIGHT KNEE: ICD-10-CM

## 2021-10-29 PROCEDURE — 99454 REM MNTR PHYSIOL PARAM 16-30: CPT | Mod: S$GLB,,, | Performed by: INTERNAL MEDICINE

## 2021-10-29 PROCEDURE — 99454 PR REMOTE MNTR, PHYS PARAM, INITIAL, EA 30 DAYS: ICD-10-PCS | Mod: S$GLB,,, | Performed by: INTERNAL MEDICINE

## 2021-10-29 RX ORDER — HYDROCODONE BITARTRATE AND ACETAMINOPHEN 10; 325 MG/1; MG/1
1 TABLET ORAL EVERY 6 HOURS PRN
Qty: 120 TABLET | Refills: 0 | Status: SHIPPED | OUTPATIENT
Start: 2021-10-29 | End: 2021-11-28

## 2021-10-31 RX ORDER — HYDROCODONE BITARTRATE AND ACETAMINOPHEN 10; 325 MG/1; MG/1
1 TABLET ORAL EVERY 6 HOURS PRN
Qty: 120 TABLET | Refills: 0 | Status: SHIPPED | OUTPATIENT
Start: 2021-10-31 | End: 2021-12-06 | Stop reason: SDUPTHER

## 2021-12-06 DIAGNOSIS — Z86.12 HISTORY OF POLIOMYELITIS: ICD-10-CM

## 2021-12-06 DIAGNOSIS — G82.20 PARAPARESIS OF BOTH LOWER LIMBS: ICD-10-CM

## 2021-12-06 DIAGNOSIS — M54.16 LEFT LUMBAR RADICULOPATHY: ICD-10-CM

## 2021-12-06 DIAGNOSIS — M25.561 CHRONIC PAIN OF RIGHT KNEE: ICD-10-CM

## 2021-12-06 DIAGNOSIS — G14 POST POLIOMYELITIS SYNDROME: ICD-10-CM

## 2021-12-06 DIAGNOSIS — E08.44 DIABETIC AMYOTROPHY ASSOCIATED WITH DIABETES MELLITUS DUE TO UNDERLYING CONDITION: ICD-10-CM

## 2021-12-06 DIAGNOSIS — G89.29 CHRONIC PAIN OF RIGHT KNEE: ICD-10-CM

## 2021-12-06 DIAGNOSIS — E11.40 TYPE 2 DIABETES MELLITUS WITH DIABETIC NEUROPATHY, WITHOUT LONG-TERM CURRENT USE OF INSULIN: ICD-10-CM

## 2021-12-06 DIAGNOSIS — M54.40 CHRONIC BILATERAL LOW BACK PAIN WITH SCIATICA, SCIATICA LATERALITY UNSPECIFIED: ICD-10-CM

## 2021-12-06 DIAGNOSIS — M54.16 LUMBAR RADICULOPATHY: ICD-10-CM

## 2021-12-06 DIAGNOSIS — M47.16 LUMBAR SPONDYLOSIS WITH MYELOPATHY: ICD-10-CM

## 2021-12-06 DIAGNOSIS — M81.0 AGE-RELATED OSTEOPOROSIS WITHOUT CURRENT PATHOLOGICAL FRACTURE: ICD-10-CM

## 2021-12-06 DIAGNOSIS — M17.11 PRIMARY OSTEOARTHRITIS OF RIGHT KNEE: ICD-10-CM

## 2021-12-06 DIAGNOSIS — M47.816 LUMBAR SPONDYLOSIS: ICD-10-CM

## 2021-12-06 DIAGNOSIS — M47.26 OSTEOARTHRITIS OF SPINE WITH RADICULOPATHY, LUMBAR REGION: ICD-10-CM

## 2021-12-06 DIAGNOSIS — E11.42 DIABETIC POLYNEUROPATHY ASSOCIATED WITH TYPE 2 DIABETES MELLITUS: ICD-10-CM

## 2021-12-06 DIAGNOSIS — M48.062 SPINAL STENOSIS OF LUMBAR REGION WITH NEUROGENIC CLAUDICATION: ICD-10-CM

## 2021-12-06 DIAGNOSIS — R26.9 GAIT DISORDER: ICD-10-CM
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
## 2021-12-06 DIAGNOSIS — G89.29 CHRONIC BILATERAL LOW BACK PAIN WITH SCIATICA, SCIATICA LATERALITY UNSPECIFIED: ICD-10-CM

## 2021-12-08 RX ORDER — HYDROCODONE BITARTRATE AND ACETAMINOPHEN 10; 325 MG/1; MG/1
1 TABLET ORAL EVERY 6 HOURS PRN
Qty: 120 TABLET | Refills: 0 | Status: SHIPPED | OUTPATIENT
Start: 2021-12-08 | End: 2022-01-13 | Stop reason: SDUPTHER

## 2021-12-09 ENCOUNTER — TELEPHONE (OUTPATIENT)
Dept: PHYSICAL MEDICINE AND REHAB | Facility: CLINIC | Age: 81
End: 2021-12-09
Payer: MEDICARE

## 2021-12-09 DIAGNOSIS — E08.44 DIABETIC AMYOTROPHY ASSOCIATED WITH DIABETES MELLITUS DUE TO UNDERLYING CONDITION: ICD-10-CM

## 2021-12-09 DIAGNOSIS — M47.26 OSTEOARTHRITIS OF SPINE WITH RADICULOPATHY, LUMBAR REGION: ICD-10-CM

## 2021-12-09 DIAGNOSIS — M54.16 LUMBAR RADICULOPATHY: ICD-10-CM

## 2021-12-09 DIAGNOSIS — R26.9 GAIT DISORDER: ICD-10-CM

## 2021-12-09 DIAGNOSIS — M47.16 LUMBAR SPONDYLOSIS WITH MYELOPATHY: ICD-10-CM

## 2021-12-09 DIAGNOSIS — M47.816 LUMBAR SPONDYLOSIS: ICD-10-CM

## 2021-12-09 DIAGNOSIS — G14 POST POLIOMYELITIS SYNDROME: ICD-10-CM

## 2021-12-09 DIAGNOSIS — M54.16 LEFT LUMBAR RADICULOPATHY: ICD-10-CM

## 2021-12-09 DIAGNOSIS — M81.0 AGE-RELATED OSTEOPOROSIS WITHOUT CURRENT PATHOLOGICAL FRACTURE: ICD-10-CM

## 2021-12-09 DIAGNOSIS — G82.20 PARAPARESIS OF BOTH LOWER LIMBS: ICD-10-CM

## 2021-12-09 DIAGNOSIS — M17.11 PRIMARY OSTEOARTHRITIS OF RIGHT KNEE: ICD-10-CM

## 2021-12-09 DIAGNOSIS — Z86.12 HISTORY OF POLIOMYELITIS: ICD-10-CM

## 2021-12-09 DIAGNOSIS — G89.29 CHRONIC PAIN OF RIGHT KNEE: ICD-10-CM
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
## 2021-12-09 DIAGNOSIS — M25.561 CHRONIC PAIN OF RIGHT KNEE: ICD-10-CM

## 2021-12-09 DIAGNOSIS — G89.29 CHRONIC BILATERAL LOW BACK PAIN WITH SCIATICA, SCIATICA LATERALITY UNSPECIFIED: ICD-10-CM

## 2021-12-09 DIAGNOSIS — E11.40 TYPE 2 DIABETES MELLITUS WITH DIABETIC NEUROPATHY, WITHOUT LONG-TERM CURRENT USE OF INSULIN: ICD-10-CM

## 2021-12-09 DIAGNOSIS — E11.42 DIABETIC POLYNEUROPATHY ASSOCIATED WITH TYPE 2 DIABETES MELLITUS: ICD-10-CM

## 2021-12-09 DIAGNOSIS — M48.062 SPINAL STENOSIS OF LUMBAR REGION WITH NEUROGENIC CLAUDICATION: ICD-10-CM

## 2021-12-09 DIAGNOSIS — M54.40 CHRONIC BILATERAL LOW BACK PAIN WITH SCIATICA, SCIATICA LATERALITY UNSPECIFIED: ICD-10-CM

## 2021-12-17 ENCOUNTER — TELEPHONE (OUTPATIENT)
Dept: HEMATOLOGY/ONCOLOGY | Facility: CLINIC | Age: 81
End: 2021-12-17
Payer: MEDICARE

## 2022-01-10 DIAGNOSIS — E11.40 TYPE 2 DIABETES MELLITUS WITH DIABETIC NEUROPATHY, WITHOUT LONG-TERM CURRENT USE OF INSULIN: ICD-10-CM

## 2022-01-10 DIAGNOSIS — M81.0 AGE-RELATED OSTEOPOROSIS WITHOUT CURRENT PATHOLOGICAL FRACTURE: ICD-10-CM

## 2022-01-10 DIAGNOSIS — M47.16 LUMBAR SPONDYLOSIS WITH MYELOPATHY: ICD-10-CM

## 2022-01-10 DIAGNOSIS — M48.062 SPINAL STENOSIS OF LUMBAR REGION WITH NEUROGENIC CLAUDICATION: ICD-10-CM

## 2022-01-10 DIAGNOSIS — R26.9 GAIT DISORDER: ICD-10-CM
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
## 2022-01-10 DIAGNOSIS — E11.42 DIABETIC POLYNEUROPATHY ASSOCIATED WITH TYPE 2 DIABETES MELLITUS: ICD-10-CM

## 2022-01-10 DIAGNOSIS — M17.11 PRIMARY OSTEOARTHRITIS OF RIGHT KNEE: ICD-10-CM

## 2022-01-10 DIAGNOSIS — M54.40 CHRONIC BILATERAL LOW BACK PAIN WITH SCIATICA, SCIATICA LATERALITY UNSPECIFIED: ICD-10-CM

## 2022-01-10 DIAGNOSIS — G89.29 CHRONIC PAIN OF RIGHT KNEE: ICD-10-CM

## 2022-01-10 DIAGNOSIS — G82.20 PARAPARESIS OF BOTH LOWER LIMBS: ICD-10-CM

## 2022-01-10 DIAGNOSIS — M54.16 LUMBAR RADICULOPATHY: ICD-10-CM

## 2022-01-10 DIAGNOSIS — G89.29 CHRONIC BILATERAL LOW BACK PAIN WITH SCIATICA, SCIATICA LATERALITY UNSPECIFIED: ICD-10-CM

## 2022-01-10 DIAGNOSIS — M47.816 LUMBAR SPONDYLOSIS: ICD-10-CM

## 2022-01-10 DIAGNOSIS — G14 POST POLIOMYELITIS SYNDROME: ICD-10-CM

## 2022-01-10 DIAGNOSIS — Z86.12 HISTORY OF POLIOMYELITIS: ICD-10-CM

## 2022-01-10 DIAGNOSIS — M54.16 LEFT LUMBAR RADICULOPATHY: ICD-10-CM

## 2022-01-10 DIAGNOSIS — M47.26 OSTEOARTHRITIS OF SPINE WITH RADICULOPATHY, LUMBAR REGION: ICD-10-CM

## 2022-01-10 DIAGNOSIS — E08.44 DIABETIC AMYOTROPHY ASSOCIATED WITH DIABETES MELLITUS DUE TO UNDERLYING CONDITION: ICD-10-CM

## 2022-01-10 DIAGNOSIS — M25.561 CHRONIC PAIN OF RIGHT KNEE: ICD-10-CM

## 2022-01-10 RX ORDER — HYDROCODONE BITARTRATE AND ACETAMINOPHEN 10; 325 MG/1; MG/1
1 TABLET ORAL EVERY 6 HOURS PRN
Qty: 120 TABLET | Refills: 0 | Status: CANCELLED | OUTPATIENT
Start: 2022-01-10 | End: 2022-02-09

## 2022-01-10 NOTE — TELEPHONE ENCOUNTER
----- Message from Dominic Nava sent at 1/10/2022  1:46 PM CST -----  Contact: [phone]  Pt requesting a refill on HYDROcodone-acetaminophen (NORCO)  mg per tablet ().    [phone]    [facility] - [facility] [address]   Phone:  [phone]  Fax:  [phone]

## 2022-01-13 DIAGNOSIS — E11.42 DIABETIC POLYNEUROPATHY ASSOCIATED WITH TYPE 2 DIABETES MELLITUS: ICD-10-CM
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
## 2022-01-13 DIAGNOSIS — M48.062 SPINAL STENOSIS OF LUMBAR REGION WITH NEUROGENIC CLAUDICATION: ICD-10-CM

## 2022-01-13 DIAGNOSIS — E11.40 TYPE 2 DIABETES MELLITUS WITH DIABETIC NEUROPATHY, WITHOUT LONG-TERM CURRENT USE OF INSULIN: ICD-10-CM

## 2022-01-13 DIAGNOSIS — E08.44 DIABETIC AMYOTROPHY ASSOCIATED WITH DIABETES MELLITUS DUE TO UNDERLYING CONDITION: ICD-10-CM

## 2022-01-13 DIAGNOSIS — M54.16 LUMBAR RADICULOPATHY: ICD-10-CM

## 2022-01-13 DIAGNOSIS — M54.16 LEFT LUMBAR RADICULOPATHY: ICD-10-CM

## 2022-01-13 DIAGNOSIS — G14 POST POLIOMYELITIS SYNDROME: ICD-10-CM

## 2022-01-13 DIAGNOSIS — G89.29 CHRONIC PAIN OF RIGHT KNEE: ICD-10-CM

## 2022-01-13 DIAGNOSIS — M17.11 PRIMARY OSTEOARTHRITIS OF RIGHT KNEE: ICD-10-CM

## 2022-01-13 DIAGNOSIS — G89.29 CHRONIC BILATERAL LOW BACK PAIN WITH SCIATICA, SCIATICA LATERALITY UNSPECIFIED: ICD-10-CM

## 2022-01-13 DIAGNOSIS — M47.16 LUMBAR SPONDYLOSIS WITH MYELOPATHY: ICD-10-CM

## 2022-01-13 DIAGNOSIS — Z86.12 HISTORY OF POLIOMYELITIS: ICD-10-CM

## 2022-01-13 DIAGNOSIS — M81.0 AGE-RELATED OSTEOPOROSIS WITHOUT CURRENT PATHOLOGICAL FRACTURE: ICD-10-CM

## 2022-01-13 DIAGNOSIS — G82.20 PARAPARESIS OF BOTH LOWER LIMBS: ICD-10-CM

## 2022-01-13 DIAGNOSIS — R26.9 GAIT DISORDER: ICD-10-CM

## 2022-01-13 DIAGNOSIS — M47.816 LUMBAR SPONDYLOSIS: ICD-10-CM

## 2022-01-13 DIAGNOSIS — M47.26 OSTEOARTHRITIS OF SPINE WITH RADICULOPATHY, LUMBAR REGION: ICD-10-CM

## 2022-01-13 DIAGNOSIS — M54.40 CHRONIC BILATERAL LOW BACK PAIN WITH SCIATICA, SCIATICA LATERALITY UNSPECIFIED: ICD-10-CM

## 2022-01-13 DIAGNOSIS — M25.561 CHRONIC PAIN OF RIGHT KNEE: ICD-10-CM

## 2022-01-13 RX ORDER — HYDROCODONE BITARTRATE AND ACETAMINOPHEN 10; 325 MG/1; MG/1
1 TABLET ORAL EVERY 6 HOURS PRN
Qty: 120 TABLET | Refills: 0 | Status: SHIPPED | OUTPATIENT
Start: 2022-01-13 | End: 2022-02-16 | Stop reason: SDUPTHER

## 2022-01-13 NOTE — TELEPHONE ENCOUNTER
----- Message from Yvonen Livingston sent at 1/13/2022 12:04 PM CST -----  Contact: self @ [phone]  Pt is calling for the status of his refill request for hydrocodone 10/325.  He was due to refill on 1-7-22.       [facility] - [facility] [address]   Phone:  [phone]  Fax:  [phone]

## 2022-01-14 RX ORDER — DICLOFENAC SODIUM 75 MG/1
TABLET, DELAYED RELEASE ORAL
Qty: 180 TABLET | Refills: 0 | Status: SHIPPED | OUTPATIENT
Start: 2022-01-14 | End: 2022-05-03

## 2022-02-04 ENCOUNTER — TELEPHONE (OUTPATIENT)
Dept: INTERNAL MEDICINE | Facility: CLINIC | Age: 82
End: 2022-02-04
Payer: MEDICARE

## 2022-02-04 DIAGNOSIS — I10 SEVERE HYPERTENSION: ICD-10-CM

## 2022-02-04 DIAGNOSIS — E11.40 TYPE 2 DIABETES MELLITUS WITH DIABETIC NEUROPATHY, WITHOUT LONG-TERM CURRENT USE OF INSULIN: Primary | ICD-10-CM

## 2022-02-04 RX ORDER — METFORMIN HYDROCHLORIDE 500 MG/1
500 TABLET, EXTENDED RELEASE ORAL DAILY
Qty: 90 TABLET | Refills: 3 | Status: SHIPPED | OUTPATIENT
Start: 2022-02-04 | End: 2022-03-02

## 2022-02-04 NOTE — TELEPHONE ENCOUNTER
No new care gaps identified.  Powered by [employer]. Reference number: [id number].   2/04/2022 4:26:04 PM CST

## 2022-02-04 NOTE — TELEPHONE ENCOUNTER
Please schedule for labs in April, thank you    Please also remind her that she is due for an eye exam, optometry appointment, which has been ordered, thank you

## 2022-02-07 ENCOUNTER — OFFICE VISIT (OUTPATIENT)
Dept: HEMATOLOGY/ONCOLOGY | Facility: CLINIC | Age: 82
End: 2022-02-07
Payer: MEDICARE

## 2022-02-07 ENCOUNTER — LAB VISIT (OUTPATIENT)
Dept: LAB | Facility: HOSPITAL | Age: 82
End: 2022-02-07
Attending: INTERNAL MEDICINE
Payer: MEDICARE

## 2022-02-07 VITALS
TEMPERATURE: 98 F | BODY MASS INDEX: 30.49 KG/M2 | OXYGEN SATURATION: 96 % | DIASTOLIC BLOOD PRESSURE: 59 MMHG | WEIGHT: 155.31 LBS | HEART RATE: 80 BPM | HEIGHT: 60 IN | SYSTOLIC BLOOD PRESSURE: 127 MMHG | RESPIRATION RATE: 17 BRPM

## 2022-02-07 DIAGNOSIS — D46.4 REFRACTORY ANEMIA: ICD-10-CM

## 2022-02-07 DIAGNOSIS — D75.9 DISEASE OF BLOOD AND BLOOD FORMING ORGAN: Primary | ICD-10-CM

## 2022-02-07 LAB
BASOPHILS # BLD AUTO: 0.14 K/UL (ref 0–0.2)
BASOPHILS NFR BLD: 1.8 % (ref 0–1.9)
DIFFERENTIAL METHOD: ABNORMAL
EOSINOPHIL # BLD AUTO: 0.6 K/UL (ref 0–0.5)
EOSINOPHIL NFR BLD: 7.1 % (ref 0–8)
ERYTHROCYTE [DISTWIDTH] IN BLOOD BY AUTOMATED COUNT: 12.6 % (ref 11.5–14.5)
HCT VFR BLD AUTO: 36.7 % (ref 37–48.5)
HGB BLD-MCNC: 12.3 G/DL (ref 12–16)
IMM GRANULOCYTES # BLD AUTO: 0.04 K/UL (ref 0–0.04)
IMM GRANULOCYTES NFR BLD AUTO: 0.5 % (ref 0–0.5)
LYMPHOCYTES # BLD AUTO: 2.4 K/UL (ref 1–4.8)
LYMPHOCYTES NFR BLD: 30.2 % (ref 18–48)
MCH RBC QN AUTO: 32.1 PG (ref 27–31)
MCHC RBC AUTO-ENTMCNC: 33.5 G/DL (ref 32–36)
MCV RBC AUTO: 96 FL (ref 82–98)
MONOCYTES # BLD AUTO: 0.5 K/UL (ref 0.3–1)
MONOCYTES NFR BLD: 6.4 % (ref 4–15)
NEUTROPHILS # BLD AUTO: 4.2 K/UL (ref 1.8–7.7)
NEUTROPHILS NFR BLD: 54 % (ref 38–73)
NRBC BLD-RTO: 0 /100 WBC
PLATELET # BLD AUTO: 314 K/UL (ref 150–450)
PMV BLD AUTO: 11 FL (ref 9.2–12.9)
RBC # BLD AUTO: 3.83 M/UL (ref 4–5.4)
WBC # BLD AUTO: 7.79 K/UL (ref 3.9–12.7)

## 2022-02-07 PROCEDURE — 85025 COMPLETE CBC W/AUTO DIFF WBC: CPT | Performed by: INTERNAL MEDICINE

## 2022-02-07 PROCEDURE — 99213 PR OFFICE/OUTPT VISIT, EST, LEVL III, 20-29 MIN: ICD-10-PCS | Mod: S$GLB,,, | Performed by: INTERNAL MEDICINE

## 2022-02-07 PROCEDURE — 1101F PR PT FALLS ASSESS DOC 0-1 FALLS W/OUT INJ PAST YR: ICD-10-PCS | Mod: CPTII,S$GLB,, | Performed by: INTERNAL MEDICINE

## 2022-02-07 PROCEDURE — 99499 UNLISTED E&M SERVICE: CPT | Mod: S$GLB,,, | Performed by: INTERNAL MEDICINE

## 2022-02-07 PROCEDURE — 3078F PR MOST RECENT DIASTOLIC BLOOD PRESSURE < 80 MM HG: ICD-10-PCS | Mod: CPTII,S$GLB,, | Performed by: INTERNAL MEDICINE

## 2022-02-07 PROCEDURE — 3074F SYST BP LT 130 MM HG: CPT | Mod: CPTII,S$GLB,, | Performed by: INTERNAL MEDICINE

## 2022-02-07 PROCEDURE — 3288F PR FALLS RISK ASSESSMENT DOCUMENTED: ICD-10-PCS | Mod: CPTII,S$GLB,, | Performed by: INTERNAL MEDICINE

## 2022-02-07 PROCEDURE — 3078F DIAST BP <80 MM HG: CPT | Mod: CPTII,S$GLB,, | Performed by: INTERNAL MEDICINE

## 2022-02-07 PROCEDURE — 1126F AMNT PAIN NOTED NONE PRSNT: CPT | Mod: CPTII,S$GLB,, | Performed by: INTERNAL MEDICINE

## 2022-02-07 PROCEDURE — 1159F PR MEDICATION LIST DOCUMENTED IN MEDICAL RECORD: ICD-10-PCS | Mod: CPTII,S$GLB,, | Performed by: INTERNAL MEDICINE

## 2022-02-07 PROCEDURE — 36415 COLL VENOUS BLD VENIPUNCTURE: CPT | Performed by: INTERNAL MEDICINE

## 2022-02-07 PROCEDURE — 99999 PR PBB SHADOW E&M-EST. PATIENT-LVL IV: CPT | Mod: PBBFAC,,, | Performed by: INTERNAL MEDICINE

## 2022-02-07 PROCEDURE — 99999 PR PBB SHADOW E&M-EST. PATIENT-LVL IV: ICD-10-PCS | Mod: PBBFAC,,, | Performed by: INTERNAL MEDICINE

## 2022-02-07 PROCEDURE — 1101F PT FALLS ASSESS-DOCD LE1/YR: CPT | Mod: CPTII,S$GLB,, | Performed by: INTERNAL MEDICINE

## 2022-02-07 PROCEDURE — 1126F PR PAIN SEVERITY QUANTIFIED, NO PAIN PRESENT: ICD-10-PCS | Mod: CPTII,S$GLB,, | Performed by: INTERNAL MEDICINE

## 2022-02-07 PROCEDURE — 1160F PR REVIEW ALL MEDS BY PRESCRIBER/CLIN PHARMACIST DOCUMENTED: ICD-10-PCS | Mod: CPTII,S$GLB,, | Performed by: INTERNAL MEDICINE

## 2022-02-07 PROCEDURE — 1160F RVW MEDS BY RX/DR IN RCRD: CPT | Mod: CPTII,S$GLB,, | Performed by: INTERNAL MEDICINE

## 2022-02-07 PROCEDURE — 3074F PR MOST RECENT SYSTOLIC BLOOD PRESSURE < 130 MM HG: ICD-10-PCS | Mod: CPTII,S$GLB,, | Performed by: INTERNAL MEDICINE

## 2022-02-07 PROCEDURE — 1159F MED LIST DOCD IN RCRD: CPT | Mod: CPTII,S$GLB,, | Performed by: INTERNAL MEDICINE

## 2022-02-07 PROCEDURE — 3288F FALL RISK ASSESSMENT DOCD: CPT | Mod: CPTII,S$GLB,, | Performed by: INTERNAL MEDICINE

## 2022-02-07 PROCEDURE — 99213 OFFICE O/P EST LOW 20 MIN: CPT | Mod: S$GLB,,, | Performed by: INTERNAL MEDICINE

## 2022-02-07 PROCEDURE — 99499 RISK ADDL DX/OHS AUDIT: ICD-10-PCS | Mod: S$GLB,,, | Performed by: INTERNAL MEDICINE

## 2022-02-08 PROBLEM — D46.4 REFRACTORY ANEMIA: Status: RESOLVED | Noted: 2019-01-31 | Resolved: 2022-02-08

## 2022-02-08 NOTE — PROGRESS NOTES
"HEMATOLOGIC MALIGNANCIES PROGRESS NOTE    IDENTIFYING STATEMENT   Emilia Michaels (Emilia) is a 81 y.o. female with a  of 1940 from [place], [place] with the diagnosis of anemia.      ONCOLOGY HISTORY:    Per Dr. Medina's last note:    "Mr. Michaels is a 78-year-old woman whom I evaluated in May 2018.  She was   referred for anemia.  Her hemoglobin values were normal from 2006 until 2016.    Since 2017, she has usually been anemic, although she did have a low normal   hemoglobin values 2 or 3 times in 2017 and 2018.  In the past year, her   hemoglobin has varied from 10.4-12.4.     I performed a bone marrow examination in 2018.  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  There was no evidence of   reticulin fibrosis.  The cytogenetic study was normal and the FISH analyses for   aberrations associated with myelodysplasia were negative.  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  She has not had thrombocytosis or polycythemia and she   does not have fibrosis in her bone marrow, so the 2 mutations suggest to me that she   has an increased risk of developing a myelodysplastic disorder.  It seems   unlikely that these are precursors of a myeloproliferative disease, although it   is possible that she could develop a mixed myelodysplastic/myeloproliferative   disorder in the future.  At the present time, I think the more appropriate label   for her anemia is "clonal hematopoiesis of indeterminate significance."     There has been little change since I last saw her in May 2018.  She has diabetes   mellitus with peripheral neuropathy, severe essential hypertension and a   history of poliomyelitis with post-polio syndrome.  She also has osteoporosis   and obstructive sleep apnea.  She is using a wheelchair today, but she " "can use a   walker in her home."    INTERVAL HISTORY:      Ms. Alan returns to clinic for follow-up of her anemia.     Since her last visit, she has continued to follow for chronic conditions with primary care and otolaryngology. She has not had any hospitalizations. She feels okay at this time.     Past Medical History, Past Social History and Past Family History have been reviewed and are unchanged except as noted in the interval history.    MEDICATIONS:     Current Outpatient Medications on File Prior to Visit   Medication Sig Dispense Refill    ACCU-CHEK AJITH PLUS METER [facility] USE  AS  INSTRUCTED 1 each 0    amLODIPine (NORVASC) 10 MG tablet TAKE 1 TABLET(10 MG) BY MOUTH EVERY DAY 90 tablet 3    aspirin (ECOTRIN) 81 MG EC tablet Take 1 tablet (81 mg total) by mouth once daily. 30 tablet 12    blood sugar diagnostic Strp 1 strip by Misc.(Non-Drug; Combo Route) route 2 (two) times daily. TRUE RESULT SYSTEM 180 strip 4    blood-glucose meter (TRUERESULT BLOOD GLUCOSE SYSTM) kit Use as instructed 1 each 0    calcium-vitamin D3-vitamin K 500-100-40 mg-unit-mcg Chew Take 2 each by mouth.      clotrimazole-betamethasone 1-0.05% (LOTRISONE) cream Apply topically 2 (two) times daily. 45 g 3    desloratadine (CLARINEX) 5 mg tablet Take 5 mg by mouth once daily.      diclofenac (VOLTAREN) 75 MG EC tablet TAKE 1 TABLET WITH FOOD TWICE DAILY AS NEEDED. STOP IF ANY STOMACH IRRITATION 180 tablet 0    diclofenac sodium (VOLTAREN) 1 % Gel GRETEL 2 GRAMS TO BOTH KNEES AND LEFT SHOULDER QID  3    docusate sodium (COLACE) 50 MG capsule Take 1 capsule (50 mg total) by mouth 2 (two) times daily as needed for Constipation.      gabapentin (NEURONTIN) 600 MG tablet TAKE 1 TABLET(600 MG) BY MOUTH FOUR TIMES DAILY WITH MEALS AND EVERY NIGHT AT BEDTIME 360 tablet 1    hydrALAZINE (APRESOLINE) 50 MG tablet TAKE 1 TABLET EVERY MORNING  AND  EVERY  AFTERNOON AND 2 TABLETS IN THE EVENING. 360 tablet 3    hydroCHLOROthiazide " (HYDRODIURIL) 25 MG tablet Take 1 tablet (25 mg total) by mouth once daily. 90 tablet 3    HYDROcodone-acetaminophen (NORCO)  mg per tablet Take 1 tablet by mouth every 6 (six) hours as needed for Pain (pain). 120 tablet 0    irbesartan (AVAPRO) 300 MG tablet TAKE 1 TABLET EVERY DAY 90 tablet 1    ketoconazole (NIZORAL) 2 % cream Apply topically once daily. 60 g 3    lancets (ACCU-CHEK SOFTCLIX LANCETS) Misc TEST BLOOD SUGAR TWICE DAILY 200 each 1    metFORMIN (GLUCOPHAGE-XR) 500 MG ER 24hr tablet Take 1 tablet (500 mg total) by mouth once daily. 90 tablet 3    multivitamin (THERAGRAN) per tablet Take 1 tablet by mouth once daily.       oxybutynin (DITROPAN) 5 MG Tab Take 1 tablet (5 mg total) by mouth 2 (two) times a day. 180 tablet 3    pravastatin (PRAVACHOL) 40 MG tablet TAKE 1 AND 1/2 TABLETS(60 MG) BY MOUTH EVERY NIGHT 135 tablet 3    TRUEPLUS LANCETS 33 gauge Misc USE BID  3    zoledronic acid-mannitol & water (RECLAST) 5 mg/100 mL PgBk        No current facility-administered medications on file prior to visit.       ALLERGIES:   Review of patient's allergies indicates:   Allergen Reactions    Atenolol     Verapamil         ROS:       Review of Systems   Constitutional: Negative for diaphoresis, fatigue, fever and unexpected weight change.   HENT:   Negative for lump/mass and sore throat.    Eyes: Negative for icterus.   Respiratory: Negative for cough and shortness of breath.    Cardiovascular: Negative for chest pain and palpitations.   Gastrointestinal: Negative for abdominal distention, constipation, diarrhea, nausea and vomiting.   Genitourinary: Negative for dysuria and frequency.    Musculoskeletal: Negative for arthralgias, gait problem and myalgias.   Skin: Negative for rash.   Neurological: Negative for dizziness, gait problem and headaches.   Hematological: Negative for adenopathy. Does not bruise/bleed easily.   Psychiatric/Behavioral: The patient is not nervous/anxious.         PHYSICAL EXAM:  Vitals:    02/07/22 0908   BP: (!) 127/59   Pulse: 80   Resp: 17   Temp: 97.9 °F (36.6 °C)   TempSrc: Oral   SpO2: 96%   Weight: 70.5 kg (155 lb 5 oz)   Height: 5' (1.524 m)   PainSc: 0-No pain       KARNOFSKY PERFORMANCE STATUS 60%  ECOG 2    Physical Exam  Constitutional:       General: She is not in acute distress.     Appearance: She is well-developed.   HENT:      Head: Normocephalic and atraumatic.      Mouth/Throat:      Mouth: No oral lesions.   Eyes:      Conjunctiva/sclera: Conjunctivae normal.   Neck:      Thyroid: No thyromegaly.   Cardiovascular:      Rate and Rhythm: Normal rate and regular rhythm.      Heart sounds: Normal heart sounds. No murmur heard.      Pulmonary:      Breath sounds: Normal breath sounds. No wheezing or rales.   Abdominal:      General: There is no distension.      Palpations: Abdomen is soft. There is no hepatomegaly, splenomegaly or mass.      Tenderness: There is no abdominal tenderness.   Lymphadenopathy:      Cervical: No cervical adenopathy.      Right cervical: No deep cervical adenopathy.     Left cervical: No deep cervical adenopathy.      Lower Body: No right inguinal adenopathy. No left inguinal adenopathy.   Skin:     Findings: No rash.   Neurological:      Mental Status: She is alert and oriented to person, place, and time.      Cranial Nerves: No cranial nerve deficit.      Coordination: Coordination normal.      Deep Tendon Reflexes: Reflexes are normal and symmetric.      Comments: Bilateral lower extremity weakness         LAB:   Results for orders placed or performed in visit on 02/07/22   CBC auto differential   Result Value Ref Range    WBC 7.79 3.90 - 12.70 K/uL    RBC 3.83 (L) 4.00 - 5.40 M/uL    Hemoglobin 12.3 12.0 - 16.0 g/dL    Hematocrit 36.7 (L) 37.0 - 48.5 %    MCV 96 82 - 98 fL    MCH 32.1 (H) 27.0 - 31.0 pg    MCHC 33.5 32.0 - 36.0 g/dL    RDW 12.6 11.5 - 14.5 %    Platelets 314 150 - 450 K/uL    MPV 11.0 9.2 - 12.9 fL     Immature Granulocytes 0.5 0.0 - 0.5 %    Gran # (ANC) 4.2 1.8 - 7.7 K/uL    Immature Grans (Abs) 0.04 0.00 - 0.04 K/uL    Lymph # 2.4 1.0 - 4.8 K/uL    Mono # 0.5 0.3 - 1.0 K/uL    Eos # 0.6 (H) 0.0 - 0.5 K/uL    Baso # 0.14 0.00 - 0.20 K/uL    nRBC 0 0 /100 WBC    Gran % 54.0 38.0 - 73.0 %    Lymph % 30.2 18.0 - 48.0 %    Mono % 6.4 4.0 - 15.0 %    Eosinophil % 7.1 0.0 - 8.0 %    Basophil % 1.8 0.0 - 1.9 %    Differential Method Automated        PROBLEMS ASSESSED THIS VISIT:    1. Disease of blood and blood forming organ        PLAN:       Clonal hematopoiesis of indeterminate potential (CHIP)  Ms. Alan has resolution of all cytopenias. 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     CHIP is a common condition in the older adult population. It has variable potential to develop into hematologic malignancy, though overall considered to be low.     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    Given low risk associated with CHIP, I recommend annual CBC monitoring with primary care. She may return to hematology for any clinically significant cytopenias (see below)    Follow-up  - Return to primary care and monitor CBC annually  - Return to hematology for any clinically significant cytopenias, defined as:    - hemoglobin less than 10 g/dl   - absolute neutrophil count (ANC) less than 1,000   - platelet count less than 100,000    German Parra MD  Hematology and Stem Cell Transplant

## 2022-02-09 RX ORDER — METFORMIN HYDROCHLORIDE 500 MG/1
TABLET, EXTENDED RELEASE ORAL
Qty: 90 TABLET | Refills: 3 | OUTPATIENT
Start: 2022-02-09

## 2022-02-16 DIAGNOSIS — G82.20 PARAPARESIS OF BOTH LOWER LIMBS: ICD-10-CM

## 2022-02-16 DIAGNOSIS — M47.816 LUMBAR SPONDYLOSIS: ICD-10-CM

## 2022-02-16 DIAGNOSIS — M81.0 AGE-RELATED OSTEOPOROSIS WITHOUT CURRENT PATHOLOGICAL FRACTURE: ICD-10-CM

## 2022-02-16 DIAGNOSIS — M17.11 PRIMARY OSTEOARTHRITIS OF RIGHT KNEE: ICD-10-CM

## 2022-02-16 DIAGNOSIS — G14 POST POLIOMYELITIS SYNDROME: ICD-10-CM

## 2022-02-16 DIAGNOSIS — M54.16 LUMBAR RADICULOPATHY: ICD-10-CM

## 2022-02-16 DIAGNOSIS — M47.26 OSTEOARTHRITIS OF SPINE WITH RADICULOPATHY, LUMBAR REGION: ICD-10-CM

## 2022-02-16 DIAGNOSIS — G89.29 CHRONIC BILATERAL LOW BACK PAIN WITH SCIATICA, SCIATICA LATERALITY UNSPECIFIED: ICD-10-CM

## 2022-02-16 DIAGNOSIS — M54.40 CHRONIC BILATERAL LOW BACK PAIN WITH SCIATICA, SCIATICA LATERALITY UNSPECIFIED: ICD-10-CM

## 2022-02-16 DIAGNOSIS — E11.40 TYPE 2 DIABETES MELLITUS WITH DIABETIC NEUROPATHY, WITHOUT LONG-TERM CURRENT USE OF INSULIN: ICD-10-CM

## 2022-02-16 DIAGNOSIS — E11.42 DIABETIC POLYNEUROPATHY ASSOCIATED WITH TYPE 2 DIABETES MELLITUS: ICD-10-CM

## 2022-02-16 DIAGNOSIS — E08.44 DIABETIC AMYOTROPHY ASSOCIATED WITH DIABETES MELLITUS DUE TO UNDERLYING CONDITION: ICD-10-CM
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
## 2022-02-16 DIAGNOSIS — R26.9 GAIT DISORDER: ICD-10-CM

## 2022-02-16 DIAGNOSIS — M54.16 LEFT LUMBAR RADICULOPATHY: ICD-10-CM

## 2022-02-16 DIAGNOSIS — Z86.12 HISTORY OF POLIOMYELITIS: ICD-10-CM

## 2022-02-16 DIAGNOSIS — M48.062 SPINAL STENOSIS OF LUMBAR REGION WITH NEUROGENIC CLAUDICATION: ICD-10-CM

## 2022-02-16 DIAGNOSIS — G89.29 CHRONIC PAIN OF RIGHT KNEE: ICD-10-CM

## 2022-02-16 DIAGNOSIS — M47.16 LUMBAR SPONDYLOSIS WITH MYELOPATHY: ICD-10-CM

## 2022-02-16 DIAGNOSIS — M25.561 CHRONIC PAIN OF RIGHT KNEE: ICD-10-CM

## 2022-02-16 NOTE — TELEPHONE ENCOUNTER
----- Message from Shelly Olivas sent at 2/16/2022  9:20 AM CST -----  Regarding: Refill  Contact: Pt [phone]  Rx Refill/Request    Is this a Refill or New Rx:  Refill     Rx Name and Strength:  HYDROcodone-acetaminophen (NORCO)  mg per tablet      Preferred Pharmacy with phone number:    [facility] - [place], [place] - [address]   [address] [facility] [address]  Phone: [phone] Fax: [phone]          Communication Preference:    [phone]      Additional Information:    Please fill

## 2022-02-16 NOTE — TELEPHONE ENCOUNTER
----- Message from Shelly Olivas sent at 2/16/2022  9:20 AM CST -----  Regarding: Refill  Contact: Pt [phone]  Rx Refill/Request    Is this a Refill or New Rx:  Refill     Rx Name and Strength:  HYDROcodone-acetaminophen (NORCO)  mg per tablet      Preferred Pharmacy with phone number:    [facility] - [place], [place] - [address]   [address] [facility] [address]  Phone: [phone] Fax: [phone]          Communication Preference:    [phone]      Additional Information:    Please fill

## 2022-02-19 RX ORDER — HYDROCODONE BITARTRATE AND ACETAMINOPHEN 10; 325 MG/1; MG/1
1 TABLET ORAL EVERY 6 HOURS PRN
Qty: 120 TABLET | Refills: 0 | Status: SHIPPED | OUTPATIENT
Start: 2022-02-19 | End: 2022-03-28 | Stop reason: SDUPTHER

## 2022-02-25 NOTE — TELEPHONE ENCOUNTER
No new care gaps identified.  Powered by [employer]. Reference number: [id number].   2/25/2022 2:17:22 PM CST

## 2022-03-02 RX ORDER — METFORMIN HYDROCHLORIDE 500 MG/1
500 TABLET, EXTENDED RELEASE ORAL DAILY
Qty: 90 TABLET | Refills: 0 | Status: SHIPPED | OUTPATIENT
Start: 2022-03-02 | End: 2022-03-25 | Stop reason: SDUPTHER

## 2022-03-18 ENCOUNTER — TELEPHONE (OUTPATIENT)
Dept: INTERNAL MEDICINE | Facility: CLINIC | Age: 82
End: 2022-03-18
Payer: MEDICARE

## 2022-03-18 RX ORDER — AMOXICILLIN AND CLAVULANATE POTASSIUM 875; 125 MG/1; MG/1
1 TABLET, FILM COATED ORAL 2 TIMES DAILY
Qty: 14 TABLET | Refills: 0 | Status: SHIPPED | OUTPATIENT
Start: 2022-03-18 | End: 2022-03-25

## 2022-03-18 NOTE — TELEPHONE ENCOUNTER
Sure, antibiotics sent in.  Increase fluids, Tylenol, rest and fluids and if symptoms persist, will likely need to be seen for an office visit.

## 2022-03-18 NOTE — TELEPHONE ENCOUNTER
----- Message from Faith Hurtado sent at 3/18/2022  3:02 PM CDT -----  Contact: Carol Ann Cody @ [phone]   Patient would like to get medical advice.  Symptoms (please be specific):  Cough, head ache and eye pains  How long have you had these symptoms: X  24 hrs   Would you like a call back, or a response through your [facility] portal?: Call back   Pharmacy name and phone # (copy from chart): [facility]  Phone: [phone] Fax: [phone]  Comments:   She need to speak with someone  today.

## 2022-03-18 NOTE — TELEPHONE ENCOUNTER
Spoke to pt Daughter stated that pt have a sever cough and cold her eyes hurt, no fever,  , they did an @ home covid test and they were Negative , her symptoms started yesterday   Pt Daughter wants to know can you send something to the pharmacy   Pharmacy updated   [facility]   Please advise

## 2022-03-25 ENCOUNTER — OFFICE VISIT (OUTPATIENT)
Dept: INTERNAL MEDICINE | Facility: CLINIC | Age: 82
End: 2022-03-25
Payer: MEDICARE

## 2022-03-25 ENCOUNTER — TELEPHONE (OUTPATIENT)
Dept: INTERNAL MEDICINE | Facility: CLINIC | Age: 82
End: 2022-03-25

## 2022-03-25 ENCOUNTER — LAB VISIT (OUTPATIENT)
Dept: LAB | Facility: HOSPITAL | Age: 82
End: 2022-03-25
Attending: INTERNAL MEDICINE
Payer: MEDICARE

## 2022-03-25 VITALS
DIASTOLIC BLOOD PRESSURE: 80 MMHG | SYSTOLIC BLOOD PRESSURE: 136 MMHG | BODY MASS INDEX: 30.43 KG/M2 | HEIGHT: 60 IN | WEIGHT: 155 LBS

## 2022-03-25 DIAGNOSIS — G82.20 PARAPARESIS OF BOTH LOWER LIMBS: ICD-10-CM

## 2022-03-25 DIAGNOSIS — I70.201 ATHEROSCLEROSIS OF ARTERY OF RIGHT LOWER EXTREMITY: ICD-10-CM

## 2022-03-25 DIAGNOSIS — G47.33 OBSTRUCTIVE SLEEP APNEA SYNDROME: ICD-10-CM

## 2022-03-25 DIAGNOSIS — I10 PRIMARY HYPERTENSION: ICD-10-CM

## 2022-03-25 DIAGNOSIS — M81.0 AGE-RELATED OSTEOPOROSIS WITHOUT CURRENT PATHOLOGICAL FRACTURE: ICD-10-CM

## 2022-03-25 DIAGNOSIS — N39.46 MIXED INCONTINENCE: ICD-10-CM

## 2022-03-25 DIAGNOSIS — E11.42 DIABETIC POLYNEUROPATHY ASSOCIATED WITH TYPE 2 DIABETES MELLITUS: ICD-10-CM

## 2022-03-25 DIAGNOSIS — H61.20 CERUMEN DEBRIS ON TYMPANIC MEMBRANE, UNSPECIFIED LATERALITY: ICD-10-CM

## 2022-03-25 DIAGNOSIS — D75.9 DISEASE OF BLOOD AND BLOOD FORMING ORGAN: ICD-10-CM

## 2022-03-25 DIAGNOSIS — Z00.00 ANNUAL PHYSICAL EXAM: Primary | ICD-10-CM

## 2022-03-25 DIAGNOSIS — E11.40 TYPE 2 DIABETES MELLITUS WITH DIABETIC NEUROPATHY, WITHOUT LONG-TERM CURRENT USE OF INSULIN: ICD-10-CM

## 2022-03-25 DIAGNOSIS — M46.96 UNSPECIFIED INFLAMMATORY SPONDYLOPATHY, LUMBAR REGION: ICD-10-CM

## 2022-03-25 DIAGNOSIS — E78.2 MIXED HYPERLIPIDEMIA: ICD-10-CM

## 2022-03-25 DIAGNOSIS — E66.09 CLASS 1 OBESITY DUE TO EXCESS CALORIES WITH SERIOUS COMORBIDITY AND BODY MASS INDEX (BMI) OF 30.0 TO 30.9 IN ADULT: ICD-10-CM

## 2022-03-25 DIAGNOSIS — G14 POST POLIOMYELITIS SYNDROME: ICD-10-CM

## 2022-03-25 DIAGNOSIS — E11.21 TYPE 2 DIABETES MELLITUS WITH DIABETIC NEPHROPATHY, WITHOUT LONG-TERM CURRENT USE OF INSULIN: ICD-10-CM

## 2022-03-25 PROBLEM — M25.512 LEFT SHOULDER PAIN: Status: RESOLVED | Noted: 2019-12-02 | Resolved: 2022-03-25

## 2022-03-25 LAB
ESTIMATED AVG GLUCOSE: 169 MG/DL (ref 68–131)
HBA1C MFR BLD: 7.5 % (ref 4–5.6)

## 2022-03-25 PROCEDURE — 83036 HEMOGLOBIN GLYCOSYLATED A1C: CPT | Performed by: INTERNAL MEDICINE

## 2022-03-25 PROCEDURE — 99999 PR PBB SHADOW E&M-EST. PATIENT-LVL IV: CPT | Mod: PBBFAC,,, | Performed by: INTERNAL MEDICINE

## 2022-03-25 PROCEDURE — 3079F DIAST BP 80-89 MM HG: CPT | Mod: CPTII,S$GLB,, | Performed by: INTERNAL MEDICINE

## 2022-03-25 PROCEDURE — 99397 PR PREVENTIVE VISIT,EST,65 & OVER: ICD-10-PCS | Mod: S$GLB,,, | Performed by: INTERNAL MEDICINE

## 2022-03-25 PROCEDURE — 1126F PR PAIN SEVERITY QUANTIFIED, NO PAIN PRESENT: ICD-10-PCS | Mod: CPTII,S$GLB,, | Performed by: INTERNAL MEDICINE

## 2022-03-25 PROCEDURE — 99999 PR PBB SHADOW E&M-EST. PATIENT-LVL IV: ICD-10-PCS | Mod: PBBFAC,,, | Performed by: INTERNAL MEDICINE

## 2022-03-25 PROCEDURE — 3075F PR MOST RECENT SYSTOLIC BLOOD PRESS GE 130-139MM HG: ICD-10-PCS | Mod: CPTII,S$GLB,, | Performed by: INTERNAL MEDICINE

## 2022-03-25 PROCEDURE — 99397 PER PM REEVAL EST PAT 65+ YR: CPT | Mod: S$GLB,,, | Performed by: INTERNAL MEDICINE

## 2022-03-25 PROCEDURE — 3075F SYST BP GE 130 - 139MM HG: CPT | Mod: CPTII,S$GLB,, | Performed by: INTERNAL MEDICINE

## 2022-03-25 PROCEDURE — 3079F PR MOST RECENT DIASTOLIC BLOOD PRESSURE 80-89 MM HG: ICD-10-PCS | Mod: CPTII,S$GLB,, | Performed by: INTERNAL MEDICINE

## 2022-03-25 PROCEDURE — 36415 COLL VENOUS BLD VENIPUNCTURE: CPT | Performed by: INTERNAL MEDICINE

## 2022-03-25 PROCEDURE — 1126F AMNT PAIN NOTED NONE PRSNT: CPT | Mod: CPTII,S$GLB,, | Performed by: INTERNAL MEDICINE

## 2022-03-25 RX ORDER — OXYBUTYNIN CHLORIDE 5 MG/1
5 TABLET ORAL 2 TIMES DAILY
Qty: 180 TABLET | Refills: 3 | Status: SHIPPED | OUTPATIENT
Start: 2022-03-25 | End: 2023-01-11

## 2022-03-25 RX ORDER — ACETAMINOPHEN 500 MG
500 TABLET ORAL
Status: CANCELLED | OUTPATIENT
Start: 2022-03-25

## 2022-03-25 RX ORDER — ASPIRIN 81 MG/1
81 TABLET ORAL DAILY
Qty: 30 TABLET | Refills: 12
Start: 2022-03-25 | End: 2023-04-26 | Stop reason: SDUPTHER

## 2022-03-25 RX ORDER — SODIUM CHLORIDE 0.9 % (FLUSH) 0.9 %
10 SYRINGE (ML) INJECTION
Status: CANCELLED | OUTPATIENT
Start: 2022-03-25

## 2022-03-25 RX ORDER — HEPARIN 100 UNIT/ML
500 SYRINGE INTRAVENOUS
Status: CANCELLED | OUTPATIENT
Start: 2022-03-25

## 2022-03-25 RX ORDER — GUAIFENESIN 600 MG/1
1200 TABLET, EXTENDED RELEASE ORAL 2 TIMES DAILY
Qty: 20 TABLET | Refills: 1 | Status: SHIPPED | OUTPATIENT
Start: 2022-03-25 | End: 2023-04-26

## 2022-03-25 RX ORDER — METFORMIN HYDROCHLORIDE 500 MG/1
500 TABLET, EXTENDED RELEASE ORAL 2 TIMES DAILY WITH MEALS
Qty: 180 TABLET | Refills: 3 | Status: SHIPPED | OUTPATIENT
Start: 2022-03-25 | End: 2022-09-26 | Stop reason: SDUPTHER

## 2022-03-25 RX ORDER — ZOLEDRONIC ACID 5 MG/100ML
5 INJECTION, SOLUTION INTRAVENOUS
Status: CANCELLED | OUTPATIENT
Start: 2022-03-25

## 2022-03-25 NOTE — TELEPHONE ENCOUNTER
----- Message from Eric Corbin MD sent at 3/25/2022  3:20 PM CDT -----  Hello,    We restarted her Reclast in December 2020 so she got a dose then and was due for a yearly dose but it looks like this never got scheduled.  I renewed her orders and have asked my staff to help coordinate her dose of Reclast as well as follow-up with Endocrine.  Thanks!    Eric     ----- Message -----  From: Lauren Deras MD  Sent: 3/25/2022  12:18 PM CDT  To: Eric Corbin MD    Hi, mutual patient with osteoporosis.  She had been getting zoledronic acid but it does not look like she has had it recently, can you review when she should have this done again?  Thank you    LB

## 2022-03-25 NOTE — PROGRESS NOTES
"Subjective:       Patient ID: Emilia Alan is a 81 y.o. female.    Chief Complaint: Annual Exam    Annual exam    Recent URI, she took Augmentin and this helped but she is still congested.   Sx not in chest, mainly in head.  No fever, chills or sweats.  No shortness of breath or chest pain.  No GI or  symptoms.    BP doing well; is in the digital program.    Diabetes stable with A1c 6.6 in October.    On zoledronic acid for her osteoporosis- need to review when this was last completed.    Would like to see optometry as well as Podiatry and ENT.  Prone to cerumen impaction.    Discussed booster for COVID which would be due in the summer.    Seen in Hematology February 2022:  "Clonal hematopoiesis of indeterminate potential (CHIP)  Ms. Alan has resolution of all cytopenias. 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   CHIP is a common condition in the older adult population. It has variable potential to develop into hematologic malignancy, though overall considered to be low.   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 Given low risk associated with CHIP, I recommend annual CBC monitoring with primary care. She may return to hematology for any clinically significant cytopenias (see below)     Follow-up  - Return to primary care and monitor CBC annually  - Return to hematology for any clinically significant cytopenias, defined as:               - hemoglobin less than 10 g/dl              - absolute neutrophil count (ANC) less than 1,000              - platelet count less than 100,000"    Patient Active Problem List:     Gait disorder     History of poliomyelitis     Post poliomyelitis syndrome     Diabetic polyneuropathy associated with type 2 diabetes mellitus     Mixed hyperlipidemia     Osteoporosis without current pathological " fracture: worse on fosamax 5/16- Reclast 8/16, 12/20     Paraparesis of both lower limbs     Hypertension     Type 2 diabetes mellitus with diabetic neuropathy, without long-term current use of insulin     Osteoarthritis of right knee     Lumbar spinal stenosis     Obstructive sleep apnea syndrome: dx 2008 needs CPAP 11     Bradycardia, drug induced     Atherosclerosis of artery of right lower extremity: see xray 4/4/07     Essential tremor     Clonal Hematopoiesis of Indeterminate Potential (CHIP)     Type 2 diabetes mellitus with renal manifestations     Facet arthritis of lumbar region     Class 1 obesity due to excess calories with serious comorbidity and body mass index (BMI) of 30.0 to 30.9 in adult     Opioid use agreement exists     Primary osteoarthritis of left shoulder     Unspecified inflammatory spondylopathy, lumbar region         Review of Systems   Constitutional: Negative for activity change, appetite change, chills, fatigue and fever.   HENT: Positive for postnasal drip and sinus pressure. Negative for congestion, ear pain, hearing loss and sore throat.    Eyes: Negative.  Negative for visual disturbance.   Respiratory: Negative for apnea, cough, chest tightness, shortness of breath and wheezing.    Cardiovascular: Negative.  Negative for chest pain, palpitations and leg swelling.   Gastrointestinal: Negative.  Negative for abdominal distention, abdominal pain, constipation, diarrhea, nausea and vomiting.   Genitourinary: Negative for dysuria, frequency, hematuria and vaginal bleeding.   Musculoskeletal: Positive for arthralgias. Negative for gait problem, joint swelling and myalgias.        Chronic, mild and stable   Skin: Negative for rash.   Neurological: Negative for dizziness, weakness, light-headedness and headaches.   Hematological: Negative for adenopathy. Does not bruise/bleed easily.   Psychiatric/Behavioral: Negative for confusion, hallucinations, sleep disturbance and suicidal ideas.        Objective:      Physical Exam  Constitutional:       Appearance: She is well-developed.      Comments: In a wheelchair   HENT:      Head: Normocephalic and atraumatic.      Comments: Cerumen bilaterally     Right Ear: External ear normal. There is impacted cerumen.      Left Ear: External ear normal. There is impacted cerumen.   Eyes:      Extraocular Movements: Extraocular movements intact.      Conjunctiva/sclera: Conjunctivae normal.   Neck:      Thyroid: No thyromegaly.   Cardiovascular:      Rate and Rhythm: Normal rate and regular rhythm.      Heart sounds: Normal heart sounds.   Pulmonary:      Effort: No respiratory distress.      Breath sounds: No wheezing or rales.   Abdominal:      General: Bowel sounds are normal.      Palpations: Abdomen is soft.   Musculoskeletal:      Cervical back: Normal range of motion and neck supple.   Lymphadenopathy:      Cervical: No cervical adenopathy.   Skin:     General: Skin is warm and dry.      Findings: No erythema or rash.   Neurological:      General: No focal deficit present.      Mental Status: She is alert and oriented to person, place, and time.   Psychiatric:         Mood and Affect: Mood normal.         Behavior: Behavior normal.         Thought Content: Thought content normal.         Judgment: Judgment normal.         Assessment:       1. Annual physical exam    2. Mixed incontinence    3. Post poliomyelitis syndrome    4. Diabetic polyneuropathy associated with type 2 diabetes mellitus    5. Mixed hyperlipidemia    6. Primary hypertension    7. Type 2 diabetes mellitus with diabetic neuropathy, without long-term current use of insulin    8. Type 2 diabetes mellitus with diabetic nephropathy, without long-term current use of insulin    9. Class 1 obesity due to excess calories with serious comorbidity and body mass index (BMI) of 30.0 to 30.9 in adult    10. Unspecified inflammatory spondylopathy, lumbar region    11. Obstructive sleep apnea syndrome: dx  2008 needs CPAP 11    12. Clonal Hematopoiesis of Indeterminate Potential (CHIP)    13. Atherosclerosis of artery of right lower extremity: see xray 4/4/07    14. Paraparesis of both lower limbs    15. Cerumen debris on tympanic membrane, unspecified laterality    16. Osteoporosis without current pathological fracture: worse on fosamax 5/16- Reclast 8/16, 12/20        Plan:         Emilia was seen today for annual exam.    Diagnoses and all orders for this visit:    Annual physical exam    Mixed incontinence  -     oxybutynin (DITROPAN) 5 MG Tab; Take 1 tablet (5 mg total) by mouth 2 (two) times a day.    Post poliomyelitis syndrome  -     BATH/SHOWER CHAIR FOR HOME USE    Diabetic polyneuropathy associated with type 2 diabetes mellitus:  Stable    Mixed hyperlipidemia:  Continue regimen    Primary hypertension:  Continue regimen    Type 2 diabetes mellitus with diabetic neuropathy, without long-term current use of insulin  -     Ambulatory referral/consult to Podiatry; Future  -     Ambulatory referral/consult to Optometry; Future    Type 2 diabetes mellitus with diabetic nephropathy, without long-term current use of insulin:  Continue regimen; A1c today.  Schedule Podiatry not to follow-up    Class 1 obesity due to excess calories with serious comorbidity and body mass index (BMI) of 30.0 to 30.9 in adult; portion control.  Improved overall, continue to work on this    Unspecified inflammatory spondylopathy, lumbar region; stable    Obstructive sleep apnea syndrome: dx 2008 needs CPAP 11; stable    Clonal Hematopoiesis of Indeterminate Potential (CHIP):  See notes from Hematology    Atherosclerosis of artery of right lower extremity: see xray 4/4/07:  No alarm symptoms    Paraparesis of both lower limbs:  Stable    Cerumen debris on tympanic membrane, unspecified laterality  -     Ambulatory referral/consult to ENT; Future    Other orders  -     metFORMIN (GLUCOPHAGE-XR) 500 MG ER 24hr tablet; Take 1 tablet (500 mg  total) by mouth 2 (two) times daily with meals.  -     aspirin (ECOTRIN) 81 MG EC tablet; Take 1 tablet (81 mg total) by mouth once daily.  -     guaiFENesin (MUCINEX) 600 mg 12 hr tablet; Take 2 tablets (1,200 mg total) by mouth 2 (two) times daily.    A1c today  Rest, fluids, acetaminophen, mucinex and follow up poor results; she recently completed antibiotics and symptoms are better but she still has a little bit of sinus congestion however no purulent drainage  Chart to Endocrine regarding zoledronic acid treatment  COVID booster within the next few months  I will review all studies and determine further tx depending on findings

## 2022-03-25 NOTE — Clinical Note
Hi, mutual patient with osteoporosis.  She had been getting zoledronic acid but it does not look like she has had it recently, can you review when she should have this done again?  Thank you  LB

## 2022-03-25 NOTE — PROGRESS NOTES
Reclast resumed 12/20/2020, ending drug holiday due to fracture.  Patient was due for repeat dose of Reclast in 12/2021 which she has not received.  Please coordinate with patient to schedule Reclast infusion soon as well as visit with next available provider for follow-up of osteoporosis.  reclast renewed
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
## 2022-03-28 DIAGNOSIS — M54.40 CHRONIC BILATERAL LOW BACK PAIN WITH SCIATICA, SCIATICA LATERALITY UNSPECIFIED: ICD-10-CM

## 2022-03-28 DIAGNOSIS — G82.20 PARAPARESIS OF BOTH LOWER LIMBS: ICD-10-CM

## 2022-03-28 DIAGNOSIS — M48.062 SPINAL STENOSIS OF LUMBAR REGION WITH NEUROGENIC CLAUDICATION: ICD-10-CM

## 2022-03-28 DIAGNOSIS — Z86.12 HISTORY OF POLIOMYELITIS: ICD-10-CM

## 2022-03-28 DIAGNOSIS — G14 POST POLIOMYELITIS SYNDROME: ICD-10-CM

## 2022-03-28 DIAGNOSIS — G89.29 CHRONIC BILATERAL LOW BACK PAIN WITH SCIATICA, SCIATICA LATERALITY UNSPECIFIED: ICD-10-CM

## 2022-03-28 DIAGNOSIS — G89.29 CHRONIC PAIN OF RIGHT KNEE: ICD-10-CM

## 2022-03-28 DIAGNOSIS — E08.44 DIABETIC AMYOTROPHY ASSOCIATED WITH DIABETES MELLITUS DUE TO UNDERLYING CONDITION: ICD-10-CM

## 2022-03-28 DIAGNOSIS — M81.0 AGE-RELATED OSTEOPOROSIS WITHOUT CURRENT PATHOLOGICAL FRACTURE: ICD-10-CM

## 2022-03-28 DIAGNOSIS — E11.40 TYPE 2 DIABETES MELLITUS WITH DIABETIC NEUROPATHY, WITHOUT LONG-TERM CURRENT USE OF INSULIN: ICD-10-CM

## 2022-03-28 DIAGNOSIS — M54.16 LUMBAR RADICULOPATHY: ICD-10-CM

## 2022-03-28 DIAGNOSIS — M25.561 CHRONIC PAIN OF RIGHT KNEE: ICD-10-CM

## 2022-03-28 DIAGNOSIS — M47.816 LUMBAR SPONDYLOSIS: ICD-10-CM

## 2022-03-28 DIAGNOSIS — M47.26 OSTEOARTHRITIS OF SPINE WITH RADICULOPATHY, LUMBAR REGION: ICD-10-CM

## 2022-03-28 DIAGNOSIS — R26.9 GAIT DISORDER: ICD-10-CM

## 2022-03-28 DIAGNOSIS — E11.42 DIABETIC POLYNEUROPATHY ASSOCIATED WITH TYPE 2 DIABETES MELLITUS: ICD-10-CM

## 2022-03-28 DIAGNOSIS — M17.11 PRIMARY OSTEOARTHRITIS OF RIGHT KNEE: ICD-10-CM

## 2022-03-28 DIAGNOSIS — M54.16 LEFT LUMBAR RADICULOPATHY: ICD-10-CM

## 2022-03-28 DIAGNOSIS — M47.16 LUMBAR SPONDYLOSIS WITH MYELOPATHY: ICD-10-CM

## 2022-03-28 NOTE — TELEPHONE ENCOUNTER
----- Message from Yvonne Livingston sent at 3/28/2022 10:30 AM CDT -----  Contact: self @ [phone]  Pt is req a refill for hydrocodone 10/325.       [facility] - [place], [place] - [address]   Phone:  [phone]  Fax:  [phone]

## 2022-03-31 ENCOUNTER — TELEPHONE (OUTPATIENT)
Dept: PHYSICAL MEDICINE AND REHAB | Facility: CLINIC | Age: 82
End: 2022-03-31
Payer: MEDICARE

## 2022-03-31 NOTE — TELEPHONE ENCOUNTER
----- Message from Shanda Lele sent at 3/31/2022  9:57 AM CDT -----  Contact: Patient  Type: Patient Call Back         Who called: Patient         What is the request in detail: calling back to f/u regarding a refill request of HYDROcodone-acetaminophen (NORCO)  mg per tablet; needs before leaving on 4/5 for cruise; please advise         Can the clinic reply by [facility]? Call back         Would the patient rather a call back or a response via [facility]? Call back         Best call back number: [phone]         Additional Information:   [facility] - [place], [place] - [address]  [address] [address]  Phone: [phone] Fax: [phone]             Thank You

## 2022-04-02 RX ORDER — HYDROCODONE BITARTRATE AND ACETAMINOPHEN 10; 325 MG/1; MG/1
1 TABLET ORAL EVERY 6 HOURS PRN
Qty: 120 TABLET | Refills: 0 | Status: SHIPPED | OUTPATIENT
Start: 2022-04-02 | End: 2022-04-29 | Stop reason: SDUPTHER

## 2022-04-07 DIAGNOSIS — E78.5 HYPERLIPIDEMIA, UNSPECIFIED HYPERLIPIDEMIA TYPE: ICD-10-CM

## 2022-04-07 NOTE — TELEPHONE ENCOUNTER
Unable to retrieve patient chart and identify care gaps.  Powered by [employer]. Reference number: [id number].   4/07/2022 5:16:54 PM CDT

## 2022-04-08 ENCOUNTER — CLINICAL SUPPORT (OUTPATIENT)
Dept: URGENT CARE | Facility: CLINIC | Age: 82
End: 2022-04-08
Payer: MEDICARE

## 2022-04-08 DIAGNOSIS — Z20.822 ENCOUNTER FOR LABORATORY TESTING FOR COVID-19 VIRUS: Primary | ICD-10-CM

## 2022-04-08 LAB
CTP QC/QA: YES
SARS-COV-2 RDRP RESP QL NAA+PROBE: NEGATIVE

## 2022-04-08 PROCEDURE — U0002: ICD-10-PCS | Mod: QW,S$GLB,, | Performed by: PHYSICIAN ASSISTANT

## 2022-04-08 PROCEDURE — 99211 PR OFFICE/OUTPT VISIT, EST, LEVL I: ICD-10-PCS | Mod: S$GLB,CS,, | Performed by: FAMILY MEDICINE

## 2022-04-08 PROCEDURE — 99211 OFF/OP EST MAY X REQ PHY/QHP: CPT | Mod: S$GLB,CS,, | Performed by: FAMILY MEDICINE

## 2022-04-08 PROCEDURE — U0002 COVID-19 LAB TEST NON-CDC: HCPCS | Mod: QW,S$GLB,, | Performed by: PHYSICIAN ASSISTANT

## 2022-04-08 RX ORDER — PRAVASTATIN SODIUM 40 MG/1
60 TABLET ORAL NIGHTLY
Qty: 135 TABLET | Refills: 1 | Status: SHIPPED | OUTPATIENT
Start: 2022-04-08 | End: 2022-09-04

## 2022-04-08 NOTE — TELEPHONE ENCOUNTER
Refill Authorization Note   Emilia Dayanna  is requesting a refill authorization.  Brief Assessment and Rationale for Refill:                 Comments:     No Care Gaps recommended.     Note composed:4:04 PM 04/08/2022

## 2022-04-08 NOTE — PATIENT INSTRUCTIONS
Your test was NEGATIVE for COVID-19 (coronavirus).      You may leave home and/or return to work when the following conditions are met:  24 hours fever free without fever-reducing medications AND  Improved symptoms  You are fully vaccinated or have not had close contact with someone with COVID-19 (within 6 feet for 15 minutes or more)    If you are fully vaccinated and had a close contact, there is no need for quarantine, unless you develop symptoms.      If you are not fully vaccinated and had a close contact:  Retest at 5 to 7 days post-exposure  If possible, it is recommended that you quarantine for 5 days from the time of contact regardless of your test status.  A mask should be worn indoors post quarantine.    If your symptoms worsen or if you have any other concerns, please contact [facility] at [phone].     Sincerely,    Destin Cooper MA

## 2022-04-19 ENCOUNTER — OFFICE VISIT (OUTPATIENT)
Dept: PODIATRY | Facility: CLINIC | Age: 82
End: 2022-04-19
Payer: MEDICARE

## 2022-04-19 VITALS
SYSTOLIC BLOOD PRESSURE: 154 MMHG | WEIGHT: 155 LBS | HEART RATE: 87 BPM | DIASTOLIC BLOOD PRESSURE: 79 MMHG | BODY MASS INDEX: 30.43 KG/M2 | HEIGHT: 60 IN

## 2022-04-19 DIAGNOSIS — M21.372 FOOT DROP, LEFT: ICD-10-CM

## 2022-04-19 DIAGNOSIS — B35.1 ONYCHOMYCOSIS DUE TO DERMATOPHYTE: ICD-10-CM

## 2022-04-19 DIAGNOSIS — E11.40 TYPE 2 DIABETES MELLITUS WITH DIABETIC NEUROPATHY, WITHOUT LONG-TERM CURRENT USE OF INSULIN: ICD-10-CM

## 2022-04-19 DIAGNOSIS — G14 POST-POLIO SYNDROME: Primary | ICD-10-CM

## 2022-04-19 DIAGNOSIS — G60.9 IDIOPATHIC PERIPHERAL NEUROPATHY: ICD-10-CM

## 2022-04-19 DIAGNOSIS — L84 CORN OR CALLUS: ICD-10-CM

## 2022-04-19 PROCEDURE — 99999 PR PBB SHADOW E&M-EST. PATIENT-LVL IV: CPT | Mod: PBBFAC,,, | Performed by: PODIATRIST

## 2022-04-19 PROCEDURE — 1125F PR PAIN SEVERITY QUANTIFIED, PAIN PRESENT: ICD-10-PCS | Mod: CPTII,S$GLB,, | Performed by: PODIATRIST

## 2022-04-19 PROCEDURE — 3077F SYST BP >= 140 MM HG: CPT | Mod: CPTII,S$GLB,, | Performed by: PODIATRIST

## 2022-04-19 PROCEDURE — 11055 PARING/CUTG B9 HYPRKER LES 1: CPT | Mod: Q9,S$GLB,, | Performed by: PODIATRIST

## 2022-04-19 PROCEDURE — 3078F DIAST BP <80 MM HG: CPT | Mod: CPTII,S$GLB,, | Performed by: PODIATRIST

## 2022-04-19 PROCEDURE — 1159F MED LIST DOCD IN RCRD: CPT | Mod: CPTII,S$GLB,, | Performed by: PODIATRIST

## 2022-04-19 PROCEDURE — 3078F PR MOST RECENT DIASTOLIC BLOOD PRESSURE < 80 MM HG: ICD-10-PCS | Mod: CPTII,S$GLB,, | Performed by: PODIATRIST

## 2022-04-19 PROCEDURE — 3051F HG A1C>EQUAL 7.0%<8.0%: CPT | Mod: CPTII,S$GLB,, | Performed by: PODIATRIST

## 2022-04-19 PROCEDURE — 11721 PR DEBRIDEMENT OF NAILS, 6 OR MORE: ICD-10-PCS | Mod: Q9,59,S$GLB, | Performed by: PODIATRIST

## 2022-04-19 PROCEDURE — 99999 PR PBB SHADOW E&M-EST. PATIENT-LVL IV: ICD-10-PCS | Mod: PBBFAC,,, | Performed by: PODIATRIST

## 2022-04-19 PROCEDURE — 3077F PR MOST RECENT SYSTOLIC BLOOD PRESSURE >= 140 MM HG: ICD-10-PCS | Mod: CPTII,S$GLB,, | Performed by: PODIATRIST

## 2022-04-19 PROCEDURE — 1159F PR MEDICATION LIST DOCUMENTED IN MEDICAL RECORD: ICD-10-PCS | Mod: CPTII,S$GLB,, | Performed by: PODIATRIST

## 2022-04-19 PROCEDURE — 1125F AMNT PAIN NOTED PAIN PRSNT: CPT | Mod: CPTII,S$GLB,, | Performed by: PODIATRIST

## 2022-04-19 PROCEDURE — 11055 PR TRIM HYPERKERATOTIC SKIN LESION, ONE: ICD-10-PCS | Mod: Q9,S$GLB,, | Performed by: PODIATRIST

## 2022-04-19 PROCEDURE — 11721 DEBRIDE NAIL 6 OR MORE: CPT | Mod: Q9,59,S$GLB, | Performed by: PODIATRIST

## 2022-04-19 PROCEDURE — 3051F PR MOST RECENT HEMOGLOBIN A1C LEVEL 7.0 - < 8.0%: ICD-10-PCS | Mod: CPTII,S$GLB,, | Performed by: PODIATRIST

## 2022-04-19 PROCEDURE — 99213 PR OFFICE/OUTPT VISIT, EST, LEVL III, 20-29 MIN: ICD-10-PCS | Mod: 25,S$GLB,, | Performed by: PODIATRIST

## 2022-04-19 PROCEDURE — 99213 OFFICE O/P EST LOW 20 MIN: CPT | Mod: 25,S$GLB,, | Performed by: PODIATRIST

## 2022-04-19 RX ORDER — CLOTRIMAZOLE AND BETAMETHASONE DIPROPIONATE 10; .64 MG/G; MG/G
CREAM TOPICAL 2 TIMES DAILY
Qty: 45 G | Refills: 3 | Status: SHIPPED | OUTPATIENT
Start: 2022-04-19 | End: 2022-07-19 | Stop reason: SDUPTHER

## 2022-04-28 NOTE — PROGRESS NOTES
Subjective:      Patient ID: Emilia Alan is a 81 y.o. female.    Chief Complaint: Nail Care and Diabetes Mellitus ([facility]cp[facility]ino 3/25/2022)    Emilia is a 81 y.o. female who presents to the clinic for evaluation and treatment of high risk feet. 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 The patient's chief complaint is long, thick toenails and dry red itchy skin L foot       PCP: Lauren Deras MD    Date Last Seen by PCP:   Chief Complaint   Patient presents with    Nail Care    Diabetes Mellitus     Pcp[facility] 3/25/2022         Current shoe gear: DM shoes w/ AFO brace( L)    Hemoglobin A1C   Date Value Ref Range Status   03/25/2022 7.5 (H) 4.0 - 5.6 % Final     Comment:     ADA Screening Guidelines:  5.7-6.4%  Consistent with prediabetes  >or=6.5%  Consistent with diabetes    High levels of fetal hemoglobin interfere with the HbA1C  assay. Heterozygous hemoglobin variants (HbS, HgC, etc)do  not significantly interfere with this assay.   However, presence of multiple variants may affect accuracy.     10/13/2021 6.6 (H) 4.0 - 5.6 % Final     Comment:     ADA Screening Guidelines:  5.7-6.4%  Consistent with prediabetes  >or=6.5%  Consistent with diabetes    High levels of fetal hemoglobin interfere  with the HbA1C  assay. Heterozygous hemoglobin variants (HbS, HgC, etc)do  not significantly interfere with this assay.   However, presence of multiple variants may affect accuracy.     04/08/2021 6.0 (H) 4.0 - 5.6 % Final     Comment:     ADA Screening Guidelines:  5.7-6.4%  Consistent with prediabetes  >or=6.5%  Consistent with diabetes    High levels of fetal hemoglobin interfere with the HbA1C  assay. Heterozygous hemoglobin variants (HbS, HgC, etc)do  not significantly interfere with this assay.   However, presence of multiple variants may affect accuracy.           Review of Systems   Constitutional: Negative for chills, decreased appetite and fever.   Cardiovascular: Positive for leg swelling (new onset). Negative for chest pain and claudication.   Respiratory: Negative for cough and shortness of breath.    Skin: Positive for dry skin, nail changes and rash. Negative for color change, flushing, itching, poor wound healing and skin cancer.   Musculoskeletal: Positive for muscle weakness (foot drop). Negative for arthritis, back pain, gout, joint pain, joint swelling and myalgias.   Gastrointestinal: Negative for nausea and vomiting.   Neurological: Positive for numbness and paresthesias. Negative for loss of balance.           Objective:       Vitals:    04/19/22 1426   BP: (!) 154/79   Pulse: 87   Weight: 70.3 kg (154 lb 15.7 oz)   Height: 5' (1.524 m)   PainSc:   7   PainLoc: Foot        Physical Exam  Vitals and nursing note reviewed.   Constitutional:       General: She is not in acute distress.     Appearance: She is well-developed. She is not diaphoretic.   Cardiovascular:      Comments: Dorsalis pedis and posterior tibial pulses are diminished Bilaterally. Toes are cool to touch. Feet are warm proximally.There is decreased digital hair. Skin is atrophic, slightly hyperpigmented, and mildly edematous      Musculoskeletal:         General: No tenderness or signs of injury.      Right ankle: Normal.       Left ankle: Normal.      Right foot: No swelling, deformity or crepitus.      Left foot: No swelling, deformity or crepitus.      Comments: Dropfoot left.      Lymphadenopathy:      Comments: No palpable lymph nodes   Skin:     General: Skin is warm and dry.      Coloration: Skin is not pale.      Findings: No abrasion, bruising, burn, laceration, lesion, petechiae or rash.      Nails: There is no clubbing.      Comments: Nails 1-5 b/l  are elongated by  4-9mm's, thickened by 3-5 mm's, dystrophic, and are darkened in  coloration . Xerosis Bilaterally. No open lesions noted.    Scaling dryness in a moccasin distribution is noted to the bilateral lower extremities with associated erythema.      Hyperkeratotic tissue noted to plantar L 5th MPJ    Neurological:      Mental Status: She is alert and oriented to person, place, and time.      Comments: [place] 5.07 monofilamant testing is diminished Charbel feet. Sharp/dull sensation diminished Bilaterally. Light touch absent Bilaterally.       Psychiatric:         Thought Content: Thought content normal.         Judgment: Judgment normal.             Assessment:       Encounter Diagnoses   Name Primary?    Type 2 diabetes mellitus with diabetic neuropathy, without long-term current use of insulin     Post-polio syndrome Yes    Idiopathic peripheral neuropathy     Corn or callus     Foot drop, left     Onychomycosis due to dermatophyte          Plan:       Emilia was seen today for nail care and diabetes mellitus.    Diagnoses and all orders for this visit:    Post-polio syndrome    Type 2 diabetes mellitus with diabetic neuropathy, without long-term current use of insulin  -     Ambulatory referral/consult to Podiatry    Idiopathic peripheral neuropathy    Corn or callus    Foot drop, left    Onychomycosis due to dermatophyte    Other orders  -     clotrimazole-betamethasone 1-0.05% (LOTRISONE) cream; Apply topically 2 (two) times daily.      I counseled the  patient on her conditions, their implications and medical management.    - Shoe inspection.Patient instructed on proper foot hygeine. We discussed wearing proper shoe gear, daily foot inspections, never walking without protective shoe gear, never putting sharp instruments to feet, routine podiatric nail visits every 2-3 months.      - With patient's permission, nails were aggressively reduced and debrided x 10 to their soft tissue attachment mechanically and with electric , removing all offending nail and debris. Patient relates relief following the procedure. She will continue to monitor the areas daily, inspect her feet, wear protective shoe gear when ambulatory, moisturizer to maintain skin integrity and follow in this office in approximately 2-3 months, sooner p.r.n.    - After cleansing the  area w/ alcohol prep pad the above mentioned hyperkeratosis was trimmed utilizing No 15 scapel, to a smooth base with out incident. Patient tolerated this  well and reported comfort to the area x1

## 2022-04-29 DIAGNOSIS — G89.29 CHRONIC BILATERAL LOW BACK PAIN WITH SCIATICA, SCIATICA LATERALITY UNSPECIFIED: ICD-10-CM
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
## 2022-04-29 DIAGNOSIS — M25.561 CHRONIC PAIN OF RIGHT KNEE: ICD-10-CM

## 2022-04-29 DIAGNOSIS — E08.44 DIABETIC AMYOTROPHY ASSOCIATED WITH DIABETES MELLITUS DUE TO UNDERLYING CONDITION: ICD-10-CM

## 2022-04-29 DIAGNOSIS — G89.29 CHRONIC PAIN OF RIGHT KNEE: ICD-10-CM

## 2022-04-29 DIAGNOSIS — R26.9 GAIT DISORDER: ICD-10-CM

## 2022-04-29 DIAGNOSIS — Z86.12 HISTORY OF POLIOMYELITIS: ICD-10-CM

## 2022-04-29 DIAGNOSIS — M54.16 LEFT LUMBAR RADICULOPATHY: ICD-10-CM

## 2022-04-29 DIAGNOSIS — M47.816 LUMBAR SPONDYLOSIS: ICD-10-CM

## 2022-04-29 DIAGNOSIS — G14 POST POLIOMYELITIS SYNDROME: ICD-10-CM

## 2022-04-29 DIAGNOSIS — M54.16 LUMBAR RADICULOPATHY: ICD-10-CM

## 2022-04-29 DIAGNOSIS — M54.40 CHRONIC BILATERAL LOW BACK PAIN WITH SCIATICA, SCIATICA LATERALITY UNSPECIFIED: ICD-10-CM

## 2022-04-29 DIAGNOSIS — M47.16 LUMBAR SPONDYLOSIS WITH MYELOPATHY: ICD-10-CM

## 2022-04-29 DIAGNOSIS — M48.062 SPINAL STENOSIS OF LUMBAR REGION WITH NEUROGENIC CLAUDICATION: ICD-10-CM

## 2022-04-29 DIAGNOSIS — E11.40 TYPE 2 DIABETES MELLITUS WITH DIABETIC NEUROPATHY, WITHOUT LONG-TERM CURRENT USE OF INSULIN: ICD-10-CM

## 2022-04-29 DIAGNOSIS — M81.0 AGE-RELATED OSTEOPOROSIS WITHOUT CURRENT PATHOLOGICAL FRACTURE: ICD-10-CM

## 2022-04-29 DIAGNOSIS — M17.11 PRIMARY OSTEOARTHRITIS OF RIGHT KNEE: ICD-10-CM

## 2022-04-29 DIAGNOSIS — E11.42 DIABETIC POLYNEUROPATHY ASSOCIATED WITH TYPE 2 DIABETES MELLITUS: ICD-10-CM

## 2022-04-29 DIAGNOSIS — M47.26 OSTEOARTHRITIS OF SPINE WITH RADICULOPATHY, LUMBAR REGION: ICD-10-CM

## 2022-04-29 DIAGNOSIS — G82.20 PARAPARESIS OF BOTH LOWER LIMBS: ICD-10-CM

## 2022-04-29 RX ORDER — HYDROCODONE BITARTRATE AND ACETAMINOPHEN 10; 325 MG/1; MG/1
1 TABLET ORAL EVERY 6 HOURS PRN
Qty: 120 TABLET | Refills: 0 | Status: SHIPPED | OUTPATIENT
Start: 2022-04-29 | End: 2022-06-08 | Stop reason: SDUPTHER

## 2022-04-29 NOTE — TELEPHONE ENCOUNTER
----- Message from Garrett Tong sent at 4/29/2022  1:06 PM CDT -----  Contact: @[phone]  Rx Refill/Request     Is this a Refill or New Rx: yes    Rx Name and Strength:  HYDROcodone-acetaminophen (NORCO)  mg per tablet  Preferred Pharmacy with phone number:     [facility] - [place], [place] - [address]  [address] [address]  Phone: [phone] Fax: [phone]

## 2022-05-03 DIAGNOSIS — E11.43 TYPE 2 DIABETES MELLITUS WITH AUTONOMIC NEUROPATHY: ICD-10-CM

## 2022-05-03 DIAGNOSIS — E11.40 TYPE 2 DIABETES MELLITUS WITH DIABETIC NEUROPATHY, WITHOUT LONG-TERM CURRENT USE OF INSULIN: ICD-10-CM

## 2022-05-03 DIAGNOSIS — I10 SEVERE HYPERTENSION: ICD-10-CM

## 2022-05-03 RX ORDER — BLOOD-GLUCOSE METER
EACH MISCELLANEOUS
Qty: 1 EACH | Refills: 0 | OUTPATIENT
Start: 2022-05-03

## 2022-05-03 RX ORDER — INSULIN PUMP SYRINGE, 3 ML
EACH MISCELLANEOUS
Refills: 0 | OUTPATIENT
Start: 2022-05-03

## 2022-05-03 RX ORDER — AMLODIPINE BESYLATE 10 MG/1
TABLET ORAL
Qty: 90 TABLET | Refills: 3 | Status: SHIPPED | OUTPATIENT
Start: 2022-05-03 | End: 2022-05-16 | Stop reason: SDUPTHER

## 2022-05-03 RX ORDER — GLIPIZIDE 5 MG/1
TABLET ORAL
Qty: 90 TABLET | Refills: 0 | Status: SHIPPED | OUTPATIENT
Start: 2022-05-03 | End: 2022-09-26 | Stop reason: SDUPTHER

## 2022-05-03 RX ORDER — IRBESARTAN 300 MG/1
TABLET ORAL
Qty: 90 TABLET | Refills: 1 | Status: SHIPPED | OUTPATIENT
Start: 2022-05-03 | End: 2022-09-26 | Stop reason: SDUPTHER

## 2022-05-03 RX ORDER — DICLOFENAC SODIUM 75 MG/1
TABLET, DELAYED RELEASE ORAL
Qty: 180 TABLET | Refills: 0 | Status: SHIPPED | OUTPATIENT
Start: 2022-05-03 | End: 2023-02-15

## 2022-05-03 RX ORDER — ISOPROPYL ALCOHOL 70 ML/100ML
SWAB TOPICAL
Qty: 100 EACH | Refills: 12 | Status: SHIPPED | OUTPATIENT
Start: 2022-05-03 | End: 2022-09-26 | Stop reason: SDUPTHER

## 2022-05-03 RX ORDER — LANCETS 33 GAUGE
EACH MISCELLANEOUS
Qty: 200 EACH | Refills: 0 | OUTPATIENT
Start: 2022-05-03

## 2022-05-03 NOTE — TELEPHONE ENCOUNTER
[facility] Note  Quick DC. Inappropriate Request   Refill request requires further review by MD: NO   Medication Therapy Plan: Pharmacy is requesting new script(s) for the following medications without required information, (sig/ frequency/qty/etc)     [facility] action(s):  Quick Discontinue      Duplicate Pended Encounter(s)/ Last Prescribed Details:    Pharmacies have been requesting medications for patients without required information, (sig, frequency, qty, etc.). In addition, requests are sent for medication(s) pt. are currently not taking, and medications patients have never taken.    We have spoken to the pharmacies about these request types and advised their teams previously that we are unable to assess these New Script requests and require all details for these requests. This is a known issue and has been reported.        Medication related problems are not assessed for QDC.   Medication Reconciliation Completed? NO Were there pending details that required adjustment? NO     Automatic Epic Generated Protocol Data Below:   Requested Prescriptions   Pending Prescriptions Disp Refills    blood-glucose meter (TRUE METRIX GLUCOSE METER) kit [Pharmacy Med Name: TRUE METRIX METER KIT]  0              Appointments      Date Provider   Last Visit   3/25/2022 Lauren Deras MD   Next Visit   Visit date not found Lauren Deras MD        Note composed:3:56 PM 05/03/2022

## 2022-05-03 NOTE — TELEPHONE ENCOUNTER
No new care gaps identified.  Powered by [employer] by [employer]. Reference number: [id number].   5/03/2022 12:28:40 PM CDT

## 2022-05-03 NOTE — TELEPHONE ENCOUNTER
[facility] Note  Quick DC. Inappropriate Request   Refill request requires further review by MD: NO   Medication Therapy Plan: Pharmacy is requesting new script(s) for the following medications without required information, (sig/ frequency/qty/etc)     [facility] action(s):  Quick Discontinue      Duplicate Pended Encounter(s)/ Last Prescribed Details:    Pharmacies have been requesting medications for patients without required information, (sig, frequency, qty, etc.). In addition, requests are sent for medication(s) pt. are currently not taking, and medications patients have never taken.    We have spoken to the pharmacies about these request types and advised their teams previously that we are unable to assess these New Script requests and require all details for these requests. This is a known issue and has been reported.        Medication related problems are not assessed for QDC.   Medication Reconciliation Completed? NO Were there pending details that required adjustment? NO     Automatic Epic Generated Protocol Data Below:   Requested Prescriptions   Pending Prescriptions Disp Refills    blood glucose control, low (TRUE METRIX LEVEL 1) Soln [Pharmacy Med Name: TRUE METRIX CONTROL SOL LEVEL 1] 1 each 0              Appointments      Date Provider   Last Visit   3/25/2022 Lauren Deras MD   Next Visit   5/3/2022 Lauren Deras MD        Note composed:3:56 PM 05/03/2022

## 2022-05-03 NOTE — TELEPHONE ENCOUNTER
No new care gaps identified.  [facility] Embedded Care Gaps. Reference number: [id number]. 5/03/2022   12:46:52 PM CDT

## 2022-05-03 NOTE — TELEPHONE ENCOUNTER
[facility] Note  Quick DC. Inappropriate Request   Refill request requires further review by MD: NO   Medication Therapy Plan: Pharmacy is requesting new script(s) for the following medications without required information, (sig/ frequency/qty/etc)     [facility] action(s):  Quick Discontinue      Duplicate Pended Encounter(s)/ Last Prescribed Details:    Pharmacies have been requesting medications for patients without required information, (sig, frequency, qty, etc.). In addition, requests are sent for medication(s) pt. are currently not taking, and medications patients have never taken.    We have spoken to the pharmacies about these request types and advised their teams previously that we are unable to assess these New Script requests and require all details for these requests. This is a known issue and has been reported.        Medication related problems are not assessed for QDC.   Medication Reconciliation Completed? NO Were there pending details that required adjustment? NO     Automatic Epic Generated Protocol Data Below:   Requested Prescriptions   Pending Prescriptions Disp Refills    lancets (TRUEPLUS LANCETS) 33 gauge Misc [Pharmacy Med Name: TRUEPLUS 33G LANCETS] 200 each 0              Appointments      Date Provider   Last Visit   3/25/2022 Lauren Deras MD   Next Visit   5/3/2022 Lauren Deras MD        Note composed:1:39 PM 05/03/2022

## 2022-05-03 NOTE — TELEPHONE ENCOUNTER
No new care gaps identified.  [facility] Embedded Care Gaps. Reference number: [id number]. 5/03/2022   12:49:02 PM CDT

## 2022-05-03 NOTE — TELEPHONE ENCOUNTER
No new care gaps identified.  [facility] Embedded Care Gaps. Reference number: [id number]. 5/03/2022   12:44:57 PM CDT

## 2022-05-03 NOTE — TELEPHONE ENCOUNTER
[facility] Note  Quick DC. Inappropriate Request   Refill request requires further review by MD: NO   Medication Therapy Plan: Pharmacy is requesting new script(s) for the following medications without required information, (sig/ frequency/qty/etc)     [facility] action(s):  Quick Discontinue      Duplicate Pended Encounter(s)/ Last Prescribed Details:    Pharmacies have been requesting medications for patients without required information, (sig, frequency, qty, etc.). In addition, requests are sent for medication(s) pt. are currently not taking, and medications patients have never taken.    We have spoken to the pharmacies about these request types and advised their teams previously that we are unable to assess these New Script requests and require all details for these requests. This is a known issue and has been reported.        Medication related problems are not assessed for QDC.   Medication Reconciliation Completed? NO Were there pending details that required adjustment? NO     Automatic Epic Generated Protocol Data Below:   Requested Prescriptions   Pending Prescriptions Disp Refills    blood sugar diagnostic (TRUE METRIX GLUCOSE TEST STRIP) Strp [Pharmacy Med Name: [employer] TRUE METRIX TEST STRIP (50)] 200 strip 0              Appointments      Date Provider   Last Visit   3/25/2022 Lauren Deras MD   Next Visit   5/3/2022 Lauren Deras MD        Note composed:1:39 PM 05/03/2022

## 2022-05-03 NOTE — TELEPHONE ENCOUNTER
No new care gaps identified.  [facility] Embedded Care Gaps. Reference number: [id number]. 5/03/2022   12:44:27 PM CDT

## 2022-05-03 NOTE — TELEPHONE ENCOUNTER
No new care gaps identified.  [facility] Embedded Care Gaps. Reference number: [id number]. 5/03/2022   12:45:29 PM CDT

## 2022-05-12 ENCOUNTER — INFUSION (OUTPATIENT)
Dept: INFECTIOUS DISEASES | Facility: HOSPITAL | Age: 82
End: 2022-05-12
Attending: INTERNAL MEDICINE
Payer: MEDICARE

## 2022-05-12 VITALS — TEMPERATURE: 98 F | HEART RATE: 88 BPM

## 2022-05-12 DIAGNOSIS — M81.0 AGE-RELATED OSTEOPOROSIS WITHOUT CURRENT PATHOLOGICAL FRACTURE: Primary | ICD-10-CM

## 2022-05-12 PROCEDURE — 63600175 PHARM REV CODE 636 W HCPCS: Performed by: INTERNAL MEDICINE

## 2022-05-12 PROCEDURE — 96365 THER/PROPH/DIAG IV INF INIT: CPT

## 2022-05-12 RX ORDER — ZOLEDRONIC ACID 5 MG/100ML
5 INJECTION, SOLUTION INTRAVENOUS
Status: COMPLETED | OUTPATIENT
Start: 2022-05-12 | End: 2022-05-12

## 2022-05-12 RX ORDER — ACETAMINOPHEN 500 MG
500 TABLET ORAL
OUTPATIENT
Start: 2022-05-12

## 2022-05-12 RX ORDER — ZOLEDRONIC ACID 5 MG/100ML
5 INJECTION, SOLUTION INTRAVENOUS
OUTPATIENT
Start: 2022-05-12

## 2022-05-12 RX ORDER — ACETAMINOPHEN 500 MG
500 TABLET ORAL
Status: DISCONTINUED | OUTPATIENT
Start: 2022-05-12 | End: 2022-05-12 | Stop reason: HOSPADM

## 2022-05-12 RX ORDER — HEPARIN 100 UNIT/ML
500 SYRINGE INTRAVENOUS
OUTPATIENT
Start: 2022-05-12

## 2022-05-12 RX ORDER — SODIUM CHLORIDE 0.9 % (FLUSH) 0.9 %
10 SYRINGE (ML) INJECTION
OUTPATIENT
Start: 2022-05-12

## 2022-05-12 RX ADMIN — ZOLEDRONIC ACID 5 MG: 5 INJECTION, SOLUTION INTRAVENOUS at 01:05

## 2022-05-12 NOTE — PROGRESS NOTES
Patient arrived for Reclast infusion.  Tylenol 500 mg PO refused.  Infusion initiated.  Tolerated without difficulty and left unit in NAD.

## 2022-05-16 DIAGNOSIS — I10 SEVERE HYPERTENSION: ICD-10-CM

## 2022-05-16 RX ORDER — AMLODIPINE BESYLATE 10 MG/1
TABLET ORAL
Qty: 90 TABLET | Refills: 3 | Status: SHIPPED | OUTPATIENT
Start: 2022-05-16 | End: 2022-09-26 | Stop reason: SDUPTHER

## 2022-05-16 NOTE — TELEPHONE ENCOUNTER
No new care gaps identified.  [facility] Embedded Care Gaps. Reference number: [id number]. 5/16/2022   1:24:31 PM CDT

## 2022-05-16 NOTE — TELEPHONE ENCOUNTER
----- Message from Ligia Zimmerman sent at 5/16/2022  1:18 PM CDT -----  Contact: [employer]  Requesting an RX refill or new RX.  Is this a refill or new RX:shirt fill   RX name and strength (copy/paste from chart): amLODIPine (NORVASC) 10 MG tablet   Is this a 30 day or 90 day RX:   Pharmacy name and phone # (copy/paste from chart):   [id number] - [address]  AT [facility] [address]  [address] [place] [address]  Phone: [phone] Fax: [phone]       The doctors have asked that we provide their patients with the following 2 reminders -- prescription refills can take up to 72 hours, and a friendly reminder that in the future you can use your [facility] account to request refills:

## 2022-06-08 DIAGNOSIS — M47.16 LUMBAR SPONDYLOSIS WITH MYELOPATHY: ICD-10-CM

## 2022-06-08 DIAGNOSIS — E11.42 DIABETIC POLYNEUROPATHY ASSOCIATED WITH TYPE 2 DIABETES MELLITUS: ICD-10-CM

## 2022-06-08 DIAGNOSIS — M81.0 AGE-RELATED OSTEOPOROSIS WITHOUT CURRENT PATHOLOGICAL FRACTURE: ICD-10-CM

## 2022-06-08 DIAGNOSIS — M47.816 LUMBAR SPONDYLOSIS: ICD-10-CM

## 2022-06-08 DIAGNOSIS — G89.29 CHRONIC PAIN OF RIGHT KNEE: ICD-10-CM

## 2022-06-08 DIAGNOSIS — Z86.12 HISTORY OF POLIOMYELITIS: ICD-10-CM

## 2022-06-08 DIAGNOSIS — M17.11 PRIMARY OSTEOARTHRITIS OF RIGHT KNEE: ICD-10-CM

## 2022-06-08 DIAGNOSIS — M54.40 CHRONIC BILATERAL LOW BACK PAIN WITH SCIATICA, SCIATICA LATERALITY UNSPECIFIED: ICD-10-CM

## 2022-06-08 DIAGNOSIS — G82.20 PARAPARESIS OF BOTH LOWER LIMBS: ICD-10-CM

## 2022-06-08 DIAGNOSIS — G89.29 CHRONIC BILATERAL LOW BACK PAIN WITH SCIATICA, SCIATICA LATERALITY UNSPECIFIED: ICD-10-CM

## 2022-06-08 DIAGNOSIS — E11.40 TYPE 2 DIABETES MELLITUS WITH DIABETIC NEUROPATHY, WITHOUT LONG-TERM CURRENT USE OF INSULIN: ICD-10-CM

## 2022-06-08 DIAGNOSIS — M47.26 OSTEOARTHRITIS OF SPINE WITH RADICULOPATHY, LUMBAR REGION: ICD-10-CM

## 2022-06-08 DIAGNOSIS — M48.062 SPINAL STENOSIS OF LUMBAR REGION WITH NEUROGENIC CLAUDICATION: ICD-10-CM
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
## 2022-06-08 DIAGNOSIS — M25.561 CHRONIC PAIN OF RIGHT KNEE: ICD-10-CM

## 2022-06-08 DIAGNOSIS — M54.16 LEFT LUMBAR RADICULOPATHY: ICD-10-CM

## 2022-06-08 DIAGNOSIS — R26.9 GAIT DISORDER: ICD-10-CM

## 2022-06-08 DIAGNOSIS — E08.44 DIABETIC AMYOTROPHY ASSOCIATED WITH DIABETES MELLITUS DUE TO UNDERLYING CONDITION: ICD-10-CM

## 2022-06-08 DIAGNOSIS — G14 POST POLIOMYELITIS SYNDROME: ICD-10-CM

## 2022-06-08 DIAGNOSIS — M54.16 LUMBAR RADICULOPATHY: ICD-10-CM

## 2022-06-08 NOTE — TELEPHONE ENCOUNTER
----- Message from Arturo Amaya sent at 6/8/2022  9:52 AM CDT -----  Who Called:        Refill or New Rx: refill         RX Name and Strength:  HYDROcodone-acetaminophen (NORCO)  mg per tablet        How is the patient currently taking it? (ex. 1XDay)        Is this a 30 day or 90 day RX        Preferred Pharmacy with phone number:  [facility] - [facility] [address]        Local or Mail Order: mail order         Ordering Provider:        Would the patient rather a call back or a response via [facility]? Call back         Best Call Back Number:     [phone]      Additional Information:

## 2022-06-12 RX ORDER — HYDROCODONE BITARTRATE AND ACETAMINOPHEN 10; 325 MG/1; MG/1
1 TABLET ORAL EVERY 6 HOURS PRN
Qty: 120 TABLET | Refills: 0 | Status: SHIPPED | OUTPATIENT
Start: 2022-06-12 | End: 2022-07-12 | Stop reason: SDUPTHER

## 2022-06-15 ENCOUNTER — TELEPHONE (OUTPATIENT)
Dept: INTERNAL MEDICINE | Facility: CLINIC | Age: 82
End: 2022-06-15
Payer: MEDICARE

## 2022-06-15 ENCOUNTER — PATIENT MESSAGE (OUTPATIENT)
Dept: ADMINISTRATIVE | Facility: CLINIC | Age: 82
End: 2022-06-15
Payer: MEDICARE

## 2022-06-15 DIAGNOSIS — U07.1 COVID-19: Primary | ICD-10-CM

## 2022-06-15 DIAGNOSIS — U07.1 COVID: ICD-10-CM

## 2022-06-15 RX ORDER — BENZONATATE 100 MG/1
100 CAPSULE ORAL 3 TIMES DAILY PRN
Qty: 21 CAPSULE | Refills: 1 | Status: SHIPPED | OUTPATIENT
Start: 2022-06-15 | End: 2023-04-26

## 2022-06-15 RX ORDER — PROMETHAZINE HYDROCHLORIDE AND CODEINE PHOSPHATE 6.25; 1 MG/5ML; MG/5ML
5 SOLUTION ORAL NIGHTLY PRN
Qty: 118 ML | Refills: 0 | Status: SHIPPED | OUTPATIENT
Start: 2022-06-15 | End: 2022-06-25

## 2022-06-16 ENCOUNTER — PATIENT MESSAGE (OUTPATIENT)
Dept: ADMINISTRATIVE | Facility: CLINIC | Age: 82
End: 2022-06-16
Payer: MEDICARE

## 2022-06-16 ENCOUNTER — PATIENT MESSAGE (OUTPATIENT)
Dept: ADMINISTRATIVE | Facility: OTHER | Age: 82
End: 2022-06-16
Payer: MEDICARE

## 2022-06-16 ENCOUNTER — NURSE TRIAGE (OUTPATIENT)
Dept: ADMINISTRATIVE | Facility: CLINIC | Age: 82
End: 2022-06-16
Payer: MEDICARE

## 2022-06-16 ENCOUNTER — TELEPHONE (OUTPATIENT)
Dept: PHYSICAL MEDICINE AND REHAB | Facility: CLINIC | Age: 82
End: 2022-06-16
Payer: MEDICARE

## 2022-06-16 DIAGNOSIS — M47.26 OSTEOARTHRITIS OF SPINE WITH RADICULOPATHY, LUMBAR REGION: ICD-10-CM

## 2022-06-16 DIAGNOSIS — E08.44 DIABETIC AMYOTROPHY ASSOCIATED WITH DIABETES MELLITUS DUE TO UNDERLYING CONDITION: ICD-10-CM

## 2022-06-16 DIAGNOSIS — M47.16 LUMBAR SPONDYLOSIS WITH MYELOPATHY: ICD-10-CM

## 2022-06-16 DIAGNOSIS — R26.9 GAIT DISORDER: ICD-10-CM

## 2022-06-16 DIAGNOSIS — M54.40 CHRONIC BILATERAL LOW BACK PAIN WITH SCIATICA, SCIATICA LATERALITY UNSPECIFIED: ICD-10-CM
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
## 2022-06-16 DIAGNOSIS — G89.29 CHRONIC BILATERAL LOW BACK PAIN WITH SCIATICA, SCIATICA LATERALITY UNSPECIFIED: ICD-10-CM

## 2022-06-16 DIAGNOSIS — M54.16 LUMBAR RADICULOPATHY: ICD-10-CM

## 2022-06-16 DIAGNOSIS — M17.11 PRIMARY OSTEOARTHRITIS OF RIGHT KNEE: ICD-10-CM

## 2022-06-16 DIAGNOSIS — M47.816 LUMBAR SPONDYLOSIS: ICD-10-CM

## 2022-06-16 DIAGNOSIS — G82.20 PARAPARESIS OF BOTH LOWER LIMBS: ICD-10-CM

## 2022-06-16 DIAGNOSIS — Z86.12 HISTORY OF POLIOMYELITIS: ICD-10-CM

## 2022-06-16 DIAGNOSIS — G89.29 CHRONIC PAIN OF RIGHT KNEE: ICD-10-CM

## 2022-06-16 DIAGNOSIS — M48.062 SPINAL STENOSIS OF LUMBAR REGION WITH NEUROGENIC CLAUDICATION: ICD-10-CM

## 2022-06-16 DIAGNOSIS — M54.16 LEFT LUMBAR RADICULOPATHY: ICD-10-CM

## 2022-06-16 DIAGNOSIS — M81.0 AGE-RELATED OSTEOPOROSIS WITHOUT CURRENT PATHOLOGICAL FRACTURE: ICD-10-CM

## 2022-06-16 DIAGNOSIS — G14 POST POLIOMYELITIS SYNDROME: ICD-10-CM

## 2022-06-16 DIAGNOSIS — M25.561 CHRONIC PAIN OF RIGHT KNEE: ICD-10-CM

## 2022-06-16 DIAGNOSIS — E11.40 TYPE 2 DIABETES MELLITUS WITH DIABETIC NEUROPATHY, WITHOUT LONG-TERM CURRENT USE OF INSULIN: ICD-10-CM

## 2022-06-16 DIAGNOSIS — E11.42 DIABETIC POLYNEUROPATHY ASSOCIATED WITH TYPE 2 DIABETES MELLITUS: ICD-10-CM

## 2022-06-16 NOTE — TELEPHONE ENCOUNTER
Pt escalated due to inputting 1 for oxygen level ,and HR. States daughter is going to  pulse ox today. Pt recheck for temp is 97.0. No further needs at this time    Reason for Disposition   Information only question and nurse able to answer    Protocols used: INFORMATION ONLY CALL - NO TRIAGE-A-[place]

## 2022-06-16 NOTE — TELEPHONE ENCOUNTER
Pt called for covid surveillance program #1 and pt agrees to do vital and place in    Reason for Disposition   Information only question and nurse able to answer    Protocols used: INFORMATION ONLY CALL - NO TRIAGE-A-[place]

## 2022-06-16 NOTE — TELEPHONE ENCOUNTER
Pt escalated for no response. Pt states she was unable to enter VS because her daughter has not gotten home yet with her pulse ox device and won't be home until around 6 pm. Pt denies any new or worsening symptoms. No fever. No CP or SOB. Pt will enter new VS when she gets pulse ox later this evening. All VS and symptoms for now are WNL. Gave number to OOC for further concerns. Will continue to monitor.    Reason for Disposition   [1] Follow-up call to recent contact AND [2] information only call, no triage required    Protocols used: INFORMATION ONLY CALL - NO TRIAGE-A-

## 2022-06-16 NOTE — TELEPHONE ENCOUNTER
----- Message from Kary Gil sent at 6/16/2022 10:36 AM CDT -----  Type: RX Refill Request    Who Called:  self     Have you contacted your pharmacy:     Refill or New Rx: refill     RX Name and Strength: HYDROcodone-acetaminophen (NORCO)  mg per tablet    How is the patient currently taking it? (ex. 1XDay):    Is this a 30 day or 90 day RX     Preferred Pharmacy with phone number:     [facility] (Now [facility]) - [place], [place] - [address]  [address] [address]  Phone: [phone] Fax: [phone]

## 2022-06-16 NOTE — TELEPHONE ENCOUNTER
Pt called for covid surveillance program.Pt said she only has a cough. Sob 0/0  Pt will  O2 sat probe today and report afternoon vitals.    Called patient to review COVID-19 Surveillance Program enrollment process.    Smartphone:y [facility] jolie:y    Program consent:y    Pulse oximeter status:not yet    Verified emergency contacts: y    Program Overview: Reviewed , no response process, and importance of correct emergency contacts in event that well-being check is warranted. Patient will call [phone] 24/7 to speak with an OnCall nurse, if needed. Pt aware that if Sp02 less than or equal to 93% or if they have any worsening symptoms, they need to go to the emergency department. If they are having a medical emergency, they will call 911. Otherwise, patient will continue to submit data as scheduled. Reviewed importance of wearing mask if self or family members leave the house.     Patient had no further questions.

## 2022-06-16 NOTE — TELEPHONE ENCOUNTER
Pt called and vitals made up till she can get her device. Pt enrolled and will call back to OOC if any problems. She only c/o a cough will reach out if needed

## 2022-06-17 ENCOUNTER — PATIENT MESSAGE (OUTPATIENT)
Dept: ADMINISTRATIVE | Facility: OTHER | Age: 82
End: 2022-06-17
Payer: MEDICARE

## 2022-06-18 ENCOUNTER — NURSE TRIAGE (OUTPATIENT)
Dept: ADMINISTRATIVE | Facility: CLINIC | Age: 82
End: 2022-06-18
Payer: MEDICARE

## 2022-06-18 ENCOUNTER — PATIENT MESSAGE (OUTPATIENT)
Dept: ADMINISTRATIVE | Facility: OTHER | Age: 82
End: 2022-06-18
Payer: MEDICARE

## 2022-06-18 NOTE — TELEPHONE ENCOUNTER
Surveillance pt escalated for HR 8100 and temp 977.   Pt reports temp is 97.7 and HR 71.  No triage.    Reason for Disposition   Health Information question, no triage required and triager able to answer question    Additional Information   Negative: [1] Caller is not with the adult (patient) AND [2] reporting urgent symptoms   Negative: Lab result questions   Negative: Medication questions   Negative: Caller can't be reached by phone   Negative: Caller has already spoken to PCP or another triager   Negative: RN needs further essential information from caller in order to complete triage   Negative: Requesting regular office appointment   Negative: [1] Caller requesting NON-URGENT health information AND [2] PCP's office is the best resource    Protocols used: INFORMATION ONLY CALL - NO TRIAGE-A-

## 2022-06-19 ENCOUNTER — PATIENT MESSAGE (OUTPATIENT)
Dept: ADMINISTRATIVE | Facility: OTHER | Age: 82
End: 2022-06-19
Payer: MEDICARE

## 2022-06-19 ENCOUNTER — NURSE TRIAGE (OUTPATIENT)
Dept: ADMINISTRATIVE | Facility: CLINIC | Age: 82
End: 2022-06-19
Payer: MEDICARE

## 2022-06-19 NOTE — TELEPHONE ENCOUNTER
Surveillance pt escalated for SpO2 72%. Pt verified information.  On retest pt's SpO2 98% and HR 83. Pt denies any chest pain or trouble breathing.  No triage.     Reason for Disposition   Health Information question, no triage required and triager able to answer question    Additional Information   Negative: [1] Caller is not with the adult (patient) AND [2] reporting urgent symptoms   Negative: Lab result questions   Negative: Medication questions   Negative: Caller can't be reached by phone   Negative: Caller has already spoken to PCP or another triager   Negative: RN needs further essential information from caller in order to complete triage   Negative: Requesting regular office appointment   Negative: [1] Caller requesting NON-URGENT health information AND [2] PCP's office is the best resource    Protocols used: INFORMATION ONLY CALL - NO TRIAGE-A-

## 2022-06-20 ENCOUNTER — PATIENT MESSAGE (OUTPATIENT)
Dept: ADMINISTRATIVE | Facility: OTHER | Age: 82
End: 2022-06-20
Payer: MEDICARE

## 2022-06-21 ENCOUNTER — PATIENT MESSAGE (OUTPATIENT)
Dept: ADMINISTRATIVE | Facility: OTHER | Age: 82
End: 2022-06-21
Payer: MEDICARE

## 2022-06-22 ENCOUNTER — PATIENT MESSAGE (OUTPATIENT)
Dept: ADMINISTRATIVE | Facility: OTHER | Age: 82
End: 2022-06-22
Payer: MEDICARE

## 2022-06-23 ENCOUNTER — PATIENT MESSAGE (OUTPATIENT)
Dept: ADMINISTRATIVE | Facility: OTHER | Age: 82
End: 2022-06-23
Payer: MEDICARE

## 2022-06-24 ENCOUNTER — PATIENT MESSAGE (OUTPATIENT)
Dept: ADMINISTRATIVE | Facility: OTHER | Age: 82
End: 2022-06-24
Payer: MEDICARE

## 2022-06-29 ENCOUNTER — PATIENT MESSAGE (OUTPATIENT)
Dept: ADMINISTRATIVE | Facility: OTHER | Age: 82
End: 2022-06-29
Payer: MEDICARE

## 2022-07-11 ENCOUNTER — OFFICE VISIT (OUTPATIENT)
Dept: OTOLARYNGOLOGY | Facility: CLINIC | Age: 82
End: 2022-07-11
Payer: MEDICARE

## 2022-07-11 VITALS — DIASTOLIC BLOOD PRESSURE: 66 MMHG | HEART RATE: 77 BPM | SYSTOLIC BLOOD PRESSURE: 142 MMHG

## 2022-07-11 DIAGNOSIS — H61.23 IMPACTED CERUMEN, BILATERAL: ICD-10-CM

## 2022-07-11 DIAGNOSIS — H93.8X1 BLOCKED EAR, RIGHT: Primary | ICD-10-CM

## 2022-07-11 DIAGNOSIS — H61.20 CERUMEN DEBRIS ON TYMPANIC MEMBRANE, UNSPECIFIED LATERALITY: ICD-10-CM

## 2022-07-11 PROCEDURE — 1159F MED LIST DOCD IN RCRD: CPT | Mod: CPTII,S$GLB,, | Performed by: OTOLARYNGOLOGY

## 2022-07-11 PROCEDURE — 99999 PR PBB SHADOW E&M-EST. PATIENT-LVL II: CPT | Mod: PBBFAC,,, | Performed by: OTOLARYNGOLOGY

## 2022-07-11 PROCEDURE — 3288F FALL RISK ASSESSMENT DOCD: CPT | Mod: CPTII,S$GLB,, | Performed by: OTOLARYNGOLOGY

## 2022-07-11 PROCEDURE — 3078F PR MOST RECENT DIASTOLIC BLOOD PRESSURE < 80 MM HG: ICD-10-PCS | Mod: CPTII,S$GLB,, | Performed by: OTOLARYNGOLOGY

## 2022-07-11 PROCEDURE — 1126F PR PAIN SEVERITY QUANTIFIED, NO PAIN PRESENT: ICD-10-PCS | Mod: CPTII,S$GLB,, | Performed by: OTOLARYNGOLOGY

## 2022-07-11 PROCEDURE — 1126F AMNT PAIN NOTED NONE PRSNT: CPT | Mod: CPTII,S$GLB,, | Performed by: OTOLARYNGOLOGY

## 2022-07-11 PROCEDURE — 1101F PR PT FALLS ASSESS DOC 0-1 FALLS W/OUT INJ PAST YR: ICD-10-PCS | Mod: CPTII,S$GLB,, | Performed by: OTOLARYNGOLOGY

## 2022-07-11 PROCEDURE — 1101F PT FALLS ASSESS-DOCD LE1/YR: CPT | Mod: CPTII,S$GLB,, | Performed by: OTOLARYNGOLOGY

## 2022-07-11 PROCEDURE — 99499 UNLISTED E&M SERVICE: CPT | Mod: S$GLB,,, | Performed by: OTOLARYNGOLOGY

## 2022-07-11 PROCEDURE — 3077F PR MOST RECENT SYSTOLIC BLOOD PRESSURE >= 140 MM HG: ICD-10-PCS | Mod: CPTII,S$GLB,, | Performed by: OTOLARYNGOLOGY

## 2022-07-11 PROCEDURE — 1159F PR MEDICATION LIST DOCUMENTED IN MEDICAL RECORD: ICD-10-PCS | Mod: CPTII,S$GLB,, | Performed by: OTOLARYNGOLOGY

## 2022-07-11 PROCEDURE — 3077F SYST BP >= 140 MM HG: CPT | Mod: CPTII,S$GLB,, | Performed by: OTOLARYNGOLOGY

## 2022-07-11 PROCEDURE — 99499 NO LOS: ICD-10-PCS | Mod: S$GLB,,, | Performed by: OTOLARYNGOLOGY

## 2022-07-11 PROCEDURE — 3078F DIAST BP <80 MM HG: CPT | Mod: CPTII,S$GLB,, | Performed by: OTOLARYNGOLOGY

## 2022-07-11 PROCEDURE — 3288F PR FALLS RISK ASSESSMENT DOCUMENTED: ICD-10-PCS | Mod: CPTII,S$GLB,, | Performed by: OTOLARYNGOLOGY

## 2022-07-11 PROCEDURE — 69210 REMOVE IMPACTED EAR WAX UNI: CPT | Mod: S$GLB,,, | Performed by: OTOLARYNGOLOGY

## 2022-07-11 PROCEDURE — 69210 PR REMOVAL IMPACTED CERUMEN REQUIRING INSTRUMENTATION, UNILATERAL: ICD-10-PCS | Mod: S$GLB,,, | Performed by: OTOLARYNGOLOGY

## 2022-07-11 PROCEDURE — 99999 PR PBB SHADOW E&M-EST. PATIENT-LVL II: ICD-10-PCS | Mod: PBBFAC,,, | Performed by: OTOLARYNGOLOGY

## 2022-07-11 NOTE — PATIENT INSTRUCTIONS
"Cerumen "plugs" were extracted from both ears canals; AD CI > AS CI  RTC prn ear cleaning  May instill drop or two of mineral oil or baby ol to ear canals prn  Consider audiomety on return prn    "

## 2022-07-11 NOTE — PROGRESS NOTES
"CC:Ear cleaning   HPI:Ms. Alan is an 81 year old CF who presents today in a scooter.  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  She is here for ear cleaning procedures. She was told by her PCP that she had a significant cerumen impaction occluding her right ear canal > her left.  She is hoping that the TV vole will decline at her house after the ear cleaning procedures;  She contracted COVID relatively recently; she was treated with an injection which really helped.  She completed an annual physical exam performed by Dr. Lauren Deras in late August 2021. She was diagnosed with diabetic polyneuropathy, post poliomyelitis syndrome, mixed hyperlipidemia, refractory anemia, osteoporosis, vitamin-D deficiency disease and class 1 obesity.        Past Medical History:   Diagnosis Date    Allergy     Anemia 10/20/2014    Arthritis     Atherosclerosis of artery of right lower extremity: see xray 4/4/07 8/8/2017    Diabetes mellitus     Diabetic neuropathy 7/25/2012    Gait disorder 7/25/2012    High cholesterol     History of poliomyelitis 7/25/2012    Hyperlipidemia 8/5/2013    Hypertension     Obstructive sleep apnea syndrome: dx 2008 needs CPAP 11 6/28/2017    Osteopenia     Osteoporosis, unspecified 6/5/2014    Other specified anemias 7/6/2015    Post poliomyelitis syndrome 7/25/2012    Renal manifestation of secondary diabetes mellitus     Sleep apnea     Type II or unspecified type diabetes mellitus without mention of complication, not stated as uncontrolled 7/6/2015    Unspecified essential hypertension 7/6/2015       PE:Gen." alert and oriented CF in no acute distress  A plug of occlusive cerumen is extracted from the right ear canal with use of a blunt curette micro forceps.  A semi occlusive plug of cerumen is removed in pieces from the left ear canal with use of a curette micro forceps.  Both TMs are intact as visualized.  Audiometry was not " "performed today      DIAGNOSIS:     ICD-10-CM ICD-9-CM    1. Blocked ear, right  H93.8X1 388.8    2. Cerumen debris on tympanic membrane, unspecified laterality  H61.20 380.4 Ambulatory referral/consult to ENT   3. Impacted cerumen, bilateral  H61.23 380.4      PLAN: Cerumen "plugs" were extracted from both ears canals; AD CI > AS CI  RTC prn ear cleaning  May instill drop or two of mineral oil or baby ol to ear canals prn  Consider audiomety on return prn      "

## 2022-07-12 DIAGNOSIS — E11.42 DIABETIC POLYNEUROPATHY ASSOCIATED WITH TYPE 2 DIABETES MELLITUS: ICD-10-CM

## 2022-07-12 DIAGNOSIS — M48.062 SPINAL STENOSIS OF LUMBAR REGION WITH NEUROGENIC CLAUDICATION: ICD-10-CM

## 2022-07-12 DIAGNOSIS — M54.16 LEFT LUMBAR RADICULOPATHY: ICD-10-CM

## 2022-07-12 DIAGNOSIS — M47.26 OSTEOARTHRITIS OF SPINE WITH RADICULOPATHY, LUMBAR REGION: ICD-10-CM
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
## 2022-07-12 DIAGNOSIS — E11.40 TYPE 2 DIABETES MELLITUS WITH DIABETIC NEUROPATHY, WITHOUT LONG-TERM CURRENT USE OF INSULIN: ICD-10-CM

## 2022-07-12 DIAGNOSIS — M54.16 LUMBAR RADICULOPATHY: ICD-10-CM

## 2022-07-12 DIAGNOSIS — G89.29 CHRONIC PAIN OF RIGHT KNEE: ICD-10-CM

## 2022-07-12 DIAGNOSIS — M47.816 LUMBAR SPONDYLOSIS: ICD-10-CM

## 2022-07-12 DIAGNOSIS — R26.9 GAIT DISORDER: ICD-10-CM

## 2022-07-12 DIAGNOSIS — M25.561 CHRONIC PAIN OF RIGHT KNEE: ICD-10-CM

## 2022-07-12 DIAGNOSIS — E08.44 DIABETIC AMYOTROPHY ASSOCIATED WITH DIABETES MELLITUS DUE TO UNDERLYING CONDITION: ICD-10-CM

## 2022-07-12 DIAGNOSIS — G14 POST POLIOMYELITIS SYNDROME: ICD-10-CM

## 2022-07-12 DIAGNOSIS — M54.40 CHRONIC BILATERAL LOW BACK PAIN WITH SCIATICA, SCIATICA LATERALITY UNSPECIFIED: ICD-10-CM

## 2022-07-12 DIAGNOSIS — Z86.12 HISTORY OF POLIOMYELITIS: ICD-10-CM

## 2022-07-12 DIAGNOSIS — M47.16 LUMBAR SPONDYLOSIS WITH MYELOPATHY: ICD-10-CM

## 2022-07-12 DIAGNOSIS — M17.11 PRIMARY OSTEOARTHRITIS OF RIGHT KNEE: ICD-10-CM

## 2022-07-12 DIAGNOSIS — G89.29 CHRONIC BILATERAL LOW BACK PAIN WITH SCIATICA, SCIATICA LATERALITY UNSPECIFIED: ICD-10-CM

## 2022-07-12 DIAGNOSIS — M81.0 AGE-RELATED OSTEOPOROSIS WITHOUT CURRENT PATHOLOGICAL FRACTURE: ICD-10-CM

## 2022-07-12 DIAGNOSIS — G82.20 PARAPARESIS OF BOTH LOWER LIMBS: ICD-10-CM

## 2022-07-12 NOTE — TELEPHONE ENCOUNTER
----- Message from Catarina Barlow sent at 7/12/2022  4:30 PM CDT -----  Regarding: Refill  Contact: pt @ [phone]  Rx Refill/Request    Is this a Refill or New Rx:Refill    Rx Name and Strength:HYDROcodone-acetaminophen (NORCO)  mg per tablet    Preferred Pharmacy with phone number:  [facility] ([facility]) - [facility] [address]  [address] [address]  Phone: [phone] Fax: [phone]    Communication Preference:pt @ [phone]  Additional Information:

## 2022-07-16 RX ORDER — HYDROCODONE BITARTRATE AND ACETAMINOPHEN 10; 325 MG/1; MG/1
1 TABLET ORAL EVERY 6 HOURS PRN
Qty: 120 TABLET | Refills: 0 | Status: SHIPPED | OUTPATIENT
Start: 2022-07-16 | End: 2022-08-19 | Stop reason: SDUPTHER

## 2022-07-18 DIAGNOSIS — E11.40 TYPE 2 DIABETES MELLITUS WITH DIABETIC NEUROPATHY, WITHOUT LONG-TERM CURRENT USE OF INSULIN: ICD-10-CM

## 2022-07-18 DIAGNOSIS — E11.43 TYPE 2 DIABETES MELLITUS WITH AUTONOMIC NEUROPATHY: ICD-10-CM

## 2022-07-18 RX ORDER — BLOOD-GLUCOSE METER
EACH MISCELLANEOUS
Qty: 1 EACH | Refills: 0 | OUTPATIENT
Start: 2022-07-18

## 2022-07-18 RX ORDER — INSULIN PUMP SYRINGE, 3 ML
EACH MISCELLANEOUS
Refills: 0 | OUTPATIENT
Start: 2022-07-18

## 2022-07-18 RX ORDER — LANCETS 33 GAUGE
EACH MISCELLANEOUS
Qty: 200 EACH | Refills: 0 | OUTPATIENT
Start: 2022-07-18

## 2022-07-18 RX ORDER — ISOPROPYL ALCOHOL 70 ML/100ML
SWAB TOPICAL
Refills: 0 | OUTPATIENT
Start: 2022-07-18

## 2022-07-18 NOTE — TELEPHONE ENCOUNTER
[facility] Note  Quick DC. Inappropriate Request   Refill request requires further review by MD: NO   Medication Therapy Plan: Pharmacy is requesting new script(s) for the following medications without required information, (sig/ frequency/qty/etc)     [facility] action(s):  Quick Discontinue      Duplicate Pended Encounter(s)/ Last Prescribed Details:    Pharmacies have been requesting medications for patients without required information, (sig, frequency, qty, etc.). In addition, requests are sent for medication(s) pt. are currently not taking, and medications patients have never taken.    We have spoken to the pharmacies about these request types and advised their teams previously that we are unable to assess these New Script requests and require all details for these requests. This is a known issue and has been reported.        Medication related problems are not assessed for QDC.   Medication Reconciliation Completed? NO Were there pending details that required adjustment? NO     Automatic Epic Generated Protocol Data Below:   Requested Prescriptions   Pending Prescriptions Disp Refills    blood-glucose meter (TRUE METRIX GLUCOSE METER) kit [Pharmacy Med Name: TRUE METRIX METER KIT]  0              Appointments      Date Provider   Last Visit   3/25/2022 Lauren Dreas MD   Next Visit   Visit date not found Lauren Deras MD        Note composed:2:03 PM 07/18/2022

## 2022-07-18 NOTE — TELEPHONE ENCOUNTER
No new care gaps identified.  [facility] Embedded Care Gaps. Reference number: [id number]. 7/18/2022   2:00:38 PM CDT

## 2022-07-18 NOTE — TELEPHONE ENCOUNTER
No new care gaps identified.  [facility] Embedded Care Gaps. Reference number: [id number]. 7/18/2022   1:57:29 PM CDT

## 2022-07-18 NOTE — TELEPHONE ENCOUNTER
No new care gaps identified.  [facility] Embedded Care Gaps. Reference number: [id number]. 7/18/2022   1:56:17 PM CDT

## 2022-07-18 NOTE — TELEPHONE ENCOUNTER
[facility] Note  Quick DC. Inappropriate Request   Refill request requires further review by MD: NO   Medication Therapy Plan: Pharmacy is requesting new script(s) for the following medications without required information, (sig/ frequency/qty/etc)     [facility] action(s):  Quick Discontinue      Duplicate Pended Encounter(s)/ Last Prescribed Details:    Pharmacies have been requesting medications for patients without required information, (sig, frequency, qty, etc.). In addition, requests are sent for medication(s) pt. are currently not taking, and medications patients have never taken.    We have spoken to the pharmacies about these request types and advised their teams previously that we are unable to assess these New Script requests and require all details for these requests. This is a known issue and has been reported.        Medication related problems are not assessed for QDC.   Medication Reconciliation Completed? NO Were there pending details that required adjustment? NO     Automatic Epic Generated Protocol Data Below:   Requested Prescriptions   Pending Prescriptions Disp Refills    lancets (TRUEPLUS LANCETS) 33 gauge Misc [Pharmacy Med Name: TRUEPLUS 33G LANCETS] 200 each 0              Appointments      Date Provider   Last Visit   3/25/2022 Lauren Deras MD   Next Visit   7/18/2022 Lauren Deras MD        Note composed:2:03 PM 07/18/2022

## 2022-07-18 NOTE — TELEPHONE ENCOUNTER
No new care gaps identified.  [facility] Embedded Care Gaps. Reference number: [id number]. 7/18/2022   1:56:53 PM CDT

## 2022-07-18 NOTE — TELEPHONE ENCOUNTER
No new care gaps identified.  [facility] Embedded Care Gaps. Reference number: [id number]. 7/18/2022   1:58:19 PM CDT

## 2022-07-18 NOTE — TELEPHONE ENCOUNTER
[facility] Note  Quick DC. Inappropriate Request   Refill request requires further review by MD: NO   Medication Therapy Plan: Pharmacy is requesting new script(s) for the following medications without required information, (sig/ frequency/qty/etc)     [facility] action(s):  Quick Discontinue      Duplicate Pended Encounter(s)/ Last Prescribed Details:    Pharmacies have been requesting medications for patients without required information, (sig, frequency, qty, etc.). In addition, requests are sent for medication(s) pt. are currently not taking, and medications patients have never taken.    We have spoken to the pharmacies about these request types and advised their teams previously that we are unable to assess these New Script requests and require all details for these requests. This is a known issue and has been reported.        Medication related problems are not assessed for QDC.   Medication Reconciliation Completed? NO Were there pending details that required adjustment? NO     Automatic Epic Generated Protocol Data Below:   Requested Prescriptions   Pending Prescriptions Disp Refills    alcohol swabs (BD ALCOHOL SWABS) PadM [Pharmacy Med Name: BD ALCOHOL SWAB PAD]  0              Appointments      Date Provider   Last Visit   3/25/2022 Lauren Deras MD   Next Visit   7/18/2022 Lauren Deras MD        Note composed:2:05 PM 07/18/2022             Breast abscess

## 2022-07-18 NOTE — TELEPHONE ENCOUNTER
[facility] Note  Quick DC. Inappropriate Request   Refill request requires further review by MD: NO   Medication Therapy Plan: Pharmacy is requesting new script(s) for the following medications without required information, (sig/ frequency/qty/etc)     [facility] action(s):  Quick Discontinue      Duplicate Pended Encounter(s)/ Last Prescribed Details:    Pharmacies have been requesting medications for patients without required information, (sig, frequency, qty, etc.). In addition, requests are sent for medication(s) pt. are currently not taking, and medications patients have never taken.    We have spoken to the pharmacies about these request types and advised their teams previously that we are unable to assess these New Script requests and require all details for these requests. This is a known issue and has been reported.        Medication related problems are not assessed for QDC.   Medication Reconciliation Completed? NO Were there pending details that required adjustment? NO     Automatic Epic Generated Protocol Data Below:   Requested Prescriptions   Pending Prescriptions Disp Refills    blood sugar diagnostic (TRUE METRIX GLUCOSE TEST STRIP) Strp [Pharmacy Med Name: [facility] TRUE METRIX TEST STRIP (50)] 200 strip 0              Appointments      Date Provider   Last Visit   3/25/2022 Lauren Deras MD   Next Visit   7/18/2022 Lauren Deras MD        Note composed:2:02 PM 07/18/2022

## 2022-07-19 ENCOUNTER — OFFICE VISIT (OUTPATIENT)
Dept: PODIATRY | Facility: CLINIC | Age: 82
End: 2022-07-19
Payer: MEDICARE

## 2022-07-19 VITALS
SYSTOLIC BLOOD PRESSURE: 140 MMHG | HEIGHT: 60 IN | HEART RATE: 60 BPM | BODY MASS INDEX: 30.27 KG/M2 | DIASTOLIC BLOOD PRESSURE: 63 MMHG | RESPIRATION RATE: 18 BRPM

## 2022-07-19 DIAGNOSIS — M21.372 FOOT DROP, LEFT: ICD-10-CM

## 2022-07-19 DIAGNOSIS — B35.1 ONYCHOMYCOSIS DUE TO DERMATOPHYTE: ICD-10-CM

## 2022-07-19 DIAGNOSIS — G60.9 IDIOPATHIC PERIPHERAL NEUROPATHY: ICD-10-CM

## 2022-07-19 DIAGNOSIS — E11.40 TYPE 2 DIABETES MELLITUS WITH DIABETIC NEUROPATHY, WITHOUT LONG-TERM CURRENT USE OF INSULIN: Primary | ICD-10-CM

## 2022-07-19 DIAGNOSIS — L84 CORN OR CALLUS: ICD-10-CM

## 2022-07-19 DIAGNOSIS — G14 POST-POLIO SYNDROME: ICD-10-CM

## 2022-07-19 PROCEDURE — 1126F PR PAIN SEVERITY QUANTIFIED, NO PAIN PRESENT: ICD-10-PCS | Mod: CPTII,S$GLB,, | Performed by: PODIATRIST

## 2022-07-19 PROCEDURE — 11721 DEBRIDE NAIL 6 OR MORE: CPT | Mod: Q9,59,S$GLB, | Performed by: PODIATRIST

## 2022-07-19 PROCEDURE — 99213 OFFICE O/P EST LOW 20 MIN: CPT | Mod: 25,S$GLB,, | Performed by: PODIATRIST

## 2022-07-19 PROCEDURE — 99213 PR OFFICE/OUTPT VISIT, EST, LEVL III, 20-29 MIN: ICD-10-PCS | Mod: 25,S$GLB,, | Performed by: PODIATRIST

## 2022-07-19 PROCEDURE — 1159F MED LIST DOCD IN RCRD: CPT | Mod: CPTII,S$GLB,, | Performed by: PODIATRIST

## 2022-07-19 PROCEDURE — 3077F PR MOST RECENT SYSTOLIC BLOOD PRESSURE >= 140 MM HG: ICD-10-PCS | Mod: CPTII,S$GLB,, | Performed by: PODIATRIST

## 2022-07-19 PROCEDURE — 11055 PARING/CUTG B9 HYPRKER LES 1: CPT | Mod: Q9,S$GLB,, | Performed by: PODIATRIST

## 2022-07-19 PROCEDURE — 11721 PR DEBRIDEMENT OF NAILS, 6 OR MORE: ICD-10-PCS | Mod: Q9,59,S$GLB, | Performed by: PODIATRIST

## 2022-07-19 PROCEDURE — 3078F DIAST BP <80 MM HG: CPT | Mod: CPTII,S$GLB,, | Performed by: PODIATRIST

## 2022-07-19 PROCEDURE — 1126F AMNT PAIN NOTED NONE PRSNT: CPT | Mod: CPTII,S$GLB,, | Performed by: PODIATRIST

## 2022-07-19 PROCEDURE — 99999 PR PBB SHADOW E&M-EST. PATIENT-LVL II: ICD-10-PCS | Mod: PBBFAC,,, | Performed by: PODIATRIST

## 2022-07-19 PROCEDURE — 3077F SYST BP >= 140 MM HG: CPT | Mod: CPTII,S$GLB,, | Performed by: PODIATRIST

## 2022-07-19 PROCEDURE — 99999 PR PBB SHADOW E&M-EST. PATIENT-LVL II: CPT | Mod: PBBFAC,,, | Performed by: PODIATRIST

## 2022-07-19 PROCEDURE — 11055 PR TRIM HYPERKERATOTIC SKIN LESION, ONE: ICD-10-PCS | Mod: Q9,S$GLB,, | Performed by: PODIATRIST

## 2022-07-19 PROCEDURE — 3078F PR MOST RECENT DIASTOLIC BLOOD PRESSURE < 80 MM HG: ICD-10-PCS | Mod: CPTII,S$GLB,, | Performed by: PODIATRIST

## 2022-07-19 PROCEDURE — 1159F PR MEDICATION LIST DOCUMENTED IN MEDICAL RECORD: ICD-10-PCS | Mod: CPTII,S$GLB,, | Performed by: PODIATRIST

## 2022-07-19 RX ORDER — CLOTRIMAZOLE AND BETAMETHASONE DIPROPIONATE 10; .64 MG/G; MG/G
CREAM TOPICAL 2 TIMES DAILY
Qty: 45 G | Refills: 3 | Status: SHIPPED | OUTPATIENT
Start: 2022-07-19 | End: 2023-04-14

## 2022-07-19 NOTE — PROGRESS NOTES
Subjective:      Patient ID: Emilia Alan is a 81 y.o. female.    Chief Complaint: PCP (Lauren Deras MD 3/25/22), Diabetic Foot Exam, and Nail Care    Emilia is a 81 y.o. female who presents to the clinic for evaluation and treatment of high risk feet. 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 The patient's chief complaint is long, thick toenails and dry red itchy skin L foot       PCP: Lauren Deras MD    Date Last Seen by PCP:   Chief Complaint   Patient presents with    PCP     Lauren Deras MD 3/25/22    Diabetic Foot Exam    Nail Care         Current shoe gear: DM shoes w/ AFO brace( L)    Hemoglobin A1C   Date Value Ref Range Status   03/25/2022 7.5 (H) 4.0 - 5.6 % Final     Comment:     ADA Screening Guidelines:  5.7-6.4%  Consistent with prediabetes  >or=6.5%  Consistent with diabetes    High levels of fetal hemoglobin interfere with the HbA1C  assay. Heterozygous hemoglobin variants (HbS, HgC, etc)do  not significantly interfere with this assay.   However, presence of multiple variants may affect accuracy.     10/13/2021 6.6 (H) 4.0 - 5.6 % Final     Comment:     ADA Screening Guidelines:  5.7-6.4%  Consistent with prediabetes  >or=6.5%  Consistent with diabetes    High  levels of fetal hemoglobin interfere with the HbA1C  assay. Heterozygous hemoglobin variants (HbS, HgC, etc)do  not significantly interfere with this assay.   However, presence of multiple variants may affect accuracy.     04/08/2021 6.0 (H) 4.0 - 5.6 % Final     Comment:     ADA Screening Guidelines:  5.7-6.4%  Consistent with prediabetes  >or=6.5%  Consistent with diabetes    High levels of fetal hemoglobin interfere with the HbA1C  assay. Heterozygous hemoglobin variants (HbS, HgC, etc)do  not significantly interfere with this assay.   However, presence of multiple variants may affect accuracy.           Review of Systems   Constitutional: Negative for chills, decreased appetite and fever.   Cardiovascular: Positive for leg swelling (new onset). Negative for chest pain and claudication.   Respiratory: Negative for cough and shortness of breath.    Skin: Positive for dry skin, nail changes and rash. Negative for color change, flushing, itching, poor wound healing and skin cancer.   Musculoskeletal: Positive for muscle weakness (foot drop). Negative for arthritis, back pain, falls, gout, joint pain, joint swelling and myalgias.   Gastrointestinal: Negative for nausea and vomiting.   Neurological: Positive for numbness and paresthesias. Negative for loss of balance.           Objective:       Vitals:    07/19/22 1354   BP: (!) 140/63   Pulse: 60   Resp: 18   Height: 5' (1.524 m)   PainSc: 0-No pain        Physical Exam  Vitals and nursing note reviewed.   Constitutional:       General: She is not in acute distress.     Appearance: She is well-developed. She is not diaphoretic.   Cardiovascular:      Comments: Dorsalis pedis and posterior tibial pulses are diminished Bilaterally. Toes are cool to touch. Feet are warm proximally.There is decreased digital hair. Skin is atrophic, slightly hyperpigmented, and mildly edematous      Musculoskeletal:         General: No tenderness or signs of injury.      Right ankle:  Normal.      Left ankle: Normal.      Right foot: No swelling, deformity or crepitus.      Left foot: No swelling, deformity or crepitus.      Comments: Dropfoot left.      Lymphadenopathy:      Comments: No palpable lymph nodes   Skin:     General: Skin is warm and dry.      Coloration: Skin is not pale.      Findings: No abrasion, bruising, burn, erythema, laceration, lesion, petechiae or rash.      Nails: There is no clubbing.      Comments: Nails 1-5 b/l  are elongated by  4-6mm's, thickened by 3-5 mm's, dystrophic, and are darkened in  coloration . Xerosis Bilaterally. No open lesions noted.    Scaling dryness in a moccasin distribution is noted to the bilateral lower extremities with associated erythema.      Hyperkeratotic tissue noted to plantar L 5th MPJ    Neurological:      Mental Status: She is alert and oriented to person, place, and time.      Comments: Colby-Sarah 5.07 monofilamant testing is diminished Charbel feet. Sharp/dull sensation diminished Bilaterally. Light touch absent Bilaterally.       Psychiatric:         Thought Content: Thought content normal.         Judgment: Judgment normal.             Assessment:       Encounter Diagnoses   Name Primary?    Type 2 diabetes mellitus with diabetic neuropathy, without long-term current use of insulin Yes    Post-polio syndrome     Idiopathic peripheral neuropathy     Corn or callus     Foot drop, left     Onychomycosis due to dermatophyte          Plan:       Emilia was seen today for pcp, diabetic foot exam and nail care.    Diagnoses and all orders for this visit:    Type 2 diabetes mellitus with diabetic neuropathy, without long-term current use of insulin    Post-polio syndrome    Idiopathic peripheral neuropathy    Corn or callus    Foot drop, left    Onychomycosis due to dermatophyte    Other orders  -     clotrimazole-betamethasone 1-0.05% (LOTRISONE) cream; Apply topically 2 (two) times daily.      I counseled the patient on her  conditions, their implications and medical management.    - Shoe inspection.Patient instructed on proper foot hygeine. We discussed wearing proper shoe gear, daily foot inspections, never walking without protective shoe gear, never putting sharp instruments to feet, routine podiatric nail visits every 2-3 months.      - With patient's permission, nails were aggressively reduced and debrided x 10 to their soft tissue attachment mechanically and with electric , removing all offending nail and debris. Patient relates relief following the procedure. She will continue to monitor the areas daily, inspect her feet, wear protective shoe gear when ambulatory, moisturizer to maintain skin integrity and follow in this office in approximately 2-3 months, sooner p.r.n.    - After cleansing the  area w/ alcohol prep pad the above mentioned hyperkeratosis was trimmed utilizing No 15 scapel, to a smooth base with out incident. Patient tolerated this  well and reported comfort to the area x1

## 2022-08-05 DIAGNOSIS — E11.43 TYPE 2 DIABETES MELLITUS WITH AUTONOMIC NEUROPATHY: ICD-10-CM

## 2022-08-05 RX ORDER — BLOOD SUGAR DIAGNOSTIC
STRIP MISCELLANEOUS
Qty: 200 STRIP | Refills: 3 | Status: SHIPPED | OUTPATIENT
Start: 2022-08-05 | End: 2022-09-26 | Stop reason: SDUPTHER

## 2022-08-05 NOTE — TELEPHONE ENCOUNTER
No new care gaps identified.  [facility] Embedded Care Gaps. Reference number: [id number]. 8/05/2022   4:48:17 PM CDT

## 2022-08-06 NOTE — TELEPHONE ENCOUNTER
Refill Decision Note   Emilia Alan  is requesting a refill authorization.  Brief Assessment and Rationale for Refill:  Approve     Medication Therapy Plan:       Medication Reconciliation Completed: No   Comments:     No Care Gaps recommended.     Note composed:7:05 PM 08/05/2022

## 2022-08-18 ENCOUNTER — PATIENT MESSAGE (OUTPATIENT)
Dept: INTERNAL MEDICINE | Facility: CLINIC | Age: 82
End: 2022-08-18
Payer: MEDICARE

## 2022-08-19 DIAGNOSIS — M54.40 CHRONIC BILATERAL LOW BACK PAIN WITH SCIATICA, SCIATICA LATERALITY UNSPECIFIED: ICD-10-CM

## 2022-08-19 DIAGNOSIS — E11.40 TYPE 2 DIABETES MELLITUS WITH DIABETIC NEUROPATHY, WITHOUT LONG-TERM CURRENT USE OF INSULIN: ICD-10-CM

## 2022-08-19 DIAGNOSIS — M54.16 LEFT LUMBAR RADICULOPATHY: ICD-10-CM

## 2022-08-19 DIAGNOSIS — E08.44 DIABETIC AMYOTROPHY ASSOCIATED WITH DIABETES MELLITUS DUE TO UNDERLYING CONDITION: ICD-10-CM

## 2022-08-19 DIAGNOSIS — M54.16 LUMBAR RADICULOPATHY: ICD-10-CM

## 2022-08-19 DIAGNOSIS — G89.29 CHRONIC BILATERAL LOW BACK PAIN WITH SCIATICA, SCIATICA LATERALITY UNSPECIFIED: ICD-10-CM

## 2022-08-19 DIAGNOSIS — M47.26 OSTEOARTHRITIS OF SPINE WITH RADICULOPATHY, LUMBAR REGION: ICD-10-CM

## 2022-08-19 DIAGNOSIS — M17.11 PRIMARY OSTEOARTHRITIS OF RIGHT KNEE: ICD-10-CM

## 2022-08-19 DIAGNOSIS — G89.29 CHRONIC PAIN OF RIGHT KNEE: ICD-10-CM

## 2022-08-19 DIAGNOSIS — E11.42 DIABETIC POLYNEUROPATHY ASSOCIATED WITH TYPE 2 DIABETES MELLITUS: ICD-10-CM

## 2022-08-19 DIAGNOSIS — G82.20 PARAPARESIS OF BOTH LOWER LIMBS: ICD-10-CM

## 2022-08-19 DIAGNOSIS — R26.9 GAIT DISORDER: ICD-10-CM
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
## 2022-08-19 DIAGNOSIS — Z86.12 HISTORY OF POLIOMYELITIS: ICD-10-CM

## 2022-08-19 DIAGNOSIS — G14 POST POLIOMYELITIS SYNDROME: ICD-10-CM

## 2022-08-19 DIAGNOSIS — M25.561 CHRONIC PAIN OF RIGHT KNEE: ICD-10-CM

## 2022-08-19 DIAGNOSIS — M47.816 LUMBAR SPONDYLOSIS: ICD-10-CM

## 2022-08-19 DIAGNOSIS — M47.16 LUMBAR SPONDYLOSIS WITH MYELOPATHY: ICD-10-CM

## 2022-08-19 DIAGNOSIS — M81.0 AGE-RELATED OSTEOPOROSIS WITHOUT CURRENT PATHOLOGICAL FRACTURE: ICD-10-CM

## 2022-08-19 DIAGNOSIS — M48.062 SPINAL STENOSIS OF LUMBAR REGION WITH NEUROGENIC CLAUDICATION: ICD-10-CM

## 2022-08-19 NOTE — TELEPHONE ENCOUNTER
----- Message from Yvonne Livingston sent at 8/19/2022  1:39 PM CDT -----  Contact: self @ [phone] or [phone]  Pt is req a refill for hydrocodone 10/325.       [facility] ([facility]) - [facility] [address]   Phone:  [phone]  Fax:  [phone]

## 2022-08-20 RX ORDER — HYDROCODONE BITARTRATE AND ACETAMINOPHEN 10; 325 MG/1; MG/1
1 TABLET ORAL EVERY 6 HOURS PRN
Qty: 120 TABLET | Refills: 0 | Status: SHIPPED | OUTPATIENT
Start: 2022-08-20 | End: 2022-09-27 | Stop reason: SDUPTHER

## 2022-09-04 DIAGNOSIS — E78.5 HYPERLIPIDEMIA, UNSPECIFIED HYPERLIPIDEMIA TYPE: ICD-10-CM

## 2022-09-04 RX ORDER — PRAVASTATIN SODIUM 40 MG/1
TABLET ORAL
Qty: 135 TABLET | Refills: 0 | Status: SHIPPED | OUTPATIENT
Start: 2022-09-04 | End: 2022-09-26 | Stop reason: SDUPTHER

## 2022-09-04 NOTE — TELEPHONE ENCOUNTER
Refill Decision Note   Emilia Alan  is requesting a refill authorization.  Brief Assessment and Rationale for Refill:  Approve     Medication Therapy Plan:       Medication Reconciliation Completed: No   Comments:     No Care Gaps recommended.     Note composed:6:15 AM 09/04/2022

## 2022-09-04 NOTE — TELEPHONE ENCOUNTER
No new care gaps identified.  [facility] Embedded Care Gaps. Reference number: [id number]. 9/04/2022   2:32:34 AM VINCET

## 2022-09-06 NOTE — PROGRESS NOTES
Infectious Diseases   Consult Note        Admission Date: 9/2/2022  Hospital Day: Hospital Day: 5   Attending: Michael Bobby MD  Date of service: 9/6/22     Reason for admission: Cellulitis [L03.90]  Peripheral edema [R60.9]  Redness and swelling of lower leg [M79.89, R23.8]    Chief complaint/ Reason for consult: Fever      Microbiology:        I have reviewed allavailable micro lab data and cultures    Blood culture (2/2) - collected on 9/2/2022: in process      Antibiotics and immunizations:       Current antibiotics: All antibiotics and their doses were reviewed by me    Recent Abx Admin                     ceFAZolin (ANCEF) 2,000 mg in dextrose 5 % 50 mL IVPB (mini-bag) (mg) 2,000 mg New Bag 09/06/22 1020     2,000 mg New Bag  0118     2,000 mg New Bag 09/05/22 1714                      Immunization History: All immunization history was reviewed by me today. Immunization History   Administered Date(s) Administered    COVID-19, MODERNA BLUE border, Primary or Immunocompromised, (age 12y+), IM, 100 mcg/0.5mL 01/21/2021, 02/18/2021, 10/28/2021, 05/12/2022    Influenza 11/24/2010    Influenza Vaccine, unspecified formulation 10/01/2016    Influenza Virus Vaccine 10/01/2012, 10/01/2013, 10/13/2015    Influenza, FLUAD, (age 72 y+), Adjuvanted, 0.5mL 10/12/2020    Influenza, High Dose (Fluzone 65 yrs and older) 10/12/2017, 10/09/2018, 10/28/2021    Pneumococcal Conjugate 13-valent (Sbyakft00) 07/13/2015    Pneumococcal Polysaccharide (Fyenekgeg63) 05/07/2012    Td, unspecified formulation 09/30/2009    Tdap (Boostrix, Adacel) 01/08/2021    Zoster Live (Zostavax) 08/01/2013    Zoster Recombinant (Shingrix) 01/08/2021, 04/24/2021       Known drug allergies: All allergies were reviewed and updated    No Known Allergies    Social history:     Social History:  All social andepidemiologic history was reviewed and updated by me today as needed. Tobacco use:   reports that she has never smoked.  She has Last 5 Patient Entered Readings                                      Current 30 Day Average: 143/70     Recent Readings 6/16/2018 6/14/2018 6/9/2018 6/8/2018 6/5/2018    SBP (mmHg) 177 148 111 123 149    DBP (mmHg) 81 74 53 59 71    Pulse 79 66 79 72 81          6/14: Left voicemail for patient to call back. Would like to increase amlodipine from 5 to 10 mg daily (continue to take at the same time 1pm)     Patient's BP average is above goal of <130/80.     Patient denies s/s of hypotension (lightheadedness, dizziness, nausea, fatigue) associated with low readings. Instructed patient to inform me if this occurs, patient confirms understanding.      Patient denies s/s of hypertension (SOB, CP, severe headaches, changes in vision) associated with high readings. Instructed patient to go to the ED if BP > 180/110 and accompanied by hypertensive s/s, patient confirms understanding.    Will continue to monitor regularly. Will follow up in 2-3 weeks, sooner if BP begins to trend upward or downward.    Patient has my contact information and knows to call with any concerns or clinical changes.     Current HTN regimen:  Hypertension Medications             amLODIPine (NORVASC) 5 MG tablet Take 1 tablet (5 mg total) by mouth once daily.    hydrALAZINE (APRESOLINE) 50 MG tablet Take 1 tablet (50 mg total) by mouth every 8 (eight) hours.    valsartan-hydrochlorothiazide (DIOVAN-HCT) 320-25 mg per tablet Take 1 tablet by mouth once daily.         6/18: Patient states that she is not sure what is causing her elevated readings, her diet has not changed and she continues to restrict her sodium intake. Will increase amlodipine to 10 mg daily and patient will continue to take in the afternoon ~ 1pm. Asked patient to take two 5 mg tablets until her current supply is gone and will send a new rx to her pharmacy for the 10 mg tablets as to not increase pill burden for patient.      never used smokeless tobacco.  Alcohol use:   reports current alcohol use. Currently lives in: [facility]   reports no history of drug use. COVID VACCINATION AND LAB RESULT RECORDS:     Internal Administration   First Dose COVID-19, LUZ ELENAA BLUE border, Primary or Immunocompromised, (age 12y+), IM, 100 mcg/0.5mL  01/21/2021   Second Dose COVID-19, LISANDRA wray, Primary or Immunocompromised, (age 12y+), IM, 100 mcg/0.5mL   02/18/2021       Last COVID Lab No results found for: SARS-COV-2, SARS-COV-2 RNA, SARS-COV-2, SARS-COV-2, SARS-COV-2 BY PCR, SARS-COV-2, SARS-COV-2, SARS-COV-2         Assessment:     The patient is a 80 y.o. old female who  has a past medical history of AR (allergic rhinitis), Arthritis of knee, Depression, Erosive gastritis (10/28/2019), GERD (gastroesophageal reflux disease), Hyperlipidemia, Internal hemorrhoids, Overweight(278.02), and Viral meningitis (5/12). with following problems:    High fever of 101.8  Bilateral leg cellulitis  Negative bilateral lower extremity venous Doppler study on 9/2/2022  Gastroesophageal reflux disease  History of depression  Dyslipidemia  Obesity Class 1 due to excess calorie intake : Body mass index is 30.38 kg/m². Discussion:      No blood cultures available from this admission. 2 sets of blood cultures have been ordered by primary team today    She had bilateral lower extremity venous Doppler study done on 9/2/2022. It was negative for DVTs. The patient has been on empiric IV cefazolin since admission. Plan:     Diagnostic Workup:    Agree with blood cultures  Will order sed rate and CRP today  Continue to follow fever curve, WBC count and blood cultures  Follow up on liverand renal functions closely  If fevers continue, will order resp viral PCR panel    Antimicrobials:     Will continue iv cefazolin 2 gm every 8 hours  Keep both legs elevated while lying down or sitting up  We will follow up on the culture results and clinical progress and will make further recommendations accordingly. Continue close vitals monitoring. Maintain good glycemic control. Fall precautions. Aspiration precautions. Continue to watch for new fever or diarrhea. DVT prophylaxis. Discussed all above with patient and RN. Drug Monitoring:    Continue serial monitoring for antibiotic toxicity as follows: CBC, CMP  Continue to watch for following: new or worsening fever, hypotension, hives, lip swelling and redness or purulence at vascular access sites. I/v access Management:    Continue to monitor i.v access sites for erythema, induration, discharge or tenderness. As always, continue efforts to minimizetubes/lines/drains as clinically appropriate to reduce chances of line associated infections. Current isolation precautions: There are no current isolations documented for this patient. Level of complexity of visit and medical decision making: High     Risk of Complications/Morbidity: High     Illness(es)/ Infection present that pose threat to life/bodily function. There is potential for severe exacerbation of infection/side effects of treatment. Therapy requires intensive monitoring for antimicrobial agent toxicity. Thank you for involving me in the care of your patient. I will continue to follow. If you have any additional questions, please do not hesitate to contact me. Subjective:     Presenting complaint in ER:     Chief Complaint   Patient presents with    Leg Swelling     Bilateral leg swelling that started a few weeks ago. Worse over the last week. HPI: Joslyn Del Rosario is a 80 y.o. female patient, who was seen at the request of Dr. Honorio Araujo MD.    History was obtained from chart review and the patient. The patient was admitted on 9/2/2022. I have been consulted to see the patient for above mentioned reason(s).     The patient has multiple medical comorbidities, and presented to the ER for 95  100   Resp:  16  16   Temp:  98.1 °F (36.7 °C)  (!) 101.8 °F (38.8 °C)   TempSrc:  Oral  Oral   SpO2: 94% 96% 92% 92%   Weight:  160 lb 12.8 oz (72.9 kg)     Height:           Physical Exam  Vitals and nursing note reviewed. Constitutional:       Appearance: She is well-developed. She is not diaphoretic. Comments: The patient was seen earlier today. HENT:      Head: Normocephalic and atraumatic. Right Ear: External ear normal. There is no impacted cerumen. Left Ear: External ear normal. There is no impacted cerumen. Nose: Nose normal.      Mouth/Throat:      Mouth: Mucous membranes are moist.      Pharynx: Oropharynx is clear. No oropharyngeal exudate. Eyes:      General: No scleral icterus. Right eye: No discharge. Left eye: No discharge. Conjunctiva/sclera: Conjunctivae normal.      Pupils: Pupils are equal, round, and reactive to light. Neck:      Thyroid: No thyromegaly. Cardiovascular:      Rate and Rhythm: Normal rate and regular rhythm. Heart sounds: Normal heart sounds. No murmur heard. No friction rub. Pulmonary:      Effort: No respiratory distress. Breath sounds: No stridor. No wheezing or rales. Abdominal:      General: Bowel sounds are normal.      Palpations: Abdomen is soft. Tenderness: There is no abdominal tenderness. There is no guarding or rebound. Musculoskeletal:         General: Swelling and tenderness present. No deformity. Normal range of motion. Cervical back: Normal range of motion and neck supple. Right lower leg: No edema. Left lower leg: No edema. Lymphadenopathy:      Cervical: No cervical adenopathy. Skin:     General: Skin is warm and dry. Coloration: Skin is not jaundiced. Findings: No bruising, erythema or rash. Neurological:      General: No focal deficit present. Mental Status: She is alert and oriented to person, place, and time. Mental status is at baseline. Motor: No abnormal muscle tone. Psychiatric:         Mood and Affect: Mood normal.         Behavior: Behavior normal.         Lines and drains: All vascular access sites are healthy with no local erythema, discharge or tenderness. Intake and output:     I/O last 3 completed shifts: In: 1080 [P.O.:1080]  Out: 2750 [Urine:2750]    Lab Data:   All available labs were reviewed by me today. CBC:   Recent Labs     09/04/22  0501 09/05/22  0438 09/06/22  1056   WBC 4.6 6.2 7.4   RBC 3.82* 3.99* 4.11   HGB 11.0* 11.5* 11.7*   HCT 33.5* 35.0* 35.8*   * 124* 125*   MCV 87.6 87.6 87.2   MCH 28.9 28.9 28.6   MCHC 32.9 33.0 32.8   RDW 14.2 14.4 14.5        BMP:  Recent Labs     09/04/22  0501 09/05/22  0438 09/06/22  1056    137 131*   K 3.6 3.5 3.5   CL 97* 95* 92*   CO2 32 33* 27   BUN 22* 30* 35*   CREATININE 0.7 0.8 1.1   CALCIUM 8.5 9.0 8.5   GLUCOSE 100* 106* 143*        Hepatic FunctionPanel:   Lab Results   Component Value Date/Time    ALKPHOS 119 09/02/2022 12:24 PM    ALT 15 09/02/2022 12:24 PM    AST 20 09/02/2022 12:24 PM    PROT 6.7 09/02/2022 12:24 PM    PROT 7.3 10/17/2012 08:47 AM    BILITOT <0.2 09/02/2022 12:24 PM    BILIDIR <0.2 10/22/2019 03:08 PM    IBILI see below 10/22/2019 03:08 PM    LABALBU 4.1 09/02/2022 12:24 PM       CPK:   Lab Results   Component Value Date    CKTOTAL 89 09/02/2022     ESR:   Lab Results   Component Value Date    SEDRATE 25 09/02/2022     CRP:   Lab Results   Component Value Date    CRP 2.0 04/03/2018         Imaging: All pertinent images and reports for the current visit were reviewed by me during this visit. I reviewed the chest x-ray/CT scan/MRI images and independently interpreted the findings and results today.     VL Extremity Venous Bilateral         XR CHEST (2 VW)    (Results Pending)       Outside records:    Labs, Microbiology, Radiology and pertinent results from Care everywhere, if available, were reviewed as a part ofthe consultation. Problem list:       Patient Active Problem List   Diagnosis Code    AR (allergic rhinitis) J30.9    Arthritis of knee M17.10    Patient overweight E66.3    High fever R50.9    Postmenopausal Z78.0    Essential hypertension I10    Erosive gastritis K29.60    Current mild episode of major depressive disorder without prior episode ([facility] [address] F32.0    Left displaced femoral neck fracture ([facility] [address] S72.002A    Fall at home [address] Rony Vasquez, Y92.009    Dyslipidemia E78.5    Cellulitis L03.90    Class 1 obesity due to excess calories with body mass index (BMI) of 30.0 to 30.9 in adult E66.09, Z68.30    History of depression Z86.59    Bilateral lower leg cellulitis L03.116, L03.115    Peripheral edema R60.9    Redness and swelling of lower leg M79.89, R23.8         Please note that this chart was generated using Dragon dictation software. Although every effort was made to ensure the accuracy of this automated transcription, some errors in transcription may have occurred inadvertently. If you may need any clarification, please do not hesitate to contact me through EPIC or at the phone number provided below with my electronic signature. Any pictures or media included in this note were obtained after taking informed verbal consent from the patient and with their approval to include those in the patient's medical record. Arielle Alston MD, MPH, JAMISON Martinez  9/6/2022, 3:29 PM  [facility]   [address] [place] [address]., [address] [facility], [address] [place] [address]  Office: [phone]  Fax: [phone]  In-person Clinic days:  Tuesday & Thursday a.m. Virtual clinic days: Monday, Wednesday & Friday a.m.

## 2022-09-07 ENCOUNTER — TELEPHONE (OUTPATIENT)
Dept: INTERNAL MEDICINE | Facility: CLINIC | Age: 82
End: 2022-09-07
Payer: MEDICARE

## 2022-09-07 DIAGNOSIS — R53.83 FATIGUE, UNSPECIFIED TYPE: ICD-10-CM

## 2022-09-07 DIAGNOSIS — E78.2 MIXED HYPERLIPIDEMIA: Primary | ICD-10-CM

## 2022-09-07 DIAGNOSIS — Z12.31 SCREENING MAMMOGRAM, ENCOUNTER FOR: ICD-10-CM

## 2022-09-07 DIAGNOSIS — I10 PRIMARY HYPERTENSION: ICD-10-CM

## 2022-09-07 DIAGNOSIS — E11.21 TYPE 2 DIABETES MELLITUS WITH DIABETIC NEPHROPATHY, WITHOUT LONG-TERM CURRENT USE OF INSULIN: ICD-10-CM

## 2022-09-08 NOTE — TELEPHONE ENCOUNTER
Please schedule for EPP first available    Labs and urine now thanks     Also due tor mammogram ordered thanks

## 2022-09-12 ENCOUNTER — HOSPITAL ENCOUNTER (OUTPATIENT)
Dept: RADIOLOGY | Facility: HOSPITAL | Age: 82
Discharge: HOME | End: 2022-09-12
Attending: INTERNAL MEDICINE
Payer: MEDICARE

## 2022-09-12 DIAGNOSIS — Z12.31 SCREENING MAMMOGRAM, ENCOUNTER FOR: ICD-10-CM

## 2022-09-12 PROCEDURE — 77067 MAMMO DIGITAL SCREENING BILAT WITH TOMO: ICD-10-PCS | Mod: 26,,, | Performed by: RADIOLOGY

## 2022-09-12 PROCEDURE — 77063 MAMMO DIGITAL SCREENING BILAT WITH TOMO: ICD-10-PCS | Mod: 26,,, | Performed by: RADIOLOGY

## 2022-09-12 PROCEDURE — 77063 BREAST TOMOSYNTHESIS BI: CPT | Mod: TC

## 2022-09-12 PROCEDURE — 77063 BREAST TOMOSYNTHESIS BI: CPT | Mod: 26,,, | Performed by: RADIOLOGY

## 2022-09-12 PROCEDURE — 77067 SCR MAMMO BI INCL CAD: CPT | Mod: 26,,, | Performed by: RADIOLOGY

## 2022-09-12 PROCEDURE — 77067 SCR MAMMO BI INCL CAD: CPT | Mod: TC

## 2022-09-26 ENCOUNTER — IMMUNIZATION (OUTPATIENT)
Dept: INTERNAL MEDICINE | Facility: CLINIC | Age: 82
End: 2022-09-26
Payer: MEDICARE

## 2022-09-26 ENCOUNTER — OFFICE VISIT (OUTPATIENT)
Dept: INTERNAL MEDICINE | Facility: CLINIC | Age: 82
End: 2022-09-26
Payer: MEDICARE

## 2022-09-26 VITALS — HEIGHT: 60 IN | SYSTOLIC BLOOD PRESSURE: 135 MMHG | DIASTOLIC BLOOD PRESSURE: 70 MMHG | BODY MASS INDEX: 30.27 KG/M2

## 2022-09-26 DIAGNOSIS — G14 POST POLIOMYELITIS SYNDROME: ICD-10-CM

## 2022-09-26 DIAGNOSIS — Z86.12 HISTORY OF POLIOMYELITIS: ICD-10-CM

## 2022-09-26 DIAGNOSIS — E11.21 TYPE 2 DIABETES MELLITUS WITH DIABETIC NEPHROPATHY, WITHOUT LONG-TERM CURRENT USE OF INSULIN: ICD-10-CM

## 2022-09-26 DIAGNOSIS — E78.5 HYPERLIPIDEMIA, UNSPECIFIED HYPERLIPIDEMIA TYPE: ICD-10-CM

## 2022-09-26 DIAGNOSIS — M81.0 AGE-RELATED OSTEOPOROSIS WITHOUT CURRENT PATHOLOGICAL FRACTURE: ICD-10-CM

## 2022-09-26 DIAGNOSIS — E11.43 TYPE 2 DIABETES MELLITUS WITH AUTONOMIC NEUROPATHY: ICD-10-CM

## 2022-09-26 DIAGNOSIS — E11.40 TYPE 2 DIABETES MELLITUS WITH DIABETIC NEUROPATHY, WITHOUT LONG-TERM CURRENT USE OF INSULIN: ICD-10-CM

## 2022-09-26 DIAGNOSIS — Z23 NEED FOR VACCINATION: Primary | ICD-10-CM

## 2022-09-26 DIAGNOSIS — G47.33 OBSTRUCTIVE SLEEP APNEA SYNDROME: ICD-10-CM

## 2022-09-26 DIAGNOSIS — I10 PRIMARY HYPERTENSION: Primary | ICD-10-CM

## 2022-09-26 PROBLEM — U07.1 COVID: Status: RESOLVED | Noted: 2022-06-15 | Resolved: 2022-09-26

## 2022-09-26 PROCEDURE — 3078F PR MOST RECENT DIASTOLIC BLOOD PRESSURE < 80 MM HG: ICD-10-PCS | Mod: CPTII,S$GLB,, | Performed by: INTERNAL MEDICINE

## 2022-09-26 PROCEDURE — 91312 COVID-19, MRNA, LNP-S, BIVALENT BOOSTER, PF, 30 MCG/0.3 ML DOSE: CPT | Mod: S$GLB,,, | Performed by: INTERNAL MEDICINE

## 2022-09-26 PROCEDURE — 99499 RISK ADDL DX/OHS AUDIT: ICD-10-PCS | Mod: S$GLB,,, | Performed by: INTERNAL MEDICINE

## 2022-09-26 PROCEDURE — 1159F MED LIST DOCD IN RCRD: CPT | Mod: CPTII,S$GLB,, | Performed by: INTERNAL MEDICINE

## 2022-09-26 PROCEDURE — 1101F PT FALLS ASSESS-DOCD LE1/YR: CPT | Mod: CPTII,S$GLB,, | Performed by: INTERNAL MEDICINE

## 2022-09-26 PROCEDURE — 3075F SYST BP GE 130 - 139MM HG: CPT | Mod: CPTII,S$GLB,, | Performed by: INTERNAL MEDICINE

## 2022-09-26 PROCEDURE — 0124A COVID-19, MRNA, LNP-S, BIVALENT BOOSTER, PF, 30 MCG/0.3 ML DOSE: CPT | Mod: CV19,PBBFAC | Performed by: INTERNAL MEDICINE

## 2022-09-26 PROCEDURE — 3078F DIAST BP <80 MM HG: CPT | Mod: CPTII,S$GLB,, | Performed by: INTERNAL MEDICINE

## 2022-09-26 PROCEDURE — 91312 COVID-19, MRNA, LNP-S, BIVALENT BOOSTER, PF, 30 MCG/0.3 ML DOSE: ICD-10-PCS | Mod: S$GLB,,, | Performed by: INTERNAL MEDICINE

## 2022-09-26 PROCEDURE — 99999 PR PBB SHADOW E&M-EST. PATIENT-LVL IV: CPT | Mod: PBBFAC,,, | Performed by: INTERNAL MEDICINE

## 2022-09-26 PROCEDURE — 90694 VACC AIIV4 NO PRSRV 0.5ML IM: CPT | Mod: S$GLB,,, | Performed by: INTERNAL MEDICINE

## 2022-09-26 PROCEDURE — 99499 UNLISTED E&M SERVICE: CPT | Mod: S$GLB,,, | Performed by: INTERNAL MEDICINE

## 2022-09-26 PROCEDURE — 3075F PR MOST RECENT SYSTOLIC BLOOD PRESS GE 130-139MM HG: ICD-10-PCS | Mod: CPTII,S$GLB,, | Performed by: INTERNAL MEDICINE

## 2022-09-26 PROCEDURE — 99999 PR PBB SHADOW E&M-EST. PATIENT-LVL IV: ICD-10-PCS | Mod: PBBFAC,,, | Performed by: INTERNAL MEDICINE

## 2022-09-26 PROCEDURE — 99214 OFFICE O/P EST MOD 30 MIN: CPT | Mod: 25,S$GLB,, | Performed by: INTERNAL MEDICINE

## 2022-09-26 PROCEDURE — G0008 FLU VACCINE - QUADRIVALENT - ADJUVANTED: ICD-10-PCS | Mod: S$GLB,,, | Performed by: INTERNAL MEDICINE

## 2022-09-26 PROCEDURE — G0008 ADMIN INFLUENZA VIRUS VAC: HCPCS | Mod: S$GLB,,, | Performed by: INTERNAL MEDICINE

## 2022-09-26 PROCEDURE — 3288F FALL RISK ASSESSMENT DOCD: CPT | Mod: CPTII,S$GLB,, | Performed by: INTERNAL MEDICINE

## 2022-09-26 PROCEDURE — 90694 FLU VACCINE - QUADRIVALENT - ADJUVANTED: ICD-10-PCS | Mod: S$GLB,,, | Performed by: INTERNAL MEDICINE

## 2022-09-26 PROCEDURE — 1101F PR PT FALLS ASSESS DOC 0-1 FALLS W/OUT INJ PAST YR: ICD-10-PCS | Mod: CPTII,S$GLB,, | Performed by: INTERNAL MEDICINE

## 2022-09-26 PROCEDURE — 3288F PR FALLS RISK ASSESSMENT DOCUMENTED: ICD-10-PCS | Mod: CPTII,S$GLB,, | Performed by: INTERNAL MEDICINE

## 2022-09-26 PROCEDURE — 99214 PR OFFICE/OUTPT VISIT, EST, LEVL IV, 30-39 MIN: ICD-10-PCS | Mod: 25,S$GLB,, | Performed by: INTERNAL MEDICINE

## 2022-09-26 PROCEDURE — 1159F PR MEDICATION LIST DOCUMENTED IN MEDICAL RECORD: ICD-10-PCS | Mod: CPTII,S$GLB,, | Performed by: INTERNAL MEDICINE

## 2022-09-26 RX ORDER — AMLODIPINE BESYLATE 10 MG/1
TABLET ORAL
Qty: 90 TABLET | Refills: 3 | Status: SHIPPED | OUTPATIENT
Start: 2022-09-26 | End: 2023-07-20

## 2022-09-26 RX ORDER — ISOPROPYL ALCOHOL 70 ML/100ML
SWAB TOPICAL
Qty: 100 EACH | Refills: 12 | Status: SHIPPED | OUTPATIENT
Start: 2022-09-26 | End: 2023-06-30

## 2022-09-26 RX ORDER — PRAVASTATIN SODIUM 40 MG/1
TABLET ORAL
Qty: 135 TABLET | Refills: 3 | Status: SHIPPED | OUTPATIENT
Start: 2022-09-26 | End: 2023-06-30

## 2022-09-26 RX ORDER — GLIPIZIDE 5 MG/1
5 TABLET ORAL DAILY
Qty: 90 TABLET | Refills: 0 | Status: SHIPPED | OUTPATIENT
Start: 2022-09-26 | End: 2023-04-26

## 2022-09-26 RX ORDER — DEXTROSE 4 G
1 TABLET,CHEWABLE ORAL 2 TIMES DAILY
Qty: 1 EACH | Refills: 0 | Status: SHIPPED | OUTPATIENT
Start: 2022-09-26 | End: 2024-02-20

## 2022-09-26 RX ORDER — METFORMIN HYDROCHLORIDE 500 MG/1
500 TABLET, EXTENDED RELEASE ORAL 2 TIMES DAILY WITH MEALS
Qty: 180 TABLET | Refills: 3 | Status: SHIPPED | OUTPATIENT
Start: 2022-09-26 | End: 2023-11-14

## 2022-09-26 RX ORDER — HYDROCHLOROTHIAZIDE 25 MG/1
25 TABLET ORAL DAILY
Qty: 90 TABLET | Refills: 0 | Status: SHIPPED | OUTPATIENT
Start: 2022-09-26 | End: 2022-12-08

## 2022-09-26 RX ORDER — PRAVASTATIN SODIUM 40 MG/1
TABLET ORAL
Qty: 135 TABLET | Refills: 0 | Status: CANCELLED | OUTPATIENT
Start: 2022-09-26

## 2022-09-26 RX ORDER — IRBESARTAN 300 MG/1
300 TABLET ORAL DAILY
Qty: 90 TABLET | Refills: 3 | Status: SHIPPED | OUTPATIENT
Start: 2022-09-26 | End: 2022-12-08

## 2022-09-26 RX ORDER — LANCETS
EACH MISCELLANEOUS
Qty: 200 EACH | Refills: 1 | Status: SHIPPED | OUTPATIENT
Start: 2022-09-26 | End: 2023-03-03

## 2022-09-26 NOTE — PROGRESS NOTES
"Subjective:       Patient ID: Emilia Alan is a 82 y.o. female.    Chief Complaint: Follow-up    6 month follow up     BP doing well; is in the digital program.     Diabetes stable with A1c 6.6 recently.     On zoledronic acid for her osteoporosis- had her last injection in May.    Would like to see Optometry; otherwise up-to-date with health maintenance.      Discussed booster for COVID which would be due in the summer.     Seen in Hematology February 2022:  "Clonal hematopoiesis of indeterminate potential (CHIP)  Ms. Alan has resolution of all cytopenias. 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   CHIP is a common condition in the older adult population. It has variable potential to develop into hematologic malignancy, though overall considered to be low.   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 Given low risk associated with CHIP, I recommend annual CBC monitoring with primary care. She may return to hematology for any clinically significant cytopenias (see below)     Follow-up  - Return to primary care and monitor CBC annually  - Return to hematology for any clinically significant cytopenias, defined as:               - hemoglobin less than 10 g/dl              - absolute neutrophil count (ANC) less than 1,000              - platelet count less than 100,000"    Patient Active Problem List   Diagnosis    Gait disorder    History of poliomyelitis    Post poliomyelitis syndrome    Diabetic polyneuropathy associated with type 2 diabetes mellitus    Mixed hyperlipidemia    Osteoporosis without current pathological fracture: worse on fosamax 5/16- Reclast 8/16, 12/20    Paraparesis of both lower limbs    Primary hypertension    Type 2 diabetes mellitus with diabetic neuropathy, without long-term current use of insulin    " Osteoarthritis of right knee    Lumbar spinal stenosis    Obstructive sleep apnea syndrome: dx 2008 needs CPAP 11    Bradycardia, drug induced    Atherosclerosis of artery of right lower extremity: see xray 4/4/07    Essential tremor    Clonal Hematopoiesis of Indeterminate Potential (CHIP)    Type 2 diabetes mellitus with renal manifestations    Facet arthritis of lumbar region    Class 1 obesity due to excess calories with serious comorbidity and body mass index (BMI) of 30.0 to 30.9 in adult    Opioid use agreement exists    Primary osteoarthritis of left shoulder    Unspecified inflammatory spondylopathy, lumbar region      HPI  Review of Systems   Constitutional:  Negative for chills and fever.   Respiratory:  Negative for cough and shortness of breath.    Cardiovascular:  Negative for chest pain.   Genitourinary:  Negative for frequency.   Musculoskeletal:         Chronic post-polio changes     Objective:      Physical Exam  Constitutional:       Appearance: She is well-developed.   HENT:      Head: Normocephalic and atraumatic.   Eyes:      Extraocular Movements: Extraocular movements intact.      Conjunctiva/sclera: Conjunctivae normal.   Neck:      Thyroid: No thyromegaly.   Cardiovascular:      Rate and Rhythm: Normal rate and regular rhythm.   Pulmonary:      Effort: No respiratory distress.   Neurological:      General: No focal deficit present.      Mental Status: She is alert and oriented to person, place, and time.   Psychiatric:         Mood and Affect: Mood normal.         Behavior: Behavior normal.         Thought Content: Thought content normal.         Judgment: Judgment normal.       Assessment:       1. Primary hypertension    2. Type 2 diabetes mellitus with autonomic neuropathy    3. Hyperlipidemia, unspecified hyperlipidemia type    4. Post poliomyelitis syndrome    5. History of poliomyelitis    6. Type 2 diabetes mellitus with diabetic neuropathy, without long-term current use of insulin     7. Type 2 diabetes mellitus with diabetic nephropathy, without long-term current use of insulin    8. Osteoporosis without current pathological fracture: worse on fosamax 5/16- Reclast 8/16, 12/20    9. Obstructive sleep apnea syndrome: dx 2008 needs CPAP 11          Plan:             Emilia was seen today for follow-up.    Diagnoses and all orders for this visit:    Primary hypertension  -     amLODIPine (NORVASC) 10 MG tablet; TAKE 1 TABLET EVERY DAY  -     hydroCHLOROthiazide (HYDRODIURIL) 25 MG tablet; Take 1 tablet (25 mg total) by mouth once daily.  -     irbesartan (AVAPRO) 300 MG tablet; Take 1 tablet (300 mg total) by mouth once daily.  -     CBC Auto Differential; Future  -     Comprehensive Metabolic Panel; Future    Type 2 diabetes mellitus with autonomic neuropathy  -     glipiZIDE (GLUCOTROL) 5 MG tablet; Take 1 tablet (5 mg total) by mouth once daily.  -     metFORMIN (GLUCOPHAGE-XR) 500 MG ER 24hr tablet; Take 1 tablet (500 mg total) by mouth 2 (two) times daily with meals.  -     lancets (ACCU-CHEK SOFTCLIX LANCETS) Misc; TEST BLOOD SUGAR TWICE DAILY  -     blood-glucose meter (ACCU-CHEK AJITH PLUS METER) Misc; Inject 1 Units into the skin 2 (two) times daily.  -     blood sugar diagnostic (ACCU-CHEK AJITH PLUS TEST STRP) Strp; TEST BLOOD SUGAR TWICE DAILY  -     Hemoglobin A1C; Future    Hyperlipidemia, unspecified hyperlipidemia type  -     pravastatin (PRAVACHOL) 40 MG tablet; TAKE 1 AND 1/2 TABLETS EVERY NIGHT  -     Lipid Panel; Future    Post poliomyelitis syndrome    History of poliomyelitis    Type 2 diabetes mellitus with diabetic neuropathy, without long-term current use of insulin    Type 2 diabetes mellitus with diabetic nephropathy, without long-term current use of insulin  -     Ambulatory referral/consult to Optometry; Future    Osteoporosis without current pathological fracture: worse on fosamax 5/16- Reclast 8/16, 12/20; ongoing treatment    Obstructive sleep apnea syndrome: dx 2008  needs CPAP 11; is compliant on treatment    Other orders  -     alcohol swabs (BD ALCOHOL SWABS) PadM; Use daily    Return in 6 months, sooner with problems in the interim  Schedule eye exam

## 2022-09-27 DIAGNOSIS — G89.29 CHRONIC PAIN OF RIGHT KNEE: ICD-10-CM

## 2022-09-27 DIAGNOSIS — E08.44 DIABETIC AMYOTROPHY ASSOCIATED WITH DIABETES MELLITUS DUE TO UNDERLYING CONDITION: ICD-10-CM

## 2022-09-27 DIAGNOSIS — Z86.12 HISTORY OF POLIOMYELITIS: ICD-10-CM

## 2022-09-27 DIAGNOSIS — M47.26 OSTEOARTHRITIS OF SPINE WITH RADICULOPATHY, LUMBAR REGION: ICD-10-CM

## 2022-09-27 DIAGNOSIS — G82.20 PARAPARESIS OF BOTH LOWER LIMBS: ICD-10-CM

## 2022-09-27 DIAGNOSIS — M54.40 CHRONIC BILATERAL LOW BACK PAIN WITH SCIATICA, SCIATICA LATERALITY UNSPECIFIED: ICD-10-CM

## 2022-09-27 DIAGNOSIS — E11.40 TYPE 2 DIABETES MELLITUS WITH DIABETIC NEUROPATHY, WITHOUT LONG-TERM CURRENT USE OF INSULIN: ICD-10-CM

## 2022-09-27 DIAGNOSIS — G14 POST POLIOMYELITIS SYNDROME: ICD-10-CM

## 2022-09-27 DIAGNOSIS — M17.11 PRIMARY OSTEOARTHRITIS OF RIGHT KNEE: ICD-10-CM

## 2022-09-27 DIAGNOSIS — M81.0 AGE-RELATED OSTEOPOROSIS WITHOUT CURRENT PATHOLOGICAL FRACTURE: ICD-10-CM

## 2022-09-27 DIAGNOSIS — R26.9 GAIT DISORDER: ICD-10-CM

## 2022-09-27 DIAGNOSIS — M47.16 LUMBAR SPONDYLOSIS WITH MYELOPATHY: ICD-10-CM

## 2022-09-27 DIAGNOSIS — M54.16 LEFT LUMBAR RADICULOPATHY: ICD-10-CM

## 2022-09-27 DIAGNOSIS — M54.16 LUMBAR RADICULOPATHY: ICD-10-CM

## 2022-09-27 DIAGNOSIS — E11.42 DIABETIC POLYNEUROPATHY ASSOCIATED WITH TYPE 2 DIABETES MELLITUS: ICD-10-CM

## 2022-09-27 DIAGNOSIS — M47.816 LUMBAR SPONDYLOSIS: ICD-10-CM

## 2022-09-27 DIAGNOSIS — G89.29 CHRONIC BILATERAL LOW BACK PAIN WITH SCIATICA, SCIATICA LATERALITY UNSPECIFIED: ICD-10-CM

## 2022-09-27 DIAGNOSIS — M48.062 SPINAL STENOSIS OF LUMBAR REGION WITH NEUROGENIC CLAUDICATION: ICD-10-CM
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
## 2022-09-27 DIAGNOSIS — M25.561 CHRONIC PAIN OF RIGHT KNEE: ICD-10-CM

## 2022-09-27 NOTE — TELEPHONE ENCOUNTER
----- Message from Corazon Ding sent at 9/27/2022  2:23 PM CDT -----  Regarding: refill  Contact: @ [phone]  Patient is requesting a refill on Rx HYDROcodone-acetaminophen (NORCO)  mg per tablet. Please call to discuss further.    [facility] - [place], [place] - [address]  [address] [facility] [address] [facility] [place] [address]  Phone: [phone] Fax: [phone]

## 2022-09-30 RX ORDER — HYDROCODONE BITARTRATE AND ACETAMINOPHEN 10; 325 MG/1; MG/1
1 TABLET ORAL EVERY 6 HOURS PRN
Qty: 120 TABLET | Refills: 0 | Status: SHIPPED | OUTPATIENT
Start: 2022-09-30 | End: 2022-11-07 | Stop reason: SDUPTHER

## 2022-10-07 ENCOUNTER — PES CALL (OUTPATIENT)
Dept: ADMINISTRATIVE | Facility: CLINIC | Age: 82
End: 2022-10-07
Payer: MEDICARE

## 2022-10-20 ENCOUNTER — OFFICE VISIT (OUTPATIENT)
Dept: PODIATRY | Facility: CLINIC | Age: 82
End: 2022-10-20
Payer: MEDICARE

## 2022-10-20 VITALS
WEIGHT: 156 LBS | HEART RATE: 79 BPM | HEIGHT: 60 IN | DIASTOLIC BLOOD PRESSURE: 63 MMHG | SYSTOLIC BLOOD PRESSURE: 139 MMHG | RESPIRATION RATE: 18 BRPM | BODY MASS INDEX: 30.63 KG/M2

## 2022-10-20 DIAGNOSIS — E11.40 TYPE 2 DIABETES MELLITUS WITH DIABETIC NEUROPATHY, WITHOUT LONG-TERM CURRENT USE OF INSULIN: ICD-10-CM

## 2022-10-20 DIAGNOSIS — M21.372 FOOT DROP, LEFT: ICD-10-CM

## 2022-10-20 DIAGNOSIS — B35.1 ONYCHOMYCOSIS DUE TO DERMATOPHYTE: ICD-10-CM

## 2022-10-20 DIAGNOSIS — G60.9 IDIOPATHIC PERIPHERAL NEUROPATHY: ICD-10-CM

## 2022-10-20 DIAGNOSIS — R26.9 GAIT DISORDER: ICD-10-CM

## 2022-10-20 DIAGNOSIS — G14 POST-POLIO SYNDROME: Primary | ICD-10-CM

## 2022-10-20 DIAGNOSIS — L84 CORN OR CALLUS: ICD-10-CM

## 2022-10-20 PROCEDURE — 3078F PR MOST RECENT DIASTOLIC BLOOD PRESSURE < 80 MM HG: ICD-10-PCS | Mod: CPTII,S$GLB,, | Performed by: PODIATRIST

## 2022-10-20 PROCEDURE — 3075F PR MOST RECENT SYSTOLIC BLOOD PRESS GE 130-139MM HG: ICD-10-PCS | Mod: CPTII,S$GLB,, | Performed by: PODIATRIST

## 2022-10-20 PROCEDURE — 11721 DEBRIDE NAIL 6 OR MORE: CPT | Mod: Q9,59,S$GLB, | Performed by: PODIATRIST

## 2022-10-20 PROCEDURE — 3078F DIAST BP <80 MM HG: CPT | Mod: CPTII,S$GLB,, | Performed by: PODIATRIST

## 2022-10-20 PROCEDURE — 99999 PR PBB SHADOW E&M-EST. PATIENT-LVL II: CPT | Mod: PBBFAC,,, | Performed by: PODIATRIST

## 2022-10-20 PROCEDURE — 3075F SYST BP GE 130 - 139MM HG: CPT | Mod: CPTII,S$GLB,, | Performed by: PODIATRIST

## 2022-10-20 PROCEDURE — 11055 PR TRIM HYPERKERATOTIC SKIN LESION, ONE: ICD-10-PCS | Mod: Q9,S$GLB,, | Performed by: PODIATRIST

## 2022-10-20 PROCEDURE — 99999 PR PBB SHADOW E&M-EST. PATIENT-LVL II: ICD-10-PCS | Mod: PBBFAC,,, | Performed by: PODIATRIST

## 2022-10-20 PROCEDURE — 99499 UNLISTED E&M SERVICE: CPT | Mod: S$GLB,,, | Performed by: PODIATRIST

## 2022-10-20 PROCEDURE — 1159F MED LIST DOCD IN RCRD: CPT | Mod: CPTII,S$GLB,, | Performed by: PODIATRIST

## 2022-10-20 PROCEDURE — 99499 NO LOS: ICD-10-PCS | Mod: S$GLB,,, | Performed by: PODIATRIST

## 2022-10-20 PROCEDURE — 11055 PARING/CUTG B9 HYPRKER LES 1: CPT | Mod: Q9,S$GLB,, | Performed by: PODIATRIST

## 2022-10-20 PROCEDURE — 1126F AMNT PAIN NOTED NONE PRSNT: CPT | Mod: CPTII,S$GLB,, | Performed by: PODIATRIST

## 2022-10-20 PROCEDURE — 1126F PR PAIN SEVERITY QUANTIFIED, NO PAIN PRESENT: ICD-10-PCS | Mod: CPTII,S$GLB,, | Performed by: PODIATRIST

## 2022-10-20 PROCEDURE — 1159F PR MEDICATION LIST DOCUMENTED IN MEDICAL RECORD: ICD-10-PCS | Mod: CPTII,S$GLB,, | Performed by: PODIATRIST

## 2022-10-20 PROCEDURE — 11721 PR DEBRIDEMENT OF NAILS, 6 OR MORE: ICD-10-PCS | Mod: Q9,59,S$GLB, | Performed by: PODIATRIST

## 2022-10-20 NOTE — PROGRESS NOTES
Subjective:      Patient ID: Emilia Alan is a 82 y.o. female.    Chief Complaint: Nail Care, Diabetic Foot Exam, and PCP (Lauren Deras MD  9/26/22)    Emilia is a 82 y.o. female who presents to the clinic for evaluation and treatment of high risk feet. 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 The patient's chief complaint is long, thick toenails     PCP: Lauren Deras MD    Date Last Seen by PCP:   Chief Complaint   Patient presents with    Nail Care    Diabetic Foot Exam    PCP     Lauren Deras MD  9/26/22         Current shoe gear: DM shoes w/ AFO brace( L)    Hemoglobin A1C   Date Value Ref Range Status   09/12/2022 6.6 (H) 4.0 - 5.6 % Final     Comment:     ADA Screening Guidelines:  5.7-6.4%  Consistent with prediabetes  >or=6.5%  Consistent with diabetes    High levels of fetal hemoglobin interfere with the HbA1C  assay. Heterozygous hemoglobin variants (HbS, HgC, etc)do  not significantly interfere with this assay.   However, presence of multiple variants may affect accuracy.     03/25/2022 7.5 (H) 4.0 - 5.6 % Final     Comment:     ADA Screening Guidelines:  5.7-6.4%  Consistent with prediabetes  >or=6.5%  Consistent with diabetes    High levels of fetal hemoglobin interfere with  the HbA1C  assay. Heterozygous hemoglobin variants (HbS, HgC, etc)do  not significantly interfere with this assay.   However, presence of multiple variants may affect accuracy.     10/13/2021 6.6 (H) 4.0 - 5.6 % Final     Comment:     ADA Screening Guidelines:  5.7-6.4%  Consistent with prediabetes  >or=6.5%  Consistent with diabetes    High levels of fetal hemoglobin interfere with the HbA1C  assay. Heterozygous hemoglobin variants (HbS, HgC, etc)do  not significantly interfere with this assay.   However, presence of multiple variants may affect accuracy.           Review of Systems   Constitutional: Negative for chills, decreased appetite and fever.   Cardiovascular:  Positive for leg swelling (new onset). Negative for chest pain and claudication.   Respiratory:  Negative for cough and shortness of breath.    Skin:  Positive for dry skin, nail changes and rash. Negative for color change, flushing, itching, poor wound healing and skin cancer.   Musculoskeletal:  Positive for muscle weakness (foot drop). Negative for arthritis, back pain, falls, gout, joint pain, joint swelling and myalgias.   Gastrointestinal:  Negative for nausea and vomiting.   Neurological:  Positive for numbness and paresthesias. Negative for loss of balance.         Objective:       Vitals:    10/20/22 1322   BP: 139/63   Pulse: 79   Resp: 18   Weight: 70.8 kg (156 lb)   Height: 5' (1.524 m)   PainSc: 0-No pain        Physical Exam  Vitals and nursing note reviewed.   Constitutional:       General: She is not in acute distress.     Appearance: She is well-developed. She is not diaphoretic.   Cardiovascular:      Comments: Dorsalis pedis and posterior tibial pulses are diminished Bilaterally. Toes are cool to touch. Feet are warm proximally.There is decreased digital hair. Skin is atrophic, slightly hyperpigmented, and mildly edematous      Musculoskeletal:         General: Deformity present. No tenderness or signs of injury.      Right ankle:  Normal.      Left ankle: Normal.      Right foot: No swelling, deformity or crepitus.      Left foot: No swelling, deformity or crepitus.      Comments: Dropfoot left.      Lymphadenopathy:      Comments: No palpable lymph nodes   Skin:     General: Skin is warm and dry.      Coloration: Skin is not pale.      Findings: No abrasion, bruising, burn, erythema, laceration, lesion, petechiae or rash.      Nails: There is no clubbing.      Comments: Nails 1-5 b/l  are elongated by  3-6mm's, thickened by 3-5 mm's, dystrophic, and are darkened in  coloration . Xerosis Bilaterally. No open lesions noted.    Scaling dryness in a moccasin distribution is noted to the bilateral lower extremities with associated erythema.      Hyperkeratotic tissue noted to plantar L 5th MPJ    Neurological:      Mental Status: She is alert and oriented to person, place, and time.      Comments: [place] 5.07 monofilamant testing is diminished Charbel feet. Sharp/dull sensation diminished Bilaterally. Light touch absent Bilaterally.       Psychiatric:         Thought Content: Thought content normal.         Judgment: Judgment normal.           Assessment:       Encounter Diagnoses   Name Primary?    Post-polio syndrome Yes    Type 2 diabetes mellitus with diabetic neuropathy, without long-term current use of insulin     Idiopathic peripheral neuropathy     Foot drop, left     Onychomycosis due to dermatophyte     Corn or callus     Gait disorder          Plan:       Emilia was seen today for nail care, diabetic foot exam and pcp.    Diagnoses and all orders for this visit:    Post-polio syndrome  -     ORTHOTIC DEVICE (DME)    Type 2 diabetes mellitus with diabetic neuropathy, without long-term current use of insulin    Idiopathic peripheral neuropathy    Foot drop, left    Onychomycosis due to dermatophyte    Corn or callus    Gait disorder    I counseled the patient on her conditions, their implications and medical management.    - Shoe  inspection.Patient instructed on proper foot hygeine. We discussed wearing proper shoe gear, daily foot inspections, never walking without protective shoe gear, never putting sharp instruments to feet, routine podiatric nail visits every 2-3 months.      - With patient's permission, nails were aggressively reduced and debrided x 10 to their soft tissue attachment mechanically and with electric , removing all offending nail and debris. Patient relates relief following the procedure. She will continue to monitor the areas daily, inspect her feet, wear protective shoe gear when ambulatory, moisturizer to maintain skin integrity and follow in this office in approximately 2-3 months, sooner p.r.n.    - After cleansing the  area w/ alcohol prep pad the above mentioned hyperkeratosis was trimmed utilizing No 15 scapel, to a smooth base with out incident. Patient tolerated this  well and reported comfort to the area x1

## 2022-11-07 DIAGNOSIS — M47.16 LUMBAR SPONDYLOSIS WITH MYELOPATHY: ICD-10-CM

## 2022-11-07 DIAGNOSIS — M54.40 CHRONIC BILATERAL LOW BACK PAIN WITH SCIATICA, SCIATICA LATERALITY UNSPECIFIED: ICD-10-CM

## 2022-11-07 DIAGNOSIS — G14 POST POLIOMYELITIS SYNDROME: ICD-10-CM

## 2022-11-07 DIAGNOSIS — G82.20 PARAPARESIS OF BOTH LOWER LIMBS: ICD-10-CM

## 2022-11-07 DIAGNOSIS — M25.561 CHRONIC PAIN OF RIGHT KNEE: ICD-10-CM

## 2022-11-07 DIAGNOSIS — M48.062 SPINAL STENOSIS OF LUMBAR REGION WITH NEUROGENIC CLAUDICATION: ICD-10-CM

## 2022-11-07 DIAGNOSIS — G89.29 CHRONIC PAIN OF RIGHT KNEE: ICD-10-CM
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
## 2022-11-07 DIAGNOSIS — E11.42 DIABETIC POLYNEUROPATHY ASSOCIATED WITH TYPE 2 DIABETES MELLITUS: ICD-10-CM

## 2022-11-07 DIAGNOSIS — M17.11 PRIMARY OSTEOARTHRITIS OF RIGHT KNEE: ICD-10-CM

## 2022-11-07 DIAGNOSIS — M81.0 AGE-RELATED OSTEOPOROSIS WITHOUT CURRENT PATHOLOGICAL FRACTURE: ICD-10-CM

## 2022-11-07 DIAGNOSIS — M47.816 LUMBAR SPONDYLOSIS: ICD-10-CM

## 2022-11-07 DIAGNOSIS — M54.16 LUMBAR RADICULOPATHY: ICD-10-CM

## 2022-11-07 DIAGNOSIS — E11.40 TYPE 2 DIABETES MELLITUS WITH DIABETIC NEUROPATHY, WITHOUT LONG-TERM CURRENT USE OF INSULIN: ICD-10-CM

## 2022-11-07 DIAGNOSIS — M47.26 OSTEOARTHRITIS OF SPINE WITH RADICULOPATHY, LUMBAR REGION: ICD-10-CM

## 2022-11-07 DIAGNOSIS — G89.29 CHRONIC BILATERAL LOW BACK PAIN WITH SCIATICA, SCIATICA LATERALITY UNSPECIFIED: ICD-10-CM

## 2022-11-07 DIAGNOSIS — Z86.12 HISTORY OF POLIOMYELITIS: ICD-10-CM

## 2022-11-07 DIAGNOSIS — R26.9 GAIT DISORDER: ICD-10-CM

## 2022-11-07 DIAGNOSIS — E08.44 DIABETIC AMYOTROPHY ASSOCIATED WITH DIABETES MELLITUS DUE TO UNDERLYING CONDITION: ICD-10-CM

## 2022-11-07 DIAGNOSIS — M54.16 LEFT LUMBAR RADICULOPATHY: ICD-10-CM

## 2022-11-07 NOTE — TELEPHONE ENCOUNTER
----- Message from Ly Pelayo sent at 11/7/2022  2:47 PM CST -----  Rx refill         HYDROcodone-acetaminophen (NORCO)  mg per tablet              [facility] - [place], [place] - [address]  [address]  [facility] [address]  Phone: [phone] Fax: [phone]

## 2022-11-13 RX ORDER — HYDROCODONE BITARTRATE AND ACETAMINOPHEN 10; 325 MG/1; MG/1
1 TABLET ORAL EVERY 6 HOURS PRN
Qty: 120 TABLET | Refills: 0 | Status: SHIPPED | OUTPATIENT
Start: 2022-11-13 | End: 2022-12-19 | Stop reason: SDUPTHER

## 2022-11-28 ENCOUNTER — HOSPITAL ENCOUNTER (EMERGENCY)
Facility: HOSPITAL | Age: 82
Discharge: HOME | End: 2022-11-28
Attending: EMERGENCY MEDICINE
Payer: MEDICARE

## 2022-11-28 VITALS
DIASTOLIC BLOOD PRESSURE: 65 MMHG | HEART RATE: 80 BPM | SYSTOLIC BLOOD PRESSURE: 146 MMHG | OXYGEN SATURATION: 97 % | RESPIRATION RATE: 15 BRPM | TEMPERATURE: 98 F

## 2022-11-28 DIAGNOSIS — M54.12 CERVICAL RADICULOPATHY: Primary | ICD-10-CM

## 2022-11-28 PROCEDURE — 99284 PR EMERGENCY DEPT VISIT,LEVEL IV: ICD-10-PCS | Mod: ,,, | Performed by: EMERGENCY MEDICINE

## 2022-11-28 PROCEDURE — 99284 EMERGENCY DEPT VISIT MOD MDM: CPT | Mod: ,,, | Performed by: EMERGENCY MEDICINE

## 2022-11-28 PROCEDURE — 99283 EMERGENCY DEPT VISIT LOW MDM: CPT

## 2022-11-28 PROCEDURE — 63600175 PHARM REV CODE 636 W HCPCS

## 2022-11-28 RX ORDER — PREDNISONE 20 MG/1
40 TABLET ORAL DAILY
Qty: 10 TABLET | Refills: 0 | Status: SHIPPED | OUTPATIENT
Start: 2022-11-28 | End: 2022-12-03

## 2022-11-28 RX ORDER — PREDNISONE 20 MG/1
60 TABLET ORAL
Status: DISCONTINUED | OUTPATIENT
Start: 2022-11-28 | End: 2022-11-28

## 2022-11-28 RX ORDER — PREDNISONE 20 MG/1
40 TABLET ORAL
Status: COMPLETED | OUTPATIENT
Start: 2022-11-28 | End: 2022-11-28

## 2022-11-28 RX ADMIN — PREDNISONE 40 MG: 20 TABLET ORAL at 04:11

## 2022-11-28 NOTE — DISCHARGE INSTRUCTIONS
Diagnosis: Back pain    Medications:  Continue taking your medications as prescribed  - Add prednisone once a day for the next five days  - Be sure to check your blood sugar while you are taking the steroids    Follow-up plan:  · Follow-up with: Primary care doctor within 3 - 5  days  · Follow-up for additional testing and/or evaluation as directed by your primary doctor    Return to the emergency department for reasons including:   - Severe pain not controlled by the pain medicine listed above   - You cannot walk because of pain or weakness  - Fevers (>100.4°F)  - Loss of bowel or bladder control (dribbling of urine, urinating or pooping on self, or having accidents you wouldn't normally have)  - Not able to urinate  - Numbness of your genital or anal area  - New or worsening weakness or numbness of your arms or legs  - Shortness of breath or chest pain, vomiting with inability to hold down fluids, passing out/unconsciousness, or any other concerning symptoms.

## 2022-11-28 NOTE — ED NOTES
Emilia Fullerconnor, a 82 y.o. female presents to the ED w/ complaint of right arm pain with some numbness/tingling x3 days without injury    Triage note:  Chief Complaint   Patient presents with    Arm Pain     Right arm pain x3 days. Denies recent injury, fall, or trauma     Review of patient's allergies indicates:   Allergen Reactions    Atenolol     Verapamil      Past Medical History:   Diagnosis Date    Allergy     Anemia 10/20/2014    Arthritis     Atherosclerosis of artery of right lower extremity: see xray 4/4/07 8/8/2017    Diabetes mellitus     Diabetic neuropathy 7/25/2012    Gait disorder 7/25/2012    High cholesterol     History of poliomyelitis 7/25/2012    Hyperlipidemia 8/5/2013    Hypertension     Obstructive sleep apnea syndrome: dx 2008 needs CPAP 11 6/28/2017    Osteopenia     Osteoporosis, unspecified 6/5/2014    Other specified anemias 7/6/2015    Post poliomyelitis syndrome 7/25/2012    Renal manifestation of secondary diabetes mellitus     Sleep apnea     Type II or unspecified type diabetes mellitus without mention of complication, not stated as uncontrolled 7/6/2015    Unspecified essential hypertension 7/6/2015

## 2022-11-28 NOTE — ED PROVIDER NOTES
Encounter Date: 2022       History     Chief Complaint   Patient presents with    Arm Pain     Right arm pain x3 days. 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  She says that for the past 3 days her right arm has been hurting, but last night she woke from sleep with paresthesias starting in her shoulder extending all the way down to all 5 fingers.  She says she did not have any loss of sensation or weakness in the extremity.  She has limited range of motion of the shoulder, but this is a longstanding problem.  She takes 10 of oxycodone every 4 hours as part of her pain regimen, but she says that this has not helped.  She denies any recent trauma or infections    The history is provided by the patient. No  was used.   Review of patient's allergies indicates:   Allergen Reactions    Atenolol     Verapamil      Past Medical History:   Diagnosis Date    Allergy     Anemia 10/20/2014    Arthritis     Atherosclerosis of artery of right lower extremity: see xray 07    Diabetes mellitus     Diabetic neuropathy 2012    Gait disorder 2012    High cholesterol     History of poliomyelitis 2012    Hyperlipidemia 2013    Hypertension     Obstructive sleep apnea syndrome: dx  needs CPAP 11 2017    Osteopenia     Osteoporosis, unspecified 2014    Other specified anemias 2015    Post poliomyelitis syndrome 2012    Renal manifestation of secondary diabetes mellitus     Sleep apnea     Type II or unspecified type diabetes mellitus without mention of complication, not stated as uncontrolled 2015    Unspecified essential hypertension 2015     Past Surgical History:   Procedure Laterality Date    CATARACT EXTRACTION       SECTION      CHOLECYSTECTOMY      COLONOSCOPY N/A 2017     Procedure: COLONOSCOPY;  Surgeon: Karen Lebron MD;  Location: [facility] ([address] [place] [address]);  Service: Endoscopy;  Laterality: N/A;    EYE SURGERY      FRACTURE SURGERY       Family History   Problem Relation Age of Onset    Diabetes Father     Heart disease Father     Heart attack Father     Heart disease Brother     No Known Problems Daughter     Diabetes Son     Heart disease Brother     Diabetes Daughter     Diabetes Daughter     Cataracts Neg Hx     Glaucoma Neg Hx     Hypertension Neg Hx     Cancer Neg Hx     Blindness Neg Hx     Amblyopia Neg Hx     Strabismus Neg Hx     Retinal detachment Neg Hx     Macular degeneration Neg Hx     Melanoma Neg Hx      Social History     Tobacco Use    Smoking status: Never    Smokeless tobacco: Never   Substance Use Topics    Alcohol use: No    Drug use: No     Review of Systems   Constitutional:  Negative for chills and fever.   HENT:  Negative for congestion and sore throat.    Respiratory:  Negative for cough and shortness of breath.    Cardiovascular:  Negative for chest pain and leg swelling.   Gastrointestinal:  Negative for nausea and vomiting.   Genitourinary:  Negative for dysuria.   Musculoskeletal:  Positive for arthralgias, back pain and neck pain.   Skin:  Negative for rash.   Neurological:  Negative for dizziness, tremors, weakness and numbness.   Hematological:  Does not bruise/bleed easily.   Psychiatric/Behavioral:  Negative for confusion and dysphoric mood.      Physical Exam     Initial Vitals [11/28/22 0250]   BP Pulse Resp Temp SpO2   (!) 146/65 80 15 98 °F (36.7 °C) 97 %      MAP       --         Physical Exam    Nursing note and vitals reviewed.  Constitutional: She appears well-developed and well-nourished.   HENT:   Head: Normocephalic and atraumatic.   Eyes: EOM are normal. Pupils are equal, round, and reactive to light.   Neck: Neck supple.   Normal range of motion.  Cardiovascular:  Normal rate, regular rhythm, S1 normal, normal heart sounds and normal  pulses.           Pulmonary/Chest: Breath sounds normal. She has no wheezes. She has no rhonchi. She has no rales.   Abdominal: Abdomen is soft. Bowel sounds are normal. There is no abdominal tenderness. There is no rebound and no guarding.   Musculoskeletal:         General: No tenderness or edema.      Cervical back: Normal range of motion and neck supple.      Comments: There is limited abduction and extension of the right shoulder     Neurological: She is alert and oriented to person, place, and time. She has normal strength. GCS score is 15. GCS eye subscore is 4. GCS verbal subscore is 5. GCS motor subscore is 6.   Motor and sensory function grossly intact in the right upper extremity   Skin: Skin is warm, dry and intact. Capillary refill takes less than 2 seconds. No rash noted. No erythema. No pallor.   Psychiatric: She has a normal mood and affect. Her speech is normal and behavior is normal. Judgment and thought content normal. Cognition and memory are normal.       ED Course   Procedures  Labs Reviewed - No data to display       Imaging Results    None          Medications   predniSONE tablet 40 mg (40 mg Oral Given 11/28/22 0416)     Medical Decision Making:   History:   Old Medical Records: I decided to obtain old medical records.  Old Records Summarized: records from clinic visits and records from previous admission(s).       <> Summary of Records: Prior records reviewed for past medical history and current medications.  Initial Assessment:   82-year-old female in no acute distress.  Presentation is most consistent with cervical radiculopathy.  She says that she has been worked up for this in the past and surgery was advised, but she refused.     Ddx: cervical radiculopathy     peripheral nerve impingement- less likely, there was nothing in the history to suggest overuse, hyperextension or compression  ED Management:  Patient was given a prescription for short course of oral steroids and advised to  follow-up with her physiatrist for further non operative pain management strategies for her cervical radiculopathy.    I discussed return precautions with the patient, provided her with her paperwork, and she was discharged in stable condition          Attending Attestation:   Physician Attestation Statement for Resident:  As the supervising MD   Physician Attestation Statement: I have personally seen and examined this patient.   I agree with the above history.  -:   As the supervising MD I agree with the above PE.     As the supervising MD I agree with the above treatment, course, plan, and disposition.   -: Painful paresthesias of right arm only, no leg/face involvement.  Doubt stroke.  This is consistent with radiculopathy.  Patient is already on Percocet 10.  Will discharge with steroids, follow-up PCP.                              Clinical Impression:   Final diagnoses:  [M54.12] Cervical radiculopathy (Primary)        ED Disposition Condition    Discharge Stable          ED Prescriptions       Medication Sig Dispense Start Date End Date Auth. Provider    predniSONE (DELTASONE) 20 MG tablet Take 2 tablets (40 mg total) by mouth once daily. for 5 days 10 tablet 11/28/2022 12/3/2022 Larry Cobb MD          Follow-up Information       Follow up With Specialties Details Why Contact Info    Lauren Deras MD Internal Medicine In 3 days As needed, If symptoms worsen [facility] [facility] [address]  [phone]               Larry Cobb MD  Resident  11/28/22 1018       Kendrick Kidd MD  11/28/22 0922

## 2022-11-28 NOTE — ED NOTES
Pt verbalized understanding of physician teaching. All d/c paperwork and prescriptions in hand. No needs observed or expressed at this time. Wheelchair to exit with family

## 2022-11-29 ENCOUNTER — TELEPHONE (OUTPATIENT)
Dept: INTERNAL MEDICINE | Facility: CLINIC | Age: 82
End: 2022-11-29
Payer: MEDICARE

## 2022-11-29 NOTE — TELEPHONE ENCOUNTER
----- Message from Miladys Mirza MA sent at 11/28/2022  5:08 PM CST -----  Contact: [phone]  Spoke with pt pt said she went to ER for arm pain, pt was treated. Pt stated she was told to f/u with Dr Deras. Pt will need a f/u apt.   ----- Message -----  From: Taylor Jaquez  Sent: 11/28/2022  11:08 AM CST  To: Braeden Aguilar A Staff    Patient called, stated that she was seen at the emergency because her pain in the arm, was told to call her pcp for f/u. Patient would like a call back ASAP. Thank you

## 2022-11-29 NOTE — TELEPHONE ENCOUNTER
Spoke to pt and she went to the ED for arm pain , pt was given Steroids for 1 week , pt will call back in 1 week if she needs to come in

## 2022-12-19 DIAGNOSIS — E08.44 DIABETIC AMYOTROPHY ASSOCIATED WITH DIABETES MELLITUS DUE TO UNDERLYING CONDITION: ICD-10-CM

## 2022-12-19 DIAGNOSIS — M47.816 LUMBAR SPONDYLOSIS: ICD-10-CM

## 2022-12-19 DIAGNOSIS — E11.40 TYPE 2 DIABETES MELLITUS WITH DIABETIC NEUROPATHY, WITHOUT LONG-TERM CURRENT USE OF INSULIN: ICD-10-CM

## 2022-12-19 DIAGNOSIS — R26.9 GAIT DISORDER: ICD-10-CM

## 2022-12-19 DIAGNOSIS — M54.16 LUMBAR RADICULOPATHY: ICD-10-CM

## 2022-12-19 DIAGNOSIS — E11.42 DIABETIC POLYNEUROPATHY ASSOCIATED WITH TYPE 2 DIABETES MELLITUS: ICD-10-CM

## 2022-12-19 DIAGNOSIS — G89.29 CHRONIC PAIN OF RIGHT KNEE: ICD-10-CM

## 2022-12-19 DIAGNOSIS — M47.16 LUMBAR SPONDYLOSIS WITH MYELOPATHY: ICD-10-CM

## 2022-12-19 DIAGNOSIS — M81.0 AGE-RELATED OSTEOPOROSIS WITHOUT CURRENT PATHOLOGICAL FRACTURE: ICD-10-CM

## 2022-12-19 DIAGNOSIS — M54.40 CHRONIC BILATERAL LOW BACK PAIN WITH SCIATICA, SCIATICA LATERALITY UNSPECIFIED: ICD-10-CM

## 2022-12-19 DIAGNOSIS — M54.16 LEFT LUMBAR RADICULOPATHY: ICD-10-CM

## 2022-12-19 DIAGNOSIS — M48.062 SPINAL STENOSIS OF LUMBAR REGION WITH NEUROGENIC CLAUDICATION: ICD-10-CM

## 2022-12-19 DIAGNOSIS — M25.561 CHRONIC PAIN OF RIGHT KNEE: ICD-10-CM

## 2022-12-19 DIAGNOSIS — G82.20 PARAPARESIS OF BOTH LOWER LIMBS: ICD-10-CM
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
## 2022-12-19 DIAGNOSIS — G89.29 CHRONIC BILATERAL LOW BACK PAIN WITH SCIATICA, SCIATICA LATERALITY UNSPECIFIED: ICD-10-CM

## 2022-12-19 DIAGNOSIS — M17.11 PRIMARY OSTEOARTHRITIS OF RIGHT KNEE: ICD-10-CM

## 2022-12-19 DIAGNOSIS — M47.26 OSTEOARTHRITIS OF SPINE WITH RADICULOPATHY, LUMBAR REGION: ICD-10-CM

## 2022-12-19 DIAGNOSIS — G14 POST POLIOMYELITIS SYNDROME: ICD-10-CM

## 2022-12-19 DIAGNOSIS — Z86.12 HISTORY OF POLIOMYELITIS: ICD-10-CM

## 2022-12-19 NOTE — TELEPHONE ENCOUNTER
----- Message from Catarina Barlow sent at 12/19/2022  3:17 PM CST -----  Regarding: Refill  Contact: Pt @ [phone]  Rx Refill/Request    Is this a Refill or New Rx:Refill    Rx Name and Strength:HYDROcodone-acetaminophen (NORCO)  mg per tablet    Preferred Pharmacy with phone number:  [facility] - [place], [place] - [address]  [address] [address]  Phone: [phone] Fax: [phone]    Communication Preference:Pt @ [phone]  Additional Information:

## 2022-12-19 NOTE — TELEPHONE ENCOUNTER
Last Rx refill-----11/13/22    Pharmacy--------- [facility]

## 2022-12-28 RX ORDER — HYDROCODONE BITARTRATE AND ACETAMINOPHEN 10; 325 MG/1; MG/1
1 TABLET ORAL EVERY 6 HOURS PRN
Qty: 120 TABLET | Refills: 0 | Status: SHIPPED | OUTPATIENT
Start: 2022-12-28 | End: 2023-03-01 | Stop reason: SDUPTHER

## 2023-02-01 ENCOUNTER — PES CALL (OUTPATIENT)
Dept: ADMINISTRATIVE | Facility: CLINIC | Age: 83
End: 2023-02-01
Payer: MEDICARE

## 2023-02-02 ENCOUNTER — TELEPHONE (OUTPATIENT)
Dept: PHYSICAL MEDICINE AND REHAB | Facility: CLINIC | Age: 83
End: 2023-02-02
Payer: MEDICARE

## 2023-02-02 ENCOUNTER — OFFICE VISIT (OUTPATIENT)
Dept: PODIATRY | Facility: CLINIC | Age: 83
End: 2023-02-02
Payer: MEDICARE

## 2023-02-02 VITALS
HEIGHT: 60 IN | RESPIRATION RATE: 18 BRPM | BODY MASS INDEX: 30.63 KG/M2 | WEIGHT: 156 LBS | DIASTOLIC BLOOD PRESSURE: 69 MMHG | HEART RATE: 70 BPM | SYSTOLIC BLOOD PRESSURE: 144 MMHG

## 2023-02-02 DIAGNOSIS — B35.1 ONYCHOMYCOSIS DUE TO DERMATOPHYTE: ICD-10-CM

## 2023-02-02 DIAGNOSIS — M21.372 FOOT DROP, LEFT: ICD-10-CM

## 2023-02-02 DIAGNOSIS — E11.40 TYPE 2 DIABETES MELLITUS WITH DIABETIC NEUROPATHY, WITHOUT LONG-TERM CURRENT USE OF INSULIN: Primary | ICD-10-CM

## 2023-02-02 DIAGNOSIS — G14 POST-POLIO SYNDROME: ICD-10-CM

## 2023-02-02 DIAGNOSIS — L84 CORN OR CALLUS: ICD-10-CM

## 2023-02-02 DIAGNOSIS — G60.9 IDIOPATHIC PERIPHERAL NEUROPATHY: ICD-10-CM

## 2023-02-02 PROCEDURE — 11055 PARING/CUTG B9 HYPRKER LES 1: CPT | Mod: Q9,S$GLB,, | Performed by: PODIATRIST

## 2023-02-02 PROCEDURE — 1159F MED LIST DOCD IN RCRD: CPT | Mod: CPTII,S$GLB,, | Performed by: PODIATRIST

## 2023-02-02 PROCEDURE — 1126F AMNT PAIN NOTED NONE PRSNT: CPT | Mod: CPTII,S$GLB,, | Performed by: PODIATRIST

## 2023-02-02 PROCEDURE — 99499 UNLISTED E&M SERVICE: CPT | Mod: S$GLB,,, | Performed by: PODIATRIST

## 2023-02-02 PROCEDURE — 1126F PR PAIN SEVERITY QUANTIFIED, NO PAIN PRESENT: ICD-10-PCS | Mod: CPTII,S$GLB,, | Performed by: PODIATRIST

## 2023-02-02 PROCEDURE — 99499 NO LOS: ICD-10-PCS | Mod: S$GLB,,, | Performed by: PODIATRIST

## 2023-02-02 PROCEDURE — 1159F PR MEDICATION LIST DOCUMENTED IN MEDICAL RECORD: ICD-10-PCS | Mod: CPTII,S$GLB,, | Performed by: PODIATRIST

## 2023-02-02 PROCEDURE — 11721 DEBRIDE NAIL 6 OR MORE: CPT | Mod: Q9,59,S$GLB, | Performed by: PODIATRIST

## 2023-02-02 PROCEDURE — 3077F SYST BP >= 140 MM HG: CPT | Mod: CPTII,S$GLB,, | Performed by: PODIATRIST

## 2023-02-02 PROCEDURE — 11721 PR DEBRIDEMENT OF NAILS, 6 OR MORE: ICD-10-PCS | Mod: Q9,59,S$GLB, | Performed by: PODIATRIST

## 2023-02-02 PROCEDURE — 99999 PR PBB SHADOW E&M-EST. PATIENT-LVL II: ICD-10-PCS | Mod: PBBFAC,,, | Performed by: PODIATRIST

## 2023-02-02 PROCEDURE — 3078F PR MOST RECENT DIASTOLIC BLOOD PRESSURE < 80 MM HG: ICD-10-PCS | Mod: CPTII,S$GLB,, | Performed by: PODIATRIST

## 2023-02-02 PROCEDURE — 3078F DIAST BP <80 MM HG: CPT | Mod: CPTII,S$GLB,, | Performed by: PODIATRIST

## 2023-02-02 PROCEDURE — 3077F PR MOST RECENT SYSTOLIC BLOOD PRESSURE >= 140 MM HG: ICD-10-PCS | Mod: CPTII,S$GLB,, | Performed by: PODIATRIST

## 2023-02-02 PROCEDURE — 11055 PR TRIM HYPERKERATOTIC SKIN LESION, ONE: ICD-10-PCS | Mod: Q9,S$GLB,, | Performed by: PODIATRIST

## 2023-02-02 PROCEDURE — 99999 PR PBB SHADOW E&M-EST. PATIENT-LVL II: CPT | Mod: PBBFAC,,, | Performed by: PODIATRIST

## 2023-02-02 NOTE — TELEPHONE ENCOUNTER
----- Message from Corazon Ding sent at 2/2/2023  9:36 AM CST -----  Regarding: refill  Contact: [phone]  Pt requesting a refill on Rx HYDROcodone-acetaminophen (NORCO)  mg per tablet. Please call to discuss further.    [facility] - [place], [place] - [address]  [address]  [facility] [address]  Phone: [phone] Fax: [phone]

## 2023-02-02 NOTE — PROGRESS NOTES
Subjective:      Patient ID: Emilia Alan is a 82 y.o. female.    Chief Complaint: PCP (Lauren Deras MD  9/26/22), Diabetic Foot Exam, Nail Care, and Callouses      Emilia is a 82 y.o. female who presents to the clinic for evaluation and treatment of high risk feet. 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 The patient's chief complaint is long, thick toenails     PCP: Lauren Deras MD    Date Last Seen by PCP:   Chief Complaint   Patient presents with    PCP     Lauren Deras MD  9/26/22    Diabetic Foot Exam    Nail Care    Callouses         Current shoe gear: DM shoes w/ AFO brace( L)    Hemoglobin A1C   Date Value Ref Range Status   09/12/2022 6.6 (H) 4.0 - 5.6 % Final     Comment:     ADA Screening Guidelines:  5.7-6.4%  Consistent with prediabetes  >or=6.5%  Consistent with diabetes    High levels of fetal hemoglobin interfere with the HbA1C  assay. Heterozygous hemoglobin variants (HbS, HgC, etc)do  not significantly interfere with this assay.   However, presence of multiple variants may affect accuracy.     03/25/2022 7.5 (H) 4.0 - 5.6 % Final     Comment:     ADA Screening Guidelines:  5.7-6.4%  Consistent with prediabetes  >or=6.5%  Consistent with diabetes    High levels of fetal  hemoglobin interfere with the HbA1C  assay. Heterozygous hemoglobin variants (HbS, HgC, etc)do  not significantly interfere with this assay.   However, presence of multiple variants may affect accuracy.     10/13/2021 6.6 (H) 4.0 - 5.6 % Final     Comment:     ADA Screening Guidelines:  5.7-6.4%  Consistent with prediabetes  >or=6.5%  Consistent with diabetes    High levels of fetal hemoglobin interfere with the HbA1C  assay. Heterozygous hemoglobin variants (HbS, HgC, etc)do  not significantly interfere with this assay.   However, presence of multiple variants may affect accuracy.           Review of Systems   Constitutional: Negative for chills, decreased appetite and fever.   Cardiovascular:  Positive for leg swelling (new onset). Negative for chest pain and claudication.   Respiratory:  Negative for cough and shortness of breath.    Skin:  Positive for dry skin, nail changes and rash. Negative for color change, flushing, itching, poor wound healing and skin cancer.   Musculoskeletal:  Positive for muscle weakness (foot drop). Negative for arthritis, back pain, falls, gout, joint pain, joint swelling and myalgias.   Gastrointestinal:  Negative for nausea and vomiting.   Neurological:  Positive for numbness and paresthesias. Negative for loss of balance.         Objective:       Vitals:    02/02/23 1308   BP: (!) 144/69   Pulse: 70   Resp: 18   Weight: 70.8 kg (156 lb)   Height: 5' (1.524 m)   PainSc: 0-No pain        Physical Exam  Vitals and nursing note reviewed.   Constitutional:       General: She is not in acute distress.     Appearance: She is well-developed. She is not diaphoretic.   Cardiovascular:      Comments: Dorsalis pedis and posterior tibial pulses are diminished Bilaterally. Toes are cool to touch. Feet are warm proximally.There is decreased digital hair. Skin is atrophic, slightly hyperpigmented, and mildly edematous      Musculoskeletal:         General: Deformity present. No tenderness or signs  of injury.      Right ankle: Normal.      Left ankle: Normal.      Right foot: No swelling, deformity or crepitus.      Left foot: No swelling, deformity or crepitus.      Comments: Dropfoot left.      Lymphadenopathy:      Comments: No palpable lymph nodes   Skin:     General: Skin is warm and dry.      Coloration: Skin is not cyanotic, mottled or pale.      Findings: No abrasion, bruising, burn, erythema, laceration, lesion, petechiae or rash.      Nails: There is no clubbing.      Comments: Nails 1-5 b/l  are elongated by  3-7mm's, thickened by 3-5 mm's, dystrophic, and are darkened in  coloration . Xerosis Bilaterally. No open lesions noted.      Hyperkeratotic tissue noted to plantar L 5th MPJ    Neurological:      Mental Status: She is alert and oriented to person, place, and time.      Comments: [place] 5.07 monofilamant testing is diminished Charbel feet. Sharp/dull sensation diminished Bilaterally. Light touch absent Bilaterally.       Psychiatric:         Thought Content: Thought content normal.         Judgment: Judgment normal.           Assessment:       Encounter Diagnoses   Name Primary?    Type 2 diabetes mellitus with diabetic neuropathy, without long-term current use of insulin Yes    Post-polio syndrome     Idiopathic peripheral neuropathy     Foot drop, left     Corn or callus     Onychomycosis due to dermatophyte          Plan:       Emilia was seen today for pcp, diabetic foot exam, nail care and callouses.    Diagnoses and all orders for this visit:    Type 2 diabetes mellitus with diabetic neuropathy, without long-term current use of insulin    Post-polio syndrome    Idiopathic peripheral neuropathy    Foot drop, left    Corn or callus    Onychomycosis due to dermatophyte    I counseled the patient on her conditions, their implications and medical management.    - Shoe inspection.Patient instructed on proper foot hygeine. We discussed wearing proper shoe gear, daily foot inspections, never  walking without protective shoe gear, never putting sharp instruments to feet, routine podiatric nail visits every 2-3 months.      - With patient's permission, nails were aggressively reduced and debrided x 10 to their soft tissue attachment mechanically and with electric , removing all offending nail and debris. Patient relates relief following the procedure. She will continue to monitor the areas daily, inspect her feet, wear protective shoe gear when ambulatory, moisturizer to maintain skin integrity and follow in this office in approximately 2-3 months, sooner p.r.n.    - After cleansing the  area w/ alcohol prep pad the above mentioned hyperkeratosis was trimmed utilizing No 15 scapel, to a smooth base with out incident. Patient tolerated this  well and reported comfort to the area x1

## 2023-02-09 DIAGNOSIS — Z00.00 ENCOUNTER FOR MEDICARE ANNUAL WELLNESS EXAM: ICD-10-CM

## 2023-02-10 ENCOUNTER — PES CALL (OUTPATIENT)
Dept: ADMINISTRATIVE | Facility: CLINIC | Age: 83
End: 2023-02-10
Payer: MEDICARE

## 2023-02-15 ENCOUNTER — TELEPHONE (OUTPATIENT)
Dept: INTERNAL MEDICINE | Facility: CLINIC | Age: 83
End: 2023-02-15
Payer: MEDICARE

## 2023-02-15 RX ORDER — DICLOFENAC SODIUM 75 MG/1
TABLET, DELAYED RELEASE ORAL
Qty: 180 TABLET | Refills: 0 | Status: SHIPPED | OUTPATIENT
Start: 2023-02-15 | End: 2023-07-10

## 2023-02-16 NOTE — TELEPHONE ENCOUNTER
Please contact her to schedule her physical exam with me in the next 1-2 months with usual labs prior, thank you- orders are in    Labs need to be done in March

## 2023-03-01 DIAGNOSIS — M47.26 OSTEOARTHRITIS OF SPINE WITH RADICULOPATHY, LUMBAR REGION: ICD-10-CM

## 2023-03-01 DIAGNOSIS — M25.561 CHRONIC PAIN OF RIGHT KNEE: ICD-10-CM

## 2023-03-01 DIAGNOSIS — M54.16 LEFT LUMBAR RADICULOPATHY: ICD-10-CM

## 2023-03-01 DIAGNOSIS — G82.20 PARAPARESIS OF BOTH LOWER LIMBS: ICD-10-CM

## 2023-03-01 DIAGNOSIS — M54.40 CHRONIC BILATERAL LOW BACK PAIN WITH SCIATICA, SCIATICA LATERALITY UNSPECIFIED: ICD-10-CM

## 2023-03-01 DIAGNOSIS — Z86.12 HISTORY OF POLIOMYELITIS: ICD-10-CM

## 2023-03-01 DIAGNOSIS — E11.40 TYPE 2 DIABETES MELLITUS WITH DIABETIC NEUROPATHY, WITHOUT LONG-TERM CURRENT USE OF INSULIN: ICD-10-CM

## 2023-03-01 DIAGNOSIS — M54.16 LUMBAR RADICULOPATHY: ICD-10-CM

## 2023-03-01 DIAGNOSIS — G89.29 CHRONIC BILATERAL LOW BACK PAIN WITH SCIATICA, SCIATICA LATERALITY UNSPECIFIED: ICD-10-CM

## 2023-03-01 DIAGNOSIS — M17.11 PRIMARY OSTEOARTHRITIS OF RIGHT KNEE: ICD-10-CM

## 2023-03-01 DIAGNOSIS — G89.29 CHRONIC PAIN OF RIGHT KNEE: ICD-10-CM
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
## 2023-03-01 DIAGNOSIS — E08.44 DIABETIC AMYOTROPHY ASSOCIATED WITH DIABETES MELLITUS DUE TO UNDERLYING CONDITION: ICD-10-CM

## 2023-03-01 DIAGNOSIS — M81.0 AGE-RELATED OSTEOPOROSIS WITHOUT CURRENT PATHOLOGICAL FRACTURE: ICD-10-CM

## 2023-03-01 DIAGNOSIS — E11.42 DIABETIC POLYNEUROPATHY ASSOCIATED WITH TYPE 2 DIABETES MELLITUS: ICD-10-CM

## 2023-03-01 DIAGNOSIS — R26.9 GAIT DISORDER: ICD-10-CM

## 2023-03-01 DIAGNOSIS — M47.816 LUMBAR SPONDYLOSIS: ICD-10-CM

## 2023-03-01 DIAGNOSIS — G14 POST POLIOMYELITIS SYNDROME: ICD-10-CM

## 2023-03-01 DIAGNOSIS — M48.062 SPINAL STENOSIS OF LUMBAR REGION WITH NEUROGENIC CLAUDICATION: ICD-10-CM

## 2023-03-01 DIAGNOSIS — M47.16 LUMBAR SPONDYLOSIS WITH MYELOPATHY: ICD-10-CM

## 2023-03-01 NOTE — TELEPHONE ENCOUNTER
----- Message from Andrez Harding sent at 3/1/2023  2:53 PM CST -----  Contact: [phone]  Type:  RX Refill Request        Who Called:pt        Refill or New Rx:refill        RX Name and Strength:HYDROcodone-acetaminophen (NORCO)  mg per tablet        Preferred Pharmacy with phone number:  [id number] - [place]  [address]  AT [address]  [address] [place] [address]  Phone: [phone] Fax: [phone]              Best Call Back Number:[phone]          Additional [employer]format[employer]

## 2023-03-02 RX ORDER — HYDROCODONE BITARTRATE AND ACETAMINOPHEN 10; 325 MG/1; MG/1
1 TABLET ORAL EVERY 6 HOURS PRN
Qty: 120 TABLET | Refills: 0 | Status: SHIPPED | OUTPATIENT
Start: 2023-03-02 | End: 2023-04-03 | Stop reason: SDUPTHER

## 2023-03-03 ENCOUNTER — TELEPHONE (OUTPATIENT)
Dept: PHYSICAL MEDICINE AND REHAB | Facility: CLINIC | Age: 83
End: 2023-03-03
Payer: MEDICARE

## 2023-03-03 NOTE — TELEPHONE ENCOUNTER
----- Message from Anne Jeffers CMA sent at 3/3/2023 11:11 AM CST -----  Regarding: Rx refill  Contact: [phone]  Hi,    Pt states she previously requested a refill, she is out of pain medication, pain level 10, pt asked to please send to [facility] instead of mail delivery.    HYDROcodone-acetaminophen (NORCO)  mg per tablet    I called ext [phone], no answer. Please call pt.    [id number] - [place] - [address]  AT [facility] [address] [facility] [address] [address]  [address] [place] [address]  Phone: [phone] Fax: [phone]    Thank you

## 2023-03-08 ENCOUNTER — TELEPHONE (OUTPATIENT)
Dept: PHYSICAL MEDICINE AND REHAB | Facility: CLINIC | Age: 83
End: 2023-03-08
Payer: MEDICARE

## 2023-03-08 NOTE — TELEPHONE ENCOUNTER
----- Message from Garland Lezama sent at 3/8/2023 11:57 AM CST -----  Type:  RX Refill Request    Who Called: Pt     Refill or New Rx: Refill     RX Name and Strength:HYDROcodone-acetaminophen (NORCO)  mg per tablet    How is the patient currently taking it? Sig - Route: Take 1 tablet by mouth every 6 (six) hours as needed for Pain (pain). - Oral  Sent to pharmacy as: HYDROcodone-acetaminophen (NORCO)  mg per tablet        Is this a 30 day or 90 day RX:    Preferred Pharmacy with phone number:[id number] - [place] - [address]  AT [facility] [address]  [address] [place] [address]    Local or Mail Order:Local     Ordering Provider:Jennyfer Velasco    Would the patient rather a call back or a response via [facility]? Call back     Best Call Back Number:[phone] (mobile)     Additional Information:she is out of pain medication, pain level 10, pt asked to please send to [facility] instead of mail delivery.  Pt has been calling since 3/3/23 for refill Request

## 2023-03-08 NOTE — TELEPHONE ENCOUNTER
The rx for this patient went to [facility] instead of [facility]. Called them and was mailed out on 3/6/23. The patient should be getting the medication by 3/9/23. Left this message on her answer machine.

## 2023-04-03 DIAGNOSIS — G14 POST POLIOMYELITIS SYNDROME: ICD-10-CM
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
## 2023-04-03 DIAGNOSIS — E11.40 TYPE 2 DIABETES MELLITUS WITH DIABETIC NEUROPATHY, WITHOUT LONG-TERM CURRENT USE OF INSULIN: ICD-10-CM

## 2023-04-03 DIAGNOSIS — E08.44 DIABETIC AMYOTROPHY ASSOCIATED WITH DIABETES MELLITUS DUE TO UNDERLYING CONDITION: ICD-10-CM

## 2023-04-03 DIAGNOSIS — E11.42 DIABETIC POLYNEUROPATHY ASSOCIATED WITH TYPE 2 DIABETES MELLITUS: ICD-10-CM

## 2023-04-03 DIAGNOSIS — Z86.12 HISTORY OF POLIOMYELITIS: ICD-10-CM

## 2023-04-03 DIAGNOSIS — M54.16 LUMBAR RADICULOPATHY: ICD-10-CM

## 2023-04-03 DIAGNOSIS — M48.062 SPINAL STENOSIS OF LUMBAR REGION WITH NEUROGENIC CLAUDICATION: ICD-10-CM

## 2023-04-03 DIAGNOSIS — R26.9 GAIT DISORDER: ICD-10-CM

## 2023-04-03 DIAGNOSIS — M81.0 AGE-RELATED OSTEOPOROSIS WITHOUT CURRENT PATHOLOGICAL FRACTURE: ICD-10-CM

## 2023-04-03 DIAGNOSIS — G82.20 PARAPARESIS OF BOTH LOWER LIMBS: ICD-10-CM

## 2023-04-03 DIAGNOSIS — M47.816 LUMBAR SPONDYLOSIS: ICD-10-CM

## 2023-04-03 DIAGNOSIS — M47.26 OSTEOARTHRITIS OF SPINE WITH RADICULOPATHY, LUMBAR REGION: ICD-10-CM

## 2023-04-03 DIAGNOSIS — M47.16 LUMBAR SPONDYLOSIS WITH MYELOPATHY: ICD-10-CM

## 2023-04-03 DIAGNOSIS — M25.561 CHRONIC PAIN OF RIGHT KNEE: ICD-10-CM

## 2023-04-03 DIAGNOSIS — M17.11 PRIMARY OSTEOARTHRITIS OF RIGHT KNEE: ICD-10-CM

## 2023-04-03 DIAGNOSIS — G89.29 CHRONIC PAIN OF RIGHT KNEE: ICD-10-CM

## 2023-04-03 DIAGNOSIS — M54.16 LEFT LUMBAR RADICULOPATHY: ICD-10-CM

## 2023-04-03 DIAGNOSIS — M54.40 CHRONIC BILATERAL LOW BACK PAIN WITH SCIATICA, SCIATICA LATERALITY UNSPECIFIED: ICD-10-CM

## 2023-04-03 DIAGNOSIS — G89.29 CHRONIC BILATERAL LOW BACK PAIN WITH SCIATICA, SCIATICA LATERALITY UNSPECIFIED: ICD-10-CM

## 2023-04-03 NOTE — TELEPHONE ENCOUNTER
----- Message from Nadege Damon sent at 4/3/2023 12:29 PM CDT -----  Regarding: pt called  Can the clinic reply in [facility]:  no        Please refill the medication(s) listed below. Please call the patient when the prescription(s) is ready for  at this phone number     [phone] (home)  the pt needs a refill. please advise          Medication #1  HYDROcodone-acetaminophen (NORCO)  mg per tablet 120 tablet 0 3/2/2023 4/1/2023 No  Sig - Route: Take 1 tablet by mouth every 6 (six) hours as needed for Pain (pain). - Oral          Medication #2          Preferred Pharmacy:   [facility] - [place], [place] - [address]  [address]  [facility] [address]  Phone: [phone] Fax: [phone]

## 2023-04-07 RX ORDER — HYDROCODONE BITARTRATE AND ACETAMINOPHEN 10; 325 MG/1; MG/1
1 TABLET ORAL EVERY 6 HOURS PRN
Qty: 120 TABLET | Refills: 0 | Status: SHIPPED | OUTPATIENT
Start: 2023-04-07 | End: 2023-04-26 | Stop reason: SDUPTHER

## 2023-04-13 NOTE — TELEPHONE ENCOUNTER
----- Message from Julisa Carrasquillo sent at 6/15/2022  3:40 PM CDT -----  Contact: [phone]  Pt would like a call back about testing pos for Covid today.    
6     Discussed with patient and daughter in law    Ordered EUA    May use tessalon during the day and may use Phenergan with codeine at night, but she will not take gabapentin when she takes the Phenergan with codeine, both daughter-in-law and patient were informed of this    Surveillance ordered    Hydration, hygienic measures, sick day rules reviewed, ER cautions discussed with patient and daughter-in-law  
Infusion team that she was waiting to get a ride to bring her in to get the infusion, this was yesterday    How is she doing today?  
Pt daughter stated she has a very bad cough, congestion in sinus and fever. They would like to know if something can be prescribed.     Ulises DONG.  
Spoke to patient stated she's doing fine.     She was unable to get infusion yesterday stated she called multiple times yesterday but did not get a call back tyo say her daughter was able to bring her.      Patient only symptom is congestion.    She declined chest pains, sob and difficulty breathing.    Will try and reach out to infusion center to see if she can come today.       
show

## 2023-04-21 ENCOUNTER — LAB VISIT (OUTPATIENT)
Dept: LAB | Facility: HOSPITAL | Age: 83
End: 2023-04-21
Attending: INTERNAL MEDICINE
Payer: MEDICARE

## 2023-04-21 DIAGNOSIS — E78.5 HYPERLIPIDEMIA, UNSPECIFIED HYPERLIPIDEMIA TYPE: ICD-10-CM

## 2023-04-21 DIAGNOSIS — I10 PRIMARY HYPERTENSION: ICD-10-CM

## 2023-04-21 DIAGNOSIS — E11.43 TYPE 2 DIABETES MELLITUS WITH AUTONOMIC NEUROPATHY: ICD-10-CM

## 2023-04-21 LAB
ALBUMIN SERPL BCP-MCNC: 3.4 G/DL (ref 3.5–5.2)
ALP SERPL-CCNC: 79 U/L (ref 55–135)
ALT SERPL W/O P-5'-P-CCNC: 22 U/L (ref 10–44)
ANION GAP SERPL CALC-SCNC: 10 MMOL/L (ref 8–16)
AST SERPL-CCNC: 17 U/L (ref 10–40)
BASOPHILS # BLD AUTO: 0.11 K/UL (ref 0–0.2)
BASOPHILS NFR BLD: 1.2 % (ref 0–1.9)
BILIRUB SERPL-MCNC: 0.4 MG/DL (ref 0.1–1)
BUN SERPL-MCNC: 31 MG/DL (ref 8–23)
CALCIUM SERPL-MCNC: 9.7 MG/DL (ref 8.7–10.5)
CHLORIDE SERPL-SCNC: 107 MMOL/L (ref 95–110)
CHOLEST SERPL-MCNC: 126 MG/DL (ref 120–199)
CHOLEST/HDLC SERPL: 3.5 {RATIO} (ref 2–5)
CO2 SERPL-SCNC: 23 MMOL/L (ref 23–29)
CREAT SERPL-MCNC: 0.9 MG/DL (ref 0.5–1.4)
DIFFERENTIAL METHOD: ABNORMAL
EOSINOPHIL # BLD AUTO: 0.3 K/UL (ref 0–0.5)
EOSINOPHIL NFR BLD: 3.5 % (ref 0–8)
ERYTHROCYTE [DISTWIDTH] IN BLOOD BY AUTOMATED COUNT: 12.8 % (ref 11.5–14.5)
EST. GFR  (NO RACE VARIABLE): >60 ML/MIN/1.73 M^2
ESTIMATED AVG GLUCOSE: 126 MG/DL (ref 68–131)
GLUCOSE SERPL-MCNC: 102 MG/DL (ref 70–110)
HBA1C MFR BLD: 6 % (ref 4–5.6)
HCT VFR BLD AUTO: 37.6 % (ref 37–48.5)
HDLC SERPL-MCNC: 36 MG/DL (ref 40–75)
HDLC SERPL: 28.6 % (ref 20–50)
HGB BLD-MCNC: 11.9 G/DL (ref 12–16)
IMM GRANULOCYTES # BLD AUTO: 0.03 K/UL (ref 0–0.04)
IMM GRANULOCYTES NFR BLD AUTO: 0.3 % (ref 0–0.5)
LDLC SERPL CALC-MCNC: 41.6 MG/DL (ref 63–159)
LYMPHOCYTES # BLD AUTO: 1.7 K/UL (ref 1–4.8)
LYMPHOCYTES NFR BLD: 18.8 % (ref 18–48)
MCH RBC QN AUTO: 31.5 PG (ref 27–31)
MCHC RBC AUTO-ENTMCNC: 31.6 G/DL (ref 32–36)
MCV RBC AUTO: 100 FL (ref 82–98)
MONOCYTES # BLD AUTO: 0.6 K/UL (ref 0.3–1)
MONOCYTES NFR BLD: 6.4 % (ref 4–15)
NEUTROPHILS # BLD AUTO: 6.3 K/UL (ref 1.8–7.7)
NEUTROPHILS NFR BLD: 69.8 % (ref 38–73)
NONHDLC SERPL-MCNC: 90 MG/DL
NRBC BLD-RTO: 0 /100 WBC
PLATELET # BLD AUTO: 348 K/UL (ref 150–450)
PMV BLD AUTO: 11.3 FL (ref 9.2–12.9)
POTASSIUM SERPL-SCNC: 4.2 MMOL/L (ref 3.5–5.1)
PROT SERPL-MCNC: 6.4 G/DL (ref 6–8.4)
RBC # BLD AUTO: 3.78 M/UL (ref 4–5.4)
SODIUM SERPL-SCNC: 140 MMOL/L (ref 136–145)
TRIGL SERPL-MCNC: 242 MG/DL (ref 30–150)
WBC # BLD AUTO: 9.02 K/UL (ref 3.9–12.7)

## 2023-04-21 PROCEDURE — 80053 COMPREHEN METABOLIC PANEL: CPT | Performed by: INTERNAL MEDICINE

## 2023-04-21 PROCEDURE — 85025 COMPLETE CBC W/AUTO DIFF WBC: CPT | Performed by: INTERNAL MEDICINE

## 2023-04-21 PROCEDURE — 83036 HEMOGLOBIN GLYCOSYLATED A1C: CPT | Performed by: INTERNAL MEDICINE

## 2023-04-21 PROCEDURE — 80061 LIPID PANEL: CPT | Performed by: INTERNAL MEDICINE

## 2023-04-21 PROCEDURE — 36415 COLL VENOUS BLD VENIPUNCTURE: CPT | Performed by: INTERNAL MEDICINE

## 2023-04-26 ENCOUNTER — TELEPHONE (OUTPATIENT)
Dept: INTERNAL MEDICINE | Facility: CLINIC | Age: 83
End: 2023-04-26

## 2023-04-26 ENCOUNTER — PATIENT OUTREACH (OUTPATIENT)
Dept: ADMINISTRATIVE | Facility: HOSPITAL | Age: 83
End: 2023-04-26
Payer: MEDICARE

## 2023-04-26 ENCOUNTER — OFFICE VISIT (OUTPATIENT)
Dept: INTERNAL MEDICINE | Facility: CLINIC | Age: 83
End: 2023-04-26
Payer: MEDICARE

## 2023-04-26 VITALS
SYSTOLIC BLOOD PRESSURE: 120 MMHG | WEIGHT: 145 LBS | BODY MASS INDEX: 28.47 KG/M2 | HEIGHT: 60 IN | DIASTOLIC BLOOD PRESSURE: 75 MMHG

## 2023-04-26 DIAGNOSIS — M47.816 LUMBAR SPONDYLOSIS: ICD-10-CM

## 2023-04-26 DIAGNOSIS — D75.9 DISEASE OF BLOOD AND BLOOD FORMING ORGAN: ICD-10-CM

## 2023-04-26 DIAGNOSIS — M25.561 CHRONIC PAIN OF RIGHT KNEE: ICD-10-CM

## 2023-04-26 DIAGNOSIS — M54.16 LEFT LUMBAR RADICULOPATHY: ICD-10-CM

## 2023-04-26 DIAGNOSIS — E08.44 DIABETIC AMYOTROPHY ASSOCIATED WITH DIABETES MELLITUS DUE TO UNDERLYING CONDITION: ICD-10-CM
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
## 2023-04-26 DIAGNOSIS — M47.16 LUMBAR SPONDYLOSIS WITH MYELOPATHY: ICD-10-CM

## 2023-04-26 DIAGNOSIS — E53.8 LOW SERUM VITAMIN B12: ICD-10-CM

## 2023-04-26 DIAGNOSIS — M17.11 PRIMARY OSTEOARTHRITIS OF RIGHT KNEE: ICD-10-CM

## 2023-04-26 DIAGNOSIS — M46.96 UNSPECIFIED INFLAMMATORY SPONDYLOPATHY, LUMBAR REGION: ICD-10-CM

## 2023-04-26 DIAGNOSIS — E11.42 DIABETIC POLYNEUROPATHY ASSOCIATED WITH TYPE 2 DIABETES MELLITUS: ICD-10-CM

## 2023-04-26 DIAGNOSIS — Z00.00 ANNUAL PHYSICAL EXAM: Primary | ICD-10-CM

## 2023-04-26 DIAGNOSIS — R53.83 FATIGUE, UNSPECIFIED TYPE: ICD-10-CM

## 2023-04-26 DIAGNOSIS — M47.26 OSTEOARTHRITIS OF SPINE WITH RADICULOPATHY, LUMBAR REGION: ICD-10-CM

## 2023-04-26 DIAGNOSIS — E11.40 TYPE 2 DIABETES MELLITUS WITH DIABETIC NEUROPATHY, WITHOUT LONG-TERM CURRENT USE OF INSULIN: ICD-10-CM

## 2023-04-26 DIAGNOSIS — E11.21 TYPE 2 DIABETES MELLITUS WITH DIABETIC NEPHROPATHY, WITHOUT LONG-TERM CURRENT USE OF INSULIN: ICD-10-CM

## 2023-04-26 DIAGNOSIS — M54.16 LUMBAR RADICULOPATHY: ICD-10-CM

## 2023-04-26 DIAGNOSIS — M48.062 SPINAL STENOSIS OF LUMBAR REGION WITH NEUROGENIC CLAUDICATION: ICD-10-CM

## 2023-04-26 DIAGNOSIS — M54.40 CHRONIC BILATERAL LOW BACK PAIN WITH SCIATICA, SCIATICA LATERALITY UNSPECIFIED: ICD-10-CM

## 2023-04-26 DIAGNOSIS — I70.201 ATHEROSCLEROSIS OF ARTERY OF RIGHT LOWER EXTREMITY: ICD-10-CM

## 2023-04-26 DIAGNOSIS — R26.9 GAIT DISORDER: ICD-10-CM

## 2023-04-26 DIAGNOSIS — G89.29 CHRONIC PAIN OF RIGHT KNEE: ICD-10-CM

## 2023-04-26 DIAGNOSIS — M81.0 AGE-RELATED OSTEOPOROSIS WITHOUT CURRENT PATHOLOGICAL FRACTURE: ICD-10-CM

## 2023-04-26 DIAGNOSIS — G14 POST POLIOMYELITIS SYNDROME: ICD-10-CM

## 2023-04-26 DIAGNOSIS — G89.29 CHRONIC BILATERAL LOW BACK PAIN WITH SCIATICA, SCIATICA LATERALITY UNSPECIFIED: ICD-10-CM

## 2023-04-26 DIAGNOSIS — Z86.12 HISTORY OF POLIOMYELITIS: ICD-10-CM

## 2023-04-26 DIAGNOSIS — E78.2 MIXED HYPERLIPIDEMIA: ICD-10-CM

## 2023-04-26 DIAGNOSIS — I10 PRIMARY HYPERTENSION: ICD-10-CM

## 2023-04-26 DIAGNOSIS — G82.20 PARAPARESIS OF BOTH LOWER LIMBS: ICD-10-CM

## 2023-04-26 PROBLEM — E66.811 CLASS 1 OBESITY DUE TO EXCESS CALORIES WITH SERIOUS COMORBIDITY AND BODY MASS INDEX (BMI) OF 30.0 TO 30.9 IN ADULT: Status: RESOLVED | Noted: 2018-07-26 | Resolved: 2023-04-26

## 2023-04-26 PROBLEM — E66.09 CLASS 1 OBESITY DUE TO EXCESS CALORIES WITH SERIOUS COMORBIDITY AND BODY MASS INDEX (BMI) OF 30.0 TO 30.9 IN ADULT: Status: RESOLVED | Noted: 2018-07-26 | Resolved: 2023-04-26

## 2023-04-26 PROCEDURE — 99999 PR PBB SHADOW E&M-EST. PATIENT-LVL V: ICD-10-PCS | Mod: PBBFAC,,, | Performed by: INTERNAL MEDICINE

## 2023-04-26 PROCEDURE — 99999 PR PBB SHADOW E&M-EST. PATIENT-LVL V: CPT | Mod: PBBFAC,,, | Performed by: INTERNAL MEDICINE

## 2023-04-26 PROCEDURE — 99397 PER PM REEVAL EST PAT 65+ YR: CPT | Mod: S$GLB,,, | Performed by: INTERNAL MEDICINE

## 2023-04-26 PROCEDURE — 3078F DIAST BP <80 MM HG: CPT | Mod: CPTII,S$GLB,, | Performed by: INTERNAL MEDICINE

## 2023-04-26 PROCEDURE — 1159F PR MEDICATION LIST DOCUMENTED IN MEDICAL RECORD: ICD-10-PCS | Mod: CPTII,S$GLB,, | Performed by: INTERNAL MEDICINE

## 2023-04-26 PROCEDURE — 99397 PR PREVENTIVE VISIT,EST,65 & OVER: ICD-10-PCS | Mod: S$GLB,,, | Performed by: INTERNAL MEDICINE

## 2023-04-26 PROCEDURE — 1101F PT FALLS ASSESS-DOCD LE1/YR: CPT | Mod: CPTII,S$GLB,, | Performed by: INTERNAL MEDICINE

## 2023-04-26 PROCEDURE — 3288F FALL RISK ASSESSMENT DOCD: CPT | Mod: CPTII,S$GLB,, | Performed by: INTERNAL MEDICINE

## 2023-04-26 PROCEDURE — 1101F PR PT FALLS ASSESS DOC 0-1 FALLS W/OUT INJ PAST YR: ICD-10-PCS | Mod: CPTII,S$GLB,, | Performed by: INTERNAL MEDICINE

## 2023-04-26 PROCEDURE — 1159F MED LIST DOCD IN RCRD: CPT | Mod: CPTII,S$GLB,, | Performed by: INTERNAL MEDICINE

## 2023-04-26 PROCEDURE — 3074F PR MOST RECENT SYSTOLIC BLOOD PRESSURE < 130 MM HG: ICD-10-PCS | Mod: CPTII,S$GLB,, | Performed by: INTERNAL MEDICINE

## 2023-04-26 PROCEDURE — 3288F PR FALLS RISK ASSESSMENT DOCUMENTED: ICD-10-PCS | Mod: CPTII,S$GLB,, | Performed by: INTERNAL MEDICINE

## 2023-04-26 PROCEDURE — 3078F PR MOST RECENT DIASTOLIC BLOOD PRESSURE < 80 MM HG: ICD-10-PCS | Mod: CPTII,S$GLB,, | Performed by: INTERNAL MEDICINE

## 2023-04-26 PROCEDURE — 3074F SYST BP LT 130 MM HG: CPT | Mod: CPTII,S$GLB,, | Performed by: INTERNAL MEDICINE

## 2023-04-26 RX ORDER — HYDROCODONE BITARTRATE AND ACETAMINOPHEN 10; 325 MG/1; MG/1
1 TABLET ORAL EVERY 6 HOURS PRN
Qty: 120 TABLET | Refills: 0 | Status: SHIPPED | OUTPATIENT
Start: 2023-05-06 | End: 2023-06-15 | Stop reason: SDUPTHER

## 2023-04-26 RX ORDER — ASPIRIN 81 MG/1
81 TABLET ORAL DAILY
Qty: 30 TABLET | Refills: 12
Start: 2023-04-26 | End: 2024-04-25

## 2023-04-26 NOTE — TELEPHONE ENCOUNTER
----- Message from Eric Corbin MD sent at 4/26/2023  4:25 PM CDT -----  Hello,     It looks like she got 2 doses of Reclast in December 2020 and May 2022 since her last bone density scan so I think it would be reasonable to weight on her next DEXA results which are due now and then decide she needs therapy.  Will reach out to my staff get her scheduled for the bone scan and follow-up visit was hormone in our department.      Kayce - can you please make sure this patient is scheduled bone scan as same location as her last scan then schedule follow-up with any available provider?    Eric  ----- Message -----  From: Lauren Deras MD  Sent: 4/26/2023  11:30 AM CDT  To: Eric Corbin MD    Hi again Dr Corbin:    [place] patient, she had her last Reclast in May of 2022.  I have ordered a bone density.  Not sure if you want her to schedule Reclast again now or wait until after her DEXA?    Thanks    Lauren Deras

## 2023-04-26 NOTE — LETTER
AUTHORIZATION FOR RELEASE OF   CONFIDENTIAL INFORMATION    Dear [facility] records,    We are seeing Emilia Alan, date of birth 1940, in the clinic at [facility]. Lauren Deras MD is the patient's PCP. Emilia Alan has an outstanding lab/procedure at the time we reviewed her chart. In order to help keep her health information updated, she has authorized us to request the following medical record(s):        (  )  MAMMOGRAM                                      (  )  COLONOSCOPY      (  )  PAP SMEAR                                          (  )  OUTSIDE LAB RESULTS     (  )  DEXA SCAN                                          ( X )  EYE EXAM            (  )  FOOT EXAM                                          (  )  ENTIRE RECORD     (  )  OUTSIDE IMMUNIZATIONS                 (  )  _______________         Please fax records to Lauren Deras MD, [phone]     If you have any questions, please contact AQUILES Sprague at [phone].           Patient Name: Emilia Alan  : 1940  Patient Phone #: [phone]

## 2023-04-26 NOTE — PROGRESS NOTES
Health Maintenance Due   Topic Date Due    Eye Exam  12/01/2021     Triggered LINKS. Updated Care Everywhere. Record request sent to [facility] asking for a copy of pt's most recent eye exam results. Chart review completed.

## 2023-04-26 NOTE — Clinical Note
Hi again Dr Corbin:  [place] patient, she had her last Reclast in May of 2022.  I have ordered a bone density.  Not sure if you want her to schedule Reclast again now or wait until after her DEXA?  Thanks  Lauren Deras

## 2023-04-26 NOTE — PROGRESS NOTES
Patient ID: Emilia Alan is a 82 y.o. female.    Chief Complaint: Annual Exam      Assessment:       1. Annual physical exam    2. Unspecified inflammatory spondylopathy, lumbar region    3. Paraparesis of both lower limbs    4. Atherosclerosis of artery of right lower extremity    5. Diabetic polyneuropathy associated with type 2 diabetes mellitus    6. Post poliomyelitis syndrome    7. Mixed hyperlipidemia    8. Primary hypertension    9. Clonal Hematopoiesis of Indeterminate Potential (CHIP)    10. Type 2 diabetes mellitus with diabetic neuropathy, without long-term current use of insulin    11. Type 2 diabetes mellitus with diabetic nephropathy, without long-term current use of insulin    12. Osteoporosis without current pathological fracture: worse on fosamax 5/16- Reclast 8/16, 12/20    13. Osteoarthritis of spine with radiculopathy, lumbar region    14. History of poliomyelitis    15. Lumbar spondylosis with myelopathy    16. Primary osteoarthritis of right knee    17. Gait disorder    18. 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 Chronic bilateral low back pain with sciatica, sciatica laterality unspecified    20. Fall, sequela    21. Spinal stenosis of lumbar region with neurogenic claudication    22. Left lumbar radiculopathy    23. Lumbar radiculopathy    24. Chronic pain of right knee    25. Osteoporosis without current pathological fracture: worse on fosamax 5/16- Reclast 8/16    26. Lumbar spondylosis    27. Fatigue, unspecified type    28. Low serum vitamin B12          Plan:         1. Annual physical exam    2. Unspecified inflammatory spondylopathy, lumbar region  -     Ambulatory referral/consult to Physical Medicine Rehab; Future; Expected date: 05/03/2023    3. Paraparesis of both lower limbs  -     HYDROcodone-acetaminophen (NORCO)  mg per tablet; Take 1 tablet by mouth every 6 (six) hours as needed for Pain (pain).  Dispense: 120  "tablet; Refill: 0    4. Atherosclerosis of artery of right lower extremity    5. Diabetic polyneuropathy associated with type 2 diabetes mellitus  -     HYDROcodone-acetaminophen (NORCO)  mg per tablet; Take 1 tablet by mouth every 6 (six) hours as needed for Pain (pain).  Dispense: 120 tablet; Refill: 0    6. Post poliomyelitis syndrome  -     Ambulatory referral/consult to Physical Medicine Rehab; Future; Expected date: 05/03/2023  -     HYDROcodone-acetaminophen (NORCO)  mg per tablet; Take 1 tablet by mouth every 6 (six) hours as needed for Pain (pain).  Dispense: 120 tablet; Refill: 0    7. Mixed hyperlipidemia  -     Lipid Panel; Future; Expected date: 10/27/2023    8. Primary hypertension  -     Comprehensive Metabolic Panel; Future; Expected date: 10/27/2023  -     CBC Auto Differential; Future; Expected date: 10/27/2023    9. Clonal Hematopoiesis of Indeterminate Potential (CHIP)  Overview:  Per Dr. Medina June 2018: Discussed NGS result; 2 pathogenic mutations:  1. KATIANA-2 V617F (5%).  2. TET2 (6%).     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  It means she has an increased risk of developing myelodysplasia and/or a myeloproliferative neoplasm.     Will monitor cbc every 2 months. If major change, may repeat marrow.  If anemia becomes more severe, will likely advise Procrit.      10. Type 2 diabetes mellitus with diabetic neuropathy, without long-term current use of insulin  -     HYDROcodone-acetaminophen (NORCO)  mg per tablet; Take 1 tablet by mouth every 6 (six) hours as needed for Pain (pain).  Dispense: 120 tablet; Refill: 0    11. Type 2 diabetes mellitus with diabetic nephropathy, without long-term current use of insulin  -     Hemoglobin A1C; Future; Expected date: 10/23/2023    12. Osteoporosis without current pathological fracture: worse on fosamax 5/16- Reclast 8/16, 12/20  -     DXA Bone Density Axial Skeleton 1 or more sites; Future; " Expected date: 04/26/2023  -     HYDROcodone-acetaminophen (NORCO)  mg per tablet; Take 1 tablet by mouth every 6 (six) hours as needed for Pain (pain).  Dispense: 120 tablet; Refill: 0    13. Osteoarthritis of spine with radiculopathy, lumbar region  -     HYDROcodone-acetaminophen (NORCO)  mg per tablet; Take 1 tablet by mouth every 6 (six) hours as needed for Pain (pain).  Dispense: 120 tablet; Refill: 0    14. History of poliomyelitis  -     HYDROcodone-acetaminophen (NORCO)  mg per tablet; Take 1 tablet by mouth every 6 (six) hours as needed for Pain (pain).  Dispense: 120 tablet; Refill: 0    15. Lumbar spondylosis with myelopathy  -     HYDROcodone-acetaminophen (NORCO)  mg per tablet; Take 1 tablet by mouth every 6 (six) hours as needed for Pain (pain).  Dispense: 120 tablet; Refill: 0    16. Primary osteoarthritis of right knee  -     HYDROcodone-acetaminophen (NORCO)  mg per tablet; Take 1 tablet by mouth every 6 (six) hours as needed for Pain (pain).  Dispense: 120 tablet; Refill: 0    17. Gait disorder  -     HYDROcodone-acetaminophen (NORCO)  mg per tablet; Take 1 tablet by mouth every 6 (six) hours as needed for Pain (pain).  Dispense: 120 tablet; Refill: 0    18. 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  Dispense: 120 tablet; Refill: 0    19. Chronic bilateral low back pain with sciatica, sciatica laterality unspecified  -     HYDROcodone-acetaminophen (NORCO)  mg per tablet; Take 1 tablet by mouth every 6 (six) hours as needed for Pain (pain).  Dispense: 120 tablet; Refill: 0    20. Fall, sequela  -     HYDROcodone-acetaminophen (NORCO)  mg per tablet; Take 1 tablet by mouth every 6 (six) hours as needed for Pain (pain).  Dispense: 120 tablet; Refill: 0    21. Spinal stenosis of  lumbar region with neurogenic claudication  -     HYDROcodone-acetaminophen (NORCO)  mg per tablet; Take 1 tablet by mouth every 6 (six) hours as needed for Pain (pain).  Dispense: 120 tablet; Refill: 0    22. Left lumbar radiculopathy  -     HYDROcodone-acetaminophen (NORCO)  mg per tablet; Take 1 tablet by mouth every 6 (six) hours as needed for Pain (pain).  Dispense: 120 tablet; Refill: 0    23. Lumbar radiculopathy  -     HYDROcodone-acetaminophen (NORCO)  mg per tablet; Take 1 tablet by mouth every 6 (six) hours as needed for Pain (pain).  Dispense: 120 tablet; Refill: 0    24. Chronic pain of right knee  -     HYDROcodone-acetaminophen (NORCO)  mg per tablet; Take 1 tablet by mouth every 6 (six) hours as needed for Pain (pain).  Dispense: 120 tablet; Refill: 0    25. Osteoporosis without current pathological fracture: worse on fosamax 5/16- Reclast 8/16  -     DXA Bone Density Axial Skeleton 1 or more sites; Future; Expected date: 04/26/2023  -     HYDROcodone-acetaminophen (NORCO)  mg per tablet; Take 1 tablet by mouth every 6 (six) hours as needed for Pain (pain).  Dispense: 120 tablet; Refill: 0    26. Lumbar spondylosis  -     HYDROcodone-acetaminophen (NORCO)  mg per tablet; Take 1 tablet by mouth every 6 (six) hours as needed for Pain (pain).  Dispense: 120 tablet; Refill: 0    27. Fatigue, unspecified type  -     TSH; Future; Expected date: 10/27/2023    28. Low serum vitamin B12  -     Vitamin B12; Future; Expected date: 08/24/2023    Other orders  -     aspirin (ECOTRIN) 81 MG EC tablet; Take 1 tablet (81 mg total) by mouth once daily.  Dispense: 30 tablet; Refill: 12        reviewed; pattern medication use stable, I let her know that I can refill her medication until such time as she can be seen in the Physical Medicine Clinic  Hold diuretic, may take metformin once daily  Monitor blood pressure, blood glucose  Has had eye exam outside of [facility], will get  "those records  Schedule DEXA; will determine whether she needs to have Reclast again, chart to endocrinology  Return to see me in 6 months with labs and urine prior, sooner with problems in the interim    Subjective:   Annual exam    BP doing well; is in the digital program.  Sometimes has low readings.  I recommended she hold her diuretic and take as needed if she has edema or if blood pressure goes up given that she is already on 3 other medications.     Diabetes stable with A1c 6.0 recently!  She is only on metformin.  She rarely has low readings.  Denies polydipsia, polyuria or unexplained weight loss.  Discussed that she may be able to reduce her metformin to once daily.     On zoledronic acid for her osteoporosis- had her last injection in May 2022.  DEXA is due this year.     Seen in Hematology February 2022:  "Clonal hematopoiesis of indeterminate potential (CHIP)  Ms. Alan has resolution of all cytopenias. 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   CHIP is a common condition in the older adult population. It has variable potential to develop into hematologic malignancy, though overall considered to be low.   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 Given low risk associated with CHIP, I recommend annual CBC monitoring with primary care. She may return to hematology for any clinically significant cytopenias (see below)     Follow-up  - Return to primary care and monitor CBC annually  - Return to hematology for any clinically significant cytopenias, defined as:               - hemoglobin less than 10 g/dl              - absolute neutrophil count (ANC) less than 1,000              - platelet count less than 100,000"     No complaints otherwise.  Planning a cruise soon with her 7 grandkids!    Podiatry February 2023  Optometry " January 2023-outside [facility]  Urine microalbumin September 2022  Mammogram September 2022  Reclast May 2022  Hematology Oncology February 2022  DEXA scan November 2020    Patient Active Problem List   Diagnosis    Gait disorder    History of poliomyelitis    Post poliomyelitis syndrome    Diabetic polyneuropathy associated with type 2 diabetes mellitus    Mixed hyperlipidemia    Osteoporosis without current pathological fracture: worse on fosamax 5/16- Reclast 8/16, 12/20    Paraparesis of both lower limbs    Primary hypertension    Type 2 diabetes mellitus with diabetic neuropathy, without long-term current use of insulin    Osteoarthritis of right knee    Lumbar spinal stenosis    Obstructive sleep apnea syndrome: dx 2008 needs CPAP 11    Bradycardia, drug induced    Atherosclerosis of artery of right lower extremity: see xray 4/4/07    Essential tremor    Clonal Hematopoiesis of Indeterminate Potential (CHIP)    Type 2 diabetes mellitus with renal manifestations    Facet arthritis of lumbar region    Opioid use agreement exists    Primary osteoarthritis of left shoulder    Unspecified inflammatory spondylopathy, lumbar region        Review of Systems   Constitutional:  Negative for fatigue and fever.   Respiratory:  Negative for cough and shortness of breath.    Cardiovascular:  Negative for chest pain and leg swelling.   Gastrointestinal:  Negative for abdominal pain.   Genitourinary:  Negative for difficulty urinating and hematuria.   Musculoskeletal:  Positive for back pain.        Chronic stable arthralgias, needs to get established with another physician in the physical medicine clinic as Dr Burger is leaving   Skin:  Negative for rash.   Neurological:  Negative for weakness and numbness.       Objective:      Physical Exam  Vitals and nursing note reviewed.   Constitutional:       Appearance: She is well-developed.   HENT:      Head: Normocephalic and atraumatic.      Right Ear: External ear normal.       Left Ear: External ear normal.      Nose: Nose normal.      Mouth/Throat:      Pharynx: No oropharyngeal exudate.   Eyes:      General: No scleral icterus.     Extraocular Movements: Extraocular movements intact.      Conjunctiva/sclera: Conjunctivae normal.   Neck:      Thyroid: No thyromegaly.      Vascular: No JVD.   Cardiovascular:      Rate and Rhythm: Normal rate and regular rhythm.      Heart sounds: Normal heart sounds. No murmur heard.    No gallop.   Pulmonary:      Effort: Pulmonary effort is normal. No respiratory distress.      Breath sounds: Normal breath sounds. No wheezing.   Abdominal:      General: Bowel sounds are normal. There is no distension.      Palpations: Abdomen is soft. There is no mass.      Tenderness: There is no abdominal tenderness. There is no guarding or rebound.   Musculoskeletal:         General: No tenderness. Normal range of motion.      Cervical back: Normal range of motion and neck supple.   Lymphadenopathy:      Cervical: No cervical adenopathy.   Skin:     General: Skin is warm.      Findings: No erythema or rash.   Neurological:      General: No focal deficit present.      Mental Status: She is alert and oriented to person, place, and time.      Cranial Nerves: No cranial nerve deficit.      Coordination: Coordination normal.   Psychiatric:         Behavior: Behavior normal.         Thought Content: Thought content normal.         Judgment: Judgment normal.           Health Maintenance Due   Topic Date Due    Eye Exam  12/01/2021

## 2023-05-04 ENCOUNTER — OFFICE VISIT (OUTPATIENT)
Dept: PODIATRY | Facility: CLINIC | Age: 83
End: 2023-05-04
Payer: MEDICARE

## 2023-05-04 VITALS
RESPIRATION RATE: 18 BRPM | HEIGHT: 60 IN | WEIGHT: 145 LBS | SYSTOLIC BLOOD PRESSURE: 150 MMHG | DIASTOLIC BLOOD PRESSURE: 76 MMHG | BODY MASS INDEX: 28.47 KG/M2 | HEART RATE: 75 BPM

## 2023-05-04 DIAGNOSIS — E11.40 TYPE 2 DIABETES MELLITUS WITH DIABETIC NEUROPATHY, WITHOUT LONG-TERM CURRENT USE OF INSULIN: Primary | ICD-10-CM

## 2023-05-04 DIAGNOSIS — M21.372 FOOT DROP, LEFT: ICD-10-CM

## 2023-05-04 DIAGNOSIS — L84 CORN OR CALLUS: ICD-10-CM

## 2023-05-04 DIAGNOSIS — B35.1 ONYCHOMYCOSIS DUE TO DERMATOPHYTE: ICD-10-CM

## 2023-05-04 DIAGNOSIS — G14 POST-POLIO SYNDROME: ICD-10-CM

## 2023-05-04 DIAGNOSIS — G60.9 IDIOPATHIC PERIPHERAL NEUROPATHY: ICD-10-CM

## 2023-05-04 PROCEDURE — 11055 PARING/CUTG B9 HYPRKER LES 1: CPT | Mod: Q9,S$GLB,, | Performed by: PODIATRIST

## 2023-05-04 PROCEDURE — 1126F AMNT PAIN NOTED NONE PRSNT: CPT | Mod: CPTII,S$GLB,, | Performed by: PODIATRIST

## 2023-05-04 PROCEDURE — 3078F PR MOST RECENT DIASTOLIC BLOOD PRESSURE < 80 MM HG: ICD-10-PCS | Mod: CPTII,S$GLB,, | Performed by: PODIATRIST

## 2023-05-04 PROCEDURE — 3077F SYST BP >= 140 MM HG: CPT | Mod: CPTII,S$GLB,, | Performed by: PODIATRIST

## 2023-05-04 PROCEDURE — 99999 PR PBB SHADOW E&M-EST. PATIENT-LVL IV: ICD-10-PCS | Mod: PBBFAC,,, | Performed by: PODIATRIST

## 2023-05-04 PROCEDURE — 99499 NO LOS: ICD-10-PCS | Mod: S$GLB,,, | Performed by: PODIATRIST

## 2023-05-04 PROCEDURE — 11721 PR DEBRIDEMENT OF NAILS, 6 OR MORE: ICD-10-PCS | Mod: Q9,59,S$GLB, | Performed by: PODIATRIST

## 2023-05-04 PROCEDURE — 1126F PR PAIN SEVERITY QUANTIFIED, NO PAIN PRESENT: ICD-10-PCS | Mod: CPTII,S$GLB,, | Performed by: PODIATRIST

## 2023-05-04 PROCEDURE — 11721 DEBRIDE NAIL 6 OR MORE: CPT | Mod: Q9,59,S$GLB, | Performed by: PODIATRIST

## 2023-05-04 PROCEDURE — 11055 PR TRIM HYPERKERATOTIC SKIN LESION, ONE: ICD-10-PCS | Mod: Q9,S$GLB,, | Performed by: PODIATRIST

## 2023-05-04 PROCEDURE — 1159F MED LIST DOCD IN RCRD: CPT | Mod: CPTII,S$GLB,, | Performed by: PODIATRIST

## 2023-05-04 PROCEDURE — 3078F DIAST BP <80 MM HG: CPT | Mod: CPTII,S$GLB,, | Performed by: PODIATRIST

## 2023-05-04 PROCEDURE — 1159F PR MEDICATION LIST DOCUMENTED IN MEDICAL RECORD: ICD-10-PCS | Mod: CPTII,S$GLB,, | Performed by: PODIATRIST

## 2023-05-04 PROCEDURE — 99999 PR PBB SHADOW E&M-EST. PATIENT-LVL IV: CPT | Mod: PBBFAC,,, | Performed by: PODIATRIST

## 2023-05-04 PROCEDURE — 3077F PR MOST RECENT SYSTOLIC BLOOD PRESSURE >= 140 MM HG: ICD-10-PCS | Mod: CPTII,S$GLB,, | Performed by: PODIATRIST

## 2023-05-04 PROCEDURE — 99499 UNLISTED E&M SERVICE: CPT | Mod: S$GLB,,, | Performed by: PODIATRIST

## 2023-05-04 NOTE — PROGRESS NOTES
Subjective:      Patient ID: Emilia Alan is a 82 y.o. female.    Chief Complaint: PCP (Lauren Deras MD 4/26/23), Diabetic Foot Exam, and Nail Care      Emilia is a 82 y.o. female who presents to the clinic for evaluation and treatment of high risk feet. 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 The patient's chief complaint is long, thick toenails     PCP: Lauren Deras MD    Date Last Seen by PCP:   Chief Complaint   Patient presents with    PCP     Lauren Deras MD 4/26/23    Diabetic Foot Exam    Nail Care         Current shoe gear: DM shoes     Hemoglobin A1C   Date Value Ref Range Status   04/21/2023 6.0 (H) 4.0 - 5.6 % Final     Comment:     ADA Screening Guidelines:  5.7-6.4%  Consistent with prediabetes  >or=6.5%  Consistent with diabetes    High levels of fetal hemoglobin interfere with the HbA1C  assay. Heterozygous hemoglobin variants (HbS, HgC, etc)do  not significantly interfere with this assay.   However, presence of multiple variants may affect accuracy.     09/12/2022 6.6 (H) 4.0 - 5.6 % Final     Comment:     ADA Screening Guidelines:  5.7-6.4%  Consistent with prediabetes  >or=6.5%  Consistent with diabetes    High levels of fetal hemoglobin interfere with the  HbA1C  assay. Heterozygous hemoglobin variants (HbS, HgC, etc)do  not significantly interfere with this assay.   However, presence of multiple variants may affect accuracy.     03/25/2022 7.5 (H) 4.0 - 5.6 % Final     Comment:     ADA Screening Guidelines:  5.7-6.4%  Consistent with prediabetes  >or=6.5%  Consistent with diabetes    High levels of fetal hemoglobin interfere with the HbA1C  assay. Heterozygous hemoglobin variants (HbS, HgC, etc)do  not significantly interfere with this assay.   However, presence of multiple variants may affect accuracy.           Review of Systems   Constitutional: Negative for chills, decreased appetite and fever.   Cardiovascular:  Positive for leg swelling (new onset). Negative for chest pain and claudication.   Respiratory:  Negative for cough and shortness of breath.    Skin:  Positive for dry skin, nail changes and rash. Negative for color change, flushing, itching, poor wound healing and skin cancer.   Musculoskeletal:  Positive for muscle weakness (foot drop). Negative for arthritis, back pain, falls, gout, joint pain, joint swelling and myalgias.   Gastrointestinal:  Negative for nausea and vomiting.   Neurological:  Positive for numbness and paresthesias. Negative for loss of balance.         Objective:       Vitals:    05/04/23 1304   BP: (!) 150/76   Pulse: 75   Resp: 18   Weight: 65.8 kg (145 lb)   Height: 5' (1.524 m)   PainSc: 0-No pain        Physical Exam  Vitals and nursing note reviewed.   Constitutional:       General: She is not in acute distress.     Appearance: She is well-developed. She is not diaphoretic.   Cardiovascular:      Comments: Dorsalis pedis and posterior tibial pulses are diminished Bilaterally. Toes are cool to touch. Feet are warm proximally.There is decreased digital hair. Skin is atrophic, slightly hyperpigmented, and mildly edematous      Musculoskeletal:         General: Deformity present. No tenderness or signs of injury.      Right ankle:  Normal.      Left ankle: Normal.      Right foot: No swelling, deformity or crepitus.      Left foot: No swelling, deformity or crepitus.      Comments: Dropfoot left.      Lymphadenopathy:      Comments: No palpable lymph nodes   Skin:     General: Skin is warm and dry.      Coloration: Skin is not cyanotic, mottled or pale.      Findings: No abrasion, bruising, burn, erythema, laceration, lesion, petechiae or rash.      Nails: There is no clubbing.      Comments: Nails 1-5 b/l  are elongated by  3-9mm's, thickened by 3-5 mm's, dystrophic, and are darkened in  coloration . Xerosis Bilaterally. No open lesions noted.      Hyperkeratotic tissue noted to plantar L 5th MPJ    Neurological:      Mental Status: She is alert and oriented to person, place, and time.      Comments: [place] 5.07 monofilamant testing is diminished Char[place] feet. Sharp/dull sensation diminished Bilaterally. Light touch absent Bilaterally.       Psychiatric:         Thought Content: Thought content normal.         Judgment: Judgment normal.           Assessment:       Encounter Diagnoses   Name Primary?    Type 2 diabetes mellitus with diabetic neuropathy, without long-term current use of insulin Yes    Idiopathic peripheral neuropathy     Foot drop, left     Post-polio syndrome     Onychomycosis due to dermatophyte     Corn or callus          Plan:       Emilia was seen today for pcp, diabetic foot exam and nail care.    Diagnoses and all orders for this visit:    Type 2 diabetes mellitus with diabetic neuropathy, without long-term current use of insulin    Idiopathic peripheral neuropathy    Foot drop, left    Post-polio syndrome    Onychomycosis due to dermatophyte    Corn or callus      I counseled the patient on her conditions, their implications and medical management.    - Shoe inspection.Patient instructed on proper foot hygeine. We discussed wearing proper shoe gear, daily foot inspections, never walking without protective shoe gear,  never putting sharp instruments to feet, routine podiatric nail visits every 2-3 months.      - With patient's permission, nails were aggressively reduced and debrided x 10 to their soft tissue attachment mechanically and with electric , removing all offending nail and debris. Patient relates relief following the procedure. She will continue to monitor the areas daily, inspect her feet, wear protective shoe gear when ambulatory, moisturizer to maintain skin integrity and follow in this office in approximately 2-3 months, sooner p.r.n.    - After cleansing the  area w/ alcohol prep pad the above mentioned hyperkeratosis was trimmed utilizing No 15 scapel, to a smooth base with out incident. Patient tolerated this  well and reported comfort to the area x1

## 2023-06-15 DIAGNOSIS — M25.561 CHRONIC PAIN OF RIGHT KNEE: ICD-10-CM

## 2023-06-15 DIAGNOSIS — M47.816 LUMBAR SPONDYLOSIS: ICD-10-CM

## 2023-06-15 DIAGNOSIS — G89.29 CHRONIC BILATERAL LOW BACK PAIN WITH SCIATICA, SCIATICA LATERALITY UNSPECIFIED: ICD-10-CM

## 2023-06-15 DIAGNOSIS — M47.26 OSTEOARTHRITIS OF SPINE WITH RADICULOPATHY, LUMBAR REGION: ICD-10-CM
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
## 2023-06-15 DIAGNOSIS — E08.44 DIABETIC AMYOTROPHY ASSOCIATED WITH DIABETES MELLITUS DUE TO UNDERLYING CONDITION: ICD-10-CM

## 2023-06-15 DIAGNOSIS — E11.40 TYPE 2 DIABETES MELLITUS WITH DIABETIC NEUROPATHY, WITHOUT LONG-TERM CURRENT USE OF INSULIN: ICD-10-CM

## 2023-06-15 DIAGNOSIS — G89.29 CHRONIC PAIN OF RIGHT KNEE: ICD-10-CM

## 2023-06-15 DIAGNOSIS — M48.062 SPINAL STENOSIS OF LUMBAR REGION WITH NEUROGENIC CLAUDICATION: ICD-10-CM

## 2023-06-15 DIAGNOSIS — M54.40 CHRONIC BILATERAL LOW BACK PAIN WITH SCIATICA, SCIATICA LATERALITY UNSPECIFIED: ICD-10-CM

## 2023-06-15 DIAGNOSIS — G14 POST POLIOMYELITIS SYNDROME: ICD-10-CM

## 2023-06-15 DIAGNOSIS — M54.16 LEFT LUMBAR RADICULOPATHY: ICD-10-CM

## 2023-06-15 DIAGNOSIS — M81.0 AGE-RELATED OSTEOPOROSIS WITHOUT CURRENT PATHOLOGICAL FRACTURE: ICD-10-CM

## 2023-06-15 DIAGNOSIS — E11.42 DIABETIC POLYNEUROPATHY ASSOCIATED WITH TYPE 2 DIABETES MELLITUS: ICD-10-CM

## 2023-06-15 DIAGNOSIS — M54.16 LUMBAR RADICULOPATHY: ICD-10-CM

## 2023-06-15 DIAGNOSIS — M47.16 LUMBAR SPONDYLOSIS WITH MYELOPATHY: ICD-10-CM

## 2023-06-15 DIAGNOSIS — M17.11 PRIMARY OSTEOARTHRITIS OF RIGHT KNEE: ICD-10-CM

## 2023-06-15 DIAGNOSIS — G82.20 PARAPARESIS OF BOTH LOWER LIMBS: ICD-10-CM

## 2023-06-15 DIAGNOSIS — Z86.12 HISTORY OF POLIOMYELITIS: ICD-10-CM

## 2023-06-15 DIAGNOSIS — R26.9 GAIT DISORDER: ICD-10-CM

## 2023-06-15 RX ORDER — HYDROCODONE BITARTRATE AND ACETAMINOPHEN 10; 325 MG/1; MG/1
1 TABLET ORAL EVERY 6 HOURS PRN
Qty: 120 TABLET | Refills: 0 | Status: SHIPPED | OUTPATIENT
Start: 2023-06-15 | End: 2023-07-17 | Stop reason: SDUPTHER

## 2023-06-15 NOTE — TELEPHONE ENCOUNTER
----- Message from Sonal Thurman sent at 6/15/2023 10:04 AM CDT -----  Requesting an RX refill or new RX.  Is this a refill or new RX: Refill  RX name and strength HYDROcodone-acetaminophen (NORCO)  mg per tablet  Is this a 30 day or 90 day RX:   Pharmacy name and phone # [facility] Phone:  [phone]  Fax:  [phone]

## 2023-06-15 NOTE — TELEPHONE ENCOUNTER
No care due was identified.  [facility] Embedded Care Due Messages. Reference number: [id number].   6/15/2023 10:32:24 AM CDT

## 2023-06-30 DIAGNOSIS — E78.5 HYPERLIPIDEMIA, UNSPECIFIED HYPERLIPIDEMIA TYPE: ICD-10-CM

## 2023-06-30 DIAGNOSIS — I10 PRIMARY HYPERTENSION: ICD-10-CM

## 2023-06-30 RX ORDER — ISOPROPYL ALCOHOL 70 ML/100ML
SWAB TOPICAL
Qty: 100 EACH | Refills: 3 | Status: SHIPPED | OUTPATIENT
Start: 2023-06-30 | End: 2023-11-13

## 2023-06-30 RX ORDER — PRAVASTATIN SODIUM 40 MG/1
TABLET ORAL
Qty: 135 TABLET | Refills: 3 | Status: SHIPPED | OUTPATIENT
Start: 2023-06-30

## 2023-06-30 RX ORDER — HYDROCHLOROTHIAZIDE 25 MG/1
TABLET ORAL
Qty: 90 TABLET | Refills: 0 | Status: SHIPPED | OUTPATIENT
Start: 2023-06-30 | End: 2023-11-13

## 2023-06-30 NOTE — TELEPHONE ENCOUNTER
No care due was identified.  [facility] Embedded Care Due Messages. Reference number: [id number].   6/30/2023 1:33:40 AM CDT

## 2023-06-30 NOTE — TELEPHONE ENCOUNTER
Refill Routing Note   Medication(s) are not appropriate for processing by [facility] for the following reason(s):      Required vitals abnormal    [facility] action(s):  Approve  Defer None identified            Appointments  past 12m or future 3m with PCP    Date Provider   Last Visit   4/26/2023 Lauren Deras MD   Next Visit   Visit date not found Lauren Deras MD   ED visits in past 90 days: 0        Note composed:8:31 AM 06/30/2023

## 2023-07-10 RX ORDER — DICLOFENAC SODIUM 75 MG/1
TABLET, DELAYED RELEASE ORAL
Qty: 180 TABLET | Refills: 0 | Status: SHIPPED | OUTPATIENT
Start: 2023-07-10 | End: 2023-12-04

## 2023-07-17 ENCOUNTER — TELEPHONE (OUTPATIENT)
Dept: INTERNAL MEDICINE | Facility: CLINIC | Age: 83
End: 2023-07-17
Payer: MEDICARE

## 2023-07-17 DIAGNOSIS — M47.816 LUMBAR SPONDYLOSIS: ICD-10-CM

## 2023-07-17 DIAGNOSIS — E11.42 DIABETIC POLYNEUROPATHY ASSOCIATED WITH TYPE 2 DIABETES MELLITUS: ICD-10-CM

## 2023-07-17 DIAGNOSIS — M54.40 CHRONIC BILATERAL LOW BACK PAIN WITH SCIATICA, SCIATICA LATERALITY UNSPECIFIED: ICD-10-CM

## 2023-07-17 DIAGNOSIS — M25.561 CHRONIC PAIN OF RIGHT KNEE: ICD-10-CM
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
## 2023-07-17 DIAGNOSIS — G89.29 CHRONIC PAIN OF RIGHT KNEE: ICD-10-CM

## 2023-07-17 DIAGNOSIS — G89.29 CHRONIC BILATERAL LOW BACK PAIN WITH SCIATICA, SCIATICA LATERALITY UNSPECIFIED: ICD-10-CM

## 2023-07-17 DIAGNOSIS — E11.40 TYPE 2 DIABETES MELLITUS WITH DIABETIC NEUROPATHY, WITHOUT LONG-TERM CURRENT USE OF INSULIN: ICD-10-CM

## 2023-07-17 DIAGNOSIS — R26.9 GAIT DISORDER: ICD-10-CM

## 2023-07-17 DIAGNOSIS — M54.16 LUMBAR RADICULOPATHY: ICD-10-CM

## 2023-07-17 DIAGNOSIS — E08.44 DIABETIC AMYOTROPHY ASSOCIATED WITH DIABETES MELLITUS DUE TO UNDERLYING CONDITION: ICD-10-CM

## 2023-07-17 DIAGNOSIS — G82.20 PARAPARESIS OF BOTH LOWER LIMBS: ICD-10-CM

## 2023-07-17 DIAGNOSIS — M17.11 PRIMARY OSTEOARTHRITIS OF RIGHT KNEE: ICD-10-CM

## 2023-07-17 DIAGNOSIS — M47.26 OSTEOARTHRITIS OF SPINE WITH RADICULOPATHY, LUMBAR REGION: ICD-10-CM

## 2023-07-17 DIAGNOSIS — M47.16 LUMBAR SPONDYLOSIS WITH MYELOPATHY: ICD-10-CM

## 2023-07-17 DIAGNOSIS — M54.16 LEFT LUMBAR RADICULOPATHY: ICD-10-CM

## 2023-07-17 DIAGNOSIS — Z86.12 HISTORY OF POLIOMYELITIS: ICD-10-CM

## 2023-07-17 DIAGNOSIS — G14 POST POLIOMYELITIS SYNDROME: ICD-10-CM

## 2023-07-17 DIAGNOSIS — M48.062 SPINAL STENOSIS OF LUMBAR REGION WITH NEUROGENIC CLAUDICATION: ICD-10-CM

## 2023-07-17 DIAGNOSIS — M81.0 AGE-RELATED OSTEOPOROSIS WITHOUT CURRENT PATHOLOGICAL FRACTURE: ICD-10-CM

## 2023-07-17 RX ORDER — HYDROCODONE BITARTRATE AND ACETAMINOPHEN 10; 325 MG/1; MG/1
1 TABLET ORAL EVERY 6 HOURS PRN
Qty: 120 TABLET | Refills: 0 | Status: SHIPPED | OUTPATIENT
Start: 2023-07-17 | End: 2023-08-15 | Stop reason: SDUPTHER

## 2023-07-17 NOTE — TELEPHONE ENCOUNTER
----- Message from Paris Peterson sent at 7/17/2023 10:27 AM CDT -----  Contact: [phone]  Requesting an RX refill or new RX.  Is this a refill or new RX: refill     RX name and strength (copy/paste from chart):    HYDROcodone-acetaminophen (NORCO)  mg per tablet        Is this a 30 day or 90 day RX: 120 tablets     Pharmacy name and phone # (copy/paste from chart):        [facility]  [address] [place] [id number]  Phone: [phone] Fax: [phone]     The doctors have asked that we provide their patients with the following 2 reminders -- prescription refills can take up to 72 hours, and a friendly reminder that in the future you can use your [facility] account to request refills: Yes

## 2023-07-17 NOTE — TELEPHONE ENCOUNTER
reviewed, controlled meds refilled    Please ask her has she had her eye exam done recently?  If so, where did she get it done?    Also, she is due for labs in October, please schedule, thank you

## 2023-07-17 NOTE — TELEPHONE ENCOUNTER
No care due was identified.  Health [facility] Embedded Care Due Messages. Reference number: [id number].   7/17/2023 10:41:17 AM CDT

## 2023-07-30 DIAGNOSIS — E11.43 TYPE 2 DIABETES MELLITUS WITH AUTONOMIC NEUROPATHY: ICD-10-CM

## 2023-07-30 RX ORDER — BLOOD SUGAR DIAGNOSTIC
STRIP MISCELLANEOUS 2 TIMES DAILY
Qty: 200 STRIP | Refills: 3 | Status: SHIPPED | OUTPATIENT
Start: 2023-07-30

## 2023-07-30 NOTE — TELEPHONE ENCOUNTER
Care Due:                  Date            Visit Type   Department     Provider  --------------------------------------------------------------------------------                                EP -                              PRIMARY      [facility]  Last Visit: 04-      CARE ([facility])   MEDICINE       Lauren Deras  Next Visit: None Scheduled  None         None Found                                                            Last  Test          Frequency    Reason                     Performed    Due Date  --------------------------------------------------------------------------------    HBA1C.......  6 months...  metFORMIN................  04-   10-    [facility] Embedded Care Due Messages. Reference number: [id number].   7/30/2023 2:59:21 AM CDT

## 2023-07-30 NOTE — TELEPHONE ENCOUNTER
Refill Decision Note   Emilia Alan  is requesting a refill authorization.  Brief Assessment and Rationale for Refill:        Medication Therapy Plan:       Medication Reconciliation Completed: No   Comments:     No Care Gaps recommended.     Note composed:12:28 PM 07/30/2023

## 2023-08-09 ENCOUNTER — OFFICE VISIT (OUTPATIENT)
Dept: PODIATRY | Facility: CLINIC | Age: 83
End: 2023-08-09
Payer: MEDICARE

## 2023-08-09 ENCOUNTER — TELEPHONE (OUTPATIENT)
Dept: INTERNAL MEDICINE | Facility: CLINIC | Age: 83
End: 2023-08-09
Payer: MEDICARE

## 2023-08-09 VITALS
RESPIRATION RATE: 18 BRPM | BODY MASS INDEX: 28.47 KG/M2 | SYSTOLIC BLOOD PRESSURE: 145 MMHG | HEIGHT: 60 IN | WEIGHT: 145 LBS | DIASTOLIC BLOOD PRESSURE: 61 MMHG | HEART RATE: 67 BPM

## 2023-08-09 DIAGNOSIS — G60.9 IDIOPATHIC PERIPHERAL NEUROPATHY: ICD-10-CM

## 2023-08-09 DIAGNOSIS — G14 POST-POLIO SYNDROME: ICD-10-CM

## 2023-08-09 DIAGNOSIS — R26.9 GAIT DISORDER: ICD-10-CM

## 2023-08-09 DIAGNOSIS — L84 CORN OR CALLUS: ICD-10-CM

## 2023-08-09 DIAGNOSIS — B35.1 ONYCHOMYCOSIS DUE TO DERMATOPHYTE: ICD-10-CM

## 2023-08-09 DIAGNOSIS — M21.372 FOOT DROP, LEFT: ICD-10-CM

## 2023-08-09 DIAGNOSIS — E11.40 TYPE 2 DIABETES MELLITUS WITH DIABETIC NEUROPATHY, WITHOUT LONG-TERM CURRENT USE OF INSULIN: Primary | ICD-10-CM

## 2023-08-09 PROCEDURE — 11056 PR TRIM BENIGN HYPERKERATOTIC SKIN LESION,2-4: ICD-10-PCS | Mod: Q9,S$GLB,, | Performed by: PODIATRIST

## 2023-08-09 PROCEDURE — 11056 PARNG/CUTG B9 HYPRKR LES 2-4: CPT | Mod: Q9,S$GLB,, | Performed by: PODIATRIST

## 2023-08-09 PROCEDURE — 99999 PR PBB SHADOW E&M-EST. PATIENT-LVL IV: ICD-10-PCS | Mod: PBBFAC,,, | Performed by: PODIATRIST

## 2023-08-09 PROCEDURE — 11721 PR DEBRIDEMENT OF NAILS, 6 OR MORE: ICD-10-PCS | Mod: Q9,59,S$GLB, | Performed by: PODIATRIST

## 2023-08-09 PROCEDURE — 99499 NO LOS: ICD-10-PCS | Mod: S$GLB,,, | Performed by: PODIATRIST

## 2023-08-09 PROCEDURE — 11721 DEBRIDE NAIL 6 OR MORE: CPT | Mod: Q9,59,S$GLB, | Performed by: PODIATRIST

## 2023-08-09 PROCEDURE — 99499 UNLISTED E&M SERVICE: CPT | Mod: S$GLB,,, | Performed by: PODIATRIST

## 2023-08-09 PROCEDURE — 99999 PR PBB SHADOW E&M-EST. PATIENT-LVL IV: CPT | Mod: PBBFAC,,, | Performed by: PODIATRIST

## 2023-08-09 NOTE — TELEPHONE ENCOUNTER
----- Message from Katt Mckinley sent at 8/9/2023  2:53 PM CDT -----  Contact: [phone]  Caller is requesting an earlier appointment then we can schedule.  Caller is requesting a message be sent to the provider.  If this is for urgent care symptoms, did you offer other providers at this location, providers at other locations, or [facility]? (yes, no, n/a):  n/a    If this is for the patients physical, did you offer to schedule next available and put on wait list, or to see NP or PA for their physical?  (yes, no, n/a):  yes,    When is the next available appointment with their provider:  12/4    Reason for the appointment:  semi annual 6 mo    Patient preference of timeframe to be scheduled:  September     Would the patient like a call back, or a response through their [facility] portal?:   callback @ # [phone]

## 2023-08-09 NOTE — PROGRESS NOTES
Subjective:      Patient ID: Emilia Alan is a 82 y.o. female.    Chief Complaint: PCP (Lauren Deras MD  4/26/23), Diabetic Foot Exam, and Nail Care      Emilia is a 82 y.o. female who presents to the clinic for evaluation and treatment of high risk feet. 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 The patient's chief complaint is long, thick toenails     PCP: Lauren Deras MD    Date Last Seen by PCP:   Chief Complaint   Patient presents with    PCP     Lauren Deras MD  4/26/23    Diabetic Foot Exam    Nail Care         Current shoe gear: DM shoes     Hemoglobin A1C   Date Value Ref Range Status   04/21/2023 6.0 (H) 4.0 - 5.6 % Final     Comment:     ADA Screening Guidelines:  5.7-6.4%  Consistent with prediabetes  >or=6.5%  Consistent with diabetes    High levels of fetal hemoglobin interfere with the HbA1C  assay. Heterozygous hemoglobin variants (HbS, HgC, etc)do  not significantly interfere with this assay.   However, presence of multiple variants may affect accuracy.     09/12/2022 6.6 (H) 4.0 - 5.6 % Final     Comment:     ADA Screening Guidelines:  5.7-6.4%  Consistent with prediabetes  >or=6.5%  Consistent with diabetes    High levels of fetal hemoglobin interfere with the  HbA1C  assay. Heterozygous hemoglobin variants (HbS, HgC, etc)do  not significantly interfere with this assay.   However, presence of multiple variants may affect accuracy.     03/25/2022 7.5 (H) 4.0 - 5.6 % Final     Comment:     ADA Screening Guidelines:  5.7-6.4%  Consistent with prediabetes  >or=6.5%  Consistent with diabetes    High levels of fetal hemoglobin interfere with the HbA1C  assay. Heterozygous hemoglobin variants (HbS, HgC, etc)do  not significantly interfere with this assay.   However, presence of multiple variants may affect accuracy.           Review of Systems   Constitutional: Negative for chills, decreased appetite and fever.   Cardiovascular:  Positive for leg swelling (stable, mild). Negative for chest pain and claudication.   Respiratory:  Negative for cough and shortness of breath.    Skin:  Positive for dry skin and nail changes. Negative for color change, flushing, itching, poor wound healing and skin cancer.   Musculoskeletal:  Positive for muscle weakness (foot drop). Negative for arthritis, back pain, falls, gout, joint pain, joint swelling and myalgias.   Gastrointestinal:  Negative for nausea and vomiting.   Neurological:  Positive for numbness and paresthesias. Negative for loss of balance.           Objective:       Vitals:    08/09/23 1337   BP: (!) 145/61   Pulse: 67   Resp: 18   Weight: 65.8 kg (145 lb)   Height: 5' (1.524 m)   PainSc: 0-No pain        Physical Exam  Vitals and nursing note reviewed.   Constitutional:       General: She is not in acute distress.     Appearance: She is well-developed. She is not diaphoretic.   Cardiovascular:      Comments: Dorsalis pedis and posterior tibial pulses are diminished Bilaterally. Toes are cool to touch. Feet are warm proximally.There is decreased digital hair. Skin is atrophic, slightly hyperpigmented, and mildly edematous      Musculoskeletal:         General: Deformity present. No tenderness or signs of injury.      Right ankle:  Normal.      Left ankle: Normal.      Right foot: No swelling, deformity or crepitus.      Left foot: No swelling, deformity or crepitus.      Comments: Dropfoot left.      Lymphadenopathy:      Comments: No palpable lymph nodes   Skin:     General: Skin is warm and dry.      Coloration: Skin is not cyanotic, mottled or pale.      Findings: No abrasion, bruising, burn, erythema, laceration, lesion, petechiae or rash.      Nails: There is no clubbing.      Comments: Nails 1-5 b/l  are elongated by  3-6mm's, thickened by 3-5 mm's, dystrophic, and are darkened in  coloration . Xerosis Bilaterally. No open lesions noted.      Hyperkeratotic tissue noted to plantar L 5th MPJ    Neurological:      Mental Status: She is alert and oriented to person, place, and time.      Comments: Anita-Sarah 5.07 monofilamant testing is diminished Charbel feet. Sharp/dull sensation diminished Bilaterally. Light touch absent Bilaterally.       Psychiatric:         Thought Content: Thought content normal.         Judgment: Judgment normal.             Assessment:       Encounter Diagnoses   Name Primary?    Type 2 diabetes mellitus with diabetic neuropathy, without long-term current use of insulin Yes    Idiopathic peripheral neuropathy     Foot drop, left     Post-polio syndrome     Onychomycosis due to dermatophyte     Corn or callus     Gait disorder          Plan:       Emilia was seen today for pcp, diabetic foot exam and nail care.    Diagnoses and all orders for this visit:    Type 2 diabetes mellitus with diabetic neuropathy, without long-term current use of insulin    Idiopathic peripheral neuropathy    Foot drop, left    Post-polio syndrome    Onychomycosis due to dermatophyte    Corn or callus    Gait disorder      I counseled the patient on her conditions, their implications and medical management.    - Shoe inspection.Patient instructed on proper foot hygeine. We discussed wearing proper shoe gear, daily foot inspections, never  walking without protective shoe gear, never putting sharp instruments to feet, routine podiatric nail visits every 2-3 months.      - With patient's permission, nails were aggressively reduced and debrided x 10 to their soft tissue attachment mechanically and with electric , removing all offending nail and debris. Patient relates relief following the procedure. She will continue to monitor the areas daily, inspect her feet, wear protective shoe gear when ambulatory, moisturizer to maintain skin integrity and follow in this office in approximately 2-3 months, sooner p.r.n.    - After cleansing the  area w/ alcohol prep pad the above mentioned hyperkeratosis was trimmed utilizing No 15 scapel, to a smooth base with out incident. Patient tolerated this  well and reported comfort to the area x1

## 2023-08-15 DIAGNOSIS — M54.16 LUMBAR RADICULOPATHY: ICD-10-CM

## 2023-08-15 DIAGNOSIS — G89.29 CHRONIC PAIN OF RIGHT KNEE: ICD-10-CM

## 2023-08-15 DIAGNOSIS — G14 POST POLIOMYELITIS SYNDROME: ICD-10-CM

## 2023-08-15 DIAGNOSIS — M81.0 AGE-RELATED OSTEOPOROSIS WITHOUT CURRENT PATHOLOGICAL FRACTURE: ICD-10-CM

## 2023-08-15 DIAGNOSIS — E11.40 TYPE 2 DIABETES MELLITUS WITH DIABETIC NEUROPATHY, WITHOUT LONG-TERM CURRENT USE OF INSULIN: ICD-10-CM

## 2023-08-15 DIAGNOSIS — M54.16 LEFT LUMBAR RADICULOPATHY: ICD-10-CM

## 2023-08-15 DIAGNOSIS — M17.11 PRIMARY OSTEOARTHRITIS OF RIGHT KNEE: ICD-10-CM

## 2023-08-15 DIAGNOSIS — M48.062 SPINAL STENOSIS OF LUMBAR REGION WITH NEUROGENIC CLAUDICATION: ICD-10-CM

## 2023-08-15 DIAGNOSIS — M47.816 LUMBAR SPONDYLOSIS: ICD-10-CM

## 2023-08-15 DIAGNOSIS — M54.40 CHRONIC BILATERAL LOW BACK PAIN WITH SCIATICA, SCIATICA LATERALITY UNSPECIFIED: ICD-10-CM

## 2023-08-15 DIAGNOSIS — G89.29 CHRONIC BILATERAL LOW BACK PAIN WITH SCIATICA, SCIATICA LATERALITY UNSPECIFIED: ICD-10-CM

## 2023-08-15 DIAGNOSIS — M47.16 LUMBAR SPONDYLOSIS WITH MYELOPATHY: ICD-10-CM

## 2023-08-15 DIAGNOSIS — G82.20 PARAPARESIS OF BOTH LOWER LIMBS: ICD-10-CM

## 2023-08-15 DIAGNOSIS — M47.26 OSTEOARTHRITIS OF SPINE WITH RADICULOPATHY, LUMBAR REGION: ICD-10-CM

## 2023-08-15 DIAGNOSIS — Z86.12 HISTORY OF POLIOMYELITIS: ICD-10-CM

## 2023-08-15 DIAGNOSIS — E11.42 DIABETIC POLYNEUROPATHY ASSOCIATED WITH TYPE 2 DIABETES MELLITUS: ICD-10-CM
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
## 2023-08-15 DIAGNOSIS — R26.9 GAIT DISORDER: ICD-10-CM

## 2023-08-15 DIAGNOSIS — E08.44 DIABETIC AMYOTROPHY ASSOCIATED WITH DIABETES MELLITUS DUE TO UNDERLYING CONDITION: ICD-10-CM

## 2023-08-15 DIAGNOSIS — M25.561 CHRONIC PAIN OF RIGHT KNEE: ICD-10-CM

## 2023-08-15 NOTE — TELEPHONE ENCOUNTER
----- Message from My Zimmerman sent at 8/15/2023  1:52 PM CDT -----  Contact: [phone]  Requesting an RX refill or new RX.  Is this a refill or new RX: refill  RX name and strength (copy/paste from chart):  HYDROcodone-acetaminophen (NORCO)  mg per tablet  Is this a 30 day or 90 day RX:   Pharmacy name and phone # (copy/paste from chart):   [id number] - [place] [address] AT [facility] [address] [place] [address]  Phone: [phone] Fax: [phone]

## 2023-08-15 NOTE — TELEPHONE ENCOUNTER
No care due was identified.  [facility] Embedded Care Due Messages. Reference number: [id number].   8/15/2023 2:18:38 PM CDT

## 2023-08-16 RX ORDER — HYDROCODONE BITARTRATE AND ACETAMINOPHEN 10; 325 MG/1; MG/1
1 TABLET ORAL EVERY 6 HOURS PRN
Qty: 120 TABLET | Refills: 0 | Status: SHIPPED | OUTPATIENT
Start: 2023-08-16 | End: 2023-08-22 | Stop reason: SDUPTHER

## 2023-08-21 DIAGNOSIS — M54.16 LEFT LUMBAR RADICULOPATHY: ICD-10-CM

## 2023-08-21 DIAGNOSIS — R26.9 GAIT DISORDER: ICD-10-CM

## 2023-08-21 DIAGNOSIS — G89.29 CHRONIC PAIN OF RIGHT KNEE: ICD-10-CM
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
## 2023-08-21 DIAGNOSIS — M48.062 SPINAL STENOSIS OF LUMBAR REGION WITH NEUROGENIC CLAUDICATION: ICD-10-CM

## 2023-08-21 DIAGNOSIS — Z86.12 HISTORY OF POLIOMYELITIS: ICD-10-CM

## 2023-08-21 DIAGNOSIS — E11.40 TYPE 2 DIABETES MELLITUS WITH DIABETIC NEUROPATHY, WITHOUT LONG-TERM CURRENT USE OF INSULIN: ICD-10-CM

## 2023-08-21 DIAGNOSIS — G82.20 PARAPARESIS OF BOTH LOWER LIMBS: ICD-10-CM

## 2023-08-21 DIAGNOSIS — E08.44 DIABETIC AMYOTROPHY ASSOCIATED WITH DIABETES MELLITUS DUE TO UNDERLYING CONDITION: ICD-10-CM

## 2023-08-21 DIAGNOSIS — M81.0 AGE-RELATED OSTEOPOROSIS WITHOUT CURRENT PATHOLOGICAL FRACTURE: ICD-10-CM

## 2023-08-21 DIAGNOSIS — M47.26 OSTEOARTHRITIS OF SPINE WITH RADICULOPATHY, LUMBAR REGION: ICD-10-CM

## 2023-08-21 DIAGNOSIS — G14 POST POLIOMYELITIS SYNDROME: ICD-10-CM

## 2023-08-21 DIAGNOSIS — M25.561 CHRONIC PAIN OF RIGHT KNEE: ICD-10-CM

## 2023-08-21 DIAGNOSIS — M47.16 LUMBAR SPONDYLOSIS WITH MYELOPATHY: ICD-10-CM

## 2023-08-21 DIAGNOSIS — M47.816 LUMBAR SPONDYLOSIS: ICD-10-CM

## 2023-08-21 DIAGNOSIS — E11.42 DIABETIC POLYNEUROPATHY ASSOCIATED WITH TYPE 2 DIABETES MELLITUS: ICD-10-CM

## 2023-08-21 DIAGNOSIS — M54.40 CHRONIC BILATERAL LOW BACK PAIN WITH SCIATICA, SCIATICA LATERALITY UNSPECIFIED: ICD-10-CM

## 2023-08-21 DIAGNOSIS — M54.16 LUMBAR RADICULOPATHY: ICD-10-CM

## 2023-08-21 DIAGNOSIS — G89.29 CHRONIC BILATERAL LOW BACK PAIN WITH SCIATICA, SCIATICA LATERALITY UNSPECIFIED: ICD-10-CM

## 2023-08-21 DIAGNOSIS — M17.11 PRIMARY OSTEOARTHRITIS OF RIGHT KNEE: ICD-10-CM

## 2023-08-21 RX ORDER — HYDROCODONE BITARTRATE AND ACETAMINOPHEN 10; 325 MG/1; MG/1
1 TABLET ORAL EVERY 6 HOURS PRN
Qty: 120 TABLET | Refills: 0 | OUTPATIENT
Start: 2023-08-21 | End: 2023-09-20

## 2023-08-21 NOTE — TELEPHONE ENCOUNTER
----- Message from [place] Ol[place] sent at 8/21/2023  9:26 AM CDT -----  Contact: [phone]  HYDROcodone-acetaminophen (NORCO)  mg per tablet 120 tablet 0 8/16/2023 9/15/2023   Sig - Route: Take 1 tablet by mouth every 6 (six) hours as needed for Pain (pain). - Oral   Sent to pharmacy as: HYDROcodone-acetaminophen (NORCO)  mg per tablet   Earliest Fill Date: 8/16/2023   Notes to Pharmacy: Quantity prescribed more than 7 day supply? Yes, quantity medically necessary   E-Prescribing Status: Transmission to pharmacy failed (8/16/2023  7:56 AM CDT)   Message completed by Terrie Tinajero MA (8/16/2023 8:01 AM).     Please resend because of Transmission to pharmacy failed (8/16/2023  7:56 AM CDT, and call patient to notify.

## 2023-08-21 NOTE — TELEPHONE ENCOUNTER
No care due was identified.  Health [facility] Embedded Care Due Messages. Reference number: [id number].   8/21/2023 10:16:46 AM CDT

## 2023-08-22 DIAGNOSIS — E11.42 DIABETIC POLYNEUROPATHY ASSOCIATED WITH TYPE 2 DIABETES MELLITUS: ICD-10-CM

## 2023-08-22 DIAGNOSIS — M54.16 LUMBAR RADICULOPATHY: ICD-10-CM

## 2023-08-22 DIAGNOSIS — E08.44 DIABETIC AMYOTROPHY ASSOCIATED WITH DIABETES MELLITUS DUE TO UNDERLYING CONDITION: ICD-10-CM

## 2023-08-22 DIAGNOSIS — M47.26 OSTEOARTHRITIS OF SPINE WITH RADICULOPATHY, LUMBAR REGION: ICD-10-CM

## 2023-08-22 DIAGNOSIS — Z86.12 HISTORY OF POLIOMYELITIS: ICD-10-CM

## 2023-08-22 DIAGNOSIS — M25.561 CHRONIC PAIN OF RIGHT KNEE: ICD-10-CM

## 2023-08-22 DIAGNOSIS — R26.9 GAIT DISORDER: ICD-10-CM

## 2023-08-22 DIAGNOSIS — M17.11 PRIMARY OSTEOARTHRITIS OF RIGHT KNEE: ICD-10-CM

## 2023-08-22 DIAGNOSIS — M47.816 LUMBAR SPONDYLOSIS: ICD-10-CM

## 2023-08-22 DIAGNOSIS — E11.40 TYPE 2 DIABETES MELLITUS WITH DIABETIC NEUROPATHY, WITHOUT LONG-TERM CURRENT USE OF INSULIN: ICD-10-CM

## 2023-08-22 DIAGNOSIS — G89.29 CHRONIC PAIN OF RIGHT KNEE: ICD-10-CM

## 2023-08-22 DIAGNOSIS — M81.0 AGE-RELATED OSTEOPOROSIS WITHOUT CURRENT PATHOLOGICAL FRACTURE: ICD-10-CM

## 2023-08-22 DIAGNOSIS — M48.062 SPINAL STENOSIS OF LUMBAR REGION WITH NEUROGENIC CLAUDICATION: ICD-10-CM

## 2023-08-22 DIAGNOSIS — M54.40 CHRONIC BILATERAL LOW BACK PAIN WITH SCIATICA, SCIATICA LATERALITY UNSPECIFIED: ICD-10-CM

## 2023-08-22 DIAGNOSIS — M47.16 LUMBAR SPONDYLOSIS WITH MYELOPATHY: ICD-10-CM
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
## 2023-08-22 DIAGNOSIS — M54.16 LEFT LUMBAR RADICULOPATHY: ICD-10-CM

## 2023-08-22 DIAGNOSIS — G14 POST POLIOMYELITIS SYNDROME: ICD-10-CM

## 2023-08-22 DIAGNOSIS — G89.29 CHRONIC BILATERAL LOW BACK PAIN WITH SCIATICA, SCIATICA LATERALITY UNSPECIFIED: ICD-10-CM

## 2023-08-22 DIAGNOSIS — G82.20 PARAPARESIS OF BOTH LOWER LIMBS: ICD-10-CM

## 2023-08-22 RX ORDER — HYDROCODONE BITARTRATE AND ACETAMINOPHEN 10; 325 MG/1; MG/1
1 TABLET ORAL EVERY 6 HOURS PRN
Qty: 120 TABLET | Refills: 0 | Status: SHIPPED | OUTPATIENT
Start: 2023-08-22 | End: 2023-09-15 | Stop reason: SDUPTHER

## 2023-08-22 NOTE — TELEPHONE ENCOUNTER
----- Message from Kameron Guerrero sent at 8/22/2023 11:17 AM CDT -----  Contact: [phone] @ patient  Requesting an RX refill or new RX.HYDROcodone-acetaminophen (NORCO)  mg per tablet  Is this a refill or new RX: refill  RX name and strength (copy/paste from chart):    Is this a 30 day or 90 day RX:   Pharmacy name and phone # [id number] - [place] - [address] AT [facility] [address]   Phone:  [phone]  The doctors have asked that we provide their patients with the following 2 reminders -- prescription refills can take up to 72 hours, and a friendly reminder that in the future you can use your [facility] account to request refills:

## 2023-08-22 NOTE — TELEPHONE ENCOUNTER
No care due was identified.  Health [facility] Embedded Care Due Messages. Reference number: [id number].   8/22/2023 11:21:43 AM CDT

## 2023-09-15 ENCOUNTER — OFFICE VISIT (OUTPATIENT)
Dept: INTERNAL MEDICINE | Facility: CLINIC | Age: 83
End: 2023-09-15
Payer: MEDICARE

## 2023-09-15 ENCOUNTER — IMMUNIZATION (OUTPATIENT)
Dept: INTERNAL MEDICINE | Facility: CLINIC | Age: 83
End: 2023-09-15
Payer: MEDICARE

## 2023-09-15 VITALS — SYSTOLIC BLOOD PRESSURE: 135 MMHG | DIASTOLIC BLOOD PRESSURE: 80 MMHG

## 2023-09-15 DIAGNOSIS — M47.26 OSTEOARTHRITIS OF SPINE WITH RADICULOPATHY, LUMBAR REGION: ICD-10-CM
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
## 2023-09-15 DIAGNOSIS — M54.40 CHRONIC BILATERAL LOW BACK PAIN WITH SCIATICA, SCIATICA LATERALITY UNSPECIFIED: ICD-10-CM

## 2023-09-15 DIAGNOSIS — G82.20 PARAPARESIS OF BOTH LOWER LIMBS: ICD-10-CM

## 2023-09-15 DIAGNOSIS — M54.16 LUMBAR RADICULOPATHY: ICD-10-CM

## 2023-09-15 DIAGNOSIS — R26.9 GAIT DISORDER: ICD-10-CM

## 2023-09-15 DIAGNOSIS — E11.21 TYPE 2 DIABETES MELLITUS WITH DIABETIC NEPHROPATHY, WITHOUT LONG-TERM CURRENT USE OF INSULIN: Primary | ICD-10-CM

## 2023-09-15 DIAGNOSIS — G89.29 CHRONIC PAIN OF RIGHT KNEE: ICD-10-CM

## 2023-09-15 DIAGNOSIS — G14 POST POLIOMYELITIS SYNDROME: ICD-10-CM

## 2023-09-15 DIAGNOSIS — M25.561 CHRONIC PAIN OF RIGHT KNEE: ICD-10-CM

## 2023-09-15 DIAGNOSIS — E11.42 DIABETIC POLYNEUROPATHY ASSOCIATED WITH TYPE 2 DIABETES MELLITUS: ICD-10-CM

## 2023-09-15 DIAGNOSIS — M81.0 AGE-RELATED OSTEOPOROSIS WITHOUT CURRENT PATHOLOGICAL FRACTURE: ICD-10-CM

## 2023-09-15 DIAGNOSIS — M47.816 LUMBAR SPONDYLOSIS: ICD-10-CM

## 2023-09-15 DIAGNOSIS — M54.16 LEFT LUMBAR RADICULOPATHY: ICD-10-CM

## 2023-09-15 DIAGNOSIS — Z86.12 HISTORY OF POLIOMYELITIS: ICD-10-CM

## 2023-09-15 DIAGNOSIS — M17.11 PRIMARY OSTEOARTHRITIS OF RIGHT KNEE: ICD-10-CM

## 2023-09-15 DIAGNOSIS — M47.16 LUMBAR SPONDYLOSIS WITH MYELOPATHY: ICD-10-CM

## 2023-09-15 DIAGNOSIS — E08.44 DIABETIC AMYOTROPHY ASSOCIATED WITH DIABETES MELLITUS DUE TO UNDERLYING CONDITION: ICD-10-CM

## 2023-09-15 DIAGNOSIS — G89.29 CHRONIC BILATERAL LOW BACK PAIN WITH SCIATICA, SCIATICA LATERALITY UNSPECIFIED: ICD-10-CM

## 2023-09-15 DIAGNOSIS — M48.062 SPINAL STENOSIS OF LUMBAR REGION WITH NEUROGENIC CLAUDICATION: ICD-10-CM

## 2023-09-15 DIAGNOSIS — E11.40 TYPE 2 DIABETES MELLITUS WITH DIABETIC NEUROPATHY, WITHOUT LONG-TERM CURRENT USE OF INSULIN: ICD-10-CM

## 2023-09-15 DIAGNOSIS — M48.061 SPINAL STENOSIS OF LUMBAR REGION, UNSPECIFIED WHETHER NEUROGENIC CLAUDICATION PRESENT: ICD-10-CM

## 2023-09-15 PROCEDURE — 1159F MED LIST DOCD IN RCRD: CPT | Mod: CPTII,S$GLB,, | Performed by: INTERNAL MEDICINE

## 2023-09-15 PROCEDURE — 90694 VACC AIIV4 NO PRSRV 0.5ML IM: CPT | Mod: S$GLB,,, | Performed by: INTERNAL MEDICINE

## 2023-09-15 PROCEDURE — 90694 FLU VACCINE - QUADRIVALENT - ADJUVANTED: ICD-10-PCS | Mod: S$GLB,,, | Performed by: INTERNAL MEDICINE

## 2023-09-15 PROCEDURE — 99214 PR OFFICE/OUTPT VISIT, EST, LEVL IV, 30-39 MIN: ICD-10-PCS | Mod: S$GLB,,, | Performed by: INTERNAL MEDICINE

## 2023-09-15 PROCEDURE — G0008 FLU VACCINE - QUADRIVALENT - ADJUVANTED: ICD-10-PCS | Mod: S$GLB,,, | Performed by: INTERNAL MEDICINE

## 2023-09-15 PROCEDURE — 1125F PR PAIN SEVERITY QUANTIFIED, PAIN PRESENT: ICD-10-PCS | Mod: CPTII,S$GLB,, | Performed by: INTERNAL MEDICINE

## 2023-09-15 PROCEDURE — 99214 OFFICE O/P EST MOD 30 MIN: CPT | Mod: S$GLB,,, | Performed by: INTERNAL MEDICINE

## 2023-09-15 PROCEDURE — 1101F PT FALLS ASSESS-DOCD LE1/YR: CPT | Mod: CPTII,S$GLB,, | Performed by: INTERNAL MEDICINE

## 2023-09-15 PROCEDURE — 3288F FALL RISK ASSESSMENT DOCD: CPT | Mod: CPTII,S$GLB,, | Performed by: INTERNAL MEDICINE

## 2023-09-15 PROCEDURE — 99999 PR PBB SHADOW E&M-EST. PATIENT-LVL IV: CPT | Mod: PBBFAC,,, | Performed by: INTERNAL MEDICINE

## 2023-09-15 PROCEDURE — 99999 PR PBB SHADOW E&M-EST. PATIENT-LVL IV: ICD-10-PCS | Mod: PBBFAC,,, | Performed by: INTERNAL MEDICINE

## 2023-09-15 PROCEDURE — 3075F SYST BP GE 130 - 139MM HG: CPT | Mod: CPTII,S$GLB,, | Performed by: INTERNAL MEDICINE

## 2023-09-15 PROCEDURE — 1125F AMNT PAIN NOTED PAIN PRSNT: CPT | Mod: CPTII,S$GLB,, | Performed by: INTERNAL MEDICINE

## 2023-09-15 PROCEDURE — 1159F PR MEDICATION LIST DOCUMENTED IN MEDICAL RECORD: ICD-10-PCS | Mod: CPTII,S$GLB,, | Performed by: INTERNAL MEDICINE

## 2023-09-15 PROCEDURE — 3288F PR FALLS RISK ASSESSMENT DOCUMENTED: ICD-10-PCS | Mod: CPTII,S$GLB,, | Performed by: INTERNAL MEDICINE

## 2023-09-15 PROCEDURE — 1101F PR PT FALLS ASSESS DOC 0-1 FALLS W/OUT INJ PAST YR: ICD-10-PCS | Mod: CPTII,S$GLB,, | Performed by: INTERNAL MEDICINE

## 2023-09-15 PROCEDURE — G0008 ADMIN INFLUENZA VIRUS VAC: HCPCS | Mod: S$GLB,,, | Performed by: INTERNAL MEDICINE

## 2023-09-15 PROCEDURE — 3079F DIAST BP 80-89 MM HG: CPT | Mod: CPTII,S$GLB,, | Performed by: INTERNAL MEDICINE

## 2023-09-15 PROCEDURE — 3075F PR MOST RECENT SYSTOLIC BLOOD PRESS GE 130-139MM HG: ICD-10-PCS | Mod: CPTII,S$GLB,, | Performed by: INTERNAL MEDICINE

## 2023-09-15 PROCEDURE — 3079F PR MOST RECENT DIASTOLIC BLOOD PRESSURE 80-89 MM HG: ICD-10-PCS | Mod: CPTII,S$GLB,, | Performed by: INTERNAL MEDICINE

## 2023-09-15 RX ORDER — HYDROCODONE BITARTRATE AND ACETAMINOPHEN 10; 325 MG/1; MG/1
1 TABLET ORAL EVERY 6 HOURS PRN
Qty: 120 TABLET | Refills: 0 | Status: SHIPPED | OUTPATIENT
Start: 2023-09-15 | End: 2023-10-16 | Stop reason: SDUPTHER

## 2023-09-15 NOTE — PROGRESS NOTES
Patient ID: Emilia Alan is a 82 y.o. female.    Chief Complaint: Follow-up      Assessment:       1. Type 2 diabetes mellitus with diabetic nephropathy, without long-term current use of insulin    2. Post poliomyelitis syndrome    3. Diabetic polyneuropathy associated with type 2 diabetes mellitus    4. Spinal stenosis of lumbar region, unspecified whether neurogenic claudication present    5. Type 2 diabetes mellitus with diabetic neuropathy, without long-term current use of insulin    6. Paraparesis of both lower limbs    7. Osteoarthritis of spine with radiculopathy, lumbar region    8. History of poliomyelitis    9. Lumbar spondylosis with myelopathy    10. Primary osteoarthritis of right knee    11. Gait disorder    12. 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 Chronic bilateral low back pain with sciatica, sciatica laterality unspecified    14. Fall, sequela    15. Spinal stenosis of lumbar region with neurogenic claudication    16. Left lumbar radiculopathy    17. Lumbar radiculopathy    18. Chronic pain of right knee    19. Osteoporosis without current pathological fracture: worse on fosamax 5/16- Reclast 8/16    20. Lumbar spondylosis          Plan:         1. Type 2 diabetes mellitus with diabetic nephropathy, without long-term current use of insulin  -     Cancel: Microalbumin/Creatinine Ratio, Urine  -     Ambulatory referral/consult to Optometry; Future; Expected date: 09/22/2023    2. Post poliomyelitis syndrome  -     HYDROcodone-acetaminophen (NORCO)  mg per tablet; Take 1 tablet by mouth every 6 (six) hours as needed for Pain (pain).  Dispense: 120 tablet; Refill: 0    3. Diabetic polyneuropathy associated with type 2 diabetes mellitus  -     HYDROcodone-acetaminophen (NORCO)  mg per tablet; Take 1 tablet by mouth every 6 (six) hours as needed for Pain (pain).  Dispense: 120 tablet; Refill: 0    4. Spinal stenosis of lumbar region,  unspecified whether neurogenic claudication present    5. Type 2 diabetes mellitus with diabetic neuropathy, without long-term current use of insulin  -     Microalbumin/Creatinine Ratio, Urine; Future; Expected date: 09/15/2023  -     HYDROcodone-acetaminophen (NORCO)  mg per tablet; Take 1 tablet by mouth every 6 (six) hours as needed for Pain (pain).  Dispense: 120 tablet; Refill: 0    6. Paraparesis of both lower limbs  -     HYDROcodone-acetaminophen (NORCO)  mg per tablet; Take 1 tablet by mouth every 6 (six) hours as needed for Pain (pain).  Dispense: 120 tablet; Refill: 0    7. Osteoarthritis of spine with radiculopathy, lumbar region  -     HYDROcodone-acetaminophen (NORCO)  mg per tablet; Take 1 tablet by mouth every 6 (six) hours as needed for Pain (pain).  Dispense: 120 tablet; Refill: 0    8. History of poliomyelitis  -     HYDROcodone-acetaminophen (NORCO)  mg per tablet; Take 1 tablet by mouth every 6 (six) hours as needed for Pain (pain).  Dispense: 120 tablet; Refill: 0    9. Lumbar spondylosis with myelopathy  -     HYDROcodone-acetaminophen (NORCO)  mg per tablet; Take 1 tablet by mouth every 6 (six) hours as needed for Pain (pain).  Dispense: 120 tablet; Refill: 0    10. Primary osteoarthritis of right knee  -     HYDROcodone-acetaminophen (NORCO)  mg per tablet; Take 1 tablet by mouth every 6 (six) hours as needed for Pain (pain).  Dispense: 120 tablet; Refill: 0    11. Gait disorder  -     HYDROcodone-acetaminophen (NORCO)  mg per tablet; Take 1 tablet by mouth every 6 (six) hours as needed for Pain (pain).  Dispense: 120 tablet; Refill: 0    12. 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  Dispense: 120 tablet; Refill: 0    13. Chronic bilateral low back pain with sciatica, sciatica  laterality unspecified  -     HYDROcodone-acetaminophen (NORCO)  mg per tablet; Take 1 tablet by mouth every 6 (six) hours as needed for Pain (pain).  Dispense: 120 tablet; Refill: 0    14. Fall, sequela  -     HYDROcodone-acetaminophen (NORCO)  mg per tablet; Take 1 tablet by mouth every 6 (six) hours as needed for Pain (pain).  Dispense: 120 tablet; Refill: 0    15. Spinal stenosis of lumbar region with neurogenic claudication  -     HYDROcodone-acetaminophen (NORCO)  mg per tablet; Take 1 tablet by mouth every 6 (six) hours as needed for Pain (pain).  Dispense: 120 tablet; Refill: 0    16. Left lumbar radiculopathy  -     HYDROcodone-acetaminophen (NORCO)  mg per tablet; Take 1 tablet by mouth every 6 (six) hours as needed for Pain (pain).  Dispense: 120 tablet; Refill: 0    17. Lumbar radiculopathy  -     HYDROcodone-acetaminophen (NORCO)  mg per tablet; Take 1 tablet by mouth every 6 (six) hours as needed for Pain (pain).  Dispense: 120 tablet; Refill: 0    18. Chronic pain of right knee  -     HYDROcodone-acetaminophen (NORCO)  mg per tablet; Take 1 tablet by mouth every 6 (six) hours as needed for Pain (pain).  Dispense: 120 tablet; Refill: 0    19. Osteoporosis without current pathological fracture: worse on fosamax 5/16- Reclast 8/16  -     HYDROcodone-acetaminophen (NORCO)  mg per tablet; Take 1 tablet by mouth every 6 (six) hours as needed for Pain (pain).  Dispense: 120 tablet; Refill: 0    20. Lumbar spondylosis  -     HYDROcodone-acetaminophen (NORCO)  mg per tablet; Take 1 tablet by mouth every 6 (six) hours as needed for Pain (pain).  Dispense: 120 tablet; Refill: 0     reviewed  Urine when she does her next labs  Flu shot today  Schedule DEXA and eye exam  COVID booster at a later point  I will review all studies and determine further tx depending on findings       Subjective:   Follow-up.    Stable for the most part.  Diabetes doing pretty well.   Last A1c was 6.0.  She has labs scheduled for next month.  Over a couple of weeks, she did have some slight elevation in her readings to about 200, reason for this was not clear but levels have normalized.  She is not had any intercurrent infection, nor any steroids.    She has chronic pain that is multifactorial.  She was followed previously in the Pain Clinic by Dr. Burger who was prescribing her chronic narcotics.  She is unable to be seen by that doctor any longer and is trying to get established in the pain clinic with another provider.  I have let her know that I can prescribe this pain meds for her, pattern of use has been stable.   reviewed.    She is willing to get her flu shot.  COVID booster discussed.  She would like to get her eye exam at [facility] now.  This will be scheduled.  DEXA scan recommended.  She has not had her Reclast since 2022.  We will get the bone density and then see if she still needs to have this treatment.    Patient Active Problem List   Diagnosis    Gait disorder    History of poliomyelitis    Post poliomyelitis syndrome    Diabetic polyneuropathy associated with type 2 diabetes mellitus    Mixed hyperlipidemia    Osteoporosis without current pathological fracture: worse on fosamax 5/16- Reclast 8/16, 12/20    Paraparesis of both lower limbs    Primary hypertension    Type 2 diabetes mellitus with diabetic neuropathy, without long-term current use of insulin    Osteoarthritis of right knee    Lumbar spinal stenosis    Obstructive sleep apnea syndrome: dx 2008 needs CPAP 11    Bradycardia, drug induced    Atherosclerosis of artery of right lower extremity: see xray 4/4/07    Essential tremor    Clonal Hematopoiesis of Indeterminate Potential (CHIP)    Type 2 diabetes mellitus with renal manifestations    Facet arthritis of lumbar region    Opioid use agreement exists    Primary osteoarthritis of left shoulder    Unspecified inflammatory spondylopathy, lumbar region        Review of  Systems   Constitutional:  Negative for fatigue and fever.   Respiratory:  Negative for cough and shortness of breath.    Cardiovascular:  Negative for chest pain.   Gastrointestinal:  Negative for abdominal pain.   Endocrine: Negative for polydipsia, polyphagia and polyuria.   Genitourinary:  Negative for difficulty urinating and hematuria.   Musculoskeletal:  Positive for arthralgias and back pain.        Chronic, stable   Skin:  Negative for rash.   Neurological:  Negative for weakness and numbness.         Objective:      Physical Exam  Constitutional:       Appearance: She is well-developed.      Comments: In a wheelchair   HENT:      Head: Normocephalic and atraumatic.   Eyes:      Extraocular Movements: Extraocular movements intact.      Conjunctiva/sclera: Conjunctivae normal.   Neck:      Thyroid: No thyromegaly.   Pulmonary:      Effort: No respiratory distress.   Musculoskeletal:      Comments: Post-polio changes, chronic   Neurological:      General: No focal deficit present.      Mental Status: She is alert and oriented to person, place, and time.   Psychiatric:         Mood and Affect: Mood normal.         Behavior: Behavior normal.         Thought Content: Thought content normal.         Judgment: Judgment normal.             Health Maintenance Due   Topic Date Due    Eye Exam  12/01/2021    COVID-19 Vaccine (5 - Moderna series) 01/26/2023    Diabetes Urine Screening  09/12/2023    DEXA Scan  11/02/2023

## 2023-10-12 ENCOUNTER — LAB VISIT (OUTPATIENT)
Dept: LAB | Facility: HOSPITAL | Age: 83
End: 2023-10-12
Attending: INTERNAL MEDICINE
Payer: MEDICARE

## 2023-10-12 DIAGNOSIS — E11.21 TYPE 2 DIABETES MELLITUS WITH DIABETIC NEPHROPATHY, WITHOUT LONG-TERM CURRENT USE OF INSULIN: ICD-10-CM

## 2023-10-12 DIAGNOSIS — E53.8 LOW SERUM VITAMIN B12: ICD-10-CM

## 2023-10-12 DIAGNOSIS — I10 PRIMARY HYPERTENSION: ICD-10-CM

## 2023-10-12 DIAGNOSIS — R53.83 FATIGUE, UNSPECIFIED TYPE: ICD-10-CM

## 2023-10-12 DIAGNOSIS — E78.2 MIXED HYPERLIPIDEMIA: ICD-10-CM

## 2023-10-12 LAB
ALBUMIN SERPL BCP-MCNC: 3.5 G/DL (ref 3.5–5.2)
ALP SERPL-CCNC: 86 U/L (ref 55–135)
ALT SERPL W/O P-5'-P-CCNC: 15 U/L (ref 10–44)
ANION GAP SERPL CALC-SCNC: 11 MMOL/L (ref 8–16)
AST SERPL-CCNC: 17 U/L (ref 10–40)
BASOPHILS # BLD AUTO: 0.16 K/UL (ref 0–0.2)
BASOPHILS NFR BLD: 1.6 % (ref 0–1.9)
BILIRUB SERPL-MCNC: 0.4 MG/DL (ref 0.1–1)
BUN SERPL-MCNC: 37 MG/DL (ref 8–23)
CALCIUM SERPL-MCNC: 9.4 MG/DL (ref 8.7–10.5)
CHLORIDE SERPL-SCNC: 107 MMOL/L (ref 95–110)
CHOLEST SERPL-MCNC: 137 MG/DL (ref 120–199)
CHOLEST/HDLC SERPL: 3 {RATIO} (ref 2–5)
CO2 SERPL-SCNC: 23 MMOL/L (ref 23–29)
CREAT SERPL-MCNC: 1.1 MG/DL (ref 0.5–1.4)
DIFFERENTIAL METHOD: ABNORMAL
EOSINOPHIL # BLD AUTO: 0.4 K/UL (ref 0–0.5)
EOSINOPHIL NFR BLD: 3.8 % (ref 0–8)
ERYTHROCYTE [DISTWIDTH] IN BLOOD BY AUTOMATED COUNT: 12.9 % (ref 11.5–14.5)
EST. GFR  (NO RACE VARIABLE): 49.9 ML/MIN/1.73 M^2
ESTIMATED AVG GLUCOSE: 126 MG/DL (ref 68–131)
GLUCOSE SERPL-MCNC: 138 MG/DL (ref 70–110)
HBA1C MFR BLD: 6 % (ref 4–5.6)
HCT VFR BLD AUTO: 37.4 % (ref 37–48.5)
HDLC SERPL-MCNC: 45 MG/DL (ref 40–75)
HDLC SERPL: 32.8 % (ref 20–50)
HGB BLD-MCNC: 11.7 G/DL (ref 12–16)
IMM GRANULOCYTES # BLD AUTO: 0.03 K/UL (ref 0–0.04)
IMM GRANULOCYTES NFR BLD AUTO: 0.3 % (ref 0–0.5)
LDLC SERPL CALC-MCNC: 67 MG/DL (ref 63–159)
LYMPHOCYTES # BLD AUTO: 1.8 K/UL (ref 1–4.8)
LYMPHOCYTES NFR BLD: 18.1 % (ref 18–48)
MCH RBC QN AUTO: 31.5 PG (ref 27–31)
MCHC RBC AUTO-ENTMCNC: 31.3 G/DL (ref 32–36)
MCV RBC AUTO: 101 FL (ref 82–98)
MONOCYTES # BLD AUTO: 0.6 K/UL (ref 0.3–1)
MONOCYTES NFR BLD: 6.4 % (ref 4–15)
NEUTROPHILS # BLD AUTO: 6.8 K/UL (ref 1.8–7.7)
NEUTROPHILS NFR BLD: 69.8 % (ref 38–73)
NONHDLC SERPL-MCNC: 92 MG/DL
NRBC BLD-RTO: 0 /100 WBC
PLATELET # BLD AUTO: 312 K/UL (ref 150–450)
PMV BLD AUTO: 11 FL (ref 9.2–12.9)
POTASSIUM SERPL-SCNC: 3.8 MMOL/L (ref 3.5–5.1)
PROT SERPL-MCNC: 6.7 G/DL (ref 6–8.4)
RBC # BLD AUTO: 3.71 M/UL (ref 4–5.4)
SODIUM SERPL-SCNC: 141 MMOL/L (ref 136–145)
TRIGL SERPL-MCNC: 125 MG/DL (ref 30–150)
TSH SERPL DL<=0.005 MIU/L-ACNC: 3.08 UIU/ML (ref 0.4–4)
VIT B12 SERPL-MCNC: 643 PG/ML (ref 210–950)
WBC # BLD AUTO: 9.78 K/UL (ref 3.9–12.7)

## 2023-10-12 PROCEDURE — 80061 LIPID PANEL: CPT | Performed by: INTERNAL MEDICINE

## 2023-10-12 PROCEDURE — 82607 VITAMIN B-12: CPT | Performed by: INTERNAL MEDICINE

## 2023-10-12 PROCEDURE — 36415 COLL VENOUS BLD VENIPUNCTURE: CPT | Performed by: INTERNAL MEDICINE

## 2023-10-12 PROCEDURE — 83036 HEMOGLOBIN GLYCOSYLATED A1C: CPT | Performed by: INTERNAL MEDICINE

## 2023-10-12 PROCEDURE — 84443 ASSAY THYROID STIM HORMONE: CPT | Performed by: INTERNAL MEDICINE

## 2023-10-12 PROCEDURE — 85025 COMPLETE CBC W/AUTO DIFF WBC: CPT | Performed by: INTERNAL MEDICINE

## 2023-10-12 PROCEDURE — 80053 COMPREHEN METABOLIC PANEL: CPT | Performed by: INTERNAL MEDICINE

## 2023-10-16 DIAGNOSIS — M25.561 CHRONIC PAIN OF RIGHT KNEE: ICD-10-CM

## 2023-10-16 DIAGNOSIS — G89.29 CHRONIC BILATERAL LOW BACK PAIN WITH SCIATICA, SCIATICA LATERALITY UNSPECIFIED: ICD-10-CM

## 2023-10-16 DIAGNOSIS — E11.42 DIABETIC POLYNEUROPATHY ASSOCIATED WITH TYPE 2 DIABETES MELLITUS: ICD-10-CM

## 2023-10-16 DIAGNOSIS — M48.062 SPINAL STENOSIS OF LUMBAR REGION WITH NEUROGENIC CLAUDICATION: ICD-10-CM

## 2023-10-16 DIAGNOSIS — M17.11 PRIMARY OSTEOARTHRITIS OF RIGHT KNEE: ICD-10-CM

## 2023-10-16 DIAGNOSIS — G82.20 PARAPARESIS OF BOTH LOWER LIMBS: ICD-10-CM

## 2023-10-16 DIAGNOSIS — M47.16 LUMBAR SPONDYLOSIS WITH MYELOPATHY: ICD-10-CM

## 2023-10-16 DIAGNOSIS — M47.816 LUMBAR SPONDYLOSIS: ICD-10-CM

## 2023-10-16 DIAGNOSIS — E11.40 TYPE 2 DIABETES MELLITUS WITH DIABETIC NEUROPATHY, WITHOUT LONG-TERM CURRENT USE OF INSULIN: ICD-10-CM

## 2023-10-16 DIAGNOSIS — G89.29 CHRONIC PAIN OF RIGHT KNEE: ICD-10-CM

## 2023-10-16 DIAGNOSIS — G14 POST POLIOMYELITIS SYNDROME: ICD-10-CM
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
## 2023-10-16 DIAGNOSIS — Z86.12 HISTORY OF POLIOMYELITIS: ICD-10-CM

## 2023-10-16 DIAGNOSIS — E08.44 DIABETIC AMYOTROPHY ASSOCIATED WITH DIABETES MELLITUS DUE TO UNDERLYING CONDITION: ICD-10-CM

## 2023-10-16 DIAGNOSIS — R26.9 GAIT DISORDER: ICD-10-CM

## 2023-10-16 DIAGNOSIS — M54.16 LUMBAR RADICULOPATHY: ICD-10-CM

## 2023-10-16 DIAGNOSIS — M81.0 AGE-RELATED OSTEOPOROSIS WITHOUT CURRENT PATHOLOGICAL FRACTURE: ICD-10-CM

## 2023-10-16 DIAGNOSIS — M47.26 OSTEOARTHRITIS OF SPINE WITH RADICULOPATHY, LUMBAR REGION: ICD-10-CM

## 2023-10-16 DIAGNOSIS — M54.16 LEFT LUMBAR RADICULOPATHY: ICD-10-CM

## 2023-10-16 DIAGNOSIS — M54.40 CHRONIC BILATERAL LOW BACK PAIN WITH SCIATICA, SCIATICA LATERALITY UNSPECIFIED: ICD-10-CM

## 2023-10-16 RX ORDER — HYDROCODONE BITARTRATE AND ACETAMINOPHEN 10; 325 MG/1; MG/1
1 TABLET ORAL EVERY 6 HOURS PRN
Qty: 120 TABLET | Refills: 0 | Status: SHIPPED | OUTPATIENT
Start: 2023-10-16 | End: 2023-10-30 | Stop reason: SDUPTHER

## 2023-10-16 NOTE — TELEPHONE ENCOUNTER
No care due was identified.  Health [facility] Embedded Care Due Messages. Reference number: [id number].   10/16/2023 1:03:07 PM CDT

## 2023-10-16 NOTE — TELEPHONE ENCOUNTER
----- Message from Brenna Moreno sent at 10/16/2023 12:12 PM CDT -----  Contact: [phone]  Requesting an RX refill or new RX.  Is this a refill or new RX: refill  RX name and strength (copy/paste from chart):  HYDROcodone-acetaminophen (NORCO)  mg per tablet  Is this a 30 day or 90 day RX: 30 day  Pharmacy name and phone # (copy/paste from chart):    [facility]  [address] [place] [address]  Phone: [phone] Fax: [phone]  The doctors have asked that we provide their patients with the following 2 reminders -- prescription refills can take up to 72 hours, and a friendly reminder that in the future you can use your [facility] account to request refills: aware .

## 2023-10-17 ENCOUNTER — TELEPHONE (OUTPATIENT)
Dept: INTERNAL MEDICINE | Facility: CLINIC | Age: 83
End: 2023-10-17
Payer: MEDICARE

## 2023-10-17 DIAGNOSIS — N28.9 RENAL INSUFFICIENCY: Primary | ICD-10-CM

## 2023-10-17 NOTE — TELEPHONE ENCOUNTER
Labs are stable other than impaired kidney function which is slightly worse than prior.    Has she been drinking less water than before?    I recommend trying to work on 6-8 glasses of water a day, rechecking labs in 1 month, orders in thank you

## 2023-10-17 NOTE — TELEPHONE ENCOUNTER
Called and spoke with pt, informed her of Dr. Deras's message. States she honestly doesn't drink enough water, repeat labs scheduled

## 2023-10-27 DIAGNOSIS — M54.16 LUMBAR RADICULOPATHY: ICD-10-CM

## 2023-10-27 DIAGNOSIS — M17.11 PRIMARY OSTEOARTHRITIS OF RIGHT KNEE: ICD-10-CM

## 2023-10-27 DIAGNOSIS — G89.29 CHRONIC PAIN OF RIGHT KNEE: ICD-10-CM

## 2023-10-27 DIAGNOSIS — E08.44 DIABETIC AMYOTROPHY ASSOCIATED WITH DIABETES MELLITUS DUE TO UNDERLYING CONDITION: ICD-10-CM

## 2023-10-27 DIAGNOSIS — M54.16 LEFT LUMBAR RADICULOPATHY: ICD-10-CM

## 2023-10-27 DIAGNOSIS — M81.0 AGE-RELATED OSTEOPOROSIS WITHOUT CURRENT PATHOLOGICAL FRACTURE: ICD-10-CM

## 2023-10-27 DIAGNOSIS — M47.816 LUMBAR SPONDYLOSIS: ICD-10-CM

## 2023-10-27 DIAGNOSIS — M48.062 SPINAL STENOSIS OF LUMBAR REGION WITH NEUROGENIC CLAUDICATION: ICD-10-CM
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
## 2023-10-27 DIAGNOSIS — E11.40 TYPE 2 DIABETES MELLITUS WITH DIABETIC NEUROPATHY, WITHOUT LONG-TERM CURRENT USE OF INSULIN: ICD-10-CM

## 2023-10-27 DIAGNOSIS — G14 POST POLIOMYELITIS SYNDROME: ICD-10-CM

## 2023-10-27 DIAGNOSIS — M25.561 CHRONIC PAIN OF RIGHT KNEE: ICD-10-CM

## 2023-10-27 DIAGNOSIS — E11.42 DIABETIC POLYNEUROPATHY ASSOCIATED WITH TYPE 2 DIABETES MELLITUS: ICD-10-CM

## 2023-10-27 DIAGNOSIS — M47.26 OSTEOARTHRITIS OF SPINE WITH RADICULOPATHY, LUMBAR REGION: ICD-10-CM

## 2023-10-27 DIAGNOSIS — R26.9 GAIT DISORDER: ICD-10-CM

## 2023-10-27 DIAGNOSIS — M54.40 CHRONIC BILATERAL LOW BACK PAIN WITH SCIATICA, SCIATICA LATERALITY UNSPECIFIED: ICD-10-CM

## 2023-10-27 DIAGNOSIS — M47.16 LUMBAR SPONDYLOSIS WITH MYELOPATHY: ICD-10-CM

## 2023-10-27 DIAGNOSIS — G89.29 CHRONIC BILATERAL LOW BACK PAIN WITH SCIATICA, SCIATICA LATERALITY UNSPECIFIED: ICD-10-CM

## 2023-10-27 DIAGNOSIS — Z86.12 HISTORY OF POLIOMYELITIS: ICD-10-CM

## 2023-10-27 DIAGNOSIS — G82.20 PARAPARESIS OF BOTH LOWER LIMBS: ICD-10-CM

## 2023-10-27 RX ORDER — HYDROCODONE BITARTRATE AND ACETAMINOPHEN 10; 325 MG/1; MG/1
1 TABLET ORAL EVERY 6 HOURS PRN
Qty: 120 TABLET | Refills: 0 | Status: CANCELLED | OUTPATIENT
Start: 2023-10-27 | End: 2023-11-26

## 2023-10-27 NOTE — TELEPHONE ENCOUNTER
Pt states Alex is out of stock  and it will not be in until 11/16/23 or later he would like it transferd to primary care pharmacy.

## 2023-10-27 NOTE — TELEPHONE ENCOUNTER
----- Message from Kervin Nava sent at 10/27/2023  3:16 PM CDT -----  Contact: Pt [phone]  Requesting an RX refill or new RX.  Is this a refill or new RX: refill  RX name and strength (copy/paste from chart):  HYDROcodone-acetaminophen (NORCO)  mg per tablet  Is this a 30 day or 90 day RX:   Pharmacy name and phone # (copy/paste from chart):  [facility]   Phone:  [phone]  Fax:  [phone]    The doctors have asked that we provide their patients with the following 2 reminders -- prescription refills can take up to 72 hours, and a friendly reminder that in the future you can use your [facility] account to request refills: yes   Pt states Alex is out of stock  and it will not be in until 11/16/23 or later he would like it transferd to primary care pharmacy.

## 2023-10-27 NOTE — TELEPHONE ENCOUNTER
No care due was identified.  [facility] Embedded Care Due Messages. Reference number: [id number].   10/27/2023 3:56:27 PM CDT

## 2023-10-30 DIAGNOSIS — M47.26 OSTEOARTHRITIS OF SPINE WITH RADICULOPATHY, LUMBAR REGION: ICD-10-CM

## 2023-10-30 DIAGNOSIS — M81.0 AGE-RELATED OSTEOPOROSIS WITHOUT CURRENT PATHOLOGICAL FRACTURE: ICD-10-CM

## 2023-10-30 DIAGNOSIS — G89.29 CHRONIC PAIN OF RIGHT KNEE: ICD-10-CM

## 2023-10-30 DIAGNOSIS — M54.40 CHRONIC BILATERAL LOW BACK PAIN WITH SCIATICA, SCIATICA LATERALITY UNSPECIFIED: ICD-10-CM

## 2023-10-30 DIAGNOSIS — G14 POST POLIOMYELITIS SYNDROME: ICD-10-CM

## 2023-10-30 DIAGNOSIS — M48.062 SPINAL STENOSIS OF LUMBAR REGION WITH NEUROGENIC CLAUDICATION: ICD-10-CM

## 2023-10-30 DIAGNOSIS — M47.16 LUMBAR SPONDYLOSIS WITH MYELOPATHY: ICD-10-CM

## 2023-10-30 DIAGNOSIS — Z86.12 HISTORY OF POLIOMYELITIS: ICD-10-CM

## 2023-10-30 DIAGNOSIS — M54.16 LUMBAR RADICULOPATHY: ICD-10-CM

## 2023-10-30 DIAGNOSIS — E08.44 DIABETIC AMYOTROPHY ASSOCIATED WITH DIABETES MELLITUS DUE TO UNDERLYING CONDITION: ICD-10-CM

## 2023-10-30 DIAGNOSIS — E11.40 TYPE 2 DIABETES MELLITUS WITH DIABETIC NEUROPATHY, WITHOUT LONG-TERM CURRENT USE OF INSULIN: ICD-10-CM

## 2023-10-30 DIAGNOSIS — M47.816 LUMBAR SPONDYLOSIS: ICD-10-CM

## 2023-10-30 DIAGNOSIS — M54.16 LEFT LUMBAR RADICULOPATHY: ICD-10-CM

## 2023-10-30 DIAGNOSIS — R26.9 GAIT DISORDER: ICD-10-CM

## 2023-10-30 DIAGNOSIS — G89.29 CHRONIC BILATERAL LOW BACK PAIN WITH SCIATICA, SCIATICA LATERALITY UNSPECIFIED: ICD-10-CM

## 2023-10-30 DIAGNOSIS — G82.20 PARAPARESIS OF BOTH LOWER LIMBS: ICD-10-CM

## 2023-10-30 DIAGNOSIS — M25.561 CHRONIC PAIN OF RIGHT KNEE: ICD-10-CM
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
## 2023-10-30 DIAGNOSIS — M17.11 PRIMARY OSTEOARTHRITIS OF RIGHT KNEE: ICD-10-CM

## 2023-10-30 DIAGNOSIS — E11.42 DIABETIC POLYNEUROPATHY ASSOCIATED WITH TYPE 2 DIABETES MELLITUS: ICD-10-CM

## 2023-10-30 RX ORDER — HYDROCODONE BITARTRATE AND ACETAMINOPHEN 10; 325 MG/1; MG/1
1 TABLET ORAL EVERY 6 HOURS PRN
Qty: 120 TABLET | Refills: 0 | Status: SHIPPED | OUTPATIENT
Start: 2023-10-30 | End: 2023-12-08 | Stop reason: SDUPTHER

## 2023-10-30 NOTE — TELEPHONE ENCOUNTER
----- Message from Kameron Guerrero sent at 10/30/2023 12:31 PM CDT -----  Contact: [phone] @ patient  Requesting an RX refill or new RX.HYDROcodone-acetaminophen (NORCO)  mg per tablet  Is this a refill or new RX: refill   RX name and strength (copy/paste from chart):    Is this a 30 day or 90 day RX:   Pharmacy name and phone #   [facility]   Phone:  [phone]  The doctors have asked that we provide their patients with the following 2 reminders -- prescription refills can take up to 72 hours, and a friendly reminder that in the future you can use your [facility] account to request refills:

## 2023-11-07 DIAGNOSIS — M25.561 CHRONIC PAIN OF RIGHT KNEE: ICD-10-CM

## 2023-11-07 DIAGNOSIS — M54.40 CHRONIC BILATERAL LOW BACK PAIN WITH SCIATICA, SCIATICA LATERALITY UNSPECIFIED: ICD-10-CM

## 2023-11-07 DIAGNOSIS — E11.42 DIABETIC POLYNEUROPATHY ASSOCIATED WITH TYPE 2 DIABETES MELLITUS: ICD-10-CM

## 2023-11-07 DIAGNOSIS — M81.0 AGE-RELATED OSTEOPOROSIS WITHOUT CURRENT PATHOLOGICAL FRACTURE: ICD-10-CM

## 2023-11-07 DIAGNOSIS — E08.44 DIABETIC AMYOTROPHY ASSOCIATED WITH DIABETES MELLITUS DUE TO UNDERLYING CONDITION: ICD-10-CM
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
## 2023-11-07 DIAGNOSIS — M54.16 LUMBAR RADICULOPATHY: ICD-10-CM

## 2023-11-07 DIAGNOSIS — M47.16 LUMBAR SPONDYLOSIS WITH MYELOPATHY: ICD-10-CM

## 2023-11-07 DIAGNOSIS — Z86.12 HISTORY OF POLIOMYELITIS: ICD-10-CM

## 2023-11-07 DIAGNOSIS — M17.11 PRIMARY OSTEOARTHRITIS OF RIGHT KNEE: ICD-10-CM

## 2023-11-07 DIAGNOSIS — M47.26 OSTEOARTHRITIS OF SPINE WITH RADICULOPATHY, LUMBAR REGION: ICD-10-CM

## 2023-11-07 DIAGNOSIS — G89.29 CHRONIC PAIN OF RIGHT KNEE: ICD-10-CM

## 2023-11-07 DIAGNOSIS — G14 POST POLIOMYELITIS SYNDROME: ICD-10-CM

## 2023-11-07 DIAGNOSIS — R26.9 GAIT DISORDER: ICD-10-CM

## 2023-11-07 DIAGNOSIS — E11.40 TYPE 2 DIABETES MELLITUS WITH DIABETIC NEUROPATHY, WITHOUT LONG-TERM CURRENT USE OF INSULIN: ICD-10-CM

## 2023-11-07 DIAGNOSIS — M54.16 LEFT LUMBAR RADICULOPATHY: ICD-10-CM

## 2023-11-07 DIAGNOSIS — G89.29 CHRONIC BILATERAL LOW BACK PAIN WITH SCIATICA, SCIATICA LATERALITY UNSPECIFIED: ICD-10-CM

## 2023-11-07 DIAGNOSIS — G82.20 PARAPARESIS OF BOTH LOWER LIMBS: ICD-10-CM

## 2023-11-07 DIAGNOSIS — M48.062 SPINAL STENOSIS OF LUMBAR REGION WITH NEUROGENIC CLAUDICATION: ICD-10-CM

## 2023-11-07 RX ORDER — GABAPENTIN 600 MG/1
TABLET ORAL
Qty: 360 TABLET | Refills: 0 | OUTPATIENT
Start: 2023-11-07 | End: 2023-11-14

## 2023-11-07 NOTE — TELEPHONE ENCOUNTER
No care due was identified.  Health [facility] Embedded Care Due Messages. Reference number: [id number].   11/07/2023 1:11:39 PM CST

## 2023-11-13 ENCOUNTER — HOSPITAL ENCOUNTER (OUTPATIENT)
Dept: RADIOLOGY | Facility: HOSPITAL | Age: 83
Discharge: HOME | End: 2023-11-13
Attending: INTERNAL MEDICINE
Payer: MEDICARE

## 2023-11-13 DIAGNOSIS — I10 PRIMARY HYPERTENSION: ICD-10-CM

## 2023-11-13 DIAGNOSIS — Z12.31 SCREENING MAMMOGRAM, ENCOUNTER FOR: ICD-10-CM

## 2023-11-13 DIAGNOSIS — G14 POST POLIOMYELITIS SYNDROME: Primary | ICD-10-CM

## 2023-11-13 PROCEDURE — 77063 MAMMO DIGITAL SCREENING BILAT WITH TOMO: ICD-10-PCS | Mod: 26,,, | Performed by: RADIOLOGY

## 2023-11-13 PROCEDURE — 77067 MAMMO DIGITAL SCREENING BILAT WITH TOMO: ICD-10-PCS | Mod: 26,,, | Performed by: RADIOLOGY

## 2023-11-13 PROCEDURE — 77063 BREAST TOMOSYNTHESIS BI: CPT | Mod: 26,,, | Performed by: RADIOLOGY

## 2023-11-13 PROCEDURE — 77067 SCR MAMMO BI INCL CAD: CPT | Mod: 26,,, | Performed by: RADIOLOGY

## 2023-11-13 PROCEDURE — 77067 SCR MAMMO BI INCL CAD: CPT | Mod: TC

## 2023-11-13 RX ORDER — HYDROCHLOROTHIAZIDE 25 MG/1
TABLET ORAL
Qty: 90 TABLET | Refills: 3 | Status: SHIPPED | OUTPATIENT
Start: 2023-11-13

## 2023-11-13 RX ORDER — ISOPROPYL ALCOHOL 70 ML/100ML
1 SWAB TOPICAL DAILY
Qty: 100 EACH | Refills: 3 | Status: SHIPPED | OUTPATIENT
Start: 2023-11-13

## 2023-11-13 NOTE — TELEPHONE ENCOUNTER
No care due was identified.  Health [facility] Embedded Care Due Messages. Reference number: [id number].   11/13/2023 1:45:17 AM CST

## 2023-11-13 NOTE — TELEPHONE ENCOUNTER
Refill Decision Note   Emilia Alan  is requesting a refill authorization.  Brief Assessment and Rationale for Refill:  Approve     Medication Therapy Plan:         Comments:     Note composed:1:48 PM 11/13/2023

## 2023-11-14 DIAGNOSIS — E08.44 DIABETIC AMYOTROPHY ASSOCIATED WITH DIABETES MELLITUS DUE TO UNDERLYING CONDITION: ICD-10-CM

## 2023-11-14 DIAGNOSIS — M47.16 LUMBAR SPONDYLOSIS WITH MYELOPATHY: ICD-10-CM

## 2023-11-14 DIAGNOSIS — M17.11 PRIMARY OSTEOARTHRITIS OF RIGHT KNEE: ICD-10-CM

## 2023-11-14 DIAGNOSIS — G89.29 CHRONIC BILATERAL LOW BACK PAIN WITH SCIATICA, SCIATICA LATERALITY UNSPECIFIED: ICD-10-CM

## 2023-11-14 DIAGNOSIS — Z86.12 HISTORY OF POLIOMYELITIS: ICD-10-CM

## 2023-11-14 DIAGNOSIS — M54.16 LEFT LUMBAR RADICULOPATHY: ICD-10-CM

## 2023-11-14 DIAGNOSIS — G82.20 PARAPARESIS OF BOTH LOWER LIMBS: ICD-10-CM

## 2023-11-14 DIAGNOSIS — M48.062 SPINAL STENOSIS OF LUMBAR REGION WITH NEUROGENIC CLAUDICATION: ICD-10-CM

## 2023-11-14 DIAGNOSIS — R26.9 GAIT DISORDER: ICD-10-CM

## 2023-11-14 DIAGNOSIS — M25.561 CHRONIC PAIN OF RIGHT KNEE: ICD-10-CM

## 2023-11-14 DIAGNOSIS — E11.43 TYPE 2 DIABETES MELLITUS WITH AUTONOMIC NEUROPATHY: ICD-10-CM

## 2023-11-14 DIAGNOSIS — M81.0 AGE-RELATED OSTEOPOROSIS WITHOUT CURRENT PATHOLOGICAL FRACTURE: ICD-10-CM

## 2023-11-14 DIAGNOSIS — G89.29 CHRONIC PAIN OF RIGHT KNEE: ICD-10-CM

## 2023-11-14 DIAGNOSIS — M47.26 OSTEOARTHRITIS OF SPINE WITH RADICULOPATHY, LUMBAR REGION: ICD-10-CM

## 2023-11-14 DIAGNOSIS — M54.40 CHRONIC BILATERAL LOW BACK PAIN WITH SCIATICA, SCIATICA LATERALITY UNSPECIFIED: ICD-10-CM

## 2023-11-14 DIAGNOSIS — G14 POST POLIOMYELITIS SYNDROME: ICD-10-CM

## 2023-11-14 DIAGNOSIS — E11.40 TYPE 2 DIABETES MELLITUS WITH DIABETIC NEUROPATHY, WITHOUT LONG-TERM CURRENT USE OF INSULIN: ICD-10-CM

## 2023-11-14 DIAGNOSIS — E11.42 DIABETIC POLYNEUROPATHY ASSOCIATED WITH TYPE 2 DIABETES MELLITUS: ICD-10-CM

## 2023-11-14 DIAGNOSIS — M54.16 LUMBAR RADICULOPATHY: ICD-10-CM
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
## 2023-11-14 RX ORDER — GABAPENTIN 600 MG/1
600 TABLET ORAL 3 TIMES DAILY
Qty: 360 TABLET | Refills: 0 | Status: SHIPPED | OUTPATIENT
Start: 2023-11-14 | End: 2024-02-20

## 2023-11-14 RX ORDER — METFORMIN HYDROCHLORIDE 500 MG/1
500 TABLET, EXTENDED RELEASE ORAL 2 TIMES DAILY WITH MEALS
Qty: 180 TABLET | Refills: 1 | Status: SHIPPED | OUTPATIENT
Start: 2023-11-14

## 2023-11-14 NOTE — TELEPHONE ENCOUNTER
----- Message from Tracy Martinez sent at 11/14/2023 10:50 AM CST -----  Contact: Emilia  or   Emilia would jarrod a call back. She said she broke her wheel chair & wants to get an order to get a new one from [facility]

## 2023-11-14 NOTE — TELEPHONE ENCOUNTER
No care due was identified.  Health [facility] Embedded Care Due Messages. Reference number: [id number].   11/14/2023 10:54:21 AM CST

## 2023-11-14 NOTE — TELEPHONE ENCOUNTER
No care due was identified.  [facility] Embedded Care Due Messages. Reference number: [id number].   11/14/2023 11:53:04 AM CST

## 2023-11-14 NOTE — TELEPHONE ENCOUNTER
Refill Decision Note   Emilia Alan  is requesting a refill authorization.  Brief Assessment and Rationale for Refill:  Approve     Medication Therapy Plan:       Medication Reconciliation Completed: No   Comments:     No Care Gaps recommended.     Note composed:11:56 AM 11/14/2023

## 2023-11-15 DIAGNOSIS — E11.43 TYPE 2 DIABETES MELLITUS WITH AUTONOMIC NEUROPATHY: ICD-10-CM

## 2023-11-15 RX ORDER — LANCETS
EACH MISCELLANEOUS
Qty: 200 EACH | Refills: 3 | Status: SHIPPED | OUTPATIENT
Start: 2023-11-15

## 2023-11-15 NOTE — TELEPHONE ENCOUNTER
No care due was identified.  [facility] Embedded Care Due Messages. Reference number: [id number].   11/15/2023 8:51:23 AM CST

## 2023-11-15 NOTE — TELEPHONE ENCOUNTER
Refill Decision Note   Emilia Alan  is requesting a refill authorization.  Brief Assessment and Rationale for Refill:  Approve     Medication Therapy Plan:         Comments:     Note composed:1:38 PM 11/15/2023

## 2023-11-20 ENCOUNTER — LAB VISIT (OUTPATIENT)
Dept: LAB | Facility: HOSPITAL | Age: 83
End: 2023-11-20
Attending: INTERNAL MEDICINE
Payer: MEDICARE

## 2023-11-20 ENCOUNTER — TELEPHONE (OUTPATIENT)
Dept: INTERNAL MEDICINE | Facility: CLINIC | Age: 83
End: 2023-11-20
Payer: MEDICARE

## 2023-11-20 DIAGNOSIS — N28.9 RENAL INSUFFICIENCY: ICD-10-CM

## 2023-11-20 LAB
ALBUMIN SERPL BCP-MCNC: 3.4 G/DL (ref 3.5–5.2)
ANION GAP SERPL CALC-SCNC: 12 MMOL/L (ref 8–16)
BUN SERPL-MCNC: 40 MG/DL (ref 8–23)
CALCIUM SERPL-MCNC: 9.7 MG/DL (ref 8.7–10.5)
CHLORIDE SERPL-SCNC: 102 MMOL/L (ref 95–110)
CO2 SERPL-SCNC: 23 MMOL/L (ref 23–29)
CREAT SERPL-MCNC: 1.2 MG/DL (ref 0.5–1.4)
EST. GFR  (NO RACE VARIABLE): 44.9 ML/MIN/1.73 M^2
GLUCOSE SERPL-MCNC: 92 MG/DL (ref 70–110)
PHOSPHATE SERPL-MCNC: 3.2 MG/DL (ref 2.7–4.5)
POTASSIUM SERPL-SCNC: 4.3 MMOL/L (ref 3.5–5.1)
SODIUM SERPL-SCNC: 137 MMOL/L (ref 136–145)

## 2023-11-20 PROCEDURE — 36415 COLL VENOUS BLD VENIPUNCTURE: CPT | Performed by: INTERNAL MEDICINE

## 2023-11-20 PROCEDURE — 80069 RENAL FUNCTION PANEL: CPT | Performed by: INTERNAL MEDICINE

## 2023-11-20 NOTE — TELEPHONE ENCOUNTER
----- Message from Ping Bledsoe sent at 11/20/2023  8:45 AM CST -----  Regarding: Wheel Chair  The paper work that is needed for her new Wheel Chair is needed to be faxed off to [employer]. Pt stated that the paper work was sent over to the place for the wheel chair already.    Fax: [phone]

## 2023-11-21 ENCOUNTER — TELEPHONE (OUTPATIENT)
Dept: INTERNAL MEDICINE | Facility: CLINIC | Age: 83
End: 2023-11-21
Payer: MEDICARE

## 2023-11-21 DIAGNOSIS — N18.31 STAGE 3A CHRONIC KIDNEY DISEASE: Primary | ICD-10-CM

## 2023-11-21 NOTE — TELEPHONE ENCOUNTER
Called and spoke with pt, informed her of recent blood work. She expressed understanding. Pt states she is drinking approximately 63 ounces of water a day

## 2023-11-21 NOTE — TELEPHONE ENCOUNTER
Labs acceptable other than kidney function is a little bit worse.  Has she been drinking less water than usual lately?  This seems to be new in the past 4-6 months

## 2023-11-21 NOTE — TELEPHONE ENCOUNTER
Thanks, I know she has had this off and on for the past several years intermittently, to continue to work on hydration.  I would like to repeat the labs in 6 weeks, orders in     It has been about 4 years since she is seen the kidney team and I recommend she get reestablished in Nephrology, this is not urgent.  Referral is in, thank you

## 2023-11-22 NOTE — TELEPHONE ENCOUNTER
Called and spoke to patient. Relayed Dr. Deras's message (below). Pt verbalized understanding. Labs and nephrology apt scheduled.   Pt states she is increasing her fluid intake.    Thanks, I know she has had this off and on for the past several years intermittently, to continue to work on hydration.  I would like to repeat the labs in 6 weeks, orders in   It has been about 4 years since she is seen the kidney team and I recommend she get reestablished in Nephrology, this is not urgent.  Referral is in, thank you

## 2023-12-04 RX ORDER — DICLOFENAC SODIUM 75 MG/1
TABLET, DELAYED RELEASE ORAL
Qty: 180 TABLET | Refills: 3 | Status: SHIPPED | OUTPATIENT
Start: 2023-12-04

## 2023-12-04 NOTE — TELEPHONE ENCOUNTER
No care due was identified.  [facility] Embedded Care Due Messages. Reference number: [id number].   12/04/2023 2:00:56 AM CST

## 2023-12-04 NOTE — TELEPHONE ENCOUNTER
----- Message from Soiba Romero sent at 12/4/2023  8:17 AM CST -----  Contact: [phone]  Requesting an RX refill or new RX.  Is this a refill or new RX:   RX name and strength : HYDROcodone-acetaminophen (NORCO)  mg per tablet   Is this a 30 day or 90 day RX:   Pharmacy name and phone # :    [facility]  [address] [place] [address]  Phone: [phone] Fax: [phone]

## 2023-12-05 ENCOUNTER — TELEPHONE (OUTPATIENT)
Dept: INTERNAL MEDICINE | Facility: CLINIC | Age: 83
End: 2023-12-05
Payer: MEDICARE

## 2023-12-05 NOTE — TELEPHONE ENCOUNTER
----- Message from Brenna Moreno sent at 12/4/2023  8:37 AM CST -----  Contact: [phone] ext [phone]  Oscar Armando called to advise that he does not see the order for the pt's new wheel chair and would like to have to have the order re-faxed please. He confirmed that the fax # [phone] is correct. Please Advise

## 2023-12-08 DIAGNOSIS — G89.29 CHRONIC BILATERAL LOW BACK PAIN WITH SCIATICA, SCIATICA LATERALITY UNSPECIFIED: ICD-10-CM

## 2023-12-08 DIAGNOSIS — M81.0 AGE-RELATED OSTEOPOROSIS WITHOUT CURRENT PATHOLOGICAL FRACTURE: ICD-10-CM

## 2023-12-08 DIAGNOSIS — M54.16 LEFT LUMBAR RADICULOPATHY: ICD-10-CM

## 2023-12-08 DIAGNOSIS — M54.40 CHRONIC BILATERAL LOW BACK PAIN WITH SCIATICA, SCIATICA LATERALITY UNSPECIFIED: ICD-10-CM

## 2023-12-08 DIAGNOSIS — E08.44 DIABETIC AMYOTROPHY ASSOCIATED WITH DIABETES MELLITUS DUE TO UNDERLYING CONDITION: ICD-10-CM

## 2023-12-08 DIAGNOSIS — R26.9 GAIT DISORDER: ICD-10-CM

## 2023-12-08 DIAGNOSIS — E11.42 DIABETIC POLYNEUROPATHY ASSOCIATED WITH TYPE 2 DIABETES MELLITUS: ICD-10-CM

## 2023-12-08 DIAGNOSIS — Z86.12 HISTORY OF POLIOMYELITIS: ICD-10-CM

## 2023-12-08 DIAGNOSIS — M25.561 CHRONIC PAIN OF RIGHT KNEE: ICD-10-CM

## 2023-12-08 DIAGNOSIS — M48.062 SPINAL STENOSIS OF LUMBAR REGION WITH NEUROGENIC CLAUDICATION: ICD-10-CM
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
## 2023-12-08 DIAGNOSIS — M54.16 LUMBAR RADICULOPATHY: ICD-10-CM

## 2023-12-08 DIAGNOSIS — M47.816 LUMBAR SPONDYLOSIS: ICD-10-CM

## 2023-12-08 DIAGNOSIS — M47.26 OSTEOARTHRITIS OF SPINE WITH RADICULOPATHY, LUMBAR REGION: ICD-10-CM

## 2023-12-08 DIAGNOSIS — E11.40 TYPE 2 DIABETES MELLITUS WITH DIABETIC NEUROPATHY, WITHOUT LONG-TERM CURRENT USE OF INSULIN: ICD-10-CM

## 2023-12-08 DIAGNOSIS — M47.16 LUMBAR SPONDYLOSIS WITH MYELOPATHY: ICD-10-CM

## 2023-12-08 DIAGNOSIS — M17.11 PRIMARY OSTEOARTHRITIS OF RIGHT KNEE: ICD-10-CM

## 2023-12-08 DIAGNOSIS — G89.29 CHRONIC PAIN OF RIGHT KNEE: ICD-10-CM

## 2023-12-08 DIAGNOSIS — G82.20 PARAPARESIS OF BOTH LOWER LIMBS: ICD-10-CM

## 2023-12-08 DIAGNOSIS — G14 POST POLIOMYELITIS SYNDROME: ICD-10-CM

## 2023-12-08 RX ORDER — HYDROCODONE BITARTRATE AND ACETAMINOPHEN 10; 325 MG/1; MG/1
1 TABLET ORAL EVERY 6 HOURS PRN
Qty: 120 TABLET | Refills: 0 | Status: SHIPPED | OUTPATIENT
Start: 2023-12-08 | End: 2024-01-08 | Stop reason: SDUPTHER

## 2023-12-08 NOTE — TELEPHONE ENCOUNTER
----- Message from Jennie Rice sent at 12/8/2023 12:20 PM CST -----  Contact: Emilia [phone]  Pt needs a refill on HYDROcodone-acetaminophen (NORCO)  mg per tablet called into     [facility]  [address] [place] [address]  Phone: [phone] Fax: [phone]      Pt mom/dad/guardian can be reached at [phone]      Emilia is calling again to request a refill on her RX. Emilia states she called Monday to request this RX, Emilia states she really needs her RX. Please call Emilia back for advice.

## 2023-12-08 NOTE — TELEPHONE ENCOUNTER
No care due was identified.  Health [facility] Embedded Care Due Messages. Reference number: [id number].   12/08/2023 1:44:18 PM CST

## 2024-01-03 DIAGNOSIS — N39.46 MIXED INCONTINENCE: ICD-10-CM

## 2024-01-03 RX ORDER — OXYBUTYNIN CHLORIDE 5 MG/1
TABLET ORAL
Qty: 180 TABLET | Refills: 2 | Status: SHIPPED | OUTPATIENT
Start: 2024-01-03

## 2024-01-03 NOTE — TELEPHONE ENCOUNTER
Refill Decision Note   Emilia Alan  is requesting a refill authorization.  Brief Assessment and Rationale for Refill:  Approve     Medication Therapy Plan:         Comments:     Note composed:10:18 AM 01/03/2024             Appointments     Last Visit   9/15/2023 Lauren Deras MD   Next Visit   Visit date not found Lauren Deras MD        Detail Level: Simple Depth Of Biopsy: dermis Was A Bandage Applied: Yes Size Of Lesion In Cm: 0 Biopsy Type: H and E Biopsy Method: Dermablade Anesthesia Type: 1% lidocaine with epinephrine Anesthesia Volume In Cc (Will Not Render If 0): 0.5 Hemostasis: Aluminum Chloride Wound Care: Petrolatum Dressing: bandage Destruction After The Procedure: No Type Of Destruction Used: Curettage Curettage Text: The wound bed was treated with curettage after the biopsy was performed. Cryotherapy Text: The wound bed was treated with cryotherapy after the biopsy was performed. Electrodesiccation Text: The wound bed was treated with electrodesiccation after the biopsy was performed. Electrodesiccation And Curettage Text: The wound bed was treated with electrodesiccation and curettage after the biopsy was performed. Silver Nitrate Text: The wound bed was treated with silver nitrate after the biopsy was performed. Lab: -[id number] Consent: Written consent was obtained and risks were reviewed including but not limited to scarring, infection, bleeding, scabbing, incomplete removal, nerve damage and allergy to anesthesia. Post-Care Instructions: I reviewed with the patient in detail post-care instructions. Patient is to keep the biopsy site dry overnight, and then apply bacitracin twice daily until healed. Patient may apply hydrogen peroxide soaks to remove any crusting. Notification Instructions: Patient will be notified of biopsy results. However, patient instructed to call the office if not contacted within 2 weeks. Billing Type: Third-Party Bill Information: Selecting Yes will display possible errors in your note based on the variables you have selected. This validation is only offered as a suggestion for you. PLEASE NOTE THAT THE VALIDATION TEXT WILL BE REMOVED WHEN YOU FINALIZE YOUR NOTE. IF YOU WANT TO FAX A PRELIMINARY NOTE YOU WILL NEED TO TOGGLE THIS TO 'NO' IF YOU DO NOT WANT IT IN YOUR FAXED NOTE.

## 2024-01-03 NOTE — TELEPHONE ENCOUNTER
No care due was identified.  Health [facility] Embedded Care Due Messages. Reference number: [id number].   1/03/2024 1:38:08 AM CST

## 2024-01-08 ENCOUNTER — LAB VISIT (OUTPATIENT)
Dept: LAB | Facility: HOSPITAL | Age: 84
End: 2024-01-08
Attending: INTERNAL MEDICINE
Payer: MEDICARE

## 2024-01-08 ENCOUNTER — TELEPHONE (OUTPATIENT)
Dept: INTERNAL MEDICINE | Facility: CLINIC | Age: 84
End: 2024-01-08
Payer: MEDICARE

## 2024-01-08 DIAGNOSIS — N18.31 STAGE 3A CHRONIC KIDNEY DISEASE: ICD-10-CM

## 2024-01-08 DIAGNOSIS — M25.561 CHRONIC PAIN OF RIGHT KNEE: ICD-10-CM
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
## 2024-01-08 DIAGNOSIS — M54.40 CHRONIC BILATERAL LOW BACK PAIN WITH SCIATICA, SCIATICA LATERALITY UNSPECIFIED: ICD-10-CM

## 2024-01-08 DIAGNOSIS — G82.20 PARAPARESIS OF BOTH LOWER LIMBS: ICD-10-CM

## 2024-01-08 DIAGNOSIS — E08.44 DIABETIC AMYOTROPHY ASSOCIATED WITH DIABETES MELLITUS DUE TO UNDERLYING CONDITION: ICD-10-CM

## 2024-01-08 DIAGNOSIS — M54.16 LEFT LUMBAR RADICULOPATHY: ICD-10-CM

## 2024-01-08 DIAGNOSIS — G89.29 CHRONIC BILATERAL LOW BACK PAIN WITH SCIATICA, SCIATICA LATERALITY UNSPECIFIED: ICD-10-CM

## 2024-01-08 DIAGNOSIS — M47.816 LUMBAR SPONDYLOSIS: ICD-10-CM

## 2024-01-08 DIAGNOSIS — M48.062 SPINAL STENOSIS OF LUMBAR REGION WITH NEUROGENIC CLAUDICATION: ICD-10-CM

## 2024-01-08 DIAGNOSIS — G14 POST POLIOMYELITIS SYNDROME: ICD-10-CM

## 2024-01-08 DIAGNOSIS — N18.31 STAGE 3A CHRONIC KIDNEY DISEASE: Primary | ICD-10-CM

## 2024-01-08 DIAGNOSIS — R26.9 GAIT DISORDER: ICD-10-CM

## 2024-01-08 DIAGNOSIS — M47.16 LUMBAR SPONDYLOSIS WITH MYELOPATHY: ICD-10-CM

## 2024-01-08 DIAGNOSIS — M47.26 OSTEOARTHRITIS OF SPINE WITH RADICULOPATHY, LUMBAR REGION: ICD-10-CM

## 2024-01-08 DIAGNOSIS — M17.11 PRIMARY OSTEOARTHRITIS OF RIGHT KNEE: ICD-10-CM

## 2024-01-08 DIAGNOSIS — Z86.12 HISTORY OF POLIOMYELITIS: ICD-10-CM

## 2024-01-08 DIAGNOSIS — M81.0 AGE-RELATED OSTEOPOROSIS WITHOUT CURRENT PATHOLOGICAL FRACTURE: ICD-10-CM

## 2024-01-08 DIAGNOSIS — E11.42 DIABETIC POLYNEUROPATHY ASSOCIATED WITH TYPE 2 DIABETES MELLITUS: ICD-10-CM

## 2024-01-08 DIAGNOSIS — E11.40 TYPE 2 DIABETES MELLITUS WITH DIABETIC NEUROPATHY, WITHOUT LONG-TERM CURRENT USE OF INSULIN: ICD-10-CM

## 2024-01-08 DIAGNOSIS — M54.16 LUMBAR RADICULOPATHY: ICD-10-CM

## 2024-01-08 DIAGNOSIS — G89.29 CHRONIC PAIN OF RIGHT KNEE: ICD-10-CM

## 2024-01-08 LAB
ALBUMIN SERPL BCP-MCNC: 3.3 G/DL (ref 3.5–5.2)
ANION GAP SERPL CALC-SCNC: 10 MMOL/L (ref 8–16)
BUN SERPL-MCNC: 21 MG/DL (ref 8–23)
CALCIUM SERPL-MCNC: 9.3 MG/DL (ref 8.7–10.5)
CHLORIDE SERPL-SCNC: 107 MMOL/L (ref 95–110)
CO2 SERPL-SCNC: 24 MMOL/L (ref 23–29)
CREAT SERPL-MCNC: 0.8 MG/DL (ref 0.5–1.4)
EST. GFR  (NO RACE VARIABLE): >60 ML/MIN/1.73 M^2
GLUCOSE SERPL-MCNC: 160 MG/DL (ref 70–110)
PHOSPHATE SERPL-MCNC: 3 MG/DL (ref 2.7–4.5)
POTASSIUM SERPL-SCNC: 3.7 MMOL/L (ref 3.5–5.1)
SODIUM SERPL-SCNC: 141 MMOL/L (ref 136–145)

## 2024-01-08 PROCEDURE — 80069 RENAL FUNCTION PANEL: CPT | Performed by: INTERNAL MEDICINE

## 2024-01-08 PROCEDURE — 36415 COLL VENOUS BLD VENIPUNCTURE: CPT | Performed by: INTERNAL MEDICINE

## 2024-01-08 RX ORDER — HYDROCODONE BITARTRATE AND ACETAMINOPHEN 10; 325 MG/1; MG/1
1 TABLET ORAL EVERY 6 HOURS PRN
Qty: 120 TABLET | Refills: 0 | Status: SHIPPED | OUTPATIENT
Start: 2024-01-08 | End: 2024-02-19 | Stop reason: SDUPTHER

## 2024-01-08 NOTE — TELEPHONE ENCOUNTER
No care due was identified.  [facility] Embedded Care Due Messages. Reference number: [id number].   1/08/2024 9:42:50 AM CST

## 2024-01-08 NOTE — TELEPHONE ENCOUNTER
----- Message from Ct Grubbs sent at 1/8/2024  9:24 AM CST -----  Contact: patient  [phone]  Dr. Deras    Requesting an RX refill or new RX.  Is this a refill or new RX:   RX name and strength   HYDROcodone-acetaminophen (NORCO)  mg per tablet  Is this a 30 day or 90 day RX:   Pharmacy name and phone #    [facility]  The doctors have asked that we provide their patients with the following 2 reminders -- prescription refills can take up to 72 hours, and a friendly reminder that in the future you can use your [facility] account to request refills: yes

## 2024-01-08 NOTE — TELEPHONE ENCOUNTER
Labs much better, keep up great work!    I recommend follow up labs in April, orders in please schedule thanks    Also due for DEXA scan- ordered    Has she had her eye exam done?  If so where?

## 2024-01-11 ENCOUNTER — TELEPHONE (OUTPATIENT)
Dept: INTERNAL MEDICINE | Facility: CLINIC | Age: 84
End: 2024-01-11
Payer: MEDICARE

## 2024-01-11 NOTE — TELEPHONE ENCOUNTER
----- Message from April Bryson sent at 1/11/2024  1:23 PM CST -----  Contact: Tran / Tr [phone]  Would like to receive medical advice.  Would they like a call back or a response via [facility]:  Call back  Additional information:      Tran Armando is calling on behalf of the pt to see if a authorization was submitted for the pt's durable medical equipment wheelchair. If not they would like for it to be submitted to them at the number listed below.     It can be submitted to Tr at [phone]

## 2024-01-23 ENCOUNTER — TELEPHONE (OUTPATIENT)
Dept: ADMINISTRATIVE | Facility: CLINIC | Age: 84
End: 2024-01-23
Payer: MEDICARE

## 2024-01-24 ENCOUNTER — OFFICE VISIT (OUTPATIENT)
Dept: INTERNAL MEDICINE | Facility: CLINIC | Age: 84
End: 2024-01-24
Payer: MEDICARE

## 2024-01-24 VITALS
HEART RATE: 75 BPM | OXYGEN SATURATION: 98 % | DIASTOLIC BLOOD PRESSURE: 66 MMHG | BODY MASS INDEX: 28.79 KG/M2 | WEIGHT: 146.63 LBS | HEIGHT: 60 IN | SYSTOLIC BLOOD PRESSURE: 136 MMHG

## 2024-01-24 DIAGNOSIS — M81.0 AGE-RELATED OSTEOPOROSIS WITHOUT CURRENT PATHOLOGICAL FRACTURE: ICD-10-CM

## 2024-01-24 DIAGNOSIS — E11.42 DIABETIC POLYNEUROPATHY ASSOCIATED WITH TYPE 2 DIABETES MELLITUS: ICD-10-CM

## 2024-01-24 DIAGNOSIS — M47.816 FACET ARTHRITIS OF LUMBAR REGION: ICD-10-CM

## 2024-01-24 DIAGNOSIS — G47.33 OBSTRUCTIVE SLEEP APNEA SYNDROME: ICD-10-CM

## 2024-01-24 DIAGNOSIS — Z00.00 ENCOUNTER FOR PREVENTIVE HEALTH EXAMINATION: Primary | ICD-10-CM

## 2024-01-24 DIAGNOSIS — N18.31 STAGE 3A CHRONIC KIDNEY DISEASE: ICD-10-CM

## 2024-01-24 DIAGNOSIS — E11.21 TYPE 2 DIABETES MELLITUS WITH DIABETIC NEPHROPATHY, WITHOUT LONG-TERM CURRENT USE OF INSULIN: ICD-10-CM

## 2024-01-24 DIAGNOSIS — G82.20 PARAPARESIS OF BOTH LOWER LIMBS: ICD-10-CM

## 2024-01-24 DIAGNOSIS — R26.9 GAIT DISORDER: ICD-10-CM

## 2024-01-24 DIAGNOSIS — D64.89 ANEMIA DUE TO OTHER CAUSE, NOT CLASSIFIED: ICD-10-CM

## 2024-01-24 DIAGNOSIS — I10 PRIMARY HYPERTENSION: ICD-10-CM

## 2024-01-24 DIAGNOSIS — G14 POST POLIOMYELITIS SYNDROME: ICD-10-CM

## 2024-01-24 DIAGNOSIS — E78.2 MIXED HYPERLIPIDEMIA: ICD-10-CM

## 2024-01-24 DIAGNOSIS — E11.40 TYPE 2 DIABETES MELLITUS WITH DIABETIC NEUROPATHY, WITHOUT LONG-TERM CURRENT USE OF INSULIN: ICD-10-CM

## 2024-01-24 DIAGNOSIS — M46.96 UNSPECIFIED INFLAMMATORY SPONDYLOPATHY, LUMBAR REGION: ICD-10-CM

## 2024-01-24 DIAGNOSIS — G25.0 ESSENTIAL TREMOR: ICD-10-CM

## 2024-01-24 DIAGNOSIS — M48.061 SPINAL STENOSIS OF LUMBAR REGION, UNSPECIFIED WHETHER NEUROGENIC CLAUDICATION PRESENT: ICD-10-CM

## 2024-01-24 DIAGNOSIS — R26.9 ABNORMALITY OF GAIT AND MOBILITY: ICD-10-CM

## 2024-01-24 DIAGNOSIS — Z00.00 ENCOUNTER FOR MEDICARE ANNUAL WELLNESS EXAM: ICD-10-CM

## 2024-01-24 PROBLEM — I70.201 ATHEROSCLEROSIS OF ARTERY OF RIGHT LOWER EXTREMITY: Status: RESOLVED | Noted: 2017-08-08 | Resolved: 2024-01-24

## 2024-01-24 PROCEDURE — 3075F SYST BP GE 130 - 139MM HG: CPT | Mod: CPTII,S$GLB,, | Performed by: NURSE PRACTITIONER

## 2024-01-24 PROCEDURE — 1126F AMNT PAIN NOTED NONE PRSNT: CPT | Mod: CPTII,S$GLB,, | Performed by: NURSE PRACTITIONER

## 2024-01-24 PROCEDURE — 3078F DIAST BP <80 MM HG: CPT | Mod: CPTII,S$GLB,, | Performed by: NURSE PRACTITIONER

## 2024-01-24 PROCEDURE — 1170F FXNL STATUS ASSESSED: CPT | Mod: CPTII,S$GLB,, | Performed by: NURSE PRACTITIONER

## 2024-01-24 PROCEDURE — G0439 PPPS, SUBSEQ VISIT: HCPCS | Mod: S$GLB,,, | Performed by: NURSE PRACTITIONER

## 2024-01-24 PROCEDURE — 99999 PR PBB SHADOW E&M-EST. PATIENT-LVL V: CPT | Mod: PBBFAC,,, | Performed by: NURSE PRACTITIONER

## 2024-01-24 PROCEDURE — 1101F PT FALLS ASSESS-DOCD LE1/YR: CPT | Mod: CPTII,S$GLB,, | Performed by: NURSE PRACTITIONER

## 2024-01-24 PROCEDURE — 3288F FALL RISK ASSESSMENT DOCD: CPT | Mod: CPTII,S$GLB,, | Performed by: NURSE PRACTITIONER

## 2024-01-24 PROCEDURE — 1159F MED LIST DOCD IN RCRD: CPT | Mod: CPTII,S$GLB,, | Performed by: NURSE PRACTITIONER

## 2024-01-24 NOTE — PATIENT INSTRUCTIONS
Counseling and Referral of Other Preventative  (Italic type indicates deductible and co-insurance are waived)    Patient Name: Emilia Alan  Today's Date: 1/24/2024    Health Maintenance       Date Due Completion Date    RSV Vaccine (Age 60+ and Pregnant patients) (1 - 1-dose 60+ series) Never done ---    Eye Exam 12/01/2021 12/1/2020 (Done)    Override on 12/1/2020: Done (Done at [facility])    DEXA Scan 11/02/2023 11/2/2020    Override on 5/17/2011: Done    Hemoglobin A1c 04/12/2024 10/12/2023    Aspirin/Antiplatelet Therapy 09/15/2024 9/15/2023    Diabetes Urine Screening 10/12/2024 10/12/2023    Lipid Panel 10/12/2024 10/12/2023    Mammogram 11/13/2025 11/13/2023    Colonoscopy 06/14/2027 6/14/2017    Override on 9/29/2006: Done    TETANUS VACCINE 12/14/2027 12/14/2017        Orders Placed This Encounter   Procedures    WHEELCHAIR FOR HOME USE       The following information is provided to all patients.  This information is to help you find resources for any of the problems found today that may be affecting your health:                  Living healthy guide: www.[facility].[place].gov      Understanding Diabetes: www.diabetes.org      Eating healthy: www.cdc.gov/healthyweight      [facility] home safety checklist: www.cdc.gov/steadi/patient.html      Agency on Aging: www.goea.[place].gov      Alcoholics anonymous (AA): www.aa.org      Physical Activity: www.court.nih.gov/tk9laxq      Tobacco use: www.quitwithusla.org         Counseling and Referral of Other Preventative  (Italic type indicates deductible and co-insurance are waived)    Patient Name: Emilia Alan  Today's Date: 1/24/2024    Health Maintenance       Date Due Completion Date    RSV Vaccine (Age 60+ and Pregnant patients) (1 - 1-dose 60+ series) Never done ---    Eye Exam 12/01/2021 12/1/2020 (Done)    Override on 12/1/2020: Done ([facility] at [facility])    DEXA Scan 11/02/2023 11/2/2020    Override on 5/17/2011: Done    Hemoglobin A1c 04/12/2024  10/12/2023    Aspirin/Antiplatelet Therapy 09/15/2024 9/15/2023    Diabetes Urine Screening 10/12/2024 10/12/2023    Lipid Panel 10/12/2024 10/12/2023    Mammogram 11/13/2025 11/13/2023    Colonoscopy 06/14/2027 6/14/2017    Override on 9/29/2006: Done    TETANUS VACCINE 12/14/2027 12/14/2017        Orders Placed This Encounter   Procedures    WHEELCHAIR FOR HOME USE     The following information is provided to all patients.  This information is to help you find resources for any of the problems found today that may be affecting your health:                  Living healthy guide: www.[facility].[place].[facility]      Understanding Diabetes: www.diabetes.org      Eating healthy: www.cdc.gov/healthyweight      CDC home safety checklist: www.cdc.gov/steadi/patient.html      Agency on Aging: www.goea.[place].[facility]      Alcoholics anonymous (AA): www.aa.org      Physical Activity: www.court.nih.gov/hy5nxgc      Tobacco use: www.quitwithusla.org

## 2024-01-24 NOTE — PROGRESS NOTES
Emilia Alan presented for a follow-up Medicare AWV today. The following components were reviewed and updated:    Medical history  Family History  Social history  Allergies and Current Medications  Health Risk Assessment  Health Maintenance  Care Team    **See Completed Assessments for Annual Wellness visit with in the encounter summary    The following assessments were completed:  Depression Screening  Cognitive function Screening      Timed Get Up Test  Whisper Test      Opioid documentation:      Patient does have a current opioid prescription.      Patient accepted further discussion regarding opioid medication use.      Patient is currently taking hydrocodone narcotic for back pain.        Pain level today is 0/10.       In addition to narcotic pain medications, patient is also using (no other oral, topical, or alternative treatments) for pain control.       Patient is followed by a specialist currently for their pain and will not be referred today.       Patient's opioid risk potential based on ORT-OUD tool:       [place] each box that applies   No   Yes     Family history of substance abuse   Alcohol [x] []   Illegal drugs [x] []   Rx drugs [x] []     Personal history of substance abuse   Alcohol [x] []   Illegal drugs [x] []   Rx drugs [] []     Age between 16-45 years   [x]   []     Patient with ADD, OCD, Bipolar disorder, schizoprenia   [x]   []     Patient with depression   [x]   []                         Scoring total                                                                 Non-opioid treatment options have been discussed today and added to the patient's after visit summary.          Vitals:    01/24/24 0815   BP: 136/66   BP Location: Left arm   Patient Position: Sitting   BP Method: Medium (Manual)   Pulse: 75   SpO2: 98%   Weight: 66.5 kg (146 lb 9.7 oz)   Height: 5' (1.524 m)     Body mass index is 28.63 kg/m².       Physical Exam  Constitutional:       General: She is not in acute  distress.     Appearance: Normal appearance. She is normal weight. She is not ill-appearing, toxic-appearing or diaphoretic.   HENT:      Head: Normocephalic.      Right Ear: There is impacted cerumen.      Left Ear: There is impacted cerumen.      Nose: Nose normal.      Mouth/Throat:      Mouth: Mucous membranes are moist.      Pharynx: Oropharynx is clear.   Eyes:      Pupils: Pupils are equal, round, and reactive to light.   Cardiovascular:      Rate and Rhythm: Normal rate and regular rhythm.      Pulses: Normal pulses.      Heart sounds: Normal heart sounds.   Pulmonary:      Effort: Pulmonary effort is normal.      Breath sounds: Normal breath sounds.   Abdominal:      General: Abdomen is flat. Bowel sounds are normal.      Palpations: Abdomen is soft.   Musculoskeletal:      Right lower leg: Edema (2) present.      Left lower leg: Edema (2) present.   Skin:     General: Skin is warm and dry.      Capillary Refill: Capillary refill takes less than 2 seconds.   Neurological:      General: No focal deficit present.      Mental Status: She is alert.           Diagnoses and health risks identified today and associated recommendations/orders:  1. Encounter for preventive health examination  All age and gender related screenings discussed     2. Encounter for Medicare annual wellness exam  All age and gender related screenings discussed   - Ambulatory Referral/Consult to Enhanced Annual Wellness Visit (eAWV)    3. Stage 3a chronic kidney disease  Problem is stable. Will continue current medication and treatment regimen. Follow up with PCP     4. Diabetic polyneuropathy associated with type 2 diabetes mellitus  Problem is stable. Will continue current medication and treatment regimen. Follow up with PCP and endocrinology     5. Paraparesis of both lower limbs  Problem is stable. Will continue current medication and treatment regimen. Follow up with PCP and pain mgmt     6. Unspecified inflammatory spondylopathy,  lumbar region  Problem is stable. Will continue current medication and treatment regimen. Follow up with PCP and pain mgmt     7. Abnormality of gait and mobility  Problem is stable. Will continue current medication and treatment regimen. Follow up with PCP - ordered lightweight WC for out of home use     8. Gait disorder  Problem is stable. Will continue current medication and treatment regimen. Follow up with PCP - ordered lightweight WC for out of home use  - WHEELCHAIR FOR HOME USE    9. Post poliomyelitis syndrome  Problem is stable. Will continue current medication and treatment regimen. Follow up with PCP - ordered lightweight WC for out of home use    10. Spinal stenosis of lumbar region, unspecified whether neurogenic claudication present  Problem is stable. Will continue current medication and treatment regimen. Follow up with PCP     11. Facet arthritis of lumbar region  Problem is stable. Will continue current medication and treatment regimen. Follow up with PCP     12. Essential tremor  Problem is stable. Will continue current medication and treatment regimen. Follow up with PCP     13. Primary hypertension  Problem is stable. Will continue current medication and treatment regimen. Follow up with PCP     14. Mixed hyperlipidemia  Problem is stable. Will continue current medication and treatment regimen. Follow up with PCP     15. Anemia due to other cause, not classified  Problem is stable. Will continue current medication and treatment regimen. Follow up with PCP     16. Type 2 diabetes mellitus with diabetic nephropathy, without long-term current use of insulin  Problem is stable. Will continue current medication and treatment regimen. Follow up with PCP     17. Type 2 diabetes mellitus with diabetic neuropathy, without long-term current use of insulin  Problem is stable. Will continue current medication and treatment regimen. Follow up with PCP     18. Osteoporosis without current pathological  fracture: worse on fosamax 5/16- Reclast 8/16, 12/20  Problem is stable. Will continue current medication and treatment regimen. Follow up with PCP     19. Obstructive sleep apnea syndrome: dx 2008 needs CPAP 11  Problem is stable. Will continue current medication and treatment regimen. Follow up with PCP       Provided Emilia with a 5-10 year written screening schedule and personal prevention plan. Recommendations were developed using the USPSTF age appropriate recommendations. Education, counseling, and referrals were provided as needed.  After Visit Summary printed and given to patient which includes a list of additional screenings\tests needed.    Post Discharge Follow-up Today:   Future Appointments:  Future Appointments   Date Time Provider Department Center   3/27/2024  1:20 PM Candace Nation MD [facility] [address]          I offered to discuss advanced care planning, including how to pick a person who would make decisions for you if you were unable to make them for yourself, called a health care power of , and what kind of decisions you might make such as use of life sustaining treatments such as ventilators and tube feeding when faced with a life limiting illness recorded on a living will that they will need to know. (How you want to be cared for as you near the end of your natural life)     X Patient is interested in learning more about how to make advanced directives.  I provided them paperwork and offered to discuss this with them.    Maria Luisa KHAN, APRN, FNP-c  Nurse Practitioner   Internal Medicine   [address] [facility] [address]  [phone]

## 2024-02-05 ENCOUNTER — TELEPHONE (OUTPATIENT)
Dept: INTERNAL MEDICINE | Facility: CLINIC | Age: 84
End: 2024-02-05

## 2024-02-05 DIAGNOSIS — M17.11 PRIMARY OSTEOARTHRITIS OF RIGHT KNEE: ICD-10-CM

## 2024-02-05 DIAGNOSIS — M54.40 CHRONIC BILATERAL LOW BACK PAIN WITH SCIATICA, SCIATICA LATERALITY UNSPECIFIED: ICD-10-CM
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
## 2024-02-05 DIAGNOSIS — R26.9 GAIT DISORDER: ICD-10-CM

## 2024-02-05 DIAGNOSIS — G89.29 CHRONIC PAIN OF RIGHT KNEE: ICD-10-CM

## 2024-02-05 DIAGNOSIS — M25.561 CHRONIC PAIN OF RIGHT KNEE: ICD-10-CM

## 2024-02-05 DIAGNOSIS — G14 POST POLIOMYELITIS SYNDROME: ICD-10-CM

## 2024-02-05 DIAGNOSIS — M47.816 LUMBAR SPONDYLOSIS: ICD-10-CM

## 2024-02-05 DIAGNOSIS — G82.20 PARAPARESIS OF BOTH LOWER LIMBS: ICD-10-CM

## 2024-02-05 DIAGNOSIS — M54.16 LEFT LUMBAR RADICULOPATHY: ICD-10-CM

## 2024-02-05 DIAGNOSIS — G89.29 CHRONIC BILATERAL LOW BACK PAIN WITH SCIATICA, SCIATICA LATERALITY UNSPECIFIED: ICD-10-CM

## 2024-02-05 DIAGNOSIS — E11.40 TYPE 2 DIABETES MELLITUS WITH DIABETIC NEUROPATHY, WITHOUT LONG-TERM CURRENT USE OF INSULIN: ICD-10-CM

## 2024-02-05 DIAGNOSIS — M81.0 AGE-RELATED OSTEOPOROSIS WITHOUT CURRENT PATHOLOGICAL FRACTURE: ICD-10-CM

## 2024-02-05 DIAGNOSIS — M47.16 LUMBAR SPONDYLOSIS WITH MYELOPATHY: ICD-10-CM

## 2024-02-05 DIAGNOSIS — E08.44 DIABETIC AMYOTROPHY ASSOCIATED WITH DIABETES MELLITUS DUE TO UNDERLYING CONDITION: ICD-10-CM

## 2024-02-05 DIAGNOSIS — M47.26 OSTEOARTHRITIS OF SPINE WITH RADICULOPATHY, LUMBAR REGION: ICD-10-CM

## 2024-02-05 DIAGNOSIS — M48.062 SPINAL STENOSIS OF LUMBAR REGION WITH NEUROGENIC CLAUDICATION: ICD-10-CM

## 2024-02-05 DIAGNOSIS — M54.16 LUMBAR RADICULOPATHY: ICD-10-CM

## 2024-02-05 DIAGNOSIS — E11.42 DIABETIC POLYNEUROPATHY ASSOCIATED WITH TYPE 2 DIABETES MELLITUS: ICD-10-CM

## 2024-02-05 DIAGNOSIS — Z86.12 HISTORY OF POLIOMYELITIS: ICD-10-CM

## 2024-02-05 RX ORDER — HYDROCODONE BITARTRATE AND ACETAMINOPHEN 10; 325 MG/1; MG/1
1 TABLET ORAL EVERY 6 HOURS PRN
Qty: 120 TABLET | Refills: 0 | Status: CANCELLED | OUTPATIENT
Start: 2024-02-05 | End: 2024-03-06

## 2024-02-05 NOTE — TELEPHONE ENCOUNTER
atient also state she is going on a cruise 02/09/2024 I would like to know if she can get her HYDROcodone-acetaminophen (NORCO)  mg per tablet

## 2024-02-05 NOTE — TELEPHONE ENCOUNTER
----- Message from Sobia Cornell Nick sent at 2/5/2024  9:38 AM CST -----  Contact: [phone]  Type: Orders Request    What orders/ testing are being requested? Wheel Chair.     Is there a future appointment scheduled for the patient with PCP?no     When?No     Would you prefer a response via [employer]?phone     Comments: [facility]. Pt state she has waiting for this wheelchair for about 2 months.       Patient also state she is going on a cruise 02/09/2024 I would like to know if she can get her HYDROcodone-acetaminophen (NORCO)  mg per tablet      [facility]  [address] [place] [address]  Phone: [phone] Fax: [phone]

## 2024-02-19 ENCOUNTER — TELEPHONE (OUTPATIENT)
Dept: INTERNAL MEDICINE | Facility: CLINIC | Age: 84
End: 2024-02-19
Payer: MEDICARE

## 2024-02-19 DIAGNOSIS — M54.40 CHRONIC BILATERAL LOW BACK PAIN WITH SCIATICA, SCIATICA LATERALITY UNSPECIFIED: ICD-10-CM

## 2024-02-19 DIAGNOSIS — M47.16 LUMBAR SPONDYLOSIS WITH MYELOPATHY: ICD-10-CM

## 2024-02-19 DIAGNOSIS — I10 PRIMARY HYPERTENSION: ICD-10-CM

## 2024-02-19 DIAGNOSIS — M48.062 SPINAL STENOSIS OF LUMBAR REGION WITH NEUROGENIC CLAUDICATION: ICD-10-CM

## 2024-02-19 DIAGNOSIS — M81.0 AGE-RELATED OSTEOPOROSIS WITHOUT CURRENT PATHOLOGICAL FRACTURE: ICD-10-CM

## 2024-02-19 DIAGNOSIS — M25.561 CHRONIC PAIN OF RIGHT KNEE: ICD-10-CM

## 2024-02-19 DIAGNOSIS — E11.40 TYPE 2 DIABETES MELLITUS WITH DIABETIC NEUROPATHY, WITHOUT LONG-TERM CURRENT USE OF INSULIN: ICD-10-CM

## 2024-02-19 DIAGNOSIS — M47.816 LUMBAR SPONDYLOSIS: ICD-10-CM

## 2024-02-19 DIAGNOSIS — G14 POST POLIOMYELITIS SYNDROME: ICD-10-CM

## 2024-02-19 DIAGNOSIS — G89.29 CHRONIC PAIN OF RIGHT KNEE: ICD-10-CM

## 2024-02-19 DIAGNOSIS — M54.16 LUMBAR RADICULOPATHY: ICD-10-CM

## 2024-02-19 DIAGNOSIS — G82.20 PARAPARESIS OF BOTH LOWER LIMBS: ICD-10-CM

## 2024-02-19 DIAGNOSIS — E08.44 DIABETIC AMYOTROPHY ASSOCIATED WITH DIABETES MELLITUS DUE TO UNDERLYING CONDITION: ICD-10-CM

## 2024-02-19 DIAGNOSIS — Z86.12 HISTORY OF POLIOMYELITIS: ICD-10-CM

## 2024-02-19 DIAGNOSIS — M17.11 PRIMARY OSTEOARTHRITIS OF RIGHT KNEE: ICD-10-CM

## 2024-02-19 DIAGNOSIS — E11.43 TYPE 2 DIABETES MELLITUS WITH AUTONOMIC NEUROPATHY: ICD-10-CM

## 2024-02-19 DIAGNOSIS — R26.9 GAIT DISORDER: ICD-10-CM
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
## 2024-02-19 DIAGNOSIS — M47.26 OSTEOARTHRITIS OF SPINE WITH RADICULOPATHY, LUMBAR REGION: ICD-10-CM

## 2024-02-19 DIAGNOSIS — M54.16 LEFT LUMBAR RADICULOPATHY: ICD-10-CM

## 2024-02-19 DIAGNOSIS — E11.42 DIABETIC POLYNEUROPATHY ASSOCIATED WITH TYPE 2 DIABETES MELLITUS: ICD-10-CM

## 2024-02-19 DIAGNOSIS — G89.29 CHRONIC BILATERAL LOW BACK PAIN WITH SCIATICA, SCIATICA LATERALITY UNSPECIFIED: ICD-10-CM

## 2024-02-19 RX ORDER — HYDROCODONE BITARTRATE AND ACETAMINOPHEN 10; 325 MG/1; MG/1
1 TABLET ORAL EVERY 6 HOURS PRN
Qty: 120 TABLET | Refills: 0 | Status: SHIPPED | OUTPATIENT
Start: 2024-02-19 | End: 2024-03-27 | Stop reason: SDUPTHER

## 2024-02-19 NOTE — TELEPHONE ENCOUNTER
No care due was identified.  [facility] Embedded Care Due Messages. Reference number: [id number].   2/19/2024 1:19:29 PM CST

## 2024-02-19 NOTE — TELEPHONE ENCOUNTER
reviewed, controlled meds refilled    Please schedule for labs in April, orders in    Please also ask her, has she had her eye exam done in the last year?  I know she goes outside of [facility].  Thank you

## 2024-02-19 NOTE — TELEPHONE ENCOUNTER
Care Due:                  Date            Visit Type   Department     Provider  --------------------------------------------------------------------------------                                EP -                              PRIMARY      [facility] INTERNAL  Last Visit: 09-      CARE ([facility])   MEDICINE       Lauren Deras  Next Visit: None Scheduled  None         None Found                                                            Last  Test          Frequency    Reason                     Performed    Due Date  --------------------------------------------------------------------------------    HBA1C.......  6 months...  metFORMIN................  10-   04-    [facility] Embedded Care Due Messages. Reference number: [id number].   2/19/2024 12:45:13 PM CST

## 2024-02-20 DIAGNOSIS — M17.11 PRIMARY OSTEOARTHRITIS OF RIGHT KNEE: ICD-10-CM

## 2024-02-20 DIAGNOSIS — M48.062 SPINAL STENOSIS OF LUMBAR REGION WITH NEUROGENIC CLAUDICATION: ICD-10-CM

## 2024-02-20 DIAGNOSIS — R26.9 GAIT DISORDER: ICD-10-CM

## 2024-02-20 DIAGNOSIS — M25.561 CHRONIC PAIN OF RIGHT KNEE: ICD-10-CM
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
## 2024-02-20 DIAGNOSIS — M54.16 LEFT LUMBAR RADICULOPATHY: ICD-10-CM

## 2024-02-20 DIAGNOSIS — G14 POST POLIOMYELITIS SYNDROME: ICD-10-CM

## 2024-02-20 DIAGNOSIS — M54.40 CHRONIC BILATERAL LOW BACK PAIN WITH SCIATICA, SCIATICA LATERALITY UNSPECIFIED: ICD-10-CM

## 2024-02-20 DIAGNOSIS — E11.42 DIABETIC POLYNEUROPATHY ASSOCIATED WITH TYPE 2 DIABETES MELLITUS: ICD-10-CM

## 2024-02-20 DIAGNOSIS — G89.29 CHRONIC PAIN OF RIGHT KNEE: ICD-10-CM

## 2024-02-20 DIAGNOSIS — G89.29 CHRONIC BILATERAL LOW BACK PAIN WITH SCIATICA, SCIATICA LATERALITY UNSPECIFIED: ICD-10-CM

## 2024-02-20 DIAGNOSIS — M81.0 AGE-RELATED OSTEOPOROSIS WITHOUT CURRENT PATHOLOGICAL FRACTURE: ICD-10-CM

## 2024-02-20 DIAGNOSIS — E11.40 TYPE 2 DIABETES MELLITUS WITH DIABETIC NEUROPATHY, WITHOUT LONG-TERM CURRENT USE OF INSULIN: ICD-10-CM

## 2024-02-20 DIAGNOSIS — M47.26 OSTEOARTHRITIS OF SPINE WITH RADICULOPATHY, LUMBAR REGION: ICD-10-CM

## 2024-02-20 DIAGNOSIS — M47.16 LUMBAR SPONDYLOSIS WITH MYELOPATHY: ICD-10-CM

## 2024-02-20 DIAGNOSIS — Z86.12 HISTORY OF POLIOMYELITIS: ICD-10-CM

## 2024-02-20 DIAGNOSIS — G82.20 PARAPARESIS OF BOTH LOWER LIMBS: ICD-10-CM

## 2024-02-20 DIAGNOSIS — M54.16 LUMBAR RADICULOPATHY: ICD-10-CM

## 2024-02-20 DIAGNOSIS — E08.44 DIABETIC AMYOTROPHY ASSOCIATED WITH DIABETES MELLITUS DUE TO UNDERLYING CONDITION: ICD-10-CM

## 2024-02-20 RX ORDER — IRBESARTAN 300 MG/1
TABLET ORAL
Qty: 90 TABLET | Refills: 1 | Status: SHIPPED | OUTPATIENT
Start: 2024-02-20

## 2024-02-20 RX ORDER — GABAPENTIN 600 MG/1
600 TABLET ORAL 3 TIMES DAILY
Qty: 270 TABLET | Refills: 3 | Status: SHIPPED | OUTPATIENT
Start: 2024-02-20

## 2024-02-20 RX ORDER — BLOOD-GLUCOSE METER
EACH MISCELLANEOUS
Qty: 1 EACH | Refills: 0 | Status: SHIPPED | OUTPATIENT
Start: 2024-02-20

## 2024-02-20 NOTE — TELEPHONE ENCOUNTER
Refill Decision Note   Emilia Alan  is requesting a refill authorization.  Brief Assessment and Rationale for Refill:  Approve     Medication Therapy Plan:        Comments:     Note composed:9:40 AM 02/20/2024

## 2024-02-20 NOTE — TELEPHONE ENCOUNTER
No care due was identified.  Health [facility] Embedded Care Due Messages. Reference number: [id number].   2/20/2024 12:22:46 AM CST

## 2024-03-11 ENCOUNTER — TELEPHONE (OUTPATIENT)
Dept: NEPHROLOGY | Facility: CLINIC | Age: 84
End: 2024-03-11
Payer: MEDICARE

## 2024-03-11 DIAGNOSIS — N18.2 CHRONIC KIDNEY DISEASE, STAGE II (MILD): Primary | ICD-10-CM

## 2024-03-18 ENCOUNTER — TELEPHONE (OUTPATIENT)
Dept: INTERNAL MEDICINE | Facility: CLINIC | Age: 84
End: 2024-03-18
Payer: MEDICARE

## 2024-03-18 NOTE — TELEPHONE ENCOUNTER
----- Message from Wanda Olivas sent at 3/18/2024  3:44 PM CDT -----  Contact: pt's daughter Carol Ann [phone]  Per Carol Ann, pt's wheelchair is broken. Carol Ann is requesting a new wheelchair  as soon as possible for pt.. Please call to discuss this matter further with Carol Ann.             Thank you

## 2024-03-26 ENCOUNTER — TELEPHONE (OUTPATIENT)
Dept: INTERNAL MEDICINE | Facility: CLINIC | Age: 84
End: 2024-03-26
Payer: MEDICARE

## 2024-03-26 DIAGNOSIS — M48.062 SPINAL STENOSIS OF LUMBAR REGION WITH NEUROGENIC CLAUDICATION: ICD-10-CM

## 2024-03-26 DIAGNOSIS — G14 POST POLIOMYELITIS SYNDROME: Primary | ICD-10-CM

## 2024-03-26 DIAGNOSIS — G82.20 PARAPARESIS OF BOTH LOWER LIMBS: ICD-10-CM

## 2024-03-26 DIAGNOSIS — M47.16 LUMBAR SPONDYLOSIS WITH MYELOPATHY: ICD-10-CM

## 2024-03-26 NOTE — TELEPHONE ENCOUNTER
Spoke to pt Daughter and she will call back with another place name to sent the info for her wheel chair

## 2024-03-26 NOTE — TELEPHONE ENCOUNTER
----- Message from Imelda Royal sent at 3/25/2024  3:02 PM CDT -----  Contact: Ms. Maharaj Phone# [phone]  Patient daughter Ms. Maharaj said that the Wheel chair that was ordered for the patient is not a Custom made Wheel chair. Ms. Maharaj would like for you to order a Custom made Wheel chair for the patient.       Please give Ms. Maharaj a call.

## 2024-03-26 NOTE — TELEPHONE ENCOUNTER
----- Message from Ann-Marie Anglinanabelle sent at 3/26/2024  8:33 AM CDT -----  Contact: daughter Carol Ann Maharaj would like for you to order her moms wheelchair which is custom needs to be measured for height, so she can wheel herself around when she is alone.  Wants them to come to the house to measure her mother to make sure it is the correct height so have them call Carol Ann back at [phone]/  Please order from the following:  [employer]  [address] [place] [address]  [place] [address]   [phone]

## 2024-03-27 ENCOUNTER — OFFICE VISIT (OUTPATIENT)
Dept: NEPHROLOGY | Facility: CLINIC | Age: 84
End: 2024-03-27
Payer: MEDICARE

## 2024-03-27 ENCOUNTER — TELEPHONE (OUTPATIENT)
Dept: NEPHROLOGY | Facility: CLINIC | Age: 84
End: 2024-03-27
Payer: MEDICARE

## 2024-03-27 VITALS
WEIGHT: 138.88 LBS | OXYGEN SATURATION: 99 % | SYSTOLIC BLOOD PRESSURE: 148 MMHG | BODY MASS INDEX: 27.13 KG/M2 | HEART RATE: 96 BPM | DIASTOLIC BLOOD PRESSURE: 70 MMHG

## 2024-03-27 DIAGNOSIS — M47.16 LUMBAR SPONDYLOSIS WITH MYELOPATHY: ICD-10-CM

## 2024-03-27 DIAGNOSIS — M17.11 PRIMARY OSTEOARTHRITIS OF RIGHT KNEE: ICD-10-CM

## 2024-03-27 DIAGNOSIS — M54.40 CHRONIC BILATERAL LOW BACK PAIN WITH SCIATICA, SCIATICA LATERALITY UNSPECIFIED: ICD-10-CM

## 2024-03-27 DIAGNOSIS — R26.9 GAIT DISORDER: ICD-10-CM

## 2024-03-27 DIAGNOSIS — E08.44 DIABETIC AMYOTROPHY ASSOCIATED WITH DIABETES MELLITUS DUE TO UNDERLYING CONDITION: ICD-10-CM

## 2024-03-27 DIAGNOSIS — N18.31 STAGE 3A CHRONIC KIDNEY DISEASE: ICD-10-CM

## 2024-03-27 DIAGNOSIS — M81.0 AGE-RELATED OSTEOPOROSIS WITHOUT CURRENT PATHOLOGICAL FRACTURE: ICD-10-CM

## 2024-03-27 DIAGNOSIS — M25.561 CHRONIC PAIN OF RIGHT KNEE: ICD-10-CM
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
## 2024-03-27 DIAGNOSIS — M54.16 LEFT LUMBAR RADICULOPATHY: ICD-10-CM

## 2024-03-27 DIAGNOSIS — E11.42 DIABETIC POLYNEUROPATHY ASSOCIATED WITH TYPE 2 DIABETES MELLITUS: ICD-10-CM

## 2024-03-27 DIAGNOSIS — M54.16 LUMBAR RADICULOPATHY: ICD-10-CM

## 2024-03-27 DIAGNOSIS — M48.062 SPINAL STENOSIS OF LUMBAR REGION WITH NEUROGENIC CLAUDICATION: ICD-10-CM

## 2024-03-27 DIAGNOSIS — G89.29 CHRONIC BILATERAL LOW BACK PAIN WITH SCIATICA, SCIATICA LATERALITY UNSPECIFIED: ICD-10-CM

## 2024-03-27 DIAGNOSIS — G82.20 PARAPARESIS OF BOTH LOWER LIMBS: ICD-10-CM

## 2024-03-27 DIAGNOSIS — E11.40 TYPE 2 DIABETES MELLITUS WITH DIABETIC NEUROPATHY, WITHOUT LONG-TERM CURRENT USE OF INSULIN: ICD-10-CM

## 2024-03-27 DIAGNOSIS — I10 PRIMARY HYPERTENSION: Primary | ICD-10-CM

## 2024-03-27 DIAGNOSIS — Z86.12 HISTORY OF POLIOMYELITIS: ICD-10-CM

## 2024-03-27 DIAGNOSIS — G89.29 CHRONIC PAIN OF RIGHT KNEE: ICD-10-CM

## 2024-03-27 DIAGNOSIS — M47.26 OSTEOARTHRITIS OF SPINE WITH RADICULOPATHY, LUMBAR REGION: ICD-10-CM

## 2024-03-27 DIAGNOSIS — G14 POST POLIOMYELITIS SYNDROME: ICD-10-CM

## 2024-03-27 DIAGNOSIS — M47.816 LUMBAR SPONDYLOSIS: ICD-10-CM

## 2024-03-27 PROCEDURE — 1159F MED LIST DOCD IN RCRD: CPT | Mod: CPTII,S$GLB,, | Performed by: INTERNAL MEDICINE

## 2024-03-27 PROCEDURE — 1101F PT FALLS ASSESS-DOCD LE1/YR: CPT | Mod: CPTII,S$GLB,, | Performed by: INTERNAL MEDICINE

## 2024-03-27 PROCEDURE — 99999 PR PBB SHADOW E&M-EST. PATIENT-LVL IV: CPT | Mod: PBBFAC,,, | Performed by: INTERNAL MEDICINE

## 2024-03-27 PROCEDURE — 99204 OFFICE O/P NEW MOD 45 MIN: CPT | Mod: S$GLB,,, | Performed by: INTERNAL MEDICINE

## 2024-03-27 PROCEDURE — 3078F DIAST BP <80 MM HG: CPT | Mod: CPTII,S$GLB,, | Performed by: INTERNAL MEDICINE

## 2024-03-27 PROCEDURE — 3077F SYST BP >= 140 MM HG: CPT | Mod: CPTII,S$GLB,, | Performed by: INTERNAL MEDICINE

## 2024-03-27 PROCEDURE — 1126F AMNT PAIN NOTED NONE PRSNT: CPT | Mod: CPTII,S$GLB,, | Performed by: INTERNAL MEDICINE

## 2024-03-27 PROCEDURE — 3288F FALL RISK ASSESSMENT DOCD: CPT | Mod: CPTII,S$GLB,, | Performed by: INTERNAL MEDICINE

## 2024-03-27 RX ORDER — HYDROCODONE BITARTRATE AND ACETAMINOPHEN 10; 325 MG/1; MG/1
1 TABLET ORAL EVERY 6 HOURS PRN
Qty: 120 TABLET | Refills: 0 | Status: SHIPPED | OUTPATIENT
Start: 2024-03-27 | End: 2024-05-03 | Stop reason: SDUPTHER

## 2024-03-27 NOTE — TELEPHONE ENCOUNTER
No care due was identified.  [facility] Embedded Care Due Messages. Reference number: [id number].   3/27/2024 12:30:59 PM CDT

## 2024-03-27 NOTE — TELEPHONE ENCOUNTER
----- Message from Kervin Nava sent at 3/27/2024 12:09 PM CDT -----  Contact: Pt  [phone]  Requesting an RX refill or new RX.  Is this a refill or new RX: Refill  RX name and strength (copy/paste from chart):  HYDROcodone-acetaminophen (NORCO)  mg per tablet  Is this a 30 day or 90 day RX:   Pharmacy name and phone # (copy/paste from chart):  [facility]   Phone: [phone]  Fax: [phone]        The doctors have asked that we provide their patients with the following 2 reminders -- prescription refills can take up to 72 hours, and a friendly reminder that in the future you can use your [facility] account to request refills: yes

## 2024-03-27 NOTE — PROGRESS NOTES
CHIEF COMPLAINT/HPI: Emilia is a 83 y.o. female for evaluation of Chronic Kidney Disease      HPI:  I have seen Ms. Alan today for the first time. She had history of HTN and DM II. Her Bp and sugar are well controlled. She checks them regularly. She denies any renal disease, no previous stones or infection. Her renal function had been stable for the past 18 years with no proteinuria.  She had occasional increase in her S. Creatinine to 1.2mg then gets back to her baseline. The latest Creatinine is 0.8mg/dl. with eGFR of >60 ml/min.     We assured the patient that her renal function is normal and nothing specific is needed for her kidneys. She will need to keep her blood pressure and sugar well controlled and avoid NSAID for her joint pain.     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 She has never used smokeless tobacco. She reports that she does not drink alcohol and does not use drugs.    Family History   Problem Relation Age of Onset    Diabetes Father     Heart disease Father     Heart attack Father     Heart disease Brother     Heart disease Brother     Diabetes Daughter     Diabetes Daughter     Diabetes Daughter     Diabetes Son      Cataracts Neg Hx     Glaucoma Neg Hx     Hypertension Neg Hx     Cancer Neg Hx     Blindness Neg Hx     Amblyopia Neg Hx     Strabismus Neg Hx     Retinal detachment Neg Hx     Macular degeneration Neg Hx     Melanoma Neg Hx        ROS:    General: No fever, chills, change in appetite or weight loss  Skin: No rashes or pruritus  Head: No headaches or recent head trauma  Eyes: No changes in vision or eye pain  Nodes: No swollen glands  Pulmonary: No dyspnea, wheezes, cough or sputum production  Cardiovascular: No chest pain, edema, PND, orthopnea or reduced exercise tolerance  Abdomen: No abdominal pain, nausea, vomiting, constipation or diarrhea  Urinary: No flank pain, dysuria or hematuria  Peripheral Vascular: No claudication or cyanosis  Musculoskeletal: No joint stiffness, swelling or back pain  Neurologic: No history of seizures, tremors, forcal weakness or numbness    PE:    Vitals:    03/27/24 1309   BP: (!) 148/70   Pulse: 96   SpO2: 99%   Weight: 63 kg (138 lb 14.2 oz)     On wheel chair  HEENT: Normocephalic, atraumatic, EOMI, PERRLA, normal conjunctive and lids, supple neck with no thyromeglay or masses, normal oropharynx, hearing intact  Cardiovascular: Regular without murmur, gallop or rub, no edema  Respiratory: Clear bilaterally without rales, rhonchi, wheezes with normal effort  Abdomen: Soft, nontender/nondistended, positive bowel sounds, no hepatospenomegaly  Extremities: Edema ++, No cyanosis, clubbing, normal gait  Skin: No rashes, ulcers, induration  Psych: Oriented x 3 with normal mood and effect    Impression and plan:  Normal renal function will need to follow general lifestyle precaution to avoid renal disease. Her blood sugar and bleed sugar will need to be controlled and regularly monitored. She was asked to stop using Diclofenac because of its effect on her HTN, CV system and renal function.       HTN with well controlled BP.    DM II with well controlled blood sugar.       Hyperlipidemia. With Statin.     Osteoporosis with Osteoarthritis.    History of Poliomyelitis.     LUIS ANDRES.Sudeep. MD. ISAK. DEJA.  , [employer] [facility] [employer] ([place]  Nephrology Consultant. [facility].   [facility]. 5th [place] [address].    email: [email or web address].  Tel: Office: [phone]

## 2024-04-08 DIAGNOSIS — E11.43 TYPE 2 DIABETES MELLITUS WITH AUTONOMIC NEUROPATHY: ICD-10-CM

## 2024-04-08 RX ORDER — METFORMIN HYDROCHLORIDE 500 MG/1
500 TABLET, EXTENDED RELEASE ORAL 2 TIMES DAILY WITH MEALS
Qty: 180 TABLET | Refills: 1 | Status: SHIPPED | OUTPATIENT
Start: 2024-04-08 | End: 2024-04-17

## 2024-04-08 NOTE — TELEPHONE ENCOUNTER
Refill Decision Note   Emilia Alan  is requesting a refill authorization.  Brief Assessment and Rationale for Refill:  Approve     Medication Therapy Plan:         Comments:     Note composed:2:40 PM 04/08/2024             Appointments     Last Visit   9/15/2023 Lauren Deras MD   Next Visit   Visit date not found Lauren Deras MD

## 2024-04-08 NOTE — TELEPHONE ENCOUNTER
No care due was identified.  [facility] Embedded Care Due Messages. Reference number: [id number].   4/08/2024 1:35:25 AM CDT

## 2024-04-09 ENCOUNTER — TELEPHONE (OUTPATIENT)
Dept: INTERNAL MEDICINE | Facility: CLINIC | Age: 84
End: 2024-04-09
Payer: MEDICARE

## 2024-04-09 NOTE — TELEPHONE ENCOUNTER
----- Message from April Bryson sent at 4/8/2024  3:07 PM CDT -----  Contact: Daughter - [phone]  Would like to receive medical advice.  Would they like a call back or a response via [facility]: Call Back     Additional information:      Pt's daughter is calling to see if the office can send in a prescription for a customized leal chair for the pt as well as to get her measured for the chair.    It should be sent to [facility] [facility] [employer] and they should have faxed something to the office in regards to it. They are still needing the prescription to be faxed over to them.

## 2024-04-15 ENCOUNTER — OFFICE VISIT (OUTPATIENT)
Dept: INTERNAL MEDICINE | Facility: CLINIC | Age: 84
End: 2024-04-15
Payer: MEDICARE

## 2024-04-15 ENCOUNTER — LAB VISIT (OUTPATIENT)
Dept: LAB | Facility: HOSPITAL | Age: 84
End: 2024-04-15
Attending: INTERNAL MEDICINE
Payer: MEDICARE

## 2024-04-15 VITALS — DIASTOLIC BLOOD PRESSURE: 78 MMHG | SYSTOLIC BLOOD PRESSURE: 132 MMHG

## 2024-04-15 DIAGNOSIS — M48.061 SPINAL STENOSIS OF LUMBAR REGION, UNSPECIFIED WHETHER NEUROGENIC CLAUDICATION PRESENT: ICD-10-CM

## 2024-04-15 DIAGNOSIS — E11.40 TYPE 2 DIABETES MELLITUS WITH DIABETIC NEUROPATHY, WITHOUT LONG-TERM CURRENT USE OF INSULIN: ICD-10-CM

## 2024-04-15 DIAGNOSIS — I10 PRIMARY HYPERTENSION: ICD-10-CM

## 2024-04-15 DIAGNOSIS — G82.20 PARAPARESIS OF BOTH LOWER LIMBS: ICD-10-CM

## 2024-04-15 DIAGNOSIS — G14 POST POLIOMYELITIS SYNDROME: Primary | ICD-10-CM

## 2024-04-15 DIAGNOSIS — N18.31 STAGE 3A CHRONIC KIDNEY DISEASE: ICD-10-CM

## 2024-04-15 DIAGNOSIS — E11.21 TYPE 2 DIABETES MELLITUS WITH DIABETIC NEPHROPATHY, WITHOUT LONG-TERM CURRENT USE OF INSULIN: ICD-10-CM

## 2024-04-15 LAB
ALBUMIN SERPL BCP-MCNC: 3.4 G/DL (ref 3.5–5.2)
ANION GAP SERPL CALC-SCNC: 10 MMOL/L (ref 8–16)
BUN SERPL-MCNC: 38 MG/DL (ref 8–23)
CALCIUM SERPL-MCNC: 10.3 MG/DL (ref 8.7–10.5)
CHLORIDE SERPL-SCNC: 102 MMOL/L (ref 95–110)
CO2 SERPL-SCNC: 26 MMOL/L (ref 23–29)
CREAT SERPL-MCNC: 1.4 MG/DL (ref 0.5–1.4)
EST. GFR  (NO RACE VARIABLE): 37.3 ML/MIN/1.73 M^2
ESTIMATED AVG GLUCOSE: 123 MG/DL (ref 68–131)
GLUCOSE SERPL-MCNC: 132 MG/DL (ref 70–110)
HBA1C MFR BLD: 5.9 % (ref 4–5.6)
PHOSPHATE SERPL-MCNC: 3.9 MG/DL (ref 2.7–4.5)
POTASSIUM SERPL-SCNC: 4 MMOL/L (ref 3.5–5.1)
SODIUM SERPL-SCNC: 138 MMOL/L (ref 136–145)

## 2024-04-15 PROCEDURE — 99213 OFFICE O/P EST LOW 20 MIN: CPT | Mod: 95,,, | Performed by: INTERNAL MEDICINE

## 2024-04-15 PROCEDURE — 80069 RENAL FUNCTION PANEL: CPT | Performed by: INTERNAL MEDICINE

## 2024-04-15 PROCEDURE — 1160F RVW MEDS BY RX/DR IN RCRD: CPT | Mod: CPTII,95,, | Performed by: INTERNAL MEDICINE

## 2024-04-15 PROCEDURE — 83036 HEMOGLOBIN GLYCOSYLATED A1C: CPT | Performed by: INTERNAL MEDICINE

## 2024-04-15 PROCEDURE — 3078F DIAST BP <80 MM HG: CPT | Mod: CPTII,95,, | Performed by: INTERNAL MEDICINE

## 2024-04-15 PROCEDURE — 1159F MED LIST DOCD IN RCRD: CPT | Mod: CPTII,95,, | Performed by: INTERNAL MEDICINE

## 2024-04-15 PROCEDURE — 3075F SYST BP GE 130 - 139MM HG: CPT | Mod: CPTII,95,, | Performed by: INTERNAL MEDICINE

## 2024-04-15 PROCEDURE — 36415 COLL VENOUS BLD VENIPUNCTURE: CPT | Performed by: INTERNAL MEDICINE

## 2024-04-15 NOTE — PROGRESS NOTES
Telemedicine Video Visit    The patient location is:  Patient Home   The chief complaint leading to consultation is: need for wheelchair/polio  Visit type: Virtual visit with synchronous audio and video  Total time spent with patient: 15 minutes  Each patient to whom he or she provides medical services by telemedicine is:  (1) informed of the relationship between the physician and patient and the respective role of any other health care provider with respect to management of the patient; and (2) notified that he or she may decline to receive medical services by telemedicine and may withdraw from such care at any time.     Needs a wheelchair since hers broke    BP in general doing well    CKD- better since January.  Nephrology took her off diclofenac.  Some DJD sx.    Had labs today, results pending.    Has an eye exam scheduled soon.      Patient Active Problem List   Diagnosis    Gait disorder    History of poliomyelitis    Post poliomyelitis syndrome    Diabetic polyneuropathy associated with type 2 diabetes mellitus    Mixed hyperlipidemia    Osteoporosis without current pathological fracture: worse on fosamax 5/16- Reclast 8/16, 12/20    Paraparesis of both lower limbs    Other specified anemias    Primary hypertension    Type 2 diabetes mellitus with diabetic neuropathy, without long-term current use of insulin    Osteoarthritis of right knee    Lumbar spinal stenosis    Obstructive sleep apnea syndrome: dx 2008 needs CPAP 11    Bradycardia, drug induced    Essential tremor    Clonal Hematopoiesis of Indeterminate Potential (CHIP)    Type 2 diabetes mellitus with renal manifestations    Facet arthritis of lumbar region    Opioid use agreement exists    Primary osteoarthritis of left shoulder    Unspecified inflammatory spondylopathy, lumbar region      Review of Systems   HENT:  Negative for hearing loss.    Eyes:  Negative for discharge.   Respiratory:  Negative for wheezing.    Cardiovascular:  Negative for  chest pain and palpitations.   Gastrointestinal:  Negative for blood in stool, constipation, diarrhea and vomiting.   Genitourinary:  Negative for dysuria and hematuria.   Musculoskeletal:  Negative for neck pain.        Stable post-polio changes   Neurological:  Negative for weakness and headaches.   Endo/Heme/Allergies:  Negative for polydipsia.      Physical Exam  Constitutional:       Appearance: She is well-developed.   HENT:      Head: Normocephalic and atraumatic.   Eyes:      Extraocular Movements: Extraocular movements intact.      Conjunctiva/sclera: Conjunctivae normal.   Neck:      Thyroid: No thyromegaly.   Pulmonary:      Effort: No respiratory distress.   Neurological:      General: No focal deficit present.      Mental Status: She is alert and oriented to person, place, and time.   Psychiatric:         Mood and Affect: Mood normal.         Behavior: Behavior normal.         Thought Content: Thought content normal.         Judgment: Judgment normal.      1. Post poliomyelitis syndrome  -     WHEELCHAIR FOR HOME USE    2. Paraparesis of both lower limbs  -     WHEELCHAIR FOR HOME USE    3. Primary hypertension    4. Type 2 diabetes mellitus with diabetic nephropathy, without long-term current use of insulin    5. Spinal stenosis of lumbar region, unspecified whether neurogenic claudication present  -     WHEELCHAIR FOR HOME USE       Labs and review  BP at home has been doing well  CKD better, continue current regimen          Answers submitted by the patient for this visit:  Review of Systems Questionnaire (Submitted on 4/15/2024)  activity change: No  unexpected weight change: No  rhinorrhea: No  trouble swallowing: No  visual disturbance: No  chest tightness: No  polyuria: No  difficulty urinating: No  menstrual problem: No  joint swelling: No  arthralgias: No  confusion: No  dysphoric mood: No

## 2024-04-17 ENCOUNTER — TELEPHONE (OUTPATIENT)
Dept: INTERNAL MEDICINE | Facility: CLINIC | Age: 84
End: 2024-04-17
Payer: MEDICARE

## 2024-04-17 DIAGNOSIS — N18.32 STAGE 3B CHRONIC KIDNEY DISEASE: Primary | ICD-10-CM

## 2024-04-17 DIAGNOSIS — E11.43 TYPE 2 DIABETES MELLITUS WITH AUTONOMIC NEUROPATHY: ICD-10-CM

## 2024-04-17 RX ORDER — METFORMIN HYDROCHLORIDE 500 MG/1
500 TABLET, EXTENDED RELEASE ORAL DAILY
Start: 2024-04-17

## 2024-04-17 NOTE — TELEPHONE ENCOUNTER
Please contact her to let her know that her labs are stable other than her kidney function is a little bit worse again    Diabetes is doing extremely well.  I would like for her to reduce her metformin to once daily only    Continue on her regimen but work on increasing fluids.  Repeat labs in 6 weeks, orders in, thank you

## 2024-04-18 NOTE — TELEPHONE ENCOUNTER
Called to speak with pt, no answer. Left voice message to give us a call back. [employer] message sent

## 2024-05-03 DIAGNOSIS — R26.9 GAIT DISORDER: ICD-10-CM

## 2024-05-03 DIAGNOSIS — M54.16 LEFT LUMBAR RADICULOPATHY: ICD-10-CM

## 2024-05-03 DIAGNOSIS — M47.16 LUMBAR SPONDYLOSIS WITH MYELOPATHY: ICD-10-CM

## 2024-05-03 DIAGNOSIS — M47.26 OSTEOARTHRITIS OF SPINE WITH RADICULOPATHY, LUMBAR REGION: ICD-10-CM

## 2024-05-03 DIAGNOSIS — E11.42 DIABETIC POLYNEUROPATHY ASSOCIATED WITH TYPE 2 DIABETES MELLITUS: ICD-10-CM

## 2024-05-03 DIAGNOSIS — G82.20 PARAPARESIS OF BOTH LOWER LIMBS: ICD-10-CM

## 2024-05-03 DIAGNOSIS — G14 POST POLIOMYELITIS SYNDROME: ICD-10-CM

## 2024-05-03 DIAGNOSIS — M25.561 CHRONIC PAIN OF RIGHT KNEE: ICD-10-CM

## 2024-05-03 DIAGNOSIS — M54.16 LUMBAR RADICULOPATHY: ICD-10-CM
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
## 2024-05-03 DIAGNOSIS — M48.062 SPINAL STENOSIS OF LUMBAR REGION WITH NEUROGENIC CLAUDICATION: ICD-10-CM

## 2024-05-03 DIAGNOSIS — M81.0 AGE-RELATED OSTEOPOROSIS WITHOUT CURRENT PATHOLOGICAL FRACTURE: ICD-10-CM

## 2024-05-03 DIAGNOSIS — M54.40 CHRONIC BILATERAL LOW BACK PAIN WITH SCIATICA, SCIATICA LATERALITY UNSPECIFIED: ICD-10-CM

## 2024-05-03 DIAGNOSIS — M17.11 PRIMARY OSTEOARTHRITIS OF RIGHT KNEE: ICD-10-CM

## 2024-05-03 DIAGNOSIS — E08.44 DIABETIC AMYOTROPHY ASSOCIATED WITH DIABETES MELLITUS DUE TO UNDERLYING CONDITION: ICD-10-CM

## 2024-05-03 DIAGNOSIS — Z86.12 HISTORY OF POLIOMYELITIS: ICD-10-CM

## 2024-05-03 DIAGNOSIS — G89.29 CHRONIC PAIN OF RIGHT KNEE: ICD-10-CM

## 2024-05-03 DIAGNOSIS — E11.40 TYPE 2 DIABETES MELLITUS WITH DIABETIC NEUROPATHY, WITHOUT LONG-TERM CURRENT USE OF INSULIN: ICD-10-CM

## 2024-05-03 DIAGNOSIS — G89.29 CHRONIC BILATERAL LOW BACK PAIN WITH SCIATICA, SCIATICA LATERALITY UNSPECIFIED: ICD-10-CM

## 2024-05-03 DIAGNOSIS — M47.816 LUMBAR SPONDYLOSIS: ICD-10-CM

## 2024-05-03 RX ORDER — HYDROCODONE BITARTRATE AND ACETAMINOPHEN 10; 325 MG/1; MG/1
1 TABLET ORAL EVERY 6 HOURS PRN
Qty: 120 TABLET | Refills: 0 | Status: SHIPPED | OUTPATIENT
Start: 2024-05-03 | End: 2024-06-03 | Stop reason: SDUPTHER

## 2024-05-03 NOTE — TELEPHONE ENCOUNTER
No care due was identified.  [facility] Embedded Care Due Messages. Reference number: [id number].   5/03/2024 9:29:58 AM CDT

## 2024-05-03 NOTE — TELEPHONE ENCOUNTER
----- Message from Kameron Guerrero sent at 5/3/2024  9:24 AM CDT -----  Contact: [phone]@patient  Requesting an RX refill or new RX.HYDROcodone-acetaminophen (NORCO)  mg per tablet  Is this a refill or new RX: refill  RX name and strength (copy/paste from chart):    Is this a 30 day or 90 day RX:   Pharmacy name and phone # [facility]   Phone: [phone]  The doctors have asked that we provide their patients with the following 2 reminders -- prescription refills can take up to 72 hours, and a friendly reminder that in the future you can use your [facility] account to request refills:

## 2024-05-24 ENCOUNTER — TELEPHONE (OUTPATIENT)
Dept: INTERNAL MEDICINE | Facility: CLINIC | Age: 84
End: 2024-05-24
Payer: MEDICARE

## 2024-05-24 NOTE — TELEPHONE ENCOUNTER
----- Message from Anita Narayan MA sent at 5/24/2024 10:12 AM CDT -----  Contact: Carol Ann@[phone]  Carol Ann Ross (pt daughter) called                Ms downey is requesting a call back to speak with provider to discuss a wheelchair special order.urgent matter and needs a call back today.

## 2024-05-28 ENCOUNTER — OFFICE VISIT (OUTPATIENT)
Dept: PODIATRY | Facility: CLINIC | Age: 84
End: 2024-05-28
Payer: MEDICARE

## 2024-05-28 VITALS
WEIGHT: 138.88 LBS | SYSTOLIC BLOOD PRESSURE: 122 MMHG | BODY MASS INDEX: 27.26 KG/M2 | HEART RATE: 70 BPM | DIASTOLIC BLOOD PRESSURE: 71 MMHG | HEIGHT: 60 IN | RESPIRATION RATE: 18 BRPM

## 2024-05-28 DIAGNOSIS — L84 CORN OR CALLUS: ICD-10-CM

## 2024-05-28 DIAGNOSIS — G60.9 IDIOPATHIC PERIPHERAL NEUROPATHY: ICD-10-CM

## 2024-05-28 DIAGNOSIS — E11.40 TYPE 2 DIABETES MELLITUS WITH DIABETIC NEUROPATHY, WITHOUT LONG-TERM CURRENT USE OF INSULIN: Primary | ICD-10-CM

## 2024-05-28 DIAGNOSIS — B35.1 ONYCHOMYCOSIS DUE TO DERMATOPHYTE: ICD-10-CM

## 2024-05-28 PROCEDURE — 11721 DEBRIDE NAIL 6 OR MORE: CPT | Mod: 59,Q9,HCNC,S$GLB | Performed by: PODIATRIST

## 2024-05-28 PROCEDURE — 11055 PARING/CUTG B9 HYPRKER LES 1: CPT | Mod: Q9,HCNC,S$GLB, | Performed by: PODIATRIST

## 2024-05-28 PROCEDURE — 99499 UNLISTED E&M SERVICE: CPT | Mod: HCNC,S$GLB,, | Performed by: PODIATRIST

## 2024-05-28 PROCEDURE — 99999 PR PBB SHADOW E&M-EST. PATIENT-LVL IV: CPT | Mod: PBBFAC,HCNC,, | Performed by: PODIATRIST

## 2024-05-28 NOTE — PROGRESS NOTES
Subjective:      Patient ID: Emilia Alan is a 83 y.o. female.    Chief Complaint: Diabetic Foot Exam (Lauren Deras MD at 4/15/2024), Nail Problem, and Toe Pain (Rt great toe )      Emilia is a 83 y.o. female who presents to the clinic for evaluation and treatment of high risk feet. 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 The patient's chief complaint is long, thick toenails     PCP: Lauren Deras MD    Date Last Seen by PCP:   Chief Complaint   Patient presents with    Diabetic Foot Exam     Lauren Deras MD at 4/15/2024    Nail Problem    Toe Pain     Rt great toe          Current shoe gear: DM shoes     Hemoglobin A1C   Date Value Ref Range Status   04/15/2024 5.9 (H) 4.0 - 5.6 % Final     Comment:     ADA Screening Guidelines:  5.7-6.4%  Consistent with prediabetes  >or=6.5%  Consistent with diabetes    High levels of fetal hemoglobin interfere with the HbA1C  assay. Heterozygous hemoglobin variants (HbS, HgC, etc)do  not significantly interfere with this assay.   However, presence of multiple variants may affect accuracy.     10/12/2023 6.0 (H) 4.0 - 5.6 % Final     Comment:     ADA Screening Guidelines:  5.7-6.4%  Consistent with prediabetes  >or=6.5%  Consistent with  diabetes    High levels of fetal hemoglobin interfere with the HbA1C  assay. Heterozygous hemoglobin variants (HbS, HgC, etc)do  not significantly interfere with this assay.   However, presence of multiple variants may affect accuracy.     04/21/2023 6.0 (H) 4.0 - 5.6 % Final     Comment:     ADA Screening Guidelines:  5.7-6.4%  Consistent with prediabetes  >or=6.5%  Consistent with diabetes    High levels of fetal hemoglobin interfere with the HbA1C  assay. Heterozygous hemoglobin variants (HbS, HgC, etc)do  not significantly interfere with this assay.   However, presence of multiple variants may affect accuracy.           Review of Systems   Constitutional: Negative for chills, decreased appetite and fever.   Cardiovascular:  Positive for leg swelling (stable, mild). Negative for chest pain, claudication, cyanosis and dyspnea on exertion.   Respiratory:  Negative for cough and shortness of breath.    Skin:  Positive for dry skin and nail changes. Negative for color change, flushing, itching, poor wound healing and skin cancer.   Musculoskeletal:  Positive for muscle weakness (foot drop). Negative for arthritis, back pain, falls, gout, joint pain, joint swelling and myalgias.   Gastrointestinal:  Negative for nausea and vomiting.   Neurological:  Positive for numbness and paresthesias. Negative for loss of balance.           Objective:       Vitals:    05/28/24 1335   BP: 122/71   Pulse: 70   Resp: 18   Weight: 63 kg (138 lb 14.2 oz)   Height: 5' (1.524 m)   PainSc: 0-No pain        Physical Exam  Vitals and nursing note reviewed.   Constitutional:       General: She is not in acute distress.     Appearance: She is well-developed. She is not diaphoretic.   Cardiovascular:      Comments: Dorsalis pedis and posterior tibial pulses are diminished Bilaterally. Toes are cool to touch. Feet are warm proximally.There is decreased digital hair. Skin is atrophic, slightly hyperpigmented, and mildly  edematous      Musculoskeletal:         General: Deformity present. No tenderness or signs of injury.      Right ankle: Normal.      Left ankle: Normal.      Right foot: No swelling, deformity or crepitus.      Left foot: No swelling, deformity or crepitus.      Comments: Dropfoot left.      Lymphadenopathy:      Comments: No palpable lymph nodes   Skin:     General: Skin is warm and dry.      Coloration: Skin is not cyanotic, mottled or pale.      Findings: No abrasion, bruising, burn, erythema, laceration, lesion, petechiae or rash.      Nails: There is no clubbing.      Comments: Nails 1-5 b/l  are elongated by 4-15mm's, thickened by 3-9 mm's, dystrophic, and are darkened in  coloration . Xerosis Bilaterally. No open lesions noted.      Hyperkeratotic tissue noted to plantar L 5th MPJ    Neurological:      Mental Status: She is alert and oriented to person, place, and time.      Comments: Lutsen-Sarah 5.07 monofilamant testing is diminished Charbel feet. Sharp/dull sensation diminished Bilaterally. Light touch absent Bilaterally.       Psychiatric:         Thought Content: Thought content normal.         Judgment: Judgment normal.             Assessment:       Encounter Diagnoses   Name Primary?    Type 2 diabetes mellitus with diabetic neuropathy, without long-term current use of insulin Yes    Idiopathic peripheral neuropathy     Onychomycosis due to dermatophyte     Corn or callus          Plan:       Emilia was seen today for diabetic foot exam, nail problem and toe pain.    Diagnoses and all orders for this visit:    Type 2 diabetes mellitus with diabetic neuropathy, without long-term current use of insulin    Idiopathic peripheral neuropathy    Onychomycosis due to dermatophyte    Corn or callus      I counseled the patient on her conditions, their implications and medical management.    - Shoe inspection.Patient instructed on proper foot hygeine. We discussed wearing proper shoe gear, daily foot inspections,  never walking without protective shoe gear, never putting sharp instruments to feet, routine podiatric nail visits every 2-3 months.      - With patient's permission, nails were aggressively reduced and debrided x 10 to their soft tissue attachment mechanically, removing all offending nail and debris. Patient relates relief following the procedure. She will continue to monitor the areas daily, inspect her feet, wear protective shoe gear when ambulatory, moisturizer to maintain skin integrity and follow in this office in approximately 2-3 months, sooner p.r.n.    - After cleansing the  area w/ alcohol prep pad the above mentioned hyperkeratosis was trimmed utilizing No 15 scapel, to a smooth base with out incident. Patient tolerated this  well and reported comfort to the area x1

## 2024-05-31 ENCOUNTER — TELEPHONE (OUTPATIENT)
Dept: INTERNAL MEDICINE | Facility: CLINIC | Age: 84
End: 2024-05-31
Payer: MEDICARE

## 2024-05-31 NOTE — TELEPHONE ENCOUNTER
She is due for labs, renal panel, please contact her to schedule orders in     Please let me know when scheduled thank you

## 2024-06-03 DIAGNOSIS — M81.0 AGE-RELATED OSTEOPOROSIS WITHOUT CURRENT PATHOLOGICAL FRACTURE: ICD-10-CM

## 2024-06-03 DIAGNOSIS — E11.42 DIABETIC POLYNEUROPATHY ASSOCIATED WITH TYPE 2 DIABETES MELLITUS: ICD-10-CM

## 2024-06-03 DIAGNOSIS — Z86.12 HISTORY OF POLIOMYELITIS: ICD-10-CM

## 2024-06-03 DIAGNOSIS — M54.40 CHRONIC BILATERAL LOW BACK PAIN WITH SCIATICA, SCIATICA LATERALITY UNSPECIFIED: ICD-10-CM

## 2024-06-03 DIAGNOSIS — M47.16 LUMBAR SPONDYLOSIS WITH MYELOPATHY: ICD-10-CM

## 2024-06-03 DIAGNOSIS — E11.40 TYPE 2 DIABETES MELLITUS WITH DIABETIC NEUROPATHY, WITHOUT LONG-TERM CURRENT USE OF INSULIN: ICD-10-CM

## 2024-06-03 DIAGNOSIS — G14 POST POLIOMYELITIS SYNDROME: ICD-10-CM

## 2024-06-03 DIAGNOSIS — G89.29 CHRONIC PAIN OF RIGHT KNEE: ICD-10-CM

## 2024-06-03 DIAGNOSIS — E08.44 DIABETIC AMYOTROPHY ASSOCIATED WITH DIABETES MELLITUS DUE TO UNDERLYING CONDITION: ICD-10-CM

## 2024-06-03 DIAGNOSIS — G89.29 CHRONIC BILATERAL LOW BACK PAIN WITH SCIATICA, SCIATICA LATERALITY UNSPECIFIED: ICD-10-CM

## 2024-06-03 DIAGNOSIS — G82.20 PARAPARESIS OF BOTH LOWER LIMBS: ICD-10-CM

## 2024-06-03 DIAGNOSIS — M54.16 LEFT LUMBAR RADICULOPATHY: ICD-10-CM

## 2024-06-03 DIAGNOSIS — M48.062 SPINAL STENOSIS OF LUMBAR REGION WITH NEUROGENIC CLAUDICATION: ICD-10-CM

## 2024-06-03 DIAGNOSIS — M47.816 LUMBAR SPONDYLOSIS: ICD-10-CM

## 2024-06-03 DIAGNOSIS — M17.11 PRIMARY OSTEOARTHRITIS OF RIGHT KNEE: ICD-10-CM

## 2024-06-03 DIAGNOSIS — R26.9 GAIT DISORDER: ICD-10-CM
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
## 2024-06-03 DIAGNOSIS — M25.561 CHRONIC PAIN OF RIGHT KNEE: ICD-10-CM

## 2024-06-03 DIAGNOSIS — M47.26 OSTEOARTHRITIS OF SPINE WITH RADICULOPATHY, LUMBAR REGION: ICD-10-CM

## 2024-06-03 DIAGNOSIS — M54.16 LUMBAR RADICULOPATHY: ICD-10-CM

## 2024-06-03 RX ORDER — HYDROCODONE BITARTRATE AND ACETAMINOPHEN 10; 325 MG/1; MG/1
1 TABLET ORAL EVERY 6 HOURS PRN
Qty: 120 TABLET | Refills: 0 | Status: SHIPPED | OUTPATIENT
Start: 2024-06-03 | End: 2024-07-04

## 2024-06-03 NOTE — TELEPHONE ENCOUNTER
No care due was identified.  [facility] Embedded Care Due Messages. Reference number: [id number].   6/03/2024 11:09:23 AM CDT

## 2024-06-03 NOTE — TELEPHONE ENCOUNTER
----- Message from Javy Mendiola sent at 6/3/2024 11:06 AM CDT -----  Regarding: Refill  Contact: [phone]  Requesting an RX refill or new RX.  Is this a refill or new RX: refill  RX name and strength (copy/paste from chart):  HYDROcodone-acetaminophen (NORCO)  mg per tablet  Is this a 30 day or 90 day RX:   Pharmacy name and phone # (copy/paste from chart):    [facility]  [address] [place] [address]  Phone: [phone] Fax: [phone]

## 2024-06-10 ENCOUNTER — PATIENT MESSAGE (OUTPATIENT)
Dept: INTERNAL MEDICINE | Facility: CLINIC | Age: 84
End: 2024-06-10
Payer: MEDICARE

## 2024-06-17 ENCOUNTER — TELEPHONE (OUTPATIENT)
Dept: INTERNAL MEDICINE | Facility: CLINIC | Age: 84
End: 2024-06-17
Payer: MEDICARE

## 2024-06-17 NOTE — TELEPHONE ENCOUNTER
----- Message from Kameron Guerrero sent at 6/17/2024 11:48 AM CDT -----  Contact: [phone]@ from [facility]  Good morning  from [facility] would like a call back to discuss the status on some forms she dropped off in the office on 6/11 for the doc to fill out for the patient to get his wheel chair. Please call  back to advise [phone]

## 2024-06-24 ENCOUNTER — TELEPHONE (OUTPATIENT)
Dept: OPTOMETRY | Facility: CLINIC | Age: 84
End: 2024-06-24
Payer: MEDICARE

## 2024-06-25 ENCOUNTER — LAB VISIT (OUTPATIENT)
Dept: LAB | Facility: HOSPITAL | Age: 84
End: 2024-06-25
Attending: INTERNAL MEDICINE
Payer: MEDICARE

## 2024-06-25 ENCOUNTER — OFFICE VISIT (OUTPATIENT)
Dept: OPTOMETRY | Facility: CLINIC | Age: 84
End: 2024-06-25
Payer: COMMERCIAL

## 2024-06-25 DIAGNOSIS — E11.21 TYPE 2 DIABETES MELLITUS WITH DIABETIC NEPHROPATHY, WITHOUT LONG-TERM CURRENT USE OF INSULIN: ICD-10-CM

## 2024-06-25 DIAGNOSIS — Z96.1 PRESENCE OF INTRAOCULAR LENS: ICD-10-CM

## 2024-06-25 DIAGNOSIS — E11.9 TYPE 2 DIABETES MELLITUS WITHOUT RETINOPATHY: Primary | ICD-10-CM

## 2024-06-25 DIAGNOSIS — H52.03 HYPEROPIA WITH PRESBYOPIA OF BOTH EYES: ICD-10-CM

## 2024-06-25 DIAGNOSIS — N18.32 STAGE 3B CHRONIC KIDNEY DISEASE: ICD-10-CM

## 2024-06-25 DIAGNOSIS — H52.4 HYPEROPIA WITH PRESBYOPIA OF BOTH EYES: ICD-10-CM

## 2024-06-25 LAB
ALBUMIN SERPL BCP-MCNC: 3.4 G/DL (ref 3.5–5.2)
ANION GAP SERPL CALC-SCNC: 11 MMOL/L (ref 8–16)
BUN SERPL-MCNC: 34 MG/DL (ref 8–23)
CALCIUM SERPL-MCNC: 10.3 MG/DL (ref 8.7–10.5)
CHLORIDE SERPL-SCNC: 102 MMOL/L (ref 95–110)
CO2 SERPL-SCNC: 26 MMOL/L (ref 23–29)
CREAT SERPL-MCNC: 1 MG/DL (ref 0.5–1.4)
EST. GFR  (NO RACE VARIABLE): 55.9 ML/MIN/1.73 M^2
GLUCOSE SERPL-MCNC: 127 MG/DL (ref 70–110)
PHOSPHATE SERPL-MCNC: 3.8 MG/DL (ref 2.7–4.5)
POTASSIUM SERPL-SCNC: 3.9 MMOL/L (ref 3.5–5.1)
SODIUM SERPL-SCNC: 139 MMOL/L (ref 136–145)

## 2024-06-25 PROCEDURE — 36415 COLL VENOUS BLD VENIPUNCTURE: CPT | Mod: HCNC | Performed by: INTERNAL MEDICINE

## 2024-06-25 PROCEDURE — 99999 PR PBB SHADOW E&M-EST. PATIENT-LVL III: CPT | Mod: PBBFAC,,, | Performed by: OPTOMETRIST

## 2024-06-25 PROCEDURE — 80069 RENAL FUNCTION PANEL: CPT | Mod: HCNC | Performed by: INTERNAL MEDICINE

## 2024-06-25 PROCEDURE — 92004 COMPRE OPH EXAM NEW PT 1/>: CPT | Mod: S$GLB,,, | Performed by: OPTOMETRIST

## 2024-07-05 DIAGNOSIS — M54.16 LEFT LUMBAR RADICULOPATHY: ICD-10-CM

## 2024-07-05 DIAGNOSIS — M47.16 LUMBAR SPONDYLOSIS WITH MYELOPATHY: ICD-10-CM

## 2024-07-05 DIAGNOSIS — M48.062 SPINAL STENOSIS OF LUMBAR REGION WITH NEUROGENIC CLAUDICATION: ICD-10-CM

## 2024-07-05 DIAGNOSIS — M17.11 PRIMARY OSTEOARTHRITIS OF RIGHT KNEE: ICD-10-CM

## 2024-07-05 DIAGNOSIS — E11.40 TYPE 2 DIABETES MELLITUS WITH DIABETIC NEUROPATHY, WITHOUT LONG-TERM CURRENT USE OF INSULIN: ICD-10-CM
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
## 2024-07-05 DIAGNOSIS — E11.42 DIABETIC POLYNEUROPATHY ASSOCIATED WITH TYPE 2 DIABETES MELLITUS: ICD-10-CM

## 2024-07-05 DIAGNOSIS — G89.29 CHRONIC PAIN OF RIGHT KNEE: ICD-10-CM

## 2024-07-05 DIAGNOSIS — M54.40 CHRONIC BILATERAL LOW BACK PAIN WITH SCIATICA, SCIATICA LATERALITY UNSPECIFIED: ICD-10-CM

## 2024-07-05 DIAGNOSIS — E08.44 DIABETIC AMYOTROPHY ASSOCIATED WITH DIABETES MELLITUS DUE TO UNDERLYING CONDITION: ICD-10-CM

## 2024-07-05 DIAGNOSIS — G82.20 PARAPARESIS OF BOTH LOWER LIMBS: ICD-10-CM

## 2024-07-05 DIAGNOSIS — R26.9 GAIT DISORDER: ICD-10-CM

## 2024-07-05 DIAGNOSIS — M81.0 AGE-RELATED OSTEOPOROSIS WITHOUT CURRENT PATHOLOGICAL FRACTURE: ICD-10-CM

## 2024-07-05 DIAGNOSIS — M47.26 OSTEOARTHRITIS OF SPINE WITH RADICULOPATHY, LUMBAR REGION: ICD-10-CM

## 2024-07-05 DIAGNOSIS — G14 POST POLIOMYELITIS SYNDROME: ICD-10-CM

## 2024-07-05 DIAGNOSIS — M25.561 CHRONIC PAIN OF RIGHT KNEE: ICD-10-CM

## 2024-07-05 DIAGNOSIS — M47.816 LUMBAR SPONDYLOSIS: ICD-10-CM

## 2024-07-05 DIAGNOSIS — G89.29 CHRONIC BILATERAL LOW BACK PAIN WITH SCIATICA, SCIATICA LATERALITY UNSPECIFIED: ICD-10-CM

## 2024-07-05 DIAGNOSIS — M54.16 LUMBAR RADICULOPATHY: ICD-10-CM

## 2024-07-05 DIAGNOSIS — Z86.12 HISTORY OF POLIOMYELITIS: ICD-10-CM

## 2024-07-05 NOTE — TELEPHONE ENCOUNTER
----- Message from Carmella Barlow sent at 7/5/2024 12:22 PM CDT -----  Contact: [phone]  Requesting an RX refill or new RX.    Is this a refill or new RX:     RX name and strength (copy/paste from chart):  HYDROcodone-acetaminophen (NORCO)  mg per tablet 120 tablet    Is this a 30 day or 90 day RX:     Pharmacy name and phone # (copy/paste from chart):    [facility]  [address] [place] [address]  Phone: [phone] Fax: [phone]

## 2024-07-05 NOTE — TELEPHONE ENCOUNTER
No care due was identified.  [facility] Embedded Care Due Messages. Reference number: [id number].   7/05/2024 1:02:52 PM CDT

## 2024-07-08 RX ORDER — HYDROCODONE BITARTRATE AND ACETAMINOPHEN 10; 325 MG/1; MG/1
1 TABLET ORAL EVERY 6 HOURS PRN
Qty: 120 TABLET | Refills: 0 | Status: SHIPPED | OUTPATIENT
Start: 2024-07-08 | End: 2024-08-07

## 2024-07-11 DIAGNOSIS — I10 PRIMARY HYPERTENSION: ICD-10-CM

## 2024-07-11 RX ORDER — IRBESARTAN 300 MG/1
TABLET ORAL
Qty: 90 TABLET | Refills: 3 | Status: SHIPPED | OUTPATIENT
Start: 2024-07-11

## 2024-07-11 NOTE — TELEPHONE ENCOUNTER
Care Due:                  Date            Visit Type   Department     Provider  --------------------------------------------------------------------------------                                ESTABLISHED                              PATIENT -    [facility] INTERNAL  Last Visit: 04-      VIRTUAL      MEDICINE       Lauren Deras                               -                              PRIMARY      [facility] INTER[facility]  Next Visit: 12-      CARE ([facility])   MEDICINE       Lauren Deras                                                            Last  Test          Frequency    Reason                     Performed    Due Date  --------------------------------------------------------------------------------    CBC.........  12 months..  diclofenac, hydrALAZINE..  10-   10-    CMP.........  12 months..  pravastatin..............  10-   10-    Lipid Panel.  12 months..  pravastatin..............  10-   10-    [facility] Embedded Care Due Messages. Reference number: [id number].   7/11/2024 1:40:34 AM CDT

## 2024-07-11 NOTE — TELEPHONE ENCOUNTER
Provider Staff:  Action required for this patient    Requires labs      Please see care gap opportunities below in Care Due Message.    Thanks!  [facility]     Appointments      Date Provider   Last Visit   4/15/2024 Lauren Deras MD   Next Visit   12/5/2024 Lauren Deras MD     Refill Decision Note   Emilia Alan  is requesting a refill authorization.  Brief Assessment and Rationale for Refill:  Approve     Medication Therapy Plan:  FOVS      Comments:     Note composed:5:17 AM 07/11/2024

## 2024-08-12 DIAGNOSIS — G89.29 CHRONIC PAIN OF RIGHT KNEE: ICD-10-CM

## 2024-08-12 DIAGNOSIS — G82.20 PARAPARESIS OF BOTH LOWER LIMBS: ICD-10-CM

## 2024-08-12 DIAGNOSIS — E08.44 DIABETIC AMYOTROPHY ASSOCIATED WITH DIABETES MELLITUS DUE TO UNDERLYING CONDITION: ICD-10-CM

## 2024-08-12 DIAGNOSIS — M17.11 PRIMARY OSTEOARTHRITIS OF RIGHT KNEE: ICD-10-CM

## 2024-08-12 DIAGNOSIS — M48.062 SPINAL STENOSIS OF LUMBAR REGION WITH NEUROGENIC CLAUDICATION: ICD-10-CM

## 2024-08-12 DIAGNOSIS — E11.42 DIABETIC POLYNEUROPATHY ASSOCIATED WITH TYPE 2 DIABETES MELLITUS: ICD-10-CM

## 2024-08-12 DIAGNOSIS — M81.0 AGE-RELATED OSTEOPOROSIS WITHOUT CURRENT PATHOLOGICAL FRACTURE: ICD-10-CM

## 2024-08-12 DIAGNOSIS — M25.561 CHRONIC PAIN OF RIGHT KNEE: ICD-10-CM

## 2024-08-12 DIAGNOSIS — Z86.12 HISTORY OF POLIOMYELITIS: ICD-10-CM

## 2024-08-12 DIAGNOSIS — R26.9 GAIT DISORDER: ICD-10-CM

## 2024-08-12 DIAGNOSIS — M54.16 LUMBAR RADICULOPATHY: ICD-10-CM

## 2024-08-12 DIAGNOSIS — G89.29 CHRONIC BILATERAL LOW BACK PAIN WITH SCIATICA, SCIATICA LATERALITY UNSPECIFIED: ICD-10-CM

## 2024-08-12 DIAGNOSIS — M54.40 CHRONIC BILATERAL LOW BACK PAIN WITH SCIATICA, SCIATICA LATERALITY UNSPECIFIED: ICD-10-CM

## 2024-08-12 DIAGNOSIS — M47.16 LUMBAR SPONDYLOSIS WITH MYELOPATHY: ICD-10-CM

## 2024-08-12 DIAGNOSIS — M54.16 LEFT LUMBAR RADICULOPATHY: ICD-10-CM

## 2024-08-12 DIAGNOSIS — M47.816 LUMBAR SPONDYLOSIS: ICD-10-CM

## 2024-08-12 DIAGNOSIS — M47.26 OSTEOARTHRITIS OF SPINE WITH RADICULOPATHY, LUMBAR REGION: ICD-10-CM
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
## 2024-08-12 DIAGNOSIS — G14 POST POLIOMYELITIS SYNDROME: ICD-10-CM

## 2024-08-12 DIAGNOSIS — E11.40 TYPE 2 DIABETES MELLITUS WITH DIABETIC NEUROPATHY, WITHOUT LONG-TERM CURRENT USE OF INSULIN: ICD-10-CM

## 2024-08-12 RX ORDER — HYDROCODONE BITARTRATE AND ACETAMINOPHEN 10; 325 MG/1; MG/1
1 TABLET ORAL EVERY 6 HOURS PRN
Qty: 120 TABLET | Refills: 0 | Status: SHIPPED | OUTPATIENT
Start: 2024-08-12 | End: 2024-09-13

## 2024-08-12 NOTE — TELEPHONE ENCOUNTER
No care due was identified.  [facility] Embedded Care Due Messages. Reference number: [id number].   8/12/2024 1:11:05 PM CDT

## 2024-08-12 NOTE — TELEPHONE ENCOUNTER
----- Message from IMELDADima Hansen sent at 8/12/2024 12:46 PM CDT -----  Contact: daughter [phone]  Requesting an RX refill or new RX.    Is this a refill or new RX: refill    RX name and strength (copy/paste from chart):  HYDROcodone-acetaminophen (NORCO)  mg per tablet     Is this a 30 day or 90 day RX: 120    Pharmacy name and phone # (copy/paste from chart):      [facility]  [address] [place] [address]  Phone: [phone] Fax: [phone]

## 2024-08-14 DIAGNOSIS — N39.46 MIXED INCONTINENCE: ICD-10-CM

## 2024-08-14 RX ORDER — OXYBUTYNIN CHLORIDE 5 MG/1
TABLET ORAL
Qty: 180 TABLET | Refills: 2 | Status: SHIPPED | OUTPATIENT
Start: 2024-08-14

## 2024-08-14 NOTE — TELEPHONE ENCOUNTER
Refill Decision Note   Emilia Alan  is requesting a refill authorization.  Brief Assessment and Rationale for Refill:  Approve     Medication Therapy Plan:         Comments:     Note composed:10:58 AM 08/14/2024

## 2024-08-14 NOTE — TELEPHONE ENCOUNTER
No care due was identified.  [facility] Embedded Care Due Messages. Reference number: [id number].   8/14/2024 4:08:05 AM CDT

## 2024-08-21 DIAGNOSIS — E78.5 HYPERLIPIDEMIA, UNSPECIFIED HYPERLIPIDEMIA TYPE: ICD-10-CM

## 2024-08-21 RX ORDER — PRAVASTATIN SODIUM 40 MG/1
TABLET ORAL
Qty: 135 TABLET | Refills: 0 | Status: SHIPPED | OUTPATIENT
Start: 2024-08-21

## 2024-08-21 NOTE — TELEPHONE ENCOUNTER
No care due was identified.  [facility] Embedded Care Due Messages. Reference number: [id number].   8/21/2024 2:06:26 AM CDT

## 2024-08-21 NOTE — TELEPHONE ENCOUNTER
Refill Decision Note   Emilia Alan  is requesting a refill authorization.  Brief Assessment and Rationale for Refill:  Approve     Medication Therapy Plan:        Comments:     Note composed:8:34 AM 08/21/2024

## 2024-08-22 DIAGNOSIS — I10 SEVERE HYPERTENSION: ICD-10-CM

## 2024-08-22 DIAGNOSIS — I10 PRIMARY HYPERTENSION: ICD-10-CM

## 2024-08-22 DIAGNOSIS — E11.43 TYPE 2 DIABETES MELLITUS WITH AUTONOMIC NEUROPATHY: ICD-10-CM

## 2024-08-22 RX ORDER — METFORMIN HYDROCHLORIDE 500 MG/1
500 TABLET, EXTENDED RELEASE ORAL 2 TIMES DAILY WITH MEALS
Qty: 180 TABLET | Refills: 0 | Status: SHIPPED | OUTPATIENT
Start: 2024-08-22

## 2024-08-22 RX ORDER — HYDRALAZINE HYDROCHLORIDE 50 MG/1
TABLET, FILM COATED ORAL
Qty: 360 TABLET | Refills: 2 | OUTPATIENT
Start: 2024-08-22

## 2024-08-22 RX ORDER — HYDROCHLOROTHIAZIDE 25 MG/1
TABLET ORAL
Qty: 90 TABLET | Refills: 2 | Status: SHIPPED | OUTPATIENT
Start: 2024-08-22

## 2024-08-22 NOTE — TELEPHONE ENCOUNTER
No care due was identified.  [facility] Embedded Care Due Messages. Reference number: [id number].   8/22/2024 3:39:29 PM CDT

## 2024-08-23 ENCOUNTER — PATIENT MESSAGE (OUTPATIENT)
Dept: PODIATRY | Facility: CLINIC | Age: 84
End: 2024-08-23
Payer: MEDICARE

## 2024-08-23 NOTE — TELEPHONE ENCOUNTER
Refill Decision Note   Emilia Dayanna  is requesting a refill authorization.  Brief Assessment and Rationale for Refill:  Quick Discontinue  Approve     Medication Therapy Plan: Hydralyzine therapy decreased 7/9/24 by [facility]; [facility]      Comments:     Note composed:11:30 PM 08/22/2024

## 2024-08-27 ENCOUNTER — OFFICE VISIT (OUTPATIENT)
Dept: PODIATRY | Facility: CLINIC | Age: 84
End: 2024-08-27
Payer: MEDICARE

## 2024-08-27 VITALS
BODY MASS INDEX: 27.26 KG/M2 | RESPIRATION RATE: 18 BRPM | SYSTOLIC BLOOD PRESSURE: 130 MMHG | HEART RATE: 82 BPM | WEIGHT: 138.88 LBS | HEIGHT: 60 IN | DIASTOLIC BLOOD PRESSURE: 66 MMHG

## 2024-08-27 DIAGNOSIS — B35.1 ONYCHOMYCOSIS DUE TO DERMATOPHYTE: ICD-10-CM

## 2024-08-27 DIAGNOSIS — E11.40 TYPE 2 DIABETES MELLITUS WITH DIABETIC NEUROPATHY, WITHOUT LONG-TERM CURRENT USE OF INSULIN: Primary | ICD-10-CM

## 2024-08-27 DIAGNOSIS — L30.9 DERMATITIS: ICD-10-CM

## 2024-08-27 DIAGNOSIS — G60.9 IDIOPATHIC PERIPHERAL NEUROPATHY: ICD-10-CM

## 2024-08-27 DIAGNOSIS — L84 CORN OR CALLUS: ICD-10-CM

## 2024-08-27 DIAGNOSIS — M21.372 FOOT DROP, LEFT: ICD-10-CM

## 2024-08-27 DIAGNOSIS — G14 POST-POLIO SYNDROME: ICD-10-CM

## 2024-08-27 PROCEDURE — 3075F SYST BP GE 130 - 139MM HG: CPT | Mod: HCNC,CPTII,S$GLB, | Performed by: PODIATRIST

## 2024-08-27 PROCEDURE — 11721 DEBRIDE NAIL 6 OR MORE: CPT | Mod: 59,Q9,HCNC,S$GLB | Performed by: PODIATRIST

## 2024-08-27 PROCEDURE — 1126F AMNT PAIN NOTED NONE PRSNT: CPT | Mod: HCNC,CPTII,S$GLB, | Performed by: PODIATRIST

## 2024-08-27 PROCEDURE — 3078F DIAST BP <80 MM HG: CPT | Mod: HCNC,CPTII,S$GLB, | Performed by: PODIATRIST

## 2024-08-27 PROCEDURE — 99213 OFFICE O/P EST LOW 20 MIN: CPT | Mod: 25,HCNC,S$GLB, | Performed by: PODIATRIST

## 2024-08-27 PROCEDURE — 1159F MED LIST DOCD IN RCRD: CPT | Mod: HCNC,CPTII,S$GLB, | Performed by: PODIATRIST

## 2024-08-27 PROCEDURE — 99999 PR PBB SHADOW E&M-EST. PATIENT-LVL IV: CPT | Mod: PBBFAC,HCNC,, | Performed by: PODIATRIST

## 2024-08-27 PROCEDURE — 11055 PARING/CUTG B9 HYPRKER LES 1: CPT | Mod: Q9,HCNC,S$GLB, | Performed by: PODIATRIST

## 2024-08-27 RX ORDER — CLOTRIMAZOLE AND BETAMETHASONE DIPROPIONATE 10; .64 MG/G; MG/G
CREAM TOPICAL 2 TIMES DAILY
Qty: 45 G | Refills: 5 | Status: SHIPPED | OUTPATIENT
Start: 2024-08-27

## 2024-08-27 RX ORDER — CLOTRIMAZOLE AND BETAMETHASONE DIPROPIONATE 10; .64 MG/G; MG/G
CREAM TOPICAL 2 TIMES DAILY
Qty: 45 G | Refills: 3 | Status: SHIPPED | OUTPATIENT
Start: 2024-08-27 | End: 2024-08-27 | Stop reason: CLARIF

## 2024-08-27 NOTE — PROGRESS NOTES
Subjective:      Patient ID: Emilia Alan is a 83 y.o. female.    Chief Complaint: Diabetic Foot Exam (Lauren Deras MD at 4/15/2024), Nail Problem (Nail fungus ), and Nail Care      Emilia is a 83 y.o. female who presents to the clinic for evaluation and treatment of high risk feet. 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 The patient's chief complaint is long, thick toenails     PCP: Lauren Deras MD    Date Last Seen by PCP:   Chief Complaint   Patient presents with    Diabetic Foot Exam     Lauren Deras MD at 4/15/2024    Nail Problem     Nail fungus     Nail Care         Current shoe gear: DM shoes     Hemoglobin A1C   Date Value Ref Range Status   04/15/2024 5.9 (H) 4.0 - 5.6 % Final     Comment:     ADA Screening Guidelines:  5.7-6.4%  Consistent with prediabetes  >or=6.5%  Consistent with diabetes    High levels of fetal hemoglobin interfere with the HbA1C  assay. Heterozygous hemoglobin variants (HbS, HgC, etc)do  not significantly interfere with this assay.   However, presence of multiple variants may affect accuracy.     10/12/2023 6.0 (H) 4.0 - 5.6 % Final     Comment:     ADA Screening Guidelines:  5.7-6.4%  Consistent with prediabetes  >or=6.5%  Consistent with  diabetes    High levels of fetal hemoglobin interfere with the HbA1C  assay. Heterozygous hemoglobin variants (HbS, HgC, etc)do  not significantly interfere with this assay.   However, presence of multiple variants may affect accuracy.     04/21/2023 6.0 (H) 4.0 - 5.6 % Final     Comment:     ADA Screening Guidelines:  5.7-6.4%  Consistent with prediabetes  >or=6.5%  Consistent with diabetes    High levels of fetal hemoglobin interfere with the HbA1C  assay. Heterozygous hemoglobin variants (HbS, HgC, etc)do  not significantly interfere with this assay.   However, presence of multiple variants may affect accuracy.           Review of Systems   Constitutional: Negative for chills, decreased appetite and fever.   Cardiovascular:  Positive for leg swelling (stable, mild). Negative for chest pain, claudication, cyanosis and dyspnea on exertion.   Respiratory:  Negative for cough and shortness of breath.    Skin:  Positive for dry skin, nail changes and rash. Negative for color change, flushing, itching, poor wound healing and skin cancer.   Musculoskeletal:  Positive for muscle weakness (foot drop). Negative for arthritis, back pain, falls, gout, joint pain, joint swelling and myalgias.   Gastrointestinal:  Negative for nausea and vomiting.   Neurological:  Positive for numbness and paresthesias. Negative for loss of balance.           Objective:       Vitals:    08/27/24 1113   BP: 130/66   Pulse: 82   Resp: 18   Weight: 63 kg (138 lb 14.2 oz)   Height: 5' (1.524 m)   PainSc: 0-No pain        Physical Exam  Vitals and nursing note reviewed.   Constitutional:       General: She is not in acute distress.     Appearance: She is well-developed. She is not diaphoretic.   Cardiovascular:      Comments: Dorsalis pedis and posterior tibial pulses are diminished Bilaterally. Toes are cool to touch. Feet are warm proximally.There is decreased digital hair. Skin is atrophic, slightly hyperpigmented, and mildly  edematous      Musculoskeletal:         General: Deformity present. No tenderness or signs of injury.      Right ankle: Normal.      Left ankle: Normal.      Right foot: No swelling, deformity or crepitus.      Left foot: No swelling, deformity or crepitus.      Comments: Dropfoot left.      Lymphadenopathy:      Comments: No palpable lymph nodes   Skin:     General: Skin is warm and dry.      Coloration: Skin is not cyanotic, mottled or pale.      Findings: No abrasion, bruising, burn, erythema, laceration, lesion, petechiae or rash.      Nails: There is no clubbing.      Comments: Nails 1-5 b/l  are elongated by 4-9mm's, thickened by 3-9 mm's, dystrophic, and are darkened in  coloration . Xerosis Bilaterally. No open lesions noted.    Hyperkeratotic tissue noted to plantar L 5th MPJ    Neurological:      Mental Status: She is alert and oriented to person, place, and time.      Comments: [place] 5.07 monofilamant testing is diminished Charbel feet. Sharp/dull sensation diminished Bilaterally. Light touch absent Bilaterally.       Psychiatric:         Thought Content: Thought content normal.         Judgment: Judgment normal.             Assessment:       Encounter Diagnoses   Name Primary?    Type 2 diabetes mellitus with diabetic neuropathy, without long-term current use of insulin Yes    Idiopathic peripheral neuropathy     Post-polio syndrome     Onychomycosis due to dermatophyte     Corn or callus     Foot drop, left     Dermatitis          Plan:       Emilia was seen today for diabetic foot exam, nail problem and nail care.    Diagnoses and all orders for this visit:    Type 2 diabetes mellitus with diabetic neuropathy, without long-term current use of insulin    Idiopathic peripheral neuropathy    Post-polio syndrome    Onychomycosis due to dermatophyte    Corn or callus    Foot drop, left    Dermatitis      I counseled the patient on her conditions, their implications and medical management.    - Shoe  inspection.Patient instructed on proper foot hygeine. We discussed wearing proper shoe gear, daily foot inspections, never walking without protective shoe gear, never putting sharp instruments to feet, routine podiatric nail visits every 2-3 months.      - With patient's permission, nails were aggressively reduced and debrided x 10 to their soft tissue attachment mechanically, removing all offending nail and debris. Patient relates relief following the procedure. She will continue to monitor the areas daily, inspect her feet, wear protective shoe gear when ambulatory, moisturizer to maintain skin integrity and follow in this office in approximately 2-3 months, sooner p.r.n.    - After cleansing the  area w/ alcohol prep pad the above mentioned hyperkeratosis was trimmed utilizing No 15 scapel, to a smooth base with out incident. Patient tolerated this  well and reported comfort to the area x1

## 2024-08-28 DIAGNOSIS — E11.43 TYPE 2 DIABETES MELLITUS WITH AUTONOMIC NEUROPATHY: ICD-10-CM

## 2024-08-28 RX ORDER — LANCETS
EACH MISCELLANEOUS
Qty: 200 EACH | Refills: 3 | Status: SHIPPED | OUTPATIENT
Start: 2024-08-28

## 2024-08-28 NOTE — TELEPHONE ENCOUNTER
Refill Decision Note   Emilia Alan  is requesting a refill authorization.  Brief Assessment and Rationale for Refill:  Approve     Medication Therapy Plan:        Comments:     Note composed:9:56 AM 08/28/2024

## 2024-08-28 NOTE — TELEPHONE ENCOUNTER
No care due was identified.  [facility] Embedded Care Due Messages. Reference number: [id number].   8/28/2024 2:07:26 AM CDT

## 2024-09-05 DIAGNOSIS — I10 SEVERE HYPERTENSION: ICD-10-CM

## 2024-09-05 RX ORDER — HYDRALAZINE HYDROCHLORIDE 50 MG/1
50 TABLET, FILM COATED ORAL 2 TIMES DAILY
Qty: 180 TABLET | Refills: 3 | Status: SHIPPED | OUTPATIENT
Start: 2024-09-05

## 2024-09-05 NOTE — TELEPHONE ENCOUNTER
----- Message from Sobia Romero sent at 9/5/2024 10:51 AM CDT -----  Contact: [phone]  Requesting an RX refill or new RX.    Is this a refill or new RX:   RX name and strength (hydrALAZINE (APRESOLINE) 50 MG tablet    Pharmacy name and phone      [facility] - [place], [place] - [address]  [address]  [facility] [address]  Phone: [phone] Fax: [phone]

## 2024-09-05 NOTE — TELEPHONE ENCOUNTER
No care due was identified.  [facility] Embedded Care Due Messages. Reference number: [id number].   9/05/2024 11:10:15 AM CDT

## 2024-09-16 DIAGNOSIS — E11.42 DIABETIC POLYNEUROPATHY ASSOCIATED WITH TYPE 2 DIABETES MELLITUS: ICD-10-CM

## 2024-09-16 DIAGNOSIS — G89.29 CHRONIC PAIN OF RIGHT KNEE: ICD-10-CM

## 2024-09-16 DIAGNOSIS — E11.40 TYPE 2 DIABETES MELLITUS WITH DIABETIC NEUROPATHY, WITHOUT LONG-TERM CURRENT USE OF INSULIN: ICD-10-CM

## 2024-09-16 DIAGNOSIS — E08.44 DIABETIC AMYOTROPHY ASSOCIATED WITH DIABETES MELLITUS DUE TO UNDERLYING CONDITION: ICD-10-CM

## 2024-09-16 DIAGNOSIS — G82.20 PARAPARESIS OF BOTH LOWER LIMBS: ICD-10-CM

## 2024-09-16 DIAGNOSIS — M54.40 CHRONIC BILATERAL LOW BACK PAIN WITH SCIATICA, SCIATICA LATERALITY UNSPECIFIED: ICD-10-CM

## 2024-09-16 DIAGNOSIS — M81.0 AGE-RELATED OSTEOPOROSIS WITHOUT CURRENT PATHOLOGICAL FRACTURE: ICD-10-CM

## 2024-09-16 DIAGNOSIS — G89.29 CHRONIC BILATERAL LOW BACK PAIN WITH SCIATICA, SCIATICA LATERALITY UNSPECIFIED: ICD-10-CM

## 2024-09-16 DIAGNOSIS — M47.816 LUMBAR SPONDYLOSIS: ICD-10-CM

## 2024-09-16 DIAGNOSIS — M54.16 LEFT LUMBAR RADICULOPATHY: ICD-10-CM

## 2024-09-16 DIAGNOSIS — G14 POST POLIOMYELITIS SYNDROME: ICD-10-CM

## 2024-09-16 DIAGNOSIS — Z86.12 HISTORY OF POLIOMYELITIS: ICD-10-CM

## 2024-09-16 DIAGNOSIS — M17.11 PRIMARY OSTEOARTHRITIS OF RIGHT KNEE: ICD-10-CM

## 2024-09-16 DIAGNOSIS — M54.16 LUMBAR RADICULOPATHY: ICD-10-CM

## 2024-09-16 DIAGNOSIS — M47.26 OSTEOARTHRITIS OF SPINE WITH RADICULOPATHY, LUMBAR REGION: ICD-10-CM
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
## 2024-09-16 DIAGNOSIS — R26.9 GAIT DISORDER: ICD-10-CM

## 2024-09-16 DIAGNOSIS — M48.062 SPINAL STENOSIS OF LUMBAR REGION WITH NEUROGENIC CLAUDICATION: ICD-10-CM

## 2024-09-16 DIAGNOSIS — M47.16 LUMBAR SPONDYLOSIS WITH MYELOPATHY: ICD-10-CM

## 2024-09-16 DIAGNOSIS — M25.561 CHRONIC PAIN OF RIGHT KNEE: ICD-10-CM

## 2024-09-16 RX ORDER — HYDROCODONE BITARTRATE AND ACETAMINOPHEN 10; 325 MG/1; MG/1
1 TABLET ORAL EVERY 6 HOURS PRN
Qty: 120 TABLET | Refills: 0 | Status: SHIPPED | OUTPATIENT
Start: 2024-09-16 | End: 2024-10-16

## 2024-09-16 NOTE — TELEPHONE ENCOUNTER
----- Message from Jerrell Bansal MA sent at 9/16/2024  2:50 PM CDT -----  Requesting an RX refill or new RX.    RX name and strength: HYDROcodone-acetaminophen (NORCO)  mg per tablet    Is this a refill or new RX: Refill    Is this a 30 day or 90 day RX: 30 day    Pharmacy name and phone:     [facility]  [address] [place] [address]  Phone: [phone] Fax: [phone]

## 2024-09-16 NOTE — TELEPHONE ENCOUNTER
Care Due:                  Date            Visit Type   Department     Provider  --------------------------------------------------------------------------------                                ESTABLISHED                              PATIENT -    [facility] INTERNAL  Last Visit: 04-      VIRTUAL      MEDICINE       Lauren Deras                               -                              PRIMARY      [facility] INTERNAL  Next Visit: 12-      CARE ([facility])   MEDICINE       Lauren Deras                                                            Last  Test          Frequency    Reason                     Performed    Due Date  --------------------------------------------------------------------------------    CBC.........  12 months..  diclofenac, hydrALAZINE..  10-   10-    CMP.........  12 months..  pravastatin..............  10-   10-    Lipid Panel.  12 months..  pravastatin..............  10-   10-    [facility] Embedded Care Due Messages. Reference number: [id number].   9/16/2024 3:12:00 PM CDT

## 2024-09-23 DIAGNOSIS — M25.561 CHRONIC PAIN OF RIGHT KNEE: ICD-10-CM

## 2024-09-23 DIAGNOSIS — M54.40 CHRONIC BILATERAL LOW BACK PAIN WITH SCIATICA, SCIATICA LATERALITY UNSPECIFIED: ICD-10-CM

## 2024-09-23 DIAGNOSIS — E11.42 DIABETIC POLYNEUROPATHY ASSOCIATED WITH TYPE 2 DIABETES MELLITUS: ICD-10-CM

## 2024-09-23 DIAGNOSIS — E08.44 DIABETIC AMYOTROPHY ASSOCIATED WITH DIABETES MELLITUS DUE TO UNDERLYING CONDITION: ICD-10-CM

## 2024-09-23 DIAGNOSIS — M81.0 AGE-RELATED OSTEOPOROSIS WITHOUT CURRENT PATHOLOGICAL FRACTURE: ICD-10-CM

## 2024-09-23 DIAGNOSIS — R26.9 GAIT DISORDER: ICD-10-CM

## 2024-09-23 DIAGNOSIS — Z86.12 HISTORY OF POLIOMYELITIS: ICD-10-CM
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
## 2024-09-23 DIAGNOSIS — G89.29 CHRONIC PAIN OF RIGHT KNEE: ICD-10-CM

## 2024-09-23 DIAGNOSIS — M54.16 LUMBAR RADICULOPATHY: ICD-10-CM

## 2024-09-23 DIAGNOSIS — M48.062 SPINAL STENOSIS OF LUMBAR REGION WITH NEUROGENIC CLAUDICATION: ICD-10-CM

## 2024-09-23 DIAGNOSIS — M47.26 OSTEOARTHRITIS OF SPINE WITH RADICULOPATHY, LUMBAR REGION: ICD-10-CM

## 2024-09-23 DIAGNOSIS — M54.16 LEFT LUMBAR RADICULOPATHY: ICD-10-CM

## 2024-09-23 DIAGNOSIS — E11.40 TYPE 2 DIABETES MELLITUS WITH DIABETIC NEUROPATHY, WITHOUT LONG-TERM CURRENT USE OF INSULIN: ICD-10-CM

## 2024-09-23 DIAGNOSIS — G89.29 CHRONIC BILATERAL LOW BACK PAIN WITH SCIATICA, SCIATICA LATERALITY UNSPECIFIED: ICD-10-CM

## 2024-09-23 DIAGNOSIS — G14 POST POLIOMYELITIS SYNDROME: ICD-10-CM

## 2024-09-23 DIAGNOSIS — M47.16 LUMBAR SPONDYLOSIS WITH MYELOPATHY: ICD-10-CM

## 2024-09-23 DIAGNOSIS — M47.816 LUMBAR SPONDYLOSIS: ICD-10-CM

## 2024-09-23 DIAGNOSIS — G82.20 PARAPARESIS OF BOTH LOWER LIMBS: ICD-10-CM

## 2024-09-23 DIAGNOSIS — M17.11 PRIMARY OSTEOARTHRITIS OF RIGHT KNEE: ICD-10-CM

## 2024-09-23 RX ORDER — HYDROCODONE BITARTRATE AND ACETAMINOPHEN 10; 325 MG/1; MG/1
1 TABLET ORAL EVERY 6 HOURS PRN
Qty: 120 TABLET | Refills: 0 | OUTPATIENT
Start: 2024-09-23 | End: 2024-10-23

## 2024-09-23 RX ORDER — HYDROCODONE BITARTRATE AND ACETAMINOPHEN 10; 325 MG/1; MG/1
1 TABLET ORAL EVERY 6 HOURS PRN
Qty: 120 TABLET | Refills: 0 | Status: SHIPPED | OUTPATIENT
Start: 2024-09-23 | End: 2024-10-21 | Stop reason: SDUPTHER

## 2024-09-23 NOTE — TELEPHONE ENCOUNTER
No care due was identified.  [facility] Embedded Care Due Messages. Reference number: [id number].   9/23/2024 1:14:30 PM CDT

## 2024-09-23 NOTE — TELEPHONE ENCOUNTER
----- Message from Taylor Jaquez sent at 9/23/2024  1:07 PM CDT -----  Contact: [phone]  Patient called, requested a call back from Dr Deras's nurse in regards why her Rx HYDROcodone-acetaminophen (NORCO)  mg per tablethas not been refill. Thank you.

## 2024-09-23 NOTE — TELEPHONE ENCOUNTER
reviewed, the patient got 120 hydrocodone on September 19th so she does not need a refill currently

## 2024-09-23 NOTE — TELEPHONE ENCOUNTER
Care Due:                  Date            Visit Type   Department     Provider  --------------------------------------------------------------------------------                                ESTABLISHED                              PATIENT -    [facility] INTERNAL  Last Visit: 04-      VIRTUAL      MEDICINE       Lauren Deras                               -                              PRIMARY      [facility] INTERNAL  Next Visit: 12-      CARE ([facility])   MEDICINE       Lauren Deras                                                            Last  Test          Frequency    Reason                     Performed    Due Date  --------------------------------------------------------------------------------    HBA1C.......  6 months...  metFORMIN................  04-   10-    Health [facility] Embedded Care Due Messages. Reference number: [id number].   9/23/2024 9:32:51 AM CDT

## 2024-09-23 NOTE — TELEPHONE ENCOUNTER
Medication was send to mail orders but pt needed it to be sent to the [facility]   Pharmacy updated     Pt going on a cruise on Thursday

## 2024-10-11 ENCOUNTER — TELEPHONE (OUTPATIENT)
Dept: INTERNAL MEDICINE | Facility: CLINIC | Age: 84
End: 2024-10-11
Payer: MEDICARE

## 2024-10-11 RX ORDER — BENZONATATE 100 MG/1
100 CAPSULE ORAL 3 TIMES DAILY PRN
Qty: 30 CAPSULE | Refills: 0 | Status: SHIPPED | OUTPATIENT
Start: 2024-10-11 | End: 2024-10-21

## 2024-10-11 RX ORDER — BENZONATATE 100 MG/1
100 CAPSULE ORAL 3 TIMES DAILY PRN
Qty: 30 CAPSULE | Refills: 0 | Status: CANCELLED | OUTPATIENT
Start: 2024-10-11 | End: 2024-10-21

## 2024-10-11 NOTE — TELEPHONE ENCOUNTER
Pt complain of a cough no other symptoms otc meds not really working requesting a Rx to be sent to the pharmacy   Please advise

## 2024-10-11 NOTE — TELEPHONE ENCOUNTER
----- Message from Eloina sent at 10/11/2024  3:00 PM CDT -----  Contact: Patient @ [phone]  .[email or web address]     Patient is calling for Medical Advice regarding:  Symptom: Cough  Outcome: Schedule an appointment to be seen within 24 hours.  Reason: Caller denied all higher acuity questions    The caller accepted this outcome.    How long has patient had these symptoms:    Pharmacy name and phone#:  [facility]  [address]  [place] [address]  Phone: [phone] Fax: [phone]        Patient wants a call back or thru [facility]:    Comments:NO APPT    Please advise patient replies from provider may take up to 48 hours.

## 2024-10-14 ENCOUNTER — OFFICE VISIT (OUTPATIENT)
Dept: INTERNAL MEDICINE | Facility: CLINIC | Age: 84
End: 2024-10-14
Payer: MEDICARE

## 2024-10-14 VITALS
WEIGHT: 138.88 LBS | HEIGHT: 60 IN | SYSTOLIC BLOOD PRESSURE: 130 MMHG | TEMPERATURE: 99 F | OXYGEN SATURATION: 95 % | HEART RATE: 87 BPM | DIASTOLIC BLOOD PRESSURE: 60 MMHG | BODY MASS INDEX: 27.26 KG/M2 | RESPIRATION RATE: 16 BRPM

## 2024-10-14 DIAGNOSIS — U07.1 COVID-19 VIRUS INFECTION: Primary | ICD-10-CM

## 2024-10-14 LAB
CTP QC/QA: YES
CTP QC/QA: YES
POC MOLECULAR INFLUENZA A AGN: NEGATIVE
POC MOLECULAR INFLUENZA B AGN: NEGATIVE
SARS-COV-2 RDRP RESP QL NAA+PROBE: POSITIVE

## 2024-10-14 PROCEDURE — 1126F AMNT PAIN NOTED NONE PRSNT: CPT | Mod: HCNC,CPTII,S$GLB, | Performed by: PHYSICIAN ASSISTANT

## 2024-10-14 PROCEDURE — 3075F SYST BP GE 130 - 139MM HG: CPT | Mod: HCNC,CPTII,S$GLB, | Performed by: PHYSICIAN ASSISTANT

## 2024-10-14 PROCEDURE — 87635 SARS-COV-2 COVID-19 AMP PRB: CPT | Mod: QW,HCNC,S$GLB, | Performed by: PHYSICIAN ASSISTANT

## 2024-10-14 PROCEDURE — 1160F RVW MEDS BY RX/DR IN RCRD: CPT | Mod: HCNC,CPTII,S$GLB, | Performed by: PHYSICIAN ASSISTANT

## 2024-10-14 PROCEDURE — 99213 OFFICE O/P EST LOW 20 MIN: CPT | Mod: HCNC,S$GLB,, | Performed by: PHYSICIAN ASSISTANT

## 2024-10-14 PROCEDURE — 1159F MED LIST DOCD IN RCRD: CPT | Mod: HCNC,CPTII,S$GLB, | Performed by: PHYSICIAN ASSISTANT

## 2024-10-14 PROCEDURE — 3078F DIAST BP <80 MM HG: CPT | Mod: HCNC,CPTII,S$GLB, | Performed by: PHYSICIAN ASSISTANT

## 2024-10-14 PROCEDURE — 87502 INFLUENZA DNA AMP PROBE: CPT | Mod: QW,HCNC,S$GLB, | Performed by: PHYSICIAN ASSISTANT

## 2024-10-14 PROCEDURE — 99999 PR PBB SHADOW E&M-EST. PATIENT-LVL V: CPT | Mod: PBBFAC,HCNC,, | Performed by: PHYSICIAN ASSISTANT

## 2024-10-14 NOTE — PROGRESS NOTES
Subjective:       Patient ID: Emilia Alan is a 84 y.o. female.        Chief Complaint: Cough    Emilia Alan is an established patient of Lauren Deras MD here today for urgent care visit.    Cough x 4 days.  Tessalon called in and helpful for the cough.  Also with body aches.  No fever.  No N/V/D.  Some mucus and nasal drainage.  Not much sore throat.  No shortness of breath or chest pain.  Feeling of no energy.    Covid risk score - 5           Review of Systems   Constitutional:  Negative for chills, diaphoresis, fatigue and fever.   HENT:  Positive for congestion. Negative for sore throat.    Eyes:  Negative for visual disturbance.   Respiratory:  Positive for cough. Negative for chest tightness and shortness of breath.    Cardiovascular:  Negative for chest pain, palpitations and leg swelling.   Gastrointestinal:  Negative for abdominal pain, blood in stool, constipation, diarrhea, nausea and vomiting.   Genitourinary:  Negative for dysuria, frequency, hematuria and urgency.   Musculoskeletal:  Positive for myalgias. Negative for arthralgias and back pain.   Skin:  Negative for rash.   Neurological:  Negative for dizziness, syncope, weakness and headaches.   Psychiatric/Behavioral:  Negative for dysphoric mood and sleep disturbance. The patient is not nervous/anxious.        Objective:      Physical Exam  Vitals and nursing note reviewed.   Constitutional:       Appearance: Normal appearance. She is well-developed.   HENT:      Head: Normocephalic.      Right Ear: Tympanic membrane and external ear normal.      Left Ear: Tympanic membrane and external ear normal.      Nose: No mucosal edema or rhinorrhea.      Mouth/Throat:      Pharynx: Oropharynx is clear.   Eyes:      Pupils: Pupils are equal, round, and reactive to light.   Cardiovascular:      Rate and Rhythm: Normal rate and regular rhythm.      Heart sounds: Normal heart sounds. No murmur heard.     No friction rub. No gallop.  "  Pulmonary:      Effort: Pulmonary effort is normal. No respiratory distress.      Breath sounds: Normal breath sounds.   Abdominal:      Palpations: Abdomen is soft.      Tenderness: There is no abdominal tenderness.   Skin:     General: Skin is warm and dry.   Neurological:      Mental Status: She is alert.         Assessment:       1. COVID-19 virus infection        Plan:       Emilia was seen today for cough.    Diagnoses and all orders for this visit:    COVID-19 virus infection  -     POCT COVID-19 Rapid Screening  -     POCT Influenza A/B Molecular    Covid test positive  Sx x 4 days  She takes hydrocodone for chronic pain and does not feel she can hold it to take paxlovid so defers  ED prompts reviewed at length  Return prompts reviewed  Supportive care and isolation precautions reviewed    Pt has been given instructions populated from patient instructions database and has verbalized understanding of the after visit summary and information contained wherein.    Follow up with a primary care provider. May go to ER for acute shortness of breath, lightheadedness, fever, or any other emergent complaints or changes in condition.    "This note will be shared with the patient"    Future Appointments   Date Time Provider Department Center   11/21/2024 11:15 AM Carole Niño DPM [facility]   12/5/2024 11:30 AM Lauren Deras MD [facility]                 "

## 2024-10-21 DIAGNOSIS — G82.20 PARAPARESIS OF BOTH LOWER LIMBS: ICD-10-CM

## 2024-10-21 DIAGNOSIS — G14 POST POLIOMYELITIS SYNDROME: ICD-10-CM

## 2024-10-21 DIAGNOSIS — M54.16 LUMBAR RADICULOPATHY: ICD-10-CM

## 2024-10-21 DIAGNOSIS — G89.29 CHRONIC BILATERAL LOW BACK PAIN WITH SCIATICA, SCIATICA LATERALITY UNSPECIFIED: ICD-10-CM

## 2024-10-21 DIAGNOSIS — M17.11 PRIMARY OSTEOARTHRITIS OF RIGHT KNEE: ICD-10-CM
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
## 2024-10-21 DIAGNOSIS — G89.29 CHRONIC PAIN OF RIGHT KNEE: ICD-10-CM

## 2024-10-21 DIAGNOSIS — M81.0 AGE-RELATED OSTEOPOROSIS WITHOUT CURRENT PATHOLOGICAL FRACTURE: ICD-10-CM

## 2024-10-21 DIAGNOSIS — E11.42 DIABETIC POLYNEUROPATHY ASSOCIATED WITH TYPE 2 DIABETES MELLITUS: ICD-10-CM

## 2024-10-21 DIAGNOSIS — M47.16 LUMBAR SPONDYLOSIS WITH MYELOPATHY: ICD-10-CM

## 2024-10-21 DIAGNOSIS — M47.816 LUMBAR SPONDYLOSIS: ICD-10-CM

## 2024-10-21 DIAGNOSIS — M54.40 CHRONIC BILATERAL LOW BACK PAIN WITH SCIATICA, SCIATICA LATERALITY UNSPECIFIED: ICD-10-CM

## 2024-10-21 DIAGNOSIS — E11.40 TYPE 2 DIABETES MELLITUS WITH DIABETIC NEUROPATHY, WITHOUT LONG-TERM CURRENT USE OF INSULIN: ICD-10-CM

## 2024-10-21 DIAGNOSIS — E08.44 DIABETIC AMYOTROPHY ASSOCIATED WITH DIABETES MELLITUS DUE TO UNDERLYING CONDITION: ICD-10-CM

## 2024-10-21 DIAGNOSIS — M47.26 OSTEOARTHRITIS OF SPINE WITH RADICULOPATHY, LUMBAR REGION: ICD-10-CM

## 2024-10-21 DIAGNOSIS — E11.43 TYPE 2 DIABETES MELLITUS WITH AUTONOMIC NEUROPATHY: ICD-10-CM

## 2024-10-21 DIAGNOSIS — M25.561 CHRONIC PAIN OF RIGHT KNEE: ICD-10-CM

## 2024-10-21 DIAGNOSIS — Z86.12 HISTORY OF POLIOMYELITIS: ICD-10-CM

## 2024-10-21 DIAGNOSIS — M48.062 SPINAL STENOSIS OF LUMBAR REGION WITH NEUROGENIC CLAUDICATION: ICD-10-CM

## 2024-10-21 DIAGNOSIS — R26.9 GAIT DISORDER: ICD-10-CM

## 2024-10-21 DIAGNOSIS — M54.16 LEFT LUMBAR RADICULOPATHY: ICD-10-CM

## 2024-10-21 NOTE — TELEPHONE ENCOUNTER
----- Message from Julisa sent at 10/21/2024  3:59 PM CDT -----  Contact: [phone]  Requesting an RX refill or new RX.    Is this a refill or new RX: Refill     RX name and strength (copy/paste from chart):  HYDROcodone-acetaminophen (NORCO)  mg per tablet    Is this a 30 day or 90 day RX:     Pharmacy name and phone # (copy/paste from chart):    [facility]  [address] [place] [address]  Phone: [phone] Fax: [phone]        The doctors have asked that we provide their patients with the following 2 reminders -- prescription refills can take up to 72 hours, and a friendly reminder that in the future you can use your [facility] account to request refills: call back

## 2024-10-21 NOTE — TELEPHONE ENCOUNTER
No care due was identified.  [facility] Embedded Care Due Messages. Reference number: [id number].   10/21/2024 5:08:04 PM CDT

## 2024-10-21 NOTE — TELEPHONE ENCOUNTER
No care due was identified.  [facility] Embedded Care Due Messages. Reference number: [id number].   10/21/2024 4:14:22 PM CDT

## 2024-10-22 RX ORDER — HYDROCODONE BITARTRATE AND ACETAMINOPHEN 10; 325 MG/1; MG/1
1 TABLET ORAL EVERY 6 HOURS PRN
Qty: 120 TABLET | Refills: 0 | Status: SHIPPED | OUTPATIENT
Start: 2024-10-22 | End: 2024-11-21

## 2024-10-22 RX ORDER — BLOOD SUGAR DIAGNOSTIC
STRIP MISCELLANEOUS
Qty: 200 STRIP | Refills: 3 | Status: SHIPPED | OUTPATIENT
Start: 2024-10-22

## 2024-10-22 NOTE — TELEPHONE ENCOUNTER
Refill Decision Note   Emilia Alan  is requesting a refill authorization.  Brief Assessment and Rationale for Refill:  Approve     Medication Therapy Plan:         Comments:     Note composed:5:19 AM 10/22/2024

## 2024-11-01 ENCOUNTER — HOSPITAL ENCOUNTER (OUTPATIENT)
Dept: RADIOLOGY | Facility: HOSPITAL | Age: 84
Discharge: HOME | End: 2024-11-01
Attending: PHYSICIAN ASSISTANT
Payer: MEDICARE

## 2024-11-01 ENCOUNTER — NURSE TRIAGE (OUTPATIENT)
Dept: ADMINISTRATIVE | Facility: CLINIC | Age: 84
End: 2024-11-01
Payer: MEDICARE

## 2024-11-01 ENCOUNTER — OFFICE VISIT (OUTPATIENT)
Dept: SPORTS MEDICINE | Facility: CLINIC | Age: 84
End: 2024-11-01
Payer: MEDICARE

## 2024-11-01 VITALS
WEIGHT: 138.88 LBS | DIASTOLIC BLOOD PRESSURE: 79 MMHG | HEIGHT: 60 IN | HEART RATE: 89 BPM | SYSTOLIC BLOOD PRESSURE: 147 MMHG | BODY MASS INDEX: 27.26 KG/M2

## 2024-11-01 DIAGNOSIS — M25.561 ACUTE PAIN OF RIGHT KNEE: ICD-10-CM

## 2024-11-01 DIAGNOSIS — M25.561 ACUTE PAIN OF RIGHT KNEE: Primary | ICD-10-CM

## 2024-11-01 DIAGNOSIS — S80.01XA CONTUSION OF RIGHT KNEE, INITIAL ENCOUNTER: ICD-10-CM

## 2024-11-01 PROCEDURE — 99999 PR PBB SHADOW E&M-EST. PATIENT-LVL IV: CPT | Mod: PBBFAC,HCNC,, | Performed by: PHYSICIAN ASSISTANT

## 2024-11-01 PROCEDURE — 73562 X-RAY EXAM OF KNEE 3: CPT | Mod: TC,50,HCNC

## 2024-11-01 PROCEDURE — 73562 X-RAY EXAM OF KNEE 3: CPT | Mod: 26,50,HCNC, | Performed by: RADIOLOGY

## 2024-11-02 DIAGNOSIS — E78.5 HYPERLIPIDEMIA, UNSPECIFIED HYPERLIPIDEMIA TYPE: ICD-10-CM

## 2024-11-02 DIAGNOSIS — E11.43 TYPE 2 DIABETES MELLITUS WITH AUTONOMIC NEUROPATHY: ICD-10-CM

## 2024-11-02 RX ORDER — ISOPROPYL ALCOHOL 70 ML/100ML
SWAB TOPICAL
Qty: 100 EACH | Refills: 3 | Status: SHIPPED | OUTPATIENT
Start: 2024-11-02

## 2024-11-02 NOTE — TELEPHONE ENCOUNTER
No care due was identified.  [facility] Embedded Care Due Messages. Reference number: [id number].   11/02/2024 2:43:17 AM CDT

## 2024-11-03 RX ORDER — METFORMIN HYDROCHLORIDE 500 MG/1
500 TABLET, EXTENDED RELEASE ORAL 2 TIMES DAILY WITH MEALS
Qty: 180 TABLET | Refills: 0 | Status: SHIPPED | OUTPATIENT
Start: 2024-11-03

## 2024-11-03 RX ORDER — PRAVASTATIN SODIUM 40 MG/1
TABLET ORAL
Qty: 135 TABLET | Refills: 0 | Status: SHIPPED | OUTPATIENT
Start: 2024-11-03

## 2024-11-03 NOTE — TELEPHONE ENCOUNTER
Refill Routing Note   Medication(s) are not appropriate for processing by [facility] for the following reason(s):        Required labs outdated    [facility] action(s):  Defer  Approve             Appointments  past 12m or future 3m with PCP    Date Provider   Last Visit   4/15/2024 Lauren Deras MD   Next Visit   12/5/2024 Lauren Deras MD   ED visits in past 90 days: 0        Note composed:10:44 PM 11/02/2024

## 2024-11-16 ENCOUNTER — HOSPITAL ENCOUNTER (INPATIENT)
Facility: HOSPITAL | Age: 84
LOS: 1 days | Discharge: HOME | DRG: 287 | End: 2024-11-19
Attending: EMERGENCY MEDICINE | Admitting: STUDENT IN AN ORGANIZED HEALTH CARE EDUCATION/TRAINING PROGRAM
Payer: MEDICARE

## 2024-11-16 DIAGNOSIS — R07.9 CHEST PAIN: ICD-10-CM

## 2024-11-16 DIAGNOSIS — R07.9 CHEST PAIN, RULE OUT ACUTE MYOCARDIAL INFARCTION: ICD-10-CM

## 2024-11-16 DIAGNOSIS — I25.10 CORONARY ARTERY DISEASE: ICD-10-CM

## 2024-11-16 LAB
ALBUMIN SERPL BCP-MCNC: 3.9 G/DL (ref 3.5–5.2)
ALLENS TEST: NORMAL
ALP SERPL-CCNC: 95 U/L (ref 40–150)
ALT SERPL W/O P-5'-P-CCNC: 12 U/L (ref 10–44)
ANION GAP SERPL CALC-SCNC: 13 MMOL/L (ref 8–16)
ASCENDING AORTA: 2.83 CM
AST SERPL-CCNC: 19 U/L (ref 10–40)
AV AREA BY CONTINUOUS VTI: 2.5 CM2
AV INDEX (PROSTH): 0.92
AV LVOT MEAN GRADIENT: 1 MMHG
AV LVOT PEAK GRADIENT: 3 MMHG
AV MEAN GRADIENT: 4.2 MMHG
AV PEAK GRADIENT: 6.8 MMHG
AV VALVE AREA BY VELOCITY RATIO: 1.7 CM²
AV VALVE AREA: 2.6 CM2
AV VELOCITY RATIO: 0.62
BASOPHILS # BLD AUTO: 0.17 K/UL (ref 0–0.2)
BASOPHILS NFR BLD: 1.7 % (ref 0–1.9)
BILIRUB SERPL-MCNC: 0.6 MG/DL (ref 0.1–1)
BILIRUB UR QL STRIP: NEGATIVE
BNP SERPL-MCNC: 71 PG/ML (ref 0–99)
BSA FOR ECHO PROCEDURE: 1.61 M2
BUN SERPL-MCNC: 20 MG/DL (ref 8–23)
CALCIUM SERPL-MCNC: 11 MG/DL (ref 8.7–10.5)
CHLORIDE SERPL-SCNC: 101 MMOL/L (ref 95–110)
CLARITY UR REFRACT.AUTO: CLEAR
CO2 SERPL-SCNC: 25 MMOL/L (ref 23–29)
COLOR UR AUTO: COLORLESS
CREAT SERPL-MCNC: 0.9 MG/DL (ref 0.5–1.4)
CV ECHO LV RWT: 0.44 CM
DIFFERENTIAL METHOD BLD: ABNORMAL
DOP CALC AO PEAK VEL: 1.3 M/S
DOP CALC AO VTI: 19 CM
DOP CALC LVOT AREA: 2.8 CM2
DOP CALC LVOT DIAMETER: 1.9 CM
DOP CALC LVOT PEAK VEL: 0.8 M/S
DOP CALC LVOT STROKE VOLUME: 49.6 CM3
DOP CALCLVOT PEAK VEL VTI: 17.5 CM
E WAVE DECELERATION TIME: 186.78 MS
E/A RATIO: 0.64
E/E' RATIO: 6.67 M/S
ECHO EF ESTIMATED: 64 %
ECHO LV POSTERIOR WALL: 0.9 CM (ref 0.6–1.1)
EOSINOPHIL # BLD AUTO: 0.3 K/UL (ref 0–0.5)
EOSINOPHIL NFR BLD: 3.2 % (ref 0–8)
ERYTHROCYTE [DISTWIDTH] IN BLOOD BY AUTOMATED COUNT: 13.1 % (ref 11.5–14.5)
EST. GFR  (NO RACE VARIABLE): >60 ML/MIN/1.73 M^2
FRACTIONAL SHORTENING: 34.1 % (ref 28–44)
GLUCOSE SERPL-MCNC: 175 MG/DL (ref 70–110)
GLUCOSE UR QL STRIP: NEGATIVE
HCT VFR BLD AUTO: 41.1 % (ref 37–48.5)
HCV AB SERPL QL IA: NORMAL
HGB BLD-MCNC: 13.7 G/DL (ref 12–16)
HGB UR QL STRIP: NEGATIVE
HIV 1+2 AB+HIV1 P24 AG SERPL QL IA: NORMAL
IMM GRANULOCYTES # BLD AUTO: 0.04 K/UL (ref 0–0.04)
IMM GRANULOCYTES NFR BLD AUTO: 0.4 % (ref 0–0.5)
INTERVENTRICULAR SEPTUM: 0.7 CM (ref 0.6–1.1)
IVRT: 85.63 MS
KETONES UR QL STRIP: NEGATIVE
LA MAJOR: 5.57 CM
LA MINOR: 5.09 CM
LA WIDTH: 3.51 CM
LDH SERPL L TO P-CCNC: 2.18 MMOL/L (ref 0.5–2.2)
LEFT ATRIUM SIZE: 2.18 CM
LEFT ATRIUM VOLUME INDEX MOD: 17.1 ML/M2
LEFT ATRIUM VOLUME INDEX: 22 ML/M2
LEFT ATRIUM VOLUME MOD: 26.8 ML
LEFT ATRIUM VOLUME: 34.6 CM3
LEFT INTERNAL DIMENSION IN SYSTOLE: 2.7 CM (ref 2.1–4)
LEFT VENTRICLE DIASTOLIC VOLUME INDEX: 48.28 ML/M2
LEFT VENTRICLE DIASTOLIC VOLUME: 75.8 ML
LEFT VENTRICLE MASS INDEX: 62 G/M2
LEFT VENTRICLE SYSTOLIC VOLUME INDEX: 17.3 ML/M2
LEFT VENTRICLE SYSTOLIC VOLUME: 27.11 ML
LEFT VENTRICULAR INTERNAL DIMENSION IN DIASTOLE: 4.1 CM (ref 3.5–6)
LEFT VENTRICULAR MASS: 97.3 G
LEUKOCYTE ESTERASE UR QL STRIP: NEGATIVE
LV LATERAL E/E' RATIO: 5.56
LV SEPTAL E/E' RATIO: 8.33
LYMPHOCYTES # BLD AUTO: 2.4 K/UL (ref 1–4.8)
LYMPHOCYTES NFR BLD: 24.1 % (ref 18–48)
MCH RBC QN AUTO: 31.9 PG (ref 27–31)
MCHC RBC AUTO-ENTMCNC: 33.3 G/DL (ref 32–36)
MCV RBC AUTO: 96 FL (ref 82–98)
MONOCYTES # BLD AUTO: 0.8 K/UL (ref 0.3–1)
MONOCYTES NFR BLD: 8.3 % (ref 4–15)
MV PEAK A VEL: 0.78 M/S
MV PEAK E VEL: 0.5 M/S
NEUTROPHILS # BLD AUTO: 6.3 K/UL (ref 1.8–7.7)
NEUTROPHILS NFR BLD: 62.3 % (ref 38–73)
NITRITE UR QL STRIP: NEGATIVE
NRBC BLD-RTO: 0 /100 WBC
OHS QRS DURATION: 74 MS
OHS QRS DURATION: 76 MS
OHS QRS DURATION: 76 MS
OHS QTC CALCULATION: 435 MS
OHS QTC CALCULATION: 438 MS
OHS QTC CALCULATION: 474 MS
PH UR STRIP: 8 [PH] (ref 5–8)
PLATELET # BLD AUTO: 395 K/UL (ref 150–450)
PMV BLD AUTO: 10.8 FL (ref 9.2–12.9)
POCT GLUCOSE: 145 MG/DL (ref 70–110)
POCT GLUCOSE: 165 MG/DL (ref 70–110)
POCT GLUCOSE: 179 MG/DL (ref 70–110)
POTASSIUM SERPL-SCNC: 3.6 MMOL/L (ref 3.5–5.1)
PROT SERPL-MCNC: 7.8 G/DL (ref 6–8.4)
PROT UR QL STRIP: NEGATIVE
RA MAJOR: 3.92 CM
RA PRESSURE ESTIMATED: 3 MMHG
RA WIDTH: 2.69 CM
RBC # BLD AUTO: 4.3 M/UL (ref 4–5.4)
RV TISSUE DOPPLER FREE WALL SYSTOLIC VELOCITY 1 (APICAL 4 CHAMBER VIEW): 16.01 CM/S
SAMPLE: NORMAL
SINUS: 3.17 CM
SITE: NORMAL
SODIUM SERPL-SCNC: 139 MMOL/L (ref 136–145)
SP GR UR STRIP: 1.01 (ref 1–1.03)
STJ: 2.45 CM
TDI LATERAL: 0.09 M/S
TDI SEPTAL: 0.06 M/S
TDI: 0.08 M/S
TRICUSPID ANNULAR PLANE SYSTOLIC EXCURSION: 1.38 CM
TROPONIN I SERPL DL<=0.01 NG/ML-MCNC: 0.01 NG/ML (ref 0–0.03)
TROPONIN I SERPL DL<=0.01 NG/ML-MCNC: 0.01 NG/ML (ref 0–0.03)
TROPONIN I SERPL DL<=0.01 NG/ML-MCNC: 0.03 NG/ML (ref 0–0.03)
URN SPEC COLLECT METH UR: ABNORMAL
WBC # BLD AUTO: 10.06 K/UL (ref 3.9–12.7)
Z-SCORE OF LEFT VENTRICULAR DIMENSION IN END DIASTOLE: -0.96
Z-SCORE OF LEFT VENTRICULAR DIMENSION IN END SYSTOLE: -0.29

## 2024-11-16 PROCEDURE — 84484 ASSAY OF TROPONIN QUANT: CPT | Mod: HCNC | Performed by: PHYSICIAN ASSISTANT

## 2024-11-16 PROCEDURE — 99900035 HC TECH TIME PER 15 MIN (STAT): Mod: HCNC

## 2024-11-16 PROCEDURE — 99285 EMERGENCY DEPT VISIT HI MDM: CPT | Mod: 25,HCNC

## 2024-11-16 PROCEDURE — 63600175 PHARM REV CODE 636 W HCPCS: Mod: HCNC | Performed by: PHYSICIAN ASSISTANT

## 2024-11-16 PROCEDURE — 25000003 PHARM REV CODE 250: Mod: HCNC

## 2024-11-16 PROCEDURE — 80053 COMPREHEN METABOLIC PANEL: CPT | Mod: HCNC

## 2024-11-16 PROCEDURE — 25000242 PHARM REV CODE 250 ALT 637 W/ HCPCS: Mod: HCNC

## 2024-11-16 PROCEDURE — 84484 ASSAY OF TROPONIN QUANT: CPT | Mod: 91,HCNC

## 2024-11-16 PROCEDURE — 82962 GLUCOSE BLOOD TEST: CPT | Mod: HCNC

## 2024-11-16 PROCEDURE — 93010 ELECTROCARDIOGRAM REPORT: CPT | Mod: HCNC,,, | Performed by: INTERNAL MEDICINE

## 2024-11-16 PROCEDURE — 93005 ELECTROCARDIOGRAM TRACING: CPT | Mod: HCNC

## 2024-11-16 PROCEDURE — G0378 HOSPITAL OBSERVATION PER HR: HCPCS | Mod: HCNC

## 2024-11-16 PROCEDURE — 83880 ASSAY OF NATRIURETIC PEPTIDE: CPT | Mod: HCNC

## 2024-11-16 PROCEDURE — 83605 ASSAY OF LACTIC ACID: CPT | Mod: HCNC

## 2024-11-16 PROCEDURE — 81003 URINALYSIS AUTO W/O SCOPE: CPT | Mod: HCNC

## 2024-11-16 PROCEDURE — 87389 HIV-1 AG W/HIV-1&-2 AB AG IA: CPT | Mod: HCNC | Performed by: PHYSICIAN ASSISTANT

## 2024-11-16 PROCEDURE — 25000003 PHARM REV CODE 250: Mod: HCNC | Performed by: PHYSICIAN ASSISTANT

## 2024-11-16 PROCEDURE — 85025 COMPLETE CBC W/AUTO DIFF WBC: CPT | Mod: HCNC

## 2024-11-16 PROCEDURE — 36415 COLL VENOUS BLD VENIPUNCTURE: CPT | Mod: HCNC | Performed by: PHYSICIAN ASSISTANT

## 2024-11-16 PROCEDURE — 25000003 PHARM REV CODE 250: Mod: HCNC | Performed by: EMERGENCY MEDICINE

## 2024-11-16 PROCEDURE — 86803 HEPATITIS C AB TEST: CPT | Mod: HCNC | Performed by: PHYSICIAN ASSISTANT

## 2024-11-16 PROCEDURE — 96372 THER/PROPH/DIAG INJ SC/IM: CPT | Performed by: PHYSICIAN ASSISTANT

## 2024-11-16 PROCEDURE — 93010 ELECTROCARDIOGRAM REPORT: CPT | Mod: HCNC,76,, | Performed by: INTERNAL MEDICINE

## 2024-11-16 RX ORDER — GABAPENTIN 300 MG/1
600 CAPSULE ORAL 3 TIMES DAILY
Status: DISCONTINUED | OUTPATIENT
Start: 2024-11-16 | End: 2024-11-19 | Stop reason: HOSPADM

## 2024-11-16 RX ORDER — ALUMINUM HYDROXIDE, MAGNESIUM HYDROXIDE, AND SIMETHICONE 1200; 120; 1200 MG/30ML; MG/30ML; MG/30ML
30 SUSPENSION ORAL 4 TIMES DAILY PRN
Status: DISCONTINUED | OUTPATIENT
Start: 2024-11-16 | End: 2024-11-19 | Stop reason: HOSPADM

## 2024-11-16 RX ORDER — AMLODIPINE BESYLATE 10 MG/1
10 TABLET ORAL DAILY
Status: DISCONTINUED | OUTPATIENT
Start: 2024-11-17 | End: 2024-11-19 | Stop reason: HOSPADM

## 2024-11-16 RX ORDER — IBUPROFEN 200 MG
16 TABLET ORAL
Status: DISCONTINUED | OUTPATIENT
Start: 2024-11-16 | End: 2024-11-19 | Stop reason: HOSPADM

## 2024-11-16 RX ORDER — SIMETHICONE 80 MG
1 TABLET,CHEWABLE ORAL 4 TIMES DAILY PRN
Status: DISCONTINUED | OUTPATIENT
Start: 2024-11-16 | End: 2024-11-19 | Stop reason: HOSPADM

## 2024-11-16 RX ORDER — IBUPROFEN 200 MG
24 TABLET ORAL
Status: DISCONTINUED | OUTPATIENT
Start: 2024-11-16 | End: 2024-11-19 | Stop reason: HOSPADM

## 2024-11-16 RX ORDER — ASPIRIN 81 MG/1
81 TABLET ORAL DAILY
Status: DISCONTINUED | OUTPATIENT
Start: 2024-11-17 | End: 2024-11-19 | Stop reason: HOSPADM

## 2024-11-16 RX ORDER — LOSARTAN POTASSIUM 50 MG/1
100 TABLET ORAL DAILY
Status: DISCONTINUED | OUTPATIENT
Start: 2024-11-16 | End: 2024-11-18

## 2024-11-16 RX ORDER — HYDROCODONE BITARTRATE AND ACETAMINOPHEN 10; 325 MG/1; MG/1
1 TABLET ORAL EVERY 6 HOURS PRN
Status: DISCONTINUED | OUTPATIENT
Start: 2024-11-16 | End: 2024-11-19 | Stop reason: HOSPADM

## 2024-11-16 RX ORDER — OXYBUTYNIN CHLORIDE 5 MG/1
5 TABLET ORAL 2 TIMES DAILY
Status: DISCONTINUED | OUTPATIENT
Start: 2024-11-16 | End: 2024-11-19 | Stop reason: HOSPADM

## 2024-11-16 RX ORDER — NITROGLYCERIN 0.4 MG/1
0.4 TABLET SUBLINGUAL EVERY 5 MIN PRN
Status: DISCONTINUED | OUTPATIENT
Start: 2024-11-16 | End: 2024-11-19 | Stop reason: HOSPADM

## 2024-11-16 RX ORDER — NALOXONE HCL 0.4 MG/ML
0.02 VIAL (ML) INJECTION
Status: DISCONTINUED | OUTPATIENT
Start: 2024-11-16 | End: 2024-11-19 | Stop reason: HOSPADM

## 2024-11-16 RX ORDER — AMLODIPINE BESYLATE 10 MG/1
10 TABLET ORAL
Status: COMPLETED | OUTPATIENT
Start: 2024-11-16 | End: 2024-11-16

## 2024-11-16 RX ORDER — ONDANSETRON 8 MG/1
8 TABLET, ORALLY DISINTEGRATING ORAL EVERY 8 HOURS PRN
Status: DISCONTINUED | OUTPATIENT
Start: 2024-11-16 | End: 2024-11-19 | Stop reason: HOSPADM

## 2024-11-16 RX ORDER — GLUCAGON 1 MG
1 KIT INJECTION
Status: DISCONTINUED | OUTPATIENT
Start: 2024-11-16 | End: 2024-11-19 | Stop reason: HOSPADM

## 2024-11-16 RX ORDER — INSULIN ASPART 100 [IU]/ML
0-5 INJECTION, SOLUTION INTRAVENOUS; SUBCUTANEOUS
Status: DISCONTINUED | OUTPATIENT
Start: 2024-11-16 | End: 2024-11-19 | Stop reason: HOSPADM

## 2024-11-16 RX ORDER — SODIUM CHLORIDE 0.9 % (FLUSH) 0.9 %
5 SYRINGE (ML) INJECTION
Status: DISCONTINUED | OUTPATIENT
Start: 2024-11-16 | End: 2024-11-19 | Stop reason: HOSPADM

## 2024-11-16 RX ORDER — ACETAMINOPHEN 325 MG/1
650 TABLET ORAL EVERY 8 HOURS PRN
Status: DISCONTINUED | OUTPATIENT
Start: 2024-11-16 | End: 2024-11-19 | Stop reason: HOSPADM

## 2024-11-16 RX ORDER — POLYETHYLENE GLYCOL 3350 17 G/17G
17 POWDER, FOR SOLUTION ORAL DAILY
Status: DISCONTINUED | OUTPATIENT
Start: 2024-11-16 | End: 2024-11-19 | Stop reason: HOSPADM

## 2024-11-16 RX ORDER — IPRATROPIUM BROMIDE AND ALBUTEROL SULFATE 2.5; .5 MG/3ML; MG/3ML
3 SOLUTION RESPIRATORY (INHALATION) EVERY 4 HOURS PRN
Status: DISCONTINUED | OUTPATIENT
Start: 2024-11-16 | End: 2024-11-19 | Stop reason: HOSPADM

## 2024-11-16 RX ORDER — METFORMIN HYDROCHLORIDE 500 MG/1
500 TABLET, EXTENDED RELEASE ORAL
Status: DISCONTINUED | OUTPATIENT
Start: 2024-11-16 | End: 2024-11-16

## 2024-11-16 RX ORDER — BISACODYL 10 MG/1
10 SUPPOSITORY RECTAL DAILY PRN
Status: DISCONTINUED | OUTPATIENT
Start: 2024-11-16 | End: 2024-11-19 | Stop reason: HOSPADM

## 2024-11-16 RX ORDER — HYDROCODONE BITARTRATE AND ACETAMINOPHEN 5; 325 MG/1; MG/1
1 TABLET ORAL EVERY 6 HOURS PRN
Status: DISCONTINUED | OUTPATIENT
Start: 2024-11-16 | End: 2024-11-19 | Stop reason: HOSPADM

## 2024-11-16 RX ORDER — ASPIRIN 325 MG
325 TABLET ORAL
Status: COMPLETED | OUTPATIENT
Start: 2024-11-16 | End: 2024-11-16

## 2024-11-16 RX ORDER — PRAVASTATIN SODIUM 40 MG/1
40 TABLET ORAL NIGHTLY
Status: DISCONTINUED | OUTPATIENT
Start: 2024-11-16 | End: 2024-11-19 | Stop reason: HOSPADM

## 2024-11-16 RX ORDER — TALC
6 POWDER (GRAM) TOPICAL NIGHTLY PRN
Status: DISCONTINUED | OUTPATIENT
Start: 2024-11-16 | End: 2024-11-19 | Stop reason: HOSPADM

## 2024-11-16 RX ORDER — HEPARIN SODIUM 5000 [USP'U]/ML
5000 INJECTION, SOLUTION INTRAVENOUS; SUBCUTANEOUS EVERY 8 HOURS
Status: DISCONTINUED | OUTPATIENT
Start: 2024-11-16 | End: 2024-11-19 | Stop reason: HOSPADM

## 2024-11-16 RX ORDER — ONDANSETRON HYDROCHLORIDE 2 MG/ML
4 INJECTION, SOLUTION INTRAVENOUS EVERY 8 HOURS PRN
Status: DISCONTINUED | OUTPATIENT
Start: 2024-11-16 | End: 2024-11-19 | Stop reason: HOSPADM

## 2024-11-16 RX ORDER — HYDROCHLOROTHIAZIDE 25 MG/1
25 TABLET ORAL
Status: DISPENSED | OUTPATIENT
Start: 2024-11-16 | End: 2024-11-16

## 2024-11-16 RX ORDER — ACETAMINOPHEN 325 MG/1
650 TABLET ORAL EVERY 4 HOURS PRN
Status: DISCONTINUED | OUTPATIENT
Start: 2024-11-16 | End: 2024-11-19 | Stop reason: HOSPADM

## 2024-11-16 RX ADMIN — OXYBUTYNIN CHLORIDE 5 MG: 5 TABLET ORAL at 01:11

## 2024-11-16 RX ADMIN — OXYBUTYNIN CHLORIDE 5 MG: 5 TABLET ORAL at 09:11

## 2024-11-16 RX ADMIN — GABAPENTIN 600 MG: 300 CAPSULE ORAL at 09:11

## 2024-11-16 RX ADMIN — GABAPENTIN 600 MG: 300 CAPSULE ORAL at 03:11

## 2024-11-16 RX ADMIN — AMLODIPINE BESYLATE 10 MG: 10 TABLET ORAL at 09:11

## 2024-11-16 RX ADMIN — PRAVASTATIN SODIUM 40 MG: 40 TABLET ORAL at 09:11

## 2024-11-16 RX ADMIN — NITROGLYCERIN 0.4 MG: 0.4 TABLET, ORALLY DISINTEGRATING SUBLINGUAL at 08:11

## 2024-11-16 RX ADMIN — HEPARIN SODIUM 5000 UNITS: 5000 INJECTION INTRAVENOUS; SUBCUTANEOUS at 03:11

## 2024-11-16 RX ADMIN — GABAPENTIN 600 MG: 300 CAPSULE ORAL at 01:11

## 2024-11-16 RX ADMIN — HYDROCODONE BITARTRATE AND ACETAMINOPHEN 1 TABLET: 10; 325 TABLET ORAL at 03:11

## 2024-11-16 RX ADMIN — ASPIRIN 325 MG ORAL TABLET 325 MG: 325 PILL ORAL at 06:11

## 2024-11-16 RX ADMIN — LOSARTAN POTASSIUM 100 MG: 50 TABLET, FILM COATED ORAL at 01:11

## 2024-11-16 RX ADMIN — HEPARIN SODIUM 5000 UNITS: 5000 INJECTION INTRAVENOUS; SUBCUTANEOUS at 09:11

## 2024-11-16 NOTE — HPI
Mrs. Alan is an 84 year old female with a pmhx of 84-year-old of diabetes, history of polio with chronic RLE swelling and wheel chair bound, HTN who presents with sharp, stabbing midsternal intermittent chest pain that began at 5pm yesterday and lasted until 9 am this morning. Chest pain lasts for a few seconds before going away. It is associated with cold sweats. No arm, jaw pain, N/V, SOB associated. She has never experienced a pain like this before. When she got the ED she was given an ASA and nitro and pain resolved in 15 minutes. It has not returned. She reports eating chinese food yesterday evening but did not feel like reflux. She reports over past two weeks her sugars have been more elevated than normal, running in the 250s, but she is not strictly compliant with diabetic diet. No fever, chills, SOB, cough, abdominal pain, D/C, urinary symptoms.     In ED, VSS. Afebrile without leukocytosis. Troponin 0.008. BNP 71. EKG without acute ischemia. Labs grossly unremarkable. Patient given  mg and hold amlodipine and admitted to observation for chest pain rule out.

## 2024-11-16 NOTE — ED TRIAGE NOTES
"Emilia Vega HAWA Alan, a 84 y.o. female presents to the ED w/ complaint of chest pain. Patient states chest pain started about an hour and half ago, patient denies SOB,n/v/d    Triage note:  Chief Complaint   Patient presents with    Chest Pain     "I feel weird" R anterior chest pain x 1.5 hr      Review of patient's allergies indicates:   Allergen Reactions    Atenolol     Verapamil      Past Medical History:   Diagnosis Date    Allergy     Anemia 10/20/2014    Arthritis     Atherosclerosis of artery of right lower extremity: see xray 4/4/07 8/8/2017    Diabetes mellitus     Diabetic neuropathy 7/25/2012    Gait disorder 7/25/2012    High cholesterol     History of poliomyelitis 7/25/2012    Hyperlipidemia 8/5/2013    Hypertension     Obstructive sleep apnea syndrome: dx 2008 needs CPAP 11 6/28/2017    Osteopenia     Osteoporosis, unspecified 6/5/2014    Other specified anemias 7/6/2015    Post poliomyelitis syndrome 7/25/2012    Renal manifestation of secondary diabetes mellitus     Sleep apnea     Type II or unspecified type diabetes mellitus without mention of complication, not stated as uncontrolled 7/6/2015    Unspecified essential hypertension 7/6/2015       "

## 2024-11-16 NOTE — H&P
"[facility] - Emergency Dept  [facility]  History & Physical    Patient Name: Emilia Alan  MRN: [id number]  Patient Class: OP- Observation  Admission Date: 11/16/2024  Attending Physician: Greg Powers MD   Primary Care Provider: Lauren Deras MD         Patient information was obtained from patient and ER records.     Subjective:     Principal Problem:Chest pain    Chief Complaint:   Chief Complaint   Patient presents with    Chest Pain     "I feel weird" R anterior chest pain x 1.5 hr         HPI: Mrs. Alan is an 84 year old female with a pmhx of 84-year-old of diabetes, history of polio with chronic RLE swelling and wheel chair bound, HTN who presents with sharp, stabbing midsternal intermittent chest pain that began at 5pm yesterday and lasted until 9 am this morning. Chest pain lasts for a few seconds before going away. It is associated with cold sweats. No arm, jaw pain, N/V, SOB associated. She has never experienced a pain like this before. When she got the ED she was given an ASA and nitro and pain resolved in 15 minutes. It has not returned. She reports eating chinese food yesterday evening but did not feel like reflux. She reports over past two weeks her sugars have been more elevated than normal, running in the 250s, but she is not strictly compliant with diabetic diet. No fever, chills, SOB, cough, abdominal pain, D/C, urinary symptoms.     In ED, VSS. Afebrile without leukocytosis. Troponin 0.008. BNP 71. EKG without acute ischemia. Labs grossly unremarkable. Patient given  mg and hold amlodipine and admitted to observation for chest pain rule out.     Past Medical History:   Diagnosis Date    Allergy     Anemia 10/20/2014    Arthritis     Atherosclerosis of artery of right lower extremity: see xray 4/4/07 8/8/2017    Diabetes mellitus     Diabetic neuropathy 7/25/2012    Gait disorder 7/25/2012    High cholesterol     History of poliomyelitis 7/25/2012    Hyperlipidemia " 2013    Hypertension     Obstructive sleep apnea syndrome: dx 2008 needs CPAP 11 2017    Osteopenia     Osteoporosis, unspecified 2014    Other specified anemias 2015    Post poliomyelitis syndrome 2012    Renal manifestation of secondary diabetes mellitus     Sleep apnea     Type II or unspecified type diabetes mellitus without mention of complication, not stated as uncontrolled 2015    Unspecified essential hypertension 2015       Past Surgical History:   Procedure Laterality Date    CATARACT EXTRACTION       SECTION      CHOLECYSTECTOMY      COLONOSCOPY N/A 2017    Procedure: COLONOSCOPY;  Surgeon: Karen Lebron MD;  Location: [address]);  Service: Endoscopy;  Laterality: N/A;    EYE SURGERY      FRACTURE SURGERY         Review of patient's allergies indicates:   Allergen Reactions    Atenolol     Verapamil        No current facility-administered medications on file prior to encounter.     Current Outpatient Medications on File Prior to Encounter   Medication Sig    amLODIPine (NORVASC) 10 MG tablet TAKE 1 TABLET EVERY DAY    aspirin (ECOTRIN) 81 MG EC tablet Take 1 tablet (81 mg total) by mouth once daily.    gabapentin (NEURONTIN) 600 MG tablet TAKE 1 TABLET THREE TIMES DAILY    hydroCHLOROthiazide (HYDRODIURIL) 25 MG tablet TAKE 1 TABLET EVERY DAY    HYDROcodone-acetaminophen (NORCO)  mg per tablet Take 1 tablet by mouth every 6 (six) hours as needed for Pain (pain).    irbesartan (AVAPRO) 300 MG tablet TAKE 1 TABLET EVERY DAY    metFORMIN (GLUCOPHAGE-XR) 500 MG ER 24hr tablet TAKE 1 TABLET TWICE DAILY WITH MEALS    oxybutynin (DITROPAN) 5 MG Tab TAKE 1 TABLET TWICE DAILY    pravastatin (PRAVACHOL) 40 MG tablet TAKE 1 AND 1/2 TABLETS EVERY NIGHT    ACCU-CHEK GUIDE TEST STRIPS Strp TEST BLOOD SUGAR TWO TIMES DAILY    alcohol swabs (DROPSAFE ALCOHOL PREP PADS) PadM USE AS DIRECTED EVERY DAY    blood-glucose meter (ACCU-CHEK GUIDE GLUCOSE METER) Misc  USE AS DIRECTED    blood-glucose meter (TRUERESULT BLOOD GLUCOSE SYSTM) kit Use as instructed    calcium-vitamin D3-vitamin K 500-100-40 mg-unit-mcg Chew Take 2 each by mouth.    clotrimazole-betamethasone 1-0.05% (LOTRISONE) cream Apply topically 2 (two) times daily.    desloratadine (CLARINEX) 5 mg tablet Take 5 mg by mouth once daily.    diclofenac (VOLTAREN) 75 MG EC tablet TAKE 1 TABLET WITH FOOD TWICE DAILY AS NEEDED. STOP IF ANY STOMACH IRRITATION    diclofenac sodium (VOLTAREN) 1 % Gel GRETEL 2 GRAMS TO BOTH KNEES AND LEFT SHOULDER QID    docusate sodium (COLACE) 50 MG capsule Take 1 capsule (50 mg total) by mouth 2 (two) times daily as needed for Constipation.    ketoconazole (NIZORAL) 2 % cream Apply topically once daily.    lancets (ACCU-CHEK SOFTCLIX LANCETS) Misc Use to test blood glucose two (2) times daily as directed with insurance preferred meter and supplies    multivitamin (THERAGRAN) per tablet Take 1 tablet by mouth once daily.     zoledronic acid-mannitol & water (RECLAST) 5 mg/100 mL PgBk      Family History       Problem Relation (Age of Onset)    Diabetes Father, Daughter, Daughter, Daughter, Son    Heart attack Father    Heart disease Father, Brother, Brother          Tobacco Use    Smoking status: Never    Smokeless tobacco: Never   Substance and Sexual Activity    Alcohol use: No    Drug use: No    Sexual activity: Yes     Review of Systems   Constitutional:  Negative for chills, fatigue and fever.   HENT:  Negative for congestion and rhinorrhea.    Respiratory:  Negative for cough and shortness of breath.    Cardiovascular:  Positive for chest pain and leg swelling (RLE, chronic).   Gastrointestinal:  Negative for abdominal distention, abdominal pain, constipation, diarrhea and vomiting.   Genitourinary:  Negative for difficulty urinating and dysuria.   Musculoskeletal:  Positive for back pain. Negative for arthralgias (chronic) and myalgias.   Neurological:  Positive for numbness. Negative  for dizziness, weakness and light-headedness.     Objective:     Vital Signs (Most Recent):  Temp: 98.2 °F (36.8 °C) (11/16/24 0700)  Pulse: 82 (11/16/24 1030)  Resp: 18 (11/16/24 1030)  BP: (!) 138/55 (11/16/24 1030)  SpO2: 96 % (11/16/24 1030) Vital Signs (24h Range):  Temp:  [98.1 °F (36.7 °C)-98.2 °F (36.8 °C)] 98.2 °F (36.8 °C)  Pulse:  [] 82  Resp:  [12-24] 18  SpO2:  [96 %-98 %] 96 %  BP: (125-175)/(55-88) 138/55     Weight: 63 kg (139 lb)  Body mass index is 27.15 kg/m².     Physical Exam  Vitals and nursing note reviewed.   Constitutional:       General: She is not in acute distress.     Appearance: Normal appearance. She is not ill-appearing.   HENT:      Head: Normocephalic and atraumatic.      Mouth/Throat:      Mouth: Mucous membranes are moist.      Pharynx: Oropharynx is clear.   Eyes:      Extraocular Movements: Extraocular movements intact.      Conjunctiva/sclera: Conjunctivae normal.      Pupils: Pupils are equal, round, and reactive to light.   Cardiovascular:      Rate and Rhythm: Normal rate and regular rhythm.      Pulses: Normal pulses.      Heart sounds: Normal heart sounds. No murmur heard.  Pulmonary:      Effort: Pulmonary effort is normal.      Breath sounds: Normal breath sounds. No wheezing or rales.   Abdominal:      General: Abdomen is flat. Bowel sounds are normal. There is no distension.      Palpations: Abdomen is soft.      Tenderness: There is no abdominal tenderness. There is no guarding.   Musculoskeletal:         General: No tenderness.      Right lower leg: Edema (2+, chronic) present.      Left lower leg: No edema.   Skin:     General: Skin is warm and dry.      Capillary Refill: Capillary refill takes less than 2 seconds.   Neurological:      General: No focal deficit present.      Mental Status: She is alert and oriented to person, place, and time.   Psychiatric:         Mood and Affect: Mood normal.         Behavior: Behavior normal.              CRANIAL NERVES      CN III, IV, VI   Pupils are equal, round, and reactive to light.       Significant Labs: All pertinent labs within the past 24 hours have been reviewed.  CBC:   Recent Labs   Lab 11/16/24  0639   WBC 10.06   HGB 13.7   HCT 41.1        CMP:   Recent Labs   Lab 11/16/24  0639      K 3.6      CO2 25   *   BUN 20   CREATININE 0.9   CALCIUM 11.0*   PROT 7.8   ALBUMIN 3.9   BILITOT 0.6   ALKPHOS 95   AST 19   ALT 12   ANIONGAP 13     Cardiac Markers:   Recent Labs   Lab 11/16/24  0639   BNP 71     POCT Glucose:   Recent Labs   Lab 11/16/24  1104   POCTGLUCOSE 179*       Significant Imaging: I have reviewed all pertinent imaging results/findings within the past 24 hours.  Assessment/Plan:     * Chest pain  Patient presents with sharp, stabbing, intermittent chest pain since last night associated with cold sweats. Resolved within 15 minutes of nitro and has not returned.     - HDS, VSS  - troponin 0.008> 0.03, will trend  - EKG without acute ischemia  - TTE pending  - given  mg  - continue ASA 81 mg, statin  - tele  - NPO for now  - low concern for ACS currently, will monitor closely      Type 2 diabetes mellitus with diabetic neuropathy, without long-term current use of insulin  Patient's FSGs are controlled on current medication regimen.  Last A1c reviewed-   Lab Results   Component Value Date    HGBA1C 5.9 (H) 04/15/2024     Most recent fingerstick glucose reviewed-   Recent Labs   Lab 11/16/24  1104   POCTGLUCOSE 179*     Current correctional scale  Low  Maintain anti-hyperglycemic dose as follows-   Antihyperglycemics (From admission, onward)      Start     Stop Route Frequency Ordered    11/16/24 1120  insulin aspart U-100 pen 0-5 Units         -- SubQ Before meals & nightly PRN 11/16/24 1020          Hold Oral hypoglycemics while patient is in the hospital.    Primary hypertension  Patients blood pressure range in the last 24 hours was: BP  Min: 125/66  Max: 175/87.The patient's  inpatient anti-hypertensive regimen is listed below:  Current Antihypertensives  nitroGLYCERIN SL tablet 0.4 mg, Every 5 min PRN, Sublingual  amLODIPine tablet 10 mg, Daily, Oral  losartan tablet 100 mg, Daily, Oral    Plan  - BP is controlled, no changes needed to their regimen  - followed with digital medicine    Mixed hyperlipidemia  - continue home statin        VTE Risk Mitigation (From admission, onward)           Ordered     heparin (porcine) injection 5,000 Units  Every 8 hours         11/16/24 1020     IP VTE HIGH RISK PATIENT  Once         11/16/24 1020     Place sequential compression device  Until discontinued         11/16/24 1020                         On 11/16/2024, patient should be placed in hospital observation services under my care in collaboration with Dr Powers.           Andreea Michelle PA-C  Department of Hospital Medicine  [facility] - Emergency Dept

## 2024-11-16 NOTE — Clinical Note
The catheter was inserted into the ostium   right coronary artery. An angiography was performed of the right coronary arteries. And removed

## 2024-11-16 NOTE — PLAN OF CARE
Problem: Infection  Goal: Absence of Infection Signs and Symptoms  Outcome: Progressing     Problem: Adult Inpatient Plan of Care  Goal: Plan of Care Review  Outcome: Progressing  Goal: Patient-Specific Goal (Individualized)  Outcome: Progressing  Goal: Absence of Hospital-Acquired Illness or Injury  Outcome: Progressing  Goal: Optimal Comfort and Wellbeing  Outcome: Progressing  Goal: Readiness for Transition of Care  Outcome: Progressing     Problem: Diabetes Comorbidity  Goal: Blood Glucose Level Within Targeted Range  Outcome: Progressing

## 2024-11-16 NOTE — Clinical Note
The catheter was inserted into the ostium   right coronary artery. An angiography was performed of the right coronary arteries. Multiple views were taken. And removed.

## 2024-11-16 NOTE — ASSESSMENT & PLAN NOTE
Patient's FSGs are controlled on current medication regimen.  Last A1c reviewed-   Lab Results   Component Value Date    HGBA1C 5.9 (H) 04/15/2024     Most recent fingerstick glucose reviewed-   Recent Labs   Lab 11/16/24  1104   POCTGLUCOSE 179*     Current correctional scale  Low  Maintain anti-hyperglycemic dose as follows-   Antihyperglycemics (From admission, onward)      Start     Stop Route Frequency Ordered    11/16/24 1120  insulin aspart U-100 pen 0-5 Units         -- SubQ Before meals & nightly PRN 11/16/24 1020          Hold Oral hypoglycemics while patient is in the hospital.

## 2024-11-16 NOTE — ED NOTES
Pt states right sided chest pain is now 0/10 but cannot tell if the medication helped or if it is her usual pattern since the pain has been intermittent. Pt instructed to inform nurse if pain returns. MD Blayne notified

## 2024-11-16 NOTE — ASSESSMENT & PLAN NOTE
Patients blood pressure range in the last 24 hours was: BP  Min: 125/66  Max: 175/87.The patient's inpatient anti-hypertensive regimen is listed below:  Current Antihypertensives  nitroGLYCERIN SL tablet 0.4 mg, Every 5 min PRN, Sublingual  amLODIPine tablet 10 mg, Daily, Oral  losartan tablet 100 mg, Daily, Oral    Plan  - BP is controlled, no changes needed to their regimen  - followed with digital medicine

## 2024-11-16 NOTE — ED NOTES
Assumed care for pt after recieving report from GRANT Portillo. Pt resting in bed in NAD, RR e/u. Vital signs stable and within desired limits at this time of assessment. Pt offered bathroom assistance and denies need at this time. Explanation of care/wait provided. Pt verbalizes no needs at this time. Bed in low, locked position with rails up and call bell in reach. Pt's white board updated with today's care team and plan.

## 2024-11-16 NOTE — ASSESSMENT & PLAN NOTE
Patient presents with sharp, stabbing, intermittent chest pain since last night associated with cold sweats. Resolved within 15 minutes of nitro and has not returned.     - HDS, VSS  - troponin 0.008> 0.03, will trend  - EKG without acute ischemia  - TTE pending  - given  mg  - continue ASA 81 mg, statin  - tele  - NPO for now  - low concern for ACS currently, will monitor closely

## 2024-11-16 NOTE — ED PROVIDER NOTES
"Encounter Date: 2024       History     Chief Complaint   Patient presents with    Chest Pain     "I feel weird" R anterior chest pain x 1.5 hr      HPI  84-year-old female history of diabetes presents for chest pain that started 60 minutes prior to arrival.  Patient has no cardiac history.  She says that her blood sugar has been up the past couple weeks.  She denies other complaints including nausea vomiting diarrhea shortness of breath blood in stool, melena, abdominal pain.    Review of patient's allergies indicates:   Allergen Reactions    Atenolol     Verapamil      Past Medical History:   Diagnosis Date    Allergy     Anemia 10/20/2014    Arthritis     Atherosclerosis of artery of right lower extremity: see xray 07    Diabetes mellitus     Diabetic neuropathy 2012    Gait disorder 2012    High cholesterol     History of poliomyelitis 2012    Hyperlipidemia 2013    Hypertension     Obstructive sleep apnea syndrome: dx 2008 needs CPAP 11 2017    Osteopenia     Osteoporosis, unspecified 2014    Other specified anemias 2015    Post poliomyelitis syndrome 2012    Renal manifestation of secondary diabetes mellitus     Sleep apnea     Type II or unspecified type diabetes mellitus without mention of complication, not stated as uncontrolled 2015    Unspecified essential hypertension 2015     Past Surgical History:   Procedure Laterality Date    CATARACT EXTRACTION       SECTION      CHOLECYSTECTOMY      COLONOSCOPY N/A 2017    Procedure: COLONOSCOPY;  Surgeon: Karen Lebron MD;  Location: [address]);  Service: Endoscopy;  Laterality: N/A;    EYE SURGERY      FRACTURE SURGERY       Family History   Problem Relation Name Age of Onset    Diabetes Father      Heart disease Father      Heart attack Father      Heart disease Brother      Heart disease Brother      Diabetes Daughter      Diabetes Daughter      Diabetes Daughter      " Diabetes Son      Cataracts Neg Hx      Glaucoma Neg Hx      Hypertension Neg Hx      Cancer Neg Hx      Blindness Neg Hx      Amblyopia Neg Hx      Strabismus Neg Hx      Retinal detachment Neg Hx      Macular degeneration Neg Hx      Melanoma Neg Hx       Social History     Tobacco Use    Smoking status: Never    Smokeless tobacco: Never   Substance Use Topics    Alcohol use: No    Drug use: No     Review of Systems    Physical Exam     Initial Vitals [11/16/24 0551]   BP Pulse Resp Temp SpO2   (!) 175/87 103 20 98.1 °F (36.7 °C) 98 %      MAP       --         Physical Exam    Nursing note and vitals reviewed.  Constitutional: She appears well-developed and well-nourished.   HENT:   Head: Normocephalic and atraumatic.   Neck: Neck supple. No tracheal deviation present. No JVD present.   Cardiovascular:  Normal rate, regular rhythm, normal heart sounds and intact distal pulses.     Exam reveals no gallop and no friction rub.       No murmur heard.  Pulmonary/Chest: Breath sounds normal. No stridor. No respiratory distress. She has no wheezes. She has no rhonchi. She has no rales. She exhibits no tenderness.   Abdominal: Abdomen is soft. She exhibits no distension and no mass. There is no abdominal tenderness. There is no rebound and no guarding.   Musculoskeletal:         General: No tenderness or edema. Normal range of motion.      Cervical back: Neck supple.     Lymphadenopathy:     She has no cervical adenopathy.   Neurological: She is alert and oriented to person, place, and time. GCS score is 15. GCS eye subscore is 4. GCS verbal subscore is 5. GCS motor subscore is 6.   Skin: Skin is warm and dry. Capillary refill takes less than 2 seconds.   Psychiatric: She has a normal mood and affect. Her behavior is normal. Judgment and thought content normal.         ED Course   Procedures  Labs Reviewed   CBC W/ AUTO DIFFERENTIAL - Abnormal       Result Value    WBC 10.06      RBC 4.30      Hemoglobin 13.7       Hematocrit 41.1      MCV 96      MCH 31.9 (*)     MCHC 33.3      RDW 13.1      Platelets 395      MPV 10.8      Immature Granulocytes 0.4      Gran # (ANC) 6.3      Immature Grans (Abs) 0.04      Lymph # 2.4      Mono # 0.8      Eos # 0.3      Baso # 0.17      nRBC 0      Gran % 62.3      Lymph % 24.1      Mono % 8.3      Eosinophil % 3.2      Basophil % 1.7      Differential Method Automated     COMPREHENSIVE METABOLIC PANEL - Abnormal    Sodium 139      Potassium 3.6      Chloride 101      CO2 25      Glucose 175 (*)     BUN 20      Creatinine 0.9      Calcium 11.0 (*)     Total Protein 7.8      Albumin 3.9      Total Bilirubin 0.6      Alkaline Phosphatase 95      AST 19      ALT 12      eGFR >60.0      Anion Gap 13     URINALYSIS, REFLEX TO URINE CULTURE - Abnormal    Specimen UA Urine, Clean Catch      Color, UA Colorless (*)     Appearance, UA Clear      pH, UA 8.0      Specific Gravity, UA 1.010      Protein, UA Negative      Glucose, UA Negative      Ketones, UA Negative      Bilirubin (UA) Negative      Occult Blood UA Negative      Nitrite, UA Negative      Leukocytes, UA Negative      Narrative:     Specimen Source->Urine   TROPONIN I - Abnormal    Troponin I 0.030 (*)    POCT GLUCOSE - Abnormal    POCT Glucose 179 (*)    HEPATITIS C ANTIBODY    Hepatitis C Ab Non-reactive      Narrative:     Release to patient->Immediate   HIV 1 / 2 ANTIBODY    HIV 1/2 Ag/Ab Non-reactive      Narrative:     Release to patient->Immediate   TROPONIN I    Troponin I 0.008     B-TYPE NATRIURETIC PEPTIDE    BNP 71     TROPONIN I   POCT GLUCOSE, HAND-HELD DEVICE   ISTAT LACTATE    POC Lactate 2.18      Sample VENOUS      Site Other      Allens Test N/A          ECG Results              Repeat EKG 12-lead (Final result)        Collection Time Result Time QRS Duration [facility]HS QTC Calculation    11/16/24 08:06:51 11/16/24 09:27:57 76 474                     Final result by Interface, Lab In [facility] (11/16/24 09:28:03)                    Narrative:    Test Reason : R07.9,    Vent. Rate :  88 BPM     Atrial Rate :  88 BPM     P-R Int : 146 ms          QRS Dur :  76 ms      QT Int : 392 ms       P-R-T Axes :  61 -27  49 degrees    QTcB Int : 474 ms    Normal sinus rhythm  Nonspecific ST abnormality  Abnormal ECG  When compared with ECG of 16-Nov-2024 06:48,  No significant change was found  Confirmed by Nakul Ross ([id number]) on 11/16/2024 9:27:55 AM    Referred By: AAAREFERRAL SELF           Confirmed By: Nakul Ross                                     EKG 12-lead (Final result)        Collection Time Result Time QRS Duration OHS QTC Calculation    11/16/24 06:48:32 11/16/24 07:48:31 76 438                     Final result by Interface, Lab In [facility] (11/16/24 07:48:36)                   Narrative:    Test Reason : R07.9,    Vent. Rate :  76 BPM     Atrial Rate :  76 BPM     P-R Int : 148 ms          QRS Dur :  76 ms      QT Int : 390 ms       P-R-T Axes :  66 -23  43 degrees    QTcB Int : 438 ms    Normal sinus rhythm with sinus arrhythmia  Nonspecific ST and/or T wave abnormalities  Abnormal ECG  When compared with ECG of 16-Nov-2024 06:03,  No significant change was found  Confirmed by Alexys Bullock ([id number]) on 11/16/2024 7:48:27 AM    Referred By: AAAREFERRAL SELF           Confirmed By: Alexys Bullock                                     EKG 12-lead (Final result)        Collection Time Result Time QRS Duration OHS QTC Calculation    11/16/24 06:03:47 11/16/24 07:48:39 74 435                     Final result by Interface, Lab In [facility] (11/16/24 07:48:46)                   Narrative:    Test Reason : R07.9,    Vent. Rate :  80 BPM     Atrial Rate :  80 BPM     P-R Int : 148 ms          QRS Dur :  74 ms      QT Int : 378 ms       P-R-T Axes :  66 -33  55 degrees    QTcB Int : 435 ms    Normal sinus rhythm  Left axis deviation  Nonspecific ST abnormality  Abnormal ECG  When compared with ECG of 23-Jan-2018 13:58,  No significant change was  found  Confirmed by Alexys Bullock ([id number]) on 11/16/2024 7:48:38 AM    Referred By: AAAREFERRAL SELF           Confirmed By: Alexys Bullock                                  Imaging Results              X-Ray Chest AP Portable (Final result)  Result time 11/16/24 08:03:57      Final result by Ike Chawla MD (11/16/24 08:03:57)                   Impression:      No convincing evidence of acute cardiopulmonary disease.      Electronically signed by: Ike Chawla  Date:    11/16/2024  Time:    08:03               Narrative:    EXAMINATION:  XR CHEST AP PORTABLE    CLINICAL HISTORY:  Chest Pain;    TECHNIQUE:  Single frontal view of the chest was performed.    COMPARISON:  Chest radiograph performed 06/09/2017.    FINDINGS:  Monitoring leads overlie the chest.    Cardiac contours appear grossly unchanged within normal limits.    Lungs essentially clear.  No definite pneumothorax or large volume pleural effusion.    No acute findings in the visualized abdomen.  Osseous and soft tissue structures appear without definite acute change.                                       Medications   nitroGLYCERIN SL tablet 0.4 mg (0.4 mg Sublingual Given 11/16/24 0801)   hydroCHLOROthiazide tablet 25 mg (0 mg Oral Hold 11/16/24 0915)   sodium chloride 0.9% flush 5 mL (has no administration in time range)   albuterol-ipratropium 2.5 mg-0.5 mg/3 mL nebulizer solution 3 mL (has no administration in time range)   melatonin tablet 6 mg (has no administration in time range)   polyethylene glycol packet 17 g (17 g Oral Not Given 11/16/24 1030)   bisacodyL suppository 10 mg (has no administration in time range)   acetaminophen tablet 650 mg (has no administration in time range)   acetaminophen tablet 650 mg (has no administration in time range)   HYDROcodone-acetaminophen 5-325 mg per tablet 1 tablet (has no administration in time range)   naloxone 0.4 mg/mL injection 0.02 mg (has no administration in time range)   glucose chewable  tablet 16 g (has no administration in time range)   glucose chewable tablet 24 g (has no administration in time range)   glucagon (human recombinant) injection 1 mg (has no administration in time range)   heparin (porcine) injection 5,000 Units (has no administration in time range)   HYDROcodone-acetaminophen  mg per tablet 1 tablet (has no administration in time range)   ondansetron disintegrating tablet 8 mg (has no administration in time range)   ondansetron injection 4 mg (has no administration in time range)   insulin aspart U-100 pen 0-5 Units (has no administration in time range)   simethicone chewable tablet 80 mg (has no administration in time range)   aluminum-magnesium hydroxide-simethicone 200-200-20 mg/5 mL suspension 30 mL (has no administration in time range)   dextrose 10% bolus 125 mL 125 mL (has no administration in time range)   dextrose 10% bolus 250 mL 250 mL (has no administration in time range)   amLODIPine tablet 10 mg (has no administration in time range)   aspirin EC tablet 81 mg (has no administration in time range)   gabapentin capsule 600 mg (has no administration in time range)   losartan tablet 100 mg (has no administration in time range)   oxybutynin tablet 5 mg (has no administration in time range)   pravastatin tablet 40 mg (has no administration in time range)   aspirin tablet 325 mg (325 mg Oral Given 11/16/24 0647)   amLODIPine tablet 10 mg (10 mg Oral Given 11/16/24 0945)     Medical Decision Making  84-year-old female chest pain.    Differential diagnosis for this patient includes but isn't limited to ACS, pneumonia, pneumothorax, cardiac tamponade, pericarditis.    Pneumothorax cardiac tamponade this likely.  Chest x-ray was negative for pneumonia, patient does have a elevated lactate on initial labs however is well-appearing afebrile not tachycardic.  UA ordered.    I considered pericarditis however there is not diffuse ST changes or UT depression on her EKG.   Additionally her chest pain is not pleuritic or positional.    Initial troponin was negative.  Initial EKG showed some depression in 2 3 AVF, due to time course of chest pain and prompt arrival patient, repeat EKG was ordered.  Redemonstrates ST depression in same leads.  Third EKG ordered for 10:00 a.m.    Using patient shared decision-making, patient to be admitted to Hospital Medicine for workup of high-risk chest pain.  I discussed case with hospital medicine who agreed to place patient was in observation.    Other labs notable for negative UA, she would have a positive lactate however has no ill symptoms, no tachycardia no fevers chills nausea vomiting on questioning.  In addition UA is negative making UTI and pyelonephritis less likely.    Second troponin was elevated.  Patient was admitted time of result.               ED Course as of 11/16/24 1302   Sat Nov 16, 2024   0734 Chest x-ray independently interpreted no acute findings. [KW]   1118 Troponin I(!): 0.030  Positive 2nd troponin. [KW]      ED Course User Index  [KW] Aleks Cisneros MD                             Clinical Impression:  Final diagnoses:  [R07.9] Chest pain          ED Disposition Condition    Observation                 Aleks Cisneros MD  Resident  11/16/24 1302

## 2024-11-16 NOTE — SUBJECTIVE & OBJECTIVE
Past Medical History:   Diagnosis Date    Allergy     Anemia 10/20/2014    Arthritis     Atherosclerosis of artery of right lower extremity: see xray 07    Diabetes mellitus     Diabetic neuropathy 2012    Gait disorder 2012    High cholesterol     History of poliomyelitis 2012    Hyperlipidemia 2013    Hypertension     Obstructive sleep apnea syndrome: dx 2008 needs CPAP 11 2017    Osteopenia     Osteoporosis, unspecified 2014    Other specified anemias 2015    Post poliomyelitis syndrome 2012    Renal manifestation of secondary diabetes mellitus     Sleep apnea     Type II or unspecified type diabetes mellitus without mention of complication, not stated as uncontrolled 2015    Unspecified essential hypertension 2015       Past Surgical History:   Procedure Laterality Date    CATARACT EXTRACTION       SECTION      CHOLECYSTECTOMY      COLONOSCOPY N/A 2017    Procedure: COLONOSCOPY;  Surgeon: Karen Lebron MD;  Location: [address]);  Service: Endoscopy;  Laterality: N/A;    EYE SURGERY      FRACTURE SURGERY         Review of patient's allergies indicates:   Allergen Reactions    Atenolol     Verapamil        No current facility-administered medications on file prior to encounter.     Current Outpatient Medications on File Prior to Encounter   Medication Sig    amLODIPine (NORVASC) 10 MG tablet TAKE 1 TABLET EVERY DAY    aspirin (ECOTRIN) 81 MG EC tablet Take 1 tablet (81 mg total) by mouth once daily.    gabapentin (NEURONTIN) 600 MG tablet TAKE 1 TABLET THREE TIMES DAILY    hydroCHLOROthiazide (HYDRODIURIL) 25 MG tablet TAKE 1 TABLET EVERY DAY    HYDROcodone-acetaminophen (NORCO)  mg per tablet Take 1 tablet by mouth every 6 (six) hours as needed for Pain (pain).    irbesartan (AVAPRO) 300 MG tablet TAKE 1 TABLET EVERY DAY    metFORMIN (GLUCOPHAGE-XR) 500 MG ER 24hr tablet TAKE 1 TABLET TWICE DAILY WITH MEALS    oxybutynin  (DITROPAN) 5 MG Tab TAKE 1 TABLET TWICE DAILY    pravastatin (PRAVACHOL) 40 MG tablet TAKE 1 AND 1/2 TABLETS EVERY NIGHT    ACCU-CHEK GUIDE TEST STRIPS Strp TEST BLOOD SUGAR TWO TIMES DAILY    alcohol swabs (DROPSAFE ALCOHOL PREP PADS) PadM USE AS DIRECTED EVERY DAY    blood-glucose meter (ACCU-CHEK GUIDE GLUCOSE METER) Misc USE AS DIRECTED    blood-glucose meter (TRUERESULT BLOOD GLUCOSE SYSTM) kit Use as instructed    calcium-vitamin D3-vitamin K 500-100-40 mg-unit-mcg Chew Take 2 each by mouth.    clotrimazole-betamethasone 1-0.05% (LOTRISONE) cream Apply topically 2 (two) times daily.    desloratadine (CLARINEX) 5 mg tablet Take 5 mg by mouth once daily.    diclofenac (VOLTAREN) 75 MG EC tablet TAKE 1 TABLET WITH FOOD TWICE DAILY AS NEEDED. STOP IF ANY STOMACH IRRITATION    diclofenac sodium (VOLTAREN) 1 % Gel GRETEL 2 GRAMS TO BOTH KNEES AND LEFT SHOULDER QID    docusate sodium (COLACE) 50 MG capsule Take 1 capsule (50 mg total) by mouth 2 (two) times daily as needed for Constipation.    ketoconazole (NIZORAL) 2 % cream Apply topically once daily.    lancets (ACCU-CHEK SOFTCLIX LANCETS) Misc Use to test blood glucose two (2) times daily as directed with insurance preferred meter and supplies    multivitamin (THERAGRAN) per tablet Take 1 tablet by mouth once daily.     zoledronic acid-mannitol & water (RECLAST) 5 mg/100 mL PgBk      Family History       Problem Relation (Age of Onset)    Diabetes Father, Daughter, Daughter, Daughter, Son    Heart attack Father    Heart disease Father, Brother, Brother          Tobacco Use    Smoking status: Never    Smokeless tobacco: Never   Substance and Sexual Activity    Alcohol use: No    Drug use: No    Sexual activity: Yes     Review of Systems   Constitutional:  Negative for chills, fatigue and fever.   HENT:  Negative for congestion and rhinorrhea.    Respiratory:  Negative for cough and shortness of breath.    Cardiovascular:  Positive for chest pain and leg swelling  (RLE, chronic).   Gastrointestinal:  Negative for abdominal distention, abdominal pain, constipation, diarrhea and vomiting.   Genitourinary:  Negative for difficulty urinating and dysuria.   Musculoskeletal:  Positive for back pain. Negative for arthralgias (chronic) and myalgias.   Neurological:  Positive for numbness. Negative for dizziness, weakness and light-headedness.     Objective:     Vital Signs (Most Recent):  Temp: 98.2 °F (36.8 °C) (11/16/24 0700)  Pulse: 82 (11/16/24 1030)  Resp: 18 (11/16/24 1030)  BP: (!) 138/55 (11/16/24 1030)  SpO2: 96 % (11/16/24 1030) Vital Signs (24h Range):  Temp:  [98.1 °F (36.7 °C)-98.2 °F (36.8 °C)] 98.2 °F (36.8 °C)  Pulse:  [] 82  Resp:  [12-24] 18  SpO2:  [96 %-98 %] 96 %  BP: (125-175)/(55-88) 138/55     Weight: 63 kg (139 lb)  Body mass index is 27.15 kg/m².     Physical Exam  Vitals and nursing note reviewed.   Constitutional:       General: She is not in acute distress.     Appearance: Normal appearance. She is not ill-appearing.   HENT:      Head: Normocephalic and atraumatic.      Mouth/Throat:      Mouth: Mucous membranes are moist.      Pharynx: Oropharynx is clear.   Eyes:      Extraocular Movements: Extraocular movements intact.      Conjunctiva/sclera: Conjunctivae normal.      Pupils: Pupils are equal, round, and reactive to light.   Cardiovascular:      Rate and Rhythm: Normal rate and regular rhythm.      Pulses: Normal pulses.      Heart sounds: Normal heart sounds. No murmur heard.  Pulmonary:      Effort: Pulmonary effort is normal.      Breath sounds: Normal breath sounds. No wheezing or rales.   Abdominal:      General: Abdomen is flat. Bowel sounds are normal. There is no distension.      Palpations: Abdomen is soft.      Tenderness: There is no abdominal tenderness. There is no guarding.   Musculoskeletal:         General: No tenderness.      Right lower leg: Edema (2+, chronic) present.      Left lower leg: No edema.   Skin:     General: Skin  is warm and dry.      Capillary Refill: Capillary refill takes less than 2 seconds.   Neurological:      General: No focal deficit present.      Mental Status: She is alert and oriented to person, place, and time.   Psychiatric:         Mood and Affect: Mood normal.         Behavior: Behavior normal.              CRANIAL NERVES     CN III, IV, VI   Pupils are equal, round, and reactive to light.       Significant Labs: All pertinent labs within the past 24 hours have been reviewed.  CBC:   Recent Labs   Lab 11/16/24  0639   WBC 10.06   HGB 13.7   HCT 41.1        CMP:   Recent Labs   Lab 11/16/24  0639      K 3.6      CO2 25   *   BUN 20   CREATININE 0.9   CALCIUM 11.0*   PROT 7.8   ALBUMIN 3.9   BILITOT 0.6   ALKPHOS 95   AST 19   ALT 12   ANIONGAP 13     Cardiac Markers:   Recent Labs   Lab 11/16/24  0639   BNP 71     POCT Glucose:   Recent Labs   Lab 11/16/24  1104   POCTGLUCOSE 179*       Significant Imaging: I have reviewed all pertinent imaging results/findings within the past 24 hours.

## 2024-11-17 LAB
ANION GAP SERPL CALC-SCNC: 10 MMOL/L (ref 8–16)
BASOPHILS # BLD AUTO: 0.11 K/UL (ref 0–0.2)
BASOPHILS NFR BLD: 1.3 % (ref 0–1.9)
BUN SERPL-MCNC: 19 MG/DL (ref 8–23)
CALCIUM SERPL-MCNC: 9.3 MG/DL (ref 8.7–10.5)
CHLORIDE SERPL-SCNC: 105 MMOL/L (ref 95–110)
CO2 SERPL-SCNC: 26 MMOL/L (ref 23–29)
CREAT SERPL-MCNC: 0.8 MG/DL (ref 0.5–1.4)
DIFFERENTIAL METHOD BLD: ABNORMAL
EOSINOPHIL # BLD AUTO: 0.3 K/UL (ref 0–0.5)
EOSINOPHIL NFR BLD: 3.1 % (ref 0–8)
ERYTHROCYTE [DISTWIDTH] IN BLOOD BY AUTOMATED COUNT: 12.9 % (ref 11.5–14.5)
EST. GFR  (NO RACE VARIABLE): >60 ML/MIN/1.73 M^2
GLUCOSE SERPL-MCNC: 128 MG/DL (ref 70–110)
HCT VFR BLD AUTO: 34.9 % (ref 37–48.5)
HGB BLD-MCNC: 11.4 G/DL (ref 12–16)
IMM GRANULOCYTES # BLD AUTO: 0.03 K/UL (ref 0–0.04)
IMM GRANULOCYTES NFR BLD AUTO: 0.3 % (ref 0–0.5)
LYMPHOCYTES # BLD AUTO: 2.7 K/UL (ref 1–4.8)
LYMPHOCYTES NFR BLD: 31.1 % (ref 18–48)
MCH RBC QN AUTO: 31.1 PG (ref 27–31)
MCHC RBC AUTO-ENTMCNC: 32.7 G/DL (ref 32–36)
MCV RBC AUTO: 95 FL (ref 82–98)
MONOCYTES # BLD AUTO: 0.8 K/UL (ref 0.3–1)
MONOCYTES NFR BLD: 9 % (ref 4–15)
NEUTROPHILS # BLD AUTO: 4.7 K/UL (ref 1.8–7.7)
NEUTROPHILS NFR BLD: 55.2 % (ref 38–73)
NRBC BLD-RTO: 0 /100 WBC
OHS QRS DURATION: 72 MS
OHS QTC CALCULATION: 408 MS
PLATELET # BLD AUTO: 316 K/UL (ref 150–450)
PMV BLD AUTO: 11 FL (ref 9.2–12.9)
POCT GLUCOSE: 139 MG/DL (ref 70–110)
POCT GLUCOSE: 143 MG/DL (ref 70–110)
POCT GLUCOSE: 164 MG/DL (ref 70–110)
POCT GLUCOSE: 172 MG/DL (ref 70–110)
POTASSIUM SERPL-SCNC: 3.2 MMOL/L (ref 3.5–5.1)
RBC # BLD AUTO: 3.66 M/UL (ref 4–5.4)
SODIUM SERPL-SCNC: 141 MMOL/L (ref 136–145)
TROPONIN I SERPL DL<=0.01 NG/ML-MCNC: <0.006 NG/ML (ref 0–0.03)
WBC # BLD AUTO: 8.58 K/UL (ref 3.9–12.7)

## 2024-11-17 PROCEDURE — 63600175 PHARM REV CODE 636 W HCPCS: Mod: HCNC | Performed by: PHYSICIAN ASSISTANT

## 2024-11-17 PROCEDURE — 96372 THER/PROPH/DIAG INJ SC/IM: CPT | Performed by: PHYSICIAN ASSISTANT

## 2024-11-17 PROCEDURE — 84484 ASSAY OF TROPONIN QUANT: CPT | Mod: HCNC | Performed by: PHYSICIAN ASSISTANT

## 2024-11-17 PROCEDURE — 36415 COLL VENOUS BLD VENIPUNCTURE: CPT | Mod: HCNC | Performed by: PHYSICIAN ASSISTANT

## 2024-11-17 PROCEDURE — 85025 COMPLETE CBC W/AUTO DIFF WBC: CPT | Mod: HCNC | Performed by: PHYSICIAN ASSISTANT

## 2024-11-17 PROCEDURE — 80048 BASIC METABOLIC PNL TOTAL CA: CPT | Mod: HCNC | Performed by: PHYSICIAN ASSISTANT

## 2024-11-17 PROCEDURE — G0378 HOSPITAL OBSERVATION PER HR: HCPCS | Mod: HCNC

## 2024-11-17 PROCEDURE — 25000003 PHARM REV CODE 250: Mod: HCNC | Performed by: PHYSICIAN ASSISTANT

## 2024-11-17 PROCEDURE — 25000003 PHARM REV CODE 250: Mod: HCNC

## 2024-11-17 RX ADMIN — OXYBUTYNIN CHLORIDE 5 MG: 5 TABLET ORAL at 08:11

## 2024-11-17 RX ADMIN — POTASSIUM BICARBONATE 25 MEQ: 978 TABLET, EFFERVESCENT ORAL at 08:11

## 2024-11-17 RX ADMIN — GABAPENTIN 600 MG: 300 CAPSULE ORAL at 03:11

## 2024-11-17 RX ADMIN — ASPIRIN 81 MG: 81 TABLET, COATED ORAL at 08:11

## 2024-11-17 RX ADMIN — HEPARIN SODIUM 5000 UNITS: 5000 INJECTION INTRAVENOUS; SUBCUTANEOUS at 05:11

## 2024-11-17 RX ADMIN — LOSARTAN POTASSIUM 100 MG: 50 TABLET, FILM COATED ORAL at 08:11

## 2024-11-17 RX ADMIN — OXYBUTYNIN CHLORIDE 5 MG: 5 TABLET ORAL at 09:11

## 2024-11-17 RX ADMIN — GABAPENTIN 600 MG: 300 CAPSULE ORAL at 08:11

## 2024-11-17 RX ADMIN — GABAPENTIN 600 MG: 300 CAPSULE ORAL at 09:11

## 2024-11-17 RX ADMIN — HYDROCODONE BITARTRATE AND ACETAMINOPHEN 1 TABLET: 10; 325 TABLET ORAL at 07:11

## 2024-11-17 RX ADMIN — POLYETHYLENE GLYCOL 3350 17 G: 17 POWDER, FOR SOLUTION ORAL at 08:11

## 2024-11-17 RX ADMIN — HYDROCODONE BITARTRATE AND ACETAMINOPHEN 1 TABLET: 10; 325 TABLET ORAL at 08:11

## 2024-11-17 RX ADMIN — PRAVASTATIN SODIUM 40 MG: 40 TABLET ORAL at 09:11

## 2024-11-17 RX ADMIN — Medication 6 MG: at 09:11

## 2024-11-17 RX ADMIN — HEPARIN SODIUM 5000 UNITS: 5000 INJECTION INTRAVENOUS; SUBCUTANEOUS at 03:11

## 2024-11-17 RX ADMIN — HEPARIN SODIUM 5000 UNITS: 5000 INJECTION INTRAVENOUS; SUBCUTANEOUS at 09:11

## 2024-11-17 RX ADMIN — AMLODIPINE BESYLATE 10 MG: 10 TABLET ORAL at 08:11

## 2024-11-17 NOTE — PLAN OF CARE
Problem: Infection  Goal: Absence of Infection Signs and Symptoms  Outcome: Progressing     Problem: Adult Inpatient Plan of Care  Goal: Plan of Care Review  Outcome: Progressing  Goal: Patient-Specific Goal (Individualized)  Outcome: Progressing  Goal: Absence of Hospital-Acquired Illness or Injury  Outcome: Progressing  Goal: Optimal Comfort and Wellbeing  Outcome: Progressing  Goal: Readiness for Transition of Care  Outcome: Progressing     Problem: Diabetes Comorbidity  Goal: Blood Glucose Level Within Targeted Range  Outcome: Progressing     Problem: Skin Injury Risk Increased  Goal: Skin Health and Integrity  Outcome: Progressing

## 2024-11-17 NOTE — SUBJECTIVE & OBJECTIVE
Interval History: NAEON. AF, VSS. Reports no further episodes of chest pain. ECHO w/o new WMAs or signs of HF. Plan for NM stress tomorrow.     Review of Systems   Constitutional:  Negative for chills and fever.   Respiratory:  Negative for chest tightness and shortness of breath.    Cardiovascular:  Negative for chest pain and leg swelling.   Gastrointestinal:  Negative for abdominal pain and nausea.   Neurological:  Negative for dizziness and weakness.     Objective:     Vital Signs (Most Recent):  Temp: 97.7 °F (36.5 °C) (11/17/24 0803)  Pulse: 66 (11/17/24 0803)  Resp: 18 (11/17/24 0859)  BP: 131/61 (11/17/24 0803)  SpO2: 95 % (11/17/24 0803) Vital Signs (24h Range):  Temp:  [97.4 °F (36.3 °C)-98.9 °F (37.2 °C)] 97.7 °F (36.5 °C)  Pulse:  [62-87] 66  Resp:  [18] 18  SpO2:  [92 %-96 %] 95 %  BP: (101-138)/(53-71) 131/61     Weight: 62.6 kg (138 lb)  Body mass index is 27.87 kg/m².    Intake/Output Summary (Last 24 hours) at 11/17/2024 0956  Last data filed at 11/17/2024 0434  Gross per 24 hour   Intake --   Output 650 ml   Net -650 ml         Physical Exam  Vitals and nursing note reviewed.   Constitutional:       Appearance: She is well-developed. She is not ill-appearing.   HENT:      Head: Normocephalic and atraumatic.   Eyes:      Pupils: Pupils are equal, round, and reactive to light.   Cardiovascular:      Rate and Rhythm: Normal rate and regular rhythm.   Pulmonary:      Effort: Pulmonary effort is normal.      Breath sounds: Normal breath sounds.   Abdominal:      Palpations: Abdomen is soft.      Tenderness: There is no abdominal tenderness.   Musculoskeletal:         General: No tenderness.   Skin:     General: Skin is warm and dry.   Neurological:      Mental Status: She is alert and oriented to person, place, and time.   Psychiatric:         Behavior: Behavior normal.             Significant Labs: All pertinent labs within the past 24 hours have been reviewed.    Significant Imaging: I have reviewed  all pertinent imaging results/findings within the past 24 hours.

## 2024-11-17 NOTE — ASSESSMENT & PLAN NOTE
Patient presents with sharp, stabbing, intermittent chest pain since last night associated with cold sweats. Resolved within 15 minutes of nitro and has not returned.     - HDS, VSS  - troponin 0.008, 0.03, <0.006  - EKG without acute ischemia  - NM stress test pending  - given  mg  - continue ASA 81 mg, statin  - tele  Results for orders placed during the hospital encounter of 11/16/24    Echo    Interpretation Summary    Left Ventricle: The left ventricle is normal in size. Ventricular mass is normal. Normal wall thickness. There is concentric remodeling. Normal wall motion. There is normal systolic function with a visually estimated ejection fraction of 55 - 60%. There is normal diastolic function.    Right Ventricle: Normal right ventricular cavity size. Wall thickness is normal. Systolic function is normal.    IVC/SVC: Normal venous pressure at 3 mmHg.

## 2024-11-17 NOTE — PROGRESS NOTES
[facility] - Observation [address] [place] [address] Medicine  Progress Note    Patient Name: Emilia Alan  MRN: [id number]  Patient Class: OP- Observation   Admission Date: 11/16/2024  Length of Stay: 0 days  Attending Physician: Greg Powers MD  Primary Care Provider: Lauren Deras MD        Subjective:     Principal Problem:Chest pain, rule out acute myocardial infarction        HPI:  Mrs. Alan is an 84 year old female with a pmhx of 84-year-old of diabetes, history of polio with chronic RLE swelling and wheel chair bound, HTN who presents with sharp, stabbing midsternal intermittent chest pain that began at 5pm yesterday and lasted until 9 am this morning. Chest pain lasts for a few seconds before going away. It is associated with cold sweats. No arm, jaw pain, N/V, SOB associated. She has never experienced a pain like this before. When she got the ED she was given an ASA and nitro and pain resolved in 15 minutes. It has not returned. She reports eating chinese food yesterday evening but did not feel like reflux. She reports over past two weeks her sugars have been more elevated than normal, running in the 250s, but she is not strictly compliant with diabetic diet. No fever, chills, SOB, cough, abdominal pain, D/C, urinary symptoms.     In ED, VSS. Afebrile without leukocytosis. Troponin 0.008. BNP 71. EKG without acute ischemia. Labs grossly unremarkable. Patient given  mg and hold amlodipine and admitted to observation for chest pain rule out.     Overview/Hospital Course:  Emilia Alan is a 84 y.o. female admitted to  for chest pain. Tn slightly elevated but normalized. EKG w/o acute ischemic changes. ECHO w/o acute WMAs. NM stress test ordered for 11/18.    Interval History: NAEON. AF, VSS. Reports no further episodes of chest pain. ECHO w/o new WMAs or signs of HF. Plan for NM stress tomorrow.     Review of Systems   Constitutional:  Negative for chills and fever.   Respiratory:  Negative for  chest tightness and shortness of breath.    Cardiovascular:  Negative for chest pain and leg swelling.   Gastrointestinal:  Negative for abdominal pain and nausea.   Neurological:  Negative for dizziness and weakness.     Objective:     Vital Signs (Most Recent):  Temp: 97.7 °F (36.5 °C) (11/17/24 0803)  Pulse: 66 (11/17/24 0803)  Resp: 18 (11/17/24 0859)  BP: 131/61 (11/17/24 0803)  SpO2: 95 % (11/17/24 0803) Vital Signs (24h Range):  Temp:  [97.4 °F (36.3 °C)-98.9 °F (37.2 °C)] 97.7 °F (36.5 °C)  Pulse:  [62-87] 66  Resp:  [18] 18  SpO2:  [92 %-96 %] 95 %  BP: (101-138)/(53-71) 131/61     Weight: 62.6 kg (138 lb)  Body mass index is 27.87 kg/m².    Intake/Output Summary (Last 24 hours) at 11/17/2024 0956  Last data filed at 11/17/2024 0434  Gross per 24 hour   Intake --   Output 650 ml   Net -650 ml         Physical Exam  Vitals and nursing note reviewed.   Constitutional:       Appearance: She is well-developed. She is not ill-appearing.   HENT:      Head: Normocephalic and atraumatic.   Eyes:      Pupils: Pupils are equal, round, and reactive to light.   Cardiovascular:      Rate and Rhythm: Normal rate and regular rhythm.   Pulmonary:      Effort: Pulmonary effort is normal.      Breath sounds: Normal breath sounds.   Abdominal:      Palpations: Abdomen is soft.      Tenderness: There is no abdominal tenderness.   Musculoskeletal:         General: No tenderness.   Skin:     General: Skin is warm and dry.   Neurological:      Mental Status: She is alert and oriented to person, place, and time.   Psychiatric:         Behavior: Behavior normal.             Significant Labs: All pertinent labs within the past 24 hours have been reviewed.    Significant Imaging: I have reviewed all pertinent imaging results/findings within the past 24 hours.    Assessment/Plan:      * Chest pain, rule out acute myocardial infarction  Patient presents with sharp, stabbing, intermittent chest pain since last night associated with cold  sweats. Resolved within 15 minutes of nitro and has not returned.     - HDS, VSS  - troponin 0.008, 0.03, <0.006  - EKG without acute ischemia  - NM stress test pending  - given  mg  - continue ASA 81 mg, statin  - tele  Results for orders placed during the hospital encounter of 11/16/24    Echo    Interpretation Summary    Left Ventricle: The left ventricle is normal in size. Ventricular mass is normal. Normal wall thickness. There is concentric remodeling. Normal wall motion. There is normal systolic function with a visually estimated ejection fraction of 55 - 60%. There is normal diastolic function.    Right Ventricle: Normal right ventricular cavity size. Wall thickness is normal. Systolic function is normal.    IVC/SVC: Normal venous pressure at 3 mmHg.    Type 2 diabetes mellitus with diabetic neuropathy, without long-term current use of insulin  Patient's FSGs are controlled on current medication regimen.  Last A1c reviewed-   Lab Results   Component Value Date    HGBA1C 5.9 (H) 04/15/2024     Most recent fingerstick glucose reviewed-   Recent Labs   Lab 11/16/24  1104   POCTGLUCOSE 179*     Current correctional scale  Low  Maintain anti-hyperglycemic dose as follows-   Antihyperglycemics (From admission, onward)      Start     Stop Route Frequency Ordered    11/16/24 1120  insulin aspart U-100 pen 0-5 Units         -- SubQ Before meals & nightly PRN 11/16/24 1020          Hold Oral hypoglycemics while patient is in the hospital.    Primary hypertension  Patients blood pressure range in the last 24 hours was: BP  Min: 125/66  Max: 175/87.The patient's inpatient anti-hypertensive regimen is listed below:  Current Antihypertensives  nitroGLYCERIN SL tablet 0.4 mg, Every 5 min PRN, Sublingual  amLODIPine tablet 10 mg, Daily, Oral  losartan tablet 100 mg, Daily, Oral    Plan  - BP is controlled, no changes needed to their regimen  - followed with digital medicine    Mixed hyperlipidemia  - continue home  statin        VTE Risk Mitigation (From admission, onward)           Ordered     heparin (porcine) injection 5,000 Units  Every 8 hours         11/16/24 1020     IP VTE HIGH RISK PATIENT  Once         11/16/24 1020     Place sequential compression device  Until discontinued         11/16/24 1020                    Discharge Planning   COOPER: 11/18/2024     Code Status: Full Code   Is the patient medically ready for discharge?:     Reason for patient still in hospital (select all that apply): Treatment             Annemarie Dubon PA-C  Department of Hospital Medicine   [facility] - Observation 11H

## 2024-11-18 LAB
ANION GAP SERPL CALC-SCNC: 12 MMOL/L (ref 8–16)
BASOPHILS # BLD AUTO: 0.12 K/UL (ref 0–0.2)
BASOPHILS NFR BLD: 1.3 % (ref 0–1.9)
BUN SERPL-MCNC: 27 MG/DL (ref 8–23)
CALCIUM SERPL-MCNC: 9.1 MG/DL (ref 8.7–10.5)
CHLORIDE SERPL-SCNC: 101 MMOL/L (ref 95–110)
CO2 SERPL-SCNC: 25 MMOL/L (ref 23–29)
CREAT SERPL-MCNC: 0.9 MG/DL (ref 0.5–1.4)
CV STRESS BASE HR: 58 BPM
DIASTOLIC BLOOD PRESSURE: 56 MMHG
DIFFERENTIAL METHOD BLD: ABNORMAL
EJECTION FRACTION- HIGH: 59 %
END DIASTOLIC INDEX-HIGH: 155 ML/M2
END DIASTOLIC INDEX-LOW: 91 ML/M2
END SYSTOLIC INDEX-HIGH: 78 ML/M2
END SYSTOLIC INDEX-LOW: 40 ML/M2
EOSINOPHIL # BLD AUTO: 0.4 K/UL (ref 0–0.5)
EOSINOPHIL NFR BLD: 4.2 % (ref 0–8)
ERYTHROCYTE [DISTWIDTH] IN BLOOD BY AUTOMATED COUNT: 13 % (ref 11.5–14.5)
EST. GFR  (NO RACE VARIABLE): >60 ML/MIN/1.73 M^2
GLUCOSE SERPL-MCNC: 141 MG/DL (ref 70–110)
HCT VFR BLD AUTO: 33.1 % (ref 37–48.5)
HGB BLD-MCNC: 10.8 G/DL (ref 12–16)
IMM GRANULOCYTES # BLD AUTO: 0.13 K/UL (ref 0–0.04)
IMM GRANULOCYTES NFR BLD AUTO: 1.4 % (ref 0–0.5)
LYMPHOCYTES # BLD AUTO: 2.7 K/UL (ref 1–4.8)
LYMPHOCYTES NFR BLD: 29.6 % (ref 18–48)
MCH RBC QN AUTO: 31.4 PG (ref 27–31)
MCHC RBC AUTO-ENTMCNC: 32.6 G/DL (ref 32–36)
MCV RBC AUTO: 96 FL (ref 82–98)
MONOCYTES # BLD AUTO: 0.7 K/UL (ref 0.3–1)
MONOCYTES NFR BLD: 7.4 % (ref 4–15)
NEUTROPHILS # BLD AUTO: 5.1 K/UL (ref 1.8–7.7)
NEUTROPHILS NFR BLD: 56.1 % (ref 38–73)
NRBC BLD-RTO: 0 /100 WBC
NUC REST DIASTOLIC VOLUME INDEX: 38
NUC REST EJECTION FRACTION: 76
NUC REST SYSTOLIC VOLUME INDEX: 9
NUC STRESS DIASTOLIC VOLUME INDEX: 40
NUC STRESS EJECTION FRACTION: 80 %
NUC STRESS SYSTOLIC VOLUME INDEX: 8
OHS CV CPX 1 MINUTE RECOVERY HEART RATE: 85 BPM
OHS CV CPX 85 PERCENT MAX PREDICTED HEART RATE MALE: 116
OHS CV CPX MAX PREDICTED HEART RATE: 136
OHS CV CPX PATIENT IS FEMALE: 1
OHS CV CPX PATIENT IS MALE: 0
OHS CV CPX PEAK DIASTOLIC BLOOD PRESSURE: 55 MMHG
OHS CV CPX PEAK HEAR RATE: 90 BPM
OHS CV CPX PEAK RATE PRESSURE PRODUCT: NORMAL
OHS CV CPX PEAK SYSTOLIC BLOOD PRESSURE: 113 MMHG
OHS CV CPX PERCENT MAX PREDICTED HEART RATE ACHIEVED: 68
OHS CV CPX RATE PRESSURE PRODUCT PRESENTING: 6554
OHS CV INITIAL DOSE: 7 MCG/KG/MIN
OHS CV PEAK DOSE: 21 MCG/KG/MIN
PLATELET # BLD AUTO: 293 K/UL (ref 150–450)
PMV BLD AUTO: 10.9 FL (ref 9.2–12.9)
POCT GLUCOSE: 167 MG/DL (ref 70–110)
POCT GLUCOSE: 99 MG/DL (ref 70–110)
POTASSIUM SERPL-SCNC: 3.8 MMOL/L (ref 3.5–5.1)
RBC # BLD AUTO: 3.44 M/UL (ref 4–5.4)
RETIRED EF AND QEF - SEE NOTES: 47 %
SODIUM SERPL-SCNC: 138 MMOL/L (ref 136–145)
SYSTOLIC BLOOD PRESSURE: 113 MMHG
WBC # BLD AUTO: 9.05 K/UL (ref 3.9–12.7)

## 2024-11-18 PROCEDURE — 94761 N-INVAS EAR/PLS OXIMETRY MLT: CPT | Mod: HCNC

## 2024-11-18 PROCEDURE — G0378 HOSPITAL OBSERVATION PER HR: HCPCS | Mod: HCNC

## 2024-11-18 PROCEDURE — 63600175 PHARM REV CODE 636 W HCPCS: Mod: HCNC | Performed by: PHYSICIAN ASSISTANT

## 2024-11-18 PROCEDURE — 80048 BASIC METABOLIC PNL TOTAL CA: CPT | Mod: HCNC | Performed by: PHYSICIAN ASSISTANT

## 2024-11-18 PROCEDURE — 96374 THER/PROPH/DIAG INJ IV PUSH: CPT

## 2024-11-18 PROCEDURE — 85025 COMPLETE CBC W/AUTO DIFF WBC: CPT | Mod: HCNC | Performed by: PHYSICIAN ASSISTANT

## 2024-11-18 PROCEDURE — 25000003 PHARM REV CODE 250: Mod: HCNC | Performed by: PHYSICIAN ASSISTANT

## 2024-11-18 PROCEDURE — 99214 OFFICE O/P EST MOD 30 MIN: CPT | Mod: HCNC,25,, | Performed by: INTERNAL MEDICINE

## 2024-11-18 PROCEDURE — 36415 COLL VENOUS BLD VENIPUNCTURE: CPT | Mod: HCNC | Performed by: PHYSICIAN ASSISTANT

## 2024-11-18 PROCEDURE — A9502 TC99M TETROFOSMIN: HCPCS | Mod: HCNC | Performed by: STUDENT IN AN ORGANIZED HEALTH CARE EDUCATION/TRAINING PROGRAM

## 2024-11-18 PROCEDURE — 96372 THER/PROPH/DIAG INJ SC/IM: CPT | Performed by: PHYSICIAN ASSISTANT

## 2024-11-18 PROCEDURE — 63600175 PHARM REV CODE 636 W HCPCS: Mod: HCNC | Performed by: STUDENT IN AN ORGANIZED HEALTH CARE EDUCATION/TRAINING PROGRAM

## 2024-11-18 RX ORDER — REGADENOSON 0.08 MG/ML
0.4 INJECTION, SOLUTION INTRAVENOUS
Status: COMPLETED | OUTPATIENT
Start: 2024-11-18 | End: 2024-11-18

## 2024-11-18 RX ORDER — AMINOPHYLLINE 25 MG/ML
75 INJECTION, SOLUTION INTRAVENOUS ONCE
Status: COMPLETED | OUTPATIENT
Start: 2024-11-18 | End: 2024-11-18

## 2024-11-18 RX ORDER — SODIUM CHLORIDE 0.9 % (FLUSH) 0.9 %
10 SYRINGE (ML) INJECTION
Status: DISCONTINUED | OUTPATIENT
Start: 2024-11-18 | End: 2024-11-19 | Stop reason: HOSPADM

## 2024-11-18 RX ADMIN — TETROFOSMIN 7 MILLICURIE: 1.38 INJECTION, POWDER, LYOPHILIZED, FOR SOLUTION INTRAVENOUS at 08:11

## 2024-11-18 RX ADMIN — OXYBUTYNIN CHLORIDE 5 MG: 5 TABLET ORAL at 09:11

## 2024-11-18 RX ADMIN — POLYETHYLENE GLYCOL 3350 17 G: 17 POWDER, FOR SOLUTION ORAL at 10:11

## 2024-11-18 RX ADMIN — GABAPENTIN 600 MG: 300 CAPSULE ORAL at 10:11

## 2024-11-18 RX ADMIN — HEPARIN SODIUM 5000 UNITS: 5000 INJECTION INTRAVENOUS; SUBCUTANEOUS at 06:11

## 2024-11-18 RX ADMIN — HEPARIN SODIUM 5000 UNITS: 5000 INJECTION INTRAVENOUS; SUBCUTANEOUS at 09:11

## 2024-11-18 RX ADMIN — ASPIRIN 81 MG: 81 TABLET, COATED ORAL at 10:11

## 2024-11-18 RX ADMIN — HYDROCODONE BITARTRATE AND ACETAMINOPHEN 1 TABLET: 5; 325 TABLET ORAL at 09:11

## 2024-11-18 RX ADMIN — AMLODIPINE BESYLATE 10 MG: 10 TABLET ORAL at 10:11

## 2024-11-18 RX ADMIN — REGADENOSON 0.4 MG: 0.08 INJECTION, SOLUTION INTRAVENOUS at 09:11

## 2024-11-18 RX ADMIN — PRAVASTATIN SODIUM 40 MG: 40 TABLET ORAL at 09:11

## 2024-11-18 RX ADMIN — OXYBUTYNIN CHLORIDE 5 MG: 5 TABLET ORAL at 10:11

## 2024-11-18 RX ADMIN — GABAPENTIN 600 MG: 300 CAPSULE ORAL at 09:11

## 2024-11-18 RX ADMIN — TETROFOSMIN 21 MILLICURIE: 1.38 INJECTION, POWDER, LYOPHILIZED, FOR SOLUTION INTRAVENOUS at 09:11

## 2024-11-18 RX ADMIN — GABAPENTIN 600 MG: 300 CAPSULE ORAL at 02:11

## 2024-11-18 RX ADMIN — Medication 6 MG: at 09:11

## 2024-11-18 RX ADMIN — AMINOPHYLLINE 75 MG: 25 INJECTION, SOLUTION INTRAVENOUS at 09:11

## 2024-11-18 RX ADMIN — HYDROCODONE BITARTRATE AND ACETAMINOPHEN 1 TABLET: 10; 325 TABLET ORAL at 10:11

## 2024-11-18 RX ADMIN — HEPARIN SODIUM 5000 UNITS: 5000 INJECTION INTRAVENOUS; SUBCUTANEOUS at 02:11

## 2024-11-18 NOTE — H&P (VIEW-ONLY)
[facility] 11H  Interventional Cardiology  Consult Note    Patient Name: Emilia Alan  MRN: [id number]  Admission Date: 11/16/2024  Hospital Length of Stay: 0 days  Code Status: Full Code   Attending Provider: Greg Powers MD  Consulting Provider: Marycruz Walsh MD  Primary Care Physician: Lauren Deras MD  Principal Problem:Chest pain    Patient information was obtained from patient, past medical records, and ER records.     Inpatient consult to Interventional Cardiology  Consult performed by: Marycruz Walsh MD  Consult ordered by: Annemarie Dubon PA-C  Reason for consult: Abnormal SPECT        Subjective:     Chief Complaint:  Chest pain     HPI:  Emilia Alan is a 84 y.o. F with a history of T2DM, HTN, and h/o childhood polio (wheelchair bound) who presented with chest pain. IC consulted for positive SPECT stress test.    Patient reports onset of substernal, stabbing chest pain 11/16 PM. Symptoms continued for hours and she therefore presented to the ED. Chest pain associated with cold sweats; denies syncope, pre-syncope, dyspnea, diaphoresis, palpitations. She has chronic RLE edema d/t polio; denies orthopnea. This is the first occurrence of chest pain. She is not active, wheelchair bound. Reports BP fairly uncontrolled. DM well controlled. No tobacco use.    VSS. Labs notable for trop 0.008 -> 0.030 -> 0.013. BNP 71, Cr 0.9. EKG non-ischemic. Cxr without acute process. TTE and SPECT below.     SPECT 11/18/24:    Abnormal myocardial perfusion scan.    There is a moderate intensity, small to medium sized, reversible perfusion abnormality that is consistent with ischemia in the mid to distal inferolateral wall(s).    There are no other significant perfusion abnormalities.    The gated perfusion images showed an ejection fraction of 76% at rest. The gated perfusion images showed an ejection fraction of 80% post stress. Normal ejection fraction is greater than 47%.    There is normal  wall motion at rest and post-stress.    LV cavity size is normal at rest and normal at post-stress.    The ECG portion of the study is negative for ischemia.    The patient reported no chest pain during the stress test.    There were no arrhythmias during stress.    TTE 24:    Left Ventricle: The left ventricle is normal in size. Ventricular mass is normal. Normal wall thickness. There is concentric remodeling. Normal wall motion. There is normal systolic function with a visually estimated ejection fraction of 55 - 60%. There is normal diastolic function.    Right Ventricle: Normal right ventricular cavity size. Wall thickness is normal. Systolic function is normal.    IVC/SVC: Normal venous pressure at 3 mmHg.    Past Medical History:   Diagnosis Date    Allergy     Anemia 10/20/2014    Arthritis     Atherosclerosis of artery of right lower extremity: see xray 07    Diabetes mellitus     Diabetic neuropathy 2012    Gait disorder 2012    High cholesterol     History of poliomyelitis 2012    Hyperlipidemia 2013    Hypertension     Obstructive sleep apnea syndrome: dx 2008 needs CPAP 11 2017    Osteopenia     Osteoporosis, unspecified 2014    Other specified anemias 2015    Post poliomyelitis syndrome 2012    Renal manifestation of secondary diabetes mellitus     Sleep apnea     Type II or unspecified type diabetes mellitus without mention of complication, not stated as uncontrolled 2015    Unspecified essential hypertension 2015       Past Surgical History:   Procedure Laterality Date    CATARACT EXTRACTION       SECTION      CHOLECYSTECTOMY      COLONOSCOPY N/A 2017    Procedure: COLONOSCOPY;  Surgeon: Karen Lebron MD;  Location: [address]);  Service: Endoscopy;  Laterality: N/A;    EYE SURGERY      FRACTURE SURGERY         Review of patient's allergies indicates:   Allergen Reactions    Atenolol     Verapamil        PTA  Medications   Medication Sig    amLODIPine (NORVASC) 10 MG tablet TAKE 1 TABLET EVERY DAY    aspirin (ECOTRIN) 81 MG EC tablet Take 1 tablet (81 mg total) by mouth once daily.    gabapentin (NEURONTIN) 600 MG tablet TAKE 1 TABLET THREE TIMES DAILY    hydroCHLOROthiazide (HYDRODIURIL) 25 MG tablet TAKE 1 TABLET EVERY DAY    HYDROcodone-acetaminophen (NORCO)  mg per tablet Take 1 tablet by mouth every 6 (six) hours as needed for Pain (pain).    irbesartan (AVAPRO) 300 MG tablet TAKE 1 TABLET EVERY DAY    metFORMIN (GLUCOPHAGE-XR) 500 MG ER 24hr tablet TAKE 1 TABLET TWICE DAILY WITH MEALS    oxybutynin (DITROPAN) 5 MG Tab TAKE 1 TABLET TWICE DAILY    pravastatin (PRAVACHOL) 40 MG tablet TAKE 1 AND 1/2 TABLETS EVERY NIGHT    ACCU-CHEK GUIDE TEST STRIPS Strp TEST BLOOD SUGAR TWO TIMES DAILY    alcohol swabs (DROPSAFE ALCOHOL PREP PADS) PadM USE AS DIRECTED EVERY DAY    blood-glucose meter (ACCU-CHEK GUIDE GLUCOSE METER) Misc USE AS DIRECTED    blood-glucose meter (TRUERESULT BLOOD GLUCOSE SYSTM) kit Use as instructed    calcium-vitamin D3-vitamin K 500-100-40 mg-unit-mcg Chew Take 2 each by mouth.    clotrimazole-betamethasone 1-0.05% (LOTRISONE) cream Apply topically 2 (two) times daily.    desloratadine (CLARINEX) 5 mg tablet Take 5 mg by mouth once daily.    diclofenac (VOLTAREN) 75 MG EC tablet TAKE 1 TABLET WITH FOOD TWICE DAILY AS NEEDED. STOP IF ANY STOMACH IRRITATION    diclofenac sodium (VOLTAREN) 1 % Gel GRETEL 2 GRAMS TO BOTH KNEES AND LEFT SHOULDER QID    docusate sodium (COLACE) 50 MG capsule Take 1 capsule (50 mg total) by mouth 2 (two) times daily as needed for Constipation.    ketoconazole (NIZORAL) 2 % cream Apply topically once daily.    lancets (ACCU-CHEK SOFTCLIX LANCETS) Misc Use to test blood glucose two (2) times daily as directed with insurance preferred meter and supplies    multivitamin (THERAGRAN) per tablet Take 1 tablet by mouth once daily.     zoledronic acid-mannitol & water (RECLAST) 5  mg/100 mL PgBk      Family History       Problem Relation (Age of Onset)    Diabetes Father, Daughter, Daughter, Daughter, Son    Heart attack Father    Heart disease Father, Brother, Brother          Tobacco Use    Smoking status: Never    Smokeless tobacco: Never   Substance and Sexual Activity    Alcohol use: No    Drug use: No    Sexual activity: Yes     Review of Systems   Constitutional: Positive for chills. Negative for fever.   Cardiovascular:  Positive for chest pain and leg swelling. Negative for dyspnea on exertion, irregular heartbeat, near-syncope, orthopnea, palpitations and syncope.   Respiratory:  Negative for shortness of breath.    Gastrointestinal:  Negative for abdominal pain.   Genitourinary:  Negative for dysuria.   Neurological:  Negative for dizziness and light-headedness.     Objective:     Vital Signs (Most Recent):  Temp: 97.8 °F (36.6 °C) (11/18/24 1126)  Pulse: 67 (11/18/24 1126)  Resp: 18 (11/18/24 1126)  BP: 136/65 (11/18/24 1126)  SpO2: 96 % (11/18/24 1126) Vital Signs (24h Range):  Temp:  [97.3 °F (36.3 °C)-98.2 °F (36.8 °C)] 97.8 °F (36.6 °C)  Pulse:  [54-71] 67  Resp:  [18-20] 18  SpO2:  [93 %-96 %] 96 %  BP: (100-136)/(50-65) 136/65     Weight: 62.6 kg (138 lb)  Body mass index is 27.87 kg/m².    SpO2: 96 %         Intake/Output Summary (Last 24 hours) at 11/18/2024 1616  Last data filed at 11/17/2024 2121  Gross per 24 hour   Intake --   Output 950 ml   Net -950 ml       Lines/Drains/Airways       Drain  Duration             Female External Urinary Catheter w/ Suction 11/16/24 0735 2 days              Peripheral Intravenous Line  Duration                  Peripheral IV - Single Lumen 11/16/24 0604 20 G Right Antecubital 2 days                     Physical Exam  Constitutional:       Appearance: Normal appearance.   HENT:      Mouth/Throat:      Mouth: Mucous membranes are moist.   Eyes:      Extraocular Movements: Extraocular movements intact.      Conjunctiva/sclera:  "Conjunctivae normal.   Cardiovascular:      Rate and Rhythm: Normal rate and regular rhythm.      Pulses: Normal pulses.      Heart sounds: No murmur heard.  Pulmonary:      Breath sounds: Normal breath sounds.   Abdominal:      General: Abdomen is flat.      Palpations: Abdomen is soft.   Musculoskeletal:      Right lower leg: Edema present.      Left lower leg: No edema.   Skin:     General: Skin is warm and dry.   Neurological:      General: No focal deficit present.      Mental Status: She is alert and oriented to person, place, and time.            Significant Labs: CMP   Recent Labs   Lab 11/17/24  0258 11/18/24  0309    138   K 3.2* 3.8    101   CO2 26 25   * 141*   BUN 19 27*   CREATININE 0.8 0.9   CALCIUM 9.3 9.1   ANIONGAP 10 12   , CBC   Recent Labs   Lab 11/17/24 0258 11/18/24  0309   WBC 8.58 9.05   HGB 11.4* 10.8*   HCT 34.9* 33.1*    293   , and INR No results for input(s): "INR", "PROTIME" in the last 48 hours.    Significant Imaging: X-Ray: CXR: X-Ray Chest 1 View (CXR): No results found for this visit on 11/16/24.  Assessment and Plan:     Cardiac/Vascular  * Chest pain  Patient with no known cardiac history presented with atypical chest pain, found to have positive SPECT cardiac stress test with reversible abnormality in mid to distal inferolateral walls.     - Plan to evaluate with LHC +/- PCI tomorrow, 11/19. Patient is a RANGEL candidate  - Cr 0.9, hgb 10.8, plt 293, TSH wnl, A1c 5.9%, LDL 67, EF 55-60%  - Anti-platelet Therapy: Asa  - Antianginals: None  - Catheter: Deshaun  - Access site: R radial  - Pulses: 2+ radial, DP, PT, fem  - Bilateral Arm Bps: None  - CTA: None  - Allergies: No shellfish / Iodine contrast allergy  - Pre-Hydration: NS  - Pre-Op Med: Bendaryl 50mg pO   - All patient's questions were answered.  -The risks, benefits and alternatives of the procedure were explained to the patient.   -The risks of coronary angiography include but are not limited to: " bleeding, infection, heart rhythm abnormalities, allergic reactions, kidney injury and potential need for dialysis, stroke and death.   - Should stenting be indicated, the patient has agreed to dual anti-platelet therapy for 1-consecutive year with a drug-eluting stent and a minimum of 1-month with the use of a bare metal stent  - Additionally, pt is aware that non-compliance is likely to result in stent clotting with heart attack, heart failure, and/or death  -The risks of moderate sedation include hypotension, respiratory depression, arrhythmias, bronchospasm, and death.   - Informed consent was obtained and the  patient is agreeable to proceed with the procedure.        Patient will be formally staffed tomorrow. I will follow-up with patient. Please contact us if you have any additional questions.    Marycruz Walsh MD  Interventional Cardiology   [facility] 11[facility]

## 2024-11-18 NOTE — SUBJECTIVE & OBJECTIVE
Interval History: NAEON. AF, VSS. NM stress test completed today. No chest pain or SOB. Perfusion abnormality consistent with ischemia noted. IC consulted. Will see patient today. Remain NPO.     Review of Systems   Constitutional:  Negative for chills and fever.   Respiratory:  Negative for chest tightness and shortness of breath.    Cardiovascular:  Negative for chest pain and leg swelling.   Gastrointestinal:  Negative for abdominal pain and nausea.   Neurological:  Negative for dizziness and weakness.     Objective:     Vital Signs (Most Recent):  Temp: 97.8 °F (36.6 °C) (11/18/24 1126)  Pulse: 67 (11/18/24 1126)  Resp: 18 (11/18/24 1126)  BP: 136/65 (11/18/24 1126)  SpO2: 96 % (11/18/24 1126) Vital Signs (24h Range):  Temp:  [97.2 °F (36.2 °C)-98.2 °F (36.8 °C)] 97.8 °F (36.6 °C)  Pulse:  [54-71] 67  Resp:  [18-20] 18  SpO2:  [92 %-96 %] 96 %  BP: (100-136)/(50-65) 136/65     Weight: 62.6 kg (138 lb)  Body mass index is 27.87 kg/m².    Intake/Output Summary (Last 24 hours) at 11/18/2024 1507  Last data filed at 11/17/2024 2121  Gross per 24 hour   Intake --   Output 950 ml   Net -950 ml         Physical Exam  Vitals and nursing note reviewed.   Constitutional:       Appearance: She is well-developed.   HENT:      Head: Normocephalic and atraumatic.   Eyes:      Pupils: Pupils are equal, round, and reactive to light.   Cardiovascular:      Rate and Rhythm: Normal rate and regular rhythm.   Pulmonary:      Effort: Pulmonary effort is normal.      Breath sounds: Normal breath sounds.   Abdominal:      Palpations: Abdomen is soft.      Tenderness: There is no abdominal tenderness.   Musculoskeletal:         General: No tenderness.   Skin:     General: Skin is warm and dry.   Neurological:      Mental Status: She is alert and oriented to person, place, and time.   Psychiatric:         Behavior: Behavior normal.             Significant Labs: All pertinent labs within the past 24 hours have been  reviewed.    Significant Imaging: I have reviewed all pertinent imaging results/findings within the past 24 hours.

## 2024-11-18 NOTE — PROGRESS NOTES
[facility] [address] [place] [address] Medicine  Progress Note    Patient Name: Emilia Alan  MRN: [id number]  Patient Class: OP- Observation   Admission Date: 11/16/2024  Length of Stay: 0 days  Attending Physician: Greg Powers MD  Primary Care Provider: Lauren Deras MD        Subjective:     Principal Problem:Chest pain, rule out acute myocardial infarction        HPI:  Mrs. Alan is an 84 year old female with a pmhx of 84-year-old of diabetes, history of polio with chronic RLE swelling and wheel chair bound, HTN who presents with sharp, stabbing midsternal intermittent chest pain that began at 5pm yesterday and lasted until 9 am this morning. Chest pain lasts for a few seconds before going away. It is associated with cold sweats. No arm, jaw pain, N/V, SOB associated. She has never experienced a pain like this before. When she got the ED she was given an ASA and nitro and pain resolved in 15 minutes. It has not returned. She reports eating chinese food yesterday evening but did not feel like reflux. She reports over past two weeks her sugars have been more elevated than normal, running in the 250s, but she is not strictly compliant with diabetic diet. No fever, chills, SOB, cough, abdominal pain, D/C, urinary symptoms.     In ED, VSS. Afebrile without leukocytosis. Troponin 0.008. BNP 71. EKG without acute ischemia. Labs grossly unremarkable. Patient given  mg and hold amlodipine and admitted to observation for chest pain rule out.     Overview/Hospital Course:  Emilia Alan is a 84 y.o. female admitted to  for chest pain. Tn slightly elevated but normalized. EKG w/o acute ischemic changes. ECHO w/o acute WMAs. NM stress test ordered for 11/18. New perfusion abnormality noted on NM test. IC consulted.     Interval History: NAEON. AF, VSS. NM stress test completed today. No chest pain or SOB. Perfusion abnormality consistent with ischemia noted. IC consulted. Will see patient today.  Remain NPO.     Review of Systems   Constitutional:  Negative for chills and fever.   Respiratory:  Negative for chest tightness and shortness of breath.    Cardiovascular:  Negative for chest pain and leg swelling.   Gastrointestinal:  Negative for abdominal pain and nausea.   Neurological:  Negative for dizziness and weakness.     Objective:     Vital Signs (Most Recent):  Temp: 97.8 °F (36.6 °C) (11/18/24 1126)  Pulse: 67 (11/18/24 1126)  Resp: 18 (11/18/24 1126)  BP: 136/65 (11/18/24 1126)  SpO2: 96 % (11/18/24 1126) Vital Signs (24h Range):  Temp:  [97.2 °F (36.2 °C)-98.2 °F (36.8 °C)] 97.8 °F (36.6 °C)  Pulse:  [54-71] 67  Resp:  [18-20] 18  SpO2:  [92 %-96 %] 96 %  BP: (100-136)/(50-65) 136/65     Weight: 62.6 kg (138 lb)  Body mass index is 27.87 kg/m².    Intake/Output Summary (Last 24 hours) at 11/18/2024 1507  Last data filed at 11/17/2024 2121  Gross per 24 hour   Intake --   Output 950 ml   Net -950 ml         Physical Exam  Vitals and nursing note reviewed.   Constitutional:       Appearance: She is well-developed.   HENT:      Head: Normocephalic and atraumatic.   Eyes:      Pupils: Pupils are equal, round, and reactive to light.   Cardiovascular:      Rate and Rhythm: Normal rate and regular rhythm.   Pulmonary:      Effort: Pulmonary effort is normal.      Breath sounds: Normal breath sounds.   Abdominal:      Palpations: Abdomen is soft.      Tenderness: There is no abdominal tenderness.   Musculoskeletal:         General: No tenderness.   Skin:     General: Skin is warm and dry.   Neurological:      Mental Status: She is alert and oriented to person, place, and time.   Psychiatric:         Behavior: Behavior normal.             Significant Labs: All pertinent labs within the past 24 hours have been reviewed.    Significant Imaging: I have reviewed all pertinent imaging results/findings within the past 24 hours.    Assessment/Plan:      * Chest pain, rule out acute myocardial infarction  Patient  presents with sharp, stabbing, intermittent chest pain since last night associated with cold sweats. Resolved within 15 minutes of nitro and has not returned.     - HDS, VSS  - troponin 0.008, 0.03, <0.006  - EKG without acute ischemia  - NM stress test showed small to medium sized, reversible perfusion abnormality that is consistent with ischemia in the mid to distal inferolateral wall   - IC consulted  - NPO  - given  mg  - continue ASA 81 mg, statin  - tele  Results for orders placed during the hospital encounter of 11/16/24    Echo    Interpretation Summary    Left Ventricle: The left ventricle is normal in size. Ventricular mass is normal. Normal wall thickness. There is concentric remodeling. Normal wall motion. There is normal systolic function with a visually estimated ejection fraction of 55 - 60%. There is normal diastolic function.    Right Ventricle: Normal right ventricular cavity size. Wall thickness is normal. Systolic function is normal.    IVC/SVC: Normal venous pressure at 3 mmHg.    Type 2 diabetes mellitus with diabetic neuropathy, without long-term current use of insulin  Patient's FSGs are controlled on current medication regimen.  Last A1c reviewed-   Lab Results   Component Value Date    HGBA1C 5.9 (H) 04/15/2024     Most recent fingerstick glucose reviewed-   Recent Labs   Lab 11/16/24  1104   POCTGLUCOSE 179*     Current correctional scale  Low  Maintain anti-hyperglycemic dose as follows-   Antihyperglycemics (From admission, onward)      Start     Stop Route Frequency Ordered    11/16/24 1120  insulin aspart U-100 pen 0-5 Units         -- SubQ Before meals & nightly PRN 11/16/24 1020          Hold Oral hypoglycemics while patient is in the hospital.    Primary hypertension  Patients blood pressure range in the last 24 hours was: BP  Min: 125/66  Max: 175/87.The patient's inpatient anti-hypertensive regimen is listed below:  Current Antihypertensives  nitroGLYCERIN SL tablet 0.4 mg,  Every 5 min PRN, Sublingual  amLODIPine tablet 10 mg, Daily, Oral  losartan tablet 100 mg, Daily, Oral    Plan  - BP is controlled, no changes needed to their regimen  - followed with digital medicine    Mixed hyperlipidemia  - continue home statin        VTE Risk Mitigation (From admission, onward)           Ordered     heparin (porcine) injection 5,000 Units  Every 8 hours         11/16/24 1020     IP VTE HIGH RISK PATIENT  Once         11/16/24 1020     Place sequential compression device  Until discontinued         11/16/24 1020                    Discharge Planning   COOPER: 11/18/2024     Code Status: Full Code   Is the patient medically ready for discharge?:     Reason for patient still in hospital (select all that apply): Patient trending condition, Treatment, and Consult recommendations  Discharge Plan A: Home with family          Annemarie Dubon PA-C  Department of Hospital Medicine   [facility]

## 2024-11-18 NOTE — ASSESSMENT & PLAN NOTE
Patient presents with sharp, stabbing, intermittent chest pain since last night associated with cold sweats. Resolved within 15 minutes of nitro and has not returned.     - HDS, VSS  - troponin 0.008, 0.03, <0.006  - EKG without acute ischemia  - NM stress test showed small to medium sized, reversible perfusion abnormality that is consistent with ischemia in the mid to distal inferolateral wall   - IC consulted  - NPO  - given  mg  - continue ASA 81 mg, statin  - tele  Results for orders placed during the hospital encounter of 11/16/24    Echo    Interpretation Summary    Left Ventricle: The left ventricle is normal in size. Ventricular mass is normal. Normal wall thickness. There is concentric remodeling. Normal wall motion. There is normal systolic function with a visually estimated ejection fraction of 55 - 60%. There is normal diastolic function.    Right Ventricle: Normal right ventricular cavity size. Wall thickness is normal. Systolic function is normal.    IVC/SVC: Normal venous pressure at 3 mmHg.

## 2024-11-18 NOTE — SUBJECTIVE & OBJECTIVE
Past Medical History:   Diagnosis Date    Allergy     Anemia 10/20/2014    Arthritis     Atherosclerosis of artery of right lower extremity: see xray 07    Diabetes mellitus     Diabetic neuropathy 2012    Gait disorder 2012    High cholesterol     History of poliomyelitis 2012    Hyperlipidemia 2013    Hypertension     Obstructive sleep apnea syndrome: dx 2008 needs CPAP 11 2017    Osteopenia     Osteoporosis, unspecified 2014    Other specified anemias 2015    Post poliomyelitis syndrome 2012    Renal manifestation of secondary diabetes mellitus     Sleep apnea     Type II or unspecified type diabetes mellitus without mention of complication, not stated as uncontrolled 2015    Unspecified essential hypertension 2015       Past Surgical History:   Procedure Laterality Date    CATARACT EXTRACTION       SECTION      CHOLECYSTECTOMY      COLONOSCOPY N/A 2017    Procedure: COLONOSCOPY;  Surgeon: Karen Lebron MD;  Location: [address]);  Service: Endoscopy;  Laterality: N/A;    EYE SURGERY      FRACTURE SURGERY         Review of patient's allergies indicates:   Allergen Reactions    Atenolol     Verapamil        PTA Medications   Medication Sig    amLODIPine (NORVASC) 10 MG tablet TAKE 1 TABLET EVERY DAY    aspirin (ECOTRIN) 81 MG EC tablet Take 1 tablet (81 mg total) by mouth once daily.    gabapentin (NEURONTIN) 600 MG tablet TAKE 1 TABLET THREE TIMES DAILY    hydroCHLOROthiazide (HYDRODIURIL) 25 MG tablet TAKE 1 TABLET EVERY DAY    HYDROcodone-acetaminophen (NORCO)  mg per tablet Take 1 tablet by mouth every 6 (six) hours as needed for Pain (pain).    irbesartan (AVAPRO) 300 MG tablet TAKE 1 TABLET EVERY DAY    metFORMIN (GLUCOPHAGE-XR) 500 MG ER 24hr tablet TAKE 1 TABLET TWICE DAILY WITH MEALS    oxybutynin (DITROPAN) 5 MG Tab TAKE 1 TABLET TWICE DAILY    pravastatin (PRAVACHOL) 40 MG tablet TAKE 1 AND 1/2 TABLETS EVERY NIGHT     ACCU-CHEK GUIDE TEST STRIPS Strp TEST BLOOD SUGAR TWO TIMES DAILY    alcohol swabs (DROPSAFE ALCOHOL PREP PADS) PadM USE AS DIRECTED EVERY DAY    blood-glucose meter (ACCU-CHEK GUIDE GLUCOSE METER) Misc USE AS DIRECTED    blood-glucose meter (TRUERESULT BLOOD GLUCOSE SYSTM) kit Use as instructed    calcium-vitamin D3-vitamin K 500-100-40 mg-unit-mcg Chew Take 2 each by mouth.    clotrimazole-betamethasone 1-0.05% (LOTRISONE) cream Apply topically 2 (two) times daily.    desloratadine (CLARINEX) 5 mg tablet Take 5 mg by mouth once daily.    diclofenac (VOLTAREN) 75 MG EC tablet TAKE 1 TABLET WITH FOOD TWICE DAILY AS NEEDED. STOP IF ANY STOMACH IRRITATION    diclofenac sodium (VOLTAREN) 1 % Gel GRETEL 2 GRAMS TO BOTH KNEES AND LEFT SHOULDER QID    docusate sodium (COLACE) 50 MG capsule Take 1 capsule (50 mg total) by mouth 2 (two) times daily as needed for Constipation.    ketoconazole (NIZORAL) 2 % cream Apply topically once daily.    lancets (ACCU-CHEK SOFTCLIX LANCETS) Misc Use to test blood glucose two (2) times daily as directed with insurance preferred meter and supplies    multivitamin (THERAGRAN) per tablet Take 1 tablet by mouth once daily.     zoledronic acid-mannitol & water (RECLAST) 5 mg/100 mL PgBk      Family History       Problem Relation (Age of Onset)    Diabetes Father, Daughter, Daughter, Daughter, Son    Heart attack Father    Heart disease Father, Brother, Brother          Tobacco Use    Smoking status: Never    Smokeless tobacco: Never   Substance and Sexual Activity    Alcohol use: No    Drug use: No    Sexual activity: Yes     Review of Systems   Constitutional: Positive for chills. Negative for fever.   Cardiovascular:  Positive for chest pain and leg swelling. Negative for dyspnea on exertion, irregular heartbeat, near-syncope, orthopnea, palpitations and syncope.   Respiratory:  Negative for shortness of breath.    Gastrointestinal:  Negative for abdominal pain.   Genitourinary:  Negative for  dysuria.   Neurological:  Negative for dizziness and light-headedness.     Objective:     Vital Signs (Most Recent):  Temp: 97.8 °F (36.6 °C) (11/18/24 1126)  Pulse: 67 (11/18/24 1126)  Resp: 18 (11/18/24 1126)  BP: 136/65 (11/18/24 1126)  SpO2: 96 % (11/18/24 1126) Vital Signs (24h Range):  Temp:  [97.3 °F (36.3 °C)-98.2 °F (36.8 °C)] 97.8 °F (36.6 °C)  Pulse:  [54-71] 67  Resp:  [18-20] 18  SpO2:  [93 %-96 %] 96 %  BP: (100-136)/(50-65) 136/65     Weight: 62.6 kg (138 lb)  Body mass index is 27.87 kg/m².    SpO2: 96 %         Intake/Output Summary (Last 24 hours) at 11/18/2024 1616  Last data filed at 11/17/2024 2121  Gross per 24 hour   Intake --   Output 950 ml   Net -950 ml       Lines/Drains/Airways       Drain  Duration             Female External Urinary Catheter w/ Suction 11/16/24 0735 2 days              Peripheral Intravenous Line  Duration                  Peripheral IV - Single Lumen 11/16/24 0604 20 G Right Antecubital 2 days                     Physical Exam  Constitutional:       Appearance: Normal appearance.   HENT:      Mouth/Throat:      Mouth: Mucous membranes are moist.   Eyes:      Extraocular Movements: Extraocular movements intact.      Conjunctiva/sclera: Conjunctivae normal.   Cardiovascular:      Rate and Rhythm: Normal rate and regular rhythm.      Pulses: Normal pulses.      Heart sounds: No murmur heard.  Pulmonary:      Breath sounds: Normal breath sounds.   Abdominal:      General: Abdomen is flat.      Palpations: Abdomen is soft.   Musculoskeletal:      Right lower leg: Edema present.      Left lower leg: No edema.   Skin:     General: Skin is warm and dry.   Neurological:      General: No focal deficit present.      Mental Status: She is alert and oriented to person, place, and time.            Significant Labs: CMP   Recent Labs   Lab 11/17/24  0258 11/18/24  0309    138   K 3.2* 3.8    101   CO2 26 25   * 141*   BUN 19 27*   CREATININE 0.8 0.9   CALCIUM 9.3  "9.1   ANIONGAP 10 12   , CBC   Recent Labs   Lab 11/17/24  0258 11/18/24  0309   WBC 8.58 9.05   HGB 11.4* 10.8*   HCT 34.9* 33.1*    293   , and INR No results for input(s): "INR", "PROTIME" in the last 48 hours.    Significant Imaging: X-Ray: CXR: X-Ray Chest 1 View (CXR): No results found for this visit on 11/16/24.  "

## 2024-11-18 NOTE — HPI
Emilia Alan is a 84 y.o. F with a history of T2DM, HTN, and h/o childhood polio (wheelchair bound) who presented with chest pain. IC consulted for positive SPECT stress test.    Patient reports onset of substernal, stabbing chest pain 11/16 PM. Symptoms continued for hours and she therefore presented to the ED. Chest pain associated with cold sweats; denies syncope, pre-syncope, dyspnea, diaphoresis, palpitations. She has chronic RLE edema d/t polio; denies orthopnea. This is the first occurrence of chest pain. She is not active, wheelchair bound. Reports BP fairly uncontrolled. DM well controlled. No tobacco use.    VSS. Labs notable for trop 0.008 -> 0.030 -> 0.013. BNP 71, Cr 0.9. EKG non-ischemic. Cxr without acute process. TTE and SPECT below.     SPECT 11/18/24:    Abnormal myocardial perfusion scan.    There is a moderate intensity, small to medium sized, reversible perfusion abnormality that is consistent with ischemia in the mid to distal inferolateral wall(s).    There are no other significant perfusion abnormalities.    The gated perfusion images showed an ejection fraction of 76% at rest. The gated perfusion images showed an ejection fraction of 80% post stress. Normal ejection fraction is greater than 47%.    There is normal wall motion at rest and post-stress.    LV cavity size is normal at rest and normal at post-stress.    The ECG portion of the study is negative for ischemia.    The patient reported no chest pain during the stress test.    There were no arrhythmias during stress.    TTE 11/6/24:    Left Ventricle: The left ventricle is normal in size. Ventricular mass is normal. Normal wall thickness. There is concentric remodeling. Normal wall motion. There is normal systolic function with a visually estimated ejection fraction of 55 - 60%. There is normal diastolic function.    Right Ventricle: Normal right ventricular cavity size. Wall thickness is normal. Systolic function is normal.     IVC/SVC: Normal venous pressure at 3 mmHg.

## 2024-11-18 NOTE — PLAN OF CARE
[facility]  Initial Discharge Assessment       Primary Care Provider: Lauren Deras MD    Admission Diagnosis: Chest pain [R07.9]    Admission Date: 11/16/2024  Expected Discharge Date: 11/18/2024         Payor: HU[employer] MANAGED MEDICARE / Plan: HU[employer] MEDICARE HMO / Product Type: Capitation /     Extended Emergency Contact Information  Primary Emergency Contact: Angely Polk   [place]  Mobile Phone: [phone]  Relation: Daughter  Secondary Emergency Contact: DayannaSachi  Address: [address] [place], [place] [address] [address] [facility]  Home Phone: [phone]  Relation: Daughter    Discharge Plan A: (P) Home with family  Discharge Plan B: (P) Home with family      [id number] [place] - [address] AT [facility] [address]  [address] [place] [address]  Phone: [phone] Fax: [phone]    [facility]  [address]  [facility] [address]  Phone: [phone] Fax: [phone]    [facility] - [place], [place] - [address] [facility]  [address] [facility] [address]  Phone: [phone] Fax: [phone]                 SW completed Discharge Planning Assessment with patient via bedside. Discharge planning booklet given to patient/family and whiteboard updated with COOPER and phone #. All questions answered.    Patient reported that her family will provide transportation upon discharge.     Patient reported that her two daughters live with her, and prior to hospitalization she was independent with her ADL's. Patient reported that she uses a wheelchair and has a shower chair. Patient reported that she is not on dialysis and does not go to a Coumadin clinic.      Patient lives in a [facility] with 0 steps to enter.     Discharge Plan A and Plan B have been determined by review of patient's clinical status, future medical and therapeutic needs, and  coverage/benefits for post-acute care in coordination with multidisciplinary team members.      Naye Randhawa LMSW  [facility]  Ext. [phone]

## 2024-11-18 NOTE — ASSESSMENT & PLAN NOTE
Patient with no known cardiac history presented with atypical chest pain, found to have positive SPECT cardiac stress test with reversible abnormality in mid to distal inferolateral walls.     - Plan to evaluate with LHC +/- PCI tomorrow, 11/19. Patient is a RANGEL candidate  - Cr 0.9, hgb 10.8, plt 293, TSH wnl, A1c 5.9%, LDL 67, EF 55-60%  - Anti-platelet Therapy: Asa  - Antianginals: None  - Catheter: Deshaun  - Access site: R radial  - Pulses: 2+ radial, DP, PT, fem  - Bilateral Arm Bps: None  - CTA: None  - Allergies: No shellfish / Iodine contrast allergy  - Pre-Hydration: NS  - Pre-Op Med: Bendaryl 50mg pO   - All patient's questions were answered.  -The risks, benefits and alternatives of the procedure were explained to the patient.   -The risks of coronary angiography include but are not limited to: bleeding, infection, heart rhythm abnormalities, allergic reactions, kidney injury and potential need for dialysis, stroke and death.   - Should stenting be indicated, the patient has agreed to dual anti-platelet therapy for 1-consecutive year with a drug-eluting stent and a minimum of 1-month with the use of a bare metal stent  - Additionally, pt is aware that non-compliance is likely to result in stent clotting with heart attack, heart failure, and/or death  -The risks of moderate sedation include hypotension, respiratory depression, arrhythmias, bronchospasm, and death.   - Informed consent was obtained and the  patient is agreeable to proceed with the procedure.

## 2024-11-18 NOTE — CONSULTS
[facility]  Interventional Cardiology  Consult Note    Patient Name: Emilia Alan  MRN: [id number]  Admission Date: 11/16/2024  Hospital Length of Stay: 0 days  Code Status: Full Code   Attending Provider: Greg Powers MD  Consulting Provider: Marycruz Walsh MD  Primary Care Physician: Lauren Deras MD  Principal Problem:Chest pain    Patient information was obtained from patient, past medical records, and ER records.     Inpatient consult to Interventional Cardiology  Consult performed by: Marycruz Walsh MD  Consult ordered by: Annemarie Dubon PA-C  Reason for consult: Abnormal SPECT        Subjective:     Chief Complaint:  Chest pain     HPI:  Emilia Alan is a 84 y.o. F with a history of T2DM, HTN, and h/o childhood polio (wheelchair bound) who presented with chest pain. IC consulted for positive SPECT stress test.    Patient reports onset of substernal, stabbing chest pain 11/16 PM. Symptoms continued for hours and she therefore presented to the ED. Chest pain associated with cold sweats; denies syncope, pre-syncope, dyspnea, diaphoresis, palpitations. She has chronic RLE edema d/t polio; denies orthopnea. This is the first occurrence of chest pain. She is not active, wheelchair bound. Reports BP fairly uncontrolled. DM well controlled. No tobacco use.    VSS. Labs notable for trop 0.008 -> 0.030 -> 0.013. BNP 71, Cr 0.9. EKG non-ischemic. Cxr without acute process. TTE and SPECT below.     SPECT 11/18/24:    Abnormal myocardial perfusion scan.    There is a moderate intensity, small to medium sized, reversible perfusion abnormality that is consistent with ischemia in the mid to distal inferolateral wall(s).    There are no other significant perfusion abnormalities.    The gated perfusion images showed an ejection fraction of 76% at rest. The gated perfusion images showed an ejection fraction of 80% post stress. Normal ejection fraction is greater than 47%.    There is normal  wall motion at rest and post-stress.    LV cavity size is normal at rest and normal at post-stress.    The ECG portion of the study is negative for ischemia.    The patient reported no chest pain during the stress test.    There were no arrhythmias during stress.    TTE 24:    Left Ventricle: The left ventricle is normal in size. Ventricular mass is normal. Normal wall thickness. There is concentric remodeling. Normal wall motion. There is normal systolic function with a visually estimated ejection fraction of 55 - 60%. There is normal diastolic function.    Right Ventricle: Normal right ventricular cavity size. Wall thickness is normal. Systolic function is normal.    IVC/SVC: Normal venous pressure at 3 mmHg.    Past Medical History:   Diagnosis Date    Allergy     Anemia 10/20/2014    Arthritis     Atherosclerosis of artery of right lower extremity: see xray 07    Diabetes mellitus     Diabetic neuropathy 2012    Gait disorder 2012    High cholesterol     History of poliomyelitis 2012    Hyperlipidemia 2013    Hypertension     Obstructive sleep apnea syndrome: dx 2008 needs CPAP 11 2017    Osteopenia     Osteoporosis, unspecified 2014    Other specified anemias 2015    Post poliomyelitis syndrome 2012    Renal manifestation of secondary diabetes mellitus     Sleep apnea     Type II or unspecified type diabetes mellitus without mention of complication, not stated as uncontrolled 2015    Unspecified essential hypertension 2015       Past Surgical History:   Procedure Laterality Date    CATARACT EXTRACTION       SECTION      CHOLECYSTECTOMY      COLONOSCOPY N/A 2017    Procedure: COLONOSCOPY;  Surgeon: Karen Lebron MD;  Location: [address]);  Service: Endoscopy;  Laterality: N/A;    EYE SURGERY      FRACTURE SURGERY         Review of patient's allergies indicates:   Allergen Reactions    Atenolol     Verapamil        PTA  Medications   Medication Sig    amLODIPine (NORVASC) 10 MG tablet TAKE 1 TABLET EVERY DAY    aspirin (ECOTRIN) 81 MG EC tablet Take 1 tablet (81 mg total) by mouth once daily.    gabapentin (NEURONTIN) 600 MG tablet TAKE 1 TABLET THREE TIMES DAILY    hydroCHLOROthiazide (HYDRODIURIL) 25 MG tablet TAKE 1 TABLET EVERY DAY    HYDROcodone-acetaminophen (NORCO)  mg per tablet Take 1 tablet by mouth every 6 (six) hours as needed for Pain (pain).    irbesartan (AVAPRO) 300 MG tablet TAKE 1 TABLET EVERY DAY    metFORMIN (GLUCOPHAGE-XR) 500 MG ER 24hr tablet TAKE 1 TABLET TWICE DAILY WITH MEALS    oxybutynin (DITROPAN) 5 MG Tab TAKE 1 TABLET TWICE DAILY    pravastatin (PRAVACHOL) 40 MG tablet TAKE 1 AND 1/2 TABLETS EVERY NIGHT    ACCU-CHEK GUIDE TEST STRIPS Strp TEST BLOOD SUGAR TWO TIMES DAILY    alcohol swabs (DROPSAFE ALCOHOL PREP PADS) PadM USE AS DIRECTED EVERY DAY    blood-glucose meter (ACCU-CHEK GUIDE GLUCOSE METER) Misc USE AS DIRECTED    blood-glucose meter (TRUERESULT BLOOD GLUCOSE SYSTM) kit Use as instructed    calcium-vitamin D3-vitamin K 500-100-40 mg-unit-mcg Chew Take 2 each by mouth.    clotrimazole-betamethasone 1-0.05% (LOTRISONE) cream Apply topically 2 (two) times daily.    desloratadine (CLARINEX) 5 mg tablet Take 5 mg by mouth once daily.    diclofenac (VOLTAREN) 75 MG EC tablet TAKE 1 TABLET WITH FOOD TWICE DAILY AS NEEDED. STOP IF ANY STOMACH IRRITATION    diclofenac sodium (VOLTAREN) 1 % Gel GRETEL 2 GRAMS TO BOTH KNEES AND LEFT SHOULDER QID    docusate sodium (COLACE) 50 MG capsule Take 1 capsule (50 mg total) by mouth 2 (two) times daily as needed for Constipation.    ketoconazole (NIZORAL) 2 % cream Apply topically once daily.    lancets (ACCU-CHEK SOFTCLIX LANCETS) Misc Use to test blood glucose two (2) times daily as directed with insurance preferred meter and supplies    multivitamin (THERAGRAN) per tablet Take 1 tablet by mouth once daily.     zoledronic acid-mannitol & water (RECLAST) 5  mg/100 mL PgBk      Family History       Problem Relation (Age of Onset)    Diabetes Father, Daughter, Daughter, Daughter, Son    Heart attack Father    Heart disease Father, Brother, Brother          Tobacco Use    Smoking status: Never    Smokeless tobacco: Never   Substance and Sexual Activity    Alcohol use: No    Drug use: No    Sexual activity: Yes     Review of Systems   Constitutional: Positive for chills. Negative for fever.   Cardiovascular:  Positive for chest pain and leg swelling. Negative for dyspnea on exertion, irregular heartbeat, near-syncope, orthopnea, palpitations and syncope.   Respiratory:  Negative for shortness of breath.    Gastrointestinal:  Negative for abdominal pain.   Genitourinary:  Negative for dysuria.   Neurological:  Negative for dizziness and light-headedness.     Objective:     Vital Signs (Most Recent):  Temp: 97.8 °F (36.6 °C) (11/18/24 1126)  Pulse: 67 (11/18/24 1126)  Resp: 18 (11/18/24 1126)  BP: 136/65 (11/18/24 1126)  SpO2: 96 % (11/18/24 1126) Vital Signs (24h Range):  Temp:  [97.3 °F (36.3 °C)-98.2 °F (36.8 °C)] 97.8 °F (36.6 °C)  Pulse:  [54-71] 67  Resp:  [18-20] 18  SpO2:  [93 %-96 %] 96 %  BP: (100-136)/(50-65) 136/65     Weight: 62.6 kg (138 lb)  Body mass index is 27.87 kg/m².    SpO2: 96 %         Intake/Output Summary (Last 24 hours) at 11/18/2024 1616  Last data filed at 11/17/2024 2121  Gross per 24 hour   Intake --   Output 950 ml   Net -950 ml       Lines/Drains/Airways       Drain  Duration             Female External Urinary Catheter w/ Suction 11/16/24 0735 2 days              Peripheral Intravenous Line  Duration                  Peripheral IV - Single Lumen 11/16/24 0604 20 G Right Antecubital 2 days                     Physical Exam  Constitutional:       Appearance: Normal appearance.   HENT:      Mouth/Throat:      Mouth: Mucous membranes are moist.   Eyes:      Extraocular Movements: Extraocular movements intact.      Conjunctiva/sclera:  "Conjunctivae normal.   Cardiovascular:      Rate and Rhythm: Normal rate and regular rhythm.      Pulses: Normal pulses.      Heart sounds: No murmur heard.  Pulmonary:      Breath sounds: Normal breath sounds.   Abdominal:      General: Abdomen is flat.      Palpations: Abdomen is soft.   Musculoskeletal:      Right lower leg: Edema present.      Left lower leg: No edema.   Skin:     General: Skin is warm and dry.   Neurological:      General: No focal deficit present.      Mental Status: She is alert and oriented to person, place, and time.            Significant Labs: CMP   Recent Labs   Lab 11/17/24  0258 11/18/24  0309    138   K 3.2* 3.8    101   CO2 26 25   * 141*   BUN 19 27*   CREATININE 0.8 0.9   CALCIUM 9.3 9.1   ANIONGAP 10 12   , CBC   Recent Labs   Lab 11/17/24 0258 11/18/24  0309   WBC 8.58 9.05   HGB 11.4* 10.8*   HCT 34.9* 33.1*    293   , and INR No results for input(s): "INR", "PROTIME" in the last 48 hours.    Significant Imaging: X-Ray: CXR: X-Ray Chest 1 View (CXR): No results found for this visit on 11/16/24.  Assessment and Plan:     Cardiac/Vascular  * Chest pain  Patient with no known cardiac history presented with atypical chest pain, found to have positive SPECT cardiac stress test with reversible abnormality in mid to distal inferolateral walls.     - Plan to evaluate with LHC +/- PCI tomorrow, 11/19. Patient is a RANGEL candidate  - Cr 0.9, hgb 10.8, plt 293, TSH wnl, A1c 5.9%, LDL 67, EF 55-60%  - Anti-platelet Therapy: Asa  - Antianginals: None  - Catheter: Deshaun  - Access site: R radial  - Pulses: 2+ radial, DP, PT, fem  - Bilateral Arm Bps: None  - CTA: None  - Allergies: No shellfish / Iodine contrast allergy  - Pre-Hydration: NS  - Pre-Op Med: Bendaryl 50mg pO   - All patient's questions were answered.  -The risks, benefits and alternatives of the procedure were explained to the patient.   -The risks of coronary angiography include but are not limited to: " bleeding, infection, heart rhythm abnormalities, allergic reactions, kidney injury and potential need for dialysis, stroke and death.   - Should stenting be indicated, the patient has agreed to dual anti-platelet therapy for 1-consecutive year with a drug-eluting stent and a minimum of 1-month with the use of a bare metal stent  - Additionally, pt is aware that non-compliance is likely to result in stent clotting with heart attack, heart failure, and/or death  -The risks of moderate sedation include hypotension, respiratory depression, arrhythmias, bronchospasm, and death.   - Informed consent was obtained and the  patient is agreeable to proceed with the procedure.        Patient will be formally staffed tomorrow. I will follow-up with patient. Please contact us if you have any additional questions.    Marycruz Walsh MD  Interventional Cardiology   [facility]

## 2024-11-19 VITALS
HEART RATE: 77 BPM | BODY MASS INDEX: 28.54 KG/M2 | SYSTOLIC BLOOD PRESSURE: 111 MMHG | DIASTOLIC BLOOD PRESSURE: 59 MMHG | HEIGHT: 59 IN | RESPIRATION RATE: 18 BRPM | TEMPERATURE: 98 F | WEIGHT: 141.56 LBS | OXYGEN SATURATION: 96 %

## 2024-11-19 LAB
ABO + RH BLD: NORMAL
ANION GAP SERPL CALC-SCNC: 10 MMOL/L (ref 8–16)
BASOPHILS # BLD AUTO: 0.12 K/UL (ref 0–0.2)
BASOPHILS NFR BLD: 1.4 % (ref 0–1.9)
BLD GP AB SCN CELLS X3 SERPL QL: NORMAL
BUN SERPL-MCNC: 24 MG/DL (ref 8–23)
CALCIUM SERPL-MCNC: 9.1 MG/DL (ref 8.7–10.5)
CHLORIDE SERPL-SCNC: 107 MMOL/L (ref 95–110)
CO2 SERPL-SCNC: 23 MMOL/L (ref 23–29)
CREAT SERPL-MCNC: 0.9 MG/DL (ref 0.5–1.4)
DIFFERENTIAL METHOD BLD: ABNORMAL
EOSINOPHIL # BLD AUTO: 0.4 K/UL (ref 0–0.5)
EOSINOPHIL NFR BLD: 4.6 % (ref 0–8)
ERYTHROCYTE [DISTWIDTH] IN BLOOD BY AUTOMATED COUNT: 12.8 % (ref 11.5–14.5)
EST. GFR  (NO RACE VARIABLE): >60 ML/MIN/1.73 M^2
GLUCOSE SERPL-MCNC: 119 MG/DL (ref 70–110)
HCT VFR BLD AUTO: 34.9 % (ref 37–48.5)
HGB BLD-MCNC: 11.4 G/DL (ref 12–16)
IMM GRANULOCYTES # BLD AUTO: 0.03 K/UL (ref 0–0.04)
IMM GRANULOCYTES NFR BLD AUTO: 0.4 % (ref 0–0.5)
LYMPHOCYTES # BLD AUTO: 2 K/UL (ref 1–4.8)
LYMPHOCYTES NFR BLD: 23.6 % (ref 18–48)
MCH RBC QN AUTO: 30.9 PG (ref 27–31)
MCHC RBC AUTO-ENTMCNC: 32.7 G/DL (ref 32–36)
MCV RBC AUTO: 95 FL (ref 82–98)
MONOCYTES # BLD AUTO: 0.7 K/UL (ref 0.3–1)
MONOCYTES NFR BLD: 7.9 % (ref 4–15)
NEUTROPHILS # BLD AUTO: 5.2 K/UL (ref 1.8–7.7)
NEUTROPHILS NFR BLD: 62.1 % (ref 38–73)
NRBC BLD-RTO: 0 /100 WBC
OHS QRS DURATION: 86 MS
OHS QTC CALCULATION: 443 MS
PLATELET # BLD AUTO: 282 K/UL (ref 150–450)
PMV BLD AUTO: 10.7 FL (ref 9.2–12.9)
POCT GLUCOSE: 134 MG/DL (ref 70–110)
POCT GLUCOSE: 209 MG/DL (ref 70–110)
POTASSIUM SERPL-SCNC: 3.7 MMOL/L (ref 3.5–5.1)
RBC # BLD AUTO: 3.69 M/UL (ref 4–5.4)
SODIUM SERPL-SCNC: 140 MMOL/L (ref 136–145)
SPECIMEN OUTDATE: NORMAL
WBC # BLD AUTO: 8.39 K/UL (ref 3.9–12.7)

## 2024-11-19 PROCEDURE — 36415 COLL VENOUS BLD VENIPUNCTURE: CPT | Mod: HCNC | Performed by: PHYSICIAN ASSISTANT

## 2024-11-19 PROCEDURE — 25000003 PHARM REV CODE 250: Mod: HCNC | Performed by: PHYSICIAN ASSISTANT

## 2024-11-19 PROCEDURE — 86850 RBC ANTIBODY SCREEN: CPT | Mod: HCNC | Performed by: STUDENT IN AN ORGANIZED HEALTH CARE EDUCATION/TRAINING PROGRAM

## 2024-11-19 PROCEDURE — 93010 ELECTROCARDIOGRAM REPORT: CPT | Mod: HCNC,,, | Performed by: INTERNAL MEDICINE

## 2024-11-19 PROCEDURE — 99900035 HC TECH TIME PER 15 MIN (STAT): Mod: HCNC

## 2024-11-19 PROCEDURE — 25000003 PHARM REV CODE 250: Mod: HCNC | Performed by: INTERNAL MEDICINE

## 2024-11-19 PROCEDURE — 63600175 PHARM REV CODE 636 W HCPCS: Mod: HCNC | Performed by: PHYSICIAN ASSISTANT

## 2024-11-19 PROCEDURE — 80048 BASIC METABOLIC PNL TOTAL CA: CPT | Mod: HCNC | Performed by: PHYSICIAN ASSISTANT

## 2024-11-19 PROCEDURE — 99153 MOD SED SAME PHYS/QHP EA: CPT | Mod: HCNC | Performed by: INTERNAL MEDICINE

## 2024-11-19 PROCEDURE — 94799 UNLISTED PULMONARY SVC/PX: CPT | Mod: HCNC

## 2024-11-19 PROCEDURE — C1894 INTRO/SHEATH, NON-LASER: HCPCS | Mod: HCNC | Performed by: INTERNAL MEDICINE

## 2024-11-19 PROCEDURE — C1769 GUIDE WIRE: HCPCS | Mod: HCNC | Performed by: INTERNAL MEDICINE

## 2024-11-19 PROCEDURE — 21400001 HC TELEMETRY ROOM: Mod: HCNC

## 2024-11-19 PROCEDURE — 63600175 PHARM REV CODE 636 W HCPCS: Mod: HCNC | Performed by: INTERNAL MEDICINE

## 2024-11-19 PROCEDURE — B2111ZZ FLUOROSCOPY OF MULTIPLE CORONARY ARTERIES USING LOW OSMOLAR CONTRAST: ICD-10-PCS | Performed by: INTERNAL MEDICINE

## 2024-11-19 PROCEDURE — 96372 THER/PROPH/DIAG INJ SC/IM: CPT | Performed by: PHYSICIAN ASSISTANT

## 2024-11-19 PROCEDURE — 36415 COLL VENOUS BLD VENIPUNCTURE: CPT | Mod: HCNC,XB | Performed by: STUDENT IN AN ORGANIZED HEALTH CARE EDUCATION/TRAINING PROGRAM

## 2024-11-19 PROCEDURE — 93454 CORONARY ARTERY ANGIO S&I: CPT | Mod: 26,HCNC,, | Performed by: INTERNAL MEDICINE

## 2024-11-19 PROCEDURE — 25500020 PHARM REV CODE 255: Mod: HCNC | Performed by: INTERNAL MEDICINE

## 2024-11-19 PROCEDURE — 94761 N-INVAS EAR/PLS OXIMETRY MLT: CPT | Mod: HCNC

## 2024-11-19 PROCEDURE — 85025 COMPLETE CBC W/AUTO DIFF WBC: CPT | Mod: HCNC | Performed by: PHYSICIAN ASSISTANT

## 2024-11-19 PROCEDURE — 93005 ELECTROCARDIOGRAM TRACING: CPT | Mod: HCNC

## 2024-11-19 PROCEDURE — 93454 CORONARY ARTERY ANGIO S&I: CPT | Mod: HCNC | Performed by: INTERNAL MEDICINE

## 2024-11-19 PROCEDURE — 99152 MOD SED SAME PHYS/QHP 5/>YRS: CPT | Mod: HCNC,,, | Performed by: INTERNAL MEDICINE

## 2024-11-19 PROCEDURE — C1887 CATHETER, GUIDING: HCPCS | Mod: HCNC | Performed by: INTERNAL MEDICINE

## 2024-11-19 PROCEDURE — 99152 MOD SED SAME PHYS/QHP 5/>YRS: CPT | Mod: HCNC | Performed by: INTERNAL MEDICINE

## 2024-11-19 PROCEDURE — 27201423 OPTIME MED/SURG SUP & DEVICES STERILE SUPPLY: Mod: HCNC | Performed by: INTERNAL MEDICINE

## 2024-11-19 PROCEDURE — 25000003 PHARM REV CODE 250: Mod: HCNC | Performed by: STUDENT IN AN ORGANIZED HEALTH CARE EDUCATION/TRAINING PROGRAM

## 2024-11-19 RX ORDER — HEPARIN SODIUM 1000 [USP'U]/ML
INJECTION, SOLUTION INTRAVENOUS; SUBCUTANEOUS
Status: DISCONTINUED | OUTPATIENT
Start: 2024-11-19 | End: 2024-11-19 | Stop reason: HOSPADM

## 2024-11-19 RX ORDER — SODIUM CHLORIDE 0.9 % (FLUSH) 0.9 %
10 SYRINGE (ML) INJECTION
Status: DISCONTINUED | OUTPATIENT
Start: 2024-11-19 | End: 2024-11-19 | Stop reason: HOSPADM

## 2024-11-19 RX ORDER — SODIUM CHLORIDE 9 MG/ML
INJECTION, SOLUTION INTRAVENOUS CONTINUOUS
Status: ACTIVE | OUTPATIENT
Start: 2024-11-19 | End: 2024-11-19

## 2024-11-19 RX ORDER — CLOPIDOGREL BISULFATE 300 MG/1
600 TABLET, FILM COATED ORAL ONCE
Status: COMPLETED | OUTPATIENT
Start: 2024-11-19 | End: 2024-11-19

## 2024-11-19 RX ORDER — FENTANYL CITRATE 50 UG/ML
INJECTION, SOLUTION INTRAMUSCULAR; INTRAVENOUS
Status: DISCONTINUED | OUTPATIENT
Start: 2024-11-19 | End: 2024-11-19 | Stop reason: HOSPADM

## 2024-11-19 RX ORDER — DIPHENHYDRAMINE HCL 50 MG
50 CAPSULE ORAL ONCE
Status: DISCONTINUED | OUTPATIENT
Start: 2024-11-19 | End: 2024-11-19

## 2024-11-19 RX ORDER — ACETAMINOPHEN 325 MG/1
650 TABLET ORAL EVERY 4 HOURS PRN
Status: DISCONTINUED | OUTPATIENT
Start: 2024-11-19 | End: 2024-11-19

## 2024-11-19 RX ORDER — LIDOCAINE HYDROCHLORIDE 20 MG/ML
INJECTION, SOLUTION EPIDURAL; INFILTRATION; INTRACAUDAL; PERINEURAL
Status: DISCONTINUED | OUTPATIENT
Start: 2024-11-19 | End: 2024-11-19 | Stop reason: HOSPADM

## 2024-11-19 RX ORDER — ASPIRIN 81 MG/1
81 TABLET ORAL DAILY
Start: 2024-11-19 | End: 2025-11-19

## 2024-11-19 RX ORDER — ONDANSETRON 8 MG/1
8 TABLET, ORALLY DISINTEGRATING ORAL EVERY 8 HOURS PRN
Status: DISCONTINUED | OUTPATIENT
Start: 2024-11-19 | End: 2024-11-19

## 2024-11-19 RX ORDER — ASPIRIN 81 MG/1
81 TABLET ORAL DAILY
Start: 2024-11-19 | End: 2024-11-19

## 2024-11-19 RX ORDER — DIPHENHYDRAMINE HCL 50 MG
50 CAPSULE ORAL ONCE
Status: COMPLETED | OUTPATIENT
Start: 2024-11-19 | End: 2024-11-19

## 2024-11-19 RX ORDER — MIDAZOLAM HYDROCHLORIDE 1 MG/ML
INJECTION INTRAMUSCULAR; INTRAVENOUS
Status: DISCONTINUED | OUTPATIENT
Start: 2024-11-19 | End: 2024-11-19 | Stop reason: HOSPADM

## 2024-11-19 RX ORDER — ATORVASTATIN CALCIUM 40 MG/1
40 TABLET, FILM COATED ORAL DAILY
Qty: 90 TABLET | Refills: 3 | Status: SHIPPED | OUTPATIENT
Start: 2024-11-19 | End: 2025-11-19

## 2024-11-19 RX ORDER — HEPARIN SOD,PORCINE/0.9 % NACL 1000/500ML
INTRAVENOUS SOLUTION INTRAVENOUS
Status: DISCONTINUED | OUTPATIENT
Start: 2024-11-19 | End: 2024-11-19 | Stop reason: HOSPADM

## 2024-11-19 RX ORDER — CLOPIDOGREL BISULFATE 75 MG/1
75 TABLET ORAL DAILY
Status: DISCONTINUED | OUTPATIENT
Start: 2024-11-20 | End: 2024-11-19 | Stop reason: HOSPADM

## 2024-11-19 RX ORDER — ATORVASTATIN CALCIUM 40 MG/1
40 TABLET, FILM COATED ORAL DAILY
Qty: 90 TABLET | Refills: 3 | Status: SHIPPED | OUTPATIENT
Start: 2024-11-19 | End: 2024-11-19

## 2024-11-19 RX ADMIN — POLYETHYLENE GLYCOL 3350 17 G: 17 POWDER, FOR SOLUTION ORAL at 08:11

## 2024-11-19 RX ADMIN — OXYBUTYNIN CHLORIDE 5 MG: 5 TABLET ORAL at 08:11

## 2024-11-19 RX ADMIN — HEPARIN SODIUM 5000 UNITS: 5000 INJECTION INTRAVENOUS; SUBCUTANEOUS at 06:11

## 2024-11-19 RX ADMIN — DIPHENHYDRAMINE HYDROCHLORIDE 50 MG: 50 CAPSULE ORAL at 08:11

## 2024-11-19 RX ADMIN — GABAPENTIN 600 MG: 300 CAPSULE ORAL at 08:11

## 2024-11-19 RX ADMIN — CLOPIDOGREL BISULFATE 600 MG: 300 TABLET, FILM COATED ORAL at 09:11

## 2024-11-19 RX ADMIN — ASPIRIN 81 MG: 81 TABLET, COATED ORAL at 08:11

## 2024-11-19 NOTE — NURSING
Right radial vasc band removed per hospital policy. No signs of bleeding or hematoma noted. Dressing applied. Dressing C/D/I. Patient educated on right radial site care and limitations. Patient verbalizes understanding. All needs met. Vital signs within normal limits. Safety measures in place. Plan of  care ongoing.

## 2024-11-19 NOTE — NURSING
Patient status post LHC. Patient alert and oriented x4. Patient follows all commands appropriately. Right radial vasc band noted. No signs of bleeding or hematoma noted. Patient educated on right radial site care. Patient verbalizes understanding. Call placed to daughter Sachi, no answer. Patient denies pain/discomfort. All needs met. Vital signs within normal limits. Safety measures in place. Plan of care ongoing.

## 2024-11-19 NOTE — INTERVAL H&P NOTE
The patient has been examined and the H&P has been reviewed:    I concur with the findings and no changes have occurred since H&P was written.    Procedure risks, benefits and alternative options discussed and understood by patient/family.    Patient reported there was an episode of blood pressure getting low after Verapamil. There was no any other allergic reaction to Verapamil. Likely this is not a true allergy. Patient agreed upon us using Verapamil during this [facility] Procedure especially its an integral part of the Radial flush.    Active Hospital Problems    Diagnosis  POA    *Chest pain [R07.9]  Yes    Type 2 diabetes mellitus with diabetic neuropathy, without long-term current use of insulin [E11.40]  Yes    Primary hypertension [I10]  Yes    Mixed hyperlipidemia [E78.2]  Yes      Resolved Hospital Problems   No resolved problems to display.

## 2024-11-19 NOTE — PLAN OF CARE
Inpatient Upgrade Note    Emilia Alan has warranted treatment spanning two or more midnights of hospital level care for the management of chest pain and Abn Nuclear stress test . She continues to require daily labs, further testing/imaging, monitoring of vital signs, medication adjustments, and further evaluation by consultants. Her condition is also complicated by the following comorbidities:   diabetes, history of polio with chronic RLE swelling and wheel chair bound, HTN  .

## 2024-11-19 NOTE — NURSING
Discharge instructions reviewed with patient and daughter at bedside. Medications administration, upcoming appointments and warning signs reviewed with patient. No complaints or concerns voiced at this time. Patient verbalized understanding. IV and tele box removed. Pt awaiting wheelchair transport to front entrance for discharge.

## 2024-11-19 NOTE — PLAN OF CARE
Problem: Infection  Goal: Absence of Infection Signs and Symptoms  Outcome: Met     Problem: Adult Inpatient Plan of Care  Goal: Plan of Care Review  Outcome: Met  Goal: Patient-Specific Goal (Individualized)  Outcome: Met  Goal: Absence of Hospital-Acquired Illness or Injury  Outcome: Met  Goal: Optimal Comfort and Wellbeing  Outcome: Met  Goal: Readiness for Transition of Care  Outcome: Met     Problem: Diabetes Comorbidity  Goal: Blood Glucose Level Within Targeted Range  Outcome: Met     Problem: Skin Injury Risk Increased  Goal: Skin Health and Integrity  Outcome: Met     Problem: Fall Injury Risk  Goal: Absence of Fall and Fall-Related Injury  Outcome: Met     Problem: Wound  Goal: Optimal Coping  Outcome: Met  Goal: Optimal Functional Ability  Outcome: Met  Goal: Absence of Infection Signs and Symptoms  Outcome: Met  Goal: Improved Oral Intake  Outcome: Met  Goal: Optimal Pain Control and Function  Outcome: Met  Goal: Skin Health and Integrity  Outcome: Met  Goal: Optimal Wound Healing  Outcome: Met

## 2024-11-19 NOTE — BRIEF OP NOTE
Brief Operative Note:    : Sotero Merlos MD     Referring Physician: Self,Aaareferral     All Operators: Surgeon(s):  Manuel Whelan MD Vyas, Ankit, MD Tafur Soto, Jose D., MD     Preoperative Diagnosis: Chest pain [R07.9]     Postop Diagnosis: Chest pain [R07.9]    Treatments/Procedures: Procedure(s) (LRB):  ANGIOGRAM, CORONARY ARTERY (Right)    Access: Right radial artery    Findings:Moderate coronary artery disease is present. 50% Ostial ramus lesion    See catheterization report for full details.    Intervention: None    See catheterization report for full details.    Closure device:  Vasc Band        Plan:  - Post cath protocol   - IVF @ 150 cc/hr x 2 hours  - Bed rest x 2 hours   - Continue  Aspirin 81 mg daily indefinitely  - Continue high intensity statin therapy (LDL goal < 70)  - Risk factor reduction (BP <130/80 mmHg, glycemic control, etc)  - Follow up with outpatient cardiologist    Estimated Blood loss: 20 cc    Specimens removed: No      Delroy Amanda MD  Cardiovascular Medicine Fellow - PGY IV  [facility]

## 2024-11-19 NOTE — NURSING TRANSFER
Nursing Transfer Note      11/19/2024   1:32 PM    Nurse giving handoff:Jamal BURNS  Nurse receiving handoff:Saida BURNS    Reason patient is being transferred: back to room post procedure    Transfer To: 726    Transfer via stretcher    Transfer with cardiac monitoring    Transported by transport    Transfer Vital Signs:  Blood Pressure:136/63 (91)  Heart Rate:69  O2:94  Temperature:97.8  Respirations:16    Telemetry: Box Number [id number]  Order for Tele Monitor? Yes      Patient belongings transferred with patient: Yes    Chart send with patient: Yes    Notified: daughter    Patient reassessed at: 11/19 5740

## 2024-11-20 NOTE — PLAN OF CARE
[facility]  Discharge Final Note    Primary Care Provider: Lauren Deras MD    Expected Discharge Date: 11/19/2024    Patient discharged to home via personal transportation.     Patient's bedside nurse and patient notified of the above.    Discharge Plan A and Plan B have been determined by review of patient's clinical status, future medical and therapeutic needs, and coverage/benefits for post-acute care in coordination with multidisciplinary team members.        Final Discharge Note (most recent)       Final Note - 11/20/24 0919          Final Note    Assessment Type Final Discharge Note (P)      Anticipated Discharge Disposition Home or Self Care (P)         Post-Acute Status    Post-Acute Authorization Other (P)      Other Status No Post-Acute Service Needs (P)                      Important Message from Medicare                 Future Appointments   Date Time Provider Department Center   11/21/2024 10:00 AM Lauren Deras MD [facility] IM [facility]   11/21/2024 11:15 AM Carole Niño DPM [facility] POD [facility]   12/5/2024 11:30 AM Lauren Deras MD [facility] IM [facility]   12/18/2024  8:40 AM Mateus Maldonado MD [facility] CARDIO [facility]        scheduled post-discharge follow-up appointment and information added to AVS.     Naye Randhawa LMSW  [facility]  Ext. [phone]

## 2024-11-20 NOTE — DISCHARGE SUMMARY
[facility] - Observation [address] [place] [address] Medicine  Discharge Summary      Patient Name: Emilia Alan  MRN: [id number]  SARAN: [id number]  Patient Class: IP- Inpatient  Admission Date: 11/16/2024  Hospital Length of Stay: 1 days  Discharge Date and Time: 11/19/2024  6:50 PM  Attending Physician: Corrine att. providers found   Discharging Provider: Annemarie Dubon PA-C  Primary Care Provider: Lauren Deras MD  [facility] Medicine Team: [facility]  Annemarie Dubon PA-C  Primary Care Team: [facility]    HPI:   Mrs. Alan is an 84 year old female with a pmhx of 84-year-old of diabetes, history of polio with chronic RLE swelling and wheel chair bound, HTN who presents with sharp, stabbing midsternal intermittent chest pain that began at 5pm yesterday and lasted until 9 am this morning. Chest pain lasts for a few seconds before going away. It is associated with cold sweats. No arm, jaw pain, N/V, SOB associated. She has never experienced a pain like this before. When she got the ED she was given an ASA and nitro and pain resolved in 15 minutes. It has not returned. She reports eating chinese food yesterday evening but did not feel like reflux. She reports over past two weeks her sugars have been more elevated than normal, running in the 250s, but she is not strictly compliant with diabetic diet. No fever, chills, SOB, cough, abdominal pain, D/C, urinary symptoms.     In ED, VSS. Afebrile without leukocytosis. Troponin 0.008. BNP 71. EKG without acute ischemia. Labs grossly unremarkable. Patient given  mg and hold amlodipine and admitted to observation for chest pain rule out.     Procedure(s) (LRB):  ANGIOGRAM, CORONARY ARTERY (Right)      Hospital Course:   Emilia Alan is a 84 y.o. female admitted to  for chest pain. Tn slightly elevated but normalized. EKG w/o acute ischemic changes. ECHO w/o acute WMAs. NM stress test ordered for 11/18. New perfusion abnormality noted on NM test. IC consulted. [facility]  completed which showed a moderate coronary artery disease is present. 50% Ostial ramus lesion. Will continue ASA and high intensity statin at discharge. Patient seen before/ after procedure and is medically ready. All questions answered at discharge with verbal understanding. Return precautions given. Cardiology referral placed.     Physical Exam  Gen: in NAD, appears stated age  CVS: RRR, no m/r/g; S1/S2 auscultated with no S3 or S4; capillary refill < 2 sec  Resp: lungs CTAB, no w/r/r; no belabored breathing or accessory muscle use appreciated         Goals of Care Treatment Preferences:  Code Status: Full Code      SDOH Screening:  The patient was screened for utility difficulties, food insecurity, transport difficulties, housing insecurity, and interpersonal safety and there were no concerns identified this admission.     Consults:   Consults (From admission, onward)          Status Ordering Provider     Inpatient consult to Interventional Cardiology  Once        Provider:  (Not yet assigned)    Completed ALICE MACKAY            Final Active Diagnoses:    Diagnosis Date Noted POA    PRINCIPAL PROBLEM:  Chest pain [R07.9] 11/16/2024 Yes    Type 2 diabetes mellitus with diabetic neuropathy, without long-term current use of insulin [E11.40] 07/06/2015 Yes    Primary hypertension [I10] 07/06/2015 Yes    Mixed hyperlipidemia [E78.2] 09/24/2012 Yes      Problems Resolved During this Admission:       Discharged Condition: stable    Disposition: Home or Self Care    Patient Instructions:      Ambulatory referral/consult to Cardiology   Standing Status: Future   Referral Priority: Routine Referral Type: Consultation   Referral Reason: Specialty Services Required   Requested Specialty: Cardiology   Number of Visits Requested: 1     Medications:  Reconciled Home Medications:      Medication List        START taking these medications      atorvastatin 40 MG tablet  Commonly known as: LIPITOR  Take 1 tablet (40 mg  total) by mouth once daily.            CONTINUE taking these medications      ACCU-CHEK GUIDE TEST STRIPS Strp  Generic drug: blood sugar diagnostic  TEST BLOOD SUGAR TWO TIMES DAILY     amLODIPine 10 MG tablet  Commonly known as: NORVASC  TAKE 1 TABLET EVERY DAY     aspirin 81 MG EC tablet  Commonly known as: ECOTRIN  Take 1 tablet (81 mg total) by mouth once daily.     * blood-glucose meter kit  Commonly known as: TRUERESULT BLOOD GLUCOSE SYSTM  Use as instructed     * ACCU-CHEK GUIDE GLUCOSE METER Misc  Generic drug: blood-glucose meter  USE AS DIRECTED     calcium-vitamin D3-vitamin K 500 mg-100 unit -40 mcg Chew  Take 2 each by mouth.     clotrimazole-betamethasone 1-0.05% cream  Commonly known as: LOTRISONE  Apply topically 2 (two) times daily.     desloratadine 5 mg tablet  Commonly known as: CLARINEX  Take 5 mg by mouth once daily.     * diclofenac sodium 1 % Gel  Commonly known as: VOLTAREN  GRETEL 2 GRAMS TO BOTH KNEES AND LEFT SHOULDER QID     * diclofenac 75 MG EC tablet  Commonly known as: VOLTAREN  TAKE 1 TABLET WITH FOOD TWICE DAILY AS NEEDED. STOP IF ANY STOMACH IRRITATION     docusate sodium 50 MG capsule  Commonly known as: COLACE  Take 1 capsule (50 mg total) by mouth 2 (two) times daily as needed for Constipation.     DROPSAFE ALCOHOL PREP PADS Padm  Generic drug: alcohol swabs  USE AS DIRECTED EVERY DAY     gabapentin 600 MG tablet  Commonly known as: NEURONTIN  TAKE 1 TABLET THREE TIMES DAILY     hydroCHLOROthiazide 25 MG tablet  Commonly known as: HYDRODIURIL  TAKE 1 TABLET EVERY DAY     HYDROcodone-acetaminophen  mg per tablet  Commonly known as: NORCO  Take 1 tablet by mouth every 6 (six) hours as needed for Pain (pain).     irbesartan 300 MG tablet  Commonly known as: AVAPRO  TAKE 1 TABLET EVERY DAY     ketoconazole 2 % cream  Commonly known as: NIZORAL  Apply topically once daily.     lancets Misc  Commonly known as: ACCU-CHEK SOFTCLIX LANCETS  Use to test blood glucose two (2) times  daily as directed with insurance preferred meter and supplies     metFORMIN 500 MG ER 24hr tablet  Commonly known as: GLUCOPHAGE-XR  TAKE 1 TABLET TWICE DAILY WITH MEALS     multivitamin per tablet  Commonly known as: THERAGRAN  Take 1 tablet by mouth once daily.     oxybutynin 5 MG Tab  Commonly known as: DITROPAN  TAKE 1 TABLET TWICE DAILY     zoledronic acid-mannitol & water 5 mg/100 mL Pgbk  Commonly known as: RECLAST           * This list has 4 medication(s) that are the same as other medications prescribed for you. Read the directions carefully, and ask your doctor or other care provider to review them with you.                STOP taking these medications      pravastatin 40 MG tablet  Commonly known as: PRAVACHOL              Indwelling Lines/Drains at time of discharge:   Lines/Drains/Airways       Drain  Duration             Female External Urinary Catheter w/ Suction 11/16/24 0735 4 days                    Time spent on the discharge of patient: 36 minutes         Annemarie Dubon PA-C  Department of Hospital Medicine  [facility]

## 2024-11-21 ENCOUNTER — OFFICE VISIT (OUTPATIENT)
Dept: PODIATRY | Facility: CLINIC | Age: 84
End: 2024-11-21
Payer: MEDICARE

## 2024-11-21 ENCOUNTER — LAB VISIT (OUTPATIENT)
Dept: LAB | Facility: HOSPITAL | Age: 84
End: 2024-11-21
Attending: INTERNAL MEDICINE
Payer: MEDICARE

## 2024-11-21 ENCOUNTER — OFFICE VISIT (OUTPATIENT)
Dept: INTERNAL MEDICINE | Facility: CLINIC | Age: 84
End: 2024-11-21
Payer: MEDICARE

## 2024-11-21 VITALS
BODY MASS INDEX: 28.45 KG/M2 | WEIGHT: 141.13 LBS | HEIGHT: 59 IN | HEART RATE: 80 BPM | DIASTOLIC BLOOD PRESSURE: 76 MMHG | RESPIRATION RATE: 18 BRPM | SYSTOLIC BLOOD PRESSURE: 130 MMHG

## 2024-11-21 VITALS — SYSTOLIC BLOOD PRESSURE: 118 MMHG | HEIGHT: 59 IN | DIASTOLIC BLOOD PRESSURE: 65 MMHG | BODY MASS INDEX: 28.59 KG/M2

## 2024-11-21 DIAGNOSIS — M21.372 FOOT DROP, LEFT: ICD-10-CM

## 2024-11-21 DIAGNOSIS — M81.0 AGE-RELATED OSTEOPOROSIS WITHOUT CURRENT PATHOLOGICAL FRACTURE: ICD-10-CM

## 2024-11-21 DIAGNOSIS — E11.40 TYPE 2 DIABETES MELLITUS WITH DIABETIC NEUROPATHY, WITHOUT LONG-TERM CURRENT USE OF INSULIN: ICD-10-CM

## 2024-11-21 DIAGNOSIS — E11.40 TYPE 2 DIABETES MELLITUS WITH DIABETIC NEUROPATHY, WITHOUT LONG-TERM CURRENT USE OF INSULIN: Primary | ICD-10-CM

## 2024-11-21 DIAGNOSIS — G14 POST-POLIO SYNDROME: ICD-10-CM

## 2024-11-21 DIAGNOSIS — G60.9 IDIOPATHIC PERIPHERAL NEUROPATHY: ICD-10-CM

## 2024-11-21 DIAGNOSIS — M17.11 PRIMARY OSTEOARTHRITIS OF RIGHT KNEE: ICD-10-CM

## 2024-11-21 DIAGNOSIS — R07.89 ATYPICAL CHEST PAIN: Primary | ICD-10-CM

## 2024-11-21 DIAGNOSIS — E78.2 MIXED HYPERLIPIDEMIA: ICD-10-CM

## 2024-11-21 DIAGNOSIS — B35.1 ONYCHOMYCOSIS DUE TO DERMATOPHYTE: ICD-10-CM

## 2024-11-21 DIAGNOSIS — G14 POST POLIOMYELITIS SYNDROME: ICD-10-CM

## 2024-11-21 DIAGNOSIS — L84 CORN OR CALLUS: ICD-10-CM

## 2024-11-21 DIAGNOSIS — M48.062 SPINAL STENOSIS OF LUMBAR REGION WITH NEUROGENIC CLAUDICATION: ICD-10-CM

## 2024-11-21 DIAGNOSIS — I10 PRIMARY HYPERTENSION: ICD-10-CM

## 2024-11-21 PROBLEM — R07.9 CHEST PAIN: Status: RESOLVED | Noted: 2024-11-16 | Resolved: 2024-11-21

## 2024-11-21 LAB
CHOLEST SERPL-MCNC: 137 MG/DL (ref 120–199)
CHOLEST/HDLC SERPL: 3 {RATIO} (ref 2–5)
ESTIMATED AVG GLUCOSE: 131 MG/DL (ref 68–131)
HBA1C MFR BLD: 6.2 % (ref 4–5.6)
HDLC SERPL-MCNC: 46 MG/DL (ref 40–75)
HDLC SERPL: 33.6 % (ref 20–50)
LDLC SERPL CALC-MCNC: 61 MG/DL (ref 63–159)
NONHDLC SERPL-MCNC: 91 MG/DL
TRIGL SERPL-MCNC: 150 MG/DL (ref 30–150)

## 2024-11-21 PROCEDURE — 99999 PR PBB SHADOW E&M-EST. PATIENT-LVL III: CPT | Mod: PBBFAC,HCNC,, | Performed by: PODIATRIST

## 2024-11-21 PROCEDURE — 80061 LIPID PANEL: CPT | Mod: HCNC | Performed by: INTERNAL MEDICINE

## 2024-11-21 PROCEDURE — 99999 PR PBB SHADOW E&M-EST. PATIENT-LVL IV: CPT | Mod: PBBFAC,HCNC,, | Performed by: INTERNAL MEDICINE

## 2024-11-21 PROCEDURE — 83036 HEMOGLOBIN GLYCOSYLATED A1C: CPT | Mod: HCNC | Performed by: INTERNAL MEDICINE

## 2024-11-21 RX ORDER — HYDROCODONE BITARTRATE AND ACETAMINOPHEN 10; 325 MG/1; MG/1
1 TABLET ORAL EVERY 6 HOURS PRN
Qty: 120 TABLET | Refills: 0 | Status: SHIPPED | OUTPATIENT
Start: 2024-11-21 | End: 2024-12-25

## 2024-11-21 NOTE — PROGRESS NOTES
"Transitional Care Note  Subjective:       Patient ID: Emilia lAan is a 84 y.o. female.  Chief Complaint: Hospital Follow Up    Family and/or Caretaker present at visit?  No.  Diagnostic tests reviewed/disposition: I have reviewed all completed as well as pending diagnostic tests at the time of discharge.  Disease/illness education: yes  Home health/community services discussion/referrals: Patient does not have home health established from hospital visit.  They do not need home health.  If needed, we will set up home health for the patient.   Establishment or re-establishment of referral orders for community resources: No other necessary community resources.   Discussion with other health care providers: No discussion with other health care providers necessary.   She has Cardiology follow up scheduled soon    Seen in ER November 16:  "pmhx of 84-year-old of diabetes, history of polio with chronic RLE swelling and wheel chair bound, HTN who presents with sharp, stabbing midsternal intermittent chest pain that began at 5pm yesterday and lasted until 9 am this morning. Chest pain lasts for a few seconds before going away. It is associated with cold sweats. No arm, jaw pain, N/V, SOB associated. She has never experienced a pain like this before. When she got the ED she was given an ASA and nitro and pain resolved in 15 minutes. It has not returned. She reports eating chinese food yesterday evening but did not feel like reflux. She reports over past two weeks her sugars have been more elevated than normal, running in the 250s, but she is not strictly compliant with diabetic diet. No fever, chills, SOB, cough, abdominal pain, D/C, urinary symptoms.      In ED, VSS. Afebrile without leukocytosis. Troponin 0.008. BNP 71. EKG without acute ischemia. Labs grossly unremarkable. Patient given  mg and hold amlodipine and admitted to observation for chest pain rule out.      Procedure(s) (LRB):  ANGIOGRAM, CORONARY " "ARTERY (Right)       Hospital Course:   Emilia Alan is a 84 y.o. female admitted to  for chest pain. Tn slightly elevated but normalized. EKG w/o acute ischemic changes. ECHO w/o acute WMAs. NM stress test ordered for 11/18. New perfusion abnormality noted on NM test. IC consulted. C completed which showed a moderate coronary artery disease is present. 50% Ostial ramus lesion. Will continue ASA and high intensity statin at discharge. Patient seen before/ after procedure and is medically ready. All questions answered at discharge with verbal understanding. Return precautions given. Cardiology referral placed. "    Since discharge she has had no sx.  She denies syncope, chest pain, tightness, shortness of breath or L arm pain.  Other medical problems have been stable.    Patient Active Problem List   Diagnosis    Gait disorder    History of poliomyelitis    Post poliomyelitis syndrome    Diabetic polyneuropathy associated with type 2 diabetes mellitus    Mixed hyperlipidemia    Osteoporosis without current pathological fracture: worse on fosamax 5/16- Reclast 8/16, 12/20    Paraparesis of both lower limbs    Other specified anemias    Primary hypertension    Type 2 diabetes mellitus with diabetic neuropathy, without long-term current use of insulin    Osteoarthritis of right knee    Lumbar spinal stenosis    Obstructive sleep apnea syndrome: dx 2008 needs CPAP 11    Bradycardia, drug induced    Essential tremor    Clonal Hematopoiesis of Indeterminate Potential (CHIP)    Type 2 diabetes mellitus with renal manifestations    Facet arthritis of lumbar region    Opioid use agreement exists    Primary osteoarthritis of left shoulder    Unspecified inflammatory spondylopathy, lumbar region         HPI  Review of Systems   Constitutional:  Negative for fatigue.   HENT:  Negative for hearing loss.    Eyes:  Negative for visual disturbance.   Respiratory:  Negative for shortness of breath.    Cardiovascular:  Negative " for chest pain.   Gastrointestinal:  Negative for abdominal pain, constipation and diarrhea.   Genitourinary:  Negative for dysuria, frequency and vaginal bleeding.   Musculoskeletal:  Positive for arthralgias. Negative for joint swelling.   Skin:  Negative for rash.   Neurological:  Negative for weakness, light-headedness and headaches.   Psychiatric/Behavioral:  Negative for sleep disturbance.        Objective:      Physical Exam  Vitals and nursing note reviewed.   Constitutional:       Appearance: She is well-developed.      Comments: In a wheelchair   HENT:      Head: Normocephalic and atraumatic.      Right Ear: External ear normal.      Left Ear: External ear normal.      Nose: Nose normal.      Mouth/Throat:      Pharynx: No oropharyngeal exudate.   Eyes:      General: No scleral icterus.     Extraocular Movements: Extraocular movements intact.      Conjunctiva/sclera: Conjunctivae normal.   Neck:      Thyroid: No thyromegaly.      Vascular: No JVD.   Cardiovascular:      Rate and Rhythm: Normal rate and regular rhythm.      Heart sounds: Normal heart sounds. No murmur heard.     No gallop.   Pulmonary:      Effort: Pulmonary effort is normal. No respiratory distress.      Breath sounds: Normal breath sounds. No wheezing.   Abdominal:      General: Bowel sounds are normal. There is no distension.      Palpations: Abdomen is soft. There is no mass.      Tenderness: There is no abdominal tenderness. There is no guarding or rebound.   Musculoskeletal:         General: No tenderness. Normal range of motion.      Cervical back: Normal range of motion and neck supple.   Lymphadenopathy:      Cervical: No cervical adenopathy.   Skin:     General: Skin is warm.      Findings: No erythema or rash.   Neurological:      General: No focal deficit present.      Mental Status: She is alert and oriented to person, place, and time.      Cranial Nerves: No cranial nerve deficit.      Coordination: Coordination normal.  "  Psychiatric:         Behavior: Behavior normal.         Thought Content: Thought content normal.         Judgment: Judgment normal.         Assessment:       1. Atypical chest pain    2. Mixed hyperlipidemia    3. Primary hypertension    4. Type 2 diabetes mellitus with diabetic neuropathy, without long-term current use of insulin    5. Osteoporosis without current pathological fracture: worse on fosamax 5/16- Reclast 8/16, 12/20    6. Post poliomyelitis syndrome    7. Primary osteoarthritis of right knee    8. Spinal stenosis of lumbar region with neurogenic claudication        Plan:       1. Atypical chest pain: see details above.  Clinically stable.  Continue regimen.  Keep Cardiology follow up.  Alarm sx and ED cautions reviewed.    2. Mixed hyperlipidemia: on tx.  Mos recent LDL < 70- will review  -     Lipid Panel; Future; Expected date: 11/21/2024    3. Primary hypertension: Low salt diet, limited ability to exercise; 5-10-# weight loss in next 6-12 months' time.  Call if BP > 130/80 on a regular basis.    4. Type 2 diabetes mellitus with diabetic neuropathy, without long-term current use of insulin: stable on regimen  -     Hemoglobin A1C; Future; Expected date: 11/21/2024  -     Microalbumin/Creatinine Ratio, Urine; Future; Expected date: 11/21/2024  -     HYDROcodone-acetaminophen (NORCO)  mg per tablet; Take 1 tablet by mouth every 6 (six) hours as needed for Pain (pain).  Dispense: 120 tablet; Refill: 0    5. Osteoporosis without current pathological fracture: worse on fosamax 5/16- Reclast 8/16, 12/20- had Reclast also in May 2022 then drug holiday per ANGELIC (Dr Corbin):  see message 2023   "It looks like she got 2 doses of Reclast in December 2020 and May 2022 since her last bone density scan so I think it would be reasonable to weight on her next DEXA results which are due now and then decide she needs therapy.  Will reach out to my staff get her scheduled for the bone scan and follow-up visit " "was hormone in our department.       Kayce - can you please make sure this patient is scheduled bone scan as same location as her last scan then schedule follow-up with any available provider?     Eric  ----- Message -----  From: Lauren Deras MD  Sent: 4/26/2023  11:30 AM CDT  To: Eric Corbin MD     Hi again Dr Corbin:     [facility] patient, she had her last Reclast in May of 2022.  I have ordered a bone density.  Not sure if you want her to schedule Reclast again now or wait until after her DEXA?     Thanks     Lauren Deras"  -     DXA Bone Density Axial Skeleton 1 or more sites; Future; Expected date: 11/21/2024    6. Post poliomyelitis syndrome: stable  -     HYDROcodone-acetaminophen (NORCO)  mg per tablet; Take 1 tablet by mouth every 6 (six) hours as needed for Pain (pain).  Dispense: 120 tablet; Refill: 0    7. Primary osteoarthritis of right knee:stable  -     HYDROcodone-acetaminophen (NORCO)  mg per tablet; Take 1 tablet by mouth every 6 (six) hours as needed for Pain (pain).  Dispense: 120 tablet; Refill: 0    8. Spinal stenosis of lumbar region with neurogenic claudication: stable  -     HYDROcodone-acetaminophen (NORCO)  mg per tablet; Take 1 tablet by mouth every 6 (six) hours as needed for Pain (pain).  Dispense: 120 tablet; Refill: 0          reviewed    I will review all studies and determine further tx depending on findings  Visit today manifests increased complexity associated with the care of the multiple chronic and episodic problems I addressed.  I am managing a longitudinal care plan of the patient due to the serious and complex problems listed above.    Keep December appt with Cardiology and me    Flu shot and COVID booster today  RSV vaccine to be considered        "

## 2024-11-21 NOTE — PROGRESS NOTES
Subjective:      Patient ID: Emilia Alan is a 84 y.o. female.    Chief Complaint: Diabetic Foot Exam (Esther Mccarty PA-C at 10/14/2024 ) and Nail Care      Emilia is a 84 y.o. female who presents to the clinic for evaluation and treatment of high risk feet. 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 The patient's chief complaint is long, thick toenails     PCP: Lauren Deras MD    Date Last Seen by PCP:   Chief Complaint   Patient presents with    Diabetic Foot Exam     Esther Mccarty PA-C at 10/14/2024     Nail Care         Current shoe gear: DM shoes     Hemoglobin A1C   Date Value Ref Range Status   04/15/2024 5.9 (H) 4.0 - 5.6 % Final     Comment:     ADA Screening Guidelines:  5.7-6.4%  Consistent with prediabetes  >or=6.5%  Consistent with diabetes    High levels of fetal hemoglobin interfere with the HbA1C  assay. Heterozygous hemoglobin variants (HbS, HgC, etc)do  not significantly interfere with this assay.   However, presence of multiple variants may affect accuracy.     10/12/2023 6.0 (H) 4.0 - 5.6 % Final     Comment:     ADA Screening Guidelines:  5.7-6.4%  Consistent with prediabetes  >or=6.5%  Consistent with diabetes    High levels of fetal hemoglobin interfere with  "the HbA1C  assay. Heterozygous hemoglobin variants (HbS, HgC, etc)do  not significantly interfere with this assay.   However, presence of multiple variants may affect accuracy.     04/21/2023 6.0 (H) 4.0 - 5.6 % Final     Comment:     ADA Screening Guidelines:  5.7-6.4%  Consistent with prediabetes  >or=6.5%  Consistent with diabetes    High levels of fetal hemoglobin interfere with the HbA1C  assay. Heterozygous hemoglobin variants (HbS, HgC, etc)do  not significantly interfere with this assay.   However, presence of multiple variants may affect accuracy.           Review of Systems   Constitutional: Negative for chills, decreased appetite and fever.   Cardiovascular:  Positive for leg swelling (stable, mild). Negative for chest pain, claudication, cyanosis and dyspnea on exertion.   Respiratory:  Negative for cough and shortness of breath.    Skin:  Positive for dry skin, nail changes and rash. Negative for color change, flushing, itching, poor wound healing and skin cancer.   Musculoskeletal:  Positive for muscle weakness (foot drop). Negative for arthritis, back pain, falls, gout, joint pain, joint swelling and myalgias.   Gastrointestinal:  Negative for nausea and vomiting.   Neurological:  Positive for numbness and paresthesias. Negative for loss of balance.           Objective:       Vitals:    11/21/24 1110   BP: 130/76   Pulse: 80   Resp: 18   Weight: 64 kg (141 lb 1.5 oz)   Height: 4' 11" (1.499 m)        Physical Exam  Vitals and nursing note reviewed.   Constitutional:       General: She is not in acute distress.     Appearance: She is well-developed. She is not diaphoretic.   Cardiovascular:      Comments: Dorsalis pedis and posterior tibial pulses are diminished Bilaterally. Toes are cool to touch. Feet are warm proximally.There is decreased digital hair. Skin is atrophic, slightly hyperpigmented, and mildly edematous      Musculoskeletal:         General: Deformity present. No tenderness or signs of " injury.      Right ankle: Normal.      Left ankle: Normal.      Right foot: No swelling, deformity or crepitus.      Left foot: No swelling, deformity or crepitus.      Comments: Dropfoot left.      Lymphadenopathy:      Comments: No palpable lymph nodes   Skin:     General: Skin is warm and dry.      Coloration: Skin is not cyanotic, mottled or pale.      Findings: No abrasion, bruising, burn, erythema, laceration, lesion, petechiae or rash.      Nails: There is no clubbing.      Comments: Nails 1-5 b/l  are elongated by 3-7mm's, thickened by 3-9 mm's, dystrophic, and are darkened in  coloration . Xerosis Bilaterally. No open lesions noted.    Hyperkeratotic tissue noted to plantar L 5th MPJ    Neurological:      Mental Status: She is alert and oriented to person, place, and time.      Comments: [place] 5.07 monofilamant testing is diminished [place] feet. Sharp/dull sensation diminished Bilaterally. Light touch absent Bilaterally.       Psychiatric:         Thought Content: Thought content normal.         Judgment: Judgment normal.             Assessment:       Encounter Diagnoses   Name Primary?    Type 2 diabetes mellitus with diabetic neuropathy, without long-term current use of insulin Yes    Idiopathic peripheral neuropathy     Post-polio syndrome     Foot drop, left     Corn or callus     Onychomycosis due to dermatophyte          Plan:       Emilia was seen today for diabetic foot exam and nail care.    Diagnoses and all orders for this visit:    Type 2 diabetes mellitus with diabetic neuropathy, without long-term current use of insulin    Idiopathic peripheral neuropathy    Post-polio syndrome    Foot drop, left    Corn or callus    Onychomycosis due to dermatophyte      I counseled the patient on her conditions, their implications and medical management.    - Shoe inspection.Patient instructed on proper foot hygeine. We discussed wearing proper shoe gear, daily foot inspections, never walking without  protective shoe gear, never putting sharp instruments to feet, routine podiatric nail visits every 2-3 months.      - With patient's permission, nails were aggressively reduced and debrided x 10 to their soft tissue attachment mechanically, removing all offending nail and debris. Patient relates relief following the procedure. She will continue to monitor the areas daily, inspect her feet, wear protective shoe gear when ambulatory, moisturizer to maintain skin integrity and follow in this office in approximately 2-3 months, sooner p.r.n.    - After cleansing the  area w/ alcohol prep pad the above mentioned hyperkeratosis was trimmed utilizing No 15 scapel, to a smooth base with out incident. Patient tolerated this  well and reported comfort to the area x1

## 2024-11-22 ENCOUNTER — PATIENT OUTREACH (OUTPATIENT)
Dept: ADMINISTRATIVE | Facility: CLINIC | Age: 84
End: 2024-11-22
Payer: MEDICARE

## 2024-11-22 NOTE — PROGRESS NOTES
C3 nurse spoke with Emilia Alan  for a TCC post hospital discharge follow up call. The patient has a scheduled HOSFU appointment with Lauren Deras MD  on 11/21/24 @ 1000.

## 2024-12-05 ENCOUNTER — OFFICE VISIT (OUTPATIENT)
Dept: INTERNAL MEDICINE | Facility: CLINIC | Age: 84
End: 2024-12-05
Payer: MEDICARE

## 2024-12-05 ENCOUNTER — LAB VISIT (OUTPATIENT)
Dept: LAB | Facility: HOSPITAL | Age: 84
End: 2024-12-05
Attending: INTERNAL MEDICINE
Payer: MEDICARE

## 2024-12-05 VITALS — SYSTOLIC BLOOD PRESSURE: 130 MMHG | DIASTOLIC BLOOD PRESSURE: 70 MMHG

## 2024-12-05 DIAGNOSIS — R53.83 FATIGUE, UNSPECIFIED TYPE: ICD-10-CM

## 2024-12-05 DIAGNOSIS — I10 PRIMARY HYPERTENSION: Primary | ICD-10-CM

## 2024-12-05 DIAGNOSIS — E53.8 B12 DEFICIENCY: ICD-10-CM

## 2024-12-05 DIAGNOSIS — E11.21 TYPE 2 DIABETES MELLITUS WITH DIABETIC NEPHROPATHY, WITHOUT LONG-TERM CURRENT USE OF INSULIN: ICD-10-CM

## 2024-12-05 DIAGNOSIS — E78.2 MIXED HYPERLIPIDEMIA: ICD-10-CM

## 2024-12-05 LAB
ALBUMIN/CREAT UR: NORMAL UG/MG (ref 0–30)
CREAT UR-MCNC: 19 MG/DL (ref 15–325)
MICROALBUMIN UR DL<=1MG/L-MCNC: <5 UG/ML

## 2024-12-05 PROCEDURE — 1159F MED LIST DOCD IN RCRD: CPT | Mod: HCNC,CPTII,S$GLB, | Performed by: INTERNAL MEDICINE

## 2024-12-05 PROCEDURE — 82570 ASSAY OF URINE CREATININE: CPT | Mod: HCNC | Performed by: INTERNAL MEDICINE

## 2024-12-05 PROCEDURE — 3075F SYST BP GE 130 - 139MM HG: CPT | Mod: HCNC,CPTII,S$GLB, | Performed by: INTERNAL MEDICINE

## 2024-12-05 PROCEDURE — 1111F DSCHRG MED/CURRENT MED MERGE: CPT | Mod: HCNC,CPTII,S$GLB, | Performed by: INTERNAL MEDICINE

## 2024-12-05 PROCEDURE — 3078F DIAST BP <80 MM HG: CPT | Mod: HCNC,CPTII,S$GLB, | Performed by: INTERNAL MEDICINE

## 2024-12-05 PROCEDURE — 1101F PT FALLS ASSESS-DOCD LE1/YR: CPT | Mod: HCNC,CPTII,S$GLB, | Performed by: INTERNAL MEDICINE

## 2024-12-05 PROCEDURE — G2211 COMPLEX E/M VISIT ADD ON: HCPCS | Mod: HCNC,S$GLB,, | Performed by: INTERNAL MEDICINE

## 2024-12-05 PROCEDURE — 99999 PR PBB SHADOW E&M-EST. PATIENT-LVL III: CPT | Mod: PBBFAC,HCNC,, | Performed by: INTERNAL MEDICINE

## 2024-12-05 PROCEDURE — 99213 OFFICE O/P EST LOW 20 MIN: CPT | Mod: HCNC,S$GLB,, | Performed by: INTERNAL MEDICINE

## 2024-12-05 PROCEDURE — 1160F RVW MEDS BY RX/DR IN RCRD: CPT | Mod: HCNC,CPTII,S$GLB, | Performed by: INTERNAL MEDICINE

## 2024-12-05 PROCEDURE — 3288F FALL RISK ASSESSMENT DOCD: CPT | Mod: HCNC,CPTII,S$GLB, | Performed by: INTERNAL MEDICINE

## 2024-12-05 NOTE — PROGRESS NOTES
Patient ID: Emilia Alan is a 84 y.o. female.    Chief Complaint: Follow-up      Assessment:       1. Primary hypertension    2. Mixed hyperlipidemia    3. Type 2 diabetes mellitus with diabetic nephropathy, without long-term current use of insulin    4. B12 deficiency    5. Fatigue, unspecified type       Plan:           1. Primary hypertension: continue regimen.  Low salt diet.  Call if BP > 130/80 on a regular basis.  -     CBC Auto Differential; Future; Expected date: 04/04/2025  -     Comprehensive Metabolic Panel; Future; Expected date: 04/04/2025    2. Mixed hyperlipidemia: continue regimen  -     Lipid Panel; Future; Expected date: 04/04/2025    3. Type 2 diabetes mellitus with diabetic nephropathy, without long-term current use of insulin: continue regimen  -     Microalbumin/Creatinine Ratio, Urine; Future; Expected date: 12/05/2024  -     Hemoglobin A1C; Future; Expected date: 04/04/2025    4. B12 deficiency: continue regimen  -     Vitamin B12; Future; Expected date: 04/04/2025    5. Fatigue, unspecified type- mild- will monitor  -     TSH; Future; Expected date: 04/04/2025    RSV vaccine today  RTC 4 months with labs prior  Complete urine for microalbumin  DEXA scan    Visit today manifests increased complexity associated with the care of the multiple chronic and episodic problems I addressed.  I am managing a longitudinal care plan of the patient due to the serious and complex problems listed above.       Subjective:   CHIEF COMPLAINT:  Patient presents today for follow-up.    CARDIOVASCULAR:  She reports no recurrence of chest pain since her last hospital visit. She saw her cardiologist on the 18th.    UPCOMING APPOINTMENTS:  She has an upcoming podiatry appointment scheduled.    We discussed getting her DEXA scheduled, and she is willing.  She will get RSV vaccine today.    Recent diabetes labs quite good!      ROS:  General: -fever, -chills, -fatigue, -weight gain, -weight loss  Eyes:  -vision changes, -redness, -discharge  ENT: -ear pain, -nasal congestion, -sore throat  Cardiovascular: -chest pain, -palpitations, -lower extremity edema  Respiratory: -cough, -shortness of breath  Gastrointestinal: -abdominal pain, -nausea, -vomiting, -diarrhea, -constipation, -blood in stool  Genitourinary: -dysuria, -hematuria, -frequency  Musculoskeletal: -joint pain, -muscle pain  Skin: -rash, -lesion  Neurological: -headache, -dizziness, -numbness, -tingling  Psychiatric: -anxiety, -depression, -sleep difficulty          Patient Active Problem List   Diagnosis    Gait disorder    History of poliomyelitis    Post poliomyelitis syndrome    Diabetic polyneuropathy associated with type 2 diabetes mellitus    Mixed hyperlipidemia    Osteoporosis without current pathological fracture: worse on fosamax 5/16- Reclast 8/16, 12/20, 5/22    Paraparesis of both lower limbs    Other specified anemias    Primary hypertension    Type 2 diabetes mellitus with diabetic neuropathy, without long-term current use of insulin    Osteoarthritis of right knee    Lumbar spinal stenosis    Obstructive sleep apnea syndrome: dx 2008 needs CPAP 11    Bradycardia, drug induced    Essential tremor    Clonal Hematopoiesis of Indeterminate Potential (CHIP)    Type 2 diabetes mellitus with renal manifestations    Facet arthritis of lumbar region    Opioid use agreement exists    Primary osteoarthritis of left shoulder    Unspecified inflammatory spondylopathy, lumbar region          Objective:      Physical Exam  Constitutional:       Appearance: She is well-developed.      Comments: In a wheelchair   HENT:      Head: Normocephalic and atraumatic.   Eyes:      Extraocular Movements: Extraocular movements intact.      Conjunctiva/sclera: Conjunctivae normal.   Neck:      Thyroid: No thyromegaly.   Cardiovascular:      Rate and Rhythm: Normal rate and regular rhythm.   Pulmonary:      Effort: No respiratory distress.      Breath sounds: No  sandra.   Neurological:      General: No focal deficit present.      Mental Status: She is alert and oriented to person, place, and time.   Psychiatric:         Mood and Affect: Mood normal.         Behavior: Behavior normal.         Thought Content: Thought content normal.         Judgment: Judgment normal.             Health Maintenance Due   Topic Date Due    DEXA Scan  11/02/2023    Diabetes Urine Screening  10/12/2024        This note was generated with the assistance of ambient listening technology. Verbal consent was obtained by the patient and accompanying visitor(s) for the recording of patient appointment to facilitate this note. I attest to having reviewed and edited the generated note for accuracy, though some syntax or spelling errors may persist. Please contact the author of this note for any clarification.

## 2024-12-18 ENCOUNTER — OFFICE VISIT (OUTPATIENT)
Dept: CARDIOLOGY | Facility: CLINIC | Age: 84
End: 2024-12-18
Payer: MEDICARE

## 2024-12-18 VITALS — BODY MASS INDEX: 29.19 KG/M2 | WEIGHT: 144.81 LBS | HEIGHT: 59 IN

## 2024-12-18 DIAGNOSIS — E11.21 TYPE 2 DIABETES MELLITUS WITH DIABETIC NEPHROPATHY, WITHOUT LONG-TERM CURRENT USE OF INSULIN: ICD-10-CM

## 2024-12-18 DIAGNOSIS — R60.9 EDEMA, UNSPECIFIED TYPE: Primary | ICD-10-CM

## 2024-12-18 DIAGNOSIS — G47.33 OBSTRUCTIVE SLEEP APNEA SYNDROME: ICD-10-CM

## 2024-12-18 DIAGNOSIS — I25.10 CORONARY ARTERY DISEASE INVOLVING NATIVE CORONARY ARTERY OF NATIVE HEART WITHOUT ANGINA PECTORIS: ICD-10-CM

## 2024-12-18 DIAGNOSIS — E11.40 TYPE 2 DIABETES MELLITUS WITH DIABETIC NEUROPATHY, WITHOUT LONG-TERM CURRENT USE OF INSULIN: ICD-10-CM

## 2024-12-18 DIAGNOSIS — R07.9 CHEST PAIN, RULE OUT ACUTE MYOCARDIAL INFARCTION: ICD-10-CM

## 2024-12-18 PROCEDURE — 99999 PR PBB SHADOW E&M-EST. PATIENT-LVL IV: CPT | Mod: PBBFAC,HCNC,GC, | Performed by: STUDENT IN AN ORGANIZED HEALTH CARE EDUCATION/TRAINING PROGRAM

## 2024-12-18 NOTE — PROGRESS NOTES
I have reviewed the notes, assessments, and/or procedures performed by fellow, I concur with her/his documentation of Emilia Alan.  Date of Service: 12/18/2024

## 2024-12-18 NOTE — PROGRESS NOTES
Clinic Note  12/18/2024      Subjective:       Patient ID:  Emilia is a 84 y.o. female being seen for an established visit.    Chief Complaint: Hypertension and Chest Pain    HPI  Patient is a 84 y.o female with h/o htn, hld, DMII, non-obstructive CAD, childhood polio ( wheelchair bound) who presents to the clinic for follow up after recent hospitalization.  Recent hospitalization from 11.16-11.19.24 where she presented for R sided chest discomfort that woke her up from sleep. Workup during hospitalization includes a SPECT stress that was concerning for moderate intensity, small to medium sized, reversible perfusion abnormality that is consistent with ischemia in the mid to distal inferolateral wall(s). Cardiology was consulted and she underwent coronary angiography which revealed non-obstructive disease. She did have 60% stenosis of her Ramus. Placed on statin and discharged home.  She does report that since discharge she has been doing well. Denies any cp/chest discomfort, SOB/JAMA, palpitation, orthopnea, pnd. Has been having chronic RLE edema that she reports improving with leg elevation.   Denies tobacco abuse, illicit drug use, or excessive alcohol use.     Ldl 61.0 in 11/2024  HgbA1C 6.2 in 11/2024  Review of Systems   Constitutional:  Negative for chills, fever and weight loss.   HENT: Negative.     Eyes:  Negative for blurred vision.   Respiratory:  Negative for cough and shortness of breath.    Cardiovascular:  Positive for leg swelling. Negative for chest pain and palpitations.   Gastrointestinal:  Negative for abdominal pain, blood in stool, constipation, diarrhea, melena, nausea and vomiting.   Musculoskeletal:  Negative for myalgias.   Skin: Negative.    Neurological:  Negative for dizziness, loss of consciousness and weakness.       Past Medical History:   Diagnosis Date    Allergy     Anemia 10/20/2014    Arthritis     Atherosclerosis of artery of right lower extremity: see xray 4/4/07 8/8/2017     Diabetes mellitus     Diabetic neuropathy 7/25/2012    Gait disorder 7/25/2012    High cholesterol     History of poliomyelitis 7/25/2012    Hyperlipidemia 8/5/2013    Hypertension     Obstructive sleep apnea syndrome: dx 2008 needs CPAP 11 6/28/2017    Osteopenia     Osteoporosis, unspecified 6/5/2014    Other specified anemias 7/6/2015    Post poliomyelitis syndrome 7/25/2012    Renal manifestation of secondary diabetes mellitus     Sleep apnea     Type II or unspecified type diabetes mellitus without mention of complication, not stated as uncontrolled 7/6/2015    Unspecified essential hypertension 7/6/2015       Family History   Problem Relation Name Age of Onset    Diabetes Father      Heart disease Father      Heart attack Father      Heart disease Brother      Heart disease Brother      Diabetes Daughter      Diabetes Daughter      Diabetes Daughter      Diabetes Son      Cataracts Neg Hx      Glaucoma Neg Hx      Hypertension Neg Hx      Cancer Neg Hx      Blindness Neg Hx      Amblyopia Neg Hx      Strabismus Neg Hx      Retinal detachment Neg Hx      Macular degeneration Neg Hx      Melanoma Neg Hx          reports that she has never smoked. She has never used smokeless tobacco. She reports that she does not drink alcohol and does not use drugs.    Medication List with Changes/Refills   Current Medications    ACCU-CHEK GUIDE TEST STRIPS STRP    TEST BLOOD SUGAR TWO TIMES DAILY    ALCOHOL SWABS (DROPSAFE ALCOHOL PREP PADS) PADM    USE AS DIRECTED EVERY DAY    AMLODIPINE (NORVASC) 10 MG TABLET    TAKE 1 TABLET EVERY DAY    ASPIRIN (ECOTRIN) 81 MG EC TABLET    Take 1 tablet (81 mg total) by mouth once daily.    ATORVASTATIN (LIPITOR) 40 MG TABLET    Take 1 tablet (40 mg total) by mouth once daily.    BLOOD-GLUCOSE METER (ACCU-CHEK GUIDE GLUCOSE METER) MISC    USE AS DIRECTED    BLOOD-GLUCOSE METER (TRUERESULT BLOOD GLUCOSE SYSTM) KIT    Use as instructed    CALCIUM-VITAMIN D3-VITAMIN K 500-100-40 MG-UNIT-MCG  CHEW    Take 2 each by mouth.    CLOTRIMAZOLE-BETAMETHASONE 1-0.05% (LOTRISONE) CREAM    Apply topically 2 (two) times daily.    DESLORATADINE (CLARINEX) 5 MG TABLET    Take 5 mg by mouth once daily.    DICLOFENAC (VOLTAREN) 75 MG EC TABLET    TAKE 1 TABLET WITH FOOD TWICE DAILY AS NEEDED. STOP IF ANY STOMACH IRRITATION    DICLOFENAC SODIUM (VOLTAREN) 1 % GEL        DOCUSATE SODIUM (COLACE) 50 MG CAPSULE    Take 1 capsule (50 mg total) by mouth 2 (two) times daily as needed for Constipation.    GABAPENTIN (NEURONTIN) 600 MG TABLET    TAKE 1 TABLET THREE TIMES DAILY    HYDROCHLOROTHIAZIDE (HYDRODIURIL) 25 MG TABLET    TAKE 1 TABLET EVERY DAY    HYDROCODONE-ACETAMINOPHEN (NORCO)  MG PER TABLET    Take 1 tablet by mouth every 6 (six) hours as needed for Pain (pain).    IRBESARTAN (AVAPRO) 300 MG TABLET    TAKE 1 TABLET EVERY DAY    KETOCONAZOLE (NIZORAL) 2 % CREAM    Apply topically once daily.    LANCETS (ACCU-CHEK SOFTCLIX LANCETS) MISC    Use to test blood glucose two (2) times daily as directed with insurance preferred meter and supplies    METFORMIN (GLUCOPHAGE-XR) 500 MG ER 24HR TABLET    TAKE 1 TABLET TWICE DAILY WITH MEALS    MULTIVITAMIN (THERAGRAN) PER TABLET    Take 1 tablet by mouth once daily.     OXYBUTYNIN (DITROPAN) 5 MG TAB    TAKE 1 TABLET TWICE DAILY    ZOLEDRONIC ACID-MANNITOL & WATER (RECLAST) 5 MG/100 ML PGBK         Review of patient's allergies indicates:   Allergen Reactions    Atenolol Other (See Comments)     Severe hypotensive    Verapamil Other (See Comments)     Severe hypotensive       Patient Active Problem List   Diagnosis    Gait disorder    History of poliomyelitis    Post poliomyelitis syndrome    Diabetic polyneuropathy associated with type 2 diabetes mellitus    Mixed hyperlipidemia    Osteoporosis without current pathological fracture: worse on fosamax 5/16- Reclast 8/16, 12/20, 5/22    Paraparesis of both lower limbs    Other specified anemias    Primary hypertension     "Type 2 diabetes mellitus with diabetic neuropathy, without long-term current use of insulin    Osteoarthritis of right knee    Lumbar spinal stenosis    Obstructive sleep apnea syndrome: dx 2008 needs CPAP 11    Bradycardia, drug induced    Essential tremor    Clonal Hematopoiesis of Indeterminate Potential (CHIP)    Type 2 diabetes mellitus with renal manifestations    Facet arthritis of lumbar region    Opioid use agreement exists    Primary osteoarthritis of left shoulder    Unspecified inflammatory spondylopathy, lumbar region           Objective:      Ht 4' 11" (1.499 m)   Wt 65.7 kg (144 lb 13.5 oz)   BMI 29.25 kg/m²   Estimated body mass index is 29.25 kg/m² as calculated from the following:    Height as of this encounter: 4' 11" (1.499 m).    Weight as of this encounter: 65.7 kg (144 lb 13.5 oz).  Physical Exam  Constitutional:       General: She is not in acute distress.     Appearance: She is not ill-appearing or diaphoretic.   HENT:      Head: Normocephalic and atraumatic.      Mouth/Throat:      Pharynx: No oropharyngeal exudate.   Eyes:      General: No scleral icterus.     Conjunctiva/sclera: Conjunctivae normal.   Neck:      Vascular: No JVD.      Trachea: No tracheal deviation.   Cardiovascular:      Rate and Rhythm: Normal rate and regular rhythm.      Heart sounds: No murmur heard.     No friction rub. No gallop.   Pulmonary:      Effort: Pulmonary effort is normal. No respiratory distress.      Breath sounds: Normal breath sounds. No stridor. No wheezing, rhonchi or rales.   Chest:      Chest wall: No tenderness.   Abdominal:      General: Bowel sounds are normal. There is no distension.      Tenderness: There is no abdominal tenderness. There is no rebound.   Musculoskeletal:         General: Deformity present. Normal range of motion.      Cervical back: Normal range of motion and neck supple.      Right lower leg: Edema (2+) present.   Lymphadenopathy:      Cervical: No cervical adenopathy. "   Skin:     General: Skin is warm and dry.   Neurological:      Mental Status: She is alert.      Cranial Nerves: No cranial nerve deficit.   Psychiatric:         Mood and Affect: Affect normal.           Assessment and Plan:         Emilia was seen today for hypertension and chest pain.    Diagnoses and all orders for this visit:    Edema, unspecified type  Chronic  RLE>LLE  Will order RLE US to r.o chronic thrombus vs venous insufficiency  Advised patient to cont leg elevation and compression socks.    Coronary artery disease  Non-obstructive CAD with 60% stenosis of ramus according to recent angiogram  Risk factor modification with cont recently added statin and BP control  She is to keep log of her BP to ensure adequate control  Cont statin- will repeat lipid panel in 3 months    HTN  Controlled  On irbesartan 300 mg qd, hctz 25mg qd, and amlodipine 10 mg qd    Type 2 diabetes mellitus with diabetic neuropathy, without long-term current use of insulin  Controlled  hgbA1C 6.2    Obstructive sleep apnea syndrome: dx 2008 needs CPAP 11  Not fully compliant with cpap      Other Orders Placed This Visit:  Orders Placed This Encounter   Procedures    US Lower Extremity Veins Bilateral         Mateus Maldonado

## 2024-12-26 DIAGNOSIS — M17.11 PRIMARY OSTEOARTHRITIS OF RIGHT KNEE: ICD-10-CM

## 2024-12-26 DIAGNOSIS — M48.062 SPINAL STENOSIS OF LUMBAR REGION WITH NEUROGENIC CLAUDICATION: ICD-10-CM

## 2024-12-26 DIAGNOSIS — E11.40 TYPE 2 DIABETES MELLITUS WITH DIABETIC NEUROPATHY, WITHOUT LONG-TERM CURRENT USE OF INSULIN: ICD-10-CM

## 2024-12-26 DIAGNOSIS — G14 POST POLIOMYELITIS SYNDROME: ICD-10-CM

## 2024-12-26 RX ORDER — HYDROCODONE BITARTRATE AND ACETAMINOPHEN 10; 325 MG/1; MG/1
1 TABLET ORAL EVERY 6 HOURS PRN
Qty: 120 TABLET | Refills: 0 | Status: SHIPPED | OUTPATIENT
Start: 2024-12-26 | End: 2025-01-25

## 2024-12-26 NOTE — TELEPHONE ENCOUNTER
----- Message from Kameron sent at 12/26/2024 10:09 AM CST -----  Contact: [phone]@patient  Requesting an RX refill or new RX.HYDROcodone-acetaminophen (NORCO)  mg per tablet    Is this a refill or new RX: refill     RX name and strength (copy/paste from chart):      Is this a 30 day or 90 day RX:     Pharmacy name and phone #[facility]        The doctors have asked that we provide their patients with the following 2 reminders -- prescription refills can take up to 72 hours, and a friendly reminder that in the future you can use your [facility] account to request refills:

## 2024-12-26 NOTE — TELEPHONE ENCOUNTER
No care due was identified.  [facility] Embedded Care Due Messages. Reference number: [id number].   12/26/2024 10:57:32 AM CST

## 2025-01-10 ENCOUNTER — HOSPITAL ENCOUNTER (OUTPATIENT)
Dept: RADIOLOGY | Facility: HOSPITAL | Age: 85
Discharge: HOME | End: 2025-01-10
Attending: STUDENT IN AN ORGANIZED HEALTH CARE EDUCATION/TRAINING PROGRAM
Payer: MEDICARE

## 2025-01-10 DIAGNOSIS — R60.9 EDEMA, UNSPECIFIED TYPE: ICD-10-CM

## 2025-01-10 PROCEDURE — 93970 EXTREMITY STUDY: CPT | Mod: TC,HCNC

## 2025-01-10 PROCEDURE — 93970 EXTREMITY STUDY: CPT | Mod: 26,HCNC,, | Performed by: INTERNAL MEDICINE

## 2025-01-18 DIAGNOSIS — E11.43 TYPE 2 DIABETES MELLITUS WITH AUTONOMIC NEUROPATHY: ICD-10-CM

## 2025-01-18 NOTE — TELEPHONE ENCOUNTER
No care due was identified.  [facility] Embedded Care Due Messages. Reference number: [id number].   1/18/2025 2:13:46 AM CST

## 2025-01-19 RX ORDER — METFORMIN HYDROCHLORIDE 500 MG/1
500 TABLET, EXTENDED RELEASE ORAL 2 TIMES DAILY WITH MEALS
Qty: 180 TABLET | Refills: 1 | Status: SHIPPED | OUTPATIENT
Start: 2025-01-19

## 2025-01-19 NOTE — TELEPHONE ENCOUNTER
Refill Decision Note   Emilia Alan  is requesting a refill authorization.  Brief Assessment and Rationale for Refill:  Approve     Medication Therapy Plan:        Comments:     Note composed:12:21 AM 01/19/2025

## 2025-01-27 DIAGNOSIS — E11.40 TYPE 2 DIABETES MELLITUS WITH DIABETIC NEUROPATHY, WITHOUT LONG-TERM CURRENT USE OF INSULIN: ICD-10-CM

## 2025-01-27 DIAGNOSIS — M48.062 SPINAL STENOSIS OF LUMBAR REGION WITH NEUROGENIC CLAUDICATION: ICD-10-CM

## 2025-01-27 DIAGNOSIS — M17.11 PRIMARY OSTEOARTHRITIS OF RIGHT KNEE: ICD-10-CM

## 2025-01-27 DIAGNOSIS — G14 POST POLIOMYELITIS SYNDROME: ICD-10-CM

## 2025-01-27 RX ORDER — HYDROCODONE BITARTRATE AND ACETAMINOPHEN 10; 325 MG/1; MG/1
1 TABLET ORAL EVERY 6 HOURS PRN
Qty: 120 TABLET | Refills: 0 | Status: SHIPPED | OUTPATIENT
Start: 2025-01-27 | End: 2025-03-01

## 2025-01-27 NOTE — TELEPHONE ENCOUNTER
No care due was identified.  [facility] Embedded Care Due Messages. Reference number: [id number].   1/27/2025 10:27:57 AM CST

## 2025-01-27 NOTE — TELEPHONE ENCOUNTER
----- Message from Maria Luisa sent at 1/27/2025  9:46 AM CST -----  Type:  RX Refill Request    Who Called: pt   Refill or New Rx:refill  RX Name and Strength:HYDROcodone-acetaminophen (NORCO)  mg per tablet  How is the patient currently taking it? (ex. 1XDay):1  Is this a 30 day or 90 day RX:120  Preferred Pharmacy with phone number:[facility]   Phone: [phone]  Fax: [phone]  Local or Mail Order:Local   Ordering Provider:Braeden   Would the patient rather a call back or a response via [facility]? Call   Best Call Back Number: [phone]  Additional Information:

## 2025-02-06 NOTE — TELEPHONE ENCOUNTER
Her blood smear shows anisocytosis and a few large lymphocytes, some with granules.  No evidence of hemolysis.  ESR 37 and CRP normal.  Hemoglobin 10.2.    I advised her to have a marrow exam and explained how it is done. We will schedule.   Detail Level: Zone Discontinue Regimen: All topical Apply a thin layer to affected areas of skin twice daily for up to two weeks at a time. Apply to wet skin and taper use with improvement. This is a topical steroid and not safe for long term use. Initiate Treatment: Doxycycline bid x 1 month Render In Strict Bullet Format?: No

## 2025-02-11 ENCOUNTER — HOSPITAL ENCOUNTER (OUTPATIENT)
Dept: RADIOLOGY | Facility: CLINIC | Age: 85
Discharge: HOME | End: 2025-02-11
Attending: INTERNAL MEDICINE
Payer: MEDICARE

## 2025-02-11 DIAGNOSIS — M81.0 AGE-RELATED OSTEOPOROSIS WITHOUT CURRENT PATHOLOGICAL FRACTURE: ICD-10-CM

## 2025-02-11 PROCEDURE — 77080 DXA BONE DENSITY AXIAL: CPT | Mod: 26,HCNC,, | Performed by: INTERNAL MEDICINE

## 2025-02-11 PROCEDURE — 77080 DXA BONE DENSITY AXIAL: CPT | Mod: TC,HCNC

## 2025-02-19 ENCOUNTER — TELEPHONE (OUTPATIENT)
Dept: FAMILY MEDICINE | Facility: CLINIC | Age: 85
End: 2025-02-19
Payer: MEDICARE

## 2025-02-19 NOTE — TELEPHONE ENCOUNTER
Please call she has not read Sarah message:    Bone density shows osteoporosis and treatment is recommended.   She had been under the care of Endocrinology and her last dose of the Reclast was in 2022.      When I communicated with her endocrinologist in 2023, she recommended waiting on additional treatment until the next bone density.      At this point, I would like for her to be evaluated again in Endocrinology. Dr. Corbin has left [facility].  Probably the best option is to do an electronic consultation whereby the endocrinology team reviews her chart and makes recommendations.  If she is willing, I will get started on that.      Alternatively we can schedule her an office visit with a new endocrinologist.  Please let me know how she would like to proceed.

## 2025-02-20 ENCOUNTER — OFFICE VISIT (OUTPATIENT)
Dept: PODIATRY | Facility: CLINIC | Age: 85
End: 2025-02-20
Payer: MEDICARE

## 2025-02-20 VITALS
HEIGHT: 59 IN | BODY MASS INDEX: 28.89 KG/M2 | DIASTOLIC BLOOD PRESSURE: 70 MMHG | SYSTOLIC BLOOD PRESSURE: 128 MMHG | RESPIRATION RATE: 18 BRPM | HEART RATE: 66 BPM | WEIGHT: 143.31 LBS

## 2025-02-20 DIAGNOSIS — M21.372 FOOT DROP, LEFT: ICD-10-CM

## 2025-02-20 DIAGNOSIS — B35.1 ONYCHOMYCOSIS DUE TO DERMATOPHYTE: ICD-10-CM

## 2025-02-20 DIAGNOSIS — G14 POST-POLIO SYNDROME: ICD-10-CM

## 2025-02-20 DIAGNOSIS — L84 CORN OR CALLUS: ICD-10-CM

## 2025-02-20 DIAGNOSIS — E11.40 TYPE 2 DIABETES MELLITUS WITH DIABETIC NEUROPATHY, WITHOUT LONG-TERM CURRENT USE OF INSULIN: Primary | ICD-10-CM

## 2025-02-20 NOTE — PROGRESS NOTES
Subjective:      Patient ID: Emilia Alan is a 84 y.o. female.    Chief Complaint: Diabetic Foot Exam (Lauren Deras MD at 12/5/2024) and Nail Care      Emilia is a 84 y.o. female who presents to the clinic for evaluation and treatment of high risk feet. 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 The patient's chief complaint is long, thick toenails     PCP: Lauren Deras MD    Date Last Seen by PCP:   Chief Complaint   Patient presents with    Diabetic Foot Exam     Lauren Deras MD at 12/5/2024    Nail Care         Current shoe gear: DM shoes     Hemoglobin A1C   Date Value Ref Range Status   11/21/2024 6.2 (H) 4.0 - 5.6 % Final     Comment:     ADA Screening Guidelines:  5.7-6.4%  Consistent with prediabetes  >or=6.5%  Consistent with diabetes    High levels of fetal hemoglobin interfere with the HbA1C  assay. Heterozygous hemoglobin variants (HbS, HgC, etc)do  not significantly interfere with this assay.   However, presence of multiple variants may affect accuracy.     04/15/2024 5.9 (H) 4.0 - 5.6 % Final     Comment:     ADA Screening Guidelines:  5.7-6.4%  Consistent with prediabetes  >or=6.5%  Consistent with diabetes    High levels of fetal hemoglobin interfere with the  "HbA1C  assay. Heterozygous hemoglobin variants (HbS, HgC, etc)do  not significantly interfere with this assay.   However, presence of multiple variants may affect accuracy.     10/12/2023 6.0 (H) 4.0 - 5.6 % Final     Comment:     ADA Screening Guidelines:  5.7-6.4%  Consistent with prediabetes  >or=6.5%  Consistent with diabetes    High levels of fetal hemoglobin interfere with the HbA1C  assay. Heterozygous hemoglobin variants (HbS, HgC, etc)do  not significantly interfere with this assay.   However, presence of multiple variants may affect accuracy.           Review of Systems   Constitutional: Negative for chills, decreased appetite and fever.   Cardiovascular:  Positive for leg swelling (stable, mild). Negative for chest pain, claudication, cyanosis and dyspnea on exertion.   Respiratory:  Negative for cough and shortness of breath.    Skin:  Positive for dry skin, nail changes and rash. Negative for color change, flushing, itching, poor wound healing and skin cancer.   Musculoskeletal:  Positive for muscle weakness (foot drop). Negative for arthritis, back pain, falls, gout, joint pain, joint swelling and myalgias.   Gastrointestinal:  Negative for nausea and vomiting.   Neurological:  Positive for numbness and paresthesias. Negative for loss of balance.           Objective:       Vitals:    02/20/25 1105   BP: 128/70   Pulse: 66   Resp: 18   Weight: 65 kg (143 lb 4.8 oz)   Height: 4' 11" (1.499 m)   PainSc: 0-No pain        Physical Exam  Vitals and nursing note reviewed.   Constitutional:       General: She is not in acute distress.     Appearance: She is well-developed. She is not diaphoretic.   Cardiovascular:      Comments: Dorsalis pedis and posterior tibial pulses are diminished Bilaterally. Toes are cool to touch. Feet are warm proximally.There is decreased digital hair. Skin is atrophic, slightly hyperpigmented, and mildly edematous      Musculoskeletal:         General: Deformity present. No " tenderness or signs of injury.      Right ankle: Normal.      Left ankle: Normal.      Right foot: No swelling, deformity or crepitus.      Left foot: No swelling, deformity or crepitus.      Comments: Dropfoot left.      Lymphadenopathy:      Comments: No palpable lymph nodes   Skin:     General: Skin is warm and dry.      Coloration: Skin is not cyanotic, mottled or pale.      Findings: No abrasion, bruising, burn, erythema, laceration, lesion, petechiae or rash.      Nails: There is no clubbing.      Comments: Nails 1-5 b/l  are elongated by 3-7mm's, thickened by 3-9 mm's, dystrophic, and are darkened in  coloration . Xerosis Bilaterally. No open lesions noted.    Hyperkeratotic tissue noted to plantar L 5th MPJ    Neurological:      Mental Status: She is alert and oriented to person, place, and time.      Comments: [place] 5.07 monofilamant testing is diminished Charbel feet. Sharp/dull sensation diminished Bilaterally. Light touch absent Bilaterally.       Psychiatric:         Thought Content: Thought content normal.         Judgment: Judgment normal.           Assessment:       Encounter Diagnoses   Name Primary?    Type 2 diabetes mellitus with diabetic neuropathy, without long-term current use of insulin Yes    Post-polio syndrome     Foot drop, left     Corn or callus     Onychomycosis due to dermatophyte          Plan:       Emilia was seen today for diabetic foot exam and nail care.    Diagnoses and all orders for this visit:    Type 2 diabetes mellitus with diabetic neuropathy, without long-term current use of insulin    Post-polio syndrome    Foot drop, left    Corn or callus    Onychomycosis due to dermatophyte      I counseled the patient on her conditions, their implications and medical management.    - Shoe inspection.Patient instructed on proper foot hygeine. We discussed wearing proper shoe gear, daily foot inspections, never walking without protective shoe gear, never putting sharp  instruments to feet, routine podiatric nail visits every 2-3 months.      - With patient's permission, nails were aggressively reduced and debrided x 10 to their soft tissue attachment mechanically, removing all offending nail and debris. Patient relates relief following the procedure. She will continue to monitor the areas daily, inspect her feet, wear protective shoe gear when ambulatory, moisturizer to maintain skin integrity and follow in this office in approximately 2-3 months, sooner p.r.n.    - After cleansing the  area w/ alcohol prep pad the above mentioned hyperkeratosis was trimmed utilizing No 15 scapel, to a smooth base with out incident. Patient tolerated this  well and reported comfort to the area x1

## 2025-02-26 DIAGNOSIS — I10 PRIMARY HYPERTENSION: ICD-10-CM

## 2025-02-26 RX ORDER — AMLODIPINE BESYLATE 10 MG/1
10 TABLET ORAL
Qty: 90 TABLET | Refills: 3 | Status: SHIPPED | OUTPATIENT
Start: 2025-02-26

## 2025-02-26 NOTE — TELEPHONE ENCOUNTER
Care Due:                  Date            Visit Type   Department     Provider  --------------------------------------------------------------------------------                                EP -                              PRIMARY      [facility]  Last Visit: 12-      CARE ([facility])   MEDICINE       Lauren Deras  Next Visit: None Scheduled  None         None Found                                                            Last  Test          Frequency    Reason                     Performed    Due Date  --------------------------------------------------------------------------------    HBA1C.......  6 months...  metFORMIN................  11- 05-    [facility] Embedded Care Due Messages. Reference number: [id number].   2/26/2025 4:38:17 AM CST

## 2025-03-03 ENCOUNTER — LAB VISIT (OUTPATIENT)
Dept: LAB | Facility: HOSPITAL | Age: 85
End: 2025-03-03
Attending: INTERNAL MEDICINE
Payer: MEDICARE

## 2025-03-03 DIAGNOSIS — R53.83 FATIGUE, UNSPECIFIED TYPE: ICD-10-CM

## 2025-03-03 DIAGNOSIS — E53.8 B12 DEFICIENCY: ICD-10-CM

## 2025-03-03 DIAGNOSIS — M48.062 SPINAL STENOSIS OF LUMBAR REGION WITH NEUROGENIC CLAUDICATION: ICD-10-CM

## 2025-03-03 DIAGNOSIS — I10 PRIMARY HYPERTENSION: ICD-10-CM

## 2025-03-03 DIAGNOSIS — G14 POST POLIOMYELITIS SYNDROME: ICD-10-CM

## 2025-03-03 DIAGNOSIS — M17.11 PRIMARY OSTEOARTHRITIS OF RIGHT KNEE: ICD-10-CM

## 2025-03-03 DIAGNOSIS — E11.40 TYPE 2 DIABETES MELLITUS WITH DIABETIC NEUROPATHY, WITHOUT LONG-TERM CURRENT USE OF INSULIN: ICD-10-CM

## 2025-03-03 DIAGNOSIS — E78.2 MIXED HYPERLIPIDEMIA: ICD-10-CM

## 2025-03-03 DIAGNOSIS — E11.21 TYPE 2 DIABETES MELLITUS WITH DIABETIC NEPHROPATHY, WITHOUT LONG-TERM CURRENT USE OF INSULIN: ICD-10-CM

## 2025-03-03 LAB
ALBUMIN SERPL BCP-MCNC: 3.6 G/DL (ref 3.5–5.2)
ALP SERPL-CCNC: 96 U/L (ref 40–150)
ALT SERPL W/O P-5'-P-CCNC: 10 U/L (ref 10–44)
ANION GAP SERPL CALC-SCNC: 11 MMOL/L (ref 8–16)
AST SERPL-CCNC: 21 U/L (ref 10–40)
BASOPHILS # BLD AUTO: 0.11 K/UL (ref 0–0.2)
BASOPHILS NFR BLD: 1.4 % (ref 0–1.9)
BILIRUB SERPL-MCNC: 0.4 MG/DL (ref 0.1–1)
BUN SERPL-MCNC: 28 MG/DL (ref 8–23)
CALCIUM SERPL-MCNC: 9.9 MG/DL (ref 8.7–10.5)
CHLORIDE SERPL-SCNC: 104 MMOL/L (ref 95–110)
CHOLEST SERPL-MCNC: 118 MG/DL (ref 120–199)
CHOLEST/HDLC SERPL: 2.7 {RATIO} (ref 2–5)
CO2 SERPL-SCNC: 27 MMOL/L (ref 23–29)
CREAT SERPL-MCNC: 0.8 MG/DL (ref 0.5–1.4)
DIFFERENTIAL METHOD BLD: ABNORMAL
EOSINOPHIL # BLD AUTO: 0.4 K/UL (ref 0–0.5)
EOSINOPHIL NFR BLD: 4.8 % (ref 0–8)
ERYTHROCYTE [DISTWIDTH] IN BLOOD BY AUTOMATED COUNT: 13 % (ref 11.5–14.5)
EST. GFR  (NO RACE VARIABLE): >60 ML/MIN/1.73 M^2
ESTIMATED AVG GLUCOSE: 140 MG/DL (ref 68–131)
GLUCOSE SERPL-MCNC: 131 MG/DL (ref 70–110)
HBA1C MFR BLD: 6.5 % (ref 4–5.6)
HCT VFR BLD AUTO: 38.6 % (ref 37–48.5)
HDLC SERPL-MCNC: 43 MG/DL (ref 40–75)
HDLC SERPL: 36.4 % (ref 20–50)
HGB BLD-MCNC: 12.3 G/DL (ref 12–16)
IMM GRANULOCYTES # BLD AUTO: 0.02 K/UL (ref 0–0.04)
IMM GRANULOCYTES NFR BLD AUTO: 0.2 % (ref 0–0.5)
LDLC SERPL CALC-MCNC: 39.2 MG/DL (ref 63–159)
LYMPHOCYTES # BLD AUTO: 2.4 K/UL (ref 1–4.8)
LYMPHOCYTES NFR BLD: 29.5 % (ref 18–48)
MCH RBC QN AUTO: 31.5 PG (ref 27–31)
MCHC RBC AUTO-ENTMCNC: 31.9 G/DL (ref 32–36)
MCV RBC AUTO: 99 FL (ref 82–98)
MONOCYTES # BLD AUTO: 0.6 K/UL (ref 0.3–1)
MONOCYTES NFR BLD: 7.7 % (ref 4–15)
NEUTROPHILS # BLD AUTO: 4.6 K/UL (ref 1.8–7.7)
NEUTROPHILS NFR BLD: 56.4 % (ref 38–73)
NONHDLC SERPL-MCNC: 75 MG/DL
NRBC BLD-RTO: 0 /100 WBC
PLATELET # BLD AUTO: 317 K/UL (ref 150–450)
PMV BLD AUTO: 11.2 FL (ref 9.2–12.9)
POTASSIUM SERPL-SCNC: 3.8 MMOL/L (ref 3.5–5.1)
PROT SERPL-MCNC: 7.3 G/DL (ref 6–8.4)
RBC # BLD AUTO: 3.9 M/UL (ref 4–5.4)
SODIUM SERPL-SCNC: 142 MMOL/L (ref 136–145)
TRIGL SERPL-MCNC: 179 MG/DL (ref 30–150)
TSH SERPL DL<=0.005 MIU/L-ACNC: 3.8 UIU/ML (ref 0.4–4)
VIT B12 SERPL-MCNC: 955 PG/ML (ref 210–950)
WBC # BLD AUTO: 8.07 K/UL (ref 3.9–12.7)

## 2025-03-03 PROCEDURE — 85025 COMPLETE CBC W/AUTO DIFF WBC: CPT | Mod: HCNC | Performed by: INTERNAL MEDICINE

## 2025-03-03 PROCEDURE — 80061 LIPID PANEL: CPT | Mod: HCNC | Performed by: INTERNAL MEDICINE

## 2025-03-03 PROCEDURE — 80053 COMPREHEN METABOLIC PANEL: CPT | Mod: HCNC | Performed by: INTERNAL MEDICINE

## 2025-03-03 PROCEDURE — 82607 VITAMIN B-12: CPT | Mod: HCNC | Performed by: INTERNAL MEDICINE

## 2025-03-03 PROCEDURE — 84443 ASSAY THYROID STIM HORMONE: CPT | Mod: HCNC | Performed by: INTERNAL MEDICINE

## 2025-03-03 PROCEDURE — 36415 COLL VENOUS BLD VENIPUNCTURE: CPT | Mod: HCNC | Performed by: INTERNAL MEDICINE

## 2025-03-03 PROCEDURE — 83036 HEMOGLOBIN GLYCOSYLATED A1C: CPT | Mod: HCNC | Performed by: INTERNAL MEDICINE

## 2025-03-03 RX ORDER — HYDROCODONE BITARTRATE AND ACETAMINOPHEN 10; 325 MG/1; MG/1
1 TABLET ORAL EVERY 6 HOURS PRN
Qty: 120 TABLET | Refills: 0 | Status: SHIPPED | OUTPATIENT
Start: 2025-03-03 | End: 2025-04-02

## 2025-03-03 NOTE — TELEPHONE ENCOUNTER
No care due was identified.  [facility] Embedded Care Due Messages. Reference number: [id number].   3/03/2025 12:57:07 PM CST

## 2025-03-04 ENCOUNTER — RESULTS FOLLOW-UP (OUTPATIENT)
Dept: FAMILY MEDICINE | Facility: CLINIC | Age: 85
End: 2025-03-04

## 2025-03-14 ENCOUNTER — OFFICE VISIT (OUTPATIENT)
Dept: INTERNAL MEDICINE | Facility: CLINIC | Age: 85
End: 2025-03-14
Payer: MEDICARE

## 2025-03-14 VITALS
WEIGHT: 143.5 LBS | OXYGEN SATURATION: 96 % | HEART RATE: 80 BPM | BODY MASS INDEX: 28.93 KG/M2 | HEIGHT: 59 IN | DIASTOLIC BLOOD PRESSURE: 64 MMHG | SYSTOLIC BLOOD PRESSURE: 140 MMHG

## 2025-03-14 DIAGNOSIS — M54.40 CHRONIC BILATERAL LOW BACK PAIN WITH SCIATICA, SCIATICA LATERALITY UNSPECIFIED: ICD-10-CM

## 2025-03-14 DIAGNOSIS — G82.20 PARAPARESIS OF BOTH LOWER LIMBS: ICD-10-CM

## 2025-03-14 DIAGNOSIS — G14 POST POLIOMYELITIS SYNDROME: ICD-10-CM

## 2025-03-14 DIAGNOSIS — I25.10 CORONARY ARTERY DISEASE INVOLVING NATIVE CORONARY ARTERY OF NATIVE HEART, UNSPECIFIED WHETHER ANGINA PRESENT: ICD-10-CM

## 2025-03-14 DIAGNOSIS — G89.29 CHRONIC BILATERAL LOW BACK PAIN WITH SCIATICA, SCIATICA LATERALITY UNSPECIFIED: ICD-10-CM

## 2025-03-14 DIAGNOSIS — E78.2 MIXED HYPERLIPIDEMIA: ICD-10-CM

## 2025-03-14 DIAGNOSIS — M17.11 PRIMARY OSTEOARTHRITIS OF RIGHT KNEE: ICD-10-CM

## 2025-03-14 DIAGNOSIS — N39.46 MIXED INCONTINENCE: ICD-10-CM

## 2025-03-14 DIAGNOSIS — R21 SKIN RASH: ICD-10-CM

## 2025-03-14 DIAGNOSIS — I10 PRIMARY HYPERTENSION: ICD-10-CM

## 2025-03-14 DIAGNOSIS — E11.40 TYPE 2 DIABETES MELLITUS WITH DIABETIC NEUROPATHY, WITHOUT LONG-TERM CURRENT USE OF INSULIN: ICD-10-CM

## 2025-03-14 DIAGNOSIS — M48.062 SPINAL STENOSIS OF LUMBAR REGION WITH NEUROGENIC CLAUDICATION: ICD-10-CM

## 2025-03-14 DIAGNOSIS — R26.9 GAIT DISORDER: ICD-10-CM

## 2025-03-14 DIAGNOSIS — M81.0 AGE-RELATED OSTEOPOROSIS WITHOUT CURRENT PATHOLOGICAL FRACTURE: ICD-10-CM

## 2025-03-14 DIAGNOSIS — Z76.89 ENCOUNTER TO ESTABLISH CARE: Primary | ICD-10-CM

## 2025-03-14 DIAGNOSIS — H61.91 LESION OF EXTERNAL EAR, RIGHT: ICD-10-CM

## 2025-03-14 DIAGNOSIS — M47.26 OSTEOARTHRITIS OF SPINE WITH RADICULOPATHY, LUMBAR REGION: ICD-10-CM

## 2025-03-14 PROCEDURE — 99999 PR PBB SHADOW E&M-EST. PATIENT-LVL IV: CPT | Mod: PBBFAC,HCNC,, | Performed by: INTERNAL MEDICINE

## 2025-03-14 RX ORDER — GABAPENTIN 600 MG/1
600 TABLET ORAL 3 TIMES DAILY
Qty: 270 TABLET | Refills: 3 | Status: SHIPPED | OUTPATIENT
Start: 2025-03-14

## 2025-03-14 RX ORDER — AMLODIPINE BESYLATE 10 MG/1
10 TABLET ORAL DAILY
Qty: 90 TABLET | Refills: 3 | Status: SHIPPED | OUTPATIENT
Start: 2025-03-14

## 2025-03-14 RX ORDER — ATORVASTATIN CALCIUM 40 MG/1
40 TABLET, FILM COATED ORAL DAILY
Qty: 90 TABLET | Refills: 3 | Status: SHIPPED | OUTPATIENT
Start: 2025-03-14 | End: 2026-03-14

## 2025-03-14 RX ORDER — IRBESARTAN 300 MG/1
300 TABLET ORAL DAILY
Qty: 90 TABLET | Refills: 3 | Status: SHIPPED | OUTPATIENT
Start: 2025-03-14

## 2025-03-14 RX ORDER — ISOPROPYL ALCOHOL 70 ML/100ML
SWAB TOPICAL
Qty: 100 EACH | Refills: 3 | Status: SHIPPED | OUTPATIENT
Start: 2025-03-14

## 2025-03-14 RX ORDER — HYDROCHLOROTHIAZIDE 25 MG/1
25 TABLET ORAL DAILY
Qty: 90 TABLET | Refills: 3 | Status: SHIPPED | OUTPATIENT
Start: 2025-03-14

## 2025-03-14 RX ORDER — OXYBUTYNIN CHLORIDE 5 MG/1
5 TABLET ORAL 2 TIMES DAILY
Qty: 180 TABLET | Refills: 3 | Status: SHIPPED | OUTPATIENT
Start: 2025-03-14

## 2025-03-14 RX ORDER — METFORMIN HYDROCHLORIDE 500 MG/1
500 TABLET, EXTENDED RELEASE ORAL 2 TIMES DAILY WITH MEALS
Qty: 180 TABLET | Refills: 3 | Status: SHIPPED | OUTPATIENT
Start: 2025-03-14

## 2025-03-14 NOTE — PATIENT INSTRUCTIONS
Schedule dermatology appointment  Schedule endocrinology appointment    Let office know when you need refills.     Return to clinic in 6 months or sooner if needed.

## 2025-03-14 NOTE — PROGRESS NOTES
Subjective:       Patient ID: Emilia Alan is a 84 y.o. female.    Chief Complaint: Establish Care      HPI  Emilia Alan is a 84 y.o. year old female previous patient of Dr. Deras presents for establishment of care. At baseline health. Medical history as below. Patient doing well, lives with two daughters.  passed away 17 years ago. 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 Also has osteoarthritis of back and knees, and lumbar spinal stenosis leading to chronic back pain and knee pain which she is on chronic opiate pain management. The osteoarthritis limits her mobility. Uses walker to move around at home. She is also on gabapentin for her radiculopathy. Recent DEXA shows persistent osteoporosis after years of reclast. Has not yet seen endocrinology for possible restarting of reclast.    Review of Systems   Constitutional:  Negative for activity change, appetite change, fatigue, fever and unexpected weight change.   HENT:  Negative for congestion, hearing loss, postnasal drip, sneezing, sore throat, trouble swallowing and voice change.    Eyes:  Negative for pain and discharge.   Respiratory:  Negative for cough, choking, chest tightness, shortness of breath and wheezing.    Cardiovascular:  Negative for chest pain, palpitations and leg swelling.   Gastrointestinal:  Negative for abdominal distention, abdominal pain, blood in stool, constipation, diarrhea, nausea and vomiting.   Endocrine: Negative for polydipsia and polyuria.   Genitourinary:  Negative for difficulty urinating and flank pain.   Musculoskeletal:  Positive for arthralgias and back pain. Negative for joint swelling, myalgias and neck pain.   Skin:  Negative for rash.   Neurological:  Positive for weakness. Negative for dizziness, tremors, seizures, numbness and headaches.   Psychiatric/Behavioral:  Negative for agitation. The patient is not nervous/anxious.          Past Medical History:   Diagnosis Date     Allergy     Anemia 10/20/2014    Arthritis     Atherosclerosis of artery of right lower extremity: see xray 4/4/07 8/8/2017    Diabetes mellitus     Diabetic neuropathy 7/25/2012    Gait disorder 7/25/2012    High cholesterol     History of poliomyelitis 7/25/2012    Hyperlipidemia 8/5/2013    Hypertension     Obstructive sleep apnea syndrome: dx 2008 needs CPAP 11 6/28/2017    Osteopenia     Osteoporosis, unspecified 6/5/2014    Other specified anemias 7/6/2015    Post poliomyelitis syndrome 7/25/2012    Renal manifestation of secondary diabetes mellitus     Sleep apnea     Type II or unspecified type diabetes mellitus without mention of complication, not stated as uncontrolled 7/6/2015    Unspecified essential hypertension 7/6/2015        Prior to Admission medications    Medication Sig Start Date End Date Taking? Authorizing Provider   aspirin (ECOTRIN) 81 MG EC tablet Take 1 tablet (81 mg total) by mouth once daily. 11/19/24 11/19/25 Yes Annemarie Dubon PA-C   blood-glucose meter (ACCU-CHEK GUIDE GLUCOSE METER) Misc USE AS DIRECTED 2/20/24  Yes Lauren Deras MD   blood-glucose meter (TRUERESULT BLOOD GLUCOSE SYSTM) kit Use as instructed 2/10/21 1/23/29 Yes Lauren Deras MD   calcium-vitamin D3-vitamin K 500-100-40 mg-unit-mcg Chew Take 2 each by mouth.   Yes Provider, Historical   clotrimazole-betamethasone 1-0.05% (LOTRISONE) cream Apply topically 2 (two) times daily. 8/27/24  Yes Carole Niño DPM   desloratadine (CLARINEX) 5 mg tablet Take 5 mg by mouth once daily.   Yes Provider, Historical   diclofenac sodium (VOLTAREN) 1 % Gel  11/14/19  Yes Provider, Historical   docusate sodium (COLACE) 50 MG capsule Take 1 capsule (50 mg total) by mouth 2 (two) times daily as needed for Constipation. 4/25/17  Yes Lauren Deras MD   HYDROcodone-acetaminophen (NORCO)  mg per tablet Take 1 tablet by mouth every 6 (six) hours as needed for Pain (pain). 3/3/25 4/2/25 Yes Lauren Deras MD    ketoconazole (NIZORAL) 2 % cream Apply topically once daily. 12/23/19  Yes Carole Niño DPM   lancets (ACCU-CHEK SOFTCLIX LANCETS) Misc Use to test blood glucose two (2) times daily as directed with insurance preferred meter and supplies 8/28/24  Yes Lauren Deras MD   multivitamin (THERAGRAN) per tablet Take 1 tablet by mouth once daily.  7/9/12  Yes Provider, Historical   zoledronic acid-mannitol & water (RECLAST) 5 mg/100 mL PgBk  12/3/20  Yes Provider, Historical   ACCU-CHEK GUIDE TEST STRIPS Strp TEST BLOOD SUGAR TWO TIMES DAILY 10/22/24 3/14/25 Yes Lauren Deras MD   alcohol swabs (DROPSAFE ALCOHOL PREP PADS) PadM USE AS DIRECTED EVERY DAY 11/2/24 3/14/25 Yes Lauren Deras MD   amLODIPine (NORVASC) 10 MG tablet TAKE 1 TABLET EVERY DAY 2/26/25 3/14/25 Yes Lauren Deras MD   atorvastatin (LIPITOR) 40 MG tablet Take 1 tablet (40 mg total) by mouth once daily. 11/19/24 3/14/25 Yes Annemarie Dubon PA-C   diclofenac (VOLTAREN) 75 MG EC tablet TAKE 1 TABLET WITH FOOD TWICE DAILY AS NEEDED. STOP IF ANY STOMACH IRRITATION 12/4/23 3/14/25 Yes Lauren Deras MD   gabapentin (NEURONTIN) 600 MG tablet TAKE 1 TABLET THREE TIMES DAILY 2/20/24 3/14/25 Yes Lauren Deras MD   hydroCHLOROthiazide (HYDRODIURIL) 25 MG tablet TAKE 1 TABLET EVERY DAY 8/22/24 3/14/25 Yes Lauren Deras MD   irbesartan (AVAPRO) 300 MG tablet TAKE 1 TABLET EVERY DAY 7/11/24 3/14/25 Yes Lauren Deras MD   metFORMIN (GLUCOPHAGE-XR) 500 MG ER 24hr tablet TAKE 1 TABLET TWICE DAILY WITH MEALS 1/19/25 3/14/25 Yes Lauren Deras MD   oxybutynin (DITROPAN) 5 MG Tab TAKE 1 TABLET TWICE DAILY 8/14/24 3/14/25 Yes Lauren Deras MD   alcohol swabs (DROPSAFE ALCOHOL PREP PADS) PadM USE AS DIRECTED EVERY DAY 3/14/25   Tobias Holt MD   amLODIPine (NORVASC) 10 MG tablet Take 1 tablet (10 mg total) by mouth once daily. 3/14/25   Tobias Holt MD   atorvastatin (LIPITOR) 40 MG tablet Take 1 tablet (40 mg total) by  "mouth once daily. 3/14/25 3/14/26  Tobias Holt MD   blood sugar diagnostic (ACCU-CHEK GUIDE TEST STRIPS) Strp TEST BLOOD SUGAR TWO TIMES DAILY 3/14/25   Tobias Holt MD   gabapentin (NEURONTIN) 600 MG tablet Take 1 tablet (600 mg total) by mouth 3 (three) times daily. 3/14/25   Tobias Holt MD   hydroCHLOROthiazide (HYDRODIURIL) 25 MG tablet Take 1 tablet (25 mg total) by mouth once daily. 3/14/25   Tobias Holt MD   irbesartan (AVAPRO) 300 MG tablet Take 1 tablet (300 mg total) by mouth once daily. 3/14/25   Tobias Holt MD   metFORMIN (GLUCOPHAGE-XR) 500 MG ER 24hr tablet Take 1 tablet (500 mg total) by mouth 2 (two) times daily with meals. 3/14/25   Tobias Holt MD   oxybutynin (DITROPAN) 5 MG Tab Take 1 tablet (5 mg total) by mouth 2 (two) times daily. 3/14/25   Tobias Holt MD        Past medical history, surgical history, and family medical history reviewed and updated as appropriate.    Medications and allergies reviewed.     Objective:          Vitals:    03/14/25 0804   BP: (!) 140/64   BP Location: Right arm   Patient Position: Sitting   Pulse: 80   SpO2: 96%   Weight: 65.1 kg (143 lb 8.3 oz)   Height: 4' 11" (1.499 m)     Body mass index is 28.99 kg/m².  Physical Exam  Constitutional:       Appearance: She is well-developed.      Comments: In wheelchair   HENT:      Head: Normocephalic and atraumatic.   Eyes:      Extraocular Movements: Extraocular movements intact.   Cardiovascular:      Rate and Rhythm: Normal rate and regular rhythm.      Heart sounds: Normal heart sounds.   Pulmonary:      Effort: Pulmonary effort is normal. No respiratory distress.      Breath sounds: Normal breath sounds. No wheezing.   Abdominal:      General: Bowel sounds are normal. There is no distension.      Palpations: Abdomen is soft.      Tenderness: There is no abdominal tenderness.   Musculoskeletal:         General: No tenderness. Normal range of motion.      Cervical back: Normal range of motion.   Skin:     General: " Skin is warm and dry.   Neurological:      Mental Status: She is alert and oriented to person, place, and time.      Cranial Nerves: No cranial nerve deficit.      Deep Tendon Reflexes: Reflexes are normal and symmetric.         Lab Results   Component Value Date    WBC 8.07 03/03/2025    HGB 12.3 03/03/2025    HCT 38.6 03/03/2025     03/03/2025    CHOL 118 (L) 03/03/2025    TRIG 179 (H) 03/03/2025    HDL 43 03/03/2025    ALT 10 03/03/2025    AST 21 03/03/2025     03/03/2025    K 3.8 03/03/2025     03/03/2025    CREATININE 0.8 03/03/2025    BUN 28 (H) 03/03/2025    CO2 27 03/03/2025    TSH 3.799 03/03/2025    INR 0.9 06/09/2017    HGBA1C 6.5 (H) 03/03/2025       Assessment:       1. Encounter to establish care    2. Primary hypertension    3. Mixed hyperlipidemia    4. Coronary artery disease involving native coronary artery of native heart, unspecified whether angina present    5. Type 2 diabetes mellitus with diabetic neuropathy, without long-term current use of insulin    6. Post poliomyelitis syndrome    7. Primary osteoarthritis of right knee    8. Spinal stenosis of lumbar region with neurogenic claudication    9. Skin rash    10. Lesion of external ear, right    11. Paraparesis of both lower limbs    12. Gait disorder    13. Osteoarthritis of spine with radiculopathy, lumbar region    14. Chronic bilateral low back pain with sciatica, sciatica laterality unspecified    15. Mixed incontinence    16. Osteoporosis without current pathological fracture: worse on fosamax 5/16- Reclast 8/16, 12/20, 5/22          Plan:     Emilia was seen today for establish care.    Diagnoses and all orders for this visit:    Encounter to establish care    Primary hypertension  Comments:  elevated in clinic today; on amlodipine, HCTZ 25, irbesartan 300  Orders:  -     amLODIPine (NORVASC) 10 MG tablet; Take 1 tablet (10 mg total) by mouth once daily.  -     hydroCHLOROthiazide (HYDRODIURIL) 25 MG tablet; Take 1  tablet (25 mg total) by mouth once daily.  -     irbesartan (AVAPRO) 300 MG tablet; Take 1 tablet (300 mg total) by mouth once daily.    Mixed hyperlipidemia  -     atorvastatin (LIPITOR) 40 MG tablet; Take 1 tablet (40 mg total) by mouth once daily.    Coronary artery disease involving native coronary artery of native heart, unspecified whether angina present  -     atorvastatin (LIPITOR) 40 MG tablet; Take 1 tablet (40 mg total) by mouth once daily.    Type 2 diabetes mellitus with diabetic neuropathy, without long-term current use of insulin  -     metFORMIN (GLUCOPHAGE-XR) 500 MG ER 24hr tablet; Take 1 tablet (500 mg total) by mouth 2 (two) times daily with meals.    Post poliomyelitis syndrome  -     gabapentin (NEURONTIN) 600 MG tablet; Take 1 tablet (600 mg total) by mouth 3 (three) times daily.    Primary osteoarthritis of right knee  -     gabapentin (NEURONTIN) 600 MG tablet; Take 1 tablet (600 mg total) by mouth 3 (three) times daily.    Spinal stenosis of lumbar region with neurogenic claudication  -     gabapentin (NEURONTIN) 600 MG tablet; Take 1 tablet (600 mg total) by mouth 3 (three) times daily.    Skin rash  Comments:  SKs on back, has become inflamed, daughter noticed it while patient was dressing. Does not bother patient. Referral to dermatology for skin exam  Orders:  -     Ambulatory referral/consult to Dermatology; Future    Lesion of external ear, right  Comments:  ulcer on posterior aspect of R external ear, poorly healing, would like to see dermatologist  Orders:  -     Ambulatory referral/consult to Dermatology; Future    Paraparesis of both lower limbs  Comments:  with abnormal gait; pt still ambulates at home with walker    Gait disorder    Osteoarthritis of spine with radiculopathy, lumbar region    Chronic bilateral low back pain with sciatica, sciatica laterality unspecified  Comments:  on chronic opiates, had pain contract with previous PCP, will continue to fill medication for  patient.    Mixed incontinence  Comments:  on oxybutinin, symptoms stable  Orders:  -     oxybutynin (DITROPAN) 5 MG Tab; Take 1 tablet (5 mg total) by mouth 2 (two) times daily.    Osteoporosis without current pathological fracture: worse on fosamax 5/16- Reclast 8/16, 12/20, 5/22  -     Ambulatory referral/consult to Endocrinology; Future    Other orders  -     blood sugar diagnostic (ACCU-CHEK GUIDE TEST STRIPS) Strp; TEST BLOOD SUGAR TWO TIMES DAILY  -     alcohol swabs (DROPSAFE ALCOHOL PREP PADS) PadM; USE AS DIRECTED EVERY DAY    Medications reordered  Doing well on current management.  Benign physical examination, no issues identified. Will obtain routine labwork and age appropriate health screenings.     Health maintenance reviewed with patient.     Follow up in about 6 months (around 9/14/2025).    Tobias Holt MD  Internal Medicine / Primary Care  [facility]  3/14/2025

## 2025-03-26 ENCOUNTER — OFFICE VISIT (OUTPATIENT)
Dept: DERMATOLOGY | Facility: CLINIC | Age: 85
End: 2025-03-26
Payer: MEDICARE

## 2025-03-26 DIAGNOSIS — R21 SKIN RASH: Primary | ICD-10-CM

## 2025-03-26 DIAGNOSIS — D17.21 BENIGN LIPOMATOUS NEOPLASM OF SKIN AND SUBCUTANEOUS TISSUE OF RIGHT ARM: ICD-10-CM

## 2025-03-26 DIAGNOSIS — B35.3 TINEA PEDIS OF BOTH FEET: ICD-10-CM

## 2025-03-26 DIAGNOSIS — L28.0 LICHEN SIMPLEX CHRONICUS: ICD-10-CM

## 2025-03-26 DIAGNOSIS — L57.0 ACTINIC KERATOSIS: ICD-10-CM

## 2025-03-26 PROCEDURE — 99202 OFFICE O/P NEW SF 15 MIN: CPT | Mod: S$GLB,,, | Performed by: DERMATOLOGY

## 2025-03-26 PROCEDURE — 1160F RVW MEDS BY RX/DR IN RCRD: CPT | Mod: CPTII,S$GLB,, | Performed by: DERMATOLOGY

## 2025-03-26 PROCEDURE — 99999 PR PBB SHADOW E&M-EST. PATIENT-LVL V: CPT | Mod: PBBFAC,HCNC,, | Performed by: DERMATOLOGY

## 2025-03-26 PROCEDURE — 1101F PT FALLS ASSESS-DOCD LE1/YR: CPT | Mod: CPTII,S$GLB,, | Performed by: DERMATOLOGY

## 2025-03-26 PROCEDURE — 3288F FALL RISK ASSESSMENT DOCD: CPT | Mod: CPTII,S$GLB,, | Performed by: DERMATOLOGY

## 2025-03-26 PROCEDURE — 1125F AMNT PAIN NOTED PAIN PRSNT: CPT | Mod: CPTII,S$GLB,, | Performed by: DERMATOLOGY

## 2025-03-26 PROCEDURE — 1159F MED LIST DOCD IN RCRD: CPT | Mod: CPTII,S$GLB,, | Performed by: DERMATOLOGY

## 2025-03-26 NOTE — PROGRESS NOTES
Subjective:      Patient ID:  Emilia Alan is a 84 y.o. female who presents for   Chief Complaint   Patient presents with    Rash     Patient is here today for rash on back, spot R ear, and changes L foot.   Changes on foot x years; prior dx tinea pedis 2015    Also notes bump right arm, extensor, distal to elbow  Only present a short time per patient.    Rash on back not really symptomatic  Noted recently by daughter  Using ketoconazole    Rash - Initial  Affected locations: back, right ear and left foot  Duration: 3 months  Signs / symptoms: asymptomatic  Severity: mild  Timing: no history of same complaint  Aggravated by: nothing  Relieving factors/Treatments tried: Rx topical steroids (Ketoconazole prn)  Improvement on treatment: mild    Review of Systems   Skin:  Positive for rash and wears hat. Negative for daily sunscreen use, activity-related sunscreen use and recent sunburn.   Hematologic/Lymphatic: Does not bruise/bleed easily.   Allergic/Immunologic: Positive for environmental allergies.     Has polio; wheelchair bound    Objective:   Physical Exam   Constitutional: She appears well-developed and well-nourished. No distress.   Neurological: She is alert and oriented to person, place, and time. She is not disoriented.   Psychiatric: She has a normal mood and affect.   Skin:   Areas Examined (abnormalities noted in diagram):   Head / Face Inspection Performed  Back Inspection Performed  LLE Inspection Performed                 Diagram Legend     Erythematous scaling macule/papule c/w actinic keratosis       Vascular papule c/w angioma      Pigmented verrucoid papule/plaque c/w seborrheic keratosis      Yellow umbilicated papule c/w sebaceous hyperplasia      Irregularly shaped tan macule c/w lentigo     1-2 mm smooth white papules consistent with Milia      Movable subcutaneous cyst with punctum c/w epidermal inclusion cyst      Subcutaneous movable cyst c/w pilar cyst      Firm pink to brown  papule c/w dermatofibroma      Pedunculated fleshy papule(s) c/w skin tag(s)      Evenly pigmented macule c/w junctional nevus     Mildly variegated pigmented, slightly irregular-bordered macule c/w mildly atypical nevus      Flesh colored to evenly pigmented papule c/w intradermal nevus       Pink pearly papule/plaque c/w basal cell carcinoma      Erythematous hyperkeratotic cursted plaque c/w SCC      Surgical scar with no sign of skin cancer recurrence      Open and closed comedones      Inflammatory papules and pustules      Verrucoid papule consistent consistent with wart     Erythematous eczematous patches and plaques     Dystrophic onycholytic nail with subungual debris c/w onychomycosis     Umbilicated papule    Erythematous-base heme-crusted tan verrucoid plaque consistent with inflamed seborrheic keratosis     Erythematous Silvery Scaling Plaque c/w Psoriasis     See annotation                    Assessment / Plan:        Tinea pedis, bilateral    Probable irritant dermatitis secondary to occlusion from back of wheelchair; bilateral symmetry argues against tinea    Actinic keratosis, ear, right    Lipoma vs other right extensor forearm, with overlying lichen simplex chronicus    The diagnoses, options, risks, benefits and alternatives were discussed with the patient.    Sufficient time was available for questions, and all questions were answered to the her satisfaction.    See below re: treatment of actinic keratosis on ear  Will refer to surgery re: lesion right arm    Recommended use of a pad to allow air flow between chair back and her back    Given asymptomatic nature of tinea on feet and changes on back, would recommended continued use of ketoconazole topically prn    Followup if symptomatology develops or any changes arise.       PROCEDURE NOTE - DESTRUCTION OF LESION(S) BY LIQUID NITROGEN  Diagnosis: actinic keratosis/keratoses  Indication: precancerous lesion(s); treatment to prevent progression to  skin cancer  Discussed diagnosis, options, risks benefits and alternatives; verbal consent obtained.  Spray application of liquid nitrogen was carried out to lesion(s) for destruction  Site(s)/number(s) treated: right ear/one  Total number of lesions treated:    Anticipated course and routine care instructions reviewed  Followup if fails to clear

## 2025-03-27 ENCOUNTER — OFFICE VISIT (OUTPATIENT)
Dept: SURGERY | Facility: CLINIC | Age: 85
End: 2025-03-27
Payer: MEDICARE

## 2025-03-27 VITALS
DIASTOLIC BLOOD PRESSURE: 63 MMHG | WEIGHT: 143.63 LBS | BODY MASS INDEX: 28.96 KG/M2 | SYSTOLIC BLOOD PRESSURE: 138 MMHG | HEART RATE: 72 BPM | HEIGHT: 59 IN

## 2025-03-27 DIAGNOSIS — D17.21 BENIGN LIPOMATOUS NEOPLASM OF SKIN AND SUBCUTANEOUS TISSUE OF RIGHT ARM: Primary | ICD-10-CM

## 2025-03-27 PROCEDURE — 1159F MED LIST DOCD IN RCRD: CPT | Mod: CPTII,S$GLB,, | Performed by: SURGERY

## 2025-03-27 PROCEDURE — 3078F DIAST BP <80 MM HG: CPT | Mod: CPTII,S$GLB,, | Performed by: SURGERY

## 2025-03-27 PROCEDURE — 1101F PT FALLS ASSESS-DOCD LE1/YR: CPT | Mod: CPTII,S$GLB,, | Performed by: SURGERY

## 2025-03-27 PROCEDURE — 99999 PR PBB SHADOW E&M-EST. PATIENT-LVL IV: CPT | Mod: PBBFAC,HCNC,, | Performed by: SURGERY

## 2025-03-27 PROCEDURE — 3075F SYST BP GE 130 - 139MM HG: CPT | Mod: CPTII,S$GLB,, | Performed by: SURGERY

## 2025-03-27 PROCEDURE — 99203 OFFICE O/P NEW LOW 30 MIN: CPT | Mod: S$GLB,,, | Performed by: SURGERY

## 2025-03-27 PROCEDURE — 3288F FALL RISK ASSESSMENT DOCD: CPT | Mod: CPTII,S$GLB,, | Performed by: SURGERY

## 2025-03-27 PROCEDURE — 1126F AMNT PAIN NOTED NONE PRSNT: CPT | Mod: CPTII,S$GLB,, | Performed by: SURGERY

## 2025-03-27 NOTE — PROGRESS NOTES
General Surgery  Initial Consult Visit    Chief Complaint: right forearm subcutaneous mass    HPI:  Patient is a 84 y.o. female, referred by Dr. Cade Beck, for evaluation of the above.  Non-tender. Noticed this about a week ago. Stable in size. Doesn't recall trauma but can't be sure. Daughter present unaware of how long this has been there.  Some overlying skin dryness but no warmth, erythema or drainage.  Takes baby aspirin but otherwise no blood thinners or other antiplatelet agents.  Dermatology note from yesterday reviewed.    Medical History:  Past Medical History:   Diagnosis Date    Allergy     Anemia 10/20/2014    Arthritis     Atherosclerosis of artery of right lower extremity: see xray 07    Diabetes mellitus     Diabetic neuropathy 2012    Gait disorder 2012    High cholesterol     History of poliomyelitis 2012    Hyperlipidemia 2013    Hypertension     Obstructive sleep apnea syndrome: dx 2008 needs CPAP 11 2017    Osteopenia     Osteoporosis, unspecified 2014    Other specified anemias 2015    Post poliomyelitis syndrome 2012    Renal manifestation of secondary diabetes mellitus     Sleep apnea     Type II or unspecified type diabetes mellitus without mention of complication, not stated as uncontrolled 2015    Unspecified essential hypertension 2015     Surgical History:  Past Surgical History:   Procedure Laterality Date    CATARACT EXTRACTION       SECTION      CHOLECYSTECTOMY      COLONOSCOPY N/A 2017    Procedure: COLONOSCOPY;  Surgeon: Karen Lebron MD;  Location: [facility] ([address] [place] [address]);  Service: Endoscopy;  Laterality: N/A;    CORONARY ANGIOGRAPHY Right 2024    Procedure: ANGIOGRAM, CORONARY ARTERY;  Surgeon: Sotero Merlos MD;  Location: [facility];  Service: Cardiology;  Laterality: Right;    EYE SURGERY      FRACTURE SURGERY       Social History:  Social History[1]    Review of Systems:  See HPI for  "pertinent positives and negatives. Other systems unrelated to current encounter. Presents in wheelchair.    Physical Exam:  /63 (BP Location: Left arm, Patient Position: Sitting)   Pulse 72   Ht 4' 11" (1.499 m)   Wt 65.2 kg (143 lb 10.1 oz)   BMI 29.01 kg/m²   Physical Exam  Vitals reviewed.   Constitutional:       General: She is not in acute distress.     Comments: Pleasant, in wheelchair.   Cardiovascular:      Rate and Rhythm: Normal rate.   Pulmonary:      Effort: No respiratory distress.   Musculoskeletal:      Comments: Mobile well circumscribed soft tissue density mass dorsal right forearm in the subcutaneous tissues about 3cm in size, no underlying fixity. No bruising.   Skin:     General: Skin is warm.   Neurological:      General: No focal deficit present.      Mental Status: She is alert and oriented to person, place, and time.   Psychiatric:         Mood and Affect: Mood normal.         Assessment/Plan:  Diagnoses and all orders for this visit:    Benign lipomatous neoplasm of skin and subcutaneous tissue of right arm  -     Ambulatory referral/consult to General Surgery  -     US Extremity Non Vascular Complete Right; Future      Emilia is a 84 y.o. female with a non-tender, mobile subcutaneous right forearm mass.  Given the reasonably large size with what's described as fairly recent onset, will get an ultrasound to make sure this isn't a hematoma which would be expected to resolve over the next few weeks with warm compresses - discussed.  If US consistent with lipoma, she would like this removed and will schedule for minor procedure room.    Ash Linder MD FACS  Minimally Invasive General & Bariatric Surgery  [facility] [place], [place]         [1]   Social History  Tobacco Use    Smoking status: Never    Smokeless tobacco: Never   Substance Use Topics    Alcohol use: No    Drug use: No     "

## 2025-04-03 DIAGNOSIS — E11.40 TYPE 2 DIABETES MELLITUS WITH DIABETIC NEUROPATHY, WITHOUT LONG-TERM CURRENT USE OF INSULIN: ICD-10-CM

## 2025-04-03 DIAGNOSIS — M17.11 PRIMARY OSTEOARTHRITIS OF RIGHT KNEE: ICD-10-CM

## 2025-04-03 DIAGNOSIS — G14 POST POLIOMYELITIS SYNDROME: ICD-10-CM

## 2025-04-03 DIAGNOSIS — M48.062 SPINAL STENOSIS OF LUMBAR REGION WITH NEUROGENIC CLAUDICATION: ICD-10-CM

## 2025-04-03 RX ORDER — HYDROCODONE BITARTRATE AND ACETAMINOPHEN 10; 325 MG/1; MG/1
1 TABLET ORAL EVERY 6 HOURS PRN
Qty: 120 TABLET | Refills: 0 | Status: SHIPPED | OUTPATIENT
Start: 2025-04-03 | End: 2025-05-04

## 2025-04-03 NOTE — TELEPHONE ENCOUNTER
----- Message from Ning sent at 4/3/2025  8:16 AM CDT -----  Contact: Refill [phone]  Requesting an RX refill or new RX.Is this a refill or new RX: REFILLRX name and strength (copy/paste from chart): Start Date End DateHYDROcodone-acetaminophen (NORCO)  mg per  Is this a 30 day or 90 day RX: Pharmacy name and phone # (copy/paste from chart):  [facility] [id number] [facility] [id number]: [phone] Fax: [phone] doctors have asked that we provide their patients with the following 2 reminders -- prescription refills can take up to 72 hours, and a friendly reminder that in the future you can use your [facility] account to request refills:

## 2025-04-03 NOTE — TELEPHONE ENCOUNTER
No care due was identified.  [facility] Embedded Care Due Messages. Reference number: [id number].   4/03/2025 8:22:22 AM CDT

## 2025-04-23 ENCOUNTER — OFFICE VISIT (OUTPATIENT)
Dept: URGENT CARE | Facility: CLINIC | Age: 85
End: 2025-04-23
Payer: MEDICARE

## 2025-04-23 VITALS
HEIGHT: 59 IN | DIASTOLIC BLOOD PRESSURE: 74 MMHG | HEART RATE: 88 BPM | BODY MASS INDEX: 28.83 KG/M2 | TEMPERATURE: 98 F | RESPIRATION RATE: 20 BRPM | OXYGEN SATURATION: 94 % | WEIGHT: 143 LBS | SYSTOLIC BLOOD PRESSURE: 129 MMHG

## 2025-04-23 DIAGNOSIS — H61.23 BILATERAL IMPACTED CERUMEN: Primary | ICD-10-CM

## 2025-04-23 PROCEDURE — 69209 REMOVE IMPACTED EAR WAX UNI: CPT | Mod: 50,S$GLB,, | Performed by: NURSE PRACTITIONER

## 2025-04-23 PROCEDURE — 99499 UNLISTED E&M SERVICE: CPT | Mod: S$GLB,,, | Performed by: NURSE PRACTITIONER

## 2025-04-23 NOTE — PROCEDURES
Ear Cerumen Removal    Date/Time: 4/23/2025 10:15 AM    Performed by: Fátima Mendoza FNP-BC  Authorized by: Fátima Mendoza FNP-BC    Consent Done?:  Yes (Verbal)    Local anesthetic:  None  Ceruminolytics applied: Ceruminolytics applied prior to the procedure    Medication Used:  Cerumenex  Location details:  Both ears  Procedure type: irrigation    Cerumen  Removal Results:  Cerumen completely removed  Patient tolerance:  Patient tolerated the procedure well with no immediate complications

## 2025-04-23 NOTE — PROGRESS NOTES
"Subjective:      Patient ID: Emilia Alan is a 84 y.o. female.    Vitals:  height is 4' 11" (1.499 m) and weight is 64.9 kg (143 lb). Her oral temperature is 97.7 °F (36.5 °C). Her blood pressure is 129/74 and her pulse is 88. Her respiration is 20 and oxygen saturation is 94% (abnormal).     Chief Complaint: Ear Fullness    This is a 84 y.o. female who presents today with a chief complaint of decreased hearing with suspicion of ears being clogged with wax. Has hearing aids appt on 5/7. This has been going on for 2 weeks. Denies ear pain or ear drainage.     Ear Fullness   There is pain in both ears. This is a new problem. The current episode started 1 to 4 weeks ago. The problem occurs constantly. The problem has been gradually worsening. There has been no fever. The pain is at a severity of 0/10. The patient is experiencing no pain. Associated symptoms include hearing loss. Pertinent negatives include no coughing, diarrhea, ear discharge, headaches, rash or sore throat. She has tried nothing for the symptoms. The treatment provided no relief.       Constitution: Negative for fever.   HENT:  Positive for hearing loss. Negative for ear pain, ear discharge, tinnitus and sore throat.    Respiratory:  Negative for cough.    Gastrointestinal:  Negative for diarrhea.   Skin:  Negative for rash.   Neurological:  Negative for headaches.      Objective:     Physical Exam   Constitutional: She is oriented to person, place, and time.   HENT:   Head: Normocephalic.   Ears:   Right Ear: impacted cerumen  Left Ear: impacted cerumen     Comments: After bilateral ear wash, TM visualized and clear. No further cerumen in ear canals. Pt states she already hears better.   Mouth/Throat: Mucous membranes are moist.   Cardiovascular: Normal rate.   Pulmonary/Chest: Effort normal.   Musculoskeletal: Normal range of motion.         General: Normal range of motion.   Neurological: She is alert and oriented to person, place, and " time.   Skin: Skin is warm and dry.   Psychiatric: Her behavior is normal. Mood normal.   Nursing note and vitals reviewed.      Assessment:     1. Bilateral impacted cerumen        Plan:   Ear Cerumen Removal    Date/Time: 4/23/2025 10:15 AM    Performed by: Fátima Mendoza FNP-BC  Authorized by: Fátima Mendoza FNP-BC    Consent Done?:  Yes (Verbal)    Local anesthetic:  None  Ceruminolytics applied: Ceruminolytics applied prior to the procedure    Medication Used:  Cerumenex  Location details:  Both ears  Procedure type: irrigation    Cerumen  Removal Results:  Cerumen completely removed  Patient tolerance:  Patient tolerated the procedure well with no immediate complications    Bilateral impacted cerumen  -     Ear Cerumen Removal

## 2025-05-05 DIAGNOSIS — M17.11 PRIMARY OSTEOARTHRITIS OF RIGHT KNEE: ICD-10-CM

## 2025-05-05 DIAGNOSIS — E11.40 TYPE 2 DIABETES MELLITUS WITH DIABETIC NEUROPATHY, WITHOUT LONG-TERM CURRENT USE OF INSULIN: ICD-10-CM

## 2025-05-05 DIAGNOSIS — G14 POST POLIOMYELITIS SYNDROME: ICD-10-CM

## 2025-05-05 DIAGNOSIS — M48.062 SPINAL STENOSIS OF LUMBAR REGION WITH NEUROGENIC CLAUDICATION: ICD-10-CM

## 2025-05-05 NOTE — TELEPHONE ENCOUNTER
No care due was identified.  [facility] Embedded Care Due Messages. Reference number: [id number].   5/05/2025 10:13:29 AM CDT

## 2025-05-05 NOTE — TELEPHONE ENCOUNTER
----- Message from Sarina sent at 5/5/2025  9:35 AM CDT -----  Contact: [phone] Pt  Requesting an RX refill or new RX. Is this a refill or new RX:Refill  RX name and strength (copy/paste from chart):  HYDROcodone-acetaminophen (NORCO)  mg per tablet 120 tablet Is this a 30 day or 90 day RX:120 Pharmacy name and phone # (copy/paste from chart):[facility] [id number] [facility] [address]Fkdlo: [phone] Fax: [phone]

## 2025-05-07 RX ORDER — HYDROCODONE BITARTRATE AND ACETAMINOPHEN 10; 325 MG/1; MG/1
1 TABLET ORAL EVERY 6 HOURS PRN
Qty: 120 TABLET | Refills: 0 | Status: SHIPPED | OUTPATIENT
Start: 2025-05-07 | End: 2025-06-07

## 2025-06-03 ENCOUNTER — OFFICE VISIT (OUTPATIENT)
Dept: PODIATRY | Facility: CLINIC | Age: 85
End: 2025-06-03
Payer: MEDICARE

## 2025-06-03 VITALS
SYSTOLIC BLOOD PRESSURE: 155 MMHG | HEART RATE: 76 BPM | RESPIRATION RATE: 18 BRPM | BODY MASS INDEX: 28.45 KG/M2 | WEIGHT: 141.13 LBS | DIASTOLIC BLOOD PRESSURE: 71 MMHG | HEIGHT: 59 IN

## 2025-06-03 DIAGNOSIS — G60.9 IDIOPATHIC PERIPHERAL NEUROPATHY: ICD-10-CM

## 2025-06-03 DIAGNOSIS — E11.40 TYPE 2 DIABETES MELLITUS WITH DIABETIC NEUROPATHY, WITHOUT LONG-TERM CURRENT USE OF INSULIN: Primary | ICD-10-CM

## 2025-06-03 DIAGNOSIS — B35.1 ONYCHOMYCOSIS DUE TO DERMATOPHYTE: ICD-10-CM

## 2025-06-03 DIAGNOSIS — M21.372 FOOT DROP, LEFT: ICD-10-CM

## 2025-06-03 DIAGNOSIS — L84 CORN OR CALLUS: ICD-10-CM

## 2025-06-03 DIAGNOSIS — G14 POST-POLIO SYNDROME: ICD-10-CM

## 2025-06-03 PROCEDURE — 11055 PARING/CUTG B9 HYPRKER LES 1: CPT | Mod: S$GLB,,, | Performed by: PODIATRIST

## 2025-06-03 PROCEDURE — 99499 UNLISTED E&M SERVICE: CPT | Mod: S$GLB,,, | Performed by: PODIATRIST

## 2025-06-03 PROCEDURE — 11721 DEBRIDE NAIL 6 OR MORE: CPT | Mod: XS,Q9,S$GLB, | Performed by: PODIATRIST

## 2025-06-03 PROCEDURE — 99999 PR PBB SHADOW E&M-EST. PATIENT-LVL IV: CPT | Mod: PBBFAC,,, | Performed by: PODIATRIST

## 2025-06-03 RX ORDER — CALCIUM CARBONATE 160(400)MG
1 TABLET,CHEWABLE ORAL DAILY
Qty: 1 EACH | Refills: 0 | Status: SHIPPED | OUTPATIENT
Start: 2025-06-03

## 2025-06-13 ENCOUNTER — TELEPHONE (OUTPATIENT)
Dept: INTERNAL MEDICINE | Facility: CLINIC | Age: 85
End: 2025-06-13
Payer: MEDICARE

## 2025-06-13 DIAGNOSIS — M48.062 SPINAL STENOSIS OF LUMBAR REGION WITH NEUROGENIC CLAUDICATION: ICD-10-CM

## 2025-06-13 DIAGNOSIS — G14 POST POLIOMYELITIS SYNDROME: ICD-10-CM

## 2025-06-13 DIAGNOSIS — E11.40 TYPE 2 DIABETES MELLITUS WITH DIABETIC NEUROPATHY, WITHOUT LONG-TERM CURRENT USE OF INSULIN: ICD-10-CM

## 2025-06-13 DIAGNOSIS — M17.11 PRIMARY OSTEOARTHRITIS OF RIGHT KNEE: ICD-10-CM

## 2025-06-13 RX ORDER — HYDROCODONE BITARTRATE AND ACETAMINOPHEN 10; 325 MG/1; MG/1
1 TABLET ORAL EVERY 6 HOURS PRN
Qty: 120 TABLET | Refills: 0 | Status: SHIPPED | OUTPATIENT
Start: 2025-06-13 | End: 2025-07-13

## 2025-06-13 NOTE — TELEPHONE ENCOUNTER
Copied from CRM #[id number]. Topic: Medications - Medication Refill  >> Jun 13, 2025  9:27 AM Benjie wrote:  Requesting an RX refill or new RX.    Is this a refill or new RX: refill    RX name and strength (copy/paste from chart):  HYDROcodone-acetaminophen (NORCO)  mg per tablet    Is this a 30 day or 90 day RX: 120    Pharmacy name and phone # (copy/paste from chart):    [facility]  [address]  [place] [address]  Phone: [phone] Fax: [phone]     Who called and call back number:Emilia Alan [phone]    The doctors have asked that we provide their patients with the following 2 reminders -- prescription refills can take up to 72 hours, and a friendly reminder that in the future you can use your [facility] account to request refills:

## 2025-06-16 DIAGNOSIS — E11.43 TYPE 2 DIABETES MELLITUS WITH AUTONOMIC NEUROPATHY: ICD-10-CM

## 2025-06-16 RX ORDER — LANCETS
EACH MISCELLANEOUS
Qty: 200 EACH | Refills: 3 | Status: SHIPPED | OUTPATIENT
Start: 2025-06-16

## 2025-06-16 NOTE — TELEPHONE ENCOUNTER
Care Due:                  Date            Visit Type   Department     Provider  --------------------------------------------------------------------------------                                             [facility] INTERNAL  Last Visit: 03-      [facility]          RAJWINDER Holt                               -                              PRIMARY      [facility] INTERNAL  Next Visit: 09-      CARE ([facility])   RAJWINDER Holt                                                            Last  Test          Frequency    Reason                     Performed    Due Date  --------------------------------------------------------------------------------    HBA1C.......  6 months...  metFORMIN................  03- 08-    [facility] Embedded Care Due Messages. Reference number: [id number].   6/16/2025 1:54:53 AM CDT

## 2025-06-16 NOTE — TELEPHONE ENCOUNTER
Refill Routing Note   Medication(s) are not appropriate for processing by [facility] for the following reason(s):        No active prescription written by provider    [facility] action(s):  Defer     Requires labs : Yes             Appointments  past 12m or future 3m with PCP    Date Provider   Last Visit   3/14/2025 Tobias Holt MD   Next Visit   9/19/2025 Tobias Holt MD   ED visits in past 90 days: 0        Note composed:11:51 AM 06/16/2025

## 2025-06-23 NOTE — PROGRESS NOTES
Subjective:      Patient ID: Emilia Alan is a 84 y.o. female.    Chief Complaint: Diabetic Foot Exam (Tobias Holt MD at 3/14/2025 ), Nail Care, and Foot Problem (Rash )      Emilia is a 84 y.o. female who presents to the clinic for evaluation and treatment of high risk feet. 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 The patient's chief complaint is long, thick toenails     PCP: Tobias Hlot MD    Date Last Seen by PCP:   Chief Complaint   Patient presents with    Diabetic Foot Exam     Tobias Holt MD at 3/14/2025     Nail Care    Foot Problem     Rash          Current shoe gear: DM shoes     Hemoglobin A1C   Date Value Ref Range Status   03/03/2025 6.5 (H) 4.0 - 5.6 % Final     Comment:     ADA Screening Guidelines:  5.7-6.4%  Consistent with prediabetes  >or=6.5%  Consistent with diabetes    High levels of fetal hemoglobin interfere with the HbA1C  assay. Heterozygous hemoglobin variants (HbS, HgC, etc)do  not significantly interfere with this assay.   However, presence of multiple variants may affect accuracy.     11/21/2024 6.2 (H) 4.0 - 5.6 % Final     Comment:     ADA Screening Guidelines:  5.7-6.4%  Consistent with prediabetes  >or=6.5%  Consistent with diabetes    High levels  "of fetal hemoglobin interfere with the HbA1C  assay. Heterozygous hemoglobin variants (HbS, HgC, etc)do  not significantly interfere with this assay.   However, presence of multiple variants may affect accuracy.     04/15/2024 5.9 (H) 4.0 - 5.6 % Final     Comment:     ADA Screening Guidelines:  5.7-6.4%  Consistent with prediabetes  >or=6.5%  Consistent with diabetes    High levels of fetal hemoglobin interfere with the HbA1C  assay. Heterozygous hemoglobin variants (HbS, HgC, etc)do  not significantly interfere with this assay.   However, presence of multiple variants may affect accuracy.           Review of Systems   Constitutional: Negative for chills, decreased appetite and fever.   Cardiovascular:  Positive for leg swelling (stable, mild). Negative for chest pain, claudication, cyanosis and dyspnea on exertion.   Respiratory:  Negative for cough and shortness of breath.    Skin:  Positive for dry skin, nail changes and rash. Negative for color change, flushing, itching, poor wound healing, skin cancer and unusual hair distribution.   Musculoskeletal:  Positive for muscle weakness (foot drop). Negative for arthritis, back pain, falls, gout, joint pain, joint swelling and myalgias.   Gastrointestinal:  Negative for nausea and vomiting.   Neurological:  Positive for numbness and paresthesias. Negative for loss of balance.           Objective:       Vitals:    06/03/25 1451   BP: (!) 155/71   Pulse: 76   Resp: 18   Weight: 64 kg (141 lb 1.5 oz)   Height: 4' 11" (1.499 m)   PainSc: 0-No pain        Physical Exam  Vitals and nursing note reviewed.   Constitutional:       General: She is not in acute distress.     Appearance: She is well-developed. She is not diaphoretic.   Cardiovascular:      Comments: Dorsalis pedis and posterior tibial pulses are diminished Bilaterally. Toes are cool to touch. Feet are warm proximally.There is decreased digital hair. Skin is atrophic, slightly hyperpigmented, and mildly " edematous      Musculoskeletal:         General: Deformity present. No tenderness or signs of injury.      Right ankle: Normal.      Left ankle: Normal.      Right foot: No swelling, deformity or crepitus.      Left foot: No swelling, deformity or crepitus.      Comments: Dropfoot left.      Lymphadenopathy:      Comments: No palpable lymph nodes   Skin:     General: Skin is warm and dry.      Coloration: Skin is not cyanotic, mottled or pale.      Findings: No abrasion, bruising, burn, erythema, laceration, lesion, petechiae or rash.      Nails: There is no clubbing.      Comments: Nails 1-5 b/l  are elongated by 3-9mm's, thickened by 3-9 mm's, dystrophic, and are darkened in  coloration . Xerosis Bilaterally. No open lesions noted.    Hyperkeratotic tissue noted to plantar L 5th MPJ    Neurological:      Mental Status: She is alert and oriented to person, place, and time.      Comments: Woodward-Sarah 5.07 monofilamant testing is diminished Charbel feet. Sharp/dull sensation diminished Bilaterally. Light touch absent Bilaterally.       Psychiatric:         Thought Content: Thought content normal.         Judgment: Judgment normal.           Assessment:       Encounter Diagnoses   Name Primary?    Type 2 diabetes mellitus with diabetic neuropathy, without long-term current use of insulin Yes    Post-polio syndrome     Foot drop, left     Idiopathic peripheral neuropathy     Onychomycosis due to dermatophyte     Corn or callus          Plan:       Emilia was seen today for diabetic foot exam, nail care and foot problem.    Diagnoses and all orders for this visit:    Type 2 diabetes mellitus with diabetic neuropathy, without long-term current use of insulin    Post-polio syndrome    Foot drop, left    Idiopathic peripheral neuropathy    Onychomycosis due to dermatophyte    Corn or callus    Other orders  -     walker (ULTRA-LIGHT ROLLATOR) Misc; 1 Units by Misc.(Non-Drug; Combo Route) route once daily.      I  counseled the patient on her conditions, their implications and medical management.    - Shoe inspection.Patient instructed on proper foot hygeine. We discussed wearing proper shoe gear, daily foot inspections, never walking without protective shoe gear, never putting sharp instruments to feet, routine podiatric nail visits every 2-3 months.      - With patient's permission, nails were aggressively reduced and debrided x 10 to their soft tissue attachment mechanically, removing all offending nail and debris. Patient relates relief following the procedure. She will continue to monitor the areas daily, inspect her feet, wear protective shoe gear when ambulatory, moisturizer to maintain skin integrity and follow in this office in approximately 2-3 months, sooner p.r.n.    - After cleansing the  area w/ alcohol prep pad the above mentioned hyperkeratosis was trimmed utilizing No 15 scapel, to a smooth base with out incident. Patient tolerated this  well and reported comfort to the area x1

## 2025-07-01 ENCOUNTER — PATIENT MESSAGE (OUTPATIENT)
Dept: ENDOCRINOLOGY | Facility: CLINIC | Age: 85
End: 2025-07-01

## 2025-07-01 ENCOUNTER — TELEPHONE (OUTPATIENT)
Dept: INTERNAL MEDICINE | Facility: CLINIC | Age: 85
End: 2025-07-01
Payer: MEDICARE

## 2025-07-01 ENCOUNTER — TELEPHONE (OUTPATIENT)
Dept: ENDOCRINOLOGY | Facility: CLINIC | Age: 85
End: 2025-07-01

## 2025-07-01 ENCOUNTER — OFFICE VISIT (OUTPATIENT)
Dept: ENDOCRINOLOGY | Facility: CLINIC | Age: 85
End: 2025-07-01
Payer: MEDICARE

## 2025-07-01 ENCOUNTER — LAB VISIT (OUTPATIENT)
Dept: LAB | Facility: HOSPITAL | Age: 85
End: 2025-07-01
Attending: INTERNAL MEDICINE
Payer: MEDICARE

## 2025-07-01 VITALS
SYSTOLIC BLOOD PRESSURE: 142 MMHG | WEIGHT: 143.5 LBS | HEART RATE: 84 BPM | OXYGEN SATURATION: 97 % | HEIGHT: 59 IN | BODY MASS INDEX: 28.93 KG/M2 | DIASTOLIC BLOOD PRESSURE: 68 MMHG

## 2025-07-01 DIAGNOSIS — R26.9 GAIT DISORDER: Primary | ICD-10-CM

## 2025-07-01 DIAGNOSIS — M81.0 AGE-RELATED OSTEOPOROSIS WITHOUT CURRENT PATHOLOGICAL FRACTURE: ICD-10-CM

## 2025-07-01 LAB
25(OH)D3+25(OH)D2 SERPL-MCNC: 68 NG/ML (ref 30–96)
ANION GAP (OHS): 12 MMOL/L (ref 8–16)
BUN SERPL-MCNC: 29 MG/DL (ref 8–23)
CALCIUM SERPL-MCNC: 10.2 MG/DL (ref 8.7–10.5)
CHLORIDE SERPL-SCNC: 102 MMOL/L (ref 95–110)
CO2 SERPL-SCNC: 27 MMOL/L (ref 23–29)
CREAT SERPL-MCNC: 0.9 MG/DL (ref 0.5–1.4)
GFR SERPLBLD CREATININE-BSD FMLA CKD-EPI: >60 ML/MIN/1.73/M2
GLUCOSE SERPL-MCNC: 240 MG/DL (ref 70–110)
POTASSIUM SERPL-SCNC: 3.6 MMOL/L (ref 3.5–5.1)
SODIUM SERPL-SCNC: 141 MMOL/L (ref 136–145)

## 2025-07-01 PROCEDURE — 82306 VITAMIN D 25 HYDROXY: CPT | Mod: HCNC

## 2025-07-01 PROCEDURE — 3078F DIAST BP <80 MM HG: CPT | Mod: CPTII,HCNC,S$GLB, | Performed by: INTERNAL MEDICINE

## 2025-07-01 PROCEDURE — 99999 PR PBB SHADOW E&M-EST. PATIENT-LVL IV: CPT | Mod: PBBFAC,HCNC,, | Performed by: INTERNAL MEDICINE

## 2025-07-01 PROCEDURE — 3077F SYST BP >= 140 MM HG: CPT | Mod: CPTII,HCNC,S$GLB, | Performed by: INTERNAL MEDICINE

## 2025-07-01 PROCEDURE — 1125F AMNT PAIN NOTED PAIN PRSNT: CPT | Mod: CPTII,HCNC,S$GLB, | Performed by: INTERNAL MEDICINE

## 2025-07-01 PROCEDURE — 99204 OFFICE O/P NEW MOD 45 MIN: CPT | Mod: HCNC,S$GLB,, | Performed by: INTERNAL MEDICINE

## 2025-07-01 PROCEDURE — 36415 COLL VENOUS BLD VENIPUNCTURE: CPT | Mod: HCNC

## 2025-07-01 PROCEDURE — 82310 ASSAY OF CALCIUM: CPT | Mod: HCNC

## 2025-07-01 PROCEDURE — 1101F PT FALLS ASSESS-DOCD LE1/YR: CPT | Mod: CPTII,HCNC,S$GLB, | Performed by: INTERNAL MEDICINE

## 2025-07-01 PROCEDURE — G2211 COMPLEX E/M VISIT ADD ON: HCPCS | Mod: HCNC,S$GLB,, | Performed by: INTERNAL MEDICINE

## 2025-07-01 PROCEDURE — 3288F FALL RISK ASSESSMENT DOCD: CPT | Mod: CPTII,HCNC,S$GLB, | Performed by: INTERNAL MEDICINE

## 2025-07-01 RX ORDER — HEPARIN 100 UNIT/ML
500 SYRINGE INTRAVENOUS
OUTPATIENT
Start: 2025-07-01

## 2025-07-01 RX ORDER — ZOLEDRONIC ACID 5 MG/100ML
5 INJECTION, SOLUTION INTRAVENOUS
OUTPATIENT
Start: 2025-07-01

## 2025-07-01 RX ORDER — SODIUM CHLORIDE 0.9 % (FLUSH) 0.9 %
10 SYRINGE (ML) INJECTION
OUTPATIENT
Start: 2025-07-01

## 2025-07-01 RX ORDER — ACETAMINOPHEN 500 MG
500 TABLET ORAL
OUTPATIENT
Start: 2025-07-01

## 2025-07-01 NOTE — TELEPHONE ENCOUNTER
Copied from CRM #[id number]. Topic: General Inquiry - Patient Advice  >> Jul 1, 2025  9:56 AM Imelda wrote:  Patient said that she was referred to a Endocrinologist, and she currently at her appointment, and she was told that they do not accept her insurance.

## 2025-07-01 NOTE — PROGRESS NOTES
ENDOCRINOLOGY INITIAL VISIT  07/01/2025    Reason for Visit:  referred by Tobias Holt MD for evaluation of osteoporosis.    HPI:  Emilia Alan is a 84 y.o. female with hx of polio     With regards to osteoporosis:     Diagnosed with osteoporosis in 2016 based on bone density test.    Osteoporosis medication history:   Reclast - 4 doses (2016, 2017, 2020, 2022) - denies any side effects     Calcium supplements?  Yes   Consumes dairy products regularly? Yes  Eats fresh green vegetables regularly? Yes    Vit D3 intake?  0 IU per day   MVI daily     Lab Results   Component Value Date    GRMKGNOG53NF 68 07/01/2025     Weight bearing exercise?   Limited, with walker at home only   Sense of balance? poor  Recent falls? no  Patient is using the following assist devices for ambulation: rolling walker at home and wheelchair   Height loss (>2 inches)? no    Fracture history:   2005: wrist fracture due to fall   [place]   Patella   [place]     Tobacco use?  no  EtOH use?   no     Current diarrhea or h/o malabsorption? no  GERD or stomach ulcers? no  Thyroid disease? no  Anemia? no  Kidney Stones? no  Hyperparathyroidism? no    High risk medications include:  none    Post-menopausal? Age?: 45-50  ERT after menopause?: no    Mom or dad with hip fracture?: no    Family history of osteoporosis?: no     Malignancy involving bone (active or history)? no  Prior radiation treatment? no  Unexplained elevation of alkaline phosphatase? no    Dental work planned? no    Lab Results   Component Value Date    PTH 41.0 07/08/2019    PTH 28 06/09/2014    TAUIIOXV78XW 68 07/01/2025    OLGANOHQ18IM 51 10/13/2021    VKZFAEFD33TN 41 10/29/2020    CALCIUM 10.2 07/01/2025    CALCIUM 9.9 03/03/2025    CALCIUM 9.1 11/19/2024    PHOS 3.8 06/25/2024    PHOS 3.9 04/15/2024    PHOS 3.0 01/08/2024    ALKPHOS 96 03/03/2025    ALKPHOS 95 11/16/2024    ALKPHOS 86 10/12/2023    TSH 3.799 03/03/2025         Lab Results   Component Value Date     "CTELOPEPTIDE 209 08/20/2018    CTELOPEPTIDE 104 08/09/2016     Independently reviewed bone density images, region of interests appropriate at all three sites.   T scores consistent with diagnosis of osteoporosis, based on T score of -1.9 at FN and FRAX, 21/5.7% very high risk for fractures.       Review of patient's allergies indicates:   Allergen Reactions    Atenolol Other (See Comments)     Severe hypotensive    Verapamil Other (See Comments)     Severe hypotensive       Current Medications[1]      ROS: see HPI       PHYSICAL EXAM  BP (!) 142/68 (BP Location: Left arm, Patient Position: Sitting)   Pulse 84   Ht 4' 11" (1.499 m)   Wt 65.1 kg (143 lb 8.3 oz)   SpO2 97%   BMI 28.99 kg/m²   Wt Readings from Last 3 Encounters:   07/01/25 65.1 kg (143 lb 8.3 oz)   06/03/25 64 kg (141 lb 1.5 oz)   04/23/25 64.9 kg (143 lb)          IMAGING STUDIES    ASSESSMENT/PLAN    Problem List Items Addressed This Visit          1 - High    Osteoporosis without current pathological fracture: worse on fosamax 5/16- Reclast 8/16, 12/20, 5/22    Osteoporosis  Diagnosed in 2016  Risk factors: poor balance, multiple fractures and postmenopausal status     PLAN:   Four doses of reclast, last dose in 2022  Given high fracture risk and reasonable response to reclast in the past will continue for one additional infusion.   We have reviewed consent in detail   Check BCP and vitamin D today          Relevant Orders    Basic Metabolic Panel (Completed)    Vitamin D (Completed)       2     Gait disorder - Primary    Diagnosed with polia and now with degenerative changes   Using walker at home and is able to bear some weight, but in general uses wheel chair             RTC 1 year     Zaynab Cisneros MD         [1]   Current Outpatient Medications:     alcohol swabs (DROPSAFE ALCOHOL PREP PADS) PadM, USE AS DIRECTED EVERY DAY, Disp: 100 each, Rfl: 3    amLODIPine (NORVASC) 10 MG tablet, Take 1 tablet (10 mg total) by mouth once daily., Disp: " 90 tablet, Rfl: 3    aspirin (ECOTRIN) 81 MG EC tablet, Take 1 tablet (81 mg total) by mouth once daily., Disp: , Rfl:     atorvastatin (LIPITOR) 40 MG tablet, Take 1 tablet (40 mg total) by mouth once daily., Disp: 90 tablet, Rfl: 3    blood sugar diagnostic (ACCU-CHEK GUIDE TEST STRIPS) Strp, TEST BLOOD SUGAR TWO TIMES DAILY, Disp: 200 strip, Rfl: 3    blood-glucose meter (ACCU-CHEK GUIDE GLUCOSE METER) Misc, USE AS DIRECTED, Disp: 1 each, Rfl: 0    blood-glucose meter (TRUERESULT BLOOD GLUCOSE SYSTM) kit, Use as instructed, Disp: 1 each, Rfl: 0    calcium-vitamin D3-vitamin K 500-100-40 mg-unit-mcg Chew, Take 2 each by mouth., Disp: , Rfl:     clotrimazole-betamethasone 1-0.05% (LOTRISONE) cream, Apply topically 2 (two) times daily., Disp: 45 g, Rfl: 5    desloratadine (CLARINEX) 5 mg tablet, Take 5 mg by mouth once daily., Disp: , Rfl:     diclofenac sodium (VOLTAREN) 1 % Gel, , Disp: , Rfl: 3    docusate sodium (COLACE) 50 MG capsule, Take 1 capsule (50 mg total) by mouth 2 (two) times daily as needed for Constipation., Disp: , Rfl:     gabapentin (NEURONTIN) 600 MG tablet, Take 1 tablet (600 mg total) by mouth 3 (three) times daily., Disp: 270 tablet, Rfl: 3    hydroCHLOROthiazide (HYDRODIURIL) 25 MG tablet, Take 1 tablet (25 mg total) by mouth once daily., Disp: 90 tablet, Rfl: 3    HYDROcodone-acetaminophen (NORCO)  mg per tablet, Take 1 tablet by mouth every 6 (six) hours as needed for Pain (pain)., Disp: 120 tablet, Rfl: 0    irbesartan (AVAPRO) 300 MG tablet, Take 1 tablet (300 mg total) by mouth once daily., Disp: 90 tablet, Rfl: 3    ketoconazole (NIZORAL) 2 % cream, Apply topically once daily., Disp: 60 g, Rfl: 3    lancets (ACCU-CHEK SOFTCLIX LANCETS) Misc, TEST BLOOD SUGAR TWICE DAILY AS DIRECTED, Disp: 200 each, Rfl: 3    metFORMIN (GLUCOPHAGE-XR) 500 MG ER 24hr tablet, Take 1 tablet (500 mg total) by mouth 2 (two) times daily with meals., Disp: 180 tablet, Rfl: 3    multivitamin (THERAGRAN)  per tablet, Take 1 tablet by mouth once daily. , Disp: , Rfl:     oxybutynin (DITROPAN) 5 MG Tab, Take 1 tablet (5 mg total) by mouth 2 (two) times daily., Disp: 180 tablet, Rfl: 3    walker (ULTRA-LIGHT ROLLATOR) Misc, 1 Units by Misc.(Non-Drug; Combo Route) route once daily., Disp: 1 each, Rfl: 0    zoledronic acid-mannitol & water (RECLAST) 5 mg/100 mL PgBk, , Disp: , Rfl:

## 2025-07-01 NOTE — ASSESSMENT & PLAN NOTE
Diagnosed with polia and now with degenerative changes   Using walker at home and is able to bear some weight, but in general uses wheel chair

## 2025-07-01 NOTE — ASSESSMENT & PLAN NOTE
Osteoporosis  Diagnosed in 2016  Risk factors: poor balance, multiple fractures and postmenopausal status     PLAN:   Four doses of reclast, last dose in 2022  Given high fracture risk and reasonable response to reclast in the past will continue for one additional infusion.   We have reviewed consent in detail   Check BCP and vitamin D today

## 2025-07-30 ENCOUNTER — TELEPHONE (OUTPATIENT)
Dept: INTERNAL MEDICINE | Facility: CLINIC | Age: 85
End: 2025-07-30
Payer: MEDICARE

## 2025-07-31 ENCOUNTER — OFFICE VISIT (OUTPATIENT)
Dept: INTERNAL MEDICINE | Facility: CLINIC | Age: 85
End: 2025-07-31
Payer: MEDICARE

## 2025-07-31 VITALS
HEART RATE: 82 BPM | SYSTOLIC BLOOD PRESSURE: 112 MMHG | BODY MASS INDEX: 27.29 KG/M2 | HEIGHT: 59 IN | OXYGEN SATURATION: 96 % | DIASTOLIC BLOOD PRESSURE: 54 MMHG | WEIGHT: 135.38 LBS

## 2025-07-31 DIAGNOSIS — M17.11 PRIMARY OSTEOARTHRITIS OF RIGHT KNEE: ICD-10-CM

## 2025-07-31 DIAGNOSIS — G14 POST POLIOMYELITIS SYNDROME: ICD-10-CM

## 2025-07-31 DIAGNOSIS — E11.40 TYPE 2 DIABETES MELLITUS WITH DIABETIC NEUROPATHY, WITHOUT LONG-TERM CURRENT USE OF INSULIN: ICD-10-CM

## 2025-07-31 DIAGNOSIS — M48.062 SPINAL STENOSIS OF LUMBAR REGION WITH NEUROGENIC CLAUDICATION: ICD-10-CM

## 2025-07-31 DIAGNOSIS — K52.9 ACUTE GASTROENTERITIS: Primary | ICD-10-CM

## 2025-07-31 PROCEDURE — 1125F AMNT PAIN NOTED PAIN PRSNT: CPT | Mod: CPTII,HCNC,S$GLB, | Performed by: INTERNAL MEDICINE

## 2025-07-31 PROCEDURE — 99214 OFFICE O/P EST MOD 30 MIN: CPT | Mod: HCNC,S$GLB,, | Performed by: INTERNAL MEDICINE

## 2025-07-31 PROCEDURE — 3074F SYST BP LT 130 MM HG: CPT | Mod: CPTII,HCNC,S$GLB, | Performed by: INTERNAL MEDICINE

## 2025-07-31 PROCEDURE — 3078F DIAST BP <80 MM HG: CPT | Mod: CPTII,HCNC,S$GLB, | Performed by: INTERNAL MEDICINE

## 2025-07-31 PROCEDURE — 99999 PR PBB SHADOW E&M-EST. PATIENT-LVL III: CPT | Mod: PBBFAC,HCNC,, | Performed by: INTERNAL MEDICINE

## 2025-07-31 PROCEDURE — 3288F FALL RISK ASSESSMENT DOCD: CPT | Mod: CPTII,HCNC,S$GLB, | Performed by: INTERNAL MEDICINE

## 2025-07-31 PROCEDURE — 1101F PT FALLS ASSESS-DOCD LE1/YR: CPT | Mod: CPTII,HCNC,S$GLB, | Performed by: INTERNAL MEDICINE

## 2025-07-31 RX ORDER — CIPROFLOXACIN 500 MG/1
500 TABLET, FILM COATED ORAL 2 TIMES DAILY
Qty: 20 TABLET | Refills: 0 | Status: SHIPPED | OUTPATIENT
Start: 2025-07-31 | End: 2025-08-10

## 2025-07-31 RX ORDER — HYDROCODONE BITARTRATE AND ACETAMINOPHEN 10; 325 MG/1; MG/1
1 TABLET ORAL EVERY 6 HOURS PRN
Qty: 120 TABLET | Refills: 0 | Status: SHIPPED | OUTPATIENT
Start: 2025-07-31 | End: 2025-08-31

## 2025-07-31 NOTE — TELEPHONE ENCOUNTER
Copied from CRM #[id number]. Topic: Medications - Medication Refill  >> Jul 31, 2025  2:03 PM Sisi wrote:  Type:  RX Refill Request    Who Called: Patient   Refill or New Rx:new  RX Name and Strength:HYDROcodone-acetaminophen (NORCO)  mg per tablet  How is the patient currently taking it? (ex. 1XDay):N/A  Is this a 30 day or 90 day RX:N/A  Preferred Pharmacy with phone number:  [facility]  [address] [place] [id number]  Phone: [phone] Fax: [phone]  Hours: Mon-Fri, 8a-5:30p  Local or Mail Order:local   Ordering Provider:Dr LANCE Holt  Would the patient rather a call back or a response via [facility]? Call back  Best Call Back Number:[phone] (M)  Additional Information: N/A

## 2025-07-31 NOTE — TELEPHONE ENCOUNTER
No care due was identified.  [facility] Embedded Care Due Messages. Reference number: [id number].   7/31/2025 2:15:25 PM CDT

## 2025-07-31 NOTE — PROGRESS NOTES
INTERNAL MEDICINE CLINIC - SAME DAY APPOINTMENT  Progress Note    PRESENTING HISTORY     PCP: Tobias Holt MD    Chief Complaint/Reason for Visit:     Chief Complaint   Patient presents with    Diarrhea     Past 3 days    Hypotension      History of Present Illness & ROS : Ms. Emilia Alan is a 84 y.o. female.      Colonoscopy and EGD  2017  Upper GI normal. Entire colon is normal.    Started 1:30 pm three days ago.  Watery diarrhea. Greenish.  Mild soreness in stomach.  Initially 2-3 times. Today one time.  No fever.    No travel.  No visitors.  No seafood.  No salad.  She ate a lot of cucumbers.       PAST HISTORY:     Past Medical History:   Diagnosis Date    Allergy     Anemia 10/20/2014    Arthritis     Atherosclerosis of artery of right lower extremity: see xray 07    Diabetes mellitus     Diabetic neuropathy 2012    Gait disorder 2012    High cholesterol     History of poliomyelitis 2012    Hyperlipidemia 2013    Hypertension     Obstructive sleep apnea syndrome: dx 2008 needs CPAP 11 2017    Osteopenia     Osteoporosis, unspecified 2014    Other specified anemias 2015    Post poliomyelitis syndrome 2012    Renal manifestation of secondary diabetes mellitus     Sleep apnea     Type II or unspecified type diabetes mellitus without mention of complication, not stated as uncontrolled 2015    Unspecified essential hypertension 2015       Past Surgical History:   Procedure Laterality Date    CATARACT EXTRACTION       SECTION      CHOLECYSTECTOMY      COLONOSCOPY N/A 2017    Procedure: COLONOSCOPY;  Surgeon: Karen Lebron MD;  Location: [facility] ENDO [address]);  Service: Endoscopy;  Laterality: N/A;    CORONARY ANGIOGRAPHY Right 2024    Procedure: ANGIOGRAM, CORONARY ARTERY;  Surgeon: Sotero Merlos MD;  Location: [facility] CATH LAB;  Service: Cardiology;  Laterality: Right;    EYE SURGERY      FRACTURE SURGERY         Family  History   Problem Relation Name Age of Onset    Diabetes Father      Heart disease Father      Heart attack Father      Heart disease Brother      Heart disease Brother      Diabetes Daughter      Diabetes Daughter      Diabetes Daughter      Diabetes Son      Cataracts Neg Hx      Glaucoma Neg Hx      Hypertension Neg Hx      Cancer Neg Hx      Blindness Neg Hx      Amblyopia Neg Hx      Strabismus Neg Hx      Retinal detachment Neg Hx      Macular degeneration Neg Hx      Melanoma Neg Hx         Social History     Socioeconomic History    Marital status:     Number of children: 4   Tobacco Use    Smoking status: Never    Smokeless tobacco: Never   Substance and Sexual Activity    Alcohol use: No    Drug use: No    Sexual activity: Yes   Other Topics Concern    Are you pregnant or think you may be? No     Social Drivers of Health     Financial Resource Strain: Low Risk  (11/16/2024)    Overall Financial Resource Strain (CARDIA)     Difficulty of Paying Living Expenses: Not very hard   Food Insecurity: No Food Insecurity (11/16/2024)    Hunger Vital Sign     Worried About Running Out of Food in the Last Year: Never true     Ran Out of Food in the Last Year: Never true   Transportation Needs: No Transportation Needs (11/16/2024)    TRANSPORTATION NEEDS     Transportation : No   Physical Activity: Unknown (4/15/2024)    Exercise Vital Sign     Days of Exercise per Week: 0 days   Recent Concern: Physical Activity - Inactive (4/15/2024)    Exercise Vital Sign     Days of Exercise per Week: 0 days     Minutes of Exercise per Session: 0 min   Stress: No Stress Concern Present (11/16/2024)    [employer] - Occupational Stress Questionnaire     Feeling of Stress : Not at all   Housing Stability: Unknown (11/16/2024)    Housing Stability Vital Sign     Unable to Pay for Housing in the Last Year: No     Homeless in the Last Year: No       MEDICATIONS & ALLERGIES:     Medications Ordered  Prior to Encounter[1]     Review of patient's allergies indicates:   Allergen Reactions    Atenolol Other (See Comments)     Severe hypotensive    Verapamil Other (See Comments)     Severe hypotensive       Medications Reconciliation:   I have reconciled the patient's home medications with the patient/family. I have updated all changes.  Refer to After-Visit Medication List.    OBJECTIVE:     Vital Signs:  Vitals:    07/31/25 1512   BP: (!) 112/54   Pulse: 82     Wt Readings from Last 3 Encounters:   07/31/25 1512 61.4 kg (135 lb 5.8 oz)   07/01/25 1013 65.1 kg (143 lb 8.3 oz)   06/03/25 1451 64 kg (141 lb 1.5 oz)     Body mass index is 27.34 kg/m².     Physical Exam:  General:  No distress.   HEENT: Head is normocephalic, atraumatic  Eyes: Clear conjunctiva.  Neck: Supple, symmetrical neck; trachea midline.  Lungs: Clear to auscultation bilaterally and normal respiratory effort.  Cardiovascular: Heart with regular rate and rhythm.    Abdomen: Abdomen is soft, non-tender non-distended with normal bowel sounds.  Psychiatric: Normal affect. Alert.      Laboratory  Lab Results   Component Value Date    WBC 8.07 03/03/2025    HGB 12.3 03/03/2025    HCT 38.6 03/03/2025     03/03/2025    CHOL 118 (L) 03/03/2025    TRIG 179 (H) 03/03/2025    HDL 43 03/03/2025    ALT 10 03/03/2025    AST 21 03/03/2025     07/01/2025    K 3.6 07/01/2025     07/01/2025    CREATININE 0.9 07/01/2025    BUN 29 (H) 07/01/2025    CO2 27 07/01/2025    TSH 3.799 03/03/2025    INR 0.9 06/09/2017    HGBA1C 6.5 (H) 03/03/2025       ASSESSMENT & PLAN:     Acute gastroenteritis  - Suspect possible salmonella from recent cucumber outbreak.    Mild disease.     Rx:  -     ciprofloxacin HCl (CIPRO) 500 MG tablet; Take 1 tablet (500 mg total) by mouth 2 (two) times daily. for 10 days  Dispense: 20 tablet; Refill: 0    Scheduled Follow-up :  Future Appointments   Date Time Provider Department Center   7/31/2025  3:30 PM Kendrick Vincent MD  [facility] IM [facility]   9/9/2025  1:30 PM Carole Moore DPM [facility] POD [facility]   9/19/2025 10:00 AM Tobias Holt MD [facility] IM [facility]       After Visit Medication List :     Medication List            Accurate as of July 31, 2025  3:28 PM. If you have any questions, ask your nurse or doctor.                START taking these medications      ciprofloxacin HCl 500 MG tablet  Commonly known as: CIPRO  Take 1 tablet (500 mg total) by mouth 2 (two) times daily. for 10 days  Started by: Kendrick Vincent MD            CONTINUE taking these medications      alcohol swabs Padm  Commonly known as: DROPSAFE ALCOHOL PREP PADS  USE AS DIRECTED EVERY DAY     amLODIPine 10 MG tablet  Commonly known as: NORVASC  Take 1 tablet (10 mg total) by mouth once daily.     aspirin 81 MG EC tablet  Commonly known as: ECOTRIN  Take 1 tablet (81 mg total) by mouth once daily.     atorvastatin 40 MG tablet  Commonly known as: LIPITOR  Take 1 tablet (40 mg total) by mouth once daily.     blood sugar diagnostic Strp  Commonly known as: ACCU-CHEK GUIDE TEST STRIPS  TEST BLOOD SUGAR TWO TIMES DAILY     * blood-glucose meter kit  Commonly known as: TRUERESULT BLOOD GLUCOSE SYSTM  Use as instructed     * ACCU-CHEK GUIDE GLUCOSE METER Misc  Generic drug: blood-glucose meter  USE AS DIRECTED     calcium-vitamin D3-vitamin K 500 mg-100 unit -40 mcg Chew     clotrimazole-betamethasone 1-0.05% cream  Commonly known as: LOTRISONE  Apply topically 2 (two) times daily.     desloratadine 5 mg tablet  Commonly known as: CLARINEX     diclofenac sodium 1 % Gel  Commonly known as: VOLTAREN     docusate sodium 50 MG capsule  Commonly known as: COLACE  Take 1 capsule (50 mg total) by mouth 2 (two) times daily as needed for Constipation.     gabapentin 600 MG tablet  Commonly known as: NEURONTIN  Take 1 tablet (600 mg total) by mouth 3 (three) times daily.     HYDROcodone-acetaminophen  mg per tablet  Commonly known as: NORCO  Take 1 tablet by mouth  every 6 (six) hours as needed for Pain (pain).     irbesartan 300 MG tablet  Commonly known as: AVAPRO  Take 1 tablet (300 mg total) by mouth once daily.     ketoconazole 2 % cream  Commonly known as: NIZORAL  Apply topically once daily.     lancets Misc  Commonly known as: ACCU-CHEK SOFTCLIX LANCETS  TEST BLOOD SUGAR TWICE DAILY AS DIRECTED     metFORMIN 500 MG ER 24hr tablet  Commonly known as: GLUCOPHAGE-XR  Take 1 tablet (500 mg total) by mouth 2 (two) times daily with meals.     multivitamin per tablet  Commonly known as: THERAGRAN     oxybutynin 5 MG Tab  Commonly known as: DITROPAN  Take 1 tablet (5 mg total) by mouth 2 (two) times daily.     ULTRA-LIGHT ROLLATOR Misc  Generic drug: walker  1 Units by Misc.(Non-Drug; Combo Route) route once daily.     zoledronic acid-mannitol & water 5 mg/100 mL Pgbk  Commonly known as: RECLAST           * This list has 2 medication(s) that are the same as other medications prescribed for you. Read the directions carefully, and ask your doctor or other care provider to review them with you.                   Where to Get Your Medications        These medications were sent to [facility]  [address] [place] [id number]      Hours: Mon-Fri, 8a-5:30p Phone: [phone]   ciprofloxacin HCl 500 MG tablet         Signing Physician:  Kendrick Vincent MD         [1]   Current Outpatient Medications on File Prior to Visit   Medication Sig Dispense Refill    alcohol swabs (DROPSAFE ALCOHOL PREP PADS) PadM USE AS DIRECTED EVERY  each 3    amLODIPine (NORVASC) 10 MG tablet Take 1 tablet (10 mg total) by mouth once daily. 90 tablet 3    aspirin (ECOTRIN) 81 MG EC tablet Take 1 tablet (81 mg total) by mouth once daily.      atorvastatin (LIPITOR) 40 MG tablet Take 1 tablet (40 mg total) by mouth once daily. 90 tablet 3    blood sugar diagnostic (ACCU-CHEK GUIDE TEST STRIPS) Strp TEST BLOOD SUGAR TWO TIMES DAILY 200 strip 3    blood-glucose meter (ACCU-CHEK  GUIDE GLUCOSE METER) [facility] USE AS DIRECTED 1 each 0    blood-glucose meter (TRUERESULT BLOOD GLUCOSE SYSTM) kit Use as instructed 1 each 0    calcium-vitamin D3-vitamin K 500-100-40 mg-unit-mcg Chew Take 2 each by mouth.      clotrimazole-betamethasone 1-0.05% (LOTRISONE) cream Apply topically 2 (two) times daily. 45 g 5    desloratadine (CLARINEX) 5 mg tablet Take 5 mg by mouth once daily.      diclofenac sodium (VOLTAREN) 1 % Gel   3    docusate sodium (COLACE) 50 MG capsule Take 1 capsule (50 mg total) by mouth 2 (two) times daily as needed for Constipation.      gabapentin (NEURONTIN) 600 MG tablet Take 1 tablet (600 mg total) by mouth 3 (three) times daily. 270 tablet 3    HYDROcodone-acetaminophen (NORCO)  mg per tablet Take 1 tablet by mouth every 6 (six) hours as needed for Pain (pain). 120 tablet 0    irbesartan (AVAPRO) 300 MG tablet Take 1 tablet (300 mg total) by mouth once daily. 90 tablet 3    ketoconazole (NIZORAL) 2 % cream Apply topically once daily. 60 g 3    lancets (ACCU-CHEK SOFTCLIX LANCETS) Misc TEST BLOOD SUGAR TWICE DAILY AS DIRECTED 200 each 3    metFORMIN (GLUCOPHAGE-XR) 500 MG ER 24hr tablet Take 1 tablet (500 mg total) by mouth 2 (two) times daily with meals. 180 tablet 3    multivitamin (THERAGRAN) per tablet Take 1 tablet by mouth once daily.       oxybutynin (DITROPAN) 5 MG Tab Take 1 tablet (5 mg total) by mouth 2 (two) times daily. 180 tablet 3    walker (ULTRA-LIGHT ROLLATOR) Misc 1 Units by Misc.(Non-Drug; Combo Route) route once daily. 1 each 0    zoledronic acid-mannitol & water (RECLAST) 5 mg/100 mL PgBk        No current facility-administered medications on file prior to visit.

## 2025-09-02 DIAGNOSIS — N39.46 MIXED INCONTINENCE: ICD-10-CM

## 2025-09-02 RX ORDER — OXYBUTYNIN CHLORIDE 5 MG/1
5 TABLET ORAL 2 TIMES DAILY
Qty: 180 TABLET | Refills: 3 | Status: SHIPPED | OUTPATIENT
Start: 2025-09-02

## 2025-09-04 DIAGNOSIS — E11.40 TYPE 2 DIABETES MELLITUS WITH DIABETIC NEUROPATHY, WITHOUT LONG-TERM CURRENT USE OF INSULIN: ICD-10-CM

## 2025-09-04 DIAGNOSIS — G14 POST POLIOMYELITIS SYNDROME: ICD-10-CM

## 2025-09-04 DIAGNOSIS — M17.11 PRIMARY OSTEOARTHRITIS OF RIGHT KNEE: ICD-10-CM

## 2025-09-04 DIAGNOSIS — M48.062 SPINAL STENOSIS OF LUMBAR REGION WITH NEUROGENIC CLAUDICATION: ICD-10-CM

## 2025-09-04 RX ORDER — HYDROCODONE BITARTRATE AND ACETAMINOPHEN 10; 325 MG/1; MG/1
1 TABLET ORAL EVERY 6 HOURS PRN
Qty: 120 TABLET | Refills: 0 | Status: SHIPPED | OUTPATIENT
Start: 2025-09-04 | End: 2025-10-05

## (undated) DEVICE — GUIDEWIRE STF .035X180CM ANG

## (undated) DEVICE — OMNIPAQUE CONTRAST 350MG/100ML

## (undated) DEVICE — SPIKE SHORT LG BORE 1-WAY 2IN

## (undated) DEVICE — GUIDEWIRE SUPRA CORE 035 190CM

## (undated) DEVICE — PAD DEFIB CADENCE ADULT R2

## (undated) DEVICE — Device

## (undated) DEVICE — TRAY CATH LAB OMC

## (undated) DEVICE — DRAPE ANGIO BRACH 38X44IN

## (undated) DEVICE — TRANSDUCER ADULT DISP

## (undated) DEVICE — GUIDEWIRE NITINOL

## (undated) DEVICE — VISE RADIFOCUS MULTI TORQUE

## (undated) DEVICE — KIT CUSTOM MANIFOLD

## (undated) DEVICE — KIT GLIDESHEATH SLEND 6FR 10CM

## (undated) DEVICE — CATH DIAG IMPULSE 6FR FR4

## (undated) DEVICE — HEMOSTAT VASC BAND REG 24CM

## (undated) DEVICE — CATH JACKY RADIAL 3.5 110CM